# Patient Record
Sex: MALE | Race: BLACK OR AFRICAN AMERICAN | Employment: OTHER | URBAN - METROPOLITAN AREA
[De-identification: names, ages, dates, MRNs, and addresses within clinical notes are randomized per-mention and may not be internally consistent; named-entity substitution may affect disease eponyms.]

---

## 2017-01-16 ENCOUNTER — HOSPITAL ENCOUNTER (EMERGENCY)
Age: 67
Discharge: HOME | DRG: 638 | End: 2017-01-17
Attending: EMERGENCY MEDICINE
Payer: MEDICARE

## 2017-01-16 DIAGNOSIS — R73.9 HYPERGLYCEMIA: Primary | ICD-10-CM

## 2017-01-16 DIAGNOSIS — N18.9 CHRONIC KIDNEY DISEASE, UNSPECIFIED STAGE: ICD-10-CM

## 2017-01-17 VITALS
HEIGHT: 70 IN | RESPIRATION RATE: 16 BRPM | HEART RATE: 70 BPM | BODY MASS INDEX: 17.9 KG/M2 | TEMPERATURE: 98.2 F | DIASTOLIC BLOOD PRESSURE: 87 MMHG | SYSTOLIC BLOOD PRESSURE: 167 MMHG | WEIGHT: 125 LBS | OXYGEN SATURATION: 100 %

## 2017-01-17 LAB
ALBUMIN SERPL BCP-MCNC: 3.6 G/DL (ref 3.5–5)
ALBUMIN/GLOB SERPL: 1.2 {RATIO} (ref 1.1–2.2)
ALP SERPL-CCNC: 93 U/L (ref 45–117)
ALT SERPL-CCNC: 44 U/L (ref 12–78)
ANION GAP BLD CALC-SCNC: 12 MMOL/L (ref 5–15)
APPEARANCE UR: CLEAR
AST SERPL W P-5'-P-CCNC: 45 U/L (ref 15–37)
BACTERIA URNS QL MICRO: NEGATIVE /HPF
BASOPHILS # BLD AUTO: 0 K/UL (ref 0–0.1)
BASOPHILS # BLD: 0 % (ref 0–1)
BILIRUB SERPL-MCNC: 0.5 MG/DL (ref 0.2–1)
BILIRUB UR QL: NEGATIVE
BUN SERPL-MCNC: 39 MG/DL (ref 6–20)
BUN/CREAT SERPL: 8 (ref 12–20)
CALCIUM SERPL-MCNC: 8.6 MG/DL (ref 8.5–10.1)
CHLORIDE SERPL-SCNC: 99 MMOL/L (ref 97–108)
CO2 SERPL-SCNC: 19 MMOL/L (ref 21–32)
COLOR UR: ABNORMAL
CREAT SERPL-MCNC: 5.12 MG/DL (ref 0.7–1.3)
EOSINOPHIL # BLD: 0.1 K/UL (ref 0–0.4)
EOSINOPHIL NFR BLD: 1 % (ref 0–7)
EPITH CASTS URNS QL MICRO: ABNORMAL /LPF
ERYTHROCYTE [DISTWIDTH] IN BLOOD BY AUTOMATED COUNT: 14.4 % (ref 11.5–14.5)
GLOBULIN SER CALC-MCNC: 3 G/DL (ref 2–4)
GLUCOSE BLD STRIP.AUTO-MCNC: 125 MG/DL (ref 65–100)
GLUCOSE BLD STRIP.AUTO-MCNC: 462 MG/DL (ref 65–100)
GLUCOSE BLD STRIP.AUTO-MCNC: >600 MG/DL (ref 65–100)
GLUCOSE BLD STRIP.AUTO-MCNC: >600 MG/DL (ref 65–100)
GLUCOSE SERPL-MCNC: 431 MG/DL (ref 65–100)
GLUCOSE UR STRIP.AUTO-MCNC: >1000 MG/DL
HCT VFR BLD AUTO: 27 % (ref 36.6–50.3)
HGB BLD-MCNC: 9.4 G/DL (ref 12.1–17)
HGB UR QL STRIP: ABNORMAL
HYALINE CASTS URNS QL MICRO: ABNORMAL /LPF (ref 0–5)
KETONES UR QL STRIP.AUTO: NEGATIVE MG/DL
LACTATE SERPL-SCNC: 1.9 MMOL/L (ref 0.4–2)
LEUKOCYTE ESTERASE UR QL STRIP.AUTO: NEGATIVE
LYMPHOCYTES # BLD AUTO: 13 % (ref 12–49)
LYMPHOCYTES # BLD: 1.2 K/UL (ref 0.8–3.5)
MCH RBC QN AUTO: 30.8 PG (ref 26–34)
MCHC RBC AUTO-ENTMCNC: 34.8 G/DL (ref 30–36.5)
MCV RBC AUTO: 88.5 FL (ref 80–99)
MONOCYTES # BLD: 0.8 K/UL (ref 0–1)
MONOCYTES NFR BLD AUTO: 8 % (ref 5–13)
NEUTS SEG # BLD: 7.3 K/UL (ref 1.8–8)
NEUTS SEG NFR BLD AUTO: 78 % (ref 32–75)
NITRITE UR QL STRIP.AUTO: NEGATIVE
PH UR STRIP: 7 [PH] (ref 5–8)
PLATELET # BLD AUTO: 245 K/UL (ref 150–400)
POTASSIUM SERPL-SCNC: 4.3 MMOL/L (ref 3.5–5.1)
PROT SERPL-MCNC: 6.6 G/DL (ref 6.4–8.2)
PROT UR STRIP-MCNC: 30 MG/DL
RBC # BLD AUTO: 3.05 M/UL (ref 4.1–5.7)
RBC #/AREA URNS HPF: ABNORMAL /HPF (ref 0–5)
SERVICE CMNT-IMP: ABNORMAL
SODIUM SERPL-SCNC: 130 MMOL/L (ref 136–145)
SP GR UR REFRACTOMETRY: 1.02 (ref 1–1.03)
UA: UC IF INDICATED,UAUC: ABNORMAL
UROBILINOGEN UR QL STRIP.AUTO: 0.2 EU/DL (ref 0.2–1)
WBC # BLD AUTO: 9.4 K/UL (ref 4.1–11.1)
WBC URNS QL MICRO: ABNORMAL /HPF (ref 0–4)

## 2017-01-17 RX ADMIN — SODIUM CHLORIDE 1000 ML: 900 INJECTION, SOLUTION INTRAVENOUS at 04:45

## 2017-01-17 RX ADMIN — SODIUM CHLORIDE 1000 ML: 900 INJECTION, SOLUTION INTRAVENOUS at 01:37

## 2017-01-17 RX ADMIN — INSULIN HUMAN 10 UNITS: 100 INJECTION, SOLUTION PARENTERAL at 01:36

## 2017-01-17 RX ADMIN — INSULIN HUMAN 10 UNITS: 100 INJECTION, SOLUTION PARENTERAL at 04:45

## 2017-01-17 NOTE — ED NOTES
Went in to give pt insulin and start iv fluid bolus and pts iv pulled out. Attempted x 5 to restart iv, unsuccessful. Spoke with Ed charge RN who will attempt iv. Dr Angela Mcghee informed.

## 2017-01-17 NOTE — ED PROVIDER NOTES
HPI Comments: Iliana Recinos. is a 61 y.o. male with h/o DM, Alzheimer's disease who presents via EMS to the ED from [facility] with c/o hyperglycemia. Per EMS, SNF staff reported that pt's BG has been reading \"high\" all day and the last time he received insulin was at 1100 this morning. Pt with no complaints, denies any pain, nausea, or SOB. PCP: PROVIDER UNKNOWN  PMHx significant for: Alzheimer's disease, CKD, HTN, DM, hyperlipidemia, prostate cancer  PSHx significant for: right shoulder surgery, EGD, colonoscopy   Social Hx:  smoking (-)                      alcohol (-)                        This history is limited by the condition of the pt. There are no other complaints, changes, or physical findings at this time. Written by IMAN Paez, as dictated by Augie Mary MD     The history is provided by the patient and the nursing home. The history is limited by the condition of the patient. Past Medical History:   Diagnosis Date    Alzheimer disease     CAD (coronary artery disease)     Cancer ([address]      prostate    Chronic kidney disease     Dementia     Diabetes ([address]     DM (diabetes mellitus), type 2, uncontrolled ([address]     Hyperlipidemia     Hypertension     Memory loss or impairment     Other ill-defined conditions(799.89)      blind R eye-retina detachment    Other ill-defined conditions(799.89)      right cerebellopontine angle lipoma.         Past Surgical History:   Procedure Laterality Date    Hx shoulder arthroscopy       right    Hx urological       prostate bx    Hx heent       retinal detachment    Upper gi endoscopy,biopsy  11/12/2014          Colonoscopy,diagnostic  11/12/2014               Family History:   Problem Relation Age of Onset    Heart Disease Mother      Pacemaker    Cancer Father        Social History     Social History    Marital status:      Spouse name: N/A    Number of children: N/A    Years of education: N/A Occupational History    Not on file. Social History Main Topics    Smoking status: Never Smoker    Smokeless tobacco: Never Used    Alcohol use No    Drug use: No    Sexual activity: Not Currently     Other Topics Concern    Not on file     Social History Narrative         ALLERGIES: Ace inhibitors and Arb-angiotensin receptor antagonist    Review of Systems   Constitutional: Negative. Negative for fever. HENT: Negative. Eyes: Negative. Respiratory: Negative. Negative for cough, chest tightness and shortness of breath. Cardiovascular: Negative. Negative for chest pain and leg swelling. Gastrointestinal: Negative. Negative for abdominal pain, diarrhea, nausea and vomiting. Endocrine: Negative. Genitourinary: Negative. Negative for difficulty urinating and dysuria. Musculoskeletal: Negative. Negative for myalgias. Skin: Negative. Neurological: Negative. Psychiatric/Behavioral: Negative. All other systems reviewed and are negative. Patient Vitals for the past 12 hrs:   Temp Pulse Resp BP SpO2   01/17/17 0652 - 81 18 161/80 -   01/17/17 0630 - 76 24 159/79 -   01/17/17 0615 - 79 18 186/88 -   01/17/17 0545 - 75 18 155/73 100 %   01/17/17 0530 - 75 19 175/72 100 %   01/17/17 0515 - 73 19 175/80 100 %   01/17/17 0500 - 72 19 182/77 100 %   01/17/17 0448 - 70 14 177/77 100 %   01/17/17 0415 - 79 19 160/83 100 %   01/17/17 0400 - 80 21 155/77 100 %   01/17/17 0300 - 81 15 160/75 100 %   01/17/17 0030 - 82 17 184/89 100 %   01/17/17 0001 - 75 19 174/80 100 %   01/16/17 2336 98.2 °F (36.8 °C) 78 19 178/79 -             Physical Exam   Constitutional: He appears well-developed and well-nourished. No distress. HENT:   Head: Normocephalic and atraumatic. Nose: Nose normal.   Mouth/Throat: No oropharyngeal exudate. Eyes: Conjunctivae and EOM are normal. Pupils are equal, round, and reactive to light. Neck: Normal range of motion. Neck supple. No JVD present. Cardiovascular: Normal rate, regular rhythm, normal heart sounds and intact distal pulses. Exam reveals no friction rub. No murmur heard. Pulmonary/Chest: Effort normal and breath sounds normal. No stridor. No respiratory distress. He has no wheezes. He has no rales. Abdominal: Soft. Bowel sounds are normal. He exhibits no distension. There is no tenderness. There is no rebound. Musculoskeletal: Normal range of motion. He exhibits no tenderness. Neurological: He is alert. He has normal strength. He is disoriented. No cranial nerve deficit. GCS eye subscore is 4. GCS verbal subscore is 4. GCS motor subscore is 6. Skin: Skin is warm and dry. No rash noted. He is not diaphoretic. Psychiatric: He has a normal mood and affect. His behavior is normal. Judgment and thought content normal.   Nursing note and vitals reviewed. MDM  Number of Diagnoses or Management Options  Chronic kidney disease, unspecified stage:   Hyperglycemia:   Diagnosis management comments: DDX:  Hyperglycemia, dka, uti    Plan:  Labs, ivf, insulin, ua    Impression:  Hyperglycemia           Amount and/or Complexity of Data Reviewed  Clinical lab tests: ordered and reviewed  Obtain history from someone other than the patient: yes (EMS)  Review and summarize past medical records: yes  Discuss the patient with other providers: yes (EMS)      ED Course       Procedures    I reviewed our electronic medical record system for any past medical records that were available that may contribute to the patients current condition, the nursing notes and and vital signs from today's visit    Nursing notes will be reviewed as they become available in realtime while the pt is in the ED. Sea Payne MD    12:35 AM  Pt's rate is 98 with a normal rhythm as noted on Cardiac monitor.   SpO2 is 100% on room air  Written by Fredy Martinez ED scribe, as dictated by Sea Payne MD      Progress Note:  12:35 AM  POC glucose on arrival to the ED is >600. Have ordered 1L NS bolus and 10 units of insulin  Written by George Godoy. Fu, ED scribe, as dictated by Sotero Daugherty MD      Progress Note:  2:45 AM  Repeat POC glucose still at 462, will order another 10 units of insulin and hang another liter of IVF  Written by George Godoy. Fu, ED scribe, as dictated by Sotero Daugherty MD     Progress Note:  4:50 AM  Informed by nursing pt had not received the 2nd dose of insulin as he had pulled out his IV and they had been unable to regain IV access. They have now regained IV access and are starting the 2nd round of medications. Written by George Godoy Fu, ED scribe, as dictated by Sotero Daugherty MD      Progress note:  7:11 AM  Repeat POC glucose down to 125. Pt noted to be feeling better, ready for discharge. Discussed lab findings with pt and the plan for d/c back to SNF. Will follow up as instructed. All questions have been answered, pt voiced understanding and agreement with plan. If narcotics were prescribed, pt was advised not to drive or operate heavy machinery. If abx were prescribed, pt advised that diarrhea and rash are possible side effects of the medications. Specific return precautions provided as well as instructions to return to the ED should sx worsen at any time.   Sotero Daugherty MD     LABORATORY TESTS:  Recent Results (from the past 12 hour(s))   GLUCOSE, POC    Collection Time: 01/16/17 11:50 PM   Result Value Ref Range    Glucose (POC) >600 (HH) 65 - 100 mg/dL    Performed by Gentry Haines    GLUCOSE, POC    Collection Time: 01/16/17 11:54 PM   Result Value Ref Range    Glucose (POC) >600 (HH) 65 - 100 mg/dL    Performed by Gentry Haines    CBC WITH AUTOMATED DIFF    Collection Time: 01/17/17 12:59 AM   Result Value Ref Range    WBC 9.4 4.1 - 11.1 K/uL    RBC 3.05 (L) 4.10 - 5.70 M/uL    HGB 9.4 (L) 12.1 - 17.0 g/dL    HCT 27.0 (L) 36.6 - 50.3 %    MCV 88.5 80.0 - 99.0 FL    MCH 30.8 26.0 - 34.0 PG    MCHC 34.8 30.0 - 36.5 g/dL    RDW 14.4 11.5 - 14.5 %    PLATELET 953 008 - 817 K/uL    NEUTROPHILS 78 (H) 32 - 75 %    LYMPHOCYTES 13 12 - 49 %    MONOCYTES 8 5 - 13 %    EOSINOPHILS 1 0 - 7 %    BASOPHILS 0 0 - 1 %    ABS. NEUTROPHILS 7.3 1.8 - 8.0 K/UL    ABS. LYMPHOCYTES 1.2 0.8 - 3.5 K/UL    ABS. MONOCYTES 0.8 0.0 - 1.0 K/UL    ABS. EOSINOPHILS 0.1 0.0 - 0.4 K/UL    ABS. BASOPHILS 0.0 0.0 - 0.1 K/UL   URINALYSIS W/ REFLEX CULTURE    Collection Time: 01/17/17 12:59 AM   Result Value Ref Range    Color YELLOW/STRAW      Appearance CLEAR CLEAR      Specific gravity 1.019 1.003 - 1.030      pH (UA) 7.0 5.0 - 8.0      Protein 30 (A) NEG mg/dL    Glucose >1000 (A) NEG mg/dL    Ketone NEGATIVE  NEG mg/dL    Bilirubin NEGATIVE  NEG      Blood TRACE (A) NEG      Urobilinogen 0.2 0.2 - 1.0 EU/dL    Nitrites NEGATIVE  NEG      Leukocyte Esterase NEGATIVE  NEG      WBC 0-4 0 - 4 /hpf    RBC 5-10 0 - 5 /hpf    Epithelial cells FEW FEW /lpf    Bacteria NEGATIVE  NEG /hpf    UA:UC IF INDICATED CULTURE NOT INDICATED BY UA RESULT CNI      Hyaline Cast 0-2 0 - 5 /lpf   LACTIC ACID, PLASMA    Collection Time: 01/17/17 12:59 AM   Result Value Ref Range    Lactic acid 1.9 0.4 - 2.0 MMOL/L   GLUCOSE, POC    Collection Time: 01/17/17  2:34 AM   Result Value Ref Range    Glucose (POC) 462 (H) 65 - 100 mg/dL    Performed by Elis Neves    METABOLIC PANEL, COMPREHENSIVE    Collection Time: 01/17/17  2:37 AM   Result Value Ref Range    Sodium 130 (L) 136 - 145 mmol/L    Potassium 4.3 3.5 - 5.1 mmol/L    Chloride 99 97 - 108 mmol/L    CO2 19 (L) 21 - 32 mmol/L    Anion gap 12 5 - 15 mmol/L    Glucose 431 (H) 65 - 100 mg/dL    BUN 39 (H) 6 - 20 MG/DL    Creatinine 5.12 (H) 0.70 - 1.30 MG/DL    BUN/Creatinine ratio 8 (L) 12 - 20      GFR est AA 14 (L) >60 ml/min/1.73m2    GFR est non-AA 11 (L) >60 ml/min/1.73m2    Calcium 8.6 8.5 - 10.1 MG/DL    Bilirubin, total 0.5 0.2 - 1.0 MG/DL    ALT 44 12 - 78 U/L    AST 45 (H) 15 - 37 U/L    Alk.  phosphatase 93 45 - 117 U/L    Protein, total 6.6 6.4 - 8.2 g/dL    Albumin 3.6 3.5 - 5.0 g/dL    Globulin 3.0 2.0 - 4.0 g/dL    A-G Ratio 1.2 1.1 - 2.2     GLUCOSE, POC    Collection Time: 01/17/17  6:54 AM   Result Value Ref Range    Glucose (POC) 125 (H) 65 - 100 mg/dL    Performed by Zack Mckoy        MEDICATIONS GIVEN:  Medications   sodium chloride 0.9 % bolus infusion 1,000 mL (0 mL IntraVENous IV Completed 1/17/17 0245)   insulin regular (NOVOLIN R, HUMULIN R) injection 10 Units (10 Units IntraVENous Given 1/17/17 0136)   sodium chloride 0.9 % bolus infusion 1,000 mL (1,000 mL IntraVENous New Bag 1/17/17 0445)   insulin regular (NOVOLIN R, HUMULIN R) injection 10 Units (10 Units IntraVENous Given 1/17/17 0445)       IMPRESSION:  1. Hyperglycemia    2. Chronic kidney disease, unspecified stage        PLAN:  1. Discharge home  Current Discharge Medication List      CONTINUE these medications which have NOT CHANGED    Details   amoxicillin-clavulanate (AUGMENTIN) 875-125 mg per tablet Take  by mouth every twelve (12) hours. albuterol-ipratropium (DUO-NEB) 2.5 mg-0.5 mg/3 ml nebu 3 mL by Nebulization route. !! amLODIPine (NORVASC) 5 mg tablet Take 5 mg by mouth daily. Polyethylene Glycol 3350 powd by Does Not Apply route. mirtazapine (REMERON) 15 mg tablet Take 15 mg by mouth nightly. Indications: MAJOR DEPRESSIVE DISORDER      omeprazole (PRILOSEC) 10 mg capsule Take 10 mg by mouth daily. Indications: GASTROESOPHAGEAL REFLUX      !! amLODIPine (NORVASC) 2.5 mg tablet Take 2.5 mg by mouth daily. Indications: HYPERTENSION      sodium polystyrene (KAYEXALATE) powder Take 30 g by mouth every fourty-eight (48) hours. tamsulosin (FLOMAX) 0.4 mg capsule Take 0.4 mg by mouth daily. calcitRIOL (ROCALTROL) 0.25 mcg capsule Take 0.25 mcg by mouth daily. ondansetron hcl (ZOFRAN, AS HYDROCHLORIDE,) 4 mg tablet Take 1 Tab by mouth every eight (8) hours as needed for Nausea.   Qty: 20 Tab, Refills: 0      b complex-vitamin c-folic acid (NEPHROCAPS) 1 mg capsule Take 1 Cap by mouth daily. insulin detemir (LEVEMIR) 100 unit/mL injection 0.07 mL by SubCUTAneous route daily. Qty: 10 mL, Refills: 10      insulin aspart (NOVOLOG) 100 unit/mL injection Novolog BEFORE MEALS: 0-90: hold, : 1 unit, 176-225: 2 units, 226-300: 3 units, 301 +    : 4 units, Novolog at BEDTIME - 225-300: 1 unit, 301 + 2 units  Qty: 10 mL, Refills: 10      glucose blood VI test strips (ASCENSIA AUTODISC VI, ONE TOUCH ULTRA TEST VI) strip Monitor glucose before meals and at bedtime and as needed for symptoms - 4-5 times daily. Qty: 150 Strip, Refills: 11      sodium bicarbonate 650 mg tablet Take 2 tablets by mouth three (3) times daily. Qty: 180 tablet, Refills: 1      ferrous sulfate (SLOW FE) 142 mg (45 mg iron) ER tablet Take 142 mg by mouth Daily (before breakfast). glucagon (GLUCAGEN) 1 mg injection 1 mL by IntraMUSCular route as needed for Hypoglycemia. Qty: 1 Vial, Refills: 0      pravastatin (PRAVACHOL) 10 mg tablet Take 1 Tab by mouth nightly. Qty: 30 Tab, Refills: 5      ergocalciferol (ERGOCALCIFEROL) 50,000 unit capsule Take 50,000 Units by mouth every seven (7) days. acetaminophen (TYLENOL) 325 mg tablet Take 325 mg by mouth every four (4) hours as needed. !! - Potential duplicate medications found. Please discuss with provider. Follow-up Information     Follow up With Details Comments Contact Info    Katherin Araujo MD Schedule an appointment as soon as possible for a visit in 2 days  [address] [place] [address] [facility]  [phone]        Return to ED if worse     DISCHARGE NOTE  7:12 AM   Pt has been re-evaluated. He has no new complaints, changes, or physical findings. All diagnostic results have been reviewed and discussed with the pt. Care plan has been outlined and discussed, and he understands all current sx, dx, tx, and rx. All of the pt's questions have been answered and concerns addressed.  All medications have been reviewed with the pt. He was instructed to and agrees to follow up with his PCP, or to return to the ED should his sxs worsen prior to follow up. Yvonne Bookeralisia. is ready for discharge. This note is prepared by Urszula Carrasco, acting as scribe for Muriel Arias MD.     Muriel Arias MD: The scribe's documentation has been prepared under my direction and personally reviewed by me in its entirety. I confirm that the note above accurately reflects all work, treatment, procedures, and medical decision making performed by me                                         This note will not be viewable in [address]

## 2017-01-17 NOTE — ED NOTES
Bedside and Verbal shift change report given to Greg Romeo RN (oncoming nurse) by Luis Manuel Duval RN (offgoing nurse). Report included the following information SBAR, ED Summary, MAR and Recent Results. Pt resting comfortably on stretcher, call bell in reach, on monitor x3.

## 2017-01-17 NOTE — PROGRESS NOTES
Care Management ED Assessment    **CM Consulted to assist with transportation home**    Mohan Vieira. is a 61 y.o. male with h/o DM, Alzheimer's disease who presents via EMS to the ED from [facility] with c/o hyperglycemia. Per EMS, SNF staff reported that pt's BG has been reading \"high\" all day and the last time he received insulin was at 1100 this morning. Medically stable for discharge. Discharge orders written.      Past Medical History          Past Medical History:   Diagnosis Date    Alzheimer disease      CAD (coronary artery disease)      [address]         prostate    Chronic kidney disease      Dementia      Diabetes ([facility]      DM (diabetes mellitus), type 2, uncontrolled ([facility]      Hyperlipidemia      Hypertension      Memory loss or impairment      Other ill-defined conditions(799.89)         blind R eye-retina detachment    Other ill-defined conditions(799.89)         right cerebellopontine angle lipoma.             Care Management Interventions  PCP Verified by CM: No  Mode of Transport at Discharge: [facility] - Ref [id number], asked for first available)  Transition of Care Consult (CM Consult): Discharge Planning, SNF (Returning to [facility])  MyChart Signup: No  Discharge Durable Medical Equipment: No  Health Maintenance Reviewed: Yes  Physical Therapy Consult: No  Occupational Therapy Consult: No  Speech Therapy Consult: No  Current Support Network: Nursing Facility (SNF - [facility])  Plan discussed with Pt/Family/Caregiver: Yes (Met with patient he is agreeable to discharge plan.)  Discharge Location  Discharge Placement: Skilled nursing facility    MICHAEL Fabian, BSN, [place]  ED Care Management  [phone]

## 2017-01-17 NOTE — ED NOTES
Assumed care of patient and verbal bedside report from Jesus Pulido RN. Patient has no signs or symptoms of acute distress. Resting quietly in a position of comfort on stretcher with call bell in reach.

## 2017-01-17 NOTE — DISCHARGE INSTRUCTIONS
Learning About High Blood Sugar  What is high blood sugar? Your body turns the food you eat into glucose (sugar), which it uses for energy. But if your body isn't able to use the sugar right away, it can build up in your blood and lead to high blood sugar. When the amount of sugar in your blood stays too high for too much of the time, you may have diabetes. Diabetes is a disease that can cause serious health problems. The good news is that lifestyle changes may help you get your blood sugar back to normal and avoid or delay diabetes. What causes high blood sugar? Sugar (glucose) can build up in your blood if you:  · Are overweight. · Have a family history of diabetes. · Take certain medicines, such as steroids. What are the symptoms? Having high blood sugar may not cause any symptoms at all. Or it may make you feel very thirsty or very hungry. You may also urinate more often than usual, have blurry vision, or lose weight without trying. How is high blood sugar treated? You can take steps to lower your blood sugar level if you understand what makes it get higher. Your doctor may want you to learn how to test your blood sugar level at home. Then you can see how illness, stress, or different kinds of food or medicine raise or lower your blood sugar level. Other tests may be needed to see if you have diabetes. How can you prevent high blood sugar? · Watch your weight. If you're overweight, losing just a small amount of weight may help. Reducing fat around your waist is most important. · Limit the amount of calories, sweets, and unhealthy fat you eat. Ask your doctor if a dietitian can help you. A registered dietitian can help you create meal plans that fit your lifestyle. · Get at least 30 minutes of exercise on most days of the week. Exercise helps control your blood sugar. It also helps you maintain a healthy weight. Walking is a good choice.  You also may want to do other activities, such as running, swimming, cycling, or playing tennis or team sports. · If your doctor prescribed medicines, take them exactly as prescribed. Call your doctor if you think you are having a problem with your medicine. You will get more details on the specific medicines your doctor prescribes. Follow-up care is a key part of your treatment and safety. Be sure to make and go to all appointments, and call your doctor if you are having problems. It's also a good idea to know your test results and keep a list of the medicines you take. Where can you learn more? Go to http://ángel-lori.info/. Enter O108 in the search box to learn more about \"Learning About High Blood Sugar. \"  Current as of: May 23, 2016  Content Version: 11.1  © 2575-4371 [employer]. Care instructions adapted under license by [employer] (which disclaims liability or warranty for this information). If you have questions about a medical condition or this instruction, always ask your healthcare professional. [place] [address] any warranty or liability for your use of this information. Chronic Kidney Disease: Care Instructions  Your Care Instructions  Chronic kidney disease happens when your kidneys don't work as well as they should. Your kidneys have a few important jobs. They remove waste from your blood. This waste leaves your body in your urine. They also balance your body's fluids and chemicals. When your kidneys don't work well, extra waste and fluid can build up. This can poison the body and sometimes cause death. The most common causes of this disease are diabetes and high blood pressure. In some cases, the disease develops in 2 to 3 months. But it usually develops over many years. If you take medicine and make healthy changes to your lifestyle, you may be able to prevent the disease from getting worse.  But if your kidney damage gets worse, you may need dialysis or a kidney transplant. Dialysis uses a machine to filter waste from the blood. A transplant is surgery to give you a healthy kidney from another person. Follow-up care is a key part of your treatment and safety. Be sure to make and go to all appointments, and call your doctor if you are having problems. It's also a good idea to know your test results and keep a list of the medicines you take. How can you care for yourself at home? Treatments and appointments  · Be safe with medicines. Take your medicines exactly as prescribed. Call your doctor if you have any problems with your medicine. You also may take medicine to control your blood pressure or to treat diabetes. Many people who have diabetes take blood pressure medicine. · If you have diabetes, do your best to keep your blood sugar in your target range. You may do this by eating healthy food and exercising. You may also take medicines. · Go to your dialysis appointments if you have this treatment. · Do not take ibuprofen (Advil, Motrin), naproxen (Aleve), or similar medicines, unless your doctor tells you to. These may make the disease worse. · Do not take any vitamins, over-the-counter medicines, or herbal products without talking to your doctor first.  · Do not smoke or use other tobacco products. Smoking can reduce blood flow to the kidneys. If you need help quitting, talk to your doctor about stop-smoking programs and medicines. These can increase your chances of quitting for good. · Do not drink alcohol or use illegal drugs. · Talk to your doctor about an exercise plan. Exercise helps lower your blood pressure. It also makes you feel better. · If you have an advance directive, let your doctor know. It may include a living will and a durable power of  for health care. If you don't have one, you may want to prepare one. It lets your doctor and loved ones know your health care wishes if you become unable to speak for yourself.   Diet  · Talk to a registered dietitian. He or she can help you make a meal plan that is right for you. Most people with kidney disease need to limit salt (sodium), fluids, and protein. Some also have to limit potassium and phosphorus. · You may have to give up many foods you like. But try to focus on the fact that this will help you stay healthy for as long as possible. · If you have a hard time eating enough, talk to your doctor or dietitian about ways to add calories to your diet. · Your diet may change as your disease changes. See your doctor for regular testing. And work with a dietitian to change your diet as needed. When should you call for help? Call 911 anytime you think you may need emergency care. For example, call if:  · You passed out (lost consciousness). Call your doctor now or seek immediate medical care if:  · You have less urine than normal or no urine. · You have trouble urinating or can urinate only very small amounts. · You are confused or have trouble thinking clearly. · You feel weaker or more tired than usual.  · You are very thirsty, lightheaded, or dizzy. · You have nausea and vomiting. · You have new swelling of your arms or feet, or your swelling is worse. · You have blood in your urine. · You have new or worse trouble breathing. Watch closely for changes in your health, and be sure to contact your doctor if:  · You have any problems with your medicine or other treatment. Where can you learn more? Go to http://ángel-lori.info/. Enter N276 in the search box to learn more about \"Chronic Kidney Disease: Care Instructions. \"  Current as of: November 20, 2015  Content Version: 11.1  © 0953-0812 [employer]. Care instructions adapted under license by [employer] (which disclaims liability or warranty for this information).  If you have questions about a medical condition or this instruction, always ask your healthcare professional. [employer] disclaims any warranty or liability for your use of this information. Learning About High Blood Sugar  What is high blood sugar? Your body turns the food you eat into glucose (sugar), which it uses for energy. But if your body isn't able to use the sugar right away, it can build up in your blood and lead to high blood sugar. When the amount of sugar in your blood stays too high for too much of the time, you may have diabetes. Diabetes is a disease that can cause serious health problems. The good news is that lifestyle changes may help you get your blood sugar back to normal and avoid or delay diabetes. What causes high blood sugar? Sugar (glucose) can build up in your blood if you:  · Are overweight. · Have a family history of diabetes. · Take certain medicines, such as steroids. What are the symptoms? Having high blood sugar may not cause any symptoms at all. Or it may make you feel very thirsty or very hungry. You may also urinate more often than usual, have blurry vision, or lose weight without trying. How is high blood sugar treated? You can take steps to lower your blood sugar level if you understand what makes it get higher. Your doctor may want you to learn how to test your blood sugar level at home. Then you can see how illness, stress, or different kinds of food or medicine raise or lower your blood sugar level. Other tests may be needed to see if you have diabetes. How can you prevent high blood sugar? · Watch your weight. If you're overweight, losing just a small amount of weight may help. Reducing fat around your waist is most important. · Limit the amount of calories, sweets, and unhealthy fat you eat. Ask your doctor if a dietitian can help you. A registered dietitian can help you create meal plans that fit your lifestyle. · Get at least 30 minutes of exercise on most days of the week. Exercise helps control your blood sugar.  It also helps you maintain a healthy weight. Walking is a good choice. You also may want to do other activities, such as running, swimming, cycling, or playing tennis or team sports. · If your doctor prescribed medicines, take them exactly as prescribed. Call your doctor if you think you are having a problem with your medicine. You will get more details on the specific medicines your doctor prescribes. Follow-up care is a key part of your treatment and safety. Be sure to make and go to all appointments, and call your doctor if you are having problems. It's also a good idea to know your test results and keep a list of the medicines you take. Where can you learn more? Go to http://ángel-olri.info/. Enter O108 in the search box to learn more about \"Learning About High Blood Sugar. \"  Current as of: May 23, 2016  Content Version: 11.1  © 5611-1585 [employer]. Care instructions adapted under license by [employer] (which disclaims liability or warranty for this information). If you have questions about a medical condition or this instruction, always ask your healthcare professional. [address] any warranty or liability for your use of this information.

## 2017-01-17 NOTE — ED NOTES
Bedside verbal report given to Mary Beth BECKETT RN. Patient care transferred. Patient included in bedside report.

## 2017-01-17 NOTE — ED NOTES
Pt to ED via EMS from [facility]. Per EMS patient with history of dementia and is at baseline neuro status. Pt is alert and oriented x2. Pt ambulatory with steady gait from EMS stretcher to ED bed. Pt denies any pain-0/10, and states that he feels \"tired\". Per EMS pt is here for cc of hyperglycemia-reading \"High\" at nursing home, and per EMS last received insulin at 1100am.  Pt is poor historian and does not know his medications or when he last received insulin. Pt in no acute distress.

## 2017-01-18 ENCOUNTER — HOSPITAL ENCOUNTER (INPATIENT)
Age: 67
LOS: 5 days | Discharge: NURSING, REHAB OR LONG-TERM CARE | DRG: 638 | End: 2017-01-23
Attending: EMERGENCY MEDICINE | Admitting: INTERNAL MEDICINE
Payer: MEDICARE

## 2017-01-18 ENCOUNTER — APPOINTMENT (OUTPATIENT)
Dept: GENERAL RADIOLOGY | Age: 67
DRG: 638 | End: 2017-01-18
Attending: INTERNAL MEDICINE
Payer: MEDICARE

## 2017-01-18 DIAGNOSIS — Z71.89 DO NOT RESUSCITATE DISCUSSION: ICD-10-CM

## 2017-01-18 DIAGNOSIS — E87.1 HYPONATREMIA: ICD-10-CM

## 2017-01-18 DIAGNOSIS — N18.4 CHRONIC KIDNEY DISEASE, STAGE 4 (SEVERE) (HCC): ICD-10-CM

## 2017-01-18 DIAGNOSIS — Z66 DO NOT RESUSCITATE: ICD-10-CM

## 2017-01-18 DIAGNOSIS — N17.9 ACUTE RENAL FAILURE, UNSPECIFIED ACUTE RENAL FAILURE TYPE (HCC): ICD-10-CM

## 2017-01-18 DIAGNOSIS — Z71.89 COUNSELING REGARDING GOALS OF CARE: ICD-10-CM

## 2017-01-18 DIAGNOSIS — R73.9 HYPERGLYCEMIA: ICD-10-CM

## 2017-01-18 DIAGNOSIS — E11.10 DIABETIC KETOACIDOSIS WITHOUT COMA ASSOCIATED WITH TYPE 2 DIABETES MELLITUS (HCC): Primary | ICD-10-CM

## 2017-01-18 DIAGNOSIS — F03.90 DEMENTIA WITHOUT BEHAVIORAL DISTURBANCE, UNSPECIFIED DEMENTIA TYPE: Chronic | ICD-10-CM

## 2017-01-18 DIAGNOSIS — R11.10 VOMITING, INTRACTABILITY OF VOMITING NOT SPECIFIED, PRESENCE OF NAUSEA NOT SPECIFIED, UNSPECIFIED VOMITING TYPE: ICD-10-CM

## 2017-01-18 LAB
ALBUMIN SERPL BCP-MCNC: 3.7 G/DL (ref 3.5–5)
ALBUMIN/GLOB SERPL: 1.2 {RATIO} (ref 1.1–2.2)
ALP SERPL-CCNC: 105 U/L (ref 45–117)
ALT SERPL-CCNC: 55 U/L (ref 12–78)
ANION GAP BLD CALC-SCNC: 13 MMOL/L (ref 5–15)
ANION GAP BLD CALC-SCNC: 14 MMOL/L (ref 5–15)
ANION GAP BLD CALC-SCNC: 18 MMOL/L (ref 5–15)
APPEARANCE UR: CLEAR
ARTERIAL PATENCY WRIST A: YES
AST SERPL W P-5'-P-CCNC: 85 U/L (ref 15–37)
ATRIAL RATE: 99 BPM
BACTERIA URNS QL MICRO: NEGATIVE /HPF
BASE DEFICIT BLDA-SCNC: 9 MMOL/L
BASOPHILS # BLD AUTO: 0 K/UL (ref 0–0.1)
BASOPHILS # BLD AUTO: ABNORMAL K/UL
BASOPHILS # BLD: 0 % (ref 0–1)
BASOPHILS # BLD: ABNORMAL %
BDY SITE: ABNORMAL
BILIRUB SERPL-MCNC: 0.9 MG/DL (ref 0.2–1)
BILIRUB UR QL: NEGATIVE
BREATHS.SPONTANEOUS ON VENT: 18
BUN SERPL-MCNC: 37 MG/DL (ref 6–20)
BUN SERPL-MCNC: 43 MG/DL (ref 6–20)
BUN SERPL-MCNC: 46 MG/DL (ref 6–20)
BUN/CREAT SERPL: 10 (ref 12–20)
BUN/CREAT SERPL: 9 (ref 12–20)
BUN/CREAT SERPL: 9 (ref 12–20)
CALCIUM SERPL-MCNC: 7.7 MG/DL (ref 8.5–10.1)
CALCIUM SERPL-MCNC: 7.7 MG/DL (ref 8.5–10.1)
CALCIUM SERPL-MCNC: 8.1 MG/DL (ref 8.5–10.1)
CALCULATED P AXIS, ECG09: 47 DEGREES
CALCULATED R AXIS, ECG10: 19 DEGREES
CALCULATED T AXIS, ECG11: 25 DEGREES
CHLORIDE SERPL-SCNC: 106 MMOL/L (ref 97–108)
CHLORIDE SERPL-SCNC: 106 MMOL/L (ref 97–108)
CHLORIDE SERPL-SCNC: 89 MMOL/L (ref 97–108)
CK MB CFR SERPL CALC: 1.6 % (ref 0–2.5)
CK MB SERPL-MCNC: 3.6 NG/ML (ref 5–25)
CK SERPL-CCNC: 229 U/L (ref 39–308)
CO2 SERPL-SCNC: 14 MMOL/L (ref 21–32)
CO2 SERPL-SCNC: 17 MMOL/L (ref 21–32)
CO2 SERPL-SCNC: 18 MMOL/L (ref 21–32)
COLOR UR: ABNORMAL
CREAT SERPL-MCNC: 4.21 MG/DL (ref 0.7–1.3)
CREAT SERPL-MCNC: 4.47 MG/DL (ref 0.7–1.3)
CREAT SERPL-MCNC: 5.04 MG/DL (ref 0.7–1.3)
DIAGNOSIS, 93000: NORMAL
DIFFERENTIAL METHOD BLD: ABNORMAL
DIFFERENTIAL METHOD BLD: ABNORMAL
EOSINOPHIL # BLD: 0 K/UL (ref 0–0.4)
EOSINOPHIL # BLD: ABNORMAL K/UL
EOSINOPHIL NFR BLD: 0 % (ref 0–7)
EOSINOPHIL NFR BLD: ABNORMAL %
EPITH CASTS URNS QL MICRO: ABNORMAL /LPF
ERYTHROCYTE [DISTWIDTH] IN BLOOD BY AUTOMATED COUNT: 14.4 % (ref 11.5–14.5)
ERYTHROCYTE [DISTWIDTH] IN BLOOD BY AUTOMATED COUNT: 14.4 % (ref 11.5–14.5)
EST. AVERAGE GLUCOSE BLD GHB EST-MCNC: 240 MG/DL
FERRITIN SERPL-MCNC: 440 NG/ML (ref 26–388)
FIO2 ON VENT: 21 %
GLOBULIN SER CALC-MCNC: 3 G/DL (ref 2–4)
GLUCOSE BLD STRIP.AUTO-MCNC: 101 MG/DL (ref 65–100)
GLUCOSE BLD STRIP.AUTO-MCNC: 113 MG/DL (ref 65–100)
GLUCOSE BLD STRIP.AUTO-MCNC: 114 MG/DL (ref 65–100)
GLUCOSE BLD STRIP.AUTO-MCNC: 128 MG/DL (ref 65–100)
GLUCOSE BLD STRIP.AUTO-MCNC: 188 MG/DL (ref 65–100)
GLUCOSE BLD STRIP.AUTO-MCNC: 203 MG/DL (ref 65–100)
GLUCOSE BLD STRIP.AUTO-MCNC: 216 MG/DL (ref 65–100)
GLUCOSE BLD STRIP.AUTO-MCNC: 261 MG/DL (ref 65–100)
GLUCOSE BLD STRIP.AUTO-MCNC: 290 MG/DL (ref 65–100)
GLUCOSE BLD STRIP.AUTO-MCNC: 378 MG/DL (ref 65–100)
GLUCOSE BLD STRIP.AUTO-MCNC: 382 MG/DL (ref 65–100)
GLUCOSE BLD STRIP.AUTO-MCNC: 383 MG/DL (ref 65–100)
GLUCOSE BLD STRIP.AUTO-MCNC: 510 MG/DL (ref 65–100)
GLUCOSE BLD STRIP.AUTO-MCNC: 77 MG/DL (ref 65–100)
GLUCOSE BLD STRIP.AUTO-MCNC: 85 MG/DL (ref 65–100)
GLUCOSE BLD STRIP.AUTO-MCNC: 89 MG/DL (ref 65–100)
GLUCOSE BLD STRIP.AUTO-MCNC: >600 MG/DL (ref 65–100)
GLUCOSE SERPL-MCNC: 1094 MG/DL (ref 65–100)
GLUCOSE SERPL-MCNC: 125 MG/DL (ref 65–100)
GLUCOSE SERPL-MCNC: 150 MG/DL (ref 65–100)
GLUCOSE SERPL-MCNC: 798 MG/DL (ref 65–100)
GLUCOSE UR STRIP.AUTO-MCNC: >1000 MG/DL
HBA1C MFR BLD: 10 % (ref 4.2–6.3)
HCO3 BLDA-SCNC: 14 MMOL/L (ref 22–26)
HCT VFR BLD AUTO: 26 % (ref 36.6–50.3)
HCT VFR BLD AUTO: 26 % (ref 36.6–50.3)
HGB BLD-MCNC: 8.5 G/DL (ref 12.1–17)
HGB BLD-MCNC: 8.5 G/DL (ref 12.1–17)
HGB UR QL STRIP: ABNORMAL
IRON SATN MFR SERPL: 13 % (ref 20–50)
IRON SERPL-MCNC: 34 UG/DL (ref 35–150)
KETONES UR QL STRIP.AUTO: ABNORMAL MG/DL
LACTATE SERPL-SCNC: 1.8 MMOL/L (ref 0.4–2)
LEUKOCYTE ESTERASE UR QL STRIP.AUTO: NEGATIVE
LIPASE SERPL-CCNC: 36 U/L (ref 73–393)
LYMPHOCYTES # BLD AUTO: 8 % (ref 12–49)
LYMPHOCYTES # BLD AUTO: ABNORMAL %
LYMPHOCYTES # BLD: 0.8 K/UL (ref 0.8–3.5)
LYMPHOCYTES # BLD: ABNORMAL K/UL
MAGNESIUM SERPL-MCNC: 1.6 MG/DL (ref 1.6–2.4)
MAGNESIUM SERPL-MCNC: 1.8 MG/DL (ref 1.6–2.4)
MCH RBC QN AUTO: 30.7 PG (ref 26–34)
MCH RBC QN AUTO: 30.7 PG (ref 26–34)
MCHC RBC AUTO-ENTMCNC: 32.7 G/DL (ref 30–36.5)
MCHC RBC AUTO-ENTMCNC: 32.7 G/DL (ref 30–36.5)
MCV RBC AUTO: 93.9 FL (ref 80–99)
MCV RBC AUTO: 93.9 FL (ref 80–99)
MONOCYTES # BLD: 0.9 K/UL (ref 0–1)
MONOCYTES # BLD: ABNORMAL K/UL
MONOCYTES NFR BLD AUTO: 9 % (ref 5–13)
MONOCYTES NFR BLD AUTO: ABNORMAL %
NEUTS SEG # BLD: 7.9 K/UL (ref 1.8–8)
NEUTS SEG # BLD: ABNORMAL K/UL
NEUTS SEG NFR BLD AUTO: 83 % (ref 32–75)
NEUTS SEG NFR BLD AUTO: ABNORMAL %
NITRITE UR QL STRIP.AUTO: NEGATIVE
P-R INTERVAL, ECG05: 134 MS
PCO2 BLDA: 26 MMHG (ref 35–45)
PH BLDA: 7.37 [PH] (ref 7.35–7.45)
PH UR STRIP: 7 [PH] (ref 5–8)
PHOSPHATE SERPL-MCNC: 3.3 MG/DL (ref 2.6–4.7)
PHOSPHATE SERPL-MCNC: 3.4 MG/DL (ref 2.6–4.7)
PLATELET # BLD AUTO: 205 K/UL (ref 150–400)
PLATELET # BLD AUTO: 205 K/UL (ref 150–400)
PO2 BLDA: 112 MMHG (ref 80–100)
POTASSIUM SERPL-SCNC: 3.8 MMOL/L (ref 3.5–5.1)
POTASSIUM SERPL-SCNC: 4.1 MMOL/L (ref 3.5–5.1)
POTASSIUM SERPL-SCNC: 5.6 MMOL/L (ref 3.5–5.1)
PROT SERPL-MCNC: 6.7 G/DL (ref 6.4–8.2)
PROT UR STRIP-MCNC: 30 MG/DL
Q-T INTERVAL, ECG07: 344 MS
QRS DURATION, ECG06: 76 MS
QTC CALCULATION (BEZET), ECG08: 441 MS
RBC # BLD AUTO: 2.77 M/UL (ref 4.1–5.7)
RBC # BLD AUTO: 2.77 M/UL (ref 4.1–5.7)
RBC #/AREA URNS HPF: ABNORMAL /HPF (ref 0–5)
RBC MORPH BLD: ABNORMAL
SAO2 % BLD: 98 % (ref 92–97)
SAO2% DEVICE SAO2% SENSOR NAME: ABNORMAL
SERVICE CMNT-IMP: ABNORMAL
SERVICE CMNT-IMP: NORMAL
SODIUM SERPL-SCNC: 121 MMOL/L (ref 136–145)
SODIUM SERPL-SCNC: 137 MMOL/L (ref 136–145)
SODIUM SERPL-SCNC: 137 MMOL/L (ref 136–145)
SP GR UR REFRACTOMETRY: 1.01 (ref 1–1.03)
SPECIMEN SITE: ABNORMAL
TIBC SERPL-MCNC: 252 UG/DL (ref 250–450)
TROPONIN I SERPL-MCNC: <0.04 NG/ML
UA: UC IF INDICATED,UAUC: ABNORMAL
UROBILINOGEN UR QL STRIP.AUTO: 0.2 EU/DL (ref 0.2–1)
VENTRICULAR RATE, ECG03: 99 BPM
WBC # BLD AUTO: 9.6 K/UL (ref 4.1–11.1)
WBC # BLD AUTO: 9.6 K/UL (ref 4.1–11.1)
WBC URNS QL MICRO: ABNORMAL /HPF (ref 0–4)

## 2017-01-18 PROCEDURE — 82550 ASSAY OF CK (CPK): CPT | Performed by: EMERGENCY MEDICINE

## 2017-01-18 PROCEDURE — 82728 ASSAY OF FERRITIN: CPT | Performed by: INTERNAL MEDICINE

## 2017-01-18 PROCEDURE — 74011000258 HC RX REV CODE- 258: Performed by: INTERNAL MEDICINE

## 2017-01-18 PROCEDURE — 74011000250 HC RX REV CODE- 250: Performed by: INTERNAL MEDICINE

## 2017-01-18 PROCEDURE — 71010 XR CHEST PORT: CPT

## 2017-01-18 PROCEDURE — 36600 WITHDRAWAL OF ARTERIAL BLOOD: CPT | Performed by: INTERNAL MEDICINE

## 2017-01-18 PROCEDURE — 65620000000 HC RM CCU GENERAL

## 2017-01-18 PROCEDURE — 80053 COMPREHEN METABOLIC PANEL: CPT | Performed by: EMERGENCY MEDICINE

## 2017-01-18 PROCEDURE — 82962 GLUCOSE BLOOD TEST: CPT

## 2017-01-18 PROCEDURE — 82803 BLOOD GASES ANY COMBINATION: CPT | Performed by: INTERNAL MEDICINE

## 2017-01-18 PROCEDURE — 83605 ASSAY OF LACTIC ACID: CPT | Performed by: EMERGENCY MEDICINE

## 2017-01-18 PROCEDURE — 83540 ASSAY OF IRON: CPT | Performed by: INTERNAL MEDICINE

## 2017-01-18 PROCEDURE — 74011250637 HC RX REV CODE- 250/637: Performed by: INTERNAL MEDICINE

## 2017-01-18 PROCEDURE — 83690 ASSAY OF LIPASE: CPT | Performed by: EMERGENCY MEDICINE

## 2017-01-18 PROCEDURE — 84100 ASSAY OF PHOSPHORUS: CPT | Performed by: INTERNAL MEDICINE

## 2017-01-18 PROCEDURE — 74011250636 HC RX REV CODE- 250/636: Performed by: EMERGENCY MEDICINE

## 2017-01-18 PROCEDURE — 80048 BASIC METABOLIC PNL TOTAL CA: CPT | Performed by: INTERNAL MEDICINE

## 2017-01-18 PROCEDURE — 74011636637 HC RX REV CODE- 636/637: Performed by: INTERNAL MEDICINE

## 2017-01-18 PROCEDURE — 36415 COLL VENOUS BLD VENIPUNCTURE: CPT | Performed by: INTERNAL MEDICINE

## 2017-01-18 PROCEDURE — 96361 HYDRATE IV INFUSION ADD-ON: CPT

## 2017-01-18 PROCEDURE — 83735 ASSAY OF MAGNESIUM: CPT | Performed by: INTERNAL MEDICINE

## 2017-01-18 PROCEDURE — 85025 COMPLETE CBC W/AUTO DIFF WBC: CPT | Performed by: EMERGENCY MEDICINE

## 2017-01-18 PROCEDURE — 74011250636 HC RX REV CODE- 250/636: Performed by: INTERNAL MEDICINE

## 2017-01-18 PROCEDURE — 94762 N-INVAS EAR/PLS OXIMTRY CONT: CPT

## 2017-01-18 PROCEDURE — 96360 HYDRATION IV INFUSION INIT: CPT

## 2017-01-18 PROCEDURE — 83036 HEMOGLOBIN GLYCOSYLATED A1C: CPT | Performed by: INTERNAL MEDICINE

## 2017-01-18 PROCEDURE — 76450000000

## 2017-01-18 PROCEDURE — 93005 ELECTROCARDIOGRAM TRACING: CPT

## 2017-01-18 PROCEDURE — 82947 ASSAY GLUCOSE BLOOD QUANT: CPT

## 2017-01-18 PROCEDURE — 99285 EMERGENCY DEPT VISIT HI MDM: CPT

## 2017-01-18 PROCEDURE — 81001 URINALYSIS AUTO W/SCOPE: CPT | Performed by: EMERGENCY MEDICINE

## 2017-01-18 PROCEDURE — 84484 ASSAY OF TROPONIN QUANT: CPT | Performed by: EMERGENCY MEDICINE

## 2017-01-18 RX ORDER — HEPARIN SODIUM 5000 [USP'U]/ML
5000 INJECTION, SOLUTION INTRAVENOUS; SUBCUTANEOUS EVERY 8 HOURS
Status: DISCONTINUED | OUTPATIENT
Start: 2017-01-18 | End: 2017-01-22

## 2017-01-18 RX ORDER — HYDROMORPHONE HYDROCHLORIDE 1 MG/ML
1 INJECTION, SOLUTION INTRAMUSCULAR; INTRAVENOUS; SUBCUTANEOUS
Status: DISCONTINUED | OUTPATIENT
Start: 2017-01-18 | End: 2017-01-18

## 2017-01-18 RX ORDER — TAMSULOSIN HYDROCHLORIDE 0.4 MG/1
0.4 CAPSULE ORAL DAILY
Status: DISCONTINUED | OUTPATIENT
Start: 2017-01-18 | End: 2017-01-23 | Stop reason: HOSPADM

## 2017-01-18 RX ORDER — ACETAMINOPHEN 325 MG/1
650 TABLET ORAL
Status: DISCONTINUED | OUTPATIENT
Start: 2017-01-18 | End: 2017-01-21

## 2017-01-18 RX ORDER — DEXTROSE 50 % IN WATER (D50W) INTRAVENOUS SYRINGE
12.5-25 AS NEEDED
Status: DISCONTINUED | OUTPATIENT
Start: 2017-01-18 | End: 2017-01-21 | Stop reason: SDUPTHER

## 2017-01-18 RX ORDER — PANTOPRAZOLE SODIUM 40 MG/1
40 TABLET, DELAYED RELEASE ORAL
Status: DISCONTINUED | OUTPATIENT
Start: 2017-01-19 | End: 2017-01-23 | Stop reason: HOSPADM

## 2017-01-18 RX ORDER — MIRTAZAPINE 15 MG/1
15 TABLET, FILM COATED ORAL
Status: DISCONTINUED | OUTPATIENT
Start: 2017-01-18 | End: 2017-01-23 | Stop reason: HOSPADM

## 2017-01-18 RX ORDER — HYDROMORPHONE HYDROCHLORIDE 2 MG/ML
1 INJECTION, SOLUTION INTRAMUSCULAR; INTRAVENOUS; SUBCUTANEOUS ONCE
Status: DISCONTINUED | OUTPATIENT
Start: 2017-01-18 | End: 2017-01-18

## 2017-01-18 RX ORDER — IPRATROPIUM BROMIDE AND ALBUTEROL SULFATE 2.5; .5 MG/3ML; MG/3ML
3 SOLUTION RESPIRATORY (INHALATION)
Status: DISCONTINUED | OUTPATIENT
Start: 2017-01-18 | End: 2017-01-23 | Stop reason: HOSPADM

## 2017-01-18 RX ORDER — ONDANSETRON 2 MG/ML
4 INJECTION INTRAMUSCULAR; INTRAVENOUS
Status: DISCONTINUED | OUTPATIENT
Start: 2017-01-18 | End: 2017-01-21

## 2017-01-18 RX ORDER — MAGNESIUM SULFATE 100 %
4 CRYSTALS MISCELLANEOUS AS NEEDED
Status: DISCONTINUED | OUTPATIENT
Start: 2017-01-18 | End: 2017-01-23 | Stop reason: HOSPADM

## 2017-01-18 RX ORDER — HYDROMORPHONE HYDROCHLORIDE 2 MG/ML
1 INJECTION, SOLUTION INTRAMUSCULAR; INTRAVENOUS; SUBCUTANEOUS
Status: DISCONTINUED | OUTPATIENT
Start: 2017-01-18 | End: 2017-01-18 | Stop reason: SDUPTHER

## 2017-01-18 RX ORDER — SODIUM BICARBONATE 1 MEQ/ML
50 SYRINGE (ML) INTRAVENOUS
Status: DISCONTINUED | OUTPATIENT
Start: 2017-01-18 | End: 2017-01-18

## 2017-01-18 RX ORDER — SODIUM CHLORIDE 0.9 % (FLUSH) 0.9 %
5-10 SYRINGE (ML) INJECTION EVERY 8 HOURS
Status: DISCONTINUED | OUTPATIENT
Start: 2017-01-18 | End: 2017-01-23 | Stop reason: HOSPADM

## 2017-01-18 RX ORDER — CALCIUM GLUCONATE 94 MG/ML
1 INJECTION, SOLUTION INTRAVENOUS
Status: DISCONTINUED | OUTPATIENT
Start: 2017-01-18 | End: 2017-01-18

## 2017-01-18 RX ORDER — SODIUM CHLORIDE 9 MG/ML
150 INJECTION, SOLUTION INTRAVENOUS CONTINUOUS
Status: DISCONTINUED | OUTPATIENT
Start: 2017-01-18 | End: 2017-01-18

## 2017-01-18 RX ORDER — HYDROMORPHONE HYDROCHLORIDE 1 MG/ML
1 INJECTION, SOLUTION INTRAMUSCULAR; INTRAVENOUS; SUBCUTANEOUS ONCE
Status: COMPLETED | OUTPATIENT
Start: 2017-01-18 | End: 2017-01-18

## 2017-01-18 RX ORDER — DEXTROSE MONOHYDRATE AND SODIUM CHLORIDE 5; .45 G/100ML; G/100ML
150 INJECTION, SOLUTION INTRAVENOUS CONTINUOUS
Status: DISCONTINUED | OUTPATIENT
Start: 2017-01-18 | End: 2017-01-20

## 2017-01-18 RX ORDER — PRAVASTATIN SODIUM 10 MG/1
10 TABLET ORAL
Status: DISCONTINUED | OUTPATIENT
Start: 2017-01-18 | End: 2017-01-23 | Stop reason: HOSPADM

## 2017-01-18 RX ORDER — LABETALOL HCL 20 MG/4 ML
10 SYRINGE (ML) INTRAVENOUS
Status: COMPLETED | OUTPATIENT
Start: 2017-01-18 | End: 2017-01-18

## 2017-01-18 RX ORDER — AMLODIPINE BESYLATE 5 MG/1
5 TABLET ORAL DAILY
Status: DISCONTINUED | OUTPATIENT
Start: 2017-01-18 | End: 2017-01-20

## 2017-01-18 RX ORDER — METOPROLOL TARTRATE 5 MG/5ML
5 INJECTION INTRAVENOUS
Status: DISCONTINUED | OUTPATIENT
Start: 2017-01-18 | End: 2017-01-23 | Stop reason: HOSPADM

## 2017-01-18 RX ORDER — SODIUM CHLORIDE 0.9 % (FLUSH) 0.9 %
5-10 SYRINGE (ML) INJECTION AS NEEDED
Status: DISCONTINUED | OUTPATIENT
Start: 2017-01-18 | End: 2017-01-23 | Stop reason: HOSPADM

## 2017-01-18 RX ADMIN — SODIUM CHLORIDE 1000 ML: 900 INJECTION, SOLUTION INTRAVENOUS at 05:03

## 2017-01-18 RX ADMIN — MIRTAZAPINE 15 MG: 15 TABLET, FILM COATED ORAL at 21:07

## 2017-01-18 RX ADMIN — HEPARIN SODIUM 5000 UNITS: 5000 INJECTION, SOLUTION INTRAVENOUS; SUBCUTANEOUS at 08:52

## 2017-01-18 RX ADMIN — SODIUM CHLORIDE 1000 ML: 900 INJECTION, SOLUTION INTRAVENOUS at 03:11

## 2017-01-18 RX ADMIN — LABETALOL HYDROCHLORIDE 10 MG: 5 INJECTION, SOLUTION INTRAVENOUS at 06:24

## 2017-01-18 RX ADMIN — SODIUM CHLORIDE 10.8 UNITS/HR: 900 INJECTION, SOLUTION INTRAVENOUS at 06:36

## 2017-01-18 RX ADMIN — DEXTROSE MONOHYDRATE 9 ML: 25 INJECTION, SOLUTION INTRAVENOUS at 13:05

## 2017-01-18 RX ADMIN — TAMSULOSIN HYDROCHLORIDE 0.4 MG: 0.4 CAPSULE ORAL at 10:08

## 2017-01-18 RX ADMIN — HYDROMORPHONE HYDROCHLORIDE 1 MG: 1 INJECTION, SOLUTION INTRAMUSCULAR; INTRAVENOUS; SUBCUTANEOUS at 05:52

## 2017-01-18 RX ADMIN — PRAVASTATIN SODIUM 10 MG: 10 TABLET ORAL at 21:07

## 2017-01-18 RX ADMIN — AMLODIPINE BESYLATE 5 MG: 5 TABLET ORAL at 08:52

## 2017-01-18 RX ADMIN — HEPARIN SODIUM 5000 UNITS: 5000 INJECTION, SOLUTION INTRAVENOUS; SUBCUTANEOUS at 16:52

## 2017-01-18 RX ADMIN — Medication 10 ML: at 23:13

## 2017-01-18 RX ADMIN — DEXTROSE MONOHYDRATE AND SODIUM CHLORIDE 100 ML/HR: 5; .45 INJECTION, SOLUTION INTRAVENOUS at 11:55

## 2017-01-18 RX ADMIN — SODIUM CHLORIDE 150 ML/HR: 900 INJECTION, SOLUTION INTRAVENOUS at 06:27

## 2017-01-18 RX ADMIN — Medication 10 ML: at 16:52

## 2017-01-18 NOTE — ED NOTES
TRANSFER - OUT REPORT:    Verbal report given to [facility] - YASMIN VERONICA RN(name) on Janna John.  being transferred to CCU(unit) for routine progression of care       Report consisted of patients Situation, Background, Assessment and   Recommendations(SBAR). Information from the following report(s) SBAR, Kardex, ED Summary and MAR was reviewed with the receiving nurse. Lines:   Peripheral IV 01/18/17 Left Antecubital (Active)   Site Assessment Clean, dry, & intact 1/18/2017  4:50 AM   Phlebitis Assessment 0 1/18/2017  4:50 AM   Infiltration Assessment 0 1/18/2017  4:50 AM   Dressing Status Clean, dry, & intact 1/18/2017  4:50 AM       Peripheral IV 01/18/17 Right Hand (Active)   Site Assessment Clean, dry, & intact 1/18/2017  6:37 AM   Phlebitis Assessment 0 1/18/2017  6:37 AM   Infiltration Assessment 0 1/18/2017  6:37 AM   Dressing Status Clean, dry, & intact 1/18/2017  6:37 AM   Dressing Type Transparent;Tape 1/18/2017  6:37 AM   Hub Color/Line Status Pink 1/18/2017  6:37 AM        Opportunity for questions and clarification was provided.       Patient transported with:   Monitor  Registered Nurse

## 2017-01-18 NOTE — DIABETES MGMT
DTC Progress Note    Recommendations/ Comments: Pt discussed with rounding team and Dr. Valerie Baires- plan to begin dextrose- would recommend, when appropriate, transitioning with home dose of Lantus 7 units daily. Patient is a LTC patient as well as severe Alzheimer dementia. Patient does not appear appropriate for education. Chart reviewed on [employer]. during Multidisciplinary Rounds. A1c:   Lab Results   Component Value Date/Time    Hemoglobin A1c 8.2 11/10/2014 02:23 AM    Hemoglobin A1c, External 7.7 08/12/2015           Recent Glucose Results:   Lab Results   Component Value Date/Time     (HH) 01/18/2017 05:40 AM    GLU 1094 (HH) 01/18/2017 03:06 AM    GLUCPOC 188 (H) 01/18/2017 11:07 AM    GLUCPOC 261 (H) 01/18/2017 10:03 AM    GLUCPOC 383 (H) 01/18/2017 08:51 AM        Lab Results   Component Value Date/Time    Creatinine 5.04 01/18/2017 03:06 AM       Active Orders   Diet    DIET NPO Except Meds        PO intake: No data found. Will continue to follow as needed. Thank you.     Dakota Chow, [address] [place] [address], [address]  Office:  [phone]

## 2017-01-18 NOTE — PROGRESS NOTES
Pt admitted early this AM by my colleague. Chart reviewed. Agree with current plans.    Will need overlap with sq insulin prior to discontinuing gtt

## 2017-01-18 NOTE — IP AVS SNAPSHOT
[address] [facility] 
[phone] Patient: Saritha Jama. MRN: [id number] SHARON:65/32/[birth year]27 You are allergic to the following Allergen Reactions Ace Inhibitors Unknown (comments) Arb-Angiotensin Receptor Antagonist Unknown (comments) Recent Documentation Height Weight BMI Smoking Status 1.651 m 72.7 kg 26.68 kg/m2 Never Smoker Unresulted Labs Order Current Status SAMPLE TO BLOOD BANK In process CBC WITH AUTOMATED DIFF Preliminary result Emergency Contacts Name Discharge Info Relation Home Work Mobile Rene Prudent [facility] Care Proxy Per Pt'S Mother)  Other Relative [6] [phone] Maribel Pretty  Child [2] [phone] [facility]  Parent [1] [phone] About your hospitalization You were admitted on:  January 18, 2017 You last received care in the:  [facility] 3 RENAL MEDICINE You were discharged on:  January 23, 2017 Unit phone number:  [phone] Why you were hospitalized Your primary diagnosis was:  Dka (Diabetic Ketoacidoses) (Hcc) Your diagnoses also included:  Counseling Regarding Goals Of Care, Do Not Resuscitate Discussion, Dementia, Dm (Diabetes Mellitus), Type 2, Uncontrolled (Hcc), Htn (Hypertension), History Of Gi Bleed, Ckd (Chronic Kidney Disease) Stage 3, Gfr 30-59 Ml/Min, Pure Hypercholesterolemia, Prostate Cancer (Hcc), Blind One Eye, Weakness Generalized, Alzheimer Disease, Cad (Coronary Artery Disease), Anemia In Chronic Kidney Disease Providers Seen During Your Hospitalizations Provider Role Specialty Primary office phone Debby Vaughn MD Attending Provider Emergency Medicine [phone] Michael Martinez MD Attending Provider Internal Medicine [phone] Radha Schultz MD Attending Provider Internal Medicine [phone] Ramo Vo MD Attending Provider Internal Medicine [phone] Your Primary Care Physician (PCP) Primary Care Physician Office Phone Office Fax Jillian Billingsley [phone] Follow-up Information Follow up With Details Comments Contact Info Cady Buenrostro MD In 3 days  [address] [facility] [address] [facility] [address] 
[phone] Ashlee Leong MD Schedule an appointment as soon as possible for a visit in 1 week Kidney specialist  [place] [address] [address] [place] [address] 
[phone] Your Appointments Wednesday February 22, 2017  2:10 PM EST Follow Up with Raven Wells MD  
[facility] Diabetes and Endocrinology [facility]. [address] [address] [phone] Current Discharge Medication List  
  
CONTINUE these medications which have CHANGED Dose & Instructions Dispensing Information Comments Morning Noon Evening Bedtime  
 amLODIPine 10 mg tablet Commonly known as:  Nereida Sharpe What changed:   
- medication strength 
- how much to take - Another medication with the same name was removed. Continue taking this medication, and follow the directions you see here. Your next dose is: Today, Tomorrow Other:  _________ Dose:  10 mg Take 1 Tab by mouth daily. Quantity:  30 Tab Refills:  1  
     
   
   
   
  
 sodium bicarbonate 650 mg tablet What changed:  when to take this Your next dose is: Today, Tomorrow Other:  _________ Dose:  1300 mg Take 2 Tabs by mouth two (2) times a day. Quantity:  180 Tab Refills:  1 CONTINUE these medications which have NOT CHANGED Dose & Instructions Dispensing Information Comments Morning Noon Evening Bedtime  
 acetaminophen 325 mg tablet Commonly known as:  TYLENOL Your next dose is: Today, Tomorrow Other:  _________ Dose:  325 mg Take 325 mg by mouth every four (4) hours as needed. Refills:  0  
     
   
   
   
  
 albuterol-ipratropium 2.5 mg-0.5 mg/3 ml Nebu Commonly known as:  Geralexandru Correia Your next dose is: Today, Tomorrow Other:  _________ Dose:  3 mL  
3 mL by Nebulization route. Refills:  0  
     
   
   
   
  
 calcitRIOL 0.25 mcg capsule Commonly known as:  ROCALTROL Your next dose is: Today, Tomorrow Other:  _________ Dose:  0.25 mcg Take 0.25 mcg by mouth daily. Refills:  0  
     
   
   
   
  
 glucagon 1 mg injection Commonly known as:  Georgina Mitchelling Your next dose is: Today, Tomorrow Other:  _________ Dose:  1 mg  
1 mL by IntraMUSCular route as needed for Hypoglycemia. Quantity:  1 Vial  
Refills:  0  
     
   
   
   
  
 glucose blood VI test strips strip Commonly known as:  ASCENSIA AUTODISC VI, ONE TOUCH ULTRA TEST VI Your next dose is: Today, Tomorrow Other:  _________ Monitor glucose before meals and at bedtime and as needed for symptoms - 4-5 times daily. Quantity:  150 Strip Refills:  11  
     
   
   
   
  
 insulin aspart 100 unit/mL injection Commonly known as:  Alpha Joana Your next dose is: Today, Tomorrow Other:  _________ Novolog BEFORE MEALS: 0-90: hold, : 1 unit, 176-225: 2 units, 226-300: 3 units, 301 +    : 4 units, Novolog at BEDTIME - 225-300: 1 unit, 301 + 2 units Quantity:  10 mL Refills:  10  
     
   
   
   
  
 mirtazapine 15 mg tablet Commonly known as:  Meme Maser Your next dose is: Today, Tomorrow Other:  _________ Dose:  15 mg Take 15 mg by mouth nightly. Indications: MAJOR DEPRESSIVE DISORDER Refills:  0 NEPHROCAPS 1 mg capsule Generic drug:  b complex-vitamin c-folic acid Your next dose is: Today, Tomorrow Other:  _________ Dose:  1 Cap Take 1 Cap by mouth daily. Refills:  0  
     
   
   
   
  
 omeprazole 10 mg capsule Commonly known as:  PRILOSEC Your next dose is: Today, Tomorrow Other:  _________ Dose:  10 mg Take 10 mg by mouth daily. Indications: GASTROESOPHAGEAL REFLUX Refills:  0  
     
   
   
   
  
 pravastatin 10 mg tablet Commonly known as:  PRAVACHOL Your next dose is: Today, Tomorrow Other:  _________ Dose:  10 mg Take 1 Tab by mouth nightly. Quantity:  30 Tab Refills:  5 SLOW  mg (45 mg iron) ER tablet Generic drug:  ferrous sulfate Your next dose is: Today, Tomorrow Other:  _________ Dose:  142 mg Take 142 mg by mouth Daily (before breakfast). Refills:  0  
     
   
   
   
  
 tamsulosin 0.4 mg capsule Commonly known as:  FLOMAX Your next dose is: Today, Tomorrow Other:  _________ Dose:  0.4 mg Take 0.4 mg by mouth daily. Refills:  0 STOP taking these medications   
 ergocalciferol 50,000 unit capsule Commonly known as:  ERGOCALCIFEROL  
   
  
 insulin detemir 100 unit/mL injection Commonly known as:  LEVEMIR  
   
  
 ondansetron hcl 4 mg tablet Commonly known as:  ZOFRAN (AS HYDROCHLORIDE) Polyethylene Glycol 3350 Powd  
   
  
 sodium polystyrene powder Commonly known as:  KAYEXALATE Where to Get Your Medications Information on where to get these meds will be given to you by the nurse or doctor. ! Ask your nurse or doctor about these medications  
  amLODIPine 10 mg tablet  
 sodium bicarbonate 650 mg tablet Discharge Instructions Patient Discharge Instructions Pt Name  Helane Gaucher. Date of Birth 1950 Age  61 y.o. Medical Record Number  [id number] PCP Elva Guerra MD   
Admit date:  1/18/2017 @    [address] Room Number  8995/60 Date of Discharge 1/22/2017 Admission Diagnoses:     DKA (diabetic ketoacidoses) ([facility] Allergies Allergen Reactions  Ace Inhibitors Unknown (comments)  Arb-Angiotensin Receptor Antagonist Unknown (comments) You were admitted to [address] for  DKA (diabetic ketoacidoses) ([facility] YOUR OTHER MEDICAL DIAGNOSES INCLUDE (BUT NOT LIMITED TO ): 
Present on Admission:  DKA (diabetic ketoacidoses) ([facility]  Do not resuscitate discussion  Dementia  DM (diabetes mellitus), type 2, uncontrolled ([facility]  HTN (hypertension)  History of GI bleed  CKD (chronic kidney disease) stage 3, GFR 30-59 ml/min  Pure hypercholesterolemia  Prostate cancer ([facility]  Blind one eye  Weakness generalized  Alzheimer disease  CAD (coronary artery disease)  Anemia in chronic kidney disease DIET:   Cardiac Diet, Low fat, Low cholesterol and Renal Diet Oral Nutritional Supplements: Boost Glucose Control Supplement Frequency: Once daily Recommended activity: Activity as tolerated Indwelling devices:  None Supplemental Oxygen: None Required Lab work: Per SNF routine Glucose management: Accucheck ACHS with sliding scale per SNF protocol Code status: DNR Skilled nursing facility MD responsible for above upon discharge. · It is important that you take the medication exactly as they are prescribed. · Keep your medication in the bottles provided by the pharmacist and keep a list of the medication names, dosages, and times to be taken in your wallet. · Do not take other medications without consulting your doctor.   
 
 
ADDITIONAL INFORMATION: If you experience any of the following symptoms or have any health problem not listed below, then please call your primary care physician or return to the emergency room if you cannot get hold of your doctor: Fever, chills, nausea, vomiting, diarrhea, change in mentation, falling, bleeding, shortness of breath. I understand that if any problems occur once I am discharged, I am supposed to call my Primary care physician for further care or seek help in the Emergency Department at the nearest Healthcare facility. I have had an opportunity to discuss my clinical issues with my doctor and nursing staff. I understand and acknowledge receipt of the above instructions. Physician's or R.N.'s Signature                                                            Date/Time Patient or Representative Signature                                                 Date/Time Discharge Orders None Mundi Announcement We are excited to announce that we are making your provider's discharge notes available to you in Mundi. You will see these notes when they are completed and signed by the physician that discharged you from your recent hospital stay. If you have any questions or concerns about any information you see in Mundi, please call the Health Information Department where you were seen or reach out to your Primary Care Provider for more information about your plan of care. Introducing [facility]! Cecy Greer introduces Mundi patient portal. Now you can access parts of your medical record, email your doctor's office, and request medication refills online. 1. In your internet browser, go to [email or web address] 2. Click on the First Time User? Click Here link in the Sign In box. You will see the New Member Sign Up page. 3. Enter your Mundi Access Code exactly as it appears below.  You will not need to use this code after youve completed the sign-up process. If you do not sign up before the expiration date, you must request a new code. · Siteheart Access Code: [email or web address] Expires: 2/21/2017  3:03 PM 
 
4. Enter the last four digits of your Social Security Number (xxxx) and Date of Birth (mm/dd/yyyy) as indicated and click Submit. You will be taken to the next sign-up page. 5. Create a Siteheart ID. This will be your Siteheart login ID and cannot be changed, so think of one that is secure and easy to remember. 6. Create a Siteheart password. You can change your password at any time. 7. Enter your Password Reset Question and Answer. This can be used at a later time if you forget your password. 8. Enter your e-mail address. You will receive e-mail notification when new information is available in [address] 9. Click Sign Up. You can now view and download portions of your medical record. 10. Click the Download Summary menu link to download a portable copy of your medical information. If you have questions, please visit the Frequently Asked Questions section of the Siteheart website. Remember, Siteheart is NOT to be used for urgent needs. For medical emergencies, dial 911. Now available from your iPhone and Android! General Information Please provide this summary of care documentation to your next provider. Patient Signature:  ____________________________________________________________ Date:  ____________________________________________________________  
  
Minnie Artist Provider Signature:  ____________________________________________________________ Date:  ____________________________________________________________

## 2017-01-18 NOTE — PROGRESS NOTES
[facility]  Pulmonary, Critical Care, and Sleep Medicine    Name: Chasity Guzman. MRN: [id number]   : 1950 Hospital: [address]   Date: 2017        Critical Care Initial Patient Consult    IMPRESSION:   · DKA  · CRI, stage 4 CKD  · Anemia hgb of 8.5  · Hypertension  · Severe Alzheimer dementia, LTC since . At [facility]   · CAD  · Hyperlipidemia  · Prostate Ca  · Full code  · POA is his sister Yvonne Stone ph: [phone]      RECOMMENDATIONS:   · Insulin drip  · IV hydration  · Monitor electrolytes  · Monitor Cr. · ON DVT prophylaxis  · Per IM had recommended Palliative eval about ESRD. Cc: Nausea and Vomiting    Subjective/History: This patient has been seen and evaluated at the request of Dr. Bridgette Jay for above. Patient is a 61 y.o. male   Who presented for evaluation of above. Was noted to have elevated blood sugar. Not able to get much hx from pt. Pt reports some nausea and vomiting after questions. Has mild cough. Has some chest pain which is not completely gone. Denies any fever, chills or sweats. Has not been able to take much by mouth because of nausea. The patient is critically ill and can not provide additional history due to Unable to comprehend. Seems limited by his underlying dementia. Past Medical History   Diagnosis Date    Alzheimer disease     CAD (coronary artery disease)     Cancer ([address]      prostate    CKD (chronic kidney disease) stage 4, GFR 15-29 ml/min ([facility]     DM (diabetes mellitus), type 2, uncontrolled ([address]     Hyperlipidemia     Hypertension     Other ill-defined conditions(799.89)      blind R eye-retina detachment    Other ill-defined conditions(799.89)      right cerebellopontine angle lipoma.        Past Surgical History   Procedure Laterality Date    Hx shoulder arthroscopy       right    Hx urological       prostate bx    Hx heent       retinal detachment    Upper gi endoscopy,biopsy  2014  Colonoscopy,diagnostic  11/12/2014            Prior to Admission medications    Medication Sig Start Date End Date Taking? Authorizing Provider   albuterol-ipratropium (DUO-NEB) 2.5 mg-0.5 mg/3 ml nebu 3 mL by Nebulization route. Historical Provider   amLODIPine (NORVASC) 5 mg tablet Take 5 mg by mouth daily. Historical Provider   Polyethylene Glycol 3350 powd by Does Not Apply route. Historical Provider   mirtazapine (REMERON) 15 mg tablet Take 15 mg by mouth nightly. Indications: MAJOR DEPRESSIVE DISORDER    Yumi De La O MD   omeprazole (PRILOSEC) 10 mg capsule Take 10 mg by mouth daily. Indications: GASTROESOPHAGEAL REFLUX    Yumi De La O MD   amLODIPine (NORVASC) 2.5 mg tablet Take 2.5 mg by mouth daily. Indications: HYPERTENSION    Yumi De La O MD   sodium polystyrene (KAYEXALATE) powder Take 30 g by mouth every fourty-eight (48) hours. Yumi De La O MD   tamsulosin (FLOMAX) 0.4 mg capsule Take 0.4 mg by mouth daily. Yumi De La O MD   calcitRIOL (ROCALTROL) 0.25 mcg capsule Take 0.25 mcg by mouth daily. Yumi De La O MD   ondansetron hcl (ZOFRAN, AS HYDROCHLORIDE,) 4 mg tablet Take 1 Tab by mouth every eight (8) hours as needed for Nausea. 12/17/15   Arya Angeles MD   b complex-vitamin c-folic acid (NEPHROCAPS) 1 mg capsule Take 1 Cap by mouth daily. Historical Provider   insulin detemir (LEVEMIR) 100 unit/mL injection 0.07 mL by SubCUTAneous route daily. Patient taking differently: 8 Units by SubCUTAneous route daily. 9/30/15   Jesika Peñaloza MD   insulin aspart (NOVOLOG) 100 unit/mL injection Novolog BEFORE MEALS: 0-90: hold, : 1 unit, 176-225: 2 units, 226-300: 3 units, 301 +    : 4 units, Novolog at BEDTIME - 225-300: 1 unit, 301 + 2 units 9/30/15   Jesika Peñaloza MD   glucose blood VI test strips (ASCENSIA AUTODISC VI, ONE TOUCH ULTRA TEST VI) strip Monitor glucose before meals and at bedtime and as needed for symptoms - 4-5 times daily.  9/30/15   Jesika Peñaloza MD sodium bicarbonate 650 mg tablet Take 2 tablets by mouth three (3) times daily. 14   Clay Hutchinson MD   ferrous sulfate (SLOW FE) 142 mg (45 mg iron) ER tablet Take 142 mg by mouth Daily (before breakfast). Yumi De La O MD   glucagon (GLUCAGEN) 1 mg injection 1 mL by IntraMUSCular route as needed for Hypoglycemia. 12   Paddy Vargas MD   pravastatin (PRAVACHOL) 10 mg tablet Take 1 Tab by mouth nightly. 3/19/12   Maik Ott MD   ergocalciferol (ERGOCALCIFEROL) 50,000 unit capsule Take 50,000 Units by mouth every seven (7) days. Historical Provider   acetaminophen (TYLENOL) 325 mg tablet Take 325 mg by mouth every four (4) hours as needed. Historical Provider     Current Facility-Administered Medications   Medication Dose Route Frequency    sodium chloride (NS) flush 5-10 mL  5-10 mL IntraVENous Q8H    insulin regular (NOVOLIN R, HUMULIN R) 100 Units in 0.9% sodium chloride 100 mL infusion  0-50 Units/hr IntraVENous TITRATE    heparin (porcine) injection 5,000 Units  5,000 Units SubCUTAneous Q8H    0.9% sodium chloride infusion  150 mL/hr IntraVENous CONTINUOUS    mirtazapine (REMERON) tablet 15 mg  15 mg Oral QHS     Allergies   Allergen Reactions    Ace Inhibitors Unknown (comments)    Arb-Angiotensin Receptor Antagonist Unknown (comments)      Social History   Substance Use Topics    Smoking status: Never Smoker    Smokeless tobacco: Never Used    Alcohol use No      Family History   Problem Relation Age of Onset    Heart Disease Mother      Pacemaker    Cancer Father         Review of Systems:  Review of systems not obtained due to patient factors. Pt has advanced dementia.      Objective:   Vital Signs:    Visit Vitals    /82    Pulse 89    Temp 99.1 °F (37.3 °C)    Resp 19    Ht 5' 5\" (1.651 m)    Wt 72.7 kg (160 lb 5.1 oz)    SpO2 100%    BMI 26.68 kg/m2       O2 Device: Room air       Temp (24hrs), Av.1 °F (37.3 °C), Min:99.1 °F (37.3 °C), Max:99.1 °F (37.3 °C)         Physical Exam:    General:  Alert, cooperative, no distress, appears stated age. Lying in bed no distress. Head:  Normocephalic, without obvious abnormality, atraumatic. Eyes:  Conjunctivae/corneas clear. PERRL, EOMs intact. Nose: Nares normal. Septum midline. Mucosa normal. No drainage or sinus tenderness. Throat: Lips, mucosa, and tongue normal. Teeth and gums normal.   Neck: Supple, symmetrical, trachea midline, no adenopathy, thyroid: no enlargment/tenderness/nodules, no carotid bruit and no JVD. Back:   Symmetric, no curvature. ROM normal.   Lungs:   Clear to auscultation bilaterally. Chest wall:  No tenderness or deformity. Heart:  Regular rate and rhythm, S1, S2 normal, no murmur, click, rub or gallop. Abdomen:   Soft, non-tender. Bowel sounds normal. No masses,  No organomegaly. Extremities: Extremities normal, atraumatic, no cyanosis or edema. Pulses: 2+ and symmetric all extremities. Skin: Skin color, texture, turgor normal. No rashes or lesions   Lymph nodes: Cervical, supraclavicular, and axillary nodes normal.   Neurologic: Grossly nonfocal       Data:     Recent Results (from the past 24 hour(s))   CBC WITH AUTOMATED DIFF    Collection Time: 01/18/17  3:06 AM   Result Value Ref Range    WBC 9.6 4.1 - 11.1 K/uL    RBC 2.77 (L) 4.10 - 5.70 M/uL    HGB 8.5 (L) 12.1 - 17.0 g/dL    HCT 26.0 (L) 36.6 - 50.3 %    MCV 93.9 80.0 - 99.0 FL    MCH 30.7 26.0 - 34.0 PG    MCHC 32.7 30.0 - 36.5 g/dL    RDW 14.4 11.5 - 14.5 %    PLATELET 118 456 - 671 K/uL    NEUTROPHILS 83 (H) 32 - 75 %    LYMPHOCYTES 8 (L) 12 - 49 %    MONOCYTES 9 5 - 13 %    EOSINOPHILS 0 0 - 7 %    BASOPHILS 0 0 - 1 %    ABS. NEUTROPHILS 7.9 1.8 - 8.0 K/UL    ABS. LYMPHOCYTES 0.8 0.8 - 3.5 K/UL    ABS. MONOCYTES 0.9 0.0 - 1.0 K/UL    ABS. EOSINOPHILS 0.0 0.0 - 0.4 K/UL    ABS.  BASOPHILS 0.0 0.0 - 0.1 K/UL    RBC COMMENTS NORMOCYTIC, NORMOCHROMIC      DF SMEAR SCANNED     LACTIC ACID, PLASMA    Collection Time: 01/18/17  3:06 AM   Result Value Ref Range    Lactic acid 1.8 0.4 - 2.0 MMOL/L   LIPASE    Collection Time: 01/18/17  3:06 AM   Result Value Ref Range    Lipase 36 (L) 73 - 393 U/L   CK W/ CKMB & INDEX    Collection Time: 01/18/17  3:06 AM   Result Value Ref Range     39 - 308 U/L    CK - MB 3.6 (H) <3.6 NG/ML    CK-MB Index 1.6 0 - 2.5     METABOLIC PANEL, COMPREHENSIVE    Collection Time: 01/18/17  3:06 AM   Result Value Ref Range    Sodium 121 (L) 136 - 145 mmol/L    Potassium 5.6 (H) 3.5 - 5.1 mmol/L    Chloride 89 (L) 97 - 108 mmol/L    CO2 14 (LL) 21 - 32 mmol/L    Anion gap 18 (H) 5 - 15 mmol/L    Glucose 1094 (HH) 65 - 100 mg/dL    BUN 46 (H) 6 - 20 MG/DL    Creatinine 5.04 (H) 0.70 - 1.30 MG/DL    BUN/Creatinine ratio 9 (L) 12 - 20      GFR est AA 14 (L) >60 ml/min/1.73m2    GFR est non-AA 12 (L) >60 ml/min/1.73m2    Calcium 8.1 (L) 8.5 - 10.1 MG/DL    Bilirubin, total 0.9 0.2 - 1.0 MG/DL    ALT 55 12 - 78 U/L    AST 85 (H) 15 - 37 U/L    Alk.  phosphatase 105 45 - 117 U/L    Protein, total 6.7 6.4 - 8.2 g/dL    Albumin 3.7 3.5 - 5.0 g/dL    Globulin 3.0 2.0 - 4.0 g/dL    A-G Ratio 1.2 1.1 - 2.2     TROPONIN I    Collection Time: 01/18/17  3:06 AM   Result Value Ref Range    Troponin-I, Qt. <0.04 <0.05 ng/mL   URINALYSIS W/ REFLEX CULTURE    Collection Time: 01/18/17  3:41 AM   Result Value Ref Range    Color YELLOW/STRAW      Appearance CLEAR CLEAR      Specific gravity 1.010 1.003 - 1.030      pH (UA) 7.0 5.0 - 8.0      Protein 30 (A) NEG mg/dL    Glucose >1000 (A) NEG mg/dL    Ketone TRACE (A) NEG mg/dL    Bilirubin NEGATIVE  NEG      Blood MODERATE (A) NEG      Urobilinogen 0.2 0.2 - 1.0 EU/dL    Nitrites NEGATIVE  NEG      Leukocyte Esterase NEGATIVE  NEG      WBC 0-4 0 - 4 /hpf    RBC 5-10 0 - 5 /hpf    Epithelial cells FEW FEW /lpf    Bacteria NEGATIVE  NEG /hpf    UA:UC IF INDICATED CULTURE NOT INDICATED BY UA RESULT CNI     GLUCOSE, POC    Collection Time: 01/18/17  4:09 AM   Result Value Ref Range    Glucose (POC) >600 (HH) 65 - 100 mg/dL    Performed by [address] [place] [address], POC    Collection Time: 01/18/17  4:10 AM   Result Value Ref Range    Glucose (POC) >600 (HH) 65 - 100 mg/dL    Performed by [address] [place] [address], POC    Collection Time: 01/18/17  5:31 AM   Result Value Ref Range    Glucose (POC) >600 (HH) 65 - 100 mg/dL    Performed by [address] [place] [address], ARTERIAL    Collection Time: 01/18/17  5:33 AM   Result Value Ref Range    pH 7.37 7.35 - 7.45      PCO2 26 (L) 35.0 - 45.0 mmHg    PO2 112 (H) 80 - 100 mmHg    O2 SAT 98 (H) 92 - 97 %    BICARBONATE 14 (L) 22 - 26 mmol/L    BASE DEFICIT 9.0 mmol/L    O2 METHOD ROOM AIR      FIO2 21 %    SPONTANEOUS RATE 18.0      Sample source ARTERIAL      SITE RIGHT RADIAL      BRIAN'S TEST YES     GLUCOSE, RANDOM    Collection Time: 01/18/17  5:40 AM   Result Value Ref Range    Glucose 798 (HH) 65 - 100 mg/dL           EKG: Rate of 99, QTc: 441. NSR from jan . Telemetry:NSR    Imaging:  I have personally reviewed the patients radiographs and have reviewed the reports:  None, will check CXR.        Martell Martinze MD

## 2017-01-18 NOTE — IP AVS SNAPSHOT
Current Discharge Medication List  
  
Take these medications at their scheduled times Dose & Instructions Dispensing Information Comments Morning Noon Evening Bedtime  
 amLODIPine 10 mg tablet Commonly known as:  Milad Bagley Your next dose is: Today, Tomorrow Other:  ____________ Dose:  10 mg Take 1 Tab by mouth daily. Quantity:  30 Tab Refills:  1  
     
   
   
   
  
 calcitRIOL 0.25 mcg capsule Commonly known as:  ROCALTROL Your next dose is: Today, Tomorrow Other:  ____________ Dose:  0.25 mcg Take 0.25 mcg by mouth daily. Refills:  0  
     
   
   
   
  
 mirtazapine 15 mg tablet Commonly known as:  Neetudaniel Deel Your next dose is: Today, Tomorrow Other:  ____________ Dose:  15 mg Take 15 mg by mouth nightly. Indications: MAJOR DEPRESSIVE DISORDER Refills:  0 NEPHROCAPS 1 mg capsule Generic drug:  b complex-vitamin c-folic acid Your next dose is: Today, Tomorrow Other:  ____________ Dose:  1 Cap Take 1 Cap by mouth daily. Refills:  0  
     
   
   
   
  
 omeprazole 10 mg capsule Commonly known as:  PRILOSEC Your next dose is: Today, Tomorrow Other:  ____________ Dose:  10 mg Take 10 mg by mouth daily. Indications: GASTROESOPHAGEAL REFLUX Refills:  0  
     
   
   
   
  
 pravastatin 10 mg tablet Commonly known as:  PRAVACHOL Your next dose is: Today, Tomorrow Other:  ____________ Dose:  10 mg Take 1 Tab by mouth nightly. Quantity:  30 Tab Refills:  5 SLOW  mg (45 mg iron) ER tablet Generic drug:  ferrous sulfate Your next dose is: Today, Tomorrow Other:  ____________ Dose:  142 mg Take 142 mg by mouth Daily (before breakfast). Refills:  0  
     
   
   
   
  
 sodium bicarbonate 650 mg tablet Your next dose is: Today, Tomorrow Other:  ____________ Dose:  1300 mg Take 2 Tabs by mouth two (2) times a day. Quantity:  180 Tab Refills:  1  
     
   
   
   
  
 tamsulosin 0.4 mg capsule Commonly known as:  FLOMAX Your next dose is: Today, Tomorrow Other:  ____________ Dose:  0.4 mg Take 0.4 mg by mouth daily. Refills:  0 Take these medications as needed Dose & Instructions Dispensing Information Comments Morning Noon Evening Bedtime  
 acetaminophen 325 mg tablet Commonly known as:  TYLENOL Your next dose is: Today, Tomorrow Other:  ____________ Dose:  325 mg Take 325 mg by mouth every four (4) hours as needed. Refills:  0  
     
   
   
   
  
 glucagon 1 mg injection Commonly known as:  Pravin Griffin Your next dose is: Today, Tomorrow Other:  ____________ Dose:  1 mg  
1 mL by IntraMUSCular route as needed for Hypoglycemia. Quantity:  1 Vial  
Refills:  0 Take these medications as directed Dose & Instructions Dispensing Information Comments Morning Noon Evening Bedtime  
 albuterol-ipratropium 2.5 mg-0.5 mg/3 ml Nebu Commonly known as:  Barsusanne Ruth Your next dose is: Today, Tomorrow Other:  ____________ Dose:  3 mL  
3 mL by Nebulization route. Refills:  0  
     
   
   
   
  
 glucose blood VI test strips strip Commonly known as:  ASCENSIA AUTODISC VI, ONE TOUCH ULTRA TEST VI Your next dose is: Today, Tomorrow Other:  ____________ Monitor glucose before meals and at bedtime and as needed for symptoms - 4-5 times daily. Quantity:  150 Strip Refills:  11  
     
   
   
   
  
 insulin aspart 100 unit/mL injection Commonly known as:  Quique White Your next dose is: Today, Tomorrow Other:  ____________ Novolog BEFORE MEALS: 0-90: hold, : 1 unit, 176-225: 2 units, 226-300: 3 units, 301 +    : 4 units, Novolog at BEDTIME - 225-300: 1 unit, 301 + 2 units Quantity:  10 mL Refills:  10 Where to Get Your Medications Information about where to get these medications is not yet available ! Ask your nurse or doctor about these medications  
  amLODIPine 10 mg tablet  
 sodium bicarbonate 650 mg tablet

## 2017-01-18 NOTE — H&P
Hospitalist Admission Note    NAME: Kalie Tapia. :  1950   MRN:  [id number]     Date/Time:  2017 5:13 AM    Patient PCP: PROVIDER UNKNOWN  ________________________________________________________________________    My assessment of this patient's clinical condition and my plan of care is as follows. Assessment / Plan:  DKA with pseudohyponatremia in setting of insulin-dependent DM2 uncontrolled with nephropathy:  - ABG now to assist with bicarb needs  - IV fluids  - insulin gtt  - NPO for now  - check HgA1c   - DTC consult  Hypertension, benign/essential:  Severe HTN in ER, improving with pain mgmt  - dilaudid prn pain  - metoprolol prn - may need to change to scheduled if remains elevated  CKD4 with anemia of chronic kidney disease, at baseline:  Severe renal failure, looks to be slowly progressing. Will probably become end-stage at some point  - holding oral bicarb, calcitriol, nephrocaps due to nausea/vomiting  - may need IV bicarb pending results of ABG  - check iron level - may need iron infusion (on po at LTC)  - palliative care consult - near end stage disease, not certain that family is aware and has discussed/thought about plan  Severe Alzheimer's dementia:  In LTC at [address] since 2011  - con't mirtazeine if able to tolerate po  Hyperlipidemia and CAD:    - holding pravachol due to nausea/vomiting  - starting bblocker IV as above  - consider adding ASA near time of discharge  Prostate ca: holding flomax due to vomiting    Code Status: full  Surrogate Decision Maker: sister Jhonathan Friend [phone]  DVT Prophylaxis: heparin    Baseline: LTC at [address] [place] [address]:   CHIEF COMPLAINT:  Nausea/vomiting    HISTORY OF PRESENT ILLNESS:     Jane Kapoor is a 61 y.o.  male who presents with above. Pt unable to give any history due to dementia. Has fluent speech but says he does not remember what happened today.   Apparently, he was found at [facility] complaining of chest/epigastric pain and had repeated vomiting throughout the day. EMS was called and he was found to have severe hyperglycemia. Pt says he does not think he has been sick recently. Only complain currently is epigastric pain. We were asked to admit for work up and evaluation of the above problems. Past Medical History   Diagnosis Date    Alzheimer disease     CAD (coronary artery disease)     Cancer ([facility] [address]      prostate    CKD (chronic kidney disease) stage 4, GFR 15-29 ml/min [facility]     DM (diabetes mellitus), type 2, uncontrolled ([facility] [address] [facility]     Hyperlipidemia     Hypertension     Other ill-defined conditions(799.89)      blind R eye-retina detachment    Other ill-defined conditions(799.89)      right cerebellopontine angle lipoma. Past Surgical History   Procedure Laterality Date    Hx shoulder arthroscopy       right    Hx urological       prostate bx    Hx heent       retinal detachment    Upper gi endoscopy,biopsy  11/12/2014          Colonoscopy,diagnostic  11/12/2014             Social History   Substance Use Topics    Smoking status: Never Smoker    Smokeless tobacco: Never Used    Alcohol use No        Family History   Problem Relation Age of Onset    Heart Disease Mother      Pacemaker    Cancer Father      Allergies   Allergen Reactions    Ace Inhibitors Unknown (comments)    Arb-Angiotensin Receptor Antagonist Unknown (comments)        Prior to Admission medications    Medication Sig Start Date End Date Taking? Authorizing Provider   amoxicillin-clavulanate (AUGMENTIN) 875-125 mg per tablet Take  by mouth every twelve (12) hours. Historical Provider   albuterol-ipratropium (DUO-NEB) 2.5 mg-0.5 mg/3 ml nebu 3 mL by Nebulization route. Historical Provider   amLODIPine (NORVASC) 5 mg tablet Take 5 mg by mouth daily. Historical Provider   Polyethylene Glycol 3350 powd by Does Not Apply route.     Historical Provider   mirtazapine (REMERON) 15 mg tablet Take 15 mg by mouth nightly. Indications: MAJOR DEPRESSIVE DISORDER    Yumi De La O MD   omeprazole (PRILOSEC) 10 mg capsule Take 10 mg by mouth daily. Indications: GASTROESOPHAGEAL REFLUX    Yumi De La O MD   amLODIPine (NORVASC) 2.5 mg tablet Take 2.5 mg by mouth daily. Indications: HYPERTENSION    Yumi De La O MD   sodium polystyrene (KAYEXALATE) powder Take 30 g by mouth every fourty-eight (48) hours. Yumi De La O MD   tamsulosin (FLOMAX) 0.4 mg capsule Take 0.4 mg by mouth daily. Yumi De La O MD   calcitRIOL (ROCALTROL) 0.25 mcg capsule Take 0.25 mcg by mouth daily. Yumi De La O MD   ondansetron hcl (ZOFRAN, AS HYDROCHLORIDE,) 4 mg tablet Take 1 Tab by mouth every eight (8) hours as needed for Nausea. 12/17/15   Daryn Ahn MD   b complex-vitamin c-folic acid (NEPHROCAPS) 1 mg capsule Take 1 Cap by mouth daily. Historical Provider   insulin detemir (LEVEMIR) 100 unit/mL injection 0.07 mL by SubCUTAneous route daily. Patient taking differently: 8 Units by SubCUTAneous route daily. 9/30/15   Rachel Ramirez MD   insulin aspart (NOVOLOG) 100 unit/mL injection Novolog BEFORE MEALS: 0-90: hold, : 1 unit, 176-225: 2 units, 226-300: 3 units, 301 +    : 4 units, Novolog at BEDTIME - 225-300: 1 unit, 301 + 2 units 9/30/15   Rachel Ramirez MD   glucose blood VI test strips (ASCENSIA AUTODISC VI, ONE TOUCH ULTRA TEST VI) strip Monitor glucose before meals and at bedtime and as needed for symptoms - 4-5 times daily. 9/30/15   Rachel Ramirez MD   sodium bicarbonate 650 mg tablet Take 2 tablets by mouth three (3) times daily. 11/12/14   Beatriz Curry MD   ferrous sulfate (SLOW FE) 142 mg (45 mg iron) ER tablet Take 142 mg by mouth Daily (before breakfast). Yumi De La O MD   glucagon (GLUCAGEN) 1 mg injection 1 mL by IntraMUSCular route as needed for Hypoglycemia. 6/25/12   Joann Francis MD   pravastatin (PRAVACHOL) 10 mg tablet Take 1 Tab by mouth nightly.  3/19/12   Baron Cobb Suzette Chávez MD   ergocalciferol (ERGOCALCIFEROL) 50,000 unit capsule Take 50,000 Units by mouth every seven (7) days. Historical Provider   acetaminophen (TYLENOL) 325 mg tablet Take 325 mg by mouth every four (4) hours as needed. Historical Provider       REVIEW OF SYSTEMS:     I am not able to complete the review of systems because:    The patient is intubated and sedated    The patient has altered mental status due to his acute medical problems    The patient has baseline aphasia from prior stroke(s)   x The patient has baseline dementia and is not reliable historian    The patient is in acute medical distress and unable to provide information           Total of 12 systems reviewed as follows:       POSITIVE= underlined text  Negative = text not underlined  General:  fever, chills, sweats, generalized weakness, weight loss/gain,      loss of appetite   Eyes:    blurred vision, eye pain, loss of vision, double vision  ENT:    rhinorrhea, pharyngitis   Respiratory:   cough, sputum production, SOB, LAUREANO, wheezing, pleuritic pain   Cardiology:   chest pain, palpitations, orthopnea, PND, edema, syncope   Gastrointestinal:  abdominal pain , N/V, diarrhea, dysphagia, constipation, bleeding   Genitourinary:  frequency, urgency, dysuria, hematuria, incontinence   Muskuloskeletal :  arthralgia, myalgia, back pain  Hematology:  easy bruising, nose or gum bleeding, lymphadenopathy   Dermatological: rash, ulceration, pruritis, color change / jaundice  Endocrine:   hot flashes or polydipsia   Neurological:  headache, dizziness, confusion, focal weakness, paresthesia,     Speech difficulties, memory loss, gait difficulty  Psychological: Feelings of anxiety, depression, agitation    Objective:   VITALS:    Visit Vitals    /83    Pulse 99    Temp 99.1 °F (37.3 °C)    Resp 18    Ht 5' 5\" (1.651 m)    Wt 72.7 kg (160 lb 5.1 oz)    SpO2 100%    BMI 26.68 kg/m2       PHYSICAL EXAM:    General:    Alert, cooperative with simple commands, moderate distress, appears stated age. HEENT: Atraumatic, anicteric sclerae, pink conjunctivae     No oral ulcers, mucosa moist, throat clear, dentition fair  Neck:  Supple, symmetrical,  thyroid: non tender  Lungs:   Clear to auscultation bilaterally. No Wheezing or Rhonchi. No rales. Chest wall:  No tenderness  No Accessory muscle use. Heart:   Tachycardic, regular rhythm,  No  murmur   No edema  Abdomen:   Soft, moderate diffuse tenderness. minimally distended. Bowel sounds normal  Extremities: No cyanosis. No clubbing    Skin:     Not pale. Not Jaundiced  No rashes   Psych:  Poor insight. Not depressed. Not anxious or agitated. Neurologic: EOMs intact. No facial asymmetry. No aphasia or slurred speech. Symmetrical strength, Sensation grossly intact. Alert and oriented X 1.     _______________________________________________________________________  Care Plan discussed with:    Comments   Patient x    Family      RN x    Care Manager                    Consultant:      _______________________________________________________________________  Expected  Disposition:   Home with Family    HH/PT/OT/RN    SNF/LTC x   JESUS    ________________________________________________________________________  TOTAL TIME:   Minutes    Critical Care Provided    39 Minutes non procedure based ([id number])      Comments    x Reviewed previous records   >50% of visit spent in counseling and coordination of care  Discussion with patient and/or family and questions answered       ________________________________________________________________________  Signed: Janet Eaton MD    Procedures: see electronic medical records for all procedures/Xrays and details which were not copied into this note but were reviewed prior to creation of Plan.     LAB DATA REVIEWED:    Recent Results (from the past 24 hour(s))   GLUCOSE, POC    Collection Time: 01/17/17  6:54 AM   Result Value Ref Range    Glucose (POC) 125 (H) 65 - 100 mg/dL    Performed by Coleen Holter    CBC WITH AUTOMATED DIFF    Collection Time: 01/18/17  3:06 AM   Result Value Ref Range    WBC 9.6 4.1 - 11.1 K/uL    RBC 2.77 (L) 4.10 - 5.70 M/uL    HGB 8.5 (L) 12.1 - 17.0 g/dL    HCT 26.0 (L) 36.6 - 50.3 %    MCV 93.9 80.0 - 99.0 FL    MCH 30.7 26.0 - 34.0 PG    MCHC 32.7 30.0 - 36.5 g/dL    RDW 14.4 11.5 - 14.5 %    PLATELET 207 366 - 932 K/uL    NEUTROPHILS 83 (H) 32 - 75 %    LYMPHOCYTES 8 (L) 12 - 49 %    MONOCYTES 9 5 - 13 %    EOSINOPHILS 0 0 - 7 %    BASOPHILS 0 0 - 1 %    ABS. NEUTROPHILS 7.9 1.8 - 8.0 K/UL    ABS. LYMPHOCYTES 0.8 0.8 - 3.5 K/UL    ABS. MONOCYTES 0.9 0.0 - 1.0 K/UL    ABS. EOSINOPHILS 0.0 0.0 - 0.4 K/UL    ABS. BASOPHILS 0.0 0.0 - 0.1 K/UL    RBC COMMENTS NORMOCYTIC, NORMOCHROMIC      DF SMEAR SCANNED     LACTIC ACID, PLASMA    Collection Time: 01/18/17  3:06 AM   Result Value Ref Range    Lactic acid 1.8 0.4 - 2.0 MMOL/L   LIPASE    Collection Time: 01/18/17  3:06 AM   Result Value Ref Range    Lipase 36 (L) 73 - 393 U/L   CK W/ CKMB & INDEX    Collection Time: 01/18/17  3:06 AM   Result Value Ref Range     39 - 308 U/L    CK - MB 3.6 (H) <3.6 NG/ML    CK-MB Index 1.6 0 - 2.5     METABOLIC PANEL, COMPREHENSIVE    Collection Time: 01/18/17  3:06 AM   Result Value Ref Range    Sodium 121 (L) 136 - 145 mmol/L    Potassium 5.6 (H) 3.5 - 5.1 mmol/L    Chloride 89 (L) 97 - 108 mmol/L    CO2 14 (LL) 21 - 32 mmol/L    Anion gap 18 (H) 5 - 15 mmol/L    Glucose 1094 (HH) 65 - 100 mg/dL    BUN 46 (H) 6 - 20 MG/DL    Creatinine 5.04 (H) 0.70 - 1.30 MG/DL    BUN/Creatinine ratio 9 (L) 12 - 20      GFR est AA 14 (L) >60 ml/min/1.73m2    GFR est non-AA 12 (L) >60 ml/min/1.73m2    Calcium 8.1 (L) 8.5 - 10.1 MG/DL    Bilirubin, total 0.9 0.2 - 1.0 MG/DL    ALT 55 12 - 78 U/L    AST 85 (H) 15 - 37 U/L    Alk.  phosphatase 105 45 - 117 U/L    Protein, total 6.7 6.4 - 8.2 g/dL    Albumin 3.7 3.5 - 5.0 g/dL    Globulin 3.0 2.0 - 4.0 g/dL    A-G Ratio 1.2 1.1 - 2. 2     TROPONIN I    Collection Time: 01/18/17  3:06 AM   Result Value Ref Range    Troponin-I, Qt. <0.04 <0.05 ng/mL   URINALYSIS W/ REFLEX CULTURE    Collection Time: 01/18/17  3:41 AM   Result Value Ref Range    Color YELLOW/STRAW      Appearance CLEAR CLEAR      Specific gravity 1.010 1.003 - 1.030      pH (UA) 7.0 5.0 - 8.0      Protein 30 (A) NEG mg/dL    Glucose >1000 (A) NEG mg/dL    Ketone TRACE (A) NEG mg/dL    Bilirubin NEGATIVE  NEG      Blood MODERATE (A) NEG      Urobilinogen 0.2 0.2 - 1.0 EU/dL    Nitrites NEGATIVE  NEG      Leukocyte Esterase NEGATIVE  NEG      WBC 0-4 0 - 4 /hpf    RBC 5-10 0 - 5 /hpf    Epithelial cells FEW FEW /lpf    Bacteria NEGATIVE  NEG /hpf    UA:UC IF INDICATED CULTURE NOT INDICATED BY UA RESULT CNI     GLUCOSE, POC    Collection Time: 01/18/17  4:09 AM   Result Value Ref Range    Glucose (POC) >600 (HH) 65 - 100 mg/dL    Performed by [address] [place] [address], POC    Collection Time: 01/18/17  4:10 AM   Result Value Ref Range    Glucose (POC) >600 (HH) 65 - 100 mg/dL    Performed by Mc Velasquez

## 2017-01-18 NOTE — ED PROVIDER NOTES
HPI Comments: Cam Xiong is a 61 y.o. male with PMHx significant for DM, HTN, frontotemporal Alzheimer's dementia, CKD, CAD, prostate CA, who presents via EMS to ED [facility] ED for evaluation of elevated BG levels today. Per EMS, pt has had N/V/D for the past ~24 hours. He has not had an episode of vomiting or diarrhea for the past several hours, but when he checked his BG at home it was found to be > 400 mg/dL. Pt was told by his PCP to seek medical attention if his BG ever became that high, so EMS was called. EMS states that when they arrived on the scene, the pt's BG was > 600 mg/dL, but they did not administer any medications en route to the ED. Pt is unsure if he has had any fevers, but he specifically denies any abdominal pain, CP, and SOB. PCP: PROVIDER UNKNOWN  Endocrinology: Dr. Tc Campo  Nephrology: Dr. 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 Written by Alexis Lopez ED Scribe, as dictated by Marck Anna MD.      The history is provided by the patient and the EMS personnel. The history is limited by the absence of a caregiver and the condition of the patient.         Past Medical History:   Diagnosis Date    Alzheimer disease     CAD (coronary artery disease)     Cancer ([address]      prostate    Chronic kidney disease     Dementia     Diabetes ([address]     DM (diabetes mellitus), type 2, uncontrolled ([address]     Hyperlipidemia     Hypertension     Memory loss or impairment     Other ill-defined conditions(799.89)      blind R eye-retina detachment    Other ill-defined conditions(799.89)      right cerebellopontine angle lipoma. Past Surgical History:   Procedure Laterality Date    Hx shoulder arthroscopy       right    Hx urological       prostate bx    Hx heent       retinal detachment    Upper gi endoscopy,biopsy  11/12/2014          Colonoscopy,diagnostic  11/12/2014               Family History:   Problem Relation Age of Onset    Heart Disease Mother      Pacemaker    Cancer Father        Social History     Social History    Marital status:      Spouse name: N/A    Number of children: N/A    Years of education: N/A     Occupational History    Not on file. Social History Main Topics    Smoking status: Never Smoker    Smokeless tobacco: Never Used    Alcohol use No    Drug use: No    Sexual activity: Not Currently     Other Topics Concern    Not on file     Social History Narrative         ALLERGIES: Ace inhibitors and Arb-angiotensin receptor antagonist    Review of Systems   Unable to perform ROS: Dementia       Vitals:    01/18/17 0245 01/18/17 0430 01/18/17 0445 01/18/17 0500   BP: (!) 206/94 184/86 (!) 200/91 170/83   Pulse: 99 97 100 99   Resp: 18 20 20 18   Temp: 99.1 °F (37.3 °C)      SpO2: 100% 100% 100% 100%   Weight: 72.7 kg (160 lb 5.1 oz)      Height: 5' 5\" (1.651 m)               Physical Exam   Constitutional: He appears well-developed and well-nourished. No distress. HENT:   Head: Normocephalic and atraumatic. Nose: Nose normal.   Mouth/Throat: No oropharyngeal exudate. Eyes: Conjunctivae and EOM are normal. Pupils are equal, round, and reactive to light. Neck: Normal range of motion. Neck supple. No JVD present. Cardiovascular: Normal rate, regular rhythm, normal heart sounds and intact distal pulses. Exam reveals no friction rub. No murmur heard. Pulmonary/Chest: Effort normal and breath sounds normal. No stridor. No respiratory distress. He has no wheezes. He has no rales. Abdominal: Soft.  Bowel sounds are normal. He exhibits no distension. There is no tenderness. There is no rebound. Musculoskeletal: Normal range of motion. He exhibits no tenderness. Neurological: He is alert. He has normal strength. He is disoriented. No cranial nerve deficit. GCS eye subscore is 4. GCS verbal subscore is 4. GCS motor subscore is 6. Skin: Skin is warm and dry. No rash noted. He is not diaphoretic. Psychiatric: He has a normal mood and affect. His behavior is normal. Judgment and thought content normal.   Nursing note and vitals reviewed. MDM  Number of Diagnoses or Management Options  Chronic kidney disease, stage 4 (severe) ([facility]):   Diabetic ketoacidosis without coma associated with type 2 diabetes mellitus ([facility]): Hyperglycemia:   Hyponatremia:   Vomiting, intractability of vomiting not specified, presence of nausea not specified, unspecified vomiting type:   Diagnosis management comments: DDX:  Dka, dehydration, electrolyte abnormality, uti, pna, acs    Plan:  Ekg, labs, ivf, insulin, cxr, ce's, ua    Impression:  DKA        Amount and/or Complexity of Data Reviewed  Clinical lab tests: reviewed and ordered  Decide to obtain previous medical records or to obtain history from someone other than the patient: yes  Obtain history from someone other than the patient: yes (EMS)  Review and summarize past medical records: yes  Discuss the patient with other providers: yes (Hospitalist)    Critical Care  Total time providing critical care:  minutes    ED Course       Procedures      I reviewed our electronic medical record system for any past medical records that were available that may contribute to the patients current condition, the nursing notes and and vital signs from today's visit    Nursing notes will be reviewed as they become available in realtime while the pt is in the ED. Carroll Ashley MD    EKG interpretation 0390: NSR, nl Axis, rate 99; , QRS 76, QTc 441; no acute ischemia;  Carroll Ashley MD      Progress Note:  4:13 AM  Pts BG is still reading high after receiving 10 units of insulin and half a liter of fluids. Will plan to give another ten units. Pt now c/o CP at this time. Written by Adri Bull, ED Scribe, as dictated by Carroll Ashley MD.       Progress Note:  4:59 AM  BG noted to be 1094 mg/dL. BP also noted to be elevated at 528 mmHg systolic. PT has not had any further vomiting while in ED. Written by Adri Bull, ED Scribe, as dictated by Carroll Ashley MD.       CONSULT NOTE:   5:16 AM   Carroll Ashley MD spoke with Dr. Willard Olea,   Specialty: Hospitalist  Discussed pt's hx, disposition, and available diagnostic and imaging results. Reviewed care plans. Consultant will evaluate pt for admission. Written by Jamaal Oseguera ED Scribe, as dictated by Carroll Ashley MD.      CRITICAL CARE NOTE:  5:19 AM  IMPENDING DETERIORATION -Airway, Respiratory, Cardiovascular, CNS, Metabolic and Renal    ASSOCIATED RISK FACTORS - Hypotension, Dysrhythmia, Metabolic changes, Dehydration, Vascular Compromise and CNS Decompensation    MANAGEMENT- Bedside Assessment and Supervision of Care    INTERPRETATION -  Xrays, ECG and Blood Pressure    INTERVENTIONS - hemodynamic mngmt, vascular control and Metobolic interventions    CASE REVIEW - Hospitalist and Nursing    TREATMENT RESPONSE -Improved    PERFORMED BY - Self      NOTES   :    I have spent  minutes of critical care time involved in lab review, consultations with specialist, family decision- making, bedside attention and documentation. During this entire length of time I was immediately available to the patient . Carroll Ashley MD       Pt noted to receive care in the ED during monthly downtime. Imaging and lab work may not be readily available in EMR. Hard copy print outs of labs have been reviewed, imaging results (via \"sticky notes\" from Radiologist) have also been reviewed.   Carroll Ashley MD LABORATORY TESTS:  Recent Results (from the past 12 hour(s))   CBC WITH AUTOMATED DIFF    Collection Time: 01/18/17  3:06 AM   Result Value Ref Range    WBC 9.6 4.1 - 11.1 K/uL    RBC 2.77 (L) 4.10 - 5.70 M/uL    HGB 8.5 (L) 12.1 - 17.0 g/dL    HCT 26.0 (L) 36.6 - 50.3 %    MCV 93.9 80.0 - 99.0 FL    MCH 30.7 26.0 - 34.0 PG    MCHC 32.7 30.0 - 36.5 g/dL    RDW 14.4 11.5 - 14.5 %    PLATELET 042 870 - 484 K/uL    NEUTROPHILS 83 (H) 32 - 75 %    LYMPHOCYTES 8 (L) 12 - 49 %    MONOCYTES 9 5 - 13 %    EOSINOPHILS 0 0 - 7 %    BASOPHILS 0 0 - 1 %    ABS. NEUTROPHILS 7.9 1.8 - 8.0 K/UL    ABS. LYMPHOCYTES 0.8 0.8 - 3.5 K/UL    ABS. MONOCYTES 0.9 0.0 - 1.0 K/UL    ABS. EOSINOPHILS 0.0 0.0 - 0.4 K/UL    ABS. BASOPHILS 0.0 0.0 - 0.1 K/UL    RBC COMMENTS NORMOCYTIC, NORMOCHROMIC      DF SMEAR SCANNED     LACTIC ACID, PLASMA    Collection Time: 01/18/17  3:06 AM   Result Value Ref Range    Lactic acid 1.8 0.4 - 2.0 MMOL/L   LIPASE    Collection Time: 01/18/17  3:06 AM   Result Value Ref Range    Lipase 36 (L) 73 - 393 U/L   CK W/ CKMB & INDEX    Collection Time: 01/18/17  3:06 AM   Result Value Ref Range     39 - 308 U/L    CK - MB 3.6 (H) <3.6 NG/ML    CK-MB Index 1.6 0 - 2.5     METABOLIC PANEL, COMPREHENSIVE    Collection Time: 01/18/17  3:06 AM   Result Value Ref Range    Sodium 121 (L) 136 - 145 mmol/L    Potassium 5.6 (H) 3.5 - 5.1 mmol/L    Chloride 89 (L) 97 - 108 mmol/L    CO2 14 (LL) 21 - 32 mmol/L    Anion gap 18 (H) 5 - 15 mmol/L    Glucose 1094 (HH) 65 - 100 mg/dL    BUN 46 (H) 6 - 20 MG/DL    Creatinine 5.04 (H) 0.70 - 1.30 MG/DL    BUN/Creatinine ratio 9 (L) 12 - 20      GFR est AA 14 (L) >60 ml/min/1.73m2    GFR est non-AA 12 (L) >60 ml/min/1.73m2    Calcium 8.1 (L) 8.5 - 10.1 MG/DL    Bilirubin, total 0.9 0.2 - 1.0 MG/DL    ALT 55 12 - 78 U/L    AST 85 (H) 15 - 37 U/L    Alk.  phosphatase 105 45 - 117 U/L    Protein, total 6.7 6.4 - 8.2 g/dL    Albumin 3.7 3.5 - 5.0 g/dL    Globulin 3.0 2.0 - 4.0 g/dL    A-G Ratio 1.2 1.1 - 2.2     TROPONIN I    Collection Time: 01/18/17  3:06 AM   Result Value Ref Range    Troponin-I, Qt. <0.04 <0.05 ng/mL   URINALYSIS W/ REFLEX CULTURE    Collection Time: 01/18/17  3:41 AM   Result Value Ref Range    Color YELLOW/STRAW      Appearance CLEAR CLEAR      Specific gravity 1.010 1.003 - 1.030      pH (UA) 7.0 5.0 - 8.0      Protein 30 (A) NEG mg/dL    Glucose >1000 (A) NEG mg/dL    Ketone TRACE (A) NEG mg/dL    Bilirubin NEGATIVE  NEG      Blood MODERATE (A) NEG      Urobilinogen 0.2 0.2 - 1.0 EU/dL    Nitrites NEGATIVE  NEG      Leukocyte Esterase NEGATIVE  NEG      WBC 0-4 0 - 4 /hpf    RBC 5-10 0 - 5 /hpf    Epithelial cells FEW FEW /lpf    Bacteria NEGATIVE  NEG /hpf    UA:UC IF INDICATED CULTURE NOT INDICATED BY UA RESULT CNI     GLUCOSE, POC    Collection Time: 01/18/17  4:09 AM   Result Value Ref Range    Glucose (POC) >600 (HH) 65 - 100 mg/dL    Performed by [address] [place] [address], POC    Collection Time: 01/18/17  4:10 AM   Result Value Ref Range    Glucose (POC) >600 (HH) 65 - 100 mg/dL    Performed by Linda Cruz        MEDICATIONS GIVEN:  Medications   sodium chloride 0.9 % bolus infusion 1,000 mL (1,000 mL IntraVENous New Bag 1/18/17 7065)   calcium gluconate injection 1 g (not administered)   sodium bicarbonate 8.4 % (1 mEq/mL) injection 50 mEq (not administered)   insulin regular (NOVOLIN R, HUMULIN R) 100 Units in 0.9% sodium chloride 100 mL infusion (not administered)   sodium chloride 0.9 % bolus infusion 1,000 mL (1,000 mL IntraVENous New Bag 1/18/17 9105)       IMPRESSION:  1. Hyperglycemia    2. Hyponatremia    3. Chronic kidney disease, stage 4 (severe) ([facility])        PLAN:  1. Admit to Hospitalist     Admission Note:  5:17 AM  Patient is being admitted to the hospital by Dr. Mauro Oreilly. The results of their tests and reasons for their admission have been discussed with them and available family.  They convey agreement and understanding for the need to be admitted and for their admission diagnosis. This note is prepared by Kristen Christiansen, acting as Scribe for Vimal Esquivel MD.     Vimal Esquivel MD: The scribe's documentation has been prepared under my direction and personally reviewed by me in its entirety. I confirm that the note above accurately reflects all work, treatment, procedures, and medical decision making performed by me.

## 2017-01-18 NOTE — PROGRESS NOTES
Critical care interdisciplinary rounds held on 1/18/17). Following members present, Pharmacy, Diabetes Treatment, Case Management, Respiratory Therapy, Physical Therapy, Pastoral Care, Heart Failure Management, Palliative Care and Nutrition. Led by Camilla Malagon RN and Dr. Brooklyn Lugo. Plan of care discussed. See clinical pathway for plan of care and interventions and desired outcomes.

## 2017-01-19 PROBLEM — Z66 DO NOT RESUSCITATE: Status: ACTIVE | Noted: 2017-01-19

## 2017-01-19 PROBLEM — Z71.89 DO NOT RESUSCITATE DISCUSSION: Status: ACTIVE | Noted: 2017-01-19

## 2017-01-19 PROBLEM — Z71.89 COUNSELING REGARDING GOALS OF CARE: Status: ACTIVE | Noted: 2017-01-19

## 2017-01-19 LAB
ANION GAP BLD CALC-SCNC: 13 MMOL/L (ref 5–15)
ANION GAP BLD CALC-SCNC: 13 MMOL/L (ref 5–15)
BUN SERPL-MCNC: 33 MG/DL (ref 6–20)
BUN SERPL-MCNC: 35 MG/DL (ref 6–20)
BUN/CREAT SERPL: 8 (ref 12–20)
BUN/CREAT SERPL: 8 (ref 12–20)
CALCIUM SERPL-MCNC: 7.4 MG/DL (ref 8.5–10.1)
CALCIUM SERPL-MCNC: 7.5 MG/DL (ref 8.5–10.1)
CHLORIDE SERPL-SCNC: 106 MMOL/L (ref 97–108)
CHLORIDE SERPL-SCNC: 110 MMOL/L (ref 97–108)
CO2 SERPL-SCNC: 15 MMOL/L (ref 21–32)
CO2 SERPL-SCNC: 15 MMOL/L (ref 21–32)
CREAT SERPL-MCNC: 4.08 MG/DL (ref 0.7–1.3)
CREAT SERPL-MCNC: 4.14 MG/DL (ref 0.7–1.3)
GLUCOSE BLD STRIP.AUTO-MCNC: 104 MG/DL (ref 65–100)
GLUCOSE BLD STRIP.AUTO-MCNC: 109 MG/DL (ref 65–100)
GLUCOSE BLD STRIP.AUTO-MCNC: 110 MG/DL (ref 65–100)
GLUCOSE BLD STRIP.AUTO-MCNC: 118 MG/DL (ref 65–100)
GLUCOSE BLD STRIP.AUTO-MCNC: 118 MG/DL (ref 65–100)
GLUCOSE BLD STRIP.AUTO-MCNC: 136 MG/DL (ref 65–100)
GLUCOSE BLD STRIP.AUTO-MCNC: 147 MG/DL (ref 65–100)
GLUCOSE BLD STRIP.AUTO-MCNC: 152 MG/DL (ref 65–100)
GLUCOSE BLD STRIP.AUTO-MCNC: 162 MG/DL (ref 65–100)
GLUCOSE BLD STRIP.AUTO-MCNC: 203 MG/DL (ref 65–100)
GLUCOSE BLD STRIP.AUTO-MCNC: 221 MG/DL (ref 65–100)
GLUCOSE BLD STRIP.AUTO-MCNC: 239 MG/DL (ref 65–100)
GLUCOSE BLD STRIP.AUTO-MCNC: 272 MG/DL (ref 65–100)
GLUCOSE BLD STRIP.AUTO-MCNC: 278 MG/DL (ref 65–100)
GLUCOSE BLD STRIP.AUTO-MCNC: 285 MG/DL (ref 65–100)
GLUCOSE BLD STRIP.AUTO-MCNC: 289 MG/DL (ref 65–100)
GLUCOSE BLD STRIP.AUTO-MCNC: 328 MG/DL (ref 65–100)
GLUCOSE BLD STRIP.AUTO-MCNC: 70 MG/DL (ref 65–100)
GLUCOSE BLD STRIP.AUTO-MCNC: 81 MG/DL (ref 65–100)
GLUCOSE BLD STRIP.AUTO-MCNC: 86 MG/DL (ref 65–100)
GLUCOSE BLD STRIP.AUTO-MCNC: 91 MG/DL (ref 65–100)
GLUCOSE BLD STRIP.AUTO-MCNC: 96 MG/DL (ref 65–100)
GLUCOSE BLD STRIP.AUTO-MCNC: 96 MG/DL (ref 65–100)
GLUCOSE BLD STRIP.AUTO-MCNC: 97 MG/DL (ref 65–100)
GLUCOSE BLD STRIP.AUTO-MCNC: >600 MG/DL (ref 65–100)
GLUCOSE BLD STRIP.AUTO-MCNC: >600 MG/DL (ref 65–100)
GLUCOSE SERPL-MCNC: 212 MG/DL (ref 65–100)
GLUCOSE SERPL-MCNC: 95 MG/DL (ref 65–100)
MAGNESIUM SERPL-MCNC: 1.7 MG/DL (ref 1.6–2.4)
MAGNESIUM SERPL-MCNC: 1.7 MG/DL (ref 1.6–2.4)
PHOSPHATE SERPL-MCNC: 3.4 MG/DL (ref 2.6–4.7)
POTASSIUM SERPL-SCNC: 3.9 MMOL/L (ref 3.5–5.1)
POTASSIUM SERPL-SCNC: 4.2 MMOL/L (ref 3.5–5.1)
SERVICE CMNT-IMP: ABNORMAL
SERVICE CMNT-IMP: NORMAL
SODIUM SERPL-SCNC: 134 MMOL/L (ref 136–145)
SODIUM SERPL-SCNC: 138 MMOL/L (ref 136–145)

## 2017-01-19 PROCEDURE — 36415 COLL VENOUS BLD VENIPUNCTURE: CPT | Performed by: INTERNAL MEDICINE

## 2017-01-19 PROCEDURE — 74011636637 HC RX REV CODE- 636/637: Performed by: INTERNAL MEDICINE

## 2017-01-19 PROCEDURE — 74011000258 HC RX REV CODE- 258: Performed by: INTERNAL MEDICINE

## 2017-01-19 PROCEDURE — 74011000250 HC RX REV CODE- 250: Performed by: INTERNAL MEDICINE

## 2017-01-19 PROCEDURE — 82962 GLUCOSE BLOOD TEST: CPT

## 2017-01-19 PROCEDURE — 80048 BASIC METABOLIC PNL TOTAL CA: CPT | Performed by: INTERNAL MEDICINE

## 2017-01-19 PROCEDURE — 74011250637 HC RX REV CODE- 250/637: Performed by: EMERGENCY MEDICINE

## 2017-01-19 PROCEDURE — 74011250637 HC RX REV CODE- 250/637: Performed by: INTERNAL MEDICINE

## 2017-01-19 PROCEDURE — 84100 ASSAY OF PHOSPHORUS: CPT | Performed by: INTERNAL MEDICINE

## 2017-01-19 PROCEDURE — 74011250636 HC RX REV CODE- 250/636: Performed by: INTERNAL MEDICINE

## 2017-01-19 PROCEDURE — 65620000000 HC RM CCU GENERAL

## 2017-01-19 PROCEDURE — 83735 ASSAY OF MAGNESIUM: CPT | Performed by: INTERNAL MEDICINE

## 2017-01-19 RX ORDER — INSULIN LISPRO 100 [IU]/ML
INJECTION, SOLUTION INTRAVENOUS; SUBCUTANEOUS
Status: DISCONTINUED | OUTPATIENT
Start: 2017-01-19 | End: 2017-01-20

## 2017-01-19 RX ADMIN — SALINE NASAL SPRAY 2 SPRAY: 1.5 SOLUTION NASAL at 23:45

## 2017-01-19 RX ADMIN — TAMSULOSIN HYDROCHLORIDE 0.4 MG: 0.4 CAPSULE ORAL at 08:13

## 2017-01-19 RX ADMIN — Medication 10 ML: at 21:50

## 2017-01-19 RX ADMIN — PANTOPRAZOLE SODIUM 40 MG: 40 TABLET, DELAYED RELEASE ORAL at 08:14

## 2017-01-19 RX ADMIN — DEXTROSE MONOHYDRATE 25 G: 25 INJECTION, SOLUTION INTRAVENOUS at 02:38

## 2017-01-19 RX ADMIN — HEPARIN SODIUM 5000 UNITS: 5000 INJECTION, SOLUTION INTRAVENOUS; SUBCUTANEOUS at 08:14

## 2017-01-19 RX ADMIN — AMLODIPINE BESYLATE 5 MG: 5 TABLET ORAL at 08:14

## 2017-01-19 RX ADMIN — HEPARIN SODIUM 5000 UNITS: 5000 INJECTION, SOLUTION INTRAVENOUS; SUBCUTANEOUS at 23:46

## 2017-01-19 RX ADMIN — DEXTROSE MONOHYDRATE AND SODIUM CHLORIDE 150 ML/HR: 5; .45 INJECTION, SOLUTION INTRAVENOUS at 14:59

## 2017-01-19 RX ADMIN — PRAVASTATIN SODIUM 10 MG: 10 TABLET ORAL at 21:49

## 2017-01-19 RX ADMIN — INSULIN LISPRO 3 UNITS: 100 INJECTION, SOLUTION INTRAVENOUS; SUBCUTANEOUS at 10:04

## 2017-01-19 RX ADMIN — Medication 10 ML: at 07:41

## 2017-01-19 RX ADMIN — Medication 10 ML: at 05:43

## 2017-01-19 RX ADMIN — HEPARIN SODIUM 5000 UNITS: 5000 INJECTION, SOLUTION INTRAVENOUS; SUBCUTANEOUS at 00:39

## 2017-01-19 RX ADMIN — MIRTAZAPINE 15 MG: 15 TABLET, FILM COATED ORAL at 21:49

## 2017-01-19 RX ADMIN — SODIUM CHLORIDE 1.5 UNITS/HR: 900 INJECTION, SOLUTION INTRAVENOUS at 00:22

## 2017-01-19 RX ADMIN — DEXTROSE MONOHYDRATE AND SODIUM CHLORIDE 150 ML/HR: 5; .45 INJECTION, SOLUTION INTRAVENOUS at 23:50

## 2017-01-19 RX ADMIN — HEPARIN SODIUM 5000 UNITS: 5000 INJECTION, SOLUTION INTRAVENOUS; SUBCUTANEOUS at 16:40

## 2017-01-19 NOTE — CONSULTS
Palliative Medicine Consult  Tadeo: [phone] ([phone])    Patient Name: Clara Ramírez. YOB: 1950    Date of Initial Consult: 1/19/17  Reason for Consult: Near end stage renal disease with severe dementia.  ?AMD and goals of care if continues to decline.  Probably would not be a candidate for HD with dementia. Requesting Provider:  Shireen Torrez  Primary Care Physician: Trenton Fatima MD      SUMMARY:   Clara Levy is a 61y.o. year old with a past history of DM, HTN, frontotemporal Alzheimer's dementia, CKD stage 4, CAD, prostate CA, who was admitted on 1/18/2017 from [address] [place] [address] with a diagnosis of hyperglycemia. Current medical issues leading to Palliative Medicine involvement include: pt with dementia, now end stage kidney failure. PALLIATIVE DIAGNOSES:   1. Hyperglycemia >600  2. DKA  3. CKD4   4. Anemia of chronic disease  5. Severe Alzheimer's dementia  6. Care decisions   PLAN:   1. Care decisions: spoke with patient, who is oriented x 3 (place, year, president), he told me he's had a good life, when asked stated he did not want his heart restarted if it stopped. When I asked him about HD, he said he knew (but could describe it) and did not want it. I spoke with Arianna Shaw, his mPOA, discussed pt's overall condition, his declining renal function, whether or not to start dialysis; she stated that pt is tired, does not have much QOL,. Counseled about comfort care vs aggressive medical care and she felt pt would want comfort over longevity. I had a long discussion with pt's daughter Ilene Ji, who agreed with Talia Yanes, answered their questions, discussed weeks to months prognosis, expect increase in confusion and somnolence. So the plan is as follows:  1. NO DIALYSIS! 2. Comfort measures only. 3. When medically ready for discharge, return to [facility] for comfort care. 4. Provide a DO NOT REHOSPITALIZE order. 5. DDNR completed, pink sheet on chart, now do not resuscitate. 2. Initial consult note routed to primary continuity provider  3. Communicated plan of care with: Palliative IDT,RN, Dr Missy Ramsey / TREATMENT PREFERENCES:   [====Goals of Care====]  GOALS OF CARE:  Patient / health care proxy stated goals:     1. CMO  2. No dialysis  3. Return to [facility]. for comfort care      TREATMENT PREFERENCES:   Code Status: DNR    Advance Care Planning:  No flowsheet data found. Other:    The palliative care team has discussed with patient / health care proxy about goals of care / treatment preferences for patient.  [====Goals of Care====]         HISTORY:     History obtained from: family   CHIEF COMPLAINT: hyperglycemia    HPI/SUBJECTIVE:    The patient is:   [x] Verbal and participatory  [] Non-participatory due to:     BIBA for evaluation of elevated BG levels; pt had n/v/d and c/o CP for the past 24 hours, when he checked his BG at home it was >400. Pt was told by his PCP to seek treatment if his BG ever became that high so they called EMS. Today pt able to tell me he's at [facility], that the month is October, year 2014 no 2017, president is Chon Wilburn, his daughter is Papo Frazier. We talked about death and dying and he stated \"when it's my time, let me go. \"  \"I've lived a good life\"  Clinical Pain Assessment (nonverbal scale for severity on nonverbal patients):   [++++ Clinical Pain Assessment++++]   Denies any pain  [++++Pain Severity++++]: Pain: 0  [++++Pain Character++++]:   [++++Pain Duration++++]:   [++++Pain Effect++++]:   [++++Pain Factors++++]:   [++++Pain Frequency++++]:   [++++Pain Location++++]:   [++++ Clinical Pain Assessment++++]     FUNCTIONAL ASSESSMENT:     Palliative Performance Scale (PPS):  PPS: 20       PSYCHOSOCIAL/SPIRITUAL SCREENING:     Advance Care Planning:  No flowsheet data found.      Any spiritual / Restoration concerns:  [] Yes /  [x] No    Caregiver Burnout:  [] Yes /  [] No /  [x] No Caregiver Present      Anticipatory grief assessment:   [x] Normal  / [] Maladaptive       ESAS Anxiety: Anxiety: 0    ESAS Depression: Depression: 0        REVIEW OF SYSTEMS:     Positive and pertinent negative findings in ROS are noted above in HPI. The following systems were [x] reviewed / [] unable to be reviewed as noted in HPI  Other findings are noted below. Systems: constitutional, ears/nose/mouth/throat, respiratory, gastrointestinal, genitourinary, musculoskeletal, integumentary, neurologic, psychiatric, endocrine. Positive findings noted below. Modified ESAS Completed by: provider   Fatigue: 8 Drowsiness: 5   Depression: 0 Pain: 0   Anxiety: 0 Nausea: 0   Anorexia: 0 Dyspnea: 0     Constipation: No     Stool Occurrence(s): 1        PHYSICAL EXAM:     From RN flowsheet:  Wt Readings from Last 3 Encounters:   01/18/17 160 lb 5.1 oz (72.7 kg)   01/16/17 125 lb (56.7 kg)   11/23/16 119 lb (54 kg)     Blood pressure 125/67, pulse 85, temperature 98.8 °F (37.1 °C), resp. rate 17, height 5' 5\" (1.651 m), weight 160 lb 5.1 oz (72.7 kg), SpO2 98 %.     Pain Scale 1: Numeric (0 - 10)  Pain Intensity 1: 0                 Last bowel movement, if known:     Constitutional: WD, WN, NAD  Eyes: pupils equal, anicteric  ENMT: no nasal discharge, moist mucous membranes  Cardiovascular: regular rhythm, distal pulses intact  Respiratory: breathing not labored, symmetric  Gastrointestinal: soft non-tender, +bowel sounds  Musculoskeletal: no deformity, no tenderness to palpation  Skin: warm, dry  Neurologic: following commands, moving all extremities  Psychiatric: full affect, no hallucinations  Other:       HISTORY:     Active Problems:    DKA (diabetic ketoacidoses) ([facility] (7/10/2011)      Past Medical History   Diagnosis Date    Alzheimer disease     CAD (coronary artery disease)     Cancer ([facility]      prostate    CKD (chronic kidney disease) stage 4, GFR 15-29 ml/min ([facility])     DM (diabetes mellitus), type 2, uncontrolled ([facility]     Hyperlipidemia     Hypertension     Other ill-defined conditions(799.89)      blind R eye-retina detachment    Other ill-defined conditions(799.89)      right cerebellopontine angle lipoma. Past Surgical History   Procedure Laterality Date    Hx shoulder arthroscopy       right    Hx urological       prostate bx    Hx heent       retinal detachment    Upper gi endoscopy,biopsy  11/12/2014          Colonoscopy,diagnostic  11/12/2014            Family History   Problem Relation Age of Onset    Heart Disease Mother      Pacemaker    Cancer Father       History reviewed, no pertinent family history.   Social History   Substance Use Topics    Smoking status: Never Smoker    Smokeless tobacco: Never Used    Alcohol use No     Allergies   Allergen Reactions    Ace Inhibitors Unknown (comments)    Arb-Angiotensin Receptor Antagonist Unknown (comments)      Current Facility-Administered Medications   Medication Dose Route Frequency    sodium chloride (OCEAN) 0.65 % nasal spray 2 Spray  2 Spray Both Nostrils Q2H PRN    insulin lispro (HUMALOG) injection   SubCUTAneous TIDAC    sodium chloride (NS) flush 5-10 mL  5-10 mL IntraVENous Q8H    sodium chloride (NS) flush 5-10 mL  5-10 mL IntraVENous PRN    insulin regular (NOVOLIN R, HUMULIN R) 100 Units in 0.9% sodium chloride 100 mL infusion  0-50 Units/hr IntraVENous TITRATE    glucose chewable tablet 16 g  4 Tab Oral PRN    dextrose (D50W) injection syrg 12.5-25 g  12.5-25 g IntraVENous PRN    glucagon (GLUCAGEN) injection 1 mg  1 mg IntraMUSCular PRN    ondansetron (ZOFRAN) injection 4 mg  4 mg IntraVENous Q4H PRN    acetaminophen (TYLENOL) tablet 650 mg  650 mg Oral Q4H PRN    heparin (porcine) injection 5,000 Units  5,000 Units SubCUTAneous Q8H    metoprolol (LOPRESSOR) injection 5 mg  5 mg IntraVENous Q6H PRN    albuterol-ipratropium (DUO-NEB) 2.5 MG-0.5 MG/3 ML  3 mL Nebulization Q4H PRN    mirtazapine (REMERON) tablet 15 mg  15 mg Oral QHS    amLODIPine (NORVASC) tablet 5 mg  5 mg Oral DAILY    pravastatin (PRAVACHOL) tablet 10 mg  10 mg Oral QHS    pantoprazole (PROTONIX) tablet 40 mg  40 mg Oral ACB    tamsulosin (FLOMAX) capsule 0.4 mg  0.4 mg Oral DAILY    dextrose 5 % - 0.45% NaCl infusion  150 mL/hr IntraVENous CONTINUOUS          LAB AND IMAGING FINDINGS:     Lab Results   Component Value Date/Time    WBC 9.6 01/18/2017 03:06 AM    WBC 9.6 01/18/2017 03:06 AM    HGB 8.5 01/18/2017 03:06 AM    HGB 8.5 01/18/2017 03:06 AM    PLATELET 566 25/03/1822 03:06 AM    PLATELET 818 85/79/9378 03:06 AM     Lab Results   Component Value Date/Time    Sodium 138 01/19/2017 04:14 AM    Potassium 4.2 01/19/2017 04:14 AM    Chloride 110 01/19/2017 04:14 AM    CO2 15 01/19/2017 04:14 AM    BUN 33 01/19/2017 04:14 AM    Creatinine 4.08 01/19/2017 04:14 AM    Calcium 7.4 01/19/2017 04:14 AM    Magnesium 1.7 01/19/2017 04:14 AM    Phosphorus 3.4 01/19/2017 04:14 AM      Lab Results   Component Value Date/Time    AST 85 01/18/2017 03:06 AM    Alk.  phosphatase 105 01/18/2017 03:06 AM    Protein, total 6.7 01/18/2017 03:06 AM    Albumin 3.7 01/18/2017 03:06 AM    Globulin 3.0 01/18/2017 03:06 AM     Lab Results   Component Value Date/Time    INR 1.0 06/14/2012 12:20 PM    Prothrombin time 10.6 06/14/2012 12:20 PM    aPTT 27.8 06/14/2012 12:20 PM      Lab Results   Component Value Date/Time    Iron 34 01/18/2017 06:21 PM    TIBC 252 01/18/2017 06:21 PM    Iron % saturation 13 01/18/2017 06:21 PM    Ferritin 440 01/18/2017 06:21 PM      Lab Results   Component Value Date/Time    pH 7.37 01/18/2017 05:33 AM    PCO2 26 01/18/2017 05:33 AM    PO2 112 01/18/2017 05:33 AM     No components found for: [place]   Lab Results   Component Value Date/Time     01/18/2017 03:06 AM    CK - MB 3.6 01/18/2017 03:06 AM                Total time: 75 min  65 minCounseling / coordination time: 65 min  > 50% counseling / coordination?: yes    Prolonged service was provided for  []30 min   []75 min in face to face time in the presence of the patient. Time Start:   Time End:   Note: this can only be billed with 88196 (initial) or 57934 (follow up). If multiple start / stop times, list each separately.

## 2017-01-19 NOTE — PROGRESS NOTES
Hospitalist Progress Note    NAME: Cam Valdivia. :  1950   MRN:  [id number]       Interim Hospital Summary: 61 y.o. male whom presented on 2017 with      Assessment / Plan:  DKA/T2DM with nephropathy  Pseudohyponatremia  Metabolic Acidosis-likely from CKD  -continue insulin gtt  - IV fluids  -check A1C  -diabetic diet, monitor lytes    Hypertension, benign/essential  -likely from pain induced  - dilaudid prn pain  - metoprolol prn     CKD4 with anemia of chronic kidney disease  -baseline cr 3.5, presenting with cr 4  -nephro following, appreciate recs    Severe Alzheimer's dementia  - In LTC at [address] since 2011  - con't mirtazapin    Hyperlipidemia/CAD  -resume pravachol, BB,  - adding ASA near time of discharge    Prostate ca  -flomax       Code Status: full  Surrogate Decision Maker: sister Adarsh Joel [phone]  DVT Prophylaxis: heparin     Baseline: LTC at [address]:     Chief Complaint / Reason for Physician Visit  No acute complaints. Discussed with RN events overnight. Review of Systems:  Symptom Y/N Comments  Symptom Y/N Comments   Fever/Chills    Chest Pain     Poor Appetite    Edema     Cough    Abdominal Pain     Sputum    Joint Pain     SOB/LAUREANO    Pruritis/Rash     Nausea/vomit    Tolerating PT/OT     Diarrhea    Tolerating Diet     Constipation    Other       Could NOT obtain due to: dementia     Objective:     VITALS:   Last 24hrs VS reviewed since prior progress note.  Most recent are:  Patient Vitals for the past 24 hrs:   Temp Pulse Resp BP SpO2   17 1400 - (!) 113 (!) 31 177/89 100 %   17 1300 - 94 18 162/80 100 %   17 1201 - (!) 101 16 - -   17 1200 98.6 °F (37 °C) 95 15 132/73 100 %   17 1100 - (!) 105 14 125/78 100 %   17 1006 - 93 21 148/78 100 %   17 0859 - 88 19 163/82 100 %   17 0800 98.7 °F (37.1 °C) - - - -   17 0408 - 95 20 146/73 100 %   17 2300 - 83 21 158/76 99 % 01/18/17 2200 - 86 22 144/75 99 %   01/18/17 2100 - (!) 101 15 133/81 100 %   01/18/17 2000 - 91 21 143/56 100 %   01/18/17 1900 98.9 °F (37.2 °C) 87 20 147/73 100 %   01/18/17 1800 - (!) 103 - 153/77 98 %   01/18/17 1700 - 89 20 128/43 100 %   01/18/17 1600 98.9 °F (37.2 °C) 79 18 125/64 100 %   01/18/17 1500 - 81 19 144/67 100 %       Intake/Output Summary (Last 24 hours) at 01/19/17 1437  Last data filed at 01/19/17 1203   Gross per 24 hour   Intake           3026.6 ml   Output             2375 ml   Net            651.6 ml        PHYSICAL EXAM:  General: WD, WN. Alert, cooperative, no acute distress    EENT:  EOMI. Anicteric sclerae. MMM  Resp:  CTA bilaterally, no wheezing or rales. No accessory muscle use  CV:  Regular  rhythm,  No edema  GI:  Soft, Non distended, Non tender.  +Bowel sounds  Neurologic:  Alert and oriented X 3, normal speech,   Psych:   Good insight. Not anxious nor agitated  Skin:  No rashes. No jaundice    Reviewed most current lab test results and cultures  YES  Reviewed most current radiology test results   YES  Review and summation of old records today    NO  Reviewed patient's current orders and MAR    YES  PMH/ reviewed - no change compared to H&P  ________________________________________________________________________  Care Plan discussed with:    Comments   Patient x    Family      RN x    Care Manager     Consultant                        Multidiciplinary team rounds were held today with , nursing, pharmacist and clinical coordinator. Patient's plan of care was discussed; medications were reviewed and discharge planning was addressed.      ________________________________________________________________________  Total NON critical care TIME:  45   Minutes    Total CRITICAL CARE TIME Spent:   Minutes non procedure based      Comments   >50% of visit spent in counseling and coordination of care ________________________________________________________________________  Angelo Perry MD     Procedures: see electronic medical records for all procedures/Xrays and details which were not copied into this note but were reviewed prior to creation of Plan. LABS:  I reviewed today's most current labs and imaging studies. Pertinent labs include:  Recent Labs      01/18/17   0306  01/17/17   0059   WBC  9.6  9.6  9.4   HGB  8.5*  8.5*  9.4*   HCT  26.0*  26.0*  27.0*   PLT  205  205  245     Recent Labs      01/19/17   0414  01/18/17   2311  01/18/17   1821  01/18/17   1136   01/18/17   0306  01/17/17   0237   NA  138  134*  137  137   --   121*  130*   K  4.2  3.9  4.1  3.8   --   5.6*  4.3   CL  110*  106  106  106   --   89*  99   CO2  15*  15*  17*  18*   --   14*  19*   GLU  95  212*  150*  125*   < >  1094*  431*   BUN  33*  35*  37*  43*   --   46*  39*   CREA  4.08*  4.14*  4.21*  4.47*   --   5.04*  5.12*   CA  7.4*  7.5*  7.7*  7.7*   --   8.1*  8.6   MG  1.7  1.7  1.6  1.8   < >   --    --    PHOS  3.4   --   3.4  3.3   --    --    --    ALB   --    --    --    --    --   3.7  3.6   TBILI   --    --    --    --    --   0.9  0.5   SGOT   --    --    --    --    --   85*  45*   ALT   --    --    --    --    --   55  44    < > = values in this interval not displayed.        Signed: Angelo Perry MD

## 2017-01-19 NOTE — PROGRESS NOTES
20;00 Pt . Resumed insulin gtt  (pt had eaten  Peanut butter and crackers previous shift)    00:50 Telehospitalist on call notified of Chemistry including Co2. No new orders received. 02:38 BG 71  Insulin gtt stopped. 12 ml D50 given per stabilizer protocol. 03:00 Repeat BS 97. Insulin remains off.    05:10 CO2 remains 15. No new orders received. 06:00 Insulin gtt remains off.

## 2017-01-19 NOTE — PROGRESS NOTES
[facility]  Pulmonary, Critical Care, and Sleep Medicine    Name: Kalie Tapia. MRN: [id number]   : 1950 Hospital: [facility]   Date: 2017        Critical Care Patient Consult    IMPRESSION:   · DKA-still with acidosis, may be worsened due to the CRI. · CRI, stage 4 CKD  · Anemia hgb of 8.5  · Hypertension  · GERD  · Severe Alzheimer dementia, LTC since . At [facility]   · CAD  · Hyperlipidemia  · Prostate Ca  · Full code  · POA is his sister Jhonathan Friend ph: [phone]      RECOMMENDATIONS:   · Insulin drip  · IV hydration  · Monitor electrolytes  · Monitor Cr. · ON DVT prophylaxis  · Placed consult to Dr. Knute Hodgkins for Acute on chronic renal insufficiency  · Consult for Palliative care in place  · Discussed with pts daughter yesterday. · May need further neuro eval for Dementia once stable from DKA standpoint. 17: No acute complaints. NO chest pain this am. Had some mild GERD yesterday. Cc: Nausea and Vomiting    Subjective/History: This patient has been seen and evaluated at the request of Dr. Yvonne Zafar for above. Patient is a 61 y.o. male   Who presented for evaluation of above. Was noted to have elevated blood sugar. Not able to get much hx from pt. Pt reports some nausea and vomiting after questions. Has mild cough. Has some chest pain which is not completely gone. Denies any fever, chills or sweats. Has not been able to take much by mouth because of nausea. The patient is critically ill and can not provide additional history due to Unable to comprehend. Seems limited by his underlying dementia.      Past Medical History   Diagnosis Date    Alzheimer disease     CAD (coronary artery disease)     Cancer ([facility]      prostate    CKD (chronic kidney disease) stage 4, GFR 15-29 ml/min (HCC)     DM (diabetes mellitus), type 2, uncontrolled (HCC)     Hyperlipidemia     Hypertension     Other ill-defined conditions(799.89)      blind R eye-retina detachment    Other ill-defined conditions(799.89)      right cerebellopontine angle lipoma. Past Surgical History   Procedure Laterality Date    Hx shoulder arthroscopy       right    Hx urological       prostate bx    Hx heent       retinal detachment    Upper gi endoscopy,biopsy  11/12/2014          Colonoscopy,diagnostic  11/12/2014            Prior to Admission medications    Medication Sig Start Date End Date Taking? Authorizing Provider   albuterol-ipratropium (DUO-NEB) 2.5 mg-0.5 mg/3 ml nebu 3 mL by Nebulization route. Historical Provider   amLODIPine (NORVASC) 5 mg tablet Take 5 mg by mouth daily. Historical Provider   Polyethylene Glycol 3350 powd by Does Not Apply route. Historical Provider   mirtazapine (REMERON) 15 mg tablet Take 15 mg by mouth nightly. Indications: MAJOR DEPRESSIVE DISORDER    Phys Other, MD   omeprazole (PRILOSEC) 10 mg capsule Take 10 mg by mouth daily. Indications: GASTROESOPHAGEAL REFLUX    Yumi De La O MD   amLODIPine (NORVASC) 2.5 mg tablet Take 2.5 mg by mouth daily. Indications: HYPERTENSION    Phys Other, MD   sodium polystyrene (KAYEXALATE) powder Take 30 g by mouth every fourty-eight (48) hours. Phys MD Jaylyn   tamsulosin (FLOMAX) 0.4 mg capsule Take 0.4 mg by mouth daily. Yumi De La O MD   calcitRIOL (ROCALTROL) 0.25 mcg capsule Take 0.25 mcg by mouth daily. Phys MD Jaylyn   ondansetron hcl (ZOFRAN, AS HYDROCHLORIDE,) 4 mg tablet Take 1 Tab by mouth every eight (8) hours as needed for Nausea. 12/17/15   Morteza Chaidez MD   b complex-vitamin c-folic acid (NEPHROCAPS) 1 mg capsule Take 1 Cap by mouth daily. Historical Provider   insulin detemir (LEVEMIR) 100 unit/mL injection 0.07 mL by SubCUTAneous route daily. Patient taking differently: 8 Units by SubCUTAneous route daily.  9/30/15   Danica Joel MD   insulin aspart (NOVOLOG) 100 unit/mL injection Novolog BEFORE MEALS: 0-90: hold, : 1 unit, 176-225: 2 units, 226-300: 3 units, 301 +    : 4 units, Novolog at BEDTIME - 225-300: 1 unit, 301 + 2 units 9/30/15   Katherin Araujo MD   glucose blood VI test strips (ASCENSIA AUTODISC VI, ONE TOUCH ULTRA TEST VI) strip Monitor glucose before meals and at bedtime and as needed for symptoms - 4-5 times daily. 9/30/15   Katherin Araujo MD   sodium bicarbonate 650 mg tablet Take 2 tablets by mouth three (3) times daily. 11/12/14   Jesus Harrington MD   ferrous sulfate (SLOW FE) 142 mg (45 mg iron) ER tablet Take 142 mg by mouth Daily (before breakfast). Yumi De La O MD   glucagon (GLUCAGEN) 1 mg injection 1 mL by IntraMUSCular route as needed for Hypoglycemia. 6/25/12   Mulu Francois MD   pravastatin (PRAVACHOL) 10 mg tablet Take 1 Tab by mouth nightly. 3/19/12   Vasyl Wharton MD   ergocalciferol (ERGOCALCIFEROL) 50,000 unit capsule Take 50,000 Units by mouth every seven (7) days. Historical Provider   acetaminophen (TYLENOL) 325 mg tablet Take 325 mg by mouth every four (4) hours as needed.     Historical Provider     Current Facility-Administered Medications   Medication Dose Route Frequency    sodium chloride (NS) flush 5-10 mL  5-10 mL IntraVENous Q8H    insulin regular (NOVOLIN R, HUMULIN R) 100 Units in 0.9% sodium chloride 100 mL infusion  0-50 Units/hr IntraVENous TITRATE    heparin (porcine) injection 5,000 Units  5,000 Units SubCUTAneous Q8H    mirtazapine (REMERON) tablet 15 mg  15 mg Oral QHS    amLODIPine (NORVASC) tablet 5 mg  5 mg Oral DAILY    pravastatin (PRAVACHOL) tablet 10 mg  10 mg Oral QHS    pantoprazole (PROTONIX) tablet 40 mg  40 mg Oral ACB    tamsulosin (FLOMAX) capsule 0.4 mg  0.4 mg Oral DAILY    dextrose 5 % - 0.45% NaCl infusion  100 mL/hr IntraVENous CONTINUOUS     Allergies   Allergen Reactions    Ace Inhibitors Unknown (comments)    Arb-Angiotensin Receptor Antagonist Unknown (comments)      Social History   Substance Use Topics    Smoking status: Never Smoker    Smokeless tobacco: Never Used    Alcohol use No      Family History   Problem Relation Age of Onset    Heart Disease Mother      Pacemaker    Cancer Father         Review of Systems:  Review of systems not obtained due to patient factors. Pt has advanced dementia. Objective:   Vital Signs:    Visit Vitals    /73    Pulse 95    Temp 98.9 °F (37.2 °C)    Resp 20    Ht 5' 5\" (1.651 m)    Wt 72.7 kg (160 lb 5.1 oz)    SpO2 100%    BMI 26.68 kg/m2       O2 Device: Room air       Temp (24hrs), Av.1 °F (37.3 °C), Min:98.9 °F (37.2 °C), Max:99.5 °F (37.5 °C)         Physical Exam:    General:  Alert, cooperative, no distress, appears stated age. Lying in bed no distress. Head:  Normocephalic, without obvious abnormality, atraumatic. Eyes:  Conjunctivae/corneas clear. PERRL, EOMs intact. Nose: Nares normal. Septum midline. Mucosa normal. No drainage or sinus tenderness. Throat: Lips, mucosa, and tongue normal. Teeth and gums normal.   Neck: Supple, symmetrical, trachea midline, no adenopathy, thyroid: no enlargment/tenderness/nodules, no carotid bruit and no JVD. Back:   Symmetric, no curvature. ROM normal.   Lungs:   Clear to auscultation bilaterally. Chest wall:  No tenderness or deformity. Heart:  Regular rate and rhythm, S1, S2 normal, no murmur, click, rub or gallop. Abdomen:   Soft, non-tender. Bowel sounds normal. No masses,  No organomegaly. Extremities: Extremities normal, atraumatic, no cyanosis or edema. Pulses: 2+ and symmetric all extremities.    Skin: Skin color, texture, turgor normal. No rashes or lesions   Lymph nodes: Cervical, supraclavicular, and axillary nodes normal.   Neurologic: Grossly nonfocal       Data:     Recent Results (from the past 24 hour(s))   GLUCOSE, POC    Collection Time: 17  8:51 AM   Result Value Ref Range    Glucose (POC) 383 (H) 65 - 100 mg/dL    Performed by [employer], POC    Collection Time: 17 10:03 AM   Result Value Ref Range    Glucose (POC) 261 (H) 65 - 100 mg/dL    Performed by [employer], POC    Collection Time: 01/18/17 11:07 AM   Result Value Ref Range    Glucose (POC) 188 (H) 65 - 100 mg/dL    Performed by Hasmukh Chavez, BASIC    Collection Time: 01/18/17 11:36 AM   Result Value Ref Range    Sodium 137 136 - 145 mmol/L    Potassium 3.8 3.5 - 5.1 mmol/L    Chloride 106 97 - 108 mmol/L    CO2 18 (L) 21 - 32 mmol/L    Anion gap 13 5 - 15 mmol/L    Glucose 125 (H) 65 - 100 mg/dL    BUN 43 (H) 6 - 20 MG/DL    Creatinine 4.47 (H) 0.70 - 1.30 MG/DL    BUN/Creatinine ratio 10 (L) 12 - 20      GFR est AA 16 (L) >60 ml/min/1.73m2    GFR est non-AA 13 (L) >60 ml/min/1.73m2    Calcium 7.7 (L) 8.5 - 10.1 MG/DL   MAGNESIUM    Collection Time: 01/18/17 11:36 AM   Result Value Ref Range    Magnesium 1.8 1.6 - 2.4 mg/dL   PHOSPHORUS    Collection Time: 01/18/17 11:36 AM   Result Value Ref Range    Phosphorus 3.3 2.6 - 4.7 MG/DL   GLUCOSE, POC    Collection Time: 01/18/17 12:04 PM   Result Value Ref Range    Glucose (POC) 128 (H) 65 - 100 mg/dL    Performed by [employer], POC    Collection Time: 01/18/17  1:03 PM   Result Value Ref Range    Glucose (POC) 77 65 - 100 mg/dL    Performed by [employer], POC    Collection Time: 01/18/17  1:18 PM   Result Value Ref Range    Glucose (POC) 101 (H) 65 - 100 mg/dL    Performed by [employer], POC    Collection Time: 01/18/17  2:24 PM   Result Value Ref Range    Glucose (POC) 85 65 - 100 mg/dL    Performed by [employer], POC    Collection Time: 01/18/17  3:33 PM   Result Value Ref Range    Glucose (POC) 89 65 - 100 mg/dL    Performed by [employer], POC    Collection Time: 01/18/17  4:33 PM   Result Value Ref Range    Glucose (POC) 113 (H) 65 - 100 mg/dL    Performed by [address], [facility]    Collection Time: 01/18/17  5:42 PM   Result Value Ref Range    Glucose (POC) 114 (H) 65 - 100 mg/dL    Performed by [address] A1C WITH EAG    Collection Time: 01/18/17  6:21 PM   Result Value Ref Range    Hemoglobin A1c 10.0 (H) 4.2 - 6.3 %    Est. average glucose 523 mg/dL   METABOLIC PANEL, BASIC    Collection Time: 01/18/17  6:21 PM   Result Value Ref Range    Sodium 137 136 - 145 mmol/L    Potassium 4.1 3.5 - 5.1 mmol/L    Chloride 106 97 - 108 mmol/L    CO2 17 (L) 21 - 32 mmol/L    Anion gap 14 5 - 15 mmol/L    Glucose 150 (H) 65 - 100 mg/dL    BUN 37 (H) 6 - 20 MG/DL    Creatinine 4.21 (H) 0.70 - 1.30 MG/DL    BUN/Creatinine ratio 9 (L) 12 - 20      GFR est AA 17 (L) >60 ml/min/1.73m2    GFR est non-AA 14 (L) >60 ml/min/1.73m2    Calcium 7.7 (L) 8.5 - 10.1 MG/DL   MAGNESIUM    Collection Time: 01/18/17  6:21 PM   Result Value Ref Range    Magnesium 1.6 1.6 - 2.4 mg/dL   PHOSPHORUS    Collection Time: 01/18/17  6:21 PM   Result Value Ref Range    Phosphorus 3.4 2.6 - 4.7 MG/DL   IRON PROFILE    Collection Time: 01/18/17  6:21 PM   Result Value Ref Range    Iron 34 (L) 35 - 150 ug/dL    TIBC 252 250 - 450 ug/dL    Iron % saturation 13 (L) 20 - 50 %   FERRITIN    Collection Time: 01/18/17  6:21 PM   Result Value Ref Range    Ferritin 440 (H) 26 - 388 NG/ML   GLUCOSE, POC    Collection Time: 01/18/17  6:43 PM   Result Value Ref Range    Glucose (POC) 203 (H) 65 - 100 mg/dL    Performed by [employer], POC    Collection Time: 01/18/17  8:01 PM   Result Value Ref Range    Glucose (POC) 382 (H) 65 - 100 mg/dL    Performed by Leonid Chan    GLUCOSE, POC    Collection Time: 01/18/17  8:57 PM   Result Value Ref Range    Glucose (POC) 378 (H) 65 - 100 mg/dL    Performed by Leonid Chan    GLUCOSE, POC    Collection Time: 01/18/17 10:11 PM   Result Value Ref Range    Glucose (POC) 290 (H) 65 - 100 mg/dL    Performed by Jose Francisco[facility], POC    Collection Time: 01/18/17 11:09 PM   Result Value Ref Range    Glucose (POC) 216 (H) 65 - 100 mg/dL    Performed by [employer] [employer], BASIC    Collection Time: 01/18/17 11:11 PM   Result Value Ref Range    Sodium 134 (L) 136 - 145 mmol/L    Potassium 3.9 3.5 - 5.1 mmol/L    Chloride 106 97 - 108 mmol/L    CO2 15 (LL) 21 - 32 mmol/L    Anion gap 13 5 - 15 mmol/L    Glucose 212 (H) 65 - 100 mg/dL    BUN 35 (H) 6 - 20 MG/DL    Creatinine 4.14 (H) 0.70 - 1.30 MG/DL    BUN/Creatinine ratio 8 (L) 12 - 20      GFR est AA 18 (L) >60 ml/min/1.73m2    GFR est non-AA 15 (L) >60 ml/min/1.73m2    Calcium 7.5 (L) 8.5 - 10.1 MG/DL   MAGNESIUM    Collection Time: 01/18/17 11:11 PM   Result Value Ref Range    Magnesium 1.7 1.6 - 2.4 mg/dL   GLUCOSE, POC    Collection Time: 01/19/17 12:22 AM   Result Value Ref Range    Glucose (POC) 136 (H) 65 - 100 mg/dL    Performed by Nish Priscilla    GLUCOSE, POC    Collection Time: 01/19/17  1:28 AM   Result Value Ref Range    Glucose (POC) 81 65 - 100 mg/dL    Performed by [place] Priscilla    GLUCOSE, POC    Collection Time: 01/19/17  2:36 AM   Result Value Ref Range    Glucose (POC) 70 65 - 100 mg/dL    Performed by Nish Priscilla    GLUCOSE, POC    Collection Time: 01/19/17  3:03 AM   Result Value Ref Range    Glucose (POC) 97 65 - 100 mg/dL    Performed by [place] Priscilla    GLUCOSE, POC    Collection Time: 01/19/17  4:11 AM   Result Value Ref Range    Glucose (POC) 109 (H) 65 - 100 mg/dL    Performed by Nish Priscilla    METABOLIC PANEL, BASIC    Collection Time: 01/19/17  4:14 AM   Result Value Ref Range    Sodium 138 136 - 145 mmol/L    Potassium 4.2 3.5 - 5.1 mmol/L    Chloride 110 (H) 97 - 108 mmol/L    CO2 15 (LL) 21 - 32 mmol/L    Anion gap 13 5 - 15 mmol/L    Glucose 95 65 - 100 mg/dL    BUN 33 (H) 6 - 20 MG/DL    Creatinine 4.08 (H) 0.70 - 1.30 MG/DL    BUN/Creatinine ratio 8 (L) 12 - 20      GFR est AA 18 (L) >60 ml/min/1.73m2    GFR est non-AA 15 (L) >60 ml/min/1.73m2    Calcium 7.4 (L) 8.5 - 10.1 MG/DL   MAGNESIUM    Collection Time: 01/19/17  4:14 AM   Result Value Ref Range    Magnesium 1.7 1.6 - 2.4 mg/dL   PHOSPHORUS    Collection Time: 01/19/17 4:14 AM   Result Value Ref Range    Phosphorus 3.4 2.6 - 4.7 MG/DL   GLUCOSE, POC    Collection Time: 01/19/17  5:13 AM   Result Value Ref Range    Glucose (POC) 152 (H) 65 - 100 mg/dL    Performed by Ezzard Luis    GLUCOSE, POC    Collection Time: 01/19/17  6:33 AM   Result Value Ref Range    Glucose (POC) 221 (H) 65 - 100 mg/dL    Performed by [employer]    GLUCOSE, POC    Collection Time: 01/19/17  7:37 AM   Result Value Ref Range    Glucose (POC) 285 (H) 65 - 100 mg/dL    Performed by [employer]             EKG: Rate of 99, QTc: 441. NSR from jan . Telemetry:NSR    Imaging:  I have personally reviewed the patients radiographs and have reviewed the reports:  None, will check CXR.        Ema Eli MD

## 2017-01-19 NOTE — DIABETES MGMT
DTC Progress Note    Recommendations/ Comments: Pt discussed with rounding team and Dr. Dick Campbell and Dr. Jhonny Ly. Pt remains acidotic. BG fluctuating from 70s-upper 200s on gtt likely secondary to insulin sensitivity and rebound hyperglycemia when gtt is at a rate of zero for too long of a duration. Plan to decrease target range to 140-180, increase rate of D5 to 150ml/hr to try to drive insulin gtt to remain on at a rate and add prandial CHO coverage. Chart reviewed on Desiree Vallejo during Multidisciplinary Rounds. A1c:   Lab Results   Component Value Date/Time    Hemoglobin A1c 10.0 01/18/2017 06:21 PM    Hemoglobin A1c, External 7.7 08/12/2015           Recent Glucose Results: Lab Results   Component Value Date/Time    GLU 95 01/19/2017 04:14 AM     (H) 01/18/2017 11:11 PM     (H) 01/18/2017 06:21 PM    GLUCPOC 272 (H) 01/19/2017 09:30 AM    GLUCPOC 278 (H) 01/19/2017 08:26 AM    GLUCPOC 285 (H) 01/19/2017 07:37 AM        Lab Results   Component Value Date/Time    Creatinine 4.08 01/19/2017 04:14 AM       Active Orders   Diet    DIET DIABETIC CONSISTENT CARB Regular; 2 GM NA (House Low NA)        PO intake: Patient Vitals for the past 72 hrs:   % Diet Eaten   01/19/17 0850 30 %       Will continue to follow as needed. Thank you.   Mamie Erickson RN, [address]

## 2017-01-19 NOTE — PROGRESS NOTES
0730: patient received in room, transferred to bed and connected to monitor. Report received from [place], [place]  0800: assessment completed, patient alert and oriented to name and place. Vs, wnl.   1200: reassessment done, no changes  1300: insulin stopped for bg of 77 and 9ml d50 given. 1600: reassessment done, no changes. diabetic diet and up with assistance orders from dr. Beatriz Rhodes. 1700: patient up to chair with minimal assistance.    1920: bedside and verbal report given to holli phillips

## 2017-01-19 NOTE — CONSULTS
Consultation Note    NAME: Nadiya Chris :  1950   MRN:  [id number]     Date/Time:  2017 11:25 AM    I have been asked to see this patient by Dr. Evin Choi  for advice/opinion re: MANA on CKD stage 4 vs progressive CKD stage 4. Assessment :    Plan:  MANA on CKD stage 4 (vs progression)  DM nephropathy  HTN  Anemia - hgb 8.5  Metabolic acidosis  Dementia, severe, in LTC for 5 years  shpt    Admitted with DKA Creatinine on 16 as an outpatient was 3.5; now his creatinine is in the low 4's; non-oliguric; agree with ivf resuscitation as is. Will follow closely and try to re-establish baseline. Probably not too far off from RRT, but need to discuss the pro's and con's with his family, especially in the context of his severe dementia. Will give epo 10 k sc weekly. Iron sat is low. Will give iv venofer for 400 mg. On calcitriol - check phos, pth       Subjective:   CHIEF COMPLAINT:  mana    HISTORY OF PRESENT ILLNESS:     Mouna Hutton is a 61 y.o.   male who has a history of ckd stage 4, dm, htn, hyperkalemia. Mr. Leny Valdovinos was alert, denied complaint, but is a poor historian. Admitted with DKA. No family present. Dementia. Past Medical History   Diagnosis Date    Alzheimer disease     CAD (coronary artery disease)     [facility] ([facility] [address]      prostate    CKD (chronic kidney disease) stage 4, GFR 15-29 ml/min ([facility])     DM (diabetes mellitus), type 2, uncontrolled ([address]     Hyperlipidemia     Hypertension     Other ill-defined conditions(799.89)      blind R eye-retina detachment    Other ill-defined conditions(799.89)      right cerebellopontine angle lipoma.        Past Surgical History   Procedure Laterality Date    Hx shoulder arthroscopy       right    Hx urological       prostate bx    Hx heent       retinal detachment    Upper gi endoscopy,biopsy  2014          Colonoscopy,diagnostic  2014           Social History   Substance Use Topics    Smoking status: Never Smoker  Smokeless tobacco: Never Used    Alcohol use No      Family History   Problem Relation Age of Onset    Heart Disease Mother      Pacemaker    Cancer Father       Allergies   Allergen Reactions    Ace Inhibitors Unknown (comments)    Arb-Angiotensin Receptor Antagonist Unknown (comments)      Prior to Admission medications    Medication Sig Start Date End Date Taking? Authorizing Provider   albuterol-ipratropium (DUO-NEB) 2.5 mg-0.5 mg/3 ml nebu 3 mL by Nebulization route. Historical Provider   amLODIPine (NORVASC) 5 mg tablet Take 5 mg by mouth daily. Historical Provider   Polyethylene Glycol 3350 powd by Does Not Apply route. Historical Provider   mirtazapine (REMERON) 15 mg tablet Take 15 mg by mouth nightly. Indications: MAJOR DEPRESSIVE DISORDER    Phys Other, MD   omeprazole (PRILOSEC) 10 mg capsule Take 10 mg by mouth daily. Indications: GASTROESOPHAGEAL REFLUX    Phys MD Jaylyn   amLODIPine (NORVASC) 2.5 mg tablet Take 2.5 mg by mouth daily. Indications: HYPERTENSION    Phys Other, MD   sodium polystyrene (KAYEXALATE) powder Take 30 g by mouth every fourty-eight (48) hours. Phys MD Jaylyn   tamsulosin (FLOMAX) 0.4 mg capsule Take 0.4 mg by mouth daily. Yumi De La O MD   calcitRIOL (ROCALTROL) 0.25 mcg capsule Take 0.25 mcg by mouth daily. Yumi De La O MD   ondansetron hcl (ZOFRAN, AS HYDROCHLORIDE,) 4 mg tablet Take 1 Tab by mouth every eight (8) hours as needed for Nausea. 12/17/15   Val Baron MD   b complex-vitamin c-folic acid (NEPHROCAPS) 1 mg capsule Take 1 Cap by mouth daily. Historical Provider   insulin detemir (LEVEMIR) 100 unit/mL injection 0.07 mL by SubCUTAneous route daily. Patient taking differently: 8 Units by SubCUTAneous route daily.  9/30/15   Cecil Bess MD   insulin aspart (NOVOLOG) 100 unit/mL injection Novolog BEFORE MEALS: 0-90: hold, : 1 unit, 176-225: 2 units, 226-300: 3 units, 301 +    : 4 units, Novolog at BEDTIME - 225-300: 1 unit, 301 + 2 units 9/30/15   Danica Joel MD   glucose blood VI test strips (ASCENSIA AUTODISC VI, ONE TOUCH ULTRA TEST VI) strip Monitor glucose before meals and at bedtime and as needed for symptoms - 4-5 times daily. 9/30/15   Danica Joel MD   sodium bicarbonate 650 mg tablet Take 2 tablets by mouth three (3) times daily. 11/12/14   Kadeem Sorto MD   ferrous sulfate (SLOW FE) 142 mg (45 mg iron) ER tablet Take 142 mg by mouth Daily (before breakfast). Yumi De La O MD   glucagon (GLUCAGEN) 1 mg injection 1 mL by IntraMUSCular route as needed for Hypoglycemia. 6/25/12   Gus Estrada MD   pravastatin (PRAVACHOL) 10 mg tablet Take 1 Tab by mouth nightly. 3/19/12   Olga Jimenez MD   ergocalciferol (ERGOCALCIFEROL) 50,000 unit capsule Take 50,000 Units by mouth every seven (7) days. Historical Provider   acetaminophen (TYLENOL) 325 mg tablet Take 325 mg by mouth every four (4) hours as needed.     Historical Provider     REVIEW OF SYSTEMS:     [x]  Unable to obtain reliable ROS due to  [x] mental status  [] sedated   [] intubated   [] Total of 12 systems reviewed as follows:  Constitutional: negative fever, negative chills, negative weight loss  Eyes:   negative visual changes  ENT:   negative sore throat, tongue or lip swelling  Respiratory:  negative cough, negative dyspnea  Cards:  negative for chest pain, palpitations, lower extremity edema  GI:   negative for nausea, vomiting, diarrhea, and abdominal pain  :  negative for frequency, dysuria  Integument:  negative for rash and pruritus  Heme:  negative for easy bruising and gum/nose bleeding  Musculoskel: negative for myalgias,  back pain and muscle weakness  Neuro:  negative for headaches, dizziness, vertigo  Psych:  negative for feelings of anxiety, depression   Travel?: none    Objective:   VITALS:    Visit Vitals    /78    Pulse 93    Temp 98.7 °F (37.1 °C)    Resp 21    Ht 5' 5\" (1.651 m)    Wt 72.7 kg (160 lb 5.1 oz)    SpO2 100%    BMI 26.68 kg/m2     PHYSICAL EXAM:  Gen:  [x]  WD [x]  WN  [] cachectic []  thin []  obese []  disheveled             []  ill apearing  []   Critical  []   Chronic    [x]  No acute distress    HEENT:   [x] NC/AT/PERRLA/EOMI    [] pink conjunctivae      [] pale conjunctivae                  PERRL  [] yes  [] no      [] moist mucosa    [] dry mucosa    hearing intact to voice [x] yes  [] No                 NECK:   supple [] yes  [] no        masses [] yes  [] No               thyroid  []  non tender  []  tender    RESP:   [x] CTA bilaterally/no wheezing/rhonchi/rales/crackles    [] rhonchi bilaterally - no dullness  [] wheezing   [] rhonchi   [] crackles     use of accessory muscles [] yes [x] no    CARD:   [x]  regular rate and rhythm/No murmurs/rubs/gallops    murmur  [] yes ()  [] no      Rubs  [] yes  [] no       Gallops [] yes  [] no    Rate []  regular  []  irregular        carotid bruits  [] Right  []  Left                 LE edema [] yes  [x] no           JVP  []  yes   [x]  no    ABD:    [x] soft/non distended/non tender/+bowel sounds/no HSM    []  Rigid    tenderness [] yes [] no   Liver enlargement  []  yes []  no                Spleen enlargement  []  yes []  no     distended []  yes [] no     bowel sound  [] hypoactive   [] hyperactive    LYMPH:    Neck []  yes [x]  no       Axillae []  yes []  no    SKIN:   Rashes []  yes   [x]  no    Ulcers []  yes   []  no               [] tight to palpitation    skin turgor []  good  [] poor  [] decreased               Cyanosis/clubbing []  yes []  no    NEUR:   [x] cranial nerves II-XII grossly intact       [] Cranial nerves deficit                 []  facial droop    []  slurred speech   [] aphasic     [] Strength normal     []  weakness  []  LUE  []   RUE/ []  LLE  []   RLE    follows commands  [x]  yes []  no           PSYCH:   insight [] poor [x] good   Alert and Oriented to  [x] person  [x] place  [x]  time                    [] depressed [] anxious [] agitated [] lethargic [] stuporous  [] sedated     LAB DATA REVIEWED:    Recent Labs      01/18/17   0306  01/17/17   0059   WBC  9.6  9.6  9.4   HGB  8.5*  8.5*  9.4*   HCT  26.0*  26.0*  27.0*   PLT  205  205  245     Recent Labs      01/19/17   0414  01/18/17   2311  01/18/17   1821  01/18/17   1136   NA  138  134*  137  137   K  4.2  3.9  4.1  3.8   CL  110*  106  106  106   CO2  15*  15*  17*  18*   BUN  33*  35*  37*  43*   CREA  4.08*  4.14*  4.21*  4.47*   GLU  95  212*  150*  125*   CA  7.4*  7.5*  7.7*  7.7*   MG  1.7  1.7  1.6  1.8   PHOS  3.4   --   3.4  3.3     Recent Labs      01/18/17   0306  01/17/17   0237   SGOT  85*  45*   ALT  55  44   AP  105  93   TBILI  0.9  0.5   ALB  3.7  3.6   GLOB  3.0  3.0   LPSE  36*   --      No results for input(s): INR, PTP, APTT in the last 72 hours. No lab exists for component: INREXT   Recent Labs      01/18/17   1821   FE  34*   TIBC  252   PSAT  13*   FERR  440*      Recent Labs      01/18/17   0533   PH  7.37   PCO2  26*   PO2  112*     Recent Labs      01/18/17   0306   CPK  229   CKMB  3.6*     Lab Results   Component Value Date/Time    Glucose (POC) 328 01/19/2017 10:33 AM    Glucose (POC) 272 01/19/2017 09:30 AM    Glucose (POC) 278 01/19/2017 08:26 AM    Glucose (POC) 285 01/19/2017 07:37 AM    Glucose (POC) 221 01/19/2017 06:33 AM    Glucose (POC) 207 11/12/2014 01:09 PM    Glucose (POC) 152 03/02/2011 10:14 PM    Glucose  02/17/2011 12:28 PM       Procedures: see electronic medical records for all procedures/Xrays and details which were not copied into this note but were reviewed prior to creation of Plan.    ________________________________________________________________________       ___________________________________________________  Consulting Physician:  Sayra Shay MD

## 2017-01-20 LAB
ANION GAP BLD CALC-SCNC: 11 MMOL/L (ref 5–15)
BUN SERPL-MCNC: 28 MG/DL (ref 6–20)
BUN/CREAT SERPL: 7 (ref 12–20)
CALCIUM SERPL-MCNC: 7.3 MG/DL (ref 8.5–10.1)
CHLORIDE SERPL-SCNC: 112 MMOL/L (ref 97–108)
CO2 SERPL-SCNC: 15 MMOL/L (ref 21–32)
CREAT SERPL-MCNC: 3.95 MG/DL (ref 0.7–1.3)
EST. AVERAGE GLUCOSE BLD GHB EST-MCNC: 223 MG/DL
GLUCOSE BLD STRIP.AUTO-MCNC: 102 MG/DL (ref 65–100)
GLUCOSE BLD STRIP.AUTO-MCNC: 111 MG/DL (ref 65–100)
GLUCOSE BLD STRIP.AUTO-MCNC: 122 MG/DL (ref 65–100)
GLUCOSE BLD STRIP.AUTO-MCNC: 124 MG/DL (ref 65–100)
GLUCOSE BLD STRIP.AUTO-MCNC: 160 MG/DL (ref 65–100)
GLUCOSE BLD STRIP.AUTO-MCNC: 167 MG/DL (ref 65–100)
GLUCOSE BLD STRIP.AUTO-MCNC: 195 MG/DL (ref 65–100)
GLUCOSE BLD STRIP.AUTO-MCNC: 242 MG/DL (ref 65–100)
GLUCOSE BLD STRIP.AUTO-MCNC: 278 MG/DL (ref 65–100)
GLUCOSE BLD STRIP.AUTO-MCNC: 321 MG/DL (ref 65–100)
GLUCOSE BLD STRIP.AUTO-MCNC: 358 MG/DL (ref 65–100)
GLUCOSE BLD STRIP.AUTO-MCNC: 85 MG/DL (ref 65–100)
GLUCOSE BLD STRIP.AUTO-MCNC: 86 MG/DL (ref 65–100)
GLUCOSE SERPL-MCNC: 152 MG/DL (ref 65–100)
HBA1C MFR BLD: 9.4 % (ref 4.2–6.3)
POTASSIUM SERPL-SCNC: 4.7 MMOL/L (ref 3.5–5.1)
SERVICE CMNT-IMP: ABNORMAL
SERVICE CMNT-IMP: NORMAL
SERVICE CMNT-IMP: NORMAL
SODIUM SERPL-SCNC: 138 MMOL/L (ref 136–145)

## 2017-01-20 PROCEDURE — 74011250637 HC RX REV CODE- 250/637: Performed by: INTERNAL MEDICINE

## 2017-01-20 PROCEDURE — 74011000258 HC RX REV CODE- 258: Performed by: INTERNAL MEDICINE

## 2017-01-20 PROCEDURE — 65660000000 HC RM CCU STEPDOWN

## 2017-01-20 PROCEDURE — 74011250636 HC RX REV CODE- 250/636: Performed by: INTERNAL MEDICINE

## 2017-01-20 PROCEDURE — 74011636637 HC RX REV CODE- 636/637: Performed by: INTERNAL MEDICINE

## 2017-01-20 PROCEDURE — 36415 COLL VENOUS BLD VENIPUNCTURE: CPT | Performed by: INTERNAL MEDICINE

## 2017-01-20 PROCEDURE — 74011000250 HC RX REV CODE- 250: Performed by: INTERNAL MEDICINE

## 2017-01-20 PROCEDURE — 80048 BASIC METABOLIC PNL TOTAL CA: CPT | Performed by: INTERNAL MEDICINE

## 2017-01-20 PROCEDURE — 83036 HEMOGLOBIN GLYCOSYLATED A1C: CPT | Performed by: INTERNAL MEDICINE

## 2017-01-20 PROCEDURE — 82962 GLUCOSE BLOOD TEST: CPT

## 2017-01-20 RX ORDER — INSULIN LISPRO 100 [IU]/ML
INJECTION, SOLUTION INTRAVENOUS; SUBCUTANEOUS EVERY 6 HOURS
Status: DISCONTINUED | OUTPATIENT
Start: 2017-01-20 | End: 2017-01-20

## 2017-01-20 RX ORDER — INSULIN LISPRO 100 [IU]/ML
2 INJECTION, SOLUTION INTRAVENOUS; SUBCUTANEOUS
Status: DISCONTINUED | OUTPATIENT
Start: 2017-01-20 | End: 2017-01-23 | Stop reason: HOSPADM

## 2017-01-20 RX ORDER — DEXTROSE 50 % IN WATER (D50W) INTRAVENOUS SYRINGE
12.5-25 AS NEEDED
Status: DISCONTINUED | OUTPATIENT
Start: 2017-01-20 | End: 2017-01-23 | Stop reason: HOSPADM

## 2017-01-20 RX ORDER — INSULIN GLARGINE 100 [IU]/ML
8 INJECTION, SOLUTION SUBCUTANEOUS DAILY
Status: DISCONTINUED | OUTPATIENT
Start: 2017-01-20 | End: 2017-01-22

## 2017-01-20 RX ORDER — INSULIN LISPRO 100 [IU]/ML
INJECTION, SOLUTION INTRAVENOUS; SUBCUTANEOUS
Status: DISCONTINUED | OUTPATIENT
Start: 2017-01-20 | End: 2017-01-23 | Stop reason: HOSPADM

## 2017-01-20 RX ORDER — MAGNESIUM SULFATE 100 %
4 CRYSTALS MISCELLANEOUS AS NEEDED
Status: DISCONTINUED | OUTPATIENT
Start: 2017-01-20 | End: 2017-01-21

## 2017-01-20 RX ORDER — AMLODIPINE BESYLATE 5 MG/1
5 TABLET ORAL ONCE
Status: COMPLETED | OUTPATIENT
Start: 2017-01-20 | End: 2017-01-20

## 2017-01-20 RX ORDER — AMLODIPINE BESYLATE 5 MG/1
10 TABLET ORAL DAILY
Status: DISCONTINUED | OUTPATIENT
Start: 2017-01-21 | End: 2017-01-23 | Stop reason: HOSPADM

## 2017-01-20 RX ORDER — SODIUM CHLORIDE 9 MG/ML
75 INJECTION, SOLUTION INTRAVENOUS CONTINUOUS
Status: DISCONTINUED | OUTPATIENT
Start: 2017-01-20 | End: 2017-01-20

## 2017-01-20 RX ORDER — SODIUM BICARBONATE 650 MG/1
1300 TABLET ORAL 2 TIMES DAILY
Status: DISCONTINUED | OUTPATIENT
Start: 2017-01-20 | End: 2017-01-23 | Stop reason: HOSPADM

## 2017-01-20 RX ADMIN — TAMSULOSIN HYDROCHLORIDE 0.4 MG: 0.4 CAPSULE ORAL at 08:00

## 2017-01-20 RX ADMIN — DEXTROSE MONOHYDRATE AND SODIUM CHLORIDE 150 ML/HR: 5; .45 INJECTION, SOLUTION INTRAVENOUS at 06:28

## 2017-01-20 RX ADMIN — Medication 10 ML: at 05:11

## 2017-01-20 RX ADMIN — SODIUM CHLORIDE 75 ML/HR: 900 INJECTION, SOLUTION INTRAVENOUS at 14:05

## 2017-01-20 RX ADMIN — METOPROLOL TARTRATE 5 MG: 5 INJECTION INTRAVENOUS at 05:06

## 2017-01-20 RX ADMIN — SODIUM BICARBONATE 650 MG TABLET 1300 MG: at 17:26

## 2017-01-20 RX ADMIN — SODIUM BICARBONATE 650 MG TABLET 1300 MG: at 12:10

## 2017-01-20 RX ADMIN — HEPARIN SODIUM 5000 UNITS: 5000 INJECTION, SOLUTION INTRAVENOUS; SUBCUTANEOUS at 16:36

## 2017-01-20 RX ADMIN — INSULIN LISPRO 2 UNITS: 100 INJECTION, SOLUTION INTRAVENOUS; SUBCUTANEOUS at 14:04

## 2017-01-20 RX ADMIN — SALINE NASAL SPRAY 2 SPRAY: 1.5 SOLUTION NASAL at 08:04

## 2017-01-20 RX ADMIN — SALINE NASAL SPRAY 2 SPRAY: 1.5 SOLUTION NASAL at 04:27

## 2017-01-20 RX ADMIN — Medication 5 ML: at 14:00

## 2017-01-20 RX ADMIN — Medication 10 ML: at 21:47

## 2017-01-20 RX ADMIN — PANTOPRAZOLE SODIUM 40 MG: 40 TABLET, DELAYED RELEASE ORAL at 07:57

## 2017-01-20 RX ADMIN — AMLODIPINE BESYLATE 5 MG: 5 TABLET ORAL at 12:11

## 2017-01-20 RX ADMIN — MIRTAZAPINE 15 MG: 15 TABLET, FILM COATED ORAL at 21:51

## 2017-01-20 RX ADMIN — IRON SUCROSE 200 MG: 20 INJECTION, SOLUTION INTRAVENOUS at 12:11

## 2017-01-20 RX ADMIN — ERYTHROPOIETIN 10000 UNITS: 10000 INJECTION, SOLUTION INTRAVENOUS; SUBCUTANEOUS at 17:52

## 2017-01-20 RX ADMIN — INSULIN GLARGINE 8 UNITS: 100 INJECTION, SOLUTION SUBCUTANEOUS at 09:42

## 2017-01-20 RX ADMIN — HEPARIN SODIUM 5000 UNITS: 5000 INJECTION, SOLUTION INTRAVENOUS; SUBCUTANEOUS at 07:57

## 2017-01-20 RX ADMIN — AMLODIPINE BESYLATE 5 MG: 5 TABLET ORAL at 08:00

## 2017-01-20 RX ADMIN — PRAVASTATIN SODIUM 10 MG: 10 TABLET ORAL at 21:51

## 2017-01-20 RX ADMIN — INSULIN HUMAN 8 UNITS: 100 INJECTION, SOLUTION PARENTERAL at 12:29

## 2017-01-20 NOTE — PROGRESS NOTES
Pt is a LTC resident at [facility] and will likely return there when medically stable. Updates sent to [facility] via [address].   DWAYNE Bonner

## 2017-01-20 NOTE — PROGRESS NOTES
D5 stopped and blood glucose went from 321 to 85 in a few hours. Rechecked blood glucose after giving ginger ale and waiting 60 minutes or so, and blood glucose was 86. Will keep a close eye on it and pass this on in report.

## 2017-01-20 NOTE — PROGRESS NOTES
[facility]  Pulmonary, Critical Care, and Sleep Medicine    Name: Melva Hall. MRN: [id number]   : 1950 Hospital: [address] [facility]   Date: 2017        Critical Care Patient Consult    IMPRESSION:   · DKA-improving. Will need to transition off the drip. · CRI, stage 4 CKD  · Anemia hgb of 8.5  · Hypertension  · GERD  · Alzheimer dementia, may also have a prior TBI per his family. · CAD  · Hyperlipidemia  · Prostate Ca  · Full code  · Pt is Do Not Resuscitate. · POA is his sister Trena Felty ph: [phone]      RECOMMENDATIONS:   · Transition off the drip. · Does he need some oral bicarb. · Monitor electrolytes  · Monitor Cr. · ON DVT prophylaxis  · Renal following. · Consult for Palliative care in place  · Will place orders to transfer out of ICU. · May need further neuro eval for Dementia once stable from DKA standpoint. 17: has nasal congestion, sneezing. Reports his appetite is decreased. 17: No acute complaints. NO chest pain this am. Had some mild GERD yesterday. Cc: Nausea and Vomiting    Subjective/History: This patient has been seen and evaluated at the request of Dr. Leslie Boyd for above. Patient is a 61 y.o. male   Who presented for evaluation of above. Was noted to have elevated blood sugar. Not able to get much hx from pt. Pt reports some nausea and vomiting after questions. Has mild cough. Has some chest pain which is not completely gone. Denies any fever, chills or sweats. Has not been able to take much by mouth because of nausea. The patient is critically ill and can not provide additional history due to Unable to comprehend. Seems limited by his underlying dementia.      Past Medical History   Diagnosis Date    Alzheimer disease     CAD (coronary artery disease)     Cancer ([facility] [address]      prostate    CKD (chronic kidney disease) stage 4, GFR 15-29 ml/min ([facility])     DM (diabetes mellitus), type 2, uncontrolled ([facility] [address]  Hyperlipidemia     Hypertension     Other ill-defined conditions(799.89)      blind R eye-retina detachment    Other ill-defined conditions(799.89)      right cerebellopontine angle lipoma. Past Surgical History   Procedure Laterality Date    Hx shoulder arthroscopy       right    Hx urological       prostate bx    Hx heent       retinal detachment    Upper gi endoscopy,biopsy  11/12/2014          Colonoscopy,diagnostic  11/12/2014            Prior to Admission medications    Medication Sig Start Date End Date Taking? Authorizing Provider   albuterol-ipratropium (DUO-NEB) 2.5 mg-0.5 mg/3 ml nebu 3 mL by Nebulization route. Historical Provider   amLODIPine (NORVASC) 5 mg tablet Take 5 mg by mouth daily. Historical Provider   Polyethylene Glycol 3350 powd by Does Not Apply route. Historical Provider   mirtazapine (REMERON) 15 mg tablet Take 15 mg by mouth nightly. Indications: MAJOR DEPRESSIVE DISORDER    Phys MD Jaylyn   omeprazole (PRILOSEC) 10 mg capsule Take 10 mg by mouth daily. Indications: GASTROESOPHAGEAL REFLUX    Yumi De La O MD   amLODIPine (NORVASC) 2.5 mg tablet Take 2.5 mg by mouth daily. Indications: HYPERTENSION    Phys Other, MD   sodium polystyrene (KAYEXALATE) powder Take 30 g by mouth every fourty-eight (48) hours. Yumi De La O MD   tamsulosin (FLOMAX) 0.4 mg capsule Take 0.4 mg by mouth daily. Yumi De La O MD   calcitRIOL (ROCALTROL) 0.25 mcg capsule Take 0.25 mcg by mouth daily. Yumi De La O MD   ondansetron hcl (ZOFRAN, AS HYDROCHLORIDE,) 4 mg tablet Take 1 Tab by mouth every eight (8) hours as needed for Nausea. 12/17/15   Elijah Nino MD   b complex-vitamin c-folic acid (NEPHROCAPS) 1 mg capsule Take 1 Cap by mouth daily. Historical Provider   insulin detemir (LEVEMIR) 100 unit/mL injection 0.07 mL by SubCUTAneous route daily. Patient taking differently: 8 Units by SubCUTAneous route daily.  9/30/15   Bebo Jones MD   insulin aspart (NOVOLOG) 100 unit/mL injection Novolog BEFORE MEALS: 0-90: hold, : 1 unit, 176-225: 2 units, 226-300: 3 units, 301 +    : 4 units, Novolog at BEDTIME - 225-300: 1 unit, 301 + 2 units 9/30/15   Kayleigh Cunha MD   glucose blood VI test strips (ASCENSIA AUTODISC VI, ONE TOUCH ULTRA TEST VI) strip Monitor glucose before meals and at bedtime and as needed for symptoms - 4-5 times daily. 9/30/15   Kayleigh Cunha MD   sodium bicarbonate 650 mg tablet Take 2 tablets by mouth three (3) times daily. 11/12/14   Carolina Mario MD   ferrous sulfate (SLOW FE) 142 mg (45 mg iron) ER tablet Take 142 mg by mouth Daily (before breakfast). Yumi De La O MD   glucagon (GLUCAGEN) 1 mg injection 1 mL by IntraMUSCular route as needed for Hypoglycemia. 6/25/12   Moody Marcus MD   pravastatin (PRAVACHOL) 10 mg tablet Take 1 Tab by mouth nightly. 3/19/12   Linda Dewey MD   ergocalciferol (ERGOCALCIFEROL) 50,000 unit capsule Take 50,000 Units by mouth every seven (7) days. Historical Provider   acetaminophen (TYLENOL) 325 mg tablet Take 325 mg by mouth every four (4) hours as needed.     Historical Provider     Current Facility-Administered Medications   Medication Dose Route Frequency    insulin lispro (HUMALOG) injection   SubCUTAneous TIDAC    sodium chloride (NS) flush 5-10 mL  5-10 mL IntraVENous Q8H    insulin regular (NOVOLIN R, HUMULIN R) 100 Units in 0.9% sodium chloride 100 mL infusion  0-50 Units/hr IntraVENous TITRATE    heparin (porcine) injection 5,000 Units  5,000 Units SubCUTAneous Q8H    mirtazapine (REMERON) tablet 15 mg  15 mg Oral QHS    amLODIPine (NORVASC) tablet 5 mg  5 mg Oral DAILY    pravastatin (PRAVACHOL) tablet 10 mg  10 mg Oral QHS    pantoprazole (PROTONIX) tablet 40 mg  40 mg Oral ACB    tamsulosin (FLOMAX) capsule 0.4 mg  0.4 mg Oral DAILY    dextrose 5 % - 0.45% NaCl infusion  150 mL/hr IntraVENous CONTINUOUS     Allergies   Allergen Reactions    Ace Inhibitors Unknown (comments)    Arb-Angiotensin Receptor Antagonist Unknown (comments)      Social History   Substance Use Topics    Smoking status: Never Smoker    Smokeless tobacco: Never Used    Alcohol use No      Family History   Problem Relation Age of Onset    Heart Disease Mother      Pacemaker    Cancer Father         Review of Systems:  Review of systems not obtained due to patient factors. Pt has advanced dementia. Objective:   Vital Signs:    Visit Vitals    /79    Pulse 74    Temp 99.3 °F (37.4 °C)    Resp 18    Ht 5' 5\" (1.651 m)    Wt 72.7 kg (160 lb 5.1 oz)    SpO2 100%    BMI 26.68 kg/m2       O2 Device: Room air       Temp (24hrs), Av.1 °F (37.3 °C), Min:98.6 °F (37 °C), Max:99.5 °F (37.5 °C)         Physical Exam:    General:  Alert, cooperative, no distress, appears stated age. Lying in bed no distress. Head:  Normocephalic, without obvious abnormality, atraumatic. Eyes:  Conjunctivae/corneas clear. PERRL, EOMs intact. Nose: Nares normal. Septum midline. Mucosa normal. No drainage or sinus tenderness. Throat: Lips, mucosa, and tongue normal. Teeth and gums normal.   Neck: Supple, symmetrical, trachea midline, no adenopathy, thyroid: no enlargment/tenderness/nodules, no carotid bruit and no JVD. Back:   Symmetric, no curvature. ROM normal.   Lungs:   Clear to auscultation bilaterally. Chest wall:  No tenderness or deformity. Heart:  Regular rate and rhythm, S1, S2 normal, no murmur, click, rub or gallop. Abdomen:   Soft, non-tender. Bowel sounds normal. No masses,  No organomegaly. Extremities: Extremities normal, atraumatic, no cyanosis or edema. Pulses: 2+ and symmetric all extremities.    Skin: Skin color, texture, turgor normal. No rashes or lesions   Lymph nodes: Cervical, supraclavicular, and axillary nodes normal.   Neurologic: Grossly nonfocal       Data:     Recent Results (from the past 24 hour(s))   GLUCOSE, POC    Collection Time: 17  8:26 AM   Result Value Ref Range    Glucose (POC) 278 (H) 65 - 100 mg/dL    Performed by Unkown     GLUCOSE, POC    Collection Time: 01/19/17  9:30 AM   Result Value Ref Range    Glucose (POC) 272 (H) 65 - 100 mg/dL    Performed by Joann Lee, POC    Collection Time: 01/19/17 10:33 AM   Result Value Ref Range    Glucose (POC) 328 (H) 65 - 100 mg/dL    Performed by Etelvina Foster    GLUCOSE, POC    Collection Time: 01/19/17 11:32 AM   Result Value Ref Range    Glucose (POC) 289 (H) 65 - 100 mg/dL    Performed by Unkown     GLUCOSE, POC    Collection Time: 01/19/17 12:36 PM   Result Value Ref Range    Glucose (POC) 147 (H) 65 - 100 mg/dL    Performed by Laith Reno, POC    Collection Time: 01/19/17 12:43 PM   Result Value Ref Range    Glucose (POC) 162 (H) 65 - 100 mg/dL    Performed by Luis Fish    GLUCOSE, POC    Collection Time: 01/19/17  1:48 PM   Result Value Ref Range    Glucose (POC) 118 (H) 65 - 100 mg/dL    Performed by Etelvina Foster    GLUCOSE, POC    Collection Time: 01/19/17  2:57 PM   Result Value Ref Range    Glucose (POC) 96 65 - 100 mg/dL    Performed by Etelvina Foster    GLUCOSE, POC    Collection Time: 01/19/17  4:10 PM   Result Value Ref Range    Glucose (POC) 86 65 - 100 mg/dL    Performed by Unkown     GLUCOSE, POC    Collection Time: 01/19/17  5:12 PM   Result Value Ref Range    Glucose (POC) 91 65 - 100 mg/dL    Performed by Unkown     GLUCOSE, POC    Collection Time: 01/19/17  6:15 PM   Result Value Ref Range    Glucose (POC) 104 (H) 65 - 100 mg/dL    Performed by Unkown     GLUCOSE, POC    Collection Time: 01/19/17  7:17 PM   Result Value Ref Range    Glucose (POC) 203 (H) 65 - 100 mg/dL    Performed by Unkown     GLUCOSE, POC    Collection Time: 01/19/17  8:21 PM   Result Value Ref Range    Glucose (POC) 239 (H) 65 - 100 mg/dL    Performed by Nilton Maldonado, POC    Collection Time: 01/19/17  9:41 PM   Result Value Ref Range Glucose (POC) 118 (H) 65 - 100 mg/dL    Performed by Vanita Baig, POC    Collection Time: 01/19/17 10:33 PM   Result Value Ref Range    Glucose (POC) 96 65 - 100 mg/dL    Performed by Jill Hutchinson    GLUCOSE, POC    Collection Time: 01/19/17 11:44 PM   Result Value Ref Range    Glucose (POC) 110 (H) 65 - 100 mg/dL    Performed by Vanita Baig, POC    Collection Time: 01/20/17 12:58 AM   Result Value Ref Range    Glucose (POC) 122 (H) 65 - 100 mg/dL    Performed by Jill Hutchinson    GLUCOSE, POC    Collection Time: 01/20/17  2:02 AM   Result Value Ref Range    Glucose (POC) 167 (H) 65 - 100 mg/dL    Performed by Vanita Baig, POC    Collection Time: 01/20/17  3:08 AM   Result Value Ref Range    Glucose (POC) 195 (H) 65 - 100 mg/dL    Performed by Abelino Rose A1C WITH EAG    Collection Time: 01/20/17  4:14 AM   Result Value Ref Range    Hemoglobin A1c 9.4 (H) 4.2 - 6.3 %    Est. average glucose 578 mg/dL   METABOLIC PANEL, BASIC    Collection Time: 01/20/17  4:14 AM   Result Value Ref Range    Sodium 138 136 - 145 mmol/L    Potassium 4.7 3.5 - 5.1 mmol/L    Chloride 112 (H) 97 - 108 mmol/L    CO2 15 (LL) 21 - 32 mmol/L    Anion gap 11 5 - 15 mmol/L    Glucose 152 (H) 65 - 100 mg/dL    BUN 28 (H) 6 - 20 MG/DL    Creatinine 3.95 (H) 0.70 - 1.30 MG/DL    BUN/Creatinine ratio 7 (L) 12 - 20      GFR est AA 19 (L) >60 ml/min/1.73m2    GFR est non-AA 15 (L) >60 ml/min/1.73m2    Calcium 7.3 (L) 8.5 - 10.1 MG/DL   GLUCOSE, POC    Collection Time: 01/20/17  4:19 AM   Result Value Ref Range    Glucose (POC) 160 (H) 65 - 100 mg/dL    Performed by Vanita Baig, POC    Collection Time: 01/20/17  5:54 AM   Result Value Ref Range    Glucose (POC) 124 (H) 65 - 100 mg/dL    Performed by Vanita Baig, POC    Collection Time: 01/20/17  7:20 AM   Result Value Ref Range    Glucose (POC) 111 (H) 65 - 100 mg/dL    Performed by Berta Montes            EKG: Rate of 99, QTc: 441. NSR from jan . Telemetry:NSR    Imaging:  I have personally reviewed the patients radiographs and have reviewed the reports:  None, will check CXR.        Su Perry MD

## 2017-01-20 NOTE — PROGRESS NOTES
NAME: Nish Chen. :  1950        MRN:  [id number]        Assessment :    Plan:  --MANA on CKD stage 4 (vs progression)  DM nephropathy  HTN  Anemia   Metabolic acidosis  Dementia, severe, in LTC for 5 years  shpt     Admitted with DKA --Creatinine with slow improvement (4.1 to 4); creatinine in November was 3.5; non-oliguric; would not want HD should RRT be needed (not needed at present)    BP too high - amlodipine increased    Will place on po bicarb    10 k sc weekly. Iron sat is low. Will give iv venofer for 400 mg    On calcitriol       Subjective:     Chief Complaint:  Alert. Has a \"cold\". We discussed the above. Review of Systems:    Symptom Y/N Comments  Symptom Y/N Comments   Fever/Chills    Chest Pain n    Poor Appetite    Edema n    Cough    Abdominal Pain     Sputum    Joint Pain     SOB/LAUREANO n   Pruritis/Rash     Nausea/vomit n   Tolerating PT/OT     Diarrhea n   Tolerating Diet     Constipation    Other       Could not obtain due to:      Objective:     VITALS:   Last 24hrs VS reviewed since prior progress note. Most recent are:  Visit Vitals    /90    Pulse 89    Temp 99.3 °F (37.4 °C)    Resp 15    Ht 5' 5\" (1.651 m)    Wt 72.7 kg (160 lb 5.1 oz)    SpO2 100%    BMI 26.68 kg/m2       Intake/Output Summary (Last 24 hours) at 17 1015  Last data filed at 17 5387   Gross per 24 hour   Intake          3398.48 ml   Output             2925 ml   Net           473.48 ml      Telemetry Reviewed:     PHYSICAL EXAM:  General: WD, WN. Alert, cooperative, no acute distress  Resp:  CTA Bilaterally. No Wheezing/Rhonchi/Rales. No access. muscle use  CV:  Regular  rhythm,  No murmur (), No Rubs, No Gallops.  No edema  GI:  Soft, Non distended, Non tender.  +Bowel sounds, no HSM      Lab Data Reviewed: (see below)    Medications Reviewed: (see below)    PMH/SH reviewed - no change compared to H&P  ________________________________________________________________________  Care Plan discussed with:  Patient y    Family      RN     Care Manager                    Consultant:          Comments   >50% of visit spent in counseling and coordination of care       ________________________________________________________________________  Sayra Shay MD     Procedures: see electronic medical records for all procedures/Xrays and details which  were not copied into this note but were reviewed prior to creation of Plan. LABS:  Recent Labs      01/18/17   0306   WBC  9.6  9.6   HGB  8.5*  8.5*   HCT  26.0*  26.0*   PLT  205  205     Recent Labs      01/20/17   0414  01/19/17   0414  01/18/17   2311  01/18/17   1821 01/18/17   1136   NA  138  138  134*  137  137   K  4.7  4.2  3.9  4.1  3.8   CL  112*  110*  106  106  106   CO2  15*  15*  15*  17*  18*   BUN  28*  33*  35*  37*  43*   CREA  3.95*  4.08*  4.14*  4.21*  4.47*   GLU  152*  95  212*  150*  125*   CA  7.3*  7.4*  7.5*  7.7*  7.7*   MG   --   1.7  1.7  1.6  1.8   PHOS   --   3.4   --   3.4  3.3     Recent Labs      01/18/17   0306   SGOT  85*   AP  105   TP  6.7   ALB  3.7   GLOB  3.0   LPSE  36*     No results for input(s): INR, PTP, APTT in the last 72 hours.     No lab exists for component: INREXT   Recent Labs      01/18/17   1821   FE  34*   TIBC  252   PSAT  13*   FERR  440*      Lab Results   Component Value Date/Time    Folate 53.6 11/11/2014 03:40 AM      Recent Labs      01/18/17   0533   PH  7.37   PCO2  26*   PO2  112*     Recent Labs      01/18/17   0306   CPK  229   CKMB  3.6*     No components found for: [place]  Lab Results   Component Value Date/Time    Color YELLOW/STRAW 01/18/2017 03:41 AM    Appearance CLEAR 01/18/2017 03:41 AM    Specific gravity 1.010 01/18/2017 03:41 AM    Specific gravity 1.019 01/17/2017 12:59 AM    pH (UA) 7.0 01/18/2017 03:41 AM    Protein 30 01/18/2017 03:41 AM    Glucose >1000 01/18/2017 03:41 AM Ketone TRACE 01/18/2017 03:41 AM    Bilirubin NEGATIVE  01/18/2017 03:41 AM    Urobilinogen 0.2 01/18/2017 03:41 AM    Nitrites NEGATIVE  01/18/2017 03:41 AM    Leukocyte Esterase NEGATIVE  01/18/2017 03:41 AM    Epithelial cells FEW 01/18/2017 03:41 AM    Bacteria NEGATIVE  01/18/2017 03:41 AM    WBC 0-4 01/18/2017 03:41 AM    RBC 5-10 01/18/2017 03:41 AM       MEDICATIONS:  Current Facility-Administered Medications   Medication Dose Route Frequency    insulin glargine (LANTUS) injection 8 Units  8 Units SubCUTAneous DAILY    glucose chewable tablet 16 g  4 Tab Oral PRN    dextrose (D50W) injection syrg 12.5-25 g  12.5-25 g IntraVENous PRN    glucagon (GLUCAGEN) injection 1 mg  1 mg IntraMUSCular PRN    insulin lispro (HUMALOG) injection   SubCUTAneous AC&HS    insulin lispro (HUMALOG) injection 2 Units  2 Units SubCUTAneous TIDAC    [START ON 1/21/2017] amLODIPine (NORVASC) tablet 10 mg  10 mg Oral DAILY    sodium chloride (OCEAN) 0.65 % nasal spray 2 Spray  2 Spray Both Nostrils Q2H PRN    sodium chloride (NS) flush 5-10 mL  5-10 mL IntraVENous Q8H    sodium chloride (NS) flush 5-10 mL  5-10 mL IntraVENous PRN    insulin regular (NOVOLIN R, HUMULIN R) 100 Units in 0.9% sodium chloride 100 mL infusion  0-50 Units/hr IntraVENous TITRATE    glucose chewable tablet 16 g  4 Tab Oral PRN    dextrose (D50W) injection syrg 12.5-25 g  12.5-25 g IntraVENous PRN    glucagon (GLUCAGEN) injection 1 mg  1 mg IntraMUSCular PRN    ondansetron (ZOFRAN) injection 4 mg  4 mg IntraVENous Q4H PRN    acetaminophen (TYLENOL) tablet 650 mg  650 mg Oral Q4H PRN    heparin (porcine) injection 5,000 Units  5,000 Units SubCUTAneous Q8H    metoprolol (LOPRESSOR) injection 5 mg  5 mg IntraVENous Q6H PRN    albuterol-ipratropium (DUO-NEB) 2.5 MG-0.5 MG/3 ML  3 mL Nebulization Q4H PRN    mirtazapine (REMERON) tablet 15 mg  15 mg Oral QHS    pravastatin (PRAVACHOL) tablet 10 mg  10 mg Oral QHS    pantoprazole (PROTONIX) tablet 40 mg  40 mg Oral ACB    tamsulosin (FLOMAX) capsule 0.4 mg  0.4 mg Oral DAILY    dextrose 5 % - 0.45% NaCl infusion  150 mL/hr IntraVENous CONTINUOUS

## 2017-01-20 NOTE — DIABETES MGMT
DTC Progress Note    Recommendations/ Comments: Pt discussed with rounding team and Dr. Earnestine Dakin and Dr. Alfredo Olivarez. Anion gap closed this am.  Plan to transition to lantus 8units daily, stop insulin gtt 2hrs after administering lantus, begin 2units ac tid for prandial coverage and humalog correction high sensitivity. NURSING : HYPOGLYCEMIC RISK ASSESSMENT    Patient is at increased risk for hypoglycemia due to to the following conditions: type 1 DM, insulin sensitive, renal failure and dementia    Noted glucose events:     Please monitor BG levels and follow the hypoglycemia protocol for treatment. Chart reviewed on Baldo Guillermo. during Multidisciplinary Rounds. A1c:   Lab Results   Component Value Date/Time    Hemoglobin A1c 9.4 01/20/2017 04:14 AM    Hemoglobin A1c, External 7.7 08/12/2015           Recent Glucose Results:   Lab Results   Component Value Date/Time     (H) 01/20/2017 04:14 AM    GLUCPOC 278 (H) 01/20/2017 09:55 AM    GLUCPOC 242 (H) 01/20/2017 08:55 AM    GLUCPOC 111 (H) 01/20/2017 07:20 AM        Lab Results   Component Value Date/Time    Creatinine 3.95 01/20/2017 04:14 AM       Active Orders   Diet    DIET DIABETIC CONSISTENT CARB Regular; 2 GM NA (House Low NA)        PO intake:   Patient Vitals for the past 72 hrs:   % Diet Eaten   01/19/17 0850 30 %       Will continue to follow as needed. Thank you.   Armando Patel RN, [address]

## 2017-01-20 NOTE — PROGRESS NOTES
1918: Restarted Insulin drip. 2241: Insulin drip held per glucostabilizer. 2345: PRN Ocean spray used for nasal congestion. 0309: Restarted insulin drip.  0427: PRN Ocean spray used for nasal congestion. 5300: PRN metoprolol given for /89.  0555: Insulin drip held per glucostabilizer.

## 2017-01-20 NOTE — PROGRESS NOTES
Hospitalist Progress Note    NAME: Janith Pallas. :  1950   MRN:  [id number]       Interim Hospital Summary: 61 y.o. male whom presented on 2017 with      Assessment / Plan:  DKA/T2DM with nephropathy  Pseudohyponatremia, resolved  AG Metabolic Acidosis-likely from CKD  -HA1C 9.4  -insulin gtt discontinued  -continue with lantus, ssi  -dc ivf-may be contributing to HTN. Patient able to take PO intake  -diabetic diet, monitor lytes    Hypertension, benign/essential  -likely from pain induced  - dilaudid prn pain  - amlodipine increased to 10mg, metoprolol prn     CKD4 with anemia of chronic kidney disease  -baseline cr 3.5, presenting with cr 4  -nephro following, appreciate recs    Severe Alzheimer's dementia  - In LTC at [facility] since 2011  - con't mirtazapin    Hyperlipidemia/CAD  -resume pravachol, BB  - adding ASA near time of discharge    Prostate ca  -flomax       Code Status: full  Surrogate Decision Maker: sister Arleen Valenzuela [phone]  DVT Prophylaxis: heparin     Baseline: LTC at [facility] [address] [facility]:     Chief Complaint / Reason for Physician Visit  No acute complaints. Discussed with RN events overnight. Review of Systems:  Symptom Y/N Comments  Symptom Y/N Comments   Fever/Chills    Chest Pain     Poor Appetite    Edema     Cough    Abdominal Pain     Sputum    Joint Pain     SOB/ALUREANO    Pruritis/Rash     Nausea/vomit    Tolerating PT/OT     Diarrhea    Tolerating Diet     Constipation    Other       Could NOT obtain due to: dementia     Objective:     VITALS:   Last 24hrs VS reviewed since prior progress note.  Most recent are:  Patient Vitals for the past 24 hrs:   Temp Pulse Resp BP SpO2   17 1351 98.2 °F (36.8 °C) 100 16 164/84 100 %   17 0900 - 89 15 188/90 100 %   17 0800 99.3 °F (37.4 °C) 77 17 177/81 100 %   17 0700 - 74 18 164/79 100 %   17 0600 - 72 16 169/78 100 %   17 0506 - 90 - 170/89 -   17 0500 - 88 15 170/89 100 %   01/20/17 0430 - - - 160/74 -   01/20/17 0400 99.3 °F (37.4 °C) 76 19 169/78 100 %   01/20/17 0300 - 80 18 152/70 100 %   01/20/17 0200 - 81 21 152/63 100 %   01/20/17 0100 - 82 16 144/70 100 %   01/20/17 0000 99.2 °F (37.3 °C) 89 17 162/82 100 %   01/19/17 2300 - 86 14 150/89 100 %   01/19/17 2200 - 86 12 141/80 100 %   01/19/17 2100 - 87 16 148/69 100 %   01/19/17 2000 99.5 °F (37.5 °C) 82 21 146/69 100 %   01/19/17 1900 - 91 24 157/77 100 %   01/19/17 1800 - 89 16 148/73 95 %   01/19/17 1700 - 92 21 155/70 100 %   01/19/17 1600 98.8 °F (37.1 °C) 85 17 125/67 98 %   01/19/17 1500 - 93 17 (!) 146/109 100 %       Intake/Output Summary (Last 24 hours) at 01/20/17 1455  Last data filed at 01/20/17 0907   Gross per 24 hour   Intake           3084.9 ml   Output             2575 ml   Net            509.9 ml        PHYSICAL EXAM:  General: WD, WN. Alert, cooperative, no acute distress    EENT:  EOMI. Anicteric sclerae. MMM  Resp:  CTA bilaterally, no wheezing or rales. No accessory muscle use  CV:  Regular  rhythm,  No edema  GI:  Soft, Non distended, Non tender.  +Bowel sounds  Neurologic:  Alert and oriented X 3, normal speech,   Psych:   Good insight. Not anxious nor agitated  Skin:  No rashes. No jaundice    Reviewed most current lab test results and cultures  YES  Reviewed most current radiology test results   YES  Review and summation of old records today    NO  Reviewed patient's current orders and MAR    YES  PMH/SH reviewed - no change compared to H&P  ________________________________________________________________________  Care Plan discussed with:    Comments   Patient x    Family      RN x    Care Manager     Consultant                        Multidiciplinary team rounds were held today with , nursing, pharmacist and clinical coordinator. Patient's plan of care was discussed; medications were reviewed and discharge planning was addressed. ________________________________________________________________________  Total NON critical care TIME:  45   Minutes    Total CRITICAL CARE TIME Spent:   Minutes non procedure based      Comments   >50% of visit spent in counseling and coordination of care     ________________________________________________________________________  Sadia Jessica MD     Procedures: see electronic medical records for all procedures/Xrays and details which were not copied into this note but were reviewed prior to creation of Plan. LABS:  I reviewed today's most current labs and imaging studies. Pertinent labs include:  Recent Labs      01/18/17   0306   WBC  9.6  9.6   HGB  8.5*  8.5*   HCT  26.0*  26.0*   PLT  205  205     Recent Labs      01/20/17   0414  01/19/17   0414  01/18/17   2311  01/18/17   1821  01/18/17   1136   01/18/17   0306   NA  138  138  134*  137  137   --   121*   K  4.7  4.2  3.9  4.1  3.8   --   5.6*   CL  112*  110*  106  106  106   --   89*   CO2  15*  15*  15*  17*  18*   --   14*   GLU  152*  95  212*  150*  125*   < >  1094*   BUN  28*  33*  35*  37*  43*   --   46*   CREA  3.95*  4.08*  4.14*  4.21*  4.47*   --   5.04*   CA  7.3*  7.4*  7.5*  7.7*  7.7*   --   8.1*   MG   --   1.7  1.7  1.6  1.8   < >   --    PHOS   --   3.4   --   3.4  3.3   --    --    ALB   --    --    --    --    --    --   3.7   TBILI   --    --    --    --    --    --   0.9   SGOT   --    --    --    --    --    --   85*   ALT   --    --    --    --    --    --   55    < > = values in this interval not displayed.        Signed: Sadia Jessica MD

## 2017-01-21 PROBLEM — N18.9 ANEMIA IN CHRONIC KIDNEY DISEASE: Status: ACTIVE | Noted: 2017-01-21

## 2017-01-21 PROBLEM — D63.1 ANEMIA IN CHRONIC KIDNEY DISEASE: Status: ACTIVE | Noted: 2017-01-21

## 2017-01-21 LAB
ALBUMIN SERPL BCP-MCNC: 3.7 G/DL (ref 3.5–5)
ALBUMIN/GLOB SERPL: 1.2 {RATIO} (ref 1.1–2.2)
ALP SERPL-CCNC: 95 U/L (ref 45–117)
ALT SERPL-CCNC: 30 U/L (ref 12–78)
ANION GAP BLD CALC-SCNC: 10 MMOL/L (ref 5–15)
AST SERPL W P-5'-P-CCNC: 21 U/L (ref 15–37)
BASOPHILS # BLD AUTO: 0.1 K/UL (ref 0–0.1)
BASOPHILS # BLD: 1 % (ref 0–1)
BILIRUB SERPL-MCNC: 0.7 MG/DL (ref 0.2–1)
BUN SERPL-MCNC: 25 MG/DL (ref 6–20)
BUN/CREAT SERPL: 6 (ref 12–20)
CALCIUM SERPL-MCNC: 7.7 MG/DL (ref 8.5–10.1)
CALCIUM SERPL-MCNC: 7.7 MG/DL (ref 8.5–10.1)
CHLORIDE SERPL-SCNC: 110 MMOL/L (ref 97–108)
CO2 SERPL-SCNC: 16 MMOL/L (ref 21–32)
CREAT SERPL-MCNC: 3.97 MG/DL (ref 0.7–1.3)
EOSINOPHIL # BLD: 0.2 K/UL (ref 0–0.4)
EOSINOPHIL NFR BLD: 2 % (ref 0–7)
ERYTHROCYTE [DISTWIDTH] IN BLOOD BY AUTOMATED COUNT: 15 % (ref 11.5–14.5)
GLOBULIN SER CALC-MCNC: 3.2 G/DL (ref 2–4)
GLUCOSE BLD STRIP.AUTO-MCNC: 121 MG/DL (ref 65–100)
GLUCOSE BLD STRIP.AUTO-MCNC: 177 MG/DL (ref 65–100)
GLUCOSE BLD STRIP.AUTO-MCNC: 189 MG/DL (ref 65–100)
GLUCOSE BLD STRIP.AUTO-MCNC: 84 MG/DL (ref 65–100)
GLUCOSE SERPL-MCNC: 181 MG/DL (ref 65–100)
HCT VFR BLD AUTO: 24.9 % (ref 36.6–50.3)
HGB BLD-MCNC: 8.5 G/DL (ref 12.1–17)
LYMPHOCYTES # BLD AUTO: 15 % (ref 12–49)
LYMPHOCYTES # BLD: 1.4 K/UL (ref 0.8–3.5)
MAGNESIUM SERPL-MCNC: 1.8 MG/DL (ref 1.6–2.4)
MCH RBC QN AUTO: 30.4 PG (ref 26–34)
MCHC RBC AUTO-ENTMCNC: 34.1 G/DL (ref 30–36.5)
MCV RBC AUTO: 88.9 FL (ref 80–99)
MONOCYTES # BLD: 1 K/UL (ref 0–1)
MONOCYTES NFR BLD AUTO: 10 % (ref 5–13)
NEUTS SEG # BLD: 7 K/UL (ref 1.8–8)
NEUTS SEG NFR BLD AUTO: 72 % (ref 32–75)
PHOSPHATE SERPL-MCNC: 3.4 MG/DL (ref 2.6–4.7)
PLATELET # BLD AUTO: 225 K/UL (ref 150–400)
POTASSIUM SERPL-SCNC: 5.2 MMOL/L (ref 3.5–5.1)
POTASSIUM SERPL-SCNC: 5.9 MMOL/L (ref 3.5–5.1)
PROT SERPL-MCNC: 6.9 G/DL (ref 6.4–8.2)
PTH-INTACT SERPL-MCNC: 409.4 PG/ML (ref 14–72)
RBC # BLD AUTO: 2.8 M/UL (ref 4.1–5.7)
SERVICE CMNT-IMP: ABNORMAL
SERVICE CMNT-IMP: NORMAL
SODIUM SERPL-SCNC: 136 MMOL/L (ref 136–145)
WBC # BLD AUTO: 9.6 K/UL (ref 4.1–11.1)

## 2017-01-21 PROCEDURE — 74011250637 HC RX REV CODE- 250/637: Performed by: INTERNAL MEDICINE

## 2017-01-21 PROCEDURE — 74011250636 HC RX REV CODE- 250/636: Performed by: INTERNAL MEDICINE

## 2017-01-21 PROCEDURE — 74011000250 HC RX REV CODE- 250: Performed by: INTERNAL MEDICINE

## 2017-01-21 PROCEDURE — 36415 COLL VENOUS BLD VENIPUNCTURE: CPT | Performed by: INTERNAL MEDICINE

## 2017-01-21 PROCEDURE — 80053 COMPREHEN METABOLIC PANEL: CPT | Performed by: INTERNAL MEDICINE

## 2017-01-21 PROCEDURE — 82962 GLUCOSE BLOOD TEST: CPT

## 2017-01-21 PROCEDURE — 74011636637 HC RX REV CODE- 636/637: Performed by: INTERNAL MEDICINE

## 2017-01-21 PROCEDURE — 83970 ASSAY OF PARATHORMONE: CPT | Performed by: INTERNAL MEDICINE

## 2017-01-21 PROCEDURE — 83735 ASSAY OF MAGNESIUM: CPT | Performed by: INTERNAL MEDICINE

## 2017-01-21 PROCEDURE — 84100 ASSAY OF PHOSPHORUS: CPT | Performed by: INTERNAL MEDICINE

## 2017-01-21 PROCEDURE — 85025 COMPLETE CBC W/AUTO DIFF WBC: CPT | Performed by: INTERNAL MEDICINE

## 2017-01-21 PROCEDURE — 84132 ASSAY OF SERUM POTASSIUM: CPT | Performed by: INTERNAL MEDICINE

## 2017-01-21 PROCEDURE — 65660000000 HC RM CCU STEPDOWN

## 2017-01-21 RX ORDER — ACETAMINOPHEN 500 MG
500 TABLET ORAL
Status: DISCONTINUED | OUTPATIENT
Start: 2017-01-21 | End: 2017-01-23 | Stop reason: HOSPADM

## 2017-01-21 RX ORDER — ONDANSETRON 2 MG/ML
2 INJECTION INTRAMUSCULAR; INTRAVENOUS
Status: DISCONTINUED | OUTPATIENT
Start: 2017-01-21 | End: 2017-01-23 | Stop reason: HOSPADM

## 2017-01-21 RX ADMIN — IRON SUCROSE 200 MG: 20 INJECTION, SOLUTION INTRAVENOUS at 11:26

## 2017-01-21 RX ADMIN — SODIUM BICARBONATE 650 MG TABLET 1300 MG: at 17:06

## 2017-01-21 RX ADMIN — HEPARIN SODIUM 5000 UNITS: 5000 INJECTION, SOLUTION INTRAVENOUS; SUBCUTANEOUS at 00:10

## 2017-01-21 RX ADMIN — Medication 5 ML: at 14:00

## 2017-01-21 RX ADMIN — INSULIN LISPRO 2 UNITS: 100 INJECTION, SOLUTION INTRAVENOUS; SUBCUTANEOUS at 16:00

## 2017-01-21 RX ADMIN — INSULIN LISPRO 2 UNITS: 100 INJECTION, SOLUTION INTRAVENOUS; SUBCUTANEOUS at 08:31

## 2017-01-21 RX ADMIN — AMLODIPINE BESYLATE 10 MG: 5 TABLET ORAL at 08:14

## 2017-01-21 RX ADMIN — HEPARIN SODIUM 5000 UNITS: 5000 INJECTION, SOLUTION INTRAVENOUS; SUBCUTANEOUS at 23:39

## 2017-01-21 RX ADMIN — HEPARIN SODIUM 5000 UNITS: 5000 INJECTION, SOLUTION INTRAVENOUS; SUBCUTANEOUS at 16:00

## 2017-01-21 RX ADMIN — INSULIN GLARGINE 8 UNITS: 100 INJECTION, SOLUTION SUBCUTANEOUS at 08:31

## 2017-01-21 RX ADMIN — ACETAMINOPHEN 500 MG: 500 TABLET, FILM COATED ORAL at 23:40

## 2017-01-21 RX ADMIN — SODIUM POLYSTYRENE SULFONATE 30 G: 15 SUSPENSION ORAL; RECTAL at 08:09

## 2017-01-21 RX ADMIN — HEPARIN SODIUM 5000 UNITS: 5000 INJECTION, SOLUTION INTRAVENOUS; SUBCUTANEOUS at 08:12

## 2017-01-21 RX ADMIN — PANTOPRAZOLE SODIUM 40 MG: 40 TABLET, DELAYED RELEASE ORAL at 08:14

## 2017-01-21 RX ADMIN — METOPROLOL TARTRATE 5 MG: 5 INJECTION INTRAVENOUS at 15:18

## 2017-01-21 RX ADMIN — MIRTAZAPINE 15 MG: 15 TABLET, FILM COATED ORAL at 21:12

## 2017-01-21 RX ADMIN — PRAVASTATIN SODIUM 10 MG: 10 TABLET ORAL at 21:12

## 2017-01-21 RX ADMIN — TAMSULOSIN HYDROCHLORIDE 0.4 MG: 0.4 CAPSULE ORAL at 08:14

## 2017-01-21 RX ADMIN — Medication 10 ML: at 21:12

## 2017-01-21 RX ADMIN — Medication 10 ML: at 06:00

## 2017-01-21 RX ADMIN — SODIUM BICARBONATE 650 MG TABLET 1300 MG: at 08:14

## 2017-01-21 NOTE — PROGRESS NOTES
Bedside and Verbal shift change report given to Alli Vieyra RN (oncoming nurse) by Parag Seymour RN (offgoing nurse). Report included the following information SBAR, Kardex, MAR and Recent Results.

## 2017-01-21 NOTE — PROGRESS NOTES
Bedside and Verbal shift change report given to Jodi (oncoming nurse) by Inderjit Madrid RN   (offgoing nurse). Report included the following information SBAR, Kardex and Recent Results. Zone Phone for oncoming shift:   [phone]    Shift Summary: forgetful, agitated at times    LDAs               Peripheral IV 01/19/17 Left Wrist (Active)   Site Assessment Clean, dry, & intact 1/20/2017  4:43 PM   Phlebitis Assessment 0 1/20/2017  4:43 PM   Infiltration Assessment 0 1/20/2017  4:43 PM   Dressing Status Clean, dry, & intact 1/20/2017  4:43 PM   Dressing Type Tape;Transparent 1/20/2017  4:43 PM   Hub Color/Line Status Blue; Infusing 1/20/2017  4:43 PM                        Intake & Output   Date 01/19/17 1900 - 01/20/17 0659 01/20/17 0700 - 01/21/17 0659   Shift 4056-0577 24 Hour Total 6555-8295 8610-8914 24 Hour Total   I  N  T  A  K  E   P.O. 360 840         P. O. 360 840       I.V.  (mL/kg/hr) 2110.6 3371.5         Insulin Volume 10.6 70.6         Volume (dextrose 5 % - 0.45% NaCl infusion) 2100 3300.8       Shift Total  (mL/kg) 2470.6  (34) 4211.5  (57.9)      O  U  T  P  U  T   Urine  (mL/kg/hr) 1375 2850 500  (0.6)  500      Urine Voided 1375 2850 500  500      Urine Occurrence(s) 2 x 4 x       Stool           Stool Occurrence(s) 2 x 3 x 1 x  1 x    Shift Total  (mL/kg) 1375  (18.9) 2850  (39.2) 500  (6.9)  500  (6.9)   NET 1095.6 1361.5 -500  -500   Weight (kg) 72.7 72.7 72.7 72.7 72.7      Last Bowel Movement Last Bowel Movement Date: 01/20/17   Glucose Checks [] N/A  [x] AC/HS  [] Q6  Concerns:   Nutrition Active Orders   Diet    DIET DIABETIC CONSISTENT CARB Regular; 2 GM NA (House Low NA)       Consults []PT  []OT  []Speech  []Case Management   Cardiac Monitoring []N/A [x]Yes Expires:

## 2017-01-21 NOTE — PROGRESS NOTES
Progress Note          Pt Name  Cam Valdivia. Date of Birth 1950   Medical Record Number  [id number]      Age  61 y.o. PCP Su Ramirez MD   Admit date:  1/18/2017    Room Number  3260/01  @ [facility]   Date of Service  1/21/2017     Admission Diagnoses:  DKA (diabetic ketoacidoses) ([facility] [address]     Assessment and plan:      DKA (diabetic ketoacidoses) ([facility] [address] -resolved    Hyperkalemia - change diet to renal restriction, and give one dose of kayexalate, recheck K+ level later in the day   Do not resuscitate discussion   Dementia   DM (diabetes mellitus), type 2, uncontrolled ([facility] [address]   HTN (hypertension)   History of GI bleed   CKD (chronic kidney disease) stage 3, GFR 30-59 ml/min   Pure hypercholesterolemia   Prostate cancer ([facility] [address]   Blind one eye   Weakness generalized   Alzheimer disease   CAD (coronary artery disease)   Anemia in chronic kidney disease      Body mass index is 26.68 kg/(m^2). PLAN  · Appreciate input from nephrologist. We will recheck his K level later  Today. · Continue current Rx as is   · Given his hyperkalemia, it is not safe for him to discharge today. · We will see if [address] [place] [address] takes pt on Sunday if not , we will plan to discharge him on Monday   ·         CODE STATUS   DNR     Functional Status  Pt resides at the LTC facility       Surrogate decision maker:  Pt's sister Mrs. Grant      Prophylaxis   Hep SQ   Discharge Plan:  SNF/LTC,      Misc   Benefit:  Payor: Kandi Claude / Plan: [employer] / Product Type: Managed Care Medicare /    Isolation :  There are currently no Active Isolations   ADT status:  INPATIENT      Query   None noted today    Prognosis   Fair    Social issues  Date  Comment     01/21/17 9:09 AM - spoke with Portia Diaz pt's daughter who was concerned that Cynthia holder in the ICU \" told her that there is no point in giving him HD.    I informed her that I respectfully disagree with that statement since the patient's status is improved and he is at his baseline, he might be a candidate for HD. His POA and family should meet with his PCP and nephrologist and plan for such decision as OP. She agreed.                   Subjective Data     \" OK \"  Review of Systems - History obtained from unobtainable from patient due to mental status    Objective Data       Comments  Pleasantly demented man lying in bed in no distress   He has nice tennis shoes on that he stated he likes      Patient Vitals for the past 24 hrs:   BP   01/21/17 0300 168/74   01/20/17 2300 156/70   01/20/17 2000 (!) 175/96   01/20/17 1539 150/74   01/20/17 1351 164/84   01/20/17 0900 188/90    Patient Vitals for the past 24 hrs:   Pulse   01/21/17 0300 85   01/20/17 2300 94   01/20/17 2000 99   01/20/17 1539 89   01/20/17 1351 100   01/20/17 0900 89    Patient Vitals for the past 24 hrs:   Resp   01/21/17 0300 18   01/20/17 2300 16   01/20/17 2000 18   01/20/17 1539 18   01/20/17 1351 16   01/20/17 0900 15    Patient Vitals for the past 24 hrs:   Temp   01/21/17 0300 99.3 °F (37.4 °C)   01/20/17 2300 99.3 °F (37.4 °C)   01/20/17 2000 98.4 °F (36.9 °C)   01/20/17 1539 98 °F (36.7 °C)   01/20/17 1351 98.2 °F (36.8 °C)        SpO2 Readings from Last 6 Encounters:   01/21/17 100%   01/17/17 100%   09/30/16 100%   09/15/16 100%   08/22/16 94%   01/15/16 100%          O2 Device: Room air Body mass index is 26.68 kg/(m^2). -  Wt Readings from Last 10 Encounters:   01/18/17 72.7 kg (160 lb 5.1 oz)   01/16/17 56.7 kg (125 lb)   11/23/16 54 kg (119 lb)   09/30/16 55.3 kg (122 lb)   09/15/16 54.4 kg (120 lb)   08/22/16 61.2 kg (135 lb)   12/17/15 56.7 kg (125 lb)   09/30/15 55.8 kg (123 lb)   11/08/14 63.5 kg (140 lb)   07/07/14 68 kg (150 lb)        Physical Exam:             General:  Alert, cooperative,   well noursished,   well developed,   appears stated age    Ears/Eyes:  Hearing intact  Sclera anicteric.       Mouth/Throat:  Mucous membranes normal pink and moist     Neck:     Lungs:  Trachea midline  Chest excursion symmetrical   Auscultation B/L Symmetrical with   Vesicular breath sounds     No Crepitations noted   Percussion note resonant on mid Clavicular line; no sign of pneumothorax        CVS:  Regular rate and rhythm   no  murmur,   No click, rub or gallop  S1 normal   S2 normal   Pedal pulses  b/l symmetrical   Abdomen:    Soft, non-tender  Bowel sounds normal  No distension   Percussion note tympanitic   Extremities:    No cyanosis, jaundice  No edema noted   No sign of DVT/cord like lesion on palpation  No sign of acute trauma   Age appropriate OA changes noted.     Skin:    Skin color, texture, turgor normal. no acute rash or lesions    Lymph nodes:     Musculoskeletal Muscle bulk B/L symmetrical   Neuro Cranial nerves are intact,   motor movement b/l symmetrical,   Sensory evaluation b/l symmetrical    Psych:  Alert and oriented to self   normal mood & affect           Medications reviewed     Current Facility-Administered Medications   Medication Dose Route Frequency    acetaminophen (TYLENOL) tablet 500 mg  500 mg Oral Q4H PRN    ondansetron (ZOFRAN) injection 2 mg  2 mg IntraVENous Q4H PRN    sodium polystyrene (KAYEXALATE) 15 gram/60 mL oral suspension 30 g  30 g Oral NOW    insulin glargine (LANTUS) injection 8 Units  8 Units SubCUTAneous DAILY    dextrose (D50W) injection syrg 12.5-25 g  12.5-25 g IntraVENous PRN    glucagon (GLUCAGEN) injection 1 mg  1 mg IntraMUSCular PRN    insulin lispro (HUMALOG) injection   SubCUTAneous AC&HS    insulin lispro (HUMALOG) injection 2 Units  2 Units SubCUTAneous TIDAC    amLODIPine (NORVASC) tablet 10 mg  10 mg Oral DAILY    sodium bicarbonate tablet 1,300 mg  1,300 mg Oral BID    epoetin shaye (EPOGEN;PROCRIT) injection 10,000 Units  10,000 Units SubCUTAneous Q7D    iron sucrose (VENOFER) 100 mg iron/5 mL injection 200 mg  200 mg IntraVENous Q24H    sodium chloride (OCEAN) 0.65 % nasal spray 2 Spray  2 Spray Both Nostrils Q2H PRN    sodium chloride (NS) flush 5-10 mL  5-10 mL IntraVENous Q8H    sodium chloride (NS) flush 5-10 mL  5-10 mL IntraVENous PRN    glucose chewable tablet 16 g  4 Tab Oral PRN    dextrose (D50W) injection syrg 12.5-25 g  12.5-25 g IntraVENous PRN    glucagon (GLUCAGEN) injection 1 mg  1 mg IntraMUSCular PRN    heparin (porcine) injection 5,000 Units  5,000 Units SubCUTAneous Q8H    metoprolol (LOPRESSOR) injection 5 mg  5 mg IntraVENous Q6H PRN    albuterol-ipratropium (DUO-NEB) 2.5 MG-0.5 MG/3 ML  3 mL Nebulization Q4H PRN    mirtazapine (REMERON) tablet 15 mg  15 mg Oral QHS    pravastatin (PRAVACHOL) tablet 10 mg  10 mg Oral QHS    pantoprazole (PROTONIX) tablet 40 mg  40 mg Oral ACB    tamsulosin (FLOMAX) capsule 0.4 mg  0.4 mg Oral DAILY       Relevant other informations: Other medical conditions listed in [facility] problem list section; all of these and other pertinent data were taken into consideration when treatment plan is developed and customized to this patient's unique overall circumstances and needs. We have reviewed available old medical records within the constraints of this admission process.         Data Review:   Recent Days:  All Micro Results     None          Recent Labs      01/21/17   0301   WBC  9.6   HGB  8.5*   HCT  24.9*   PLT  225     Recent Labs      01/21/17   0301  01/20/17   0414  01/19/17   0414  01/18/17   2311  01/18/17   1821   NA  136  138  138  134*  137   K  5.9*  4.7  4.2  3.9  4.1   CL  110*  112*  110*  106  106   CO2  16*  15*  15*  15*  17*   GLU  181*  152*  95  212*  150*   BUN  25*  28*  33*  35*  37*   CREA  3.97*  3.95*  4.08*  4.14*  4.21*   CA  7.7*  7.3*  7.4*  7.5*  7.7*   MG  1.8   --   1.7  1.7  1.6   PHOS  3.4   --   3.4   --   3.4   ALB  3.7   --    --    --    --    TBILI  0.7   --    --    --    --    SGOT  21   --    --    --    --    ALT  30   --    --    --    --       Lab Results   Component Value Date/Time    TSH 2.61 06/20/2012 03:14 AM            Care Plan discussed with:Patient/Family and Nurse   Other medical conditions are listed in the active hospital problem list section; these and other pertinent data were taken into consideration when the treatment plan was developed and customized to this patient's unique overall circumstances and needs.     High complexity decision making was performed for this patient who is at high risk for decompensation with multiple organ involvement.    Today total floor/unit time was 35 minutes while caring for this patient and greater than 50% of that time was spent with patient (and/or family) coordinating patients clinical issues; this includes time spent during multidisciplinary rounds. Marquis Mirna PRINGLE MPH FACP    1/21/2017

## 2017-01-21 NOTE — PROGRESS NOTES
REC'D REPORT FROM [facility] RN AT BEDSIDE. PT RESTING IN BED. FAMILY AT BEDSIDE. PT DOES HAVE PERIODS OF CONFUSION. 36 REPORT GIVEN TO EVA RN AT BEDSIDE.

## 2017-01-22 LAB
ANION GAP BLD CALC-SCNC: 12 MMOL/L (ref 5–15)
BASOPHILS # BLD AUTO: 0 K/UL (ref 0–0.1)
BASOPHILS # BLD: 0 % (ref 0–1)
BUN SERPL-MCNC: 27 MG/DL (ref 6–20)
BUN/CREAT SERPL: 6 (ref 12–20)
CALCIUM SERPL-MCNC: 7.5 MG/DL (ref 8.5–10.1)
CHLORIDE SERPL-SCNC: 109 MMOL/L (ref 97–108)
CO2 SERPL-SCNC: 17 MMOL/L (ref 21–32)
CREAT SERPL-MCNC: 4.29 MG/DL (ref 0.7–1.3)
EOSINOPHIL # BLD: 0.3 K/UL (ref 0–0.4)
EOSINOPHIL NFR BLD: 2 % (ref 0–7)
ERYTHROCYTE [DISTWIDTH] IN BLOOD BY AUTOMATED COUNT: 15 % (ref 11.5–14.5)
GLUCOSE BLD STRIP.AUTO-MCNC: 107 MG/DL (ref 65–100)
GLUCOSE BLD STRIP.AUTO-MCNC: 116 MG/DL (ref 65–100)
GLUCOSE BLD STRIP.AUTO-MCNC: 55 MG/DL (ref 65–100)
GLUCOSE BLD STRIP.AUTO-MCNC: 56 MG/DL (ref 65–100)
GLUCOSE BLD STRIP.AUTO-MCNC: 76 MG/DL (ref 65–100)
GLUCOSE BLD STRIP.AUTO-MCNC: 86 MG/DL (ref 65–100)
GLUCOSE BLD STRIP.AUTO-MCNC: 99 MG/DL (ref 65–100)
GLUCOSE SERPL-MCNC: 57 MG/DL (ref 65–100)
HCT VFR BLD AUTO: 23.8 % (ref 36.6–50.3)
HGB BLD-MCNC: 8.2 G/DL (ref 12.1–17)
LYMPHOCYTES # BLD AUTO: 24 % (ref 12–49)
LYMPHOCYTES # BLD: 3.1 K/UL (ref 0.8–3.5)
MCH RBC QN AUTO: 30.8 PG (ref 26–34)
MCHC RBC AUTO-ENTMCNC: 34.5 G/DL (ref 30–36.5)
MCV RBC AUTO: 89.5 FL (ref 80–99)
MONOCYTES # BLD: 1.9 K/UL (ref 0–1)
MONOCYTES NFR BLD AUTO: 15 % (ref 5–13)
NEUTS SEG # BLD: 7.7 K/UL (ref 1.8–8)
NEUTS SEG NFR BLD AUTO: 59 % (ref 32–75)
PLATELET # BLD AUTO: 258 K/UL (ref 150–400)
POTASSIUM SERPL-SCNC: 4.6 MMOL/L (ref 3.5–5.1)
RBC # BLD AUTO: 2.66 M/UL (ref 4.1–5.7)
SERVICE CMNT-IMP: ABNORMAL
SERVICE CMNT-IMP: NORMAL
SODIUM SERPL-SCNC: 138 MMOL/L (ref 136–145)
WBC # BLD AUTO: 13 K/UL (ref 4.1–11.1)

## 2017-01-22 PROCEDURE — 36415 COLL VENOUS BLD VENIPUNCTURE: CPT | Performed by: INTERNAL MEDICINE

## 2017-01-22 PROCEDURE — 74011250637 HC RX REV CODE- 250/637: Performed by: INTERNAL MEDICINE

## 2017-01-22 PROCEDURE — 85025 COMPLETE CBC W/AUTO DIFF WBC: CPT | Performed by: INTERNAL MEDICINE

## 2017-01-22 PROCEDURE — 74011250636 HC RX REV CODE- 250/636: Performed by: INTERNAL MEDICINE

## 2017-01-22 PROCEDURE — 65660000000 HC RM CCU STEPDOWN

## 2017-01-22 PROCEDURE — 80048 BASIC METABOLIC PNL TOTAL CA: CPT | Performed by: INTERNAL MEDICINE

## 2017-01-22 PROCEDURE — 74011000250 HC RX REV CODE- 250: Performed by: INTERNAL MEDICINE

## 2017-01-22 PROCEDURE — 82962 GLUCOSE BLOOD TEST: CPT

## 2017-01-22 PROCEDURE — 74011636637 HC RX REV CODE- 636/637: Performed by: INTERNAL MEDICINE

## 2017-01-22 RX ORDER — AMLODIPINE BESYLATE 10 MG/1
10 TABLET ORAL DAILY
Qty: 30 TAB | Refills: 1 | Status: ON HOLD | OUTPATIENT
Start: 2017-01-22 | End: 2017-04-06 | Stop reason: DRUGHIGH

## 2017-01-22 RX ORDER — INSULIN GLARGINE 100 [IU]/ML
4 INJECTION, SOLUTION SUBCUTANEOUS DAILY
Status: DISCONTINUED | OUTPATIENT
Start: 2017-01-23 | End: 2017-01-23 | Stop reason: HOSPADM

## 2017-01-22 RX ORDER — HEPARIN SODIUM 5000 [USP'U]/ML
5000 INJECTION, SOLUTION INTRAVENOUS; SUBCUTANEOUS EVERY 12 HOURS
Status: DISCONTINUED | OUTPATIENT
Start: 2017-01-23 | End: 2017-01-23 | Stop reason: HOSPADM

## 2017-01-22 RX ORDER — SODIUM BICARBONATE 650 MG/1
1300 TABLET ORAL 2 TIMES DAILY
Qty: 180 TAB | Refills: 1 | Status: ON HOLD | OUTPATIENT
Start: 2017-01-22 | End: 2017-04-04

## 2017-01-22 RX ADMIN — INSULIN GLARGINE 8 UNITS: 100 INJECTION, SOLUTION SUBCUTANEOUS at 08:34

## 2017-01-22 RX ADMIN — MIRTAZAPINE 15 MG: 15 TABLET, FILM COATED ORAL at 21:38

## 2017-01-22 RX ADMIN — DEXTROSE MONOHYDRATE 12.5 G: 500 INJECTION PARENTERAL at 11:30

## 2017-01-22 RX ADMIN — Medication 10 ML: at 21:38

## 2017-01-22 RX ADMIN — HEPARIN SODIUM 5000 UNITS: 5000 INJECTION, SOLUTION INTRAVENOUS; SUBCUTANEOUS at 16:41

## 2017-01-22 RX ADMIN — INSULIN LISPRO 2 UNITS: 100 INJECTION, SOLUTION INTRAVENOUS; SUBCUTANEOUS at 08:26

## 2017-01-22 RX ADMIN — Medication 10 ML: at 08:26

## 2017-01-22 RX ADMIN — METOPROLOL TARTRATE 5 MG: 5 INJECTION INTRAVENOUS at 16:40

## 2017-01-22 RX ADMIN — SODIUM BICARBONATE 650 MG TABLET 1300 MG: at 17:55

## 2017-01-22 RX ADMIN — Medication 10 ML: at 12:50

## 2017-01-22 RX ADMIN — AMLODIPINE BESYLATE 10 MG: 5 TABLET ORAL at 08:25

## 2017-01-22 RX ADMIN — SODIUM BICARBONATE 650 MG TABLET 1300 MG: at 08:25

## 2017-01-22 RX ADMIN — TAMSULOSIN HYDROCHLORIDE 0.4 MG: 0.4 CAPSULE ORAL at 08:25

## 2017-01-22 RX ADMIN — HEPARIN SODIUM 5000 UNITS: 5000 INJECTION, SOLUTION INTRAVENOUS; SUBCUTANEOUS at 08:25

## 2017-01-22 RX ADMIN — PANTOPRAZOLE SODIUM 40 MG: 40 TABLET, DELAYED RELEASE ORAL at 08:25

## 2017-01-22 RX ADMIN — PRAVASTATIN SODIUM 10 MG: 10 TABLET ORAL at 21:38

## 2017-01-22 NOTE — PROGRESS NOTES
NAME: Suzi Jc. :  1950        MRN:  [id number]        Assessment :    Plan:  --MANA on CKD stage 4 (vs progression)  DM nephropathy    Hyperkalemia    HTN  Anemia   Metabolic acidosis  Dementia, severe, in LTC for 5 years  shpt     Admitted with DKA --Creatinine at new baseline of ~ 4; non-oliguric; would not want HD should RRT be needed (not needed at present)    on po bicarb    He has required routine kayexalate dosing as an outpatient for high K; s/p kayexalate 30 grams on ; K ok today    epo 10 k sc weekly. Iron sat is low. getting iv venofer for 400 mg    On calcitriol       Subjective:     Chief Complaint:  Alert. poor historian. To direct questioning he denied orthostatic symptoms, sob, cp or n/v/d. He was walking around his room without difficulty when I was there to see him. We discussed the above. Review of Systems:    Symptom Y/N Comments  Symptom Y/N Comments   Fever/Chills    Chest Pain n    Poor Appetite    Edema n    Cough    Abdominal Pain     Sputum    Joint Pain     SOB/LAUREANO n   Pruritis/Rash     Nausea/vomit n   Tolerating PT/OT     Diarrhea n   Tolerating Diet     Constipation    Other       Could not obtain due to:      Objective:     VITALS:   Last 24hrs VS reviewed since prior progress note. Most recent are:  Visit Vitals    /74 (BP 1 Location: Left arm, BP Patient Position: At rest)    Pulse 97    Temp 97.4 °F (36.3 °C)    Resp 18    Ht 5' 5\" (1.651 m)    Wt 72.7 kg (160 lb 5.1 oz)    SpO2 99%    BMI 26.68 kg/m2     No intake or output data in the 24 hours ending 17 0715   Telemetry Reviewed:     PHYSICAL EXAM:  General: WD, WN. Alert, cooperative, no acute distress  Resp:  CTA Bilaterally. No Wheezing/Rhonchi/Rales. No access. muscle use  CV:  Regular  rhythm,  No murmur (), No Rubs, No Gallops.  No edema  GI:  Soft, Non distended, Non tender.  +Bowel sounds, no HSM      Lab Data Reviewed: (see below)    Medications Reviewed: (see below)    PMH/SH reviewed - no change compared to H&P  ________________________________________________________________________  Care Plan discussed with:  Patient y    Family      RN     Care Manager                    Consultant:          Comments   >50% of visit spent in counseling and coordination of care       ________________________________________________________________________  Jose L Carpenter MD     Procedures: see electronic medical records for all procedures/Xrays and details which  were not copied into this note but were reviewed prior to creation of Plan. LABS:  Recent Labs      01/22/17 0328 01/21/17 0301   WBC  13.0*  9.6   HGB  8.2*  8.5*   HCT  23.8*  24.9*   PLT  258  225     Recent Labs      01/22/17 0328 01/21/17   1416  01/21/17 0301 01/20/17   0414   NA  138   --   136  138   K  4.6  5.2*  5.9*  4.7   CL  109*   --   110*  112*   CO2  17*   --   16*  15*   BUN  27*   --   25*  28*   CREA  4.29*   --   3.97*  3.95*   GLU  57*   --   181*  152*   CA  7.5*   --   7.7*  7.7*  7.3*   MG   --    --   1.8   --    PHOS   --    --   3.4   --      Recent Labs      01/21/17   0301   SGOT  21   AP  95   TP  6.9   ALB  3.7   GLOB  3.2     No results for input(s): INR, PTP, APTT in the last 72 hours. No lab exists for component: INREXT, INREXT   No results for input(s): FE, TIBC, PSAT, FERR in the last 72 hours. Lab Results   Component Value Date/Time    Folate 53.6 11/11/2014 03:40 AM      No results for input(s): PH, PCO2, PO2 in the last 72 hours. No results for input(s): CPK, CKMB in the last 72 hours.     No lab exists for component: TROPONINI  No components found for: [place]  Lab Results   Component Value Date/Time    Color YELLOW/STRAW 01/18/2017 03:41 AM    Appearance CLEAR 01/18/2017 03:41 AM    Specific gravity 1.010 01/18/2017 03:41 AM    Specific gravity 1.019 01/17/2017 12:59 AM    pH (UA) 7.0 01/18/2017 03:41 AM    Protein 30 01/18/2017 03:41 AM    Glucose >1000 01/18/2017 03:41 AM    Ketone TRACE 01/18/2017 03:41 AM    Bilirubin NEGATIVE  01/18/2017 03:41 AM    Urobilinogen 0.2 01/18/2017 03:41 AM    Nitrites NEGATIVE  01/18/2017 03:41 AM    Leukocyte Esterase NEGATIVE  01/18/2017 03:41 AM    Epithelial cells FEW 01/18/2017 03:41 AM    Bacteria NEGATIVE  01/18/2017 03:41 AM    WBC 0-4 01/18/2017 03:41 AM    RBC 5-10 01/18/2017 03:41 AM       MEDICATIONS:  Current Facility-Administered Medications   Medication Dose Route Frequency    acetaminophen (TYLENOL) tablet 500 mg  500 mg Oral Q4H PRN    ondansetron (ZOFRAN) injection 2 mg  2 mg IntraVENous Q4H PRN    insulin glargine (LANTUS) injection 8 Units  8 Units SubCUTAneous DAILY    dextrose (D50W) injection syrg 12.5-25 g  12.5-25 g IntraVENous PRN    glucagon (GLUCAGEN) injection 1 mg  1 mg IntraMUSCular PRN    insulin lispro (HUMALOG) injection   SubCUTAneous AC&HS    insulin lispro (HUMALOG) injection 2 Units  2 Units SubCUTAneous TIDAC    amLODIPine (NORVASC) tablet 10 mg  10 mg Oral DAILY    sodium bicarbonate tablet 1,300 mg  1,300 mg Oral BID    epoetin shaye (EPOGEN;PROCRIT) injection 10,000 Units  10,000 Units SubCUTAneous Q7D    sodium chloride (NS) flush 5-10 mL  5-10 mL IntraVENous Q8H    sodium chloride (NS) flush 5-10 mL  5-10 mL IntraVENous PRN    glucose chewable tablet 16 g  4 Tab Oral PRN    heparin (porcine) injection 5,000 Units  5,000 Units SubCUTAneous Q8H    metoprolol (LOPRESSOR) injection 5 mg  5 mg IntraVENous Q6H PRN    albuterol-ipratropium (DUO-NEB) 2.5 MG-0.5 MG/3 ML  3 mL Nebulization Q4H PRN    mirtazapine (REMERON) tablet 15 mg  15 mg Oral QHS    pravastatin (PRAVACHOL) tablet 10 mg  10 mg Oral QHS    pantoprazole (PROTONIX) tablet 40 mg  40 mg Oral ACB    tamsulosin (FLOMAX) capsule 0.4 mg  0.4 mg Oral DAILY

## 2017-01-22 NOTE — DISCHARGE INSTRUCTIONS
Patient Discharge Instructions     Pt Name  Charlie Griffiths Date of Birth 1950   Age  61 y.o. Medical Record Number  [id number]   PCP Cleveland Phalen, MD    Admit date:  1/18/2017 @    [place] [address]    Room Number  3260/01   Date of Discharge 1/22/2017     Admission Diagnoses:     DKA (diabetic ketoacidoses) ([facility]          Allergies   Allergen Reactions    Ace Inhibitors Unknown (comments)    Arb-Angiotensin Receptor Antagonist Unknown (comments)        You were admitted to [address] for  DKA (diabetic ketoacidoses) ([facility]    YOUR OTHER MEDICAL DIAGNOSES INCLUDE (BUT NOT LIMITED TO ):  Present on Admission:   [facility]   Do not resuscitate discussion   Dementia   DM (diabetes mellitus), type 2, uncontrolled ([facility]   HTN (hypertension)   History of GI bleed   CKD (chronic kidney disease) stage 3, GFR 30-59 ml/min   Pure hypercholesterolemia   Prostate cancer ([facility]   Blind one eye   Weakness generalized   Alzheimer disease   CAD (coronary artery disease)   Anemia in chronic kidney disease          DIET:   Cardiac Diet, Low fat, Low cholesterol and Renal Diet   Oral Nutritional Supplements: Boost Glucose Control  Supplement Frequency: Once daily   Recommended activity: Activity as tolerated   Indwelling devices:  None   Supplemental Oxygen: None   Required Lab work: Per SNF routine   Glucose management: Accucheck ACHS with sliding scale per SNF protocol   Code status: DNR            Skilled nursing facility MD responsible for above upon discharge. · It is important that you take the medication exactly as they are prescribed. · Keep your medication in the bottles provided by the pharmacist and keep a list of the medication names, dosages, and times to be taken in your wallet. · Do not take other medications without consulting your doctor.        ADDITIONAL INFORMATION: If you experience any of the following symptoms or have any health problem not listed below, then please call your primary care physician or return to the emergency room if you cannot get hold of your doctor: Fever, chills, nausea, vomiting, diarrhea, change in mentation, falling, bleeding, shortness of breath. I understand that if any problems occur once I am discharged, I am supposed to call my Primary care physician for further care or seek help in the Emergency Department at the nearest Healthcare facility. I have had an opportunity to discuss my clinical issues with my doctor and nursing staff. I understand and acknowledge receipt of the above instructions.                                                                                                                                            Physician's or R.N.'s Signature                                                            Date/Time                                                                                                                                              Patient or Representative Signature                                                 Date/Time

## 2017-01-22 NOTE — PROGRESS NOTES
Bedside and Verbal shift change report given to Rosemary Alejandra (oncoming nurse) by Molly Gorman RN   (offgoing nurse). Report included the following information SBAR, Kardex and Recent Results. Zone Phone for oncoming shift:   [phone]    Shift Summary: forgetful, wanders, pleasant. LDAs               Peripheral IV 01/19/17 Left Wrist (Active)   Site Assessment Clean, dry, & intact 1/21/2017  5:00 PM   Phlebitis Assessment 0 1/21/2017  5:00 PM   Infiltration Assessment 0 1/21/2017  5:00 PM   Dressing Status Clean, dry, & intact 1/21/2017  5:00 PM   Dressing Type Tape;Transparent 1/21/2017  5:00 PM   Hub Color/Line Status Blue;Capped 1/21/2017  5:00 PM                        Intake & Output   Date 01/20/17 1900 - 01/21/17 0659 01/21/17 0700 - 01/22/17 0659   Shift 8423-1722 24 Hour Total 2536-7664 6100-8230 24 Hour Total   I  N  T  A  K  E   P.O. 240 240         P. O. 240 240       Shift Total  (mL/kg) 240  (3.3) 240  (3.3)      O  U  T  P  U  T   Urine  (mL/kg/hr)  500         Urine Voided  500         Urine Occurrence(s) 2 x 2 x       Stool           Stool Occurrence(s)  1 x       Shift Total  (mL/kg)  500  (6.9)       -260      Weight (kg) 72.7 72.7 72.7 72.7 72.7      Last Bowel Movement Last Bowel Movement Date: 01/20/17   Glucose Checks [] N/A  [x] AC/HS  [] Q6  Concerns:   Nutrition Active Orders   Diet    DIET RENAL WITH OPTIONS 60-60-60; Regular; Consistent Carb 1800kcal       Consults []PT  []OT  []Speech  [x]Case Management   Cardiac Monitoring []N/A [x]Yes Expires:

## 2017-01-22 NOTE — PROGRESS NOTES
LEDA spoke with Vanita Timmons with [facility] and was advised pt would need to be re-assessed on Monday. LEDA sent referral via [employer] and advised attending.      DWAYNE Hampton, [address] [place] [address]   [phone]

## 2017-01-22 NOTE — ROUTINE PROCESS
Bedside and Verbal shift change report given to Polly Lopez (oncoming nurse) by Adrian Hernandez RN    (offgoing nurse).  Report included the following information SBAR, Kardex and Recent Results

## 2017-01-22 NOTE — PROGRESS NOTES
1115- Pt has a BG of 55. BG re-check: 56. Pt states that he feels, \"shaky. \"    1130- 1/2 amp of D50 given through IV.    1143- BG re-check- 107. Will continue to monitor. 1600- Pt's BG- 76.    1610- Pt given juice. MD paged. Orders received to decrease pt's daily Lantus from 8 units to 4 units. 1624- Pt's BG recheck- 116. Will continue to monitor. 1640- Pt's Bp 166/93. Pt given 5 mg IV Metoprolol. 1752- Pt's Bp 156/82.    1937- Bedside and Verbal shift change report given to Marcus Vaca RN (oncoming nurse) by Joann Ledbetter RN (offgoing nurse). Report included the following information SBAR, Kardex, Intake/Output and MAR.

## 2017-01-23 VITALS
OXYGEN SATURATION: 99 % | WEIGHT: 160.32 LBS | SYSTOLIC BLOOD PRESSURE: 174 MMHG | HEART RATE: 83 BPM | TEMPERATURE: 98.4 F | HEIGHT: 65 IN | BODY MASS INDEX: 26.71 KG/M2 | DIASTOLIC BLOOD PRESSURE: 88 MMHG | RESPIRATION RATE: 18 BRPM

## 2017-01-23 LAB
ANION GAP BLD CALC-SCNC: 12 MMOL/L (ref 5–15)
BASOPHILS # BLD AUTO: 0 K/UL (ref 0–0.1)
BASOPHILS # BLD: 0 % (ref 0–1)
BUN SERPL-MCNC: 25 MG/DL (ref 6–20)
BUN/CREAT SERPL: 6 (ref 12–20)
CALCIUM SERPL-MCNC: 7.7 MG/DL (ref 8.5–10.1)
CHLORIDE SERPL-SCNC: 110 MMOL/L (ref 97–108)
CO2 SERPL-SCNC: 16 MMOL/L (ref 21–32)
CREAT SERPL-MCNC: 4.38 MG/DL (ref 0.7–1.3)
EOSINOPHIL # BLD: 0.2 K/UL (ref 0–0.4)
EOSINOPHIL NFR BLD: 2 % (ref 0–7)
ERYTHROCYTE [DISTWIDTH] IN BLOOD BY AUTOMATED COUNT: 15.1 % (ref 11.5–14.5)
GLUCOSE BLD STRIP.AUTO-MCNC: 102 MG/DL (ref 65–100)
GLUCOSE BLD STRIP.AUTO-MCNC: 123 MG/DL (ref 65–100)
GLUCOSE BLD STRIP.AUTO-MCNC: 168 MG/DL (ref 65–100)
GLUCOSE BLD STRIP.AUTO-MCNC: 169 MG/DL (ref 65–100)
GLUCOSE BLD STRIP.AUTO-MCNC: 48 MG/DL (ref 65–100)
GLUCOSE BLD STRIP.AUTO-MCNC: 52 MG/DL (ref 65–100)
GLUCOSE SERPL-MCNC: 60 MG/DL (ref 65–100)
HCT VFR BLD AUTO: 26.4 % (ref 36.6–50.3)
HGB BLD-MCNC: 8.9 G/DL (ref 12.1–17)
LYMPHOCYTES # BLD AUTO: 19 % (ref 12–49)
LYMPHOCYTES # BLD: 2 K/UL (ref 0.8–3.5)
MAGNESIUM SERPL-MCNC: 1.9 MG/DL (ref 1.6–2.4)
MCH RBC QN AUTO: 30.6 PG (ref 26–34)
MCHC RBC AUTO-ENTMCNC: 33.7 G/DL (ref 30–36.5)
MCV RBC AUTO: 90.7 FL (ref 80–99)
MONOCYTES # BLD: 0.9 K/UL (ref 0–1)
MONOCYTES NFR BLD AUTO: 8 % (ref 5–13)
NEUTS SEG # BLD: 7.7 K/UL (ref 1.8–8)
NEUTS SEG NFR BLD AUTO: 71 % (ref 32–75)
PHOSPHATE SERPL-MCNC: 4.5 MG/DL (ref 2.6–4.7)
PLATELET # BLD AUTO: 276 K/UL (ref 150–400)
POTASSIUM SERPL-SCNC: 5 MMOL/L (ref 3.5–5.1)
POTASSIUM SERPL-SCNC: 5.5 MMOL/L (ref 3.5–5.1)
RBC # BLD AUTO: 2.91 M/UL (ref 4.1–5.7)
SERVICE CMNT-IMP: ABNORMAL
SODIUM SERPL-SCNC: 138 MMOL/L (ref 136–145)
WBC # BLD AUTO: 10.9 K/UL (ref 4.1–11.1)

## 2017-01-23 PROCEDURE — 83735 ASSAY OF MAGNESIUM: CPT | Performed by: INTERNAL MEDICINE

## 2017-01-23 PROCEDURE — 74011000250 HC RX REV CODE- 250: Performed by: INTERNAL MEDICINE

## 2017-01-23 PROCEDURE — 74011636637 HC RX REV CODE- 636/637: Performed by: INTERNAL MEDICINE

## 2017-01-23 PROCEDURE — 84132 ASSAY OF SERUM POTASSIUM: CPT | Performed by: HOSPITALIST

## 2017-01-23 PROCEDURE — 74011250637 HC RX REV CODE- 250/637: Performed by: INTERNAL MEDICINE

## 2017-01-23 PROCEDURE — 80048 BASIC METABOLIC PNL TOTAL CA: CPT | Performed by: INTERNAL MEDICINE

## 2017-01-23 PROCEDURE — 36415 COLL VENOUS BLD VENIPUNCTURE: CPT | Performed by: INTERNAL MEDICINE

## 2017-01-23 PROCEDURE — 84100 ASSAY OF PHOSPHORUS: CPT | Performed by: INTERNAL MEDICINE

## 2017-01-23 PROCEDURE — 85025 COMPLETE CBC W/AUTO DIFF WBC: CPT | Performed by: INTERNAL MEDICINE

## 2017-01-23 PROCEDURE — 82962 GLUCOSE BLOOD TEST: CPT

## 2017-01-23 PROCEDURE — 74011250636 HC RX REV CODE- 250/636: Performed by: INTERNAL MEDICINE

## 2017-01-23 RX ADMIN — INSULIN GLARGINE 4 UNITS: 100 INJECTION, SOLUTION SUBCUTANEOUS at 09:48

## 2017-01-23 RX ADMIN — HEPARIN SODIUM 5000 UNITS: 5000 INJECTION, SOLUTION INTRAVENOUS; SUBCUTANEOUS at 09:48

## 2017-01-23 RX ADMIN — SODIUM POLYSTYRENE SULFONATE 30 G: 15 SUSPENSION ORAL; RECTAL at 09:50

## 2017-01-23 RX ADMIN — AMLODIPINE BESYLATE 10 MG: 5 TABLET ORAL at 09:49

## 2017-01-23 RX ADMIN — Medication 10 ML: at 05:31

## 2017-01-23 RX ADMIN — PANTOPRAZOLE SODIUM 40 MG: 40 TABLET, DELAYED RELEASE ORAL at 09:49

## 2017-01-23 RX ADMIN — Medication 10 ML: at 17:07

## 2017-01-23 RX ADMIN — Medication 10 ML: at 01:29

## 2017-01-23 RX ADMIN — SODIUM BICARBONATE 650 MG TABLET 1300 MG: at 09:49

## 2017-01-23 RX ADMIN — TAMSULOSIN HYDROCHLORIDE 0.4 MG: 0.4 CAPSULE ORAL at 09:49

## 2017-01-23 RX ADMIN — DEXTROSE MONOHYDRATE 25 G: 500 INJECTION PARENTERAL at 02:05

## 2017-01-23 RX ADMIN — INSULIN LISPRO 2 UNITS: 100 INJECTION, SOLUTION INTRAVENOUS; SUBCUTANEOUS at 09:48

## 2017-01-23 RX ADMIN — Medication 10 ML: at 02:08

## 2017-01-23 RX ADMIN — METOPROLOL TARTRATE 5 MG: 5 INJECTION INTRAVENOUS at 01:29

## 2017-01-23 NOTE — PROGRESS NOTES
Pt admitted from [address] at [facility] with DKA and CKD 4. Pt resides in NH, needs assist with all adls, has [facility], POA is Hugo Liu, NATHALIAR, and plan is for return to NH when bed available. 1500  Staff please call report to [phone], send emar, d/c instructions, facesheet, ambulance form, Rx for narcotics if any, and completed DDNR. [facility] ambulance will transport at 5pm  Thanks    Care Management Interventions  PCP Verified by CM:  Yes  Palliative Care Consult (Criteria: CHF and RRAT>21): Yes  Mode of Transport at Discharge: S  Transition of Care Consult (CM Consult): [address]: No  Discharge Durable Medical Equipment: Yes  Physical Therapy Consult: No  Occupational Therapy Consult: No  Speech Therapy Consult: No  Current Support Network: [address] [place] [address]  Confirm Follow Up Transport: Family  Plan discussed with Pt/Family/Caregiver: No  Discharge Location  Discharge Placement: Skilled nursing facility

## 2017-01-23 NOTE — PROGRESS NOTES
NAME: Hannah Bradley. :  1950        MRN:  [id number]        Assessment :    Plan:  --MANA on CKD stage 4 (vs progression)  DM nephropathy    Hyperkalemia    HTN  Anemia   Metabolic acidosis  Dementia, severe, in LTC for 5 years  shpt     Admitted with DKA --Creatinine at new baseline of ~ 4; non-oliguric; would not want HD should RRT be needed (not needed at present)    on po bicarb    He has required routine kayexalate dosing as an outpatient for high K; s/p kayexalate 30 grams on ; Deleta John today--in the outpatient setting he is eligible for either  #1) kayexolate 60 grams weekly or #2) veltassa 8.4 g (new med for hyperkalemia with less gi side effects)    epo 10 k sc weekly. Iron sat is low. getting iv venofer for 400 mg    On calcitriol    I don't know who he sees in the outpatient setting-but once office opens I will set him up for outpatient appt       Subjective:     Chief Complaint:  Awants his coffee and to go home. Review of Systems:    Symptom Y/N Comments  Symptom Y/N Comments   Fever/Chills    Chest Pain n    Poor Appetite    Edema n    Cough    Abdominal Pain     Sputum    Joint Pain     SOB/LAUREANO n   Pruritis/Rash     Nausea/vomit n   Tolerating PT/OT     Diarrhea n   Tolerating Diet     Constipation    Other       Could not obtain due to:      Objective:     VITALS:   Last 24hrs VS reviewed since prior progress note. Most recent are:  Visit Vitals    /75    Pulse 80    Temp 98.5 °F (36.9 °C)    Resp 18    Ht 5' 5\" (1.651 m)    Wt 72.7 kg (160 lb 5.1 oz)    SpO2 96%    BMI 26.68 kg/m2       Intake/Output Summary (Last 24 hours) at 17 0751  Last data filed at 17 2040   Gross per 24 hour   Intake             1080 ml   Output                0 ml   Net             1080 ml      Telemetry Reviewed:     PHYSICAL EXAM:  General: WD, WN.  Alert, cooperative, no acute distress  Resp:  CTA Bilaterally. CV:  Regular  rhythm,  No. No edema  GI:  Soft, N      Lab Data Reviewed: (see below)    Medications Reviewed: (see below)    PMH/SH reviewed - no change compared to H&P  ________________________________________________________________________  Care Plan discussed with:  Patient y    [facility]:          Comments   >50% of visit spent in counseling and coordination of care       ________________________________________________________________________  Nia Worthington MD     Procedures: see electronic medical records for all procedures/Xrays and details which  were not copied into this note but were reviewed prior to creation of Plan. LABS:  Recent Labs      01/23/17   0512 01/22/17   0328   WBC  10.9  13.0*   HGB  8.9*  8.2*   HCT  26.4*  23.8*   PLT  276  258     Recent Labs      01/23/17   0512  01/22/17   0328  01/21/17   1416  01/21/17   0301   NA  138  138   --   136   K  5.5*  4.6  5.2*  5.9*   CL  110*  109*   --   110*   CO2  16*  17*   --   16*   BUN  25*  27*   --   25*   CREA  4.38*  4.29*   --   3.97*   GLU  60*  57*   --   181*   CA  7.7*  7.5*   --   7.7*  7.7*   MG  1.9   --    --   1.8   PHOS  4.5   --    --   3.4     Recent Labs      01/21/17   0301   SGOT  21   AP  95   TP  6.9   ALB  3.7   GLOB  3.2     No results for input(s): INR, PTP, APTT in the last 72 hours. No lab exists for component: INREXT, INREXT   No results for input(s): FE, TIBC, PSAT, FERR in the last 72 hours. Lab Results   Component Value Date/Time    Folate 53.6 11/11/2014 03:40 AM      No results for input(s): PH, PCO2, PO2 in the last 72 hours. No results for input(s): CPK, CKMB in the last 72 hours.     No lab exists for component: TROPONINI  No components found for: [place]  Lab Results   Component Value Date/Time    Color YELLOW/STRAW 01/18/2017 03:41 AM    Appearance CLEAR 01/18/2017 03:41 AM    Specific gravity 1.010 01/18/2017 03:41 AM    Specific gravity 1.019 01/17/2017 12:59 AM    pH (UA) 7.0 01/18/2017 03:41 AM    Protein 30 01/18/2017 03:41 AM    Glucose >1000 01/18/2017 03:41 AM    Ketone TRACE 01/18/2017 03:41 AM    Bilirubin NEGATIVE  01/18/2017 03:41 AM    Urobilinogen 0.2 01/18/2017 03:41 AM    Nitrites NEGATIVE  01/18/2017 03:41 AM    Leukocyte Esterase NEGATIVE  01/18/2017 03:41 AM    Epithelial cells FEW 01/18/2017 03:41 AM    Bacteria NEGATIVE  01/18/2017 03:41 AM    WBC 0-4 01/18/2017 03:41 AM    RBC 5-10 01/18/2017 03:41 AM       MEDICATIONS:  Current Facility-Administered Medications   Medication Dose Route Frequency    sodium polystyrene (KAYEXALATE) 15 gram/60 mL oral suspension 30 g  30 g Oral NOW    insulin glargine (LANTUS) injection 4 Units  4 Units SubCUTAneous DAILY    heparin (porcine) injection 5,000 Units  5,000 Units SubCUTAneous Q12H    acetaminophen (TYLENOL) tablet 500 mg  500 mg Oral Q4H PRN    ondansetron (ZOFRAN) injection 2 mg  2 mg IntraVENous Q4H PRN    dextrose (D50W) injection syrg 12.5-25 g  12.5-25 g IntraVENous PRN    glucagon (GLUCAGEN) injection 1 mg  1 mg IntraMUSCular PRN    insulin lispro (HUMALOG) injection   SubCUTAneous AC&HS    insulin lispro (HUMALOG) injection 2 Units  2 Units SubCUTAneous TIDAC    amLODIPine (NORVASC) tablet 10 mg  10 mg Oral DAILY    sodium bicarbonate tablet 1,300 mg  1,300 mg Oral BID    epoetin shaye (EPOGEN;PROCRIT) injection 10,000 Units  10,000 Units SubCUTAneous Q7D    sodium chloride (NS) flush 5-10 mL  5-10 mL IntraVENous Q8H    sodium chloride (NS) flush 5-10 mL  5-10 mL IntraVENous PRN    glucose chewable tablet 16 g  4 Tab Oral PRN    metoprolol (LOPRESSOR) injection 5 mg  5 mg IntraVENous Q6H PRN    albuterol-ipratropium (DUO-NEB) 2.5 MG-0.5 MG/3 ML  3 mL Nebulization Q4H PRN    mirtazapine (REMERON) tablet 15 mg  15 mg Oral QHS    pravastatin (PRAVACHOL) tablet 10 mg  10 mg Oral QHS    pantoprazole (PROTONIX) tablet 40 mg  40 mg Oral ACB    tamsulosin (FLOMAX) capsule 0.4 mg  0.4 mg Oral DAILY

## 2017-01-23 NOTE — PROGRESS NOTES
Hospital to [address]                                                                        61 y.o.   male    [facility]   Room: [address] [place] [address] 3 RENAL MEDICINE  Unit Phone# :  [phone] [facility] 3 RENAL MEDICINE  [address]  [address] [address]  Dept: [phone]  Loc: [phone]                    SITUATION     Admitted:  1/18/2017         Attending Provider:  Janice Ryan MD       Consultations:  IP CONSULT TO NEPHROLOGY    PCP:  Trenton Fatima MD   [phone]    Treatment Team: Attending Provider: Janice Ryan MD; Consulting Provider: Shireen Torrez MD; Consulting Provider: Dela Habermann, NP; Consulting Provider: Salazar Okeefe MD; Utilization Review: Cara Kirby RN; Care Manager: Taniya Pitts RN    Admitting Dx:  DKA (diabetic ketoacidoses) [facility])       Principal Problem: DKA (diabetic ketoacidoses) ([facility]    * No surgery found * of      BY: * Surgery not found *             ON: * No surgery found *                  Code Status: DNR                Advance Directives: No flowsheet data found. (Send w/patient)   No Doesnt Have       Isolation:  There are currently no Active Isolations       MDRO: No current active infections    Pain Medications given:  none    Last dose: 1/23/2017 at      Special Equipment needed: no  Type of equipment:  (Not currently on dialysis)     BACKGROUND     Allergies:   Allergies   Allergen Reactions    Ace Inhibitors Unknown (comments)    Arb-Angiotensin Receptor Antagonist Unknown (comments)       Past Medical History   Diagnosis Date    Alzheimer disease     CAD (coronary artery disease)     Cancer ([facility]      prostate    CKD (chronic kidney disease) stage 4, GFR 15-29 ml/min (HCC)     DM (diabetes mellitus), type 2, uncontrolled ([facility]     Hyperlipidemia     Hypertension     Other ill-defined conditions(484.23)      blind R eye-retina detachment    Other ill-defined conditions(799.89)      right cerebellopontine angle lipoma. Past Surgical History   Procedure Laterality Date    Hx shoulder arthroscopy       right    Hx urological       prostate bx    Hx heent       retinal detachment    Upper gi endoscopy,biopsy  11/12/2014          Colonoscopy,diagnostic  11/12/2014             Prescriptions Prior to Admission   Medication Sig    albuterol-ipratropium (DUO-NEB) 2.5 mg-0.5 mg/3 ml nebu 3 mL by Nebulization route.  Polyethylene Glycol 3350 powd by Does Not Apply route.  mirtazapine (REMERON) 15 mg tablet Take 15 mg by mouth nightly. Indications: MAJOR DEPRESSIVE DISORDER    omeprazole (PRILOSEC) 10 mg capsule Take 10 mg by mouth daily. Indications: GASTROESOPHAGEAL REFLUX    amLODIPine (NORVASC) 2.5 mg tablet Take 2.5 mg by mouth daily. Indications: HYPERTENSION    sodium polystyrene (KAYEXALATE) powder Take 30 g by mouth every fourty-eight (48) hours.  tamsulosin (FLOMAX) 0.4 mg capsule Take 0.4 mg by mouth daily.  calcitRIOL (ROCALTROL) 0.25 mcg capsule Take 0.25 mcg by mouth daily.  ondansetron hcl (ZOFRAN, AS HYDROCHLORIDE,) 4 mg tablet Take 1 Tab by mouth every eight (8) hours as needed for Nausea.  b complex-vitamin c-folic acid (NEPHROCAPS) 1 mg capsule Take 1 Cap by mouth daily.  insulin detemir (LEVEMIR) 100 unit/mL injection 0.07 mL by SubCUTAneous route daily. (Patient taking differently: 8 Units by SubCUTAneous route daily.)    insulin aspart (NOVOLOG) 100 unit/mL injection Novolog BEFORE MEALS: 0-90: hold, : 1 unit, 176-225: 2 units, 226-300: 3 units, 301 +    : 4 units, Novolog at BEDTIME - 225-300: 1 unit, 301 + 2 units    glucose blood VI test strips (ASCENSIA AUTODISC VI, ONE TOUCH ULTRA TEST VI) strip Monitor glucose before meals and at bedtime and as needed for symptoms - 4-5 times daily.  ferrous sulfate (SLOW FE) 142 mg (45 mg iron) ER tablet Take 142 mg by mouth Daily (before breakfast).     glucagon (GLUCAGEN) 1 mg injection 1 mL by IntraMUSCular route as needed for Hypoglycemia.  pravastatin (PRAVACHOL) 10 mg tablet Take 1 Tab by mouth nightly.  ergocalciferol (ERGOCALCIFEROL) 50,000 unit capsule Take 50,000 Units by mouth every seven (7) days.  acetaminophen (TYLENOL) 325 mg tablet Take 325 mg by mouth every four (4) hours as needed. Hard scripts included in transfer packet no    Vaccinations:    Immunization History   Administered Date(s) Administered    Influenza Vaccine 10/01/2014, 11/08/2016    Influenza Vaccine Split 09/21/2010, 12/08/2011    Pneumococcal Vaccine (Unspecified Type) 08/16/2010       Readmission Risks:    Known Risks: none        The Charlson CoMorbitiy Index tool is an evidenced based tool that has more automatic generated information. The tool looks at many different items such as the age of the patient, how many times they were admitted in the last calendar year, current length of stay in the hospital and their diagnosis. All of these items are pulled automatically from information documented in the chart from various places and will generate a score that predicts whether a patient is at low (less than 13), medium (13-20) or high (21 or greater) risk of being readmitted.         ASSESSMENT                Temp: 98.4 °F (36.9 °C) (01/23/17 1640) Pulse (Heart Rate): 83 (01/23/17 1640)     Resp Rate: 18 (01/23/17 1640)           BP: 174/88 (01/23/17 1640)     O2 Sat (%): 99 % (01/23/17 1640)     Weight: 72.7 kg (160 lb 5.1 oz)    Height: 5' 5\" (165.1 cm) (01/18/17 0245)       If above not within 1 hour of discharge:    BP:_____  P:____  R:____ T:_____ O2 Sat: ___%  O2: ______    Active Orders   Diet    DIET RENAL WITH OPTIONS 60-60-60; Regular; Consistent Carb 1800kcal         Orientation: disoriented     Active Behaviors: None                                   Active Lines/Drains:  (Peg Tube / Childs / CL or S/L?): no    Urinary Status: Voiding     Last BM: Last Bowel Movement Date: 01/23/17                   Mobility: No limitations   Weight Bearing Status: WBAT (Weight Bearing as Tolerated)                Lab Results   Component Value Date/Time    Glucose 60 01/23/2017 05:12 AM    Hemoglobin A1c 9.4 01/20/2017 04:14 AM    INR 1.0 06/14/2012 12:20 PM    INR PLEASE DISREGARD RESULTS 07/10/2011 02:30 AM    HGB 8.9 01/23/2017 05:12 AM    HGB 8.2 01/22/2017 03:28 AM    Hemoglobin A1c, External 7.7 08/12/2015        RECOMMENDATION     See After Visit Summary (AVS) for:  · Discharge instructions  · After [address]   · Special equipment needed (entered pre-discharge by Care Management)  · Medication Reconciliation    · Follow up Appointment(s)         Report given/sent by:  Ezra Siegel RN                    Verbal report given to:  Isa Oreilly  [facility] to:  [facility]         Estimated discharge time:  1/23/2017 at 1700

## 2017-01-23 NOTE — CONSULTS
Palliative Medicine Consult  [place]: [phone] ([phone])    Patient Name: Yvonne Alba. YOB: 1950    Date of Initial Consult: 1/19/17  Reason for Consult: Near end stage renal disease with severe dementia.  ?AMD and goals of care if continues to decline.  Probably would not be a candidate for HD with dementia. Requesting Provider:  Sammi Ghosh  Primary Care Physician: Cady Buenrostro MD      SUMMARY:   Yvonne Ojeda is a 61y.o. year old with a past history of DM, HTN, frontotemporal Alzheimer's dementia, CKD stage 4, CAD, prostate CA, who was admitted on 1/18/2017 from [address] [place] [address] with a diagnosis of hyperglycemia. Current medical issues leading to Palliative Medicine involvement include: pt with dementia, now end stage kidney failure. PALLIATIVE DIAGNOSES:   1. Hyperglycemia >600  2. DKA  3. CKD4   4. Anemia of chronic disease  5. Severe Alzheimer's dementia  6. Care decisions   PLAN:   1. Care decisions: Reached out to Keli Andino, however, she was too busy at work to talk, will call back later. Want to discuss hospice with her, however, he is not symptomatic, may not need for awhile. 1. NO DIALYSIS! 2. Comfort measures only. 3. When medically ready for discharge, return to [facility] for comfort care. 4. Provide a DO NOT REHOSPITALIZE order. 5. DDNR completed, pink sheet on chart, now do not resuscitate. 2. Initial consult note routed to primary continuity provider  Communicated plan of care with: Palliative IDT,RN      GOALS OF CARE / TREATMENT PREFERENCES:   [====Goals of Care====]  GOALS OF CARE:  Patient / health care proxy stated goals:     1. CMO  2. No dialysis  3. Return to [facility]. for comfort care      TREATMENT PREFERENCES:   Code Status: DNR    Advance Care Planning:  No flowsheet data found.     Other:    The palliative care team has discussed with patient / health care proxy about goals of care / treatment preferences for patient.  [====Goals of Care====] HISTORY:     History obtained from: family   CHIEF COMPLAINT: hyperglycemia    HPI/SUBJECTIVE:    The patient is:   [x] Verbal and participatory  [] Non-participatory due to:     BIBA for evaluation of elevated BG levels; pt had n/v/d and c/o CP for the past 24 hours, when he checked his BG at home it was >400. Pt was told by his PCP to seek treatment if his BG ever became that high so they called EMS. Today pt able to tell me he's at [facility], that the month is October, year 2014 no 2017, president is Alejandro Coleman, his daughter is Amie Villalobos. We talked about death and dying and he stated \"when it's my time, let me go. \"  \"I've lived a good life\"  Clinical Pain Assessment (nonverbal scale for severity on nonverbal patients):   [++++ Clinical Pain Assessment++++]   Denies any pain  [++++Pain Severity++++]: Pain: 0  [++++Pain Character++++]:   [++++Pain Duration++++]:   [++++Pain Effect++++]:   [++++Pain Factors++++]:   [++++Pain Frequency++++]:   [++++Pain Location++++]:   [++++ Clinical Pain Assessment++++]     FUNCTIONAL ASSESSMENT:     Palliative Performance Scale (PPS):  PPS: 30       PSYCHOSOCIAL/SPIRITUAL SCREENING:     Advance Care Planning:  No flowsheet data found. Any spiritual / Muslim concerns:  [] Yes /  [x] No    Caregiver Burnout:  [] Yes /  [] No /  [x] No Caregiver Present      Anticipatory grief assessment:   [x] Normal  / [] Maladaptive       ESAS Anxiety: Anxiety: 0    ESAS Depression: Depression: 0        REVIEW OF SYSTEMS:     Positive and pertinent negative findings in ROS are noted above in HPI. The following systems were [x] reviewed / [] unable to be reviewed as noted in HPI  Other findings are noted below. Systems: constitutional, ears/nose/mouth/throat, respiratory, gastrointestinal, genitourinary, musculoskeletal, integumentary, neurologic, psychiatric, endocrine. Positive findings noted below.   Modified ESAS Completed by: provider   Fatigue: 3 Drowsiness: 0   Depression: 0 Pain: 0 Anxiety: 0 Nausea: 0   Anorexia: 0 Dyspnea: 0     Constipation: No     Stool Occurrence(s): 1        PHYSICAL EXAM:     From RN flowsheet:  Wt Readings from Last 3 Encounters:   01/18/17 160 lb 5.1 oz (72.7 kg)   01/16/17 125 lb (56.7 kg)   11/23/16 119 lb (54 kg)     Blood pressure (!) 155/91, pulse 87, temperature 97.9 °F (36.6 °C), resp. rate 18, height 5' 5\" (1.651 m), weight 160 lb 5.1 oz (72.7 kg), SpO2 96 %. Pain Scale 1: Numeric (0 - 10)  Pain Intensity 1: 0                 Last bowel movement, if known:     Constitutional: WD, WN, NAD  Eyes: pupils equal, anicteric  ENMT: no nasal discharge, moist mucous membranes  Cardiovascular: regular rhythm, distal pulses intact  Respiratory: breathing not labored, symmetric  Gastrointestinal: soft non-tender, +bowel sounds  Musculoskeletal: no deformity, no tenderness to palpation  Skin: warm, dry  Neurologic: following commands, moving all extremities  Psychiatric: full affect, no hallucinations       HISTORY:     Principal Problem:    DKA (diabetic ketoacidoses) ([address] (7/10/2011)    Active Problems:    Pure hypercholesterolemia (1/2/2010)      Prostate cancer ([address] (1/2/2010)      Overview: Early stage--watchful waiting 2009      Dementia (1/2/2010)      Overview: Moderate.       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      DM (diabetes mellitus), type 2, uncontrolled ([address] (8/16/2010)      Weakness generalized (8/16/2010)      Alzheimer disease (9/19/2010)      CAD (coronary artery disease) (12/20/2010)      HTN (hypertension) (12/20/2010)      CKD (chronic kidney disease) stage 3, GFR 30-59 ml/min (12/13/2013)      History of GI bleed (11/12/2014)      Counseling regarding goals of care (1/19/2017)      Do not resuscitate discussion (1/19/2017)      Anemia in chronic kidney disease (1/21/2017)      Past Medical History   Diagnosis Date    Alzheimer disease     CAD (coronary artery disease)     Cancer ([address]      prostate    CKD (chronic kidney disease) stage 4, GFR 15-29 ml/min ([facility]     DM (diabetes mellitus), type 2, uncontrolled [facility]     Hyperlipidemia     Hypertension     Other ill-defined conditions(799.89)      blind R eye-retina detachment    Other ill-defined conditions(799.89)      right cerebellopontine angle lipoma. Past Surgical History   Procedure Laterality Date    Hx shoulder arthroscopy       right    Hx urological       prostate bx    Hx heent       retinal detachment    Upper gi endoscopy,biopsy  11/12/2014          Colonoscopy,diagnostic  11/12/2014            Family History   Problem Relation Age of Onset    Heart Disease Mother      Pacemaker    Cancer Father       History reviewed, no pertinent family history.   Social History   Substance Use Topics    Smoking status: Never Smoker    Smokeless tobacco: Never Used    Alcohol use No     Allergies   Allergen Reactions    Ace Inhibitors Unknown (comments)    Arb-Angiotensin Receptor Antagonist Unknown (comments)      Current Facility-Administered Medications   Medication Dose Route Frequency    insulin glargine (LANTUS) injection 4 Units  4 Units SubCUTAneous DAILY    heparin (porcine) injection 5,000 Units  5,000 Units SubCUTAneous Q12H    acetaminophen (TYLENOL) tablet 500 mg  500 mg Oral Q4H PRN    ondansetron (ZOFRAN) injection 2 mg  2 mg IntraVENous Q4H PRN    dextrose (D50W) injection syrg 12.5-25 g  12.5-25 g IntraVENous PRN    glucagon (GLUCAGEN) injection 1 mg  1 mg IntraMUSCular PRN    insulin lispro (HUMALOG) injection   SubCUTAneous AC&HS    insulin lispro (HUMALOG) injection 2 Units  2 Units SubCUTAneous TIDAC    amLODIPine (NORVASC) tablet 10 mg  10 mg Oral DAILY    sodium bicarbonate tablet 1,300 mg  1,300 mg Oral BID    epoetin shaye (EPOGEN;PROCRIT) injection 10,000 Units  10,000 Units SubCUTAneous Q7D    sodium chloride (NS) flush 5-10 mL  5-10 mL IntraVENous Q8H    sodium chloride (NS) flush 5-10 mL  5-10 mL IntraVENous PRN    glucose chewable tablet 16 g  4 Tab Oral PRN    metoprolol (LOPRESSOR) injection 5 mg  5 mg IntraVENous Q6H PRN    albuterol-ipratropium (DUO-NEB) 2.5 MG-0.5 MG/3 ML  3 mL Nebulization Q4H PRN    mirtazapine (REMERON) tablet 15 mg  15 mg Oral QHS    pravastatin (PRAVACHOL) tablet 10 mg  10 mg Oral QHS    pantoprazole (PROTONIX) tablet 40 mg  40 mg Oral ACB    tamsulosin (FLOMAX) capsule 0.4 mg  0.4 mg Oral DAILY          LAB AND IMAGING FINDINGS:     Lab Results   Component Value Date/Time    WBC 10.9 01/23/2017 05:12 AM    HGB 8.9 01/23/2017 05:12 AM    PLATELET 995 00/32/7768 05:12 AM     Lab Results   Component Value Date/Time    Sodium 138 01/23/2017 05:12 AM    Potassium 5.5 01/23/2017 05:12 AM    Chloride 110 01/23/2017 05:12 AM    CO2 16 01/23/2017 05:12 AM    BUN 25 01/23/2017 05:12 AM    Creatinine 4.38 01/23/2017 05:12 AM    Calcium 7.7 01/23/2017 05:12 AM    Magnesium 1.9 01/23/2017 05:12 AM    Phosphorus 4.5 01/23/2017 05:12 AM      Lab Results   Component Value Date/Time    AST 21 01/21/2017 03:01 AM    Alk.  phosphatase 95 01/21/2017 03:01 AM    Protein, total 6.9 01/21/2017 03:01 AM    Albumin 3.7 01/21/2017 03:01 AM    Globulin 3.2 01/21/2017 03:01 AM     Lab Results   Component Value Date/Time    INR 1.0 06/14/2012 12:20 PM    Prothrombin time 10.6 06/14/2012 12:20 PM    aPTT 27.8 06/14/2012 12:20 PM      Lab Results   Component Value Date/Time    Iron 34 01/18/2017 06:21 PM    TIBC 252 01/18/2017 06:21 PM    Iron % saturation 13 01/18/2017 06:21 PM    Ferritin 440 01/18/2017 06:21 PM      Lab Results   Component Value Date/Time    pH 7.37 01/18/2017 05:33 AM    PCO2 26 01/18/2017 05:33 AM    PO2 112 01/18/2017 05:33 AM     No components found for: [place]   Lab Results   Component Value Date/Time     01/18/2017 03:06 AM    CK - MB 3.6 01/18/2017 03:06 AM                Total time: 25 min  65 minCounseling / coordination time: 20 min  > 50% counseling / coordination?: yes    Prolonged service was provided for  []30 min   []75 min in face to face time in the presence of the patient. Time Start:   Time End:   Note: this can only be billed with 54041 (initial) or 70744 (follow up). If multiple start / stop times, list each separately.

## 2017-01-23 NOTE — PROGRESS NOTES
[facility]. They can accept tomorrow. Will arrange for 11a ambulance if all agree.

## 2017-01-23 NOTE — PROGRESS NOTES
Progress Note          Pt Name  Oleg Cartagena. Date of Birth 1950   Medical Record Number  [id number]      Age  61 y.o. PCP Karen Biggs MD   Admit date:  1/18/2017    Room Number  3260/01  @ [facility]   Date of Service  1/23/2017     Admission Diagnoses:  DKA (diabetic ketoacidoses) ([facility]     Assessment and plan:      DKA (diabetic ketoacidoses) ([facility] -resolved    Hyperkalemia - change diet to renal restriction, and give one dose of kayexalate, recheck K+ level later in the day   Do not resuscitate discussion   Dementia   DM (diabetes mellitus), type 2, uncontrolled ([facility]   HTN (hypertension)   History of GI bleed   CKD (chronic kidney disease) stage 3, GFR 30-59 ml/min   Pure hypercholesterolemia   Prostate cancer ([facility]   Blind one eye   Weakness generalized   Alzheimer disease   CAD (coronary artery disease)   Anemia in chronic kidney disease      Body mass index is 26.68 kg/(m^2). PLAN  · Appreciate input from nephrologist. We will recheck his K level later  Today. · Continue current Rx as is   · K+ level is better    · We will see if [address] [place] [address] takes pt on Sunday if not , we will plan to discharge him on Monday   ·         CODE STATUS   DNR     Functional Status  Pt resides at the LTC facility       Surrogate decision maker:  Pt's sister Mrs. Grant      Prophylaxis   Hep SQ   Discharge Plan:  SNF/LTC,      Misc   Benefit:  Payor: Sydney Causey / Plan: [employer] / Product Type: Managed Care Medicare /    Isolation :  There are currently no Active Isolations   ADT status:  INPATIENT      Query   None noted today    Prognosis   Fair    Social issues  Date  Comment     01/21/17 9:09 AM - spoke with Papo Frazier pt's daughter who was concerned that Cynthia holder in the ICU \" told her that there is no point in giving him HD.    I informed her that I respectfully disagree with that statement since the patient's status is improved and he is at his baseline, he might be a candidate for HD. His POA and family should meet with his PCP and nephrologist and plan for such decision as OP. She agreed.                   Subjective Data     \" OK \"  Review of Systems - History obtained from unobtainable from patient due to mental status    Objective Data       Comments  Pleasantly demented man wandering in no distress in the hallway. Very pleasant demented man      Patient Vitals for the past 24 hrs:   BP   01/23/17 0210 144/75   01/22/17 2300 176/89   01/22/17 2040 160/90   01/22/17 1752 156/82   01/22/17 1640 (!) 166/93   01/22/17 1515 169/82   01/22/17 1128 131/58   01/22/17 0806 174/80    Patient Vitals for the past 24 hrs:   Pulse   01/23/17 0210 80   01/22/17 2300 88   01/22/17 2040 95   01/22/17 1752 82   01/22/17 1640 95   01/22/17 1515 96   01/22/17 1128 97   01/22/17 0806 90    Patient Vitals for the past 24 hrs:   Resp   01/23/17 0210 18   01/22/17 2300 16   01/22/17 2040 18   01/22/17 1515 16   01/22/17 1128 18   01/22/17 0806 18    Patient Vitals for the past 24 hrs:   Temp   01/23/17 0210 98.5 °F (36.9 °C)   01/22/17 2300 98.4 °F (36.9 °C)   01/22/17 2040 98.5 °F (36.9 °C)   01/22/17 1515 98.1 °F (36.7 °C)   01/22/17 1128 98.5 °F (36.9 °C)   01/22/17 0806 98.8 °F (37.1 °C)        SpO2 Readings from Last 6 Encounters:   01/23/17 96%   01/17/17 100%   09/30/16 100%   09/15/16 100%   08/22/16 94%   01/15/16 100%          O2 Device: Room air Body mass index is 26.68 kg/(m^2).  -  Wt Readings from Last 10 Encounters:   01/18/17 72.7 kg (160 lb 5.1 oz)   01/16/17 56.7 kg (125 lb)   11/23/16 54 kg (119 lb)   09/30/16 55.3 kg (122 lb)   09/15/16 54.4 kg (120 lb)   08/22/16 61.2 kg (135 lb)   12/17/15 56.7 kg (125 lb)   09/30/15 55.8 kg (123 lb)   11/08/14 63.5 kg (140 lb)   07/07/14 68 kg (150 lb)        Physical Exam:             General:  Alert, cooperative,   well noursished,   well developed,   appears stated age    Ears/Eyes:  Hearing intact  Sclera anicteric. Mouth/Throat:     Neck:     Lungs:        CVS:     Abdomen:     Extremities:     Skin:     Lymph nodes:     Musculoskeletal    Neuro    Psych:  Alert and oriented to self   normal mood & affect           Medications reviewed     Current Facility-Administered Medications   Medication Dose Route Frequency    insulin glargine (LANTUS) injection 4 Units  4 Units SubCUTAneous DAILY    heparin (porcine) injection 5,000 Units  5,000 Units SubCUTAneous Q12H    acetaminophen (TYLENOL) tablet 500 mg  500 mg Oral Q4H PRN    ondansetron (ZOFRAN) injection 2 mg  2 mg IntraVENous Q4H PRN    dextrose (D50W) injection syrg 12.5-25 g  12.5-25 g IntraVENous PRN    glucagon (GLUCAGEN) injection 1 mg  1 mg IntraMUSCular PRN    insulin lispro (HUMALOG) injection   SubCUTAneous AC&HS    insulin lispro (HUMALOG) injection 2 Units  2 Units SubCUTAneous TIDAC    amLODIPine (NORVASC) tablet 10 mg  10 mg Oral DAILY    sodium bicarbonate tablet 1,300 mg  1,300 mg Oral BID    epoetin shaye (EPOGEN;PROCRIT) injection 10,000 Units  10,000 Units SubCUTAneous Q7D    sodium chloride (NS) flush 5-10 mL  5-10 mL IntraVENous Q8H    sodium chloride (NS) flush 5-10 mL  5-10 mL IntraVENous PRN    glucose chewable tablet 16 g  4 Tab Oral PRN    metoprolol (LOPRESSOR) injection 5 mg  5 mg IntraVENous Q6H PRN    albuterol-ipratropium (DUO-NEB) 2.5 MG-0.5 MG/3 ML  3 mL Nebulization Q4H PRN    mirtazapine (REMERON) tablet 15 mg  15 mg Oral QHS    pravastatin (PRAVACHOL) tablet 10 mg  10 mg Oral QHS    pantoprazole (PROTONIX) tablet 40 mg  40 mg Oral ACB    tamsulosin (FLOMAX) capsule 0.4 mg  0.4 mg Oral DAILY       Relevant other informations: Other medical conditions listed in [facility] problem list section; all of these and other pertinent data were taken into consideration when treatment plan is developed and customized to this patient's unique overall circumstances and needs.  We have reviewed available old medical records within the constraints of this admission process. Data Review:   Recent Days:  All Micro Results     None          Recent Labs      01/22/17   0328  01/21/17   0301   WBC  13.0*  9.6   HGB  8.2*  8.5*   HCT  23.8*  24.9*   PLT  258  225     Recent Labs      01/22/17   0328  01/21/17   1416  01/21/17   0301   NA  138   --   136   K  4.6  5.2*  5.9*   CL  109*   --   110*   CO2  17*   --   16*   GLU  57*   --   181*   BUN  27*   --   25*   CREA  4.29*   --   3.97*   CA  7.5*   --   7.7*  7.7*   MG   --    --   1.8   PHOS   --    --   3.4   ALB   --    --   3.7   TBILI   --    --   0.7   SGOT   --    --   21   ALT   --    --   30      Lab Results   Component Value Date/Time    TSH 2.61 06/20/2012 03:14 AM            Care Plan discussed with:Patient/Family and Nurse   Other medical conditions are listed in the active hospital problem list section; these and other pertinent data were taken into consideration when the treatment plan was developed and customized to this patient's unique overall circumstances and needs.     High complexity decision making was performed for this patient who is at high risk for decompensation with multiple organ involvement.    Today total floor/unit time was 15 minutes while caring for this patient and greater than 50% of that time was spent with patient (and/or family) coordinating patients clinical issues; this includes time spent during multidisciplinary rounds. Katja Chacon MD MPH FACP    1/23/2017

## 2017-01-23 NOTE — PROGRESS NOTES
0200: Pt called RN, reporting not feeling well and diaphoretic. VS stable per baseline. 0202:  RN checked pt's BG = 46 on Lt middle finger. Rechecked BG on Rt middle finger = 56.  0207: D50 50mL given. 0221: Rechecked BG = 169. Will continue to monitor. MD dr. Oscar Carter notified. No new order.

## 2017-01-23 NOTE — PROGRESS NOTES
Bedside and Verbal shift change report given to Tianna (oncoming nurse) by Diallo Perez (offgoing nurse). Report included the following information SBAR, Kardex, Intake/Output, MAR, Recent Results and Med Rec Status.

## 2017-01-23 NOTE — DISCHARGE SUMMARY
Ryan Contreras    Physician Discharge Summary     Patient ID:  Tammy Norman  [id number]  61 y.o.  1950    Admit date: 1/18/2017    Discharge date and time: 1/23/2017    Admission Diagnoses: DKA (diabetic ketoacidoses) [facility])    Discharge Diagnoses:      DKA (diabetic ketoacidoses) ([facility] (7/10/2011)  Pure hypercholesterolemia (1/2/2010)  Prostate cancer ([facility] (1/2/2010)      Overview: Early stage--watchful waiting 2009   Dementia (1/2/2010)      Overview: Moderate.   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  DM (diabetes mellitus), type 2, uncontrolled ([facility] (8/16/2010)  CAD (coronary artery disease) (12/20/2010)  HTN (hypertension) (12/20/2010)  CKD (chronic kidney disease) stage 3, GFR 30-59 ml/min (12/13/2013)  History of GI bleed (11/12/2014)  Counseling regarding goals of care (1/19/2017)  Do not resuscitate discussion (1/19/2017)  Anemia in chronic kidney disease (1/21/2017)    Hospital Course:     DKA/T2DM with nephropathy: DKA resolved  Pseudohyponatremia, resolved  AG Metabolic Acidosis-likely from CKD  -HA1C 9.4  -continue with lantus     Hypertension, benign/essential  -cont current treatment     CKD4 with anemia of chronic kidney disease with hyperkalemia  -baseline cr 3.5, presenting with cr 4  -nephro following, appreciate recs  -cont kayexalate as outpt for hyperkalemia and will f/u with nephrology     Severe Alzheimer's dementia  - In LTC at [address] since 12/2011  - con't mirtazapin     Hyperlipidemia/CAD  -resume pravachol, BB    Prostate ca  -flomax      PCP: Cheryl Marcos MD     Consults: Nephrology and pallaitive    Discharge Exam:  Gen:  No acute distress  Resp:  No accessory muscle use, clear breath sounds without wheezes rales or rhonchi  Card:  No murmurs, normal S1, S2 without thrills, bruits or peripheral edema  Abd:  Soft, non-tender, non-distended, normoactive bowel sounds are present, no palpable organomegaly  Skin:  No rashes or ulcers, skin turgor is good  Neuro:  Cranial nerves 3-12 are grossly intact    Disposition: home    Patient Instructions:   Current Discharge Medication List      CONTINUE these medications which have CHANGED    Details   amLODIPine (NORVASC) 10 mg tablet Take 1 Tab by mouth daily. Qty: 30 Tab, Refills: 1      sodium bicarbonate 650 mg tablet Take 2 Tabs by mouth two (2) times a day. Qty: 180 Tab, Refills: 1         CONTINUE these medications which have NOT CHANGED    Details   albuterol-ipratropium (DUO-NEB) 2.5 mg-0.5 mg/3 ml nebu 3 mL by Nebulization route. mirtazapine (REMERON) 15 mg tablet Take 15 mg by mouth nightly. Indications: MAJOR DEPRESSIVE DISORDER      omeprazole (PRILOSEC) 10 mg capsule Take 10 mg by mouth daily. Indications: GASTROESOPHAGEAL REFLUX      tamsulosin (FLOMAX) 0.4 mg capsule Take 0.4 mg by mouth daily. calcitRIOL (ROCALTROL) 0.25 mcg capsule Take 0.25 mcg by mouth daily. b complex-vitamin c-folic acid (NEPHROCAPS) 1 mg capsule Take 1 Cap by mouth daily. insulin aspart (NOVOLOG) 100 unit/mL injection Novolog BEFORE MEALS: 0-90: hold, : 1 unit, 176-225: 2 units, 226-300: 3 units, 301 +    : 4 units, Novolog at BEDTIME - 225-300: 1 unit, 301 + 2 units  Qty: 10 mL, Refills: 10      glucose blood VI test strips (ASCENSIA AUTODISC VI, ONE TOUCH ULTRA TEST VI) strip Monitor glucose before meals and at bedtime and as needed for symptoms - 4-5 times daily. Qty: 150 Strip, Refills: 11      ferrous sulfate (SLOW FE) 142 mg (45 mg iron) ER tablet Take 142 mg by mouth Daily (before breakfast). glucagon (GLUCAGEN) 1 mg injection 1 mL by IntraMUSCular route as needed for Hypoglycemia. Qty: 1 Vial, Refills: 0      pravastatin (PRAVACHOL) 10 mg tablet Take 1 Tab by mouth nightly. Qty: 30 Tab, Refills: 5      acetaminophen (TYLENOL) 325 mg tablet Take 325 mg by mouth every four (4) hours as needed.          STOP taking these medications       Polyethylene Glycol 3350 powd Comments:   Reason for Stopping:         sodium polystyrene (KAYEXALATE) powder Comments:   Reason for Stopping:         ondansetron hcl (ZOFRAN, AS HYDROCHLORIDE,) 4 mg tablet Comments:   Reason for Stopping:         insulin detemir (LEVEMIR) 100 unit/mL injection Comments:   Reason for Stopping:         ergocalciferol (ERGOCALCIFEROL) 50,000 unit capsule Comments:   Reason for Stopping:             Activity: Activity as tolerated  Diet: Cardiac/diabetic Diet  Wound Care: None needed    Follow-up with Cleveland Phalen, MD in 1 week.   Follow-up tests/labs none    Approximate time spent in patient care on day of discharge: 35 min    Signed:  Belle Saunders MD  1/23/2017  12:06 PM

## 2017-02-04 ENCOUNTER — APPOINTMENT (OUTPATIENT)
Dept: GENERAL RADIOLOGY | Age: 67
DRG: 871 | End: 2017-02-04
Attending: EMERGENCY MEDICINE
Payer: MEDICARE

## 2017-02-04 ENCOUNTER — HOSPITAL ENCOUNTER (INPATIENT)
Age: 67
LOS: 6 days | Discharge: NURSING, REHAB OR LONG-TERM CARE | DRG: 871 | End: 2017-02-10
Attending: EMERGENCY MEDICINE | Admitting: INTERNAL MEDICINE
Payer: MEDICARE

## 2017-02-04 DIAGNOSIS — F02.80 EARLY ONSET ALZHEIMER'S DEMENTIA WITHOUT BEHAVIORAL DISTURBANCE (HCC): ICD-10-CM

## 2017-02-04 DIAGNOSIS — G30.0 EARLY ONSET ALZHEIMER'S DEMENTIA WITHOUT BEHAVIORAL DISTURBANCE (HCC): ICD-10-CM

## 2017-02-04 DIAGNOSIS — D64.9 ANEMIA, UNSPECIFIED TYPE: ICD-10-CM

## 2017-02-04 DIAGNOSIS — J18.9 HCAP (HEALTHCARE-ASSOCIATED PNEUMONIA): Primary | ICD-10-CM

## 2017-02-04 DIAGNOSIS — F03.90 DEMENTIA WITHOUT BEHAVIORAL DISTURBANCE, UNSPECIFIED DEMENTIA TYPE: Chronic | ICD-10-CM

## 2017-02-04 DIAGNOSIS — A41.9 SEPSIS, DUE TO UNSPECIFIED ORGANISM: ICD-10-CM

## 2017-02-04 DIAGNOSIS — R73.9 HYPERGLYCEMIA: ICD-10-CM

## 2017-02-04 DIAGNOSIS — R53.1 WEAKNESS GENERALIZED: ICD-10-CM

## 2017-02-04 DIAGNOSIS — N18.5 CKD (CHRONIC KIDNEY DISEASE), STAGE 5: ICD-10-CM

## 2017-02-04 DIAGNOSIS — R06.00 DYSPNEA, UNSPECIFIED TYPE: ICD-10-CM

## 2017-02-04 LAB
ALBUMIN SERPL BCP-MCNC: 3.3 G/DL (ref 3.5–5)
ALBUMIN/GLOB SERPL: 1.1 {RATIO} (ref 1.1–2.2)
ALP SERPL-CCNC: 94 U/L (ref 45–117)
ALT SERPL-CCNC: 23 U/L (ref 12–78)
ANION GAP BLD CALC-SCNC: 13 MMOL/L (ref 5–15)
AST SERPL W P-5'-P-CCNC: 21 U/L (ref 15–37)
BASOPHILS # BLD AUTO: 0.1 K/UL (ref 0–0.1)
BASOPHILS # BLD: 0 % (ref 0–1)
BILIRUB SERPL-MCNC: 0.3 MG/DL (ref 0.2–1)
BUN SERPL-MCNC: 40 MG/DL (ref 6–20)
BUN/CREAT SERPL: 8 (ref 12–20)
CALCIUM SERPL-MCNC: 7.9 MG/DL (ref 8.5–10.1)
CHLORIDE SERPL-SCNC: 107 MMOL/L (ref 97–108)
CK MB CFR SERPL CALC: 2 % (ref 0–2.5)
CK MB SERPL-MCNC: 7.8 NG/ML (ref 5–25)
CK SERPL-CCNC: 389 U/L (ref 39–308)
CO2 SERPL-SCNC: 17 MMOL/L (ref 21–32)
CREAT SERPL-MCNC: 4.94 MG/DL (ref 0.7–1.3)
EOSINOPHIL # BLD: 0.1 K/UL (ref 0–0.4)
EOSINOPHIL NFR BLD: 1 % (ref 0–7)
ERYTHROCYTE [DISTWIDTH] IN BLOOD BY AUTOMATED COUNT: 14.8 % (ref 11.5–14.5)
FLUAV AG NPH QL IA: NEGATIVE
FLUBV AG NOSE QL IA: NEGATIVE
GLOBULIN SER CALC-MCNC: 3.1 G/DL (ref 2–4)
GLUCOSE BLD STRIP.AUTO-MCNC: 118 MG/DL (ref 65–100)
GLUCOSE BLD STRIP.AUTO-MCNC: 252 MG/DL (ref 65–100)
GLUCOSE SERPL-MCNC: 87 MG/DL (ref 65–100)
HCT VFR BLD AUTO: 19.7 % (ref 36.6–50.3)
HCT VFR BLD AUTO: 21.7 % (ref 36.6–50.3)
HEMOCCULT STL QL: NEGATIVE
HGB BLD-MCNC: 6.7 G/DL (ref 12.1–17)
HGB BLD-MCNC: 7.4 G/DL (ref 12.1–17)
LACTATE SERPL-SCNC: 1.7 MMOL/L (ref 0.4–2)
LYMPHOCYTES # BLD AUTO: 8 % (ref 12–49)
LYMPHOCYTES # BLD: 1.1 K/UL (ref 0.8–3.5)
MCH RBC QN AUTO: 30.8 PG (ref 26–34)
MCHC RBC AUTO-ENTMCNC: 34.1 G/DL (ref 30–36.5)
MCV RBC AUTO: 90.4 FL (ref 80–99)
MONOCYTES # BLD: 0.9 K/UL (ref 0–1)
MONOCYTES NFR BLD AUTO: 7 % (ref 5–13)
NEUTS SEG # BLD: 10.7 K/UL (ref 1.8–8)
NEUTS SEG NFR BLD AUTO: 84 % (ref 32–75)
PLATELET # BLD AUTO: 353 K/UL (ref 150–400)
POTASSIUM SERPL-SCNC: 5.7 MMOL/L (ref 3.5–5.1)
PROT SERPL-MCNC: 6.4 G/DL (ref 6.4–8.2)
RBC # BLD AUTO: 2.4 M/UL (ref 4.1–5.7)
SERVICE CMNT-IMP: ABNORMAL
SERVICE CMNT-IMP: ABNORMAL
SODIUM SERPL-SCNC: 137 MMOL/L (ref 136–145)
TROPONIN I SERPL-MCNC: <0.04 NG/ML
WBC # BLD AUTO: 12.8 K/UL (ref 4.1–11.1)

## 2017-02-04 PROCEDURE — 74011250637 HC RX REV CODE- 250/637: Performed by: INTERNAL MEDICINE

## 2017-02-04 PROCEDURE — 74011250636 HC RX REV CODE- 250/636: Performed by: INTERNAL MEDICINE

## 2017-02-04 PROCEDURE — 85025 COMPLETE CBC W/AUTO DIFF WBC: CPT | Performed by: EMERGENCY MEDICINE

## 2017-02-04 PROCEDURE — 83605 ASSAY OF LACTIC ACID: CPT | Performed by: EMERGENCY MEDICINE

## 2017-02-04 PROCEDURE — 77030029131 HC ADMN ST IV BLD N DEHP ICUM -B

## 2017-02-04 PROCEDURE — 82270 OCCULT BLOOD FECES: CPT

## 2017-02-04 PROCEDURE — 96361 HYDRATE IV INFUSION ADD-ON: CPT

## 2017-02-04 PROCEDURE — 80053 COMPREHEN METABOLIC PANEL: CPT | Performed by: EMERGENCY MEDICINE

## 2017-02-04 PROCEDURE — 94640 AIRWAY INHALATION TREATMENT: CPT

## 2017-02-04 PROCEDURE — 74011250636 HC RX REV CODE- 250/636: Performed by: EMERGENCY MEDICINE

## 2017-02-04 PROCEDURE — 93005 ELECTROCARDIOGRAM TRACING: CPT

## 2017-02-04 PROCEDURE — 87804 INFLUENZA ASSAY W/OPTIC: CPT | Performed by: EMERGENCY MEDICINE

## 2017-02-04 PROCEDURE — 82550 ASSAY OF CK (CPK): CPT | Performed by: EMERGENCY MEDICINE

## 2017-02-04 PROCEDURE — 86900 BLOOD TYPING SEROLOGIC ABO: CPT | Performed by: INTERNAL MEDICINE

## 2017-02-04 PROCEDURE — 71020 XR CHEST PA LAT: CPT

## 2017-02-04 PROCEDURE — 87040 BLOOD CULTURE FOR BACTERIA: CPT | Performed by: EMERGENCY MEDICINE

## 2017-02-04 PROCEDURE — 74011000250 HC RX REV CODE- 250: Performed by: INTERNAL MEDICINE

## 2017-02-04 PROCEDURE — 84484 ASSAY OF TROPONIN QUANT: CPT | Performed by: EMERGENCY MEDICINE

## 2017-02-04 PROCEDURE — 74011636637 HC RX REV CODE- 636/637: Performed by: INTERNAL MEDICINE

## 2017-02-04 PROCEDURE — 86920 COMPATIBILITY TEST SPIN: CPT | Performed by: INTERNAL MEDICINE

## 2017-02-04 PROCEDURE — 65270000029 HC RM PRIVATE

## 2017-02-04 PROCEDURE — 36415 COLL VENOUS BLD VENIPUNCTURE: CPT | Performed by: EMERGENCY MEDICINE

## 2017-02-04 PROCEDURE — 82962 GLUCOSE BLOOD TEST: CPT

## 2017-02-04 PROCEDURE — 74011000258 HC RX REV CODE- 258: Performed by: EMERGENCY MEDICINE

## 2017-02-04 PROCEDURE — 74011000250 HC RX REV CODE- 250: Performed by: EMERGENCY MEDICINE

## 2017-02-04 PROCEDURE — 85018 HEMOGLOBIN: CPT | Performed by: INTERNAL MEDICINE

## 2017-02-04 PROCEDURE — 99285 EMERGENCY DEPT VISIT HI MDM: CPT

## 2017-02-04 PROCEDURE — 96365 THER/PROPH/DIAG IV INF INIT: CPT

## 2017-02-04 RX ORDER — LEVOFLOXACIN 5 MG/ML
750 INJECTION, SOLUTION INTRAVENOUS
Status: DISCONTINUED | OUTPATIENT
Start: 2017-02-04 | End: 2017-02-04

## 2017-02-04 RX ORDER — ACETAMINOPHEN 325 MG/1
650 TABLET ORAL
Status: DISCONTINUED | OUTPATIENT
Start: 2017-02-04 | End: 2017-02-10 | Stop reason: HOSPADM

## 2017-02-04 RX ORDER — ONDANSETRON 2 MG/ML
4 INJECTION INTRAMUSCULAR; INTRAVENOUS
Status: DISCONTINUED | OUTPATIENT
Start: 2017-02-04 | End: 2017-02-10 | Stop reason: HOSPADM

## 2017-02-04 RX ORDER — BISACODYL 5 MG
5 TABLET, DELAYED RELEASE (ENTERIC COATED) ORAL DAILY PRN
Status: DISCONTINUED | OUTPATIENT
Start: 2017-02-04 | End: 2017-02-10 | Stop reason: HOSPADM

## 2017-02-04 RX ORDER — PANTOPRAZOLE SODIUM 40 MG/1
40 TABLET, DELAYED RELEASE ORAL
Status: DISCONTINUED | OUTPATIENT
Start: 2017-02-05 | End: 2017-02-10 | Stop reason: HOSPADM

## 2017-02-04 RX ORDER — AMLODIPINE BESYLATE 5 MG/1
10 TABLET ORAL DAILY
Status: DISCONTINUED | OUTPATIENT
Start: 2017-02-05 | End: 2017-02-10 | Stop reason: HOSPADM

## 2017-02-04 RX ORDER — CALCITRIOL 0.25 UG/1
0.25 CAPSULE ORAL DAILY
Status: DISCONTINUED | OUTPATIENT
Start: 2017-02-05 | End: 2017-02-10 | Stop reason: HOSPADM

## 2017-02-04 RX ORDER — LEVOFLOXACIN 5 MG/ML
500 INJECTION, SOLUTION INTRAVENOUS
Status: DISCONTINUED | OUTPATIENT
Start: 2017-02-06 | End: 2017-02-10 | Stop reason: HOSPADM

## 2017-02-04 RX ORDER — LEVOFLOXACIN 5 MG/ML
750 INJECTION, SOLUTION INTRAVENOUS
Status: DISCONTINUED | OUTPATIENT
Start: 2017-02-05 | End: 2017-02-04

## 2017-02-04 RX ORDER — HEPARIN SODIUM 5000 [USP'U]/ML
5000 INJECTION, SOLUTION INTRAVENOUS; SUBCUTANEOUS EVERY 8 HOURS
Status: DISCONTINUED | OUTPATIENT
Start: 2017-02-04 | End: 2017-02-10 | Stop reason: HOSPADM

## 2017-02-04 RX ORDER — HYDRALAZINE HYDROCHLORIDE 20 MG/ML
10 INJECTION INTRAMUSCULAR; INTRAVENOUS
Status: DISCONTINUED | OUTPATIENT
Start: 2017-02-04 | End: 2017-02-10 | Stop reason: HOSPADM

## 2017-02-04 RX ORDER — DEXTROSE 50 % IN WATER (D50W) INTRAVENOUS SYRINGE
12.5-25 AS NEEDED
Status: DISCONTINUED | OUTPATIENT
Start: 2017-02-04 | End: 2017-02-06 | Stop reason: SDUPTHER

## 2017-02-04 RX ORDER — PRAVASTATIN SODIUM 10 MG/1
10 TABLET ORAL
Status: DISCONTINUED | OUTPATIENT
Start: 2017-02-04 | End: 2017-02-10 | Stop reason: HOSPADM

## 2017-02-04 RX ORDER — SODIUM BICARBONATE 650 MG/1
1300 TABLET ORAL 2 TIMES DAILY
Status: DISCONTINUED | OUTPATIENT
Start: 2017-02-04 | End: 2017-02-05

## 2017-02-04 RX ORDER — MIRTAZAPINE 15 MG/1
15 TABLET, FILM COATED ORAL
Status: DISCONTINUED | OUTPATIENT
Start: 2017-02-04 | End: 2017-02-10 | Stop reason: HOSPADM

## 2017-02-04 RX ORDER — SODIUM CHLORIDE 0.9 % (FLUSH) 0.9 %
5-10 SYRINGE (ML) INJECTION AS NEEDED
Status: DISCONTINUED | OUTPATIENT
Start: 2017-02-04 | End: 2017-02-10 | Stop reason: HOSPADM

## 2017-02-04 RX ORDER — PREDNISONE 20 MG/1
40 TABLET ORAL
Status: DISCONTINUED | OUTPATIENT
Start: 2017-02-04 | End: 2017-02-05

## 2017-02-04 RX ORDER — ALBUTEROL SULFATE 0.83 MG/ML
2.5 SOLUTION RESPIRATORY (INHALATION)
Status: DISCONTINUED | OUTPATIENT
Start: 2017-02-04 | End: 2017-02-10 | Stop reason: HOSPADM

## 2017-02-04 RX ORDER — MAGNESIUM SULFATE 100 %
4 CRYSTALS MISCELLANEOUS AS NEEDED
Status: DISCONTINUED | OUTPATIENT
Start: 2017-02-04 | End: 2017-02-05 | Stop reason: SDUPTHER

## 2017-02-04 RX ORDER — INSULIN LISPRO 100 [IU]/ML
INJECTION, SOLUTION INTRAVENOUS; SUBCUTANEOUS
Status: DISCONTINUED | OUTPATIENT
Start: 2017-02-04 | End: 2017-02-05 | Stop reason: ALTCHOICE

## 2017-02-04 RX ORDER — TAMSULOSIN HYDROCHLORIDE 0.4 MG/1
0.4 CAPSULE ORAL DAILY
Status: DISCONTINUED | OUTPATIENT
Start: 2017-02-05 | End: 2017-02-10 | Stop reason: HOSPADM

## 2017-02-04 RX ORDER — LANOLIN ALCOHOL/MO/W.PET/CERES
1 CREAM (GRAM) TOPICAL
Status: DISCONTINUED | OUTPATIENT
Start: 2017-02-05 | End: 2017-02-05 | Stop reason: SDUPTHER

## 2017-02-04 RX ORDER — VANCOMYCIN 1.75 GRAM/500 ML IN 0.9 % SODIUM CHLORIDE INTRAVENOUS
1750 ONCE
Status: COMPLETED | OUTPATIENT
Start: 2017-02-04 | End: 2017-02-05

## 2017-02-04 RX ORDER — IPRATROPIUM BROMIDE AND ALBUTEROL SULFATE 2.5; .5 MG/3ML; MG/3ML
3 SOLUTION RESPIRATORY (INHALATION)
Status: DISCONTINUED | OUTPATIENT
Start: 2017-02-04 | End: 2017-02-07

## 2017-02-04 RX ORDER — IPRATROPIUM BROMIDE AND ALBUTEROL SULFATE 2.5; .5 MG/3ML; MG/3ML
3 SOLUTION RESPIRATORY (INHALATION)
Status: COMPLETED | OUTPATIENT
Start: 2017-02-04 | End: 2017-02-04

## 2017-02-04 RX ORDER — SODIUM CHLORIDE 9 MG/ML
50 INJECTION, SOLUTION INTRAVENOUS CONTINUOUS
Status: DISCONTINUED | OUTPATIENT
Start: 2017-02-04 | End: 2017-02-08

## 2017-02-04 RX ORDER — SODIUM POLYSTYRENE SULFONATE 15 G/60ML
30 SUSPENSION ORAL; RECTAL
Status: COMPLETED | OUTPATIENT
Start: 2017-02-04 | End: 2017-02-04

## 2017-02-04 RX ORDER — SODIUM CHLORIDE 9 MG/ML
250 INJECTION, SOLUTION INTRAVENOUS AS NEEDED
Status: DISCONTINUED | OUTPATIENT
Start: 2017-02-04 | End: 2017-02-06 | Stop reason: ALTCHOICE

## 2017-02-04 RX ORDER — SODIUM CHLORIDE 0.9 % (FLUSH) 0.9 %
5-10 SYRINGE (ML) INJECTION EVERY 8 HOURS
Status: DISCONTINUED | OUTPATIENT
Start: 2017-02-04 | End: 2017-02-10 | Stop reason: HOSPADM

## 2017-02-04 RX ADMIN — IPRATROPIUM BROMIDE AND ALBUTEROL SULFATE 3 ML: .5; 3 SOLUTION RESPIRATORY (INHALATION) at 19:39

## 2017-02-04 RX ADMIN — INSULIN LISPRO 3 UNITS: 100 INJECTION, SOLUTION INTRAVENOUS; SUBCUTANEOUS at 22:15

## 2017-02-04 RX ADMIN — CEFEPIME HYDROCHLORIDE 2 G: 2 INJECTION, POWDER, FOR SOLUTION INTRAVENOUS at 19:38

## 2017-02-04 RX ADMIN — IPRATROPIUM BROMIDE AND ALBUTEROL SULFATE 3 ML: .5; 3 SOLUTION RESPIRATORY (INHALATION) at 20:53

## 2017-02-04 RX ADMIN — SODIUM BICARBONATE 1300 MG: 650 TABLET, ORALLY DISINTEGRATING ORAL at 22:55

## 2017-02-04 RX ADMIN — LEVOFLOXACIN 750 MG: 5 INJECTION, SOLUTION INTRAVENOUS at 20:50

## 2017-02-04 RX ADMIN — VANCOMYCIN HYDROCHLORIDE 1750 MG: 10 INJECTION, POWDER, LYOPHILIZED, FOR SOLUTION INTRAVENOUS at 22:54

## 2017-02-04 RX ADMIN — MIRTAZAPINE 15 MG: 15 TABLET, FILM COATED ORAL at 22:55

## 2017-02-04 RX ADMIN — Medication 10 ML: at 23:03

## 2017-02-04 RX ADMIN — SODIUM CHLORIDE 50 ML/HR: 900 INJECTION, SOLUTION INTRAVENOUS at 22:19

## 2017-02-04 RX ADMIN — PRAVASTATIN SODIUM 10 MG: 10 TABLET ORAL at 22:56

## 2017-02-04 RX ADMIN — PREDNISONE 40 MG: 20 TABLET ORAL at 22:56

## 2017-02-04 RX ADMIN — SODIUM POLYSTYRENE SULFONATE 15 G: 15 SUSPENSION ORAL; RECTAL at 20:39

## 2017-02-04 RX ADMIN — HEPARIN SODIUM 5000 UNITS: 5000 INJECTION, SOLUTION INTRAVENOUS; SUBCUTANEOUS at 21:05

## 2017-02-04 RX ADMIN — SODIUM CHLORIDE 2181 ML: 900 INJECTION, SOLUTION INTRAVENOUS at 18:49

## 2017-02-04 NOTE — IP AVS SNAPSHOT
Current Discharge Medication List  
  
Take these medications at their scheduled times Dose & Instructions Dispensing Information Comments Morning Noon Evening Bedtime  
 amLODIPine 10 mg tablet Commonly known as:  Caleb Esposito Your next dose is: Today, Tomorrow Other:  ____________ Dose:  10 mg Take 1 Tab by mouth daily. Quantity:  30 Tab Refills:  1  
     
   
   
   
  
 calcitRIOL 0.25 mcg capsule Commonly known as:  ROCALTROL Your next dose is: Today, Tomorrow Other:  ____________ Dose:  0.25 mcg Take 0.25 mcg by mouth daily. Refills:  0  
     
   
   
   
  
 levoFLOXacin 750 mg tablet Commonly known as:  Threasa Mince Your next dose is: Today, Tomorrow Other:  ____________ Dose:  750 mg Take 1 Tab by mouth daily. Quantity:  10 Tab Refills:  0  
     
   
   
   
  
 mirtazapine 15 mg tablet Commonly known as:  Xenia Dawn Your next dose is: Today, Tomorrow Other:  ____________ Dose:  15 mg Take 15 mg by mouth nightly. Indications: MAJOR DEPRESSIVE DISORDER Refills:  0 NEPHROCAPS 1 mg capsule Generic drug:  b complex-vitamin c-folic acid Your next dose is: Today, Tomorrow Other:  ____________ Dose:  1 Cap Take 1 Cap by mouth daily. Refills:  0  
     
   
   
   
  
 omeprazole 10 mg capsule Commonly known as:  PRILOSEC Your next dose is: Today, Tomorrow Other:  ____________ Dose:  10 mg Take 10 mg by mouth daily. Indications: GASTROESOPHAGEAL REFLUX Refills:  0  
     
   
   
   
  
 pravastatin 10 mg tablet Commonly known as:  PRAVACHOL Your next dose is: Today, Tomorrow Other:  ____________ Dose:  10 mg Take 1 Tab by mouth nightly. Quantity:  30 Tab Refills:  5 SLOW  mg (45 mg iron) ER tablet Generic drug:  ferrous sulfate Your next dose is: Today, Tomorrow Other:  ____________ Dose:  142 mg Take 142 mg by mouth Daily (before breakfast). Refills:  0  
     
   
   
   
  
 sodium bicarbonate 650 mg tablet Your next dose is: Today, Tomorrow Other:  ____________ Dose:  1300 mg Take 2 Tabs by mouth two (2) times a day. Quantity:  180 Tab Refills:  1  
     
   
   
   
  
 tamsulosin 0.4 mg capsule Commonly known as:  FLOMAX Your next dose is: Today, Tomorrow Other:  ____________ Dose:  0.4 mg Take 0.4 mg by mouth daily. Refills:  0 Take these medications as needed Dose & Instructions Dispensing Information Comments Morning Noon Evening Bedtime  
 acetaminophen 325 mg tablet Commonly known as:  TYLENOL Your next dose is: Today, Tomorrow Other:  ____________ Dose:  325 mg Take 325 mg by mouth every four (4) hours as needed. Refills:  0  
     
   
   
   
  
 glucagon 1 mg injection Commonly known as:  Royal Cordial Your next dose is: Today, Tomorrow Other:  ____________ Dose:  1 mg  
1 mL by IntraMUSCular route as needed for Hypoglycemia. Quantity:  1 Vial  
Refills:  0 Take these medications as directed Dose & Instructions Dispensing Information Comments Morning Noon Evening Bedtime  
 albuterol-ipratropium 2.5 mg-0.5 mg/3 ml Nebu Commonly known as:  She Medicus Your next dose is: Today, Tomorrow Other:  ____________ Dose:  3 mL  
3 mL by Nebulization route. Refills:  0  
     
   
   
   
  
 glucose blood VI test strips strip Commonly known as:  ASCENSIA AUTODISC VI, ONE TOUCH ULTRA TEST VI Your next dose is: Today, Tomorrow Other:  ____________ Monitor glucose before meals and at bedtime and as needed for symptoms - 4-5 times daily. Quantity:  150 Strip Refills:  11  
     
   
   
   
  
 insulin aspart 100 unit/mL injection Commonly known as:  Kortney Oreilly Your next dose is: Today, Tomorrow Other:  ____________ Novolog BEFORE MEALS: 0-90: hold, : 1 unit, 176-225: 2 units, 226-300: 3 units, 301 +    : 4 units, Novolog at BEDTIME - 225-300: 1 unit, 301 + 2 units Quantity:  10 mL Refills:  10 Where to Get Your Medications Information about where to get these medications is not yet available ! Ask your nurse or doctor about these medications  
  levoFLOXacin 750 mg tablet

## 2017-02-04 NOTE — ED PROVIDER NOTES
HPI Comments: Nelson Epperson is a 61 y.o. male, who presents via EMS from [facility] to the ED c/o SOB since earlier today. Pt was confused about why he was there, stating that he was not SOB. EMS was called by [facility] over concern of his breathing. According to pt, his blood sugar had been fluctuating PTA. Pt is compliant with his daily medication, per [facility]. He endorses a cough and notes that it was nonproductive. He also notes that his bowel movements have been normal. He denies fever, leg swelling, or dark and tarry stools. Pt denies any pain in the ED. PCP: Stone Felix MD    PMHx: Significant for anemia, Alzheimer's disease, HTN, MD, HLD, CAD, prostate cancer, CKD  PSHx: Significant for right shoulder arthroscopy  Social Hx: -tobacco, -EtOH, -Illicit Drugs     There are no other complaints, changes, or physical findings at this time. The history is provided by the patient and the nursing home. No  was used. Past Medical History:   Diagnosis Date    Alzheimer disease     CAD (coronary artery disease)     Cancer ([facility] [address]      prostate    CKD (chronic kidney disease) stage 4, GFR 15-29 ml/min ([facility])     DM (diabetes mellitus), type 2, uncontrolled ([address]     Hyperlipidemia     Hypertension     Other ill-defined conditions(799.89)      blind R eye-retina detachment    Other ill-defined conditions(799.89)      right cerebellopontine angle lipoma.         Past Surgical History:   Procedure Laterality Date    Hx shoulder arthroscopy       right    Hx urological       prostate bx    Hx heent       retinal detachment    Upper gi endoscopy,biopsy  11/12/2014          Colonoscopy,diagnostic  11/12/2014               Family History:   Problem Relation Age of Onset    Heart Disease Mother      Pacemaker    Cancer Father        Social History     Social History    Marital status:      Spouse name: N/A    Number of children: N/A    Years of education: N/A     Occupational History    Not on file. Social History Main Topics    Smoking status: Never Smoker    Smokeless tobacco: Never Used    Alcohol use No    Drug use: No    Sexual activity: Not Currently     Other Topics Concern    Not on file     Social History Narrative         ALLERGIES: Ace inhibitors and Arb-angiotensin receptor antagonist    Review of Systems   Constitutional: Negative for chills and fever. HENT: Negative for congestion, rhinorrhea, sneezing and sore throat. Eyes: Negative for redness and visual disturbance. Respiratory: Positive for cough (nonproductive) and shortness of breath. Cardiovascular: Negative for chest pain and leg swelling. Gastrointestinal: Negative for abdominal pain, blood in stool, constipation, diarrhea, nausea and vomiting. Genitourinary: Negative for difficulty urinating and frequency. Musculoskeletal: Negative for back pain, myalgias and neck stiffness. Skin: Negative for rash. Neurological: Negative for dizziness, syncope, weakness and headaches. Hematological: Negative for adenopathy. Patient Vitals for the past 12 hrs:   Temp Pulse Resp BP SpO2   02/04/17 2150 - (!) 119 24 - 100 %   02/04/17 2145 - (!) 123 18 144/81 99 %   02/04/17 2130 - - - 134/71 99 %   02/04/17 2128 - (!) 107 25 - 100 %   02/04/17 2115 - (!) 114 24 151/77 100 %   02/04/17 2100 - (!) 116 (!) 31 157/81 100 %   02/04/17 2045 - (!) 121 17 155/75 100 %   02/04/17 2033 - (!) 121 29 - 100 %   02/04/17 2030 - (!) 121 (!) 33 169/86 100 %   02/04/17 2015 - (!) 124 26 (!) 153/100 100 %   02/04/17 2001 - (!) 120 22 - 100 %   02/04/17 2000 - (!) 120 25 151/83 100 %   02/04/17 1945 98.5 °F (36.9 °C) - - - -   02/04/17 1930 - (!) 117 26 151/72 100 %   02/04/17 1900 - (!) 104 24 155/80 97 %   02/04/17 1756 97.5 °F (36.4 °C) (!) 101 24 158/81 100 %       Physical Exam   Constitutional: He is oriented to person, place, and time.  He appears well-developed and well-nourished. HENT:   Head: Normocephalic and atraumatic. Mouth/Throat: Oropharynx is clear and moist and mucous membranes are normal.   Eyes: EOM are normal.   Neck: Normal range of motion and full passive range of motion without pain. Neck supple. Cardiovascular: Regular rhythm, normal heart sounds, intact distal pulses and normal pulses. Tachycardia present. No murmur heard. Pulmonary/Chest: Effort normal and breath sounds normal. He exhibits no tenderness. Faint wheezes to right upper lobe and bilateral left lower lobe, tachypnic, mild respiratory distress    Abdominal: Soft. Normal appearance and bowel sounds are normal. There is no tenderness. There is no rebound and no guarding. Neurological: He is alert and oriented to person, place, and time. He has normal strength. Skin: Skin is warm, dry and intact. No rash noted. No erythema. There is pallor. Warm to touch   Psychiatric: He has a normal mood and affect. His speech is normal and behavior is normal. Judgment and thought content normal.   Nursing note and vitals reviewed. MDM  Number of Diagnoses or Management Options  Anemia, unspecified type:   CKD (chronic kidney disease), stage 5 ([facility] [address]):   HCAP (healthcare-associated pneumonia):    Hyperglycemia:   Sepsis, due to unspecified organism [facility]):   Diagnosis management comments: DDx: ACS, COPD, asthma, anemia, PE, pneumonia       Amount and/or Complexity of Data Reviewed  Clinical lab tests: ordered and reviewed  Tests in the radiology section of CPT®: ordered and reviewed  Tests in the medicine section of CPT®: reviewed and ordered  Obtain history from someone other than the patient: yes (Nursing home, EMS)  Review and summarize past medical records: yes  Discuss the patient with other providers: yes (Hospitalist )  Independent visualization of images, tracings, or specimens: yes    Risk of Complications, Morbidity, and/or Mortality  Presenting problems: high  Diagnostic procedures: high  Management options: high    Critical Care  Total time providing critical care: 30-74 minutes    Patient Progress  Patient progress: improved    ED Course       Procedures    ED EKG interpretation: 18:01  Rhythm: sinus tachycardia; and regular . Rate (approx.): 105; Axis: normal; P wave: normal; QRS interval: normal ; ST/T wave: normal; Other findings: borderline ekg. This EKG was interpreted by Braxton Da Silva MD,ED Provider. PROGRESS NOTE   6:07 PM  Pt was not quite certain why he was there. He denied initially SOB. Pt's SpO2 was 100% upon examination. Written by Tyler Reese ED Scribe, as dictated by Braxton Da Silva MD.     PROGRESS NOTE   6:48 PM  Hospitalist paged for admission over possible sepsis and healthcare associated pneumonia. Written by Tyler Reese ED Scribe, as dictated by Braxton Da Silva MD.    CONSULT NOTE:   6:49 PM  Braxton Da Silva MD spoke with Dr. Meagan Christine,   Specialty: Hospitalist  Discussed pt's hx, disposition, and available diagnostic and imaging results. Reviewed care plans. Consultant will evaluate pt for admission.   Written by Tyler Reese ED Scribe, as dictated by Braxton Da Silva MD.    LABORATORY TESTS:  Recent Results (from the past 12 hour(s))   EKG, 12 LEAD, INITIAL    Collection Time: 02/04/17  6:01 PM   Result Value Ref Range    Ventricular Rate 105 BPM    Atrial Rate 105 BPM    P-R Interval 124 ms    QRS Duration 64 ms    Q-T Interval 358 ms    QTC Calculation (Bezet) 473 ms    Calculated P Axis 24 degrees    Calculated R Axis 43 degrees    Calculated T Axis 9 degrees    Diagnosis       ** Poor data quality, interpretation may be adversely affected  Sinus tachycardia  Nonspecific T wave abnormality  Abnormal ECG  When compared with ECG of 18-JAN-2017 03:23,  No significant change was found     GLUCOSE, POC    Collection Time: 02/04/17  6:11 PM   Result Value Ref Range    Glucose (POC) 118 (H) 65 - 100 mg/dL Performed by [facility]    CBC WITH AUTOMATED DIFF    Collection Time: 02/04/17  6:40 PM   Result Value Ref Range    WBC 12.8 (H) 4.1 - 11.1 K/uL    RBC 2.40 (L) 4.10 - 5.70 M/uL    HGB 7.4 (L) 12.1 - 17.0 g/dL    HCT 21.7 (L) 36.6 - 50.3 %    MCV 90.4 80.0 - 99.0 FL    MCH 30.8 26.0 - 34.0 PG    MCHC 34.1 30.0 - 36.5 g/dL    RDW 14.8 (H) 11.5 - 14.5 %    PLATELET 040 772 - 971 K/uL    NEUTROPHILS 84 (H) 32 - 75 %    LYMPHOCYTES 8 (L) 12 - 49 %    MONOCYTES 7 5 - 13 %    EOSINOPHILS 1 0 - 7 %    BASOPHILS 0 0 - 1 %    ABS. NEUTROPHILS 10.7 (H) 1.8 - 8.0 K/UL    ABS. LYMPHOCYTES 1.1 0.8 - 3.5 K/UL    ABS. MONOCYTES 0.9 0.0 - 1.0 K/UL    ABS. EOSINOPHILS 0.1 0.0 - 0.4 K/UL    ABS. BASOPHILS 0.1 0.0 - 0.1 K/UL   METABOLIC PANEL, COMPREHENSIVE    Collection Time: 02/04/17  6:40 PM   Result Value Ref Range    Sodium 137 136 - 145 mmol/L    Potassium 5.7 (H) 3.5 - 5.1 mmol/L    Chloride 107 97 - 108 mmol/L    CO2 17 (L) 21 - 32 mmol/L    Anion gap 13 5 - 15 mmol/L    Glucose 87 65 - 100 mg/dL    BUN 40 (H) 6 - 20 MG/DL    Creatinine 4.94 (H) 0.70 - 1.30 MG/DL    BUN/Creatinine ratio 8 (L) 12 - 20      GFR est AA 14 (L) >60 ml/min/1.73m2    GFR est non-AA 12 (L) >60 ml/min/1.73m2    Calcium 7.9 (L) 8.5 - 10.1 MG/DL    Bilirubin, total 0.3 0.2 - 1.0 MG/DL    ALT (SGPT) 23 12 - 78 U/L    AST (SGOT) 21 15 - 37 U/L    Alk.  phosphatase 94 45 - 117 U/L    Protein, total 6.4 6.4 - 8.2 g/dL    Albumin 3.3 (L) 3.5 - 5.0 g/dL    Globulin 3.1 2.0 - 4.0 g/dL    A-G Ratio 1.1 1.1 - 2.2     CK W/ CKMB & INDEX    Collection Time: 02/04/17  6:40 PM   Result Value Ref Range     (H) 39 - 308 U/L    CK - MB 7.8 (H) <3.6 NG/ML    CK-MB Index 2.0 0 - 2.5     TROPONIN I    Collection Time: 02/04/17  6:40 PM   Result Value Ref Range    Troponin-I, Qt. <0.04 <0.05 ng/mL   INFLUENZA A & B AG (RAPID TEST)    Collection Time: 02/04/17  6:40 PM   Result Value Ref Range    Influenza A Antigen NEGATIVE  NEG      Influenza B Antigen NEGATIVE NEG     LACTIC ACID, PLASMA    Collection Time: 02/04/17  7:02 PM   Result Value Ref Range    Lactic acid 1.7 0.4 - 2.0 MMOL/L       IMAGING RESULTS:  CXR Results  (Last 48 hours)               02/04/17 1822  XR CHEST PA LAT Final result    Impression:  IMPRESSION: New, mild right perihilar patchy airspace disease. Narrative: Indication:  Chest Pain, SOB, diffuse wheezing, pneumonia vs COPD? Exam: PA and lateral views of the chest.       Direct comparison is made to prior CXR dated January 2017. Findings: Cardiomediastinal silhouette is within normal limits. There is new   mild right perihilar patchy airspace disease. Lungs are otherwise clear. There   is no pleural fluid. There is no pneumothorax.                    MEDICATIONS GIVEN:  Medications   cefepime (MAXIPIME) 2 g in 0.9% sodium chloride (MBP/ADV) 100 mL (2 g IntraVENous New Bag 2/4/17 1938)   levoFLOXacin (LEVAQUIN) 750 mg in D5W IVPB (750 mg IntraVENous New Bag 2/4/17 2050)   sodium chloride (NS) flush 5-10 mL (not administered)   sodium chloride (NS) flush 5-10 mL (not administered)   acetaminophen (TYLENOL) tablet 650 mg (not administered)   ondansetron (ZOFRAN) injection 4 mg (not administered)   bisacodyl (DULCOLAX) tablet 5 mg (not administered)   0.9% sodium chloride infusion (not administered)   heparin (porcine) injection 5,000 Units (5,000 Units SubCUTAneous Given 2/4/17 2105)   albuterol-ipratropium (DUO-NEB) 2.5 MG-0.5 MG/3 ML (3 mL Nebulization Given 2/4/17 2053)   albuterol (PROVENTIL VENTOLIN) nebulizer solution 2.5 mg (not administered)   amLODIPine (NORVASC) tablet 10 mg (not administered)   calcitRIOL (ROCALTROL) capsule 0.25 mcg (not administered)   mirtazapine (REMERON) tablet 15 mg (not administered)   pravastatin (PRAVACHOL) tablet 10 mg (not administered)   sodium bicarbonate tablet 1,300 mg (not administered)   tamsulosin (FLOMAX) capsule 0.4 mg (not administered)   B complex-vitaminC-folic acid (NEPHROCAP) cap (not administered)   insulin lispro (HUMALOG) injection (not administered)   glucose chewable tablet 16 g (not administered)   dextrose (D50W) injection syrg 12.5-25 g (not administered)   glucagon (GLUCAGEN) injection 1 mg (not administered)   ferrous sulfate ER tablet 140 mg (not administered)   pantoprazole (PROTONIX) tablet 40 mg (not administered)   hydrALAZINE (APRESOLINE) 20 mg/mL injection 10 mg (not administered)   vancomycin (VANCOCIN) 1750 mg in  ml infusion (not administered)   sodium chloride 0.9 % bolus infusion 2,181 mL (2,181 mL IntraVENous New Bag 2/4/17 1849)   albuterol-ipratropium (DUO-NEB) 2.5 MG-0.5 MG/3 ML (3 mL Nebulization Given 2/4/17 1939)   sodium polystyrene (KAYEXALATE) 15 gram/60 mL oral suspension 30 g (15 g Oral Given 2/4/17 2039)       IMPRESSION:  1. HCAP (healthcare-associated pneumonia)    2. Anemia, unspecified type    3. CKD (chronic kidney disease), stage 5 ([facility] [address]    4. Hyperglycemia    5. Sepsis, due to unspecified organism [facility])        PLAN: Admit to hospitalist  Admission Note:  6:50 PM  Patient is being admitted to the hospital by Dr. Alessio Virk. The results of their tests and reasons for their admission have been discussed with them and/or available family. They convey agreement and understanding for the need to be admitted and for their admission diagnosis. Written by Merced Perez, ED Scribe, as dictated by Sj Higgins MD.    Attestation: This note is prepared by Merced Perez, acting as Scribe for Sj Higgins MD.    Sj Higgins MD: The scribe's documentation has been prepared under my direction and personally reviewed by me in its entirety. I confirm that the note above accurately reflects all work, treatment, procedures, and medical decision making performed by me.

## 2017-02-04 NOTE — IP AVS SNAPSHOT
[address] [facility] 
[phone] Patient: Ismael Rose. MRN: [id number] You are allergic to the following Allergen Reactions Ace Inhibitors Unknown (comments) Arb-Angiotensin Receptor Antagonist Unknown (comments) Recent Documentation Height Weight BMI Smoking Status 1.651 m 52.3 kg 19.19 kg/m2 Never Smoker Emergency Contacts Name Discharge Info Relation Home Work Mobile Ananya Diaz [facility] Care Proxy Per Pt'S Mother) DISCHARGE CAREGIVER [3] Other Relative [6] [phone] Maribel Pretty  Child [2] [phone] On license of [facility]  Parent [1] [phone] About your hospitalization You were admitted on:  February 4, 2017 You last received care in the:  [facility] 3 RENAL MEDICINE You were discharged on:  February 10, 2017 Unit phone number:  [phone] Why you were hospitalized Your primary diagnosis was:  Not on File Your diagnoses also included:  Pneumonia, Dyspnea Providers Seen During Your Hospitalizations Provider Role Specialty Primary office phone Avel Russo MD Attending Provider Emergency Medicine [phone] Deepthi Deshpande MD Attending Provider Internal Medicine [phone] Your Primary Care Physician (PCP) Primary Care Physician Office Phone Office Fax Marvin Smoke [phone] Follow-up Information Follow up With Details Comments Contact Info Cheryl Marcos MD   [address] [facility] [place] [address] 
[phone] Cheryl Marcos MD In 3 days  [address] [place] [address] [place] [address] 
[phone] Your Appointments Wednesday February 22, 2017  2:10 PM EST Follow Up with Rohit Harrington MD  
[facility] [address] [address] [phone] Current Discharge Medication List  
  
START taking these medications Dose & Instructions Dispensing Information Comments Morning Noon Evening Bedtime  
 levoFLOXacin 750 mg tablet Commonly known as:  Michael Aloe Your next dose is: Today, Tomorrow Other:  _________ Dose:  750 mg Take 1 Tab by mouth daily. Quantity:  10 Tab Refills:  0 CONTINUE these medications which have NOT CHANGED Dose & Instructions Dispensing Information Comments Morning Noon Evening Bedtime  
 acetaminophen 325 mg tablet Commonly known as:  TYLENOL Your next dose is: Today, Tomorrow Other:  _________ Dose:  325 mg Take 325 mg by mouth every four (4) hours as needed. Refills:  0  
     
   
   
   
  
 albuterol-ipratropium 2.5 mg-0.5 mg/3 ml Nebu Commonly known as:  Otoniel Punches Your next dose is: Today, Tomorrow Other:  _________ Dose:  3 mL  
3 mL by Nebulization route. Refills:  0  
     
   
   
   
  
 amLODIPine 10 mg tablet Commonly known as:  Isabell Colon Your next dose is: Today, Tomorrow Other:  _________ Dose:  10 mg Take 1 Tab by mouth daily. Quantity:  30 Tab Refills:  1  
     
   
   
   
  
 calcitRIOL 0.25 mcg capsule Commonly known as:  ROCALTROL Your next dose is: Today, Tomorrow Other:  _________ Dose:  0.25 mcg Take 0.25 mcg by mouth daily. Refills:  0  
     
   
   
   
  
 glucagon 1 mg injection Commonly known as:  Gm Blower Your next dose is: Today, Tomorrow Other:  _________ Dose:  1 mg  
1 mL by IntraMUSCular route as needed for Hypoglycemia. Quantity:  1 Vial  
Refills:  0  
     
   
   
   
  
 glucose blood VI test strips strip Commonly known as:  ASCENSIA AUTODISC VI, ONE TOUCH ULTRA TEST VI  
   
 Your next dose is: Today, Tomorrow Other:  _________ Monitor glucose before meals and at bedtime and as needed for symptoms - 4-5 times daily. Quantity:  150 Strip Refills:  11  
     
   
   
   
  
 insulin aspart 100 unit/mL injection Commonly known as:  Kala Eth Your next dose is: Today, Tomorrow Other:  _________ Novolog BEFORE MEALS: 0-90: hold, : 1 unit, 176-225: 2 units, 226-300: 3 units, 301 +    : 4 units, Novolog at BEDTIME - 225-300: 1 unit, 301 + 2 units Quantity:  10 mL Refills:  10  
     
   
   
   
  
 mirtazapine 15 mg tablet Commonly known as:  Rolly Clarence Your next dose is: Today, Tomorrow Other:  _________ Dose:  15 mg Take 15 mg by mouth nightly. Indications: MAJOR DEPRESSIVE DISORDER Refills:  0 NEPHROCAPS 1 mg capsule Generic drug:  b complex-vitamin c-folic acid Your next dose is: Today, Tomorrow Other:  _________ Dose:  1 Cap Take 1 Cap by mouth daily. Refills:  0  
     
   
   
   
  
 omeprazole 10 mg capsule Commonly known as:  PRILOSEC Your next dose is: Today, Tomorrow Other:  _________ Dose:  10 mg Take 10 mg by mouth daily. Indications: GASTROESOPHAGEAL REFLUX Refills:  0  
     
   
   
   
  
 pravastatin 10 mg tablet Commonly known as:  PRAVACHOL Your next dose is: Today, Tomorrow Other:  _________ Dose:  10 mg Take 1 Tab by mouth nightly. Quantity:  30 Tab Refills:  5 SLOW  mg (45 mg iron) ER tablet Generic drug:  ferrous sulfate Your next dose is: Today, Tomorrow Other:  _________ Dose:  142 mg Take 142 mg by mouth Daily (before breakfast). Refills:  0  
     
   
   
   
  
 sodium bicarbonate 650 mg tablet Your next dose is: Today, Tomorrow Other:  _________  Dose:  1300 mg  
 Take 2 Tabs by mouth two (2) times a day. Quantity:  180 Tab Refills:  1  
     
   
   
   
  
 tamsulosin 0.4 mg capsule Commonly known as:  FLOMAX Your next dose is: Today, Tomorrow Other:  _________ Dose:  0.4 mg Take 0.4 mg by mouth daily. Refills:  0 Where to Get Your Medications Information on where to get these meds will be given to you by the nurse or doctor. ! Ask your nurse or doctor about these medications  
  levoFLOXacin 750 mg tablet Discharge Instructions DISCHARGE DIAGNOSIS: 
Acute hypoxic respiratory failure MEDICATIONS: 
· It is important that you take the medication exactly as they are prescribed. · Keep your medication in the bottles provided by the pharmacist and keep a list of the medication names, dosages, and times to be taken in your wallet. · Do not take other medications without consulting your doctor. Pain Management: per above medications What to do at [facility] Recommended diet:  Cardiac Diet and Diabetic Diet Recommended activity: Activity as tolerated If you have questions regarding the hospital related prescriptions or hospital related issues please call [address] [place] [address] at . You can always direct your questions to your primary care doctor if you are unable to reach your hospital physician; your PCP works as an extension of your hospital doctor just like your hospital doctor is an extension of your PCP for your time at the hospital [facility]). If you experience any of the following symptoms then please call your primary care physician or return to the emergency room if you cannot get hold of your doctor: 
Fever, chills, nausea, vomiting, diarrhea, change in mentation, falling, bleeding, shortness of breath,  
 
Discharge Orders None Margaritahart Announcement  We are excited to announce that we are making your provider's discharge notes available to you in Owned it. You will see these notes when they are completed and signed by the physician that discharged you from your recent hospital stay. If you have any questions or concerns about any information you see in Owned it, please call the Health Information Department where you were seen or reach out to your Primary Care Provider for more information about your plan of care. Introducing [facility] SERVICES! Bala Gatic[employer] introduces Owned it patient portal. Now you can access parts of your medical record, email your doctor's office, and request medication refills online. 1. In your internet browser, go to [email or web address] 2. Click on the First Time User? Click Here link in the Sign In box. You will see the New Member Sign Up page. 3. Enter your Owned it Access Code exactly as it appears below. You will not need to use this code after youve completed the sign-up process. If you do not sign up before the expiration date, you must request a new code. · Owned it Access Code: [email or web address] Expires: 2/21/2017  3:03 PM 
 
4. Enter the last four digits of your Social Security Number (xxxx) and Date of Birth (mm/dd/yyyy) as indicated and click Submit. You will be taken to the next sign-up page. 5. Create a [employer] ID. This will be your Owned it login ID and cannot be changed, so think of one that is secure and easy to remember. 6. Create a Owned it password. You can change your password at any time. 7. Enter your Password Reset Question and Answer. This can be used at a later time if you forget your password. 8. Enter your e-mail address. You will receive e-mail notification when new information is available in [address] 9. Click Sign Up. You can now view and download portions of your medical record. 10. Click the Download Summary menu link to download a portable copy of your medical information. If you have questions, please visit the Frequently Asked Questions section of the MyChart website. Remember, MyChart is NOT to be used for urgent needs. For medical emergencies, dial 911. Now available from your iPhone and Android! General Information Please provide this summary of care documentation to your next provider. Patient Signature:  ____________________________________________________________ Date:  ____________________________________________________________  
  
[facility] Provider Signature:  ____________________________________________________________ Date:  ____________________________________________________________

## 2017-02-05 ENCOUNTER — APPOINTMENT (OUTPATIENT)
Dept: GENERAL RADIOLOGY | Age: 67
DRG: 871 | End: 2017-02-05
Attending: INTERNAL MEDICINE
Payer: MEDICARE

## 2017-02-05 LAB
ANION GAP BLD CALC-SCNC: 17 MMOL/L (ref 5–15)
ANION GAP BLD CALC-SCNC: 19 MMOL/L (ref 5–15)
ANION GAP BLD CALC-SCNC: 20 MMOL/L (ref 5–15)
ARTERIAL PATENCY WRIST A: YES
ARTERIAL PATENCY WRIST A: YES
ATRIAL RATE: 105 BPM
BASE DEFICIT BLDA-SCNC: 19.9 MMOL/L
BASE DEFICIT BLDA-SCNC: 23.4 MMOL/L
BASOPHILS # BLD AUTO: 0 K/UL (ref 0–0.1)
BASOPHILS # BLD: 0 % (ref 0–1)
BDY SITE: ABNORMAL
BDY SITE: ABNORMAL
BNP SERPL-MCNC: ABNORMAL PG/ML (ref 0–125)
BREATHS.SPONTANEOUS ON VENT: 40
BUN SERPL-MCNC: 39 MG/DL (ref 6–20)
BUN SERPL-MCNC: 40 MG/DL (ref 6–20)
BUN SERPL-MCNC: 47 MG/DL (ref 6–20)
BUN/CREAT SERPL: 8 (ref 12–20)
BUN/CREAT SERPL: 8 (ref 12–20)
BUN/CREAT SERPL: 9 (ref 12–20)
CALCIUM SERPL-MCNC: 7.6 MG/DL (ref 8.5–10.1)
CALCIUM SERPL-MCNC: 7.6 MG/DL (ref 8.5–10.1)
CALCIUM SERPL-MCNC: 8.6 MG/DL (ref 8.5–10.1)
CALCULATED P AXIS, ECG09: 24 DEGREES
CALCULATED R AXIS, ECG10: 43 DEGREES
CALCULATED T AXIS, ECG11: 9 DEGREES
CHLORIDE SERPL-SCNC: 106 MMOL/L (ref 97–108)
CHLORIDE SERPL-SCNC: 107 MMOL/L (ref 97–108)
CHLORIDE SERPL-SCNC: 110 MMOL/L (ref 97–108)
CK MB CFR SERPL CALC: 1.8 % (ref 0–2.5)
CK MB SERPL-MCNC: 7.6 NG/ML (ref 5–25)
CK SERPL-CCNC: 428 U/L (ref 39–308)
CO2 SERPL-SCNC: 16 MMOL/L (ref 21–32)
CO2 SERPL-SCNC: 7 MMOL/L (ref 21–32)
CO2 SERPL-SCNC: 7 MMOL/L (ref 21–32)
CREAT SERPL-MCNC: 4.82 MG/DL (ref 0.7–1.3)
CREAT SERPL-MCNC: 4.99 MG/DL (ref 0.7–1.3)
CREAT SERPL-MCNC: 5.5 MG/DL (ref 0.7–1.3)
DIAGNOSIS, 93000: NORMAL
DIFFERENTIAL METHOD BLD: ABNORMAL
EOSINOPHIL # BLD: 0 K/UL (ref 0–0.4)
EOSINOPHIL NFR BLD: 0 % (ref 0–7)
EPAP/CPAP/PEEP, PAPEEP: 5
ERYTHROCYTE [DISTWIDTH] IN BLOOD BY AUTOMATED COUNT: 14.8 % (ref 11.5–14.5)
FIO2 ON VENT: 100 %
GAS FLOW.O2 O2 DELIVERY SYS: 3.5 L/MIN
GAS FLOW.O2 SETTING OXYMISER: 16 L/MIN
GLUCOSE BLD STRIP.AUTO-MCNC: 132 MG/DL (ref 65–100)
GLUCOSE BLD STRIP.AUTO-MCNC: 155 MG/DL (ref 65–100)
GLUCOSE BLD STRIP.AUTO-MCNC: 192 MG/DL (ref 65–100)
GLUCOSE BLD STRIP.AUTO-MCNC: 235 MG/DL (ref 65–100)
GLUCOSE BLD STRIP.AUTO-MCNC: 251 MG/DL (ref 65–100)
GLUCOSE BLD STRIP.AUTO-MCNC: 264 MG/DL (ref 65–100)
GLUCOSE BLD STRIP.AUTO-MCNC: 370 MG/DL (ref 65–100)
GLUCOSE BLD STRIP.AUTO-MCNC: 488 MG/DL (ref 65–100)
GLUCOSE BLD STRIP.AUTO-MCNC: 49 MG/DL (ref 65–100)
GLUCOSE BLD STRIP.AUTO-MCNC: 552 MG/DL (ref 65–100)
GLUCOSE BLD STRIP.AUTO-MCNC: 63 MG/DL (ref 65–100)
GLUCOSE BLD STRIP.AUTO-MCNC: 82 MG/DL (ref 65–100)
GLUCOSE BLD STRIP.AUTO-MCNC: 83 MG/DL (ref 65–100)
GLUCOSE BLD STRIP.AUTO-MCNC: 92 MG/DL (ref 65–100)
GLUCOSE BLD STRIP.AUTO-MCNC: 93 MG/DL (ref 65–100)
GLUCOSE BLD STRIP.AUTO-MCNC: >600 MG/DL (ref 65–100)
GLUCOSE BLD STRIP.AUTO-MCNC: >600 MG/DL (ref 65–100)
GLUCOSE SERPL-MCNC: 190 MG/DL (ref 65–100)
GLUCOSE SERPL-MCNC: 555 MG/DL (ref 65–100)
GLUCOSE SERPL-MCNC: 577 MG/DL (ref 65–100)
GLUCOSE SERPL-MCNC: 668 MG/DL (ref 65–100)
HCO3 BLDA-SCNC: 10 MMOL/L (ref 22–26)
HCO3 BLDA-SCNC: 5 MMOL/L (ref 22–26)
HCT VFR BLD AUTO: 25 % (ref 36.6–50.3)
HGB BLD-MCNC: 8.3 G/DL (ref 12.1–17)
LYMPHOCYTES # BLD AUTO: 1 % (ref 12–49)
LYMPHOCYTES # BLD: 0.2 K/UL (ref 0.8–3.5)
MCH RBC QN AUTO: 31 PG (ref 26–34)
MCHC RBC AUTO-ENTMCNC: 33.2 G/DL (ref 30–36.5)
MCV RBC AUTO: 93.3 FL (ref 80–99)
MONOCYTES # BLD: 0.2 K/UL (ref 0–1)
MONOCYTES NFR BLD AUTO: 1 % (ref 5–13)
NEUTS SEG # BLD: 14.7 K/UL (ref 1.8–8)
NEUTS SEG NFR BLD AUTO: 98 % (ref 32–75)
P-R INTERVAL, ECG05: 124 MS
PCO2 BLDA: 16 MMHG (ref 35–45)
PCO2 BLDA: 39 MMHG (ref 35–45)
PH BLDA: 7.04 [PH] (ref 7.35–7.45)
PH BLDA: 7.07 [PH] (ref 7.35–7.45)
PLATELET # BLD AUTO: 346 K/UL (ref 150–400)
PO2 BLDA: 376 MMHG (ref 80–100)
PO2 BLDA: 99 MMHG (ref 80–100)
POTASSIUM SERPL-SCNC: 5 MMOL/L (ref 3.5–5.1)
POTASSIUM SERPL-SCNC: 6.7 MMOL/L (ref 3.5–5.1)
POTASSIUM SERPL-SCNC: 7.4 MMOL/L (ref 3.5–5.1)
POTASSIUM SERPL-SCNC: 7.5 MMOL/L (ref 3.5–5.1)
Q-T INTERVAL, ECG07: 358 MS
QRS DURATION, ECG06: 64 MS
QTC CALCULATION (BEZET), ECG08: 473 MS
RBC # BLD AUTO: 2.68 M/UL (ref 4.1–5.7)
RBC MORPH BLD: ABNORMAL
SAO2 % BLD: 100 % (ref 92–97)
SAO2 % BLD: 95 % (ref 92–97)
SAO2% DEVICE SAO2% SENSOR NAME: ABNORMAL
SAO2% DEVICE SAO2% SENSOR NAME: ABNORMAL
SERVICE CMNT-IMP: ABNORMAL
SERVICE CMNT-IMP: NORMAL
SODIUM SERPL-SCNC: 133 MMOL/L (ref 136–145)
SODIUM SERPL-SCNC: 133 MMOL/L (ref 136–145)
SODIUM SERPL-SCNC: 143 MMOL/L (ref 136–145)
SPECIMEN SITE: ABNORMAL
SPECIMEN SITE: ABNORMAL
TROPONIN I SERPL-MCNC: <0.04 NG/ML
VENTILATION MODE VENT: ABNORMAL
VENTILATION MODE VENT: ABNORMAL
VENTRICULAR RATE, ECG03: 105 BPM
VT SETTING VENT: 500 ML
WBC # BLD AUTO: 15.1 K/UL (ref 4.1–11.1)

## 2017-02-05 PROCEDURE — 36556 INSERT NON-TUNNEL CV CATH: CPT

## 2017-02-05 PROCEDURE — 94640 AIRWAY INHALATION TREATMENT: CPT

## 2017-02-05 PROCEDURE — 05HM33Z INSERTION OF INFUSION DEVICE INTO RIGHT INTERNAL JUGULAR VEIN, PERCUTANEOUS APPROACH: ICD-10-PCS | Performed by: RADIOLOGY

## 2017-02-05 PROCEDURE — 36415 COLL VENOUS BLD VENIPUNCTURE: CPT | Performed by: INTERNAL MEDICINE

## 2017-02-05 PROCEDURE — 74011250636 HC RX REV CODE- 250/636: Performed by: EMERGENCY MEDICINE

## 2017-02-05 PROCEDURE — 82550 ASSAY OF CK (CPK): CPT | Performed by: INTERNAL MEDICINE

## 2017-02-05 PROCEDURE — 74011000250 HC RX REV CODE- 250: Performed by: INTERNAL MEDICINE

## 2017-02-05 PROCEDURE — 74011000250 HC RX REV CODE- 250

## 2017-02-05 PROCEDURE — 74011250636 HC RX REV CODE- 250/636: Performed by: INTERNAL MEDICINE

## 2017-02-05 PROCEDURE — 74011250637 HC RX REV CODE- 250/637: Performed by: INTERNAL MEDICINE

## 2017-02-05 PROCEDURE — 94664 DEMO&/EVAL PT USE INHALER: CPT

## 2017-02-05 PROCEDURE — C1751 CATH, INF, PER/CENT/MIDLINE: HCPCS

## 2017-02-05 PROCEDURE — 80048 BASIC METABOLIC PNL TOTAL CA: CPT | Performed by: INTERNAL MEDICINE

## 2017-02-05 PROCEDURE — 36600 WITHDRAWAL OF ARTERIAL BLOOD: CPT | Performed by: INTERNAL MEDICINE

## 2017-02-05 PROCEDURE — 82803 BLOOD GASES ANY COMBINATION: CPT | Performed by: INTERNAL MEDICINE

## 2017-02-05 PROCEDURE — C1894 INTRO/SHEATH, NON-LASER: HCPCS

## 2017-02-05 PROCEDURE — 5A1945Z RESPIRATORY VENTILATION, 24-96 CONSECUTIVE HOURS: ICD-10-PCS | Performed by: ANESTHESIOLOGY

## 2017-02-05 PROCEDURE — 82962 GLUCOSE BLOOD TEST: CPT

## 2017-02-05 PROCEDURE — 84132 ASSAY OF SERUM POTASSIUM: CPT | Performed by: INTERNAL MEDICINE

## 2017-02-05 PROCEDURE — 82947 ASSAY GLUCOSE BLOOD QUANT: CPT

## 2017-02-05 PROCEDURE — 77030029684 HC NEB SM VOL KT MONA -A

## 2017-02-05 PROCEDURE — 65610000006 HC RM INTENSIVE CARE

## 2017-02-05 PROCEDURE — 71010 XR CHEST PORT: CPT

## 2017-02-05 PROCEDURE — 74011000258 HC RX REV CODE- 258: Performed by: INTERNAL MEDICINE

## 2017-02-05 PROCEDURE — P9016 RBC LEUKOCYTES REDUCED: HCPCS | Performed by: INTERNAL MEDICINE

## 2017-02-05 PROCEDURE — 74011636637 HC RX REV CODE- 636/637: Performed by: INTERNAL MEDICINE

## 2017-02-05 PROCEDURE — 77030008771 HC TU NG SALEM SUMP -A

## 2017-02-05 PROCEDURE — 74011000258 HC RX REV CODE- 258: Performed by: EMERGENCY MEDICINE

## 2017-02-05 PROCEDURE — 84484 ASSAY OF TROPONIN QUANT: CPT | Performed by: INTERNAL MEDICINE

## 2017-02-05 PROCEDURE — 31500 INSERT EMERGENCY AIRWAY: CPT

## 2017-02-05 PROCEDURE — 83880 ASSAY OF NATRIURETIC PEPTIDE: CPT | Performed by: INTERNAL MEDICINE

## 2017-02-05 PROCEDURE — 85025 COMPLETE CBC W/AUTO DIFF WBC: CPT | Performed by: INTERNAL MEDICINE

## 2017-02-05 PROCEDURE — 77030005538 HC CATH URETH FOL44 BARD -B

## 2017-02-05 PROCEDURE — 36430 TRANSFUSION BLD/BLD COMPNT: CPT

## 2017-02-05 PROCEDURE — 94002 VENT MGMT INPAT INIT DAY: CPT

## 2017-02-05 PROCEDURE — 74011000250 HC RX REV CODE- 250: Performed by: EMERGENCY MEDICINE

## 2017-02-05 PROCEDURE — 0BH17EZ INSERTION OF ENDOTRACHEAL AIRWAY INTO TRACHEA, VIA NATURAL OR ARTIFICIAL OPENING: ICD-10-PCS | Performed by: ANESTHESIOLOGY

## 2017-02-05 RX ORDER — PROPOFOL 10 MG/ML
5-50 VIAL (ML) INTRAVENOUS
Status: DISCONTINUED | OUTPATIENT
Start: 2017-02-05 | End: 2017-02-06

## 2017-02-05 RX ORDER — INSULIN LISPRO 100 [IU]/ML
10 INJECTION, SOLUTION INTRAVENOUS; SUBCUTANEOUS ONCE
Status: ACTIVE | OUTPATIENT
Start: 2017-02-05 | End: 2017-02-05

## 2017-02-05 RX ORDER — DEXTROSE 50 % IN WATER (D50W) INTRAVENOUS SYRINGE
25 ONCE
Status: DISCONTINUED | OUTPATIENT
Start: 2017-02-05 | End: 2017-02-05

## 2017-02-05 RX ORDER — CALCIUM GLUCONATE 94 MG/ML
1 INJECTION, SOLUTION INTRAVENOUS ONCE
Status: DISCONTINUED | OUTPATIENT
Start: 2017-02-05 | End: 2017-02-05

## 2017-02-05 RX ORDER — INSULIN LISPRO 100 [IU]/ML
INJECTION, SOLUTION INTRAVENOUS; SUBCUTANEOUS
Status: DISCONTINUED | OUTPATIENT
Start: 2017-02-05 | End: 2017-02-05 | Stop reason: ALTCHOICE

## 2017-02-05 RX ORDER — BUMETANIDE 0.25 MG/ML
2 INJECTION INTRAMUSCULAR; INTRAVENOUS ONCE
Status: COMPLETED | OUTPATIENT
Start: 2017-02-05 | End: 2017-02-05

## 2017-02-05 RX ORDER — LORAZEPAM 2 MG/ML
0.5 INJECTION INTRAMUSCULAR ONCE
Status: COMPLETED | OUTPATIENT
Start: 2017-02-05 | End: 2017-02-05

## 2017-02-05 RX ORDER — DEXTROSE MONOHYDRATE AND SODIUM CHLORIDE 5; .9 G/100ML; G/100ML
50 INJECTION, SOLUTION INTRAVENOUS CONTINUOUS
Status: DISCONTINUED | OUTPATIENT
Start: 2017-02-05 | End: 2017-02-06

## 2017-02-05 RX ORDER — SODIUM BICARBONATE 1 MEQ/ML
100 SYRINGE (ML) INTRAVENOUS
Status: COMPLETED | OUTPATIENT
Start: 2017-02-05 | End: 2017-02-05

## 2017-02-05 RX ORDER — VECURONIUM BROMIDE FOR INJECTION 1 MG/ML
10 INJECTION, POWDER, LYOPHILIZED, FOR SOLUTION INTRAVENOUS ONCE
Status: COMPLETED | OUTPATIENT
Start: 2017-02-05 | End: 2017-02-05

## 2017-02-05 RX ORDER — PROPOFOL 10 MG/ML
5-50 VIAL (ML) INTRAVENOUS
Status: DISCONTINUED | OUTPATIENT
Start: 2017-02-05 | End: 2017-02-05 | Stop reason: SDUPTHER

## 2017-02-05 RX ORDER — MUPIROCIN 20 MG/G
OINTMENT TOPICAL 2 TIMES DAILY
Status: DISCONTINUED | OUTPATIENT
Start: 2017-02-05 | End: 2017-02-05

## 2017-02-05 RX ORDER — LORAZEPAM 2 MG/ML
0.5 INJECTION INTRAMUSCULAR
Status: COMPLETED | OUTPATIENT
Start: 2017-02-05 | End: 2017-02-05

## 2017-02-05 RX ORDER — SODIUM POLYSTYRENE SULFONATE 15 G/60ML
45 SUSPENSION ORAL; RECTAL
Status: COMPLETED | OUTPATIENT
Start: 2017-02-05 | End: 2017-02-05

## 2017-02-05 RX ORDER — DEXTROSE 50 % IN WATER (D50W) INTRAVENOUS SYRINGE
12.5-25 AS NEEDED
Status: DISCONTINUED | OUTPATIENT
Start: 2017-02-05 | End: 2017-02-10 | Stop reason: HOSPADM

## 2017-02-05 RX ORDER — FUROSEMIDE 10 MG/ML
40 INJECTION INTRAMUSCULAR; INTRAVENOUS 2 TIMES DAILY
Status: DISCONTINUED | OUTPATIENT
Start: 2017-02-05 | End: 2017-02-08

## 2017-02-05 RX ORDER — CHLORHEXIDINE GLUCONATE 1.2 MG/ML
15 RINSE ORAL EVERY 12 HOURS
Status: DISCONTINUED | OUTPATIENT
Start: 2017-02-05 | End: 2017-02-06

## 2017-02-05 RX ORDER — MAGNESIUM SULFATE 100 %
4 CRYSTALS MISCELLANEOUS AS NEEDED
Status: DISCONTINUED | OUTPATIENT
Start: 2017-02-05 | End: 2017-02-10 | Stop reason: HOSPADM

## 2017-02-05 RX ORDER — LORAZEPAM 2 MG/ML
INJECTION INTRAMUSCULAR
Status: DISCONTINUED
Start: 2017-02-05 | End: 2017-02-05

## 2017-02-05 RX ORDER — PROPOFOL 10 MG/ML
200 INJECTION, EMULSION INTRAVENOUS
Status: COMPLETED | OUTPATIENT
Start: 2017-02-05 | End: 2017-02-05

## 2017-02-05 RX ORDER — SODIUM BICARBONATE 1 MEQ/ML
100 SYRINGE (ML) INTRAVENOUS ONCE
Status: COMPLETED | OUTPATIENT
Start: 2017-02-05 | End: 2017-02-05

## 2017-02-05 RX ORDER — SODIUM BICARBONATE 1 MEQ/ML
SYRINGE (ML) INTRAVENOUS
Status: COMPLETED
Start: 2017-02-05 | End: 2017-02-05

## 2017-02-05 RX ORDER — MUPIROCIN 20 MG/G
OINTMENT TOPICAL 2 TIMES DAILY
Status: DISCONTINUED | OUTPATIENT
Start: 2017-02-06 | End: 2017-02-10 | Stop reason: HOSPADM

## 2017-02-05 RX ORDER — INSULIN LISPRO 100 [IU]/ML
10 INJECTION, SOLUTION INTRAVENOUS; SUBCUTANEOUS ONCE
Status: COMPLETED | OUTPATIENT
Start: 2017-02-05 | End: 2017-02-05

## 2017-02-05 RX ADMIN — SODIUM BICARBONATE 100 MEQ: 84 INJECTION, SOLUTION INTRAVENOUS at 10:30

## 2017-02-05 RX ADMIN — Medication 25 MCG/HR: at 15:58

## 2017-02-05 RX ADMIN — SODIUM POLYSTYRENE SULFONATE 45 G: 15 SUSPENSION ORAL; RECTAL at 09:15

## 2017-02-05 RX ADMIN — SODIUM CHLORIDE 10.8 UNITS/HR: 900 INJECTION, SOLUTION INTRAVENOUS at 08:55

## 2017-02-05 RX ADMIN — INSULIN LISPRO 10 UNITS: 100 INJECTION, SOLUTION INTRAVENOUS; SUBCUTANEOUS at 07:48

## 2017-02-05 RX ADMIN — PRAVASTATIN SODIUM 10 MG: 10 TABLET ORAL at 21:46

## 2017-02-05 RX ADMIN — Medication 10 ML: at 05:32

## 2017-02-05 RX ADMIN — IPRATROPIUM BROMIDE AND ALBUTEROL SULFATE 3 ML: .5; 3 SOLUTION RESPIRATORY (INHALATION) at 14:47

## 2017-02-05 RX ADMIN — IPRATROPIUM BROMIDE AND ALBUTEROL SULFATE 3 ML: .5; 3 SOLUTION RESPIRATORY (INHALATION) at 07:49

## 2017-02-05 RX ADMIN — HEPARIN SODIUM 5000 UNITS: 5000 INJECTION, SOLUTION INTRAVENOUS; SUBCUTANEOUS at 05:31

## 2017-02-05 RX ADMIN — IPRATROPIUM BROMIDE AND ALBUTEROL SULFATE 3 ML: .5; 3 SOLUTION RESPIRATORY (INHALATION) at 20:30

## 2017-02-05 RX ADMIN — MUPIROCIN: 20 OINTMENT TOPICAL at 19:27

## 2017-02-05 RX ADMIN — HEPARIN SODIUM 5000 UNITS: 5000 INJECTION, SOLUTION INTRAVENOUS; SUBCUTANEOUS at 12:14

## 2017-02-05 RX ADMIN — IPRATROPIUM BROMIDE AND ALBUTEROL SULFATE 3 ML: .5; 3 SOLUTION RESPIRATORY (INHALATION) at 01:56

## 2017-02-05 RX ADMIN — PROPOFOL 10 MCG/KG/MIN: 10 INJECTION, EMULSION INTRAVENOUS at 07:50

## 2017-02-05 RX ADMIN — PROPOFOL 200 MG: 10 INJECTION, EMULSION INTRAVENOUS at 07:02

## 2017-02-05 RX ADMIN — DEXTROSE MONOHYDRATE AND SODIUM CHLORIDE 50 ML/HR: 5; .9 INJECTION, SOLUTION INTRAVENOUS at 15:57

## 2017-02-05 RX ADMIN — HEPARIN SODIUM 5000 UNITS: 5000 INJECTION, SOLUTION INTRAVENOUS; SUBCUTANEOUS at 20:57

## 2017-02-05 RX ADMIN — PROPOFOL 8.25 MCG/KG/MIN: 10 INJECTION, EMULSION INTRAVENOUS at 08:57

## 2017-02-05 RX ADMIN — FUROSEMIDE 40 MG: 10 INJECTION, SOLUTION INTRAMUSCULAR; INTRAVENOUS at 18:24

## 2017-02-05 RX ADMIN — BUMETANIDE 2 MG: 0.25 INJECTION INTRAMUSCULAR; INTRAVENOUS at 02:55

## 2017-02-05 RX ADMIN — DEXTROSE MONOHYDRATE 20 ML: 25 INJECTION, SOLUTION INTRAVENOUS at 17:01

## 2017-02-05 RX ADMIN — ALBUTEROL SULFATE 2.5 MG: 2.5 SOLUTION RESPIRATORY (INHALATION) at 06:39

## 2017-02-05 RX ADMIN — SODIUM BICARBONATE 100 MEQ: 84 INJECTION INTRAVENOUS at 07:02

## 2017-02-05 RX ADMIN — Medication 10 ML: at 06:18

## 2017-02-05 RX ADMIN — BUMETANIDE 2 MG: 0.25 INJECTION INTRAMUSCULAR; INTRAVENOUS at 06:16

## 2017-02-05 RX ADMIN — AMLODIPINE BESYLATE 10 MG: 5 TABLET ORAL at 10:02

## 2017-02-05 RX ADMIN — CEFEPIME HYDROCHLORIDE 2 G: 2 INJECTION, POWDER, FOR SOLUTION INTRAVENOUS at 19:47

## 2017-02-05 RX ADMIN — FUROSEMIDE 40 MG: 10 INJECTION, SOLUTION INTRAMUSCULAR; INTRAVENOUS at 12:14

## 2017-02-05 RX ADMIN — CHLORHEXIDINE GLUCONATE 15 ML: 1.2 RINSE ORAL at 20:55

## 2017-02-05 RX ADMIN — CALCIUM CHLORIDE 1 G: 100 INJECTION, SOLUTION INTRAVENOUS at 11:16

## 2017-02-05 RX ADMIN — LORAZEPAM 0.5 MG: 2 INJECTION INTRAMUSCULAR; INTRAVENOUS at 06:15

## 2017-02-05 RX ADMIN — PROPOFOL 50 MCG/KG/MIN: 10 INJECTION, EMULSION INTRAVENOUS at 17:27

## 2017-02-05 RX ADMIN — CHLORHEXIDINE GLUCONATE 15 ML: 1.2 RINSE ORAL at 10:31

## 2017-02-05 RX ADMIN — VECURONIUM BROMIDE 10 MG: 1 INJECTION, POWDER, LYOPHILIZED, FOR SOLUTION INTRAVENOUS at 07:39

## 2017-02-05 RX ADMIN — Medication 10 ML: at 14:40

## 2017-02-05 RX ADMIN — Medication 10 ML: at 21:46

## 2017-02-05 RX ADMIN — LORAZEPAM 0.5 MG: 2 INJECTION INTRAMUSCULAR; INTRAVENOUS at 02:56

## 2017-02-05 RX ADMIN — PROPOFOL 40 MCG/KG/MIN: 10 INJECTION, EMULSION INTRAVENOUS at 22:20

## 2017-02-05 RX ADMIN — Medication 100 MEQ: at 10:30

## 2017-02-05 RX ADMIN — DEXTROSE MONOHYDRATE 12.5 G: 25 INJECTION, SOLUTION INTRAVENOUS at 15:42

## 2017-02-05 RX ADMIN — DEXTROSE MONOHYDRATE AND SODIUM CHLORIDE 50 ML/HR: 5; .9 INJECTION, SOLUTION INTRAVENOUS at 19:43

## 2017-02-05 RX ADMIN — MIRTAZAPINE 15 MG: 15 TABLET, FILM COATED ORAL at 21:46

## 2017-02-05 RX ADMIN — WATER: 1000 INJECTION, SOLUTION INTRAVENOUS at 12:39

## 2017-02-05 NOTE — ED NOTES
Pt transported upstairs with RN and ED tech. Pt transported on cardiac monitor. Pt in no acute distress.

## 2017-02-05 NOTE — ED NOTES
Assumed care of pt from [address] RN at bedside with verbal report consisting of Situation, Background, Assessment, and Recommendations (SBAR). Pt is A&O x 4. Pt resting comfortably on the stretcher in a position of comfort. Call bell within reach. Side rails x 2. Cardiac monitor x 3. Stretcher locked in the lowest position. Concerns and questions addressed at this time. Pt in no acute distress at this the time. Will continue to monitor.

## 2017-02-05 NOTE — PROGRESS NOTES
[facility]  Pulmonary, Critical Care, and Sleep Medicine    Name: Charlie Willson. MRN: [id number]   : 1950 Hospital: [facility]   Date: 2017        Critical Care Initial Patient Consult    IMPRESSION:   · Acute hypoxic respiratory failure required intubation on 17. · Possible Health Care associated  Pneumonia, with sepsis. · Pulmonary edema  · Severe metabolic acidosis  · Hyperglycemia, DM  · CAD, Hypertension on beta blocker  · Anemia suspected GI blood loss, most recent Hgb is 8.3  · CRI with acute kidney injury. , with hyperkalemia, acidosis. Baseline Cr is 4. Pt had not wanted HD in past.   · Hx of prostate Ca  · Depression  · Alzheimers dementia, pt is a resident of [facility]. 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 · Nonsmoker  · Discussed with Dr. Randi Marino and nurse. · Critically ill, Moderate risk of multiple organ failure and death. On moderate ventilator support. RECOMMENDATIONS:   · Empiric abx  · Blood transfusion  · Acute  Treatment was given earlier today for hyperkalemia  · Was given bicarb, calcium. · Was given Kayexalate, Bicarb Calcium  · ON ventilator support, not ready for SBT at this point. · Sedation target RASS of zero to minus 1. Subjective/History: This patient has been seen and evaluated at the request of Dr. Beti Malik for above. Patient is a 61 y.o. male who was admitted with respiratory insufficency. Worsened this am early and required emergent intubation. Has also been noted to have hyperglycemia and hyperkalemia. Has been having increased shortness of breath, chills prior to ER eval.       The patient is critically ill and can not provide additional history due to Ventilated.      Past Medical History   Diagnosis Date    Alzheimer disease     CAD (coronary artery disease)     Cancer ([facility] [address]      prostate    CKD (chronic kidney disease) stage 4, GFR 15-29 ml/min ([facility])     DM (diabetes mellitus), type 2, uncontrolled ([facility] [address]     Hyperlipidemia     Hypertension     Other ill-defined conditions(799.89)      blind R eye-retina detachment    Other ill-defined conditions(799.89)      right cerebellopontine angle lipoma. Past Surgical History   Procedure Laterality Date    Hx shoulder arthroscopy       right    Hx urological       prostate bx    Hx heent       retinal detachment    Upper gi endoscopy,biopsy  11/12/2014          Colonoscopy,diagnostic  11/12/2014            Prior to Admission medications    Medication Sig Start Date End Date Taking? Authorizing Provider   albuterol-ipratropium (DUO-NEB) 2.5 mg-0.5 mg/3 ml nebu 3 mL by Nebulization route. Yes Historical Provider   mirtazapine (REMERON) 15 mg tablet Take 15 mg by mouth nightly. Indications: MAJOR DEPRESSIVE DISORDER   Yes Yumi De La O MD   amLODIPine (NORVASC) 10 mg tablet Take 1 Tab by mouth daily. 1/22/17   Beth Juarez MD   sodium bicarbonate 650 mg tablet Take 2 Tabs by mouth two (2) times a day. 1/22/17   Beth Juarez MD   omeprazole (PRILOSEC) 10 mg capsule Take 10 mg by mouth daily. Indications: GASTROESOPHAGEAL REFLUX    Yumi De La O MD   tamsulosin (FLOMAX) 0.4 mg capsule Take 0.4 mg by mouth daily. Yumi De La O MD   calcitRIOL (ROCALTROL) 0.25 mcg capsule Take 0.25 mcg by mouth daily. Yumi De La O MD   b complex-vitamin c-folic acid (NEPHROCAPS) 1 mg capsule Take 1 Cap by mouth daily. Historical Provider   insulin aspart (NOVOLOG) 100 unit/mL injection Novolog BEFORE MEALS: 0-90: hold, : 1 unit, 176-225: 2 units, 226-300: 3 units, 301 +    : 4 units, Novolog at BEDTIME - 225-300: 1 unit, 301 + 2 units 9/30/15   Latonia Guerrero MD   glucose blood VI test strips (ASCENSIA AUTODISC VI, ONE TOUCH ULTRA TEST VI) strip Monitor glucose before meals and at bedtime and as needed for symptoms - 4-5 times daily.  9/30/15   Latonia Guerrero MD   ferrous sulfate (SLOW FE) 142 mg (45 mg iron) ER tablet Take 142 mg by mouth Daily (before breakfast). Yumi De La O MD   glucagon (GLUCAGEN) 1 mg injection 1 mL by IntraMUSCular route as needed for Hypoglycemia. 6/25/12   Dre Melo MD   pravastatin (PRAVACHOL) 10 mg tablet Take 1 Tab by mouth nightly. 3/19/12   Glen Orozco MD   acetaminophen (TYLENOL) 325 mg tablet Take 325 mg by mouth every four (4) hours as needed.     Historical Provider     Current Facility-Administered Medications   Medication Dose Route Frequency    LORazepam (ATIVAN) 2 mg/mL injection        insulin regular (NOVOLIN R, HUMULIN R) 100 Units in 0.9% sodium chloride 100 mL infusion  0-50 Units/hr IntraVENous TITRATE    propofol (DIPRIVAN) infusion  5-50 mcg/kg/min IntraVENous TITRATE    calcium chloride 1 g in 0.9% sodium chloride 100 mL IVPB  1 g IntraVENous ONCE    calcium gluconate injection 1 g  1 g IntraVENous ONCE    insulin regular (NOVOLIN R, HUMULIN R) injection 10 Units  10 Units IntraVENous ONCE    dextrose (D50W) injection syrg 25 g  25 g IntraVENous ONCE    furosemide (LASIX) injection 40 mg  40 mg IntraVENous BID    chlorhexidine (PERIDEX) 0.12 % mouthwash 15 mL  15 mL Oral Q12H    mupirocin (BACTROBAN) 2 % ointment   Both Nostrils BID    sodium bicarbonate (8.4%) 150 mEq in sterile water 1,000 mL infusion   IntraVENous CONTINUOUS    cefepime (MAXIPIME) 2 g in 0.9% sodium chloride (MBP/ADV) 100 mL  2 g IntraVENous Q24H    sodium chloride (NS) flush 5-10 mL  5-10 mL IntraVENous Q8H    0.9% sodium chloride infusion  50 mL/hr IntraVENous CONTINUOUS    heparin (porcine) injection 5,000 Units  5,000 Units SubCUTAneous Q8H    albuterol-ipratropium (DUO-NEB) 2.5 MG-0.5 MG/3 ML  3 mL Nebulization Q6H RT    amLODIPine (NORVASC) tablet 10 mg  10 mg Oral DAILY    calcitRIOL (ROCALTROL) capsule 0.25 mcg  0.25 mcg Oral DAILY    mirtazapine (REMERON) tablet 15 mg  15 mg Oral QHS    pravastatin (PRAVACHOL) tablet 10 mg  10 mg Oral QHS    tamsulosin (FLOMAX) capsule 0.4 mg  0.4 mg Oral DAILY    B complex-vitaminC-folic acid (NEPHROCAP) cap  1 Cap Oral DAILY    pantoprazole (PROTONIX) tablet 40 mg  40 mg Oral ACB    [START ON 2017] levoFLOXacin (LEVAQUIN) 500 mg in D5W IVPB  500 mg IntraVENous Q48H    [START ON 2017] vancomycin (VANCOCIN) 750 mg in 0.9% sodium chloride (MBP/ADV) 250 mL  750 mg IntraVENous Q36H     Allergies   Allergen Reactions    Ace Inhibitors Unknown (comments)    Arb-Angiotensin Receptor Antagonist Unknown (comments)      Social History   Substance Use Topics    Smoking status: Never Smoker    Smokeless tobacco: Never Used    Alcohol use No      Family History   Problem Relation Age of Onset    Heart Disease Mother      Pacemaker    Cancer Father         Review of Systems:  Review of systems not obtained due to patient factors. Objective:   Vital Signs:    Visit Vitals    /49    Pulse (!) 106    Temp 98.2 °F (36.8 °C)    Resp 21    Ht 5' 5\" (1.651 m)    Wt 60.4 kg (133 lb 2.5 oz)    SpO2 100%    BMI 22.16 kg/m2       O2 Device: Nasal cannula   O2 Flow Rate (L/min): 3 l/min   Temp (24hrs), Av.1 °F (36.7 °C), Min:97.5 °F (36.4 °C), Max:98.5 °F (36.9 °C)       Intake/Output:   Last shift:         Last 3 shifts:  1901 -  0700  In: 480 [P.O.:480]  Out: 350 [Urine:350]    Intake/Output Summary (Last 24 hours) at 17 1159  Last data filed at 17 0315   Gross per 24 hour   Intake              480 ml   Output              350 ml   Net              130 ml       Ventilator Settings:  Mode Rate Tidal Volume Pressure FiO2 PEEP   Assist control   500 ml    100 % 5 cm H20     Peak airway pressure: 20 cm H2O    Minute ventilation: 8.16 l/min      Physical Exam:    General:  Intubated and sedated, no distress, appears stated age. Head:  Normocephalic, without obvious abnormality, atraumatic. Eyes:  Conjunctivae/corneas clear. PERRL, EOMs intact. Nose: Nares normal. Septum midline.  Mucosa normal. No drainage or sinus tenderness. Throat: Lips, mucosa, and tongue normal. Teeth and gums normal.   Neck: Supple, symmetrical, trachea midline, no adenopathy, thyroid: no enlargment/tenderness/nodules, no carotid bruit and no JVD. Back:   Symmetric, no curvature. ROM normal.   Lungs:   Clear to auscultation bilaterally. Clear anteriorly, occasional rhonchi. Chest wall:  No tenderness or deformity. Heart:  Regular rate and rhythm, S1, S2 normal, no murmur, click, rub or gallop. Abdomen:   Soft, non-tender. Bowel sounds normal. No masses,  No organomegaly. NO rebound. Extremities: Extremities normal, atraumatic, no cyanosis or edema. Pulses: 2+ and symmetric all extremities. Skin: Skin color, texture, turgor normal. No rashes or lesions       Neurologic: Grossly nonfocal, Sedated.         Data:     Recent Results (from the past 24 hour(s))   EKG, 12 LEAD, INITIAL    Collection Time: 02/04/17  6:01 PM   Result Value Ref Range    Ventricular Rate 105 BPM    Atrial Rate 105 BPM    P-R Interval 124 ms    QRS Duration 64 ms    Q-T Interval 358 ms    QTC Calculation (Bezet) 473 ms    Calculated P Axis 24 degrees    Calculated R Axis 43 degrees    Calculated T Axis 9 degrees    Diagnosis       ** Poor data quality, interpretation may be adversely affected  Sinus tachycardia  Nonspecific T wave abnormality  Abnormal ECG  When compared with ECG of 18-JAN-2017 03:23,  No significant change was found     GLUCOSE, POC    Collection Time: 02/04/17  6:11 PM   Result Value Ref Range    Glucose (POC) 118 (H) 65 - 100 mg/dL    Performed by Vani Leggett    CBC WITH AUTOMATED DIFF    Collection Time: 02/04/17  6:40 PM   Result Value Ref Range    WBC 12.8 (H) 4.1 - 11.1 K/uL    RBC 2.40 (L) 4.10 - 5.70 M/uL    HGB 7.4 (L) 12.1 - 17.0 g/dL    HCT 21.7 (L) 36.6 - 50.3 %    MCV 90.4 80.0 - 99.0 FL    MCH 30.8 26.0 - 34.0 PG    MCHC 34.1 30.0 - 36.5 g/dL    RDW 14.8 (H) 11.5 - 14.5 %    PLATELET 518 599 - 819 K/uL    NEUTROPHILS 84 (H) 32 - 75 % LYMPHOCYTES 8 (L) 12 - 49 %    MONOCYTES 7 5 - 13 %    EOSINOPHILS 1 0 - 7 %    BASOPHILS 0 0 - 1 %    ABS. NEUTROPHILS 10.7 (H) 1.8 - 8.0 K/UL    ABS. LYMPHOCYTES 1.1 0.8 - 3.5 K/UL    ABS. MONOCYTES 0.9 0.0 - 1.0 K/UL    ABS. EOSINOPHILS 0.1 0.0 - 0.4 K/UL    ABS. BASOPHILS 0.1 0.0 - 0.1 K/UL   METABOLIC PANEL, COMPREHENSIVE    Collection Time: 02/04/17  6:40 PM   Result Value Ref Range    Sodium 137 136 - 145 mmol/L    Potassium 5.7 (H) 3.5 - 5.1 mmol/L    Chloride 107 97 - 108 mmol/L    CO2 17 (L) 21 - 32 mmol/L    Anion gap 13 5 - 15 mmol/L    Glucose 87 65 - 100 mg/dL    BUN 40 (H) 6 - 20 MG/DL    Creatinine 4.94 (H) 0.70 - 1.30 MG/DL    BUN/Creatinine ratio 8 (L) 12 - 20      GFR est AA 14 (L) >60 ml/min/1.73m2    GFR est non-AA 12 (L) >60 ml/min/1.73m2    Calcium 7.9 (L) 8.5 - 10.1 MG/DL    Bilirubin, total 0.3 0.2 - 1.0 MG/DL    ALT (SGPT) 23 12 - 78 U/L    AST (SGOT) 21 15 - 37 U/L    Alk.  phosphatase 94 45 - 117 U/L    Protein, total 6.4 6.4 - 8.2 g/dL    Albumin 3.3 (L) 3.5 - 5.0 g/dL    Globulin 3.1 2.0 - 4.0 g/dL    A-G Ratio 1.1 1.1 - 2.2     CK W/ CKMB & INDEX    Collection Time: 02/04/17  6:40 PM   Result Value Ref Range     (H) 39 - 308 U/L    CK - MB 7.8 (H) <3.6 NG/ML    CK-MB Index 2.0 0 - 2.5     TROPONIN I    Collection Time: 02/04/17  6:40 PM   Result Value Ref Range    Troponin-I, Qt. <0.04 <0.05 ng/mL   INFLUENZA A & B AG (RAPID TEST)    Collection Time: 02/04/17  6:40 PM   Result Value Ref Range    Influenza A Antigen NEGATIVE  NEG      Influenza B Antigen NEGATIVE  NEG     CULTURE, BLOOD    Collection Time: 02/04/17  7:02 PM   Result Value Ref Range    Special Requests: NO SPECIAL REQUESTS      Culture result: NO GROWTH AFTER 12 HOURS     LACTIC ACID, PLASMA    Collection Time: 02/04/17  7:02 PM   Result Value Ref Range    Lactic acid 1.7 0.4 - 2.0 MMOL/L   CULTURE, BLOOD    Collection Time: 02/04/17  7:02 PM   Result Value Ref Range    Special Requests: NO SPECIAL REQUESTS Culture result: NO GROWTH AFTER 12 HOURS     HGB & HCT    Collection Time: 02/04/17  9:14 PM   Result Value Ref Range    HGB 6.7 (L) 12.1 - 17.0 g/dL    HCT 19.7 (L) 36.6 - 50.3 %   TYPE & SCREEN    Collection Time: 02/04/17  9:14 PM   Result Value Ref Range    Crossmatch Expiration 02/07/2017     ABO/Rh(D) A POSITIVE     Antibody screen NEG     Unit number [id number]     Blood component type RC LR AS1     Unit division 00     Status of unit ALLOCATED     Crossmatch result Compatible     Unit number [id number]     Blood component type RC LR AS1     Unit division 00     Status of unit ISSUED     Crossmatch result Compatible    GLUCOSE, POC    Collection Time: 02/04/17 10:07 PM   Result Value Ref Range    Glucose (POC) 252 (H) 65 - 100 mg/dL    Performed by Yumiko Ace    POC FECAL OCCULT BLOOD    Collection Time: 02/04/17 10:09 PM   Result Value Ref Range    Occult blood, stool (POC) NEGATIVE  NEG     GLUCOSE, POC    Collection Time: 02/05/17  2:23 AM   Result Value Ref Range    Glucose (POC) 264 (H) 65 - 100 mg/dL    Performed by Chey JAFFE    METABOLIC PANEL, BASIC    Collection Time: 02/05/17  5:42 AM   Result Value Ref Range    Sodium 133 (L) 136 - 145 mmol/L    Potassium 7.4 (HH) 3.5 - 5.1 mmol/L    Chloride 106 97 - 108 mmol/L    CO2 7 (LL) 21 - 32 mmol/L    Anion gap 20 (H) 5 - 15 mmol/L    Glucose 555 (H) 65 - 100 mg/dL    BUN 40 (H) 6 - 20 MG/DL    Creatinine 4.82 (H) 0.70 - 1.30 MG/DL    BUN/Creatinine ratio 8 (L) 12 - 20      GFR est AA 15 (L) >60 ml/min/1.73m2    GFR est non-AA 12 (L) >60 ml/min/1.73m2    Calcium 7.6 (L) 8.5 - 10.1 MG/DL   CBC WITH AUTOMATED DIFF    Collection Time: 02/05/17  5:42 AM   Result Value Ref Range    WBC 15.1 (H) 4.1 - 11.1 K/uL    RBC 2.68 (L) 4.10 - 5.70 M/uL    HGB 8.3 (L) 12.1 - 17.0 g/dL    HCT 25.0 (L) 36.6 - 50.3 %    MCV 93.3 80.0 - 99.0 FL    MCH 31.0 26.0 - 34.0 PG    MCHC 33.2 30.0 - 36.5 g/dL    RDW 14.8 (H) 11.5 - 14.5 %    PLATELET 997 275 - 238 K/uL NEUTROPHILS 98 (H) 32 - 75 %    LYMPHOCYTES 1 (L) 12 - 49 %    MONOCYTES 1 (L) 5 - 13 %    EOSINOPHILS 0 0 - 7 %    BASOPHILS 0 0 - 1 %    ABS. NEUTROPHILS 14.7 (H) 1.8 - 8.0 K/UL    ABS. LYMPHOCYTES 0.2 (L) 0.8 - 3.5 K/UL    ABS. MONOCYTES 0.2 0.0 - 1.0 K/UL    ABS. EOSINOPHILS 0.0 0.0 - 0.4 K/UL    ABS.  BASOPHILS 0.0 0.0 - 0.1 K/UL    DF SMEAR SCANNED      RBC COMMENTS NORMOCYTIC, NORMOCHROMIC     METABOLIC PANEL, BASIC    Collection Time: 02/05/17  5:42 AM   Result Value Ref Range    Sodium 133 (L) 136 - 145 mmol/L    Potassium 7.5 (HH) 3.5 - 5.1 mmol/L    Chloride 107 97 - 108 mmol/L    CO2 7 (LL) 21 - 32 mmol/L    Anion gap 19 (H) 5 - 15 mmol/L    Glucose 577 (H) 65 - 100 mg/dL    BUN 39 (H) 6 - 20 MG/DL    Creatinine 4.99 (H) 0.70 - 1.30 MG/DL    BUN/Creatinine ratio 8 (L) 12 - 20      GFR est AA 14 (L) >60 ml/min/1.73m2    GFR est non-AA 12 (L) >60 ml/min/1.73m2    Calcium 7.6 (L) 8.5 - 10.1 MG/DL   CK W/ CKMB & INDEX    Collection Time: 02/05/17  5:42 AM   Result Value Ref Range     (H) 39 - 308 U/L    CK - MB 7.6 (H) <3.6 NG/ML    CK-MB Index 1.8 0 - 2.5     TROPONIN I    Collection Time: 02/05/17  5:42 AM   Result Value Ref Range    Troponin-I, Qt. <0.04 <0.05 ng/mL   PRO-BNP    Collection Time: 02/05/17  5:42 AM   Result Value Ref Range    NT pro-BNP 68018 (H) 0 - 125 PG/ML   GLUCOSE, POC    Collection Time: 02/05/17  6:48 AM   Result Value Ref Range    Glucose (POC) >600 (HH) 65 - 100 mg/dL    Performed by Natalio hernan JAFFE    BLOOD GAS, ARTERIAL    Collection Time: 02/05/17  6:50 AM   Result Value Ref Range    pH 7.07 (LL) 7.35 - 7.45      PCO2 16 (L) 35.0 - 45.0 mmHg    PO2 99 80 - 100 mmHg    O2 SAT 95 92 - 97 %    BICARBONATE 5 (L) 22 - 26 mmol/L    BASE DEFICIT 23.4 mmol/L    O2 METHOD NASAL O2      O2 FLOW RATE 3.50 L/min    MODE OTHER      SPONTANEOUS RATE 40.0      Sample source ARTERIAL      SITE RIGHT RADIAL      BRIAN'S TEST YES      Critical value read back HUGH MORROW RN    GLUCOSE, POC Collection Time: 02/05/17  8:23 AM   Result Value Ref Range    Glucose (POC) >600 (HH) 65 - 100 mg/dL    Performed by Toro Chavez, RANDOM    Collection Time: 02/05/17  9:12 AM   Result Value Ref Range    Glucose 668 (HH) 65 - 100 mg/dL   POTASSIUM    Collection Time: 02/05/17  9:12 AM   Result Value Ref Range    Potassium 6.7 (HH) 3.5 - 5.1 mmol/L   GLUCOSE, POC    Collection Time: 02/05/17  9:56 AM   Result Value Ref Range    Glucose (POC) 552 (H) 65 - 100 mg/dL    Performed by Daryl James, ARTERIAL    Collection Time: 02/05/17 10:00 AM   Result Value Ref Range    pH 7.04 (LL) 7.35 - 7.45      PCO2 39 35.0 - 45.0 mmHg    PO2 376 (H) 80 - 100 mmHg    O2  (H) 92 - 97 %    BICARBONATE 10 (L) 22 - 26 mmol/L    BASE DEFICIT 19.9 mmol/L    O2 METHOD VENTILATOR      FIO2 100 %    MODE A/C      Tidal volume 500      SET RATE 16      EPAP/CPAP/PEEP 5.0      Sample source ARTERIAL      SITE LEFT RADIAL      BRIAN'S TEST YES      Critical value read back Chas Hanna MD    GLUCOSE, POC    Collection Time: 02/05/17 10:59 AM   Result Value Ref Range    Glucose (POC) 488 (H) 65 - 100 mg/dL    Performed by Sarah Richards              Telemetry:** Poor data quality, interpretation may be adversely affected   Sinus tachycardia   Nonspecific T wave abnormality; Qtc is 473    Imaging:  I have personally reviewed the patients radiographs and have reviewed the reports:  2-5-17: FINDINGS: Single AP portable view of the chest obtained at 10:59 AM demonstrates  a new right internal jugular central venous catheter, which has its tip  overlying the right atrium. There is otherwise no change in position of the  lines and tubes. The cardiomediastinal silhouette is stable. Moderately severe  pulmonary interstitial edema, bibasilar atelectasis/airspace disease, and  bilateral pleural effusions are unchanged.  No pneumothorax is seen.      IMPRESSION: Right internal jugular central venous catheter appears to be in  satisfactory position. No evidence of placement related complication. Unchanged  pulmonary edema, bibasilar atelectasis/airspace disease, and bilateral pleural  effusions.         Total critical care time exclusive of procedures: 40 minutes  Gerda Calero MD

## 2017-02-05 NOTE — PROGRESS NOTES
Central Line Procedure Note    Indication: Inadequate venous access    Pt intubated and sedated. Patient positioned in Trendelenburg. 7-Step Sterility Protocol followed. (cap, mask sterile gown, sterile gloves, large sterile sheet, hand hygiene, 2% chlorhexidine for cutaneous antisepsis)  5 mL 1% Lidocaine placed at insertion site. Right internal jugular cannulated x 1 attempt(s) utilizing the Seldinger technique. Catheter secured & Biopatch applied. Sterile Tegaderm placed. CXR pending.     Care turned over to covering Attending MD.

## 2017-02-05 NOTE — ED NOTES
Pt reports feeling cold and \"sweating cold\". RN provided pt with another blanket. RN talked with Hospitalist to make her aware that pt is shivering, \"sweating cold\", tachypneic and tachycardic. No verbal orders received.

## 2017-02-05 NOTE — PROGRESS NOTES
Rapid assessment called at 2216 for room 3257. Arrived at pt's room 0640. Patient presents with acute resp distress. Assessment completed. The following interventions were provided: Stat nebs, cxr, bicarb x 2, RSI and intubate. Dr. Willis Vaughan at bedside. Orders were obtained. Based on assessment and current results pt. will intubate and transfer to CCU.

## 2017-02-05 NOTE — ED NOTES
Bedside shift change report given to Felix Santos (oncoming nurse) by Mandy Mckee RN (offgoing nurse).  Report included the following information SBAR, ED Summary, MAR and Recent Results

## 2017-02-05 NOTE — PROGRESS NOTES
Primary Nurse Maryam Taylor RN and Pillo Hopkins RN performed a dual skin assessment on this patient No impairment noted.   Luke score is 22

## 2017-02-05 NOTE — H&P
Hospitalist Admission Note    NAME: Saritha Jama. :  1950   MRN:  [id number]     Date/Time:  2017 8:51 PM    Patient PCP: Pablo Tapia MD  ________________________________________________________________________    My assessment of this patient's clinical condition and my plan of care is as follows. Assessment / Plan:  Addendum:  -repeat h/h 6.7/.7  -heme occult neg  -transfuse 2 units  -continue to trend h/h    Acute respiratory failure without hypoxia  HCAP  Sepsis, POA   -cxr showed mild right perihilar patchy airspace disease  -BC obtained, sputum cx ordered  -duonebs, albuterol prn  -empiric abx with vancomycin, Levaquin, cefepime, de-escalate, pending bc     Chronic Anemia  -hbg 7.4, from 8.9 on   -likely anemia of chronic disease, hwoever will need to r/o other source of bleeding  -fecal occult  -type and screen  -trend h/h, transfuse for hbg<7  -continue iron suppl    CAD/HTN/HLD  -continue amlodipine, pravastatin    CKD stage 4 with hyperkalemia  -s/p kayexalate  -repeat bmp   -nephro consulted, seen by Dr Raiza Montemayor last admission      Code Status: heparin  Surrogate Decision Maker:daughter    DVT Prophylaxis: heparin  GI Prophylaxis: not indicated    Baseline: lives at [facility]:   CHIEF COMPLAINT: shortness of breath    HISTORY OF PRESENT ILLNESS:     Td Avila is a 61 y.o.  male w CKD stage 4, T2DM, HTN, Alzheimer disease, CAD present to ED via EMS complaining of sob. Pt is a resident at [facility], started having sob since 1400 per chart review. Pt was placed on oxygen and nebs rx given. SpO2 at the time was 98%. Per EMS, pt's sob appeared to be decrease once he was out of nursing home. He complained of \"feeling cold\", appears to have shivering per nursing staff. During my encounter, patient is aaox3, appears to be comfortable, NAD. However pt would answer \"I don't care and I don't know\" to all questions.     In the ED, vitals include: T97, P 120, /81 SpO2 100% on RA. CXR showed mild right perihilar patchy airspace disease. We were asked to admit for work up and evaluation of the above problems. Past Medical History   Diagnosis Date    Alzheimer disease     CAD (coronary artery disease)     Cancer ([facility] [address]      prostate    CKD (chronic kidney disease) stage 4, GFR 15-29 ml/min ([facility]     DM (diabetes mellitus), type 2, uncontrolled ([facility] [address]     Hyperlipidemia     Hypertension     Other ill-defined conditions(799.89)      blind R eye-retina detachment    Other ill-defined conditions(799.89)      right cerebellopontine angle lipoma. Past Surgical History   Procedure Laterality Date    Hx shoulder arthroscopy       right    Hx urological       prostate bx    Hx heent       retinal detachment    Upper gi endoscopy,biopsy  11/12/2014          Colonoscopy,diagnostic  11/12/2014             Social History   Substance Use Topics    Smoking status: Never Smoker    Smokeless tobacco: Never Used    Alcohol use No        Family History   Problem Relation Age of Onset    Heart Disease Mother      Pacemaker    Cancer Father      Allergies   Allergen Reactions    Ace Inhibitors Unknown (comments)    Arb-Angiotensin Receptor Antagonist Unknown (comments)        Prior to Admission medications    Medication Sig Start Date End Date Taking? Authorizing Provider   amLODIPine (NORVASC) 10 mg tablet Take 1 Tab by mouth daily. 1/22/17   Edouard Ferrari MD   sodium bicarbonate 650 mg tablet Take 2 Tabs by mouth two (2) times a day. 1/22/17   Edouard Ferrari MD   albuterol-ipratropium (DUO-NEB) 2.5 mg-0.5 mg/3 ml nebu 3 mL by Nebulization route. Historical Provider   mirtazapine (REMERON) 15 mg tablet Take 15 mg by mouth nightly. Indications: MAJOR DEPRESSIVE DISORDER    Yumi De La O MD   omeprazole (PRILOSEC) 10 mg capsule Take 10 mg by mouth daily.  Indications: Selin De La O MD tamsulosin (FLOMAX) 0.4 mg capsule Take 0.4 mg by mouth daily. Yumi De La O MD   calcitRIOL (ROCALTROL) 0.25 mcg capsule Take 0.25 mcg by mouth daily. Yumi De La O MD   b complex-vitamin c-folic acid (NEPHROCAPS) 1 mg capsule Take 1 Cap by mouth daily. Historical Provider   insulin aspart (NOVOLOG) 100 unit/mL injection Novolog BEFORE MEALS: 0-90: hold, : 1 unit, 176-225: 2 units, 226-300: 3 units, 301 +    : 4 units, Novolog at BEDTIME - 225-300: 1 unit, 301 + 2 units 9/30/15   Kayleigh Cunha MD   glucose blood VI test strips (ASCENSIA AUTODISC VI, ONE TOUCH ULTRA TEST VI) strip Monitor glucose before meals and at bedtime and as needed for symptoms - 4-5 times daily. 9/30/15   Kayleigh Cunha MD   ferrous sulfate (SLOW FE) 142 mg (45 mg iron) ER tablet Take 142 mg by mouth Daily (before breakfast). Yumi De La O MD   glucagon (GLUCAGEN) 1 mg injection 1 mL by IntraMUSCular route as needed for Hypoglycemia. 6/25/12   Moody Marcus MD   pravastatin (PRAVACHOL) 10 mg tablet Take 1 Tab by mouth nightly. 3/19/12   Linda Dewey MD   acetaminophen (TYLENOL) 325 mg tablet Take 325 mg by mouth every four (4) hours as needed. Historical Provider       REVIEW OF SYSTEMS:     I am not able to complete the review of systems because:    The patient is intubated and sedated    The patient has altered mental status due to his acute medical problems    The patient has baseline aphasia from prior stroke(s)    The patient has baseline dementia and is not reliable historian    The patient is in acute medical distress and unable to provide information   x The patient refused to answer questions appropriately       Total of 12 systems reviewed as follows:       POSITIVE= underlined text  Negative = text not underlined  General:  fever, chills, sweats, generalized weakness, weight loss/gain,      loss of appetite   Eyes:    blurred vision, eye pain, loss of vision, double vision  ENT:    rhinorrhea, pharyngitis Respiratory:   cough, sputum production, SOB, LAUREANO, wheezing, pleuritic pain   Cardiology:   chest pain, palpitations, orthopnea, PND, edema, syncope   Gastrointestinal:  abdominal pain , N/V, diarrhea, dysphagia, constipation, bleeding   Genitourinary:  frequency, urgency, dysuria, hematuria, incontinence   Muskuloskeletal :  arthralgia, myalgia, back pain  Hematology:  easy bruising, nose or gum bleeding, lymphadenopathy   Dermatological: rash, ulceration, pruritis, color change / jaundice  Endocrine:   hot flashes or polydipsia   Neurological:  headache, dizziness, confusion, focal weakness, paresthesia,     Speech difficulties, memory loss, gait difficulty  Psychological: Feelings of anxiety, depression, agitation    Objective:   VITALS:    Visit Vitals    /83    Pulse (!) 120    Temp 98.5 °F (36.9 °C)    Resp 22    Ht 5' 5\" (1.651 m)    Wt 72.7 kg (160 lb 4.4 oz)    SpO2 100%    BMI 26.67 kg/m2       PHYSICAL EXAM:    General:    Alert, cooperative, no distress, appears stated age. HEENT: Atraumatic, anicteric sclerae, pink conjunctivae     No oral ulcers, mucosa moist, throat clear, dentition fair  Neck:  Supple, symmetrical,  thyroid: non tender  Lungs:   Mild wheezing b/l  Chest wall:  No tenderness  No Accessory muscle use. Heart:   tachycardic,  No  murmur   No edema  Abdomen:   Soft, non-tender. Not distended. Bowel sounds normal  Extremities: No cyanosis. No clubbing      Capillary refill normal,  Radial pulse 2+  Skin:     Not pale. Not Jaundiced  No rashes   Psych:  Good insight. Not depressed. Not anxious or agitated.   Neurologic: AAOx4, able to move all exts     _______________________________________________________________________  Care Plan discussed with:    Comments   Patient x    Family      RN x    Care Manager                    Consultant:      _______________________________________________________________________  Expected  Disposition:   Home with Family HH/PT/OT/RN    SNF/LTC x   JESUS    ________________________________________________________________________  TOTAL TIME:  60 Minutes    Critical Care Provided     Minutes non procedure based      Comments     Reviewed previous records   >50% of visit spent in counseling and coordination of care  Discussion with patient and/or family and questions answered       ________________________________________________________________________  Signed: Meghana Clark MD    Procedures: see electronic medical records for all procedures/Xrays and details which were not copied into this note but were reviewed prior to creation of Plan. LAB DATA REVIEWED:    Recent Results (from the past 24 hour(s))   EKG, 12 LEAD, INITIAL    Collection Time: 02/04/17  6:01 PM   Result Value Ref Range    Ventricular Rate 105 BPM    Atrial Rate 105 BPM    P-R Interval 124 ms    QRS Duration 64 ms    Q-T Interval 358 ms    QTC Calculation (Bezet) 473 ms    Calculated P Axis 24 degrees    Calculated R Axis 43 degrees    Calculated T Axis 9 degrees    Diagnosis       ** Poor data quality, interpretation may be adversely affected  Sinus tachycardia  Nonspecific T wave abnormality  Abnormal ECG  When compared with ECG of 18-JAN-2017 03:23,  No significant change was found     GLUCOSE, POC    Collection Time: 02/04/17  6:11 PM   Result Value Ref Range    Glucose (POC) 118 (H) 65 - 100 mg/dL    Performed by Nuvia Kaminski    CBC WITH AUTOMATED DIFF    Collection Time: 02/04/17  6:40 PM   Result Value Ref Range    WBC 12.8 (H) 4.1 - 11.1 K/uL    RBC 2.40 (L) 4.10 - 5.70 M/uL    HGB 7.4 (L) 12.1 - 17.0 g/dL    HCT 21.7 (L) 36.6 - 50.3 %    MCV 90.4 80.0 - 99.0 FL    MCH 30.8 26.0 - 34.0 PG    MCHC 34.1 30.0 - 36.5 g/dL    RDW 14.8 (H) 11.5 - 14.5 %    PLATELET 034 849 - 571 K/uL    NEUTROPHILS 84 (H) 32 - 75 %    LYMPHOCYTES 8 (L) 12 - 49 %    MONOCYTES 7 5 - 13 %    EOSINOPHILS 1 0 - 7 %    BASOPHILS 0 0 - 1 %    ABS.  NEUTROPHILS 10.7 (H) 1.8 - 8.0 K/UL ABS. LYMPHOCYTES 1.1 0.8 - 3.5 K/UL    ABS. MONOCYTES 0.9 0.0 - 1.0 K/UL    ABS. EOSINOPHILS 0.1 0.0 - 0.4 K/UL    ABS. BASOPHILS 0.1 0.0 - 0.1 K/UL   METABOLIC PANEL, COMPREHENSIVE    Collection Time: 02/04/17  6:40 PM   Result Value Ref Range    Sodium 137 136 - 145 mmol/L    Potassium 5.7 (H) 3.5 - 5.1 mmol/L    Chloride 107 97 - 108 mmol/L    CO2 17 (L) 21 - 32 mmol/L    Anion gap 13 5 - 15 mmol/L    Glucose 87 65 - 100 mg/dL    BUN 40 (H) 6 - 20 MG/DL    Creatinine 4.94 (H) 0.70 - 1.30 MG/DL    BUN/Creatinine ratio 8 (L) 12 - 20      GFR est AA 14 (L) >60 ml/min/1.73m2    GFR est non-AA 12 (L) >60 ml/min/1.73m2    Calcium 7.9 (L) 8.5 - 10.1 MG/DL    Bilirubin, total 0.3 0.2 - 1.0 MG/DL    ALT (SGPT) 23 12 - 78 U/L    AST (SGOT) 21 15 - 37 U/L    Alk.  phosphatase 94 45 - 117 U/L    Protein, total 6.4 6.4 - 8.2 g/dL    Albumin 3.3 (L) 3.5 - 5.0 g/dL    Globulin 3.1 2.0 - 4.0 g/dL    A-G Ratio 1.1 1.1 - 2.2     CK W/ CKMB & INDEX    Collection Time: 02/04/17  6:40 PM   Result Value Ref Range     (H) 39 - 308 U/L    CK - MB 7.8 (H) <3.6 NG/ML    CK-MB Index 2.0 0 - 2.5     TROPONIN I    Collection Time: 02/04/17  6:40 PM   Result Value Ref Range    Troponin-I, Qt. <0.04 <0.05 ng/mL   INFLUENZA A & B AG (RAPID TEST)    Collection Time: 02/04/17  6:40 PM   Result Value Ref Range    Influenza A Antigen NEGATIVE  NEG      Influenza B Antigen NEGATIVE  NEG     LACTIC ACID, PLASMA    Collection Time: 02/04/17  7:02 PM   Result Value Ref Range    Lactic acid 1.7 0.4 - 2.0 MMOL/L

## 2017-02-05 NOTE — PROGRESS NOTES
0600: Pagekei hartley MD, dr. Abbi Aparicio, informed of pt's condition of episode of labored breathing, pt complaining he can't breathe. Urine output 225 through spencer cath. RR 34, O2 99% at 3L O2 by NC, expiratory wheezing on auscultation. Blood transfusion completed at 0530. MD stated will put order in for Bumex and Lorazepam. RN continues to monitor pt.   0630: Pt becoming more anxious and labored breathing. RN paged Rapid response team and ED MD, dr. Liliya Martínez. 6735: RRT and supervisor arrived to pt's room. 5: MD dr. Liliya Martínez came to assess pt.   0564: Anesthetist came to intubate pt.  Everton Wells: Pt transported for transfer to ICU.

## 2017-02-05 NOTE — PROGRESS NOTES
Intubation Note    Called to bedside secondary to  impending respiratory failure. Patient pre-oxygenated with 100% oxygen. Smooth RSI with propofol 100 mg IV.    glidescope  x 1    7.5 ETT taped and secured at 24 cm at the teeth.    + Bilateral BS, + Chest rise, + ETCO2    CXR pending.     Care turned over to covering Attending MD.

## 2017-02-05 NOTE — CONSULTS
Consultation Note    NAME: Charlie Willson. :  1950   MRN:  [id number]     Date/Time:  2017 9:21 AM    I have been asked to see this patient by Dr. Zheng Nurse  for advice/opinion re: CKD/MANA/Hyperkalemia. Assessment :    Plan:  CKD-4 - baseline creatinine about 4. Has declined RRT in the past.  MANA  Sepsis   HCAP  DM-hyperglycemia  Metabolic acidosis  Pseudohyponatremia  Hyperkalemia-chronic- no up related to elevated glucose. Notes from his last stay indicate that he did not want HD. Will need to address with family when they arrive. Needs aggressive treatment of glucose. Given kayexalate. Repeat labs pending. IV calcium and IV HCO3. Send urine studies. Will give IV HCO3 - amps now and then drip. Potassium down a little. Continue to work on glucose. BMP at 2 pm.       Subjective:   CHIEF COMPLAINT:  Intubated    HISTORY OF PRESENT ILLNESS:     Rach Hassan is a 61 y.o.   male who has a history of CKD-4 recently here now admitted with dyspnea. Review of records shows that he was here - with DKA. He was treated with Iv insulin and improved. His D/C creatinine was 4.38. He resides in a NH. According to the chart he had dyspnea as well as shivering and chills. He was sent to the ER. CXR showed patchy ASDZ and he was admitted. Overnight, he worsened and had to be intubated. Labs show a glucose over 500 and a potassium over 7. Repeat labs are pending. Past Medical History   Diagnosis Date    Alzheimer disease     CAD (coronary artery disease)     Cancer ([facility] [address]      prostate    CKD (chronic kidney disease) stage 4, GFR 15-29 ml/min ([facility])     DM (diabetes mellitus), type 2, uncontrolled ([facility] [address]     Hyperlipidemia     Hypertension     Other ill-defined conditions(799.89)      blind R eye-retina detachment    Other ill-defined conditions(799.89)      right cerebellopontine angle lipoma.        Past Surgical History   Procedure Laterality Date    Hx shoulder arthroscopy       right    Hx urological       prostate bx    Hx heent       retinal detachment    Upper gi endoscopy,biopsy  11/12/2014          Colonoscopy,diagnostic  11/12/2014           Social History   Substance Use Topics    Smoking status: Never Smoker    Smokeless tobacco: Never Used    Alcohol use No      Family History   Problem Relation Age of Onset    Heart Disease Mother      Pacemaker    Cancer Father       Allergies   Allergen Reactions    Ace Inhibitors Unknown (comments)    Arb-Angiotensin Receptor Antagonist Unknown (comments)      Prior to Admission medications    Medication Sig Start Date End Date Taking? Authorizing Provider   albuterol-ipratropium (DUO-NEB) 2.5 mg-0.5 mg/3 ml nebu 3 mL by Nebulization route. Yes Historical Provider   mirtazapine (REMERON) 15 mg tablet Take 15 mg by mouth nightly. Indications: MAJOR DEPRESSIVE DISORDER   Yes Yumi De La O MD   amLODIPine (NORVASC) 10 mg tablet Take 1 Tab by mouth daily. 1/22/17   Kanchan Cruz MD   sodium bicarbonate 650 mg tablet Take 2 Tabs by mouth two (2) times a day. 1/22/17   Kanchan Cruz MD   omeprazole (PRILOSEC) 10 mg capsule Take 10 mg by mouth daily. Indications: GASTROESOPHAGEAL REFLUX    Yumi De La O MD   tamsulosin (FLOMAX) 0.4 mg capsule Take 0.4 mg by mouth daily. Yumi De La O MD   calcitRIOL (ROCALTROL) 0.25 mcg capsule Take 0.25 mcg by mouth daily. Yumi De La O MD   b complex-vitamin c-folic acid (NEPHROCAPS) 1 mg capsule Take 1 Cap by mouth daily. Historical Provider   insulin aspart (NOVOLOG) 100 unit/mL injection Novolog BEFORE MEALS: 0-90: hold, : 1 unit, 176-225: 2 units, 226-300: 3 units, 301 +    : 4 units, Novolog at BEDTIME - 225-300: 1 unit, 301 + 2 units 9/30/15   Kayleigh Cunha MD   glucose blood VI test strips (ASCENSIA AUTODISC VI, ONE TOUCH ULTRA TEST VI) strip Monitor glucose before meals and at bedtime and as needed for symptoms - 4-5 times daily.  9/30/15   Kayleigh Cunha MD ferrous sulfate (SLOW FE) 142 mg (45 mg iron) ER tablet Take 142 mg by mouth Daily (before breakfast). Yumi De La O MD   glucagon (GLUCAGEN) 1 mg injection 1 mL by IntraMUSCular route as needed for Hypoglycemia. 6/25/12   Go Hernandez MD   pravastatin (PRAVACHOL) 10 mg tablet Take 1 Tab by mouth nightly. 3/19/12   Mika Benítez MD   acetaminophen (TYLENOL) 325 mg tablet Take 325 mg by mouth every four (4) hours as needed.     Historical Provider     REVIEW OF SYSTEMS:     [x]  Unable to obtain reliable ROS due to  [] mental status  [] sedated   [x] intubated   [] Total of 12 systems reviewed as follows:  Constitutional: negative fever, negative chills, negative weight loss  Eyes:   negative visual changes  ENT:   negative sore throat, tongue or lip swelling  Respiratory:  negative cough, negative dyspnea  Cards:  negative for chest pain, palpitations, lower extremity edema  GI:   negative for nausea, vomiting, diarrhea, and abdominal pain  :  negative for frequency, dysuria  Integument:  negative for rash and pruritus  Heme:  negative for easy bruising and gum/nose bleeding  Musculoskel: negative for myalgias,  back pain and muscle weakness  Neuro:  negative for headaches, dizziness, vertigo  Psych:  negative for feelings of anxiety, depression   Travel?: none    Objective:   VITALS:    Visit Vitals    /63    Pulse (!) 113    Temp 98.2 °F (36.8 °C)    Resp 16    Ht 5' 5\" (1.651 m)    Wt 60.4 kg (133 lb 2.5 oz)    SpO2 100%    BMI 22.16 kg/m2     PHYSICAL EXAM:  Gen:  []  WD []  WN  [] cachectic []  thin []  obese []  disheveled             [x]  ill apearing - on vent  []   Critical  []   Chronic    []  No acute distress    HEENT:   [x] NC/AT/PERRL    [x] pink conjunctivae      [] pale conjunctivae                  PERRL  [] yes  [] no      [] moist mucosa    [] dry mucosa    hearing intact to voice [] yes  [] No                 NECK:   supple [x] yes  [] no        masses [] yes  [] No thyroid  []  non tender  []  tender    RESP:   [] CTA bilaterally/no wheezing/rhonchi/rales/crackles    [x] rhonchi bilaterally - no dullness  [] wheezing   [] rhonchi   [] crackles     use of accessory muscles [] yes [] no    CARD:   [x]  regular rate and rhythm/No murmurs/rubs/gallops    murmur  [] yes ()  [] no      Rubs  [] yes  [] no       Gallops [] yes  [] no    Rate []  regular  []  irregular        carotid bruits  [] Right  []  Left                 LE edema [] yes  [x] no           JVP  []  yes   []  no    ABD:    [x] soft/non distended/non tender/+bowel sounds/no HSM    []  Rigid    tenderness [] yes [] no   Liver enlargement  []  yes []  no                Spleen enlargement  []  yes []  no     distended []  yes [] no     bowel sound  [] hypoactive   [] hyperactive    LYMPH:    Neck []  yes [x]  no       Axillae []  yes [x]  no    SKIN:   Rashes []  yes   [x]  no    Ulcers []  yes   []  no               [] tight to palpitation    skin turgor []  good  [] poor  [] decreased               Cyanosis/clubbing []  yes []  no    NEUR:   [] cranial nerves II-XII grossly intact       [] Cranial nerves deficit                 []  facial droop    []  slurred speech   [] aphasic     [] Strength normal     []  weakness  []  LUE  []   RUE/ []  LLE  []   RLE    follows commands  []  yes [x]  no           PSYCH:   insight [x] poor [] good   Alert and Oriented to  [] person  [] place  []  time                    [] depressed [] anxious [] agitated  [] lethargic [] stuporous  [] sedated     LAB DATA REVIEWED:    Recent Labs      02/05/17   0542  02/04/17 2114 02/04/17   1840   WBC  15.1*   --   12.8*   HGB  8.3*  6.7*  7.4*   HCT  25.0*  19.7*  21.7*   PLT  346   --   353     Recent Labs      02/05/17   0542  02/04/17   1840   NA  133*  133*  137   K  7.5*  7.4*  5.7*   CL  107  106  107   CO2  7*  7*  17*   BUN  39*  40*  40*   CREA  4.99*  4.82*  4.94*   GLU  577*  555*  87   CA  7.6*  7.6*  7.9* Recent Labs      02/04/17   1840   SGOT  21   ALT  23   AP  94   TBILI  0.3   ALB  3.3*   GLOB  3.1     No results for input(s): INR, PTP, APTT in the last 72 hours. No lab exists for component: INREXT   No results for input(s): FE, TIBC, PSAT, FERR in the last 72 hours.    Recent Labs      02/05/17   0650   PH  7.07*   PCO2  16*   PO2  99     Recent Labs      02/05/17   0542  02/04/17   1840   CPK  428*  389*   CKMB  7.6*  7.8*     Lab Results   Component Value Date/Time    Glucose (POC) >600 02/05/2017 08:23 AM    Glucose (POC) >600 02/05/2017 06:48 AM    Glucose (POC) 264 02/05/2017 02:23 AM    Glucose (POC) 252 02/04/2017 10:07 PM    Glucose (POC) 118 02/04/2017 06:11 PM    Glucose (POC) 207 11/12/2014 01:09 PM    Glucose (POC) 152 03/02/2011 10:14 PM    Glucose  02/17/2011 12:28 PM       Procedures: see electronic medical records for all procedures/Xrays and details which were not copied into this note but were reviewed prior to creation of Plan.    ________________________________________________________________________       ___________________________________________________  Consulting Physician: Pavel Montoya MD

## 2017-02-05 NOTE — PROGRESS NOTES
1500-Patient agitated and trying to talk on ventilator. Peak pressures elevated and vent going off. Propofol maxed out at 50mcgs from 45mcgs. 1505-Notified Dr. Pedro Luis Hinds of latest BMP result. Received telephone orders to wait until 0400 to draw next BMP and to keep patient on the Bicarb drip. Also confirmed the glucostabilizer setting (specifically the target range from 200-300) was okay for now. He verbalized to not changed it.

## 2017-02-05 NOTE — PROGRESS NOTES
TRANSFER - IN REPORT:    Verbal report received from Michel (name) on Baldo Guillermo.  being received from ED (unit) for routine progression of care      Report consisted of patients Situation, Background, Assessment and   Recommendations(SBAR). Information from the following report(s) SBAR, Kardex, Intake/Output, MAR and Recent Results was reviewed with the receiving nurse. Opportunity for questions and clarification was provided. Assessment completed upon patients arrival to unit and care assumed.

## 2017-02-05 NOTE — PROGRESS NOTES
0146: Pt started receiving blood transfusion at 100ml/hr. VS stable per baseline. No s/s of allergic reaction. 0215: Pt refused to finish Albuterol breathing treatment, saying that he was feeling sick, but not nauseated and refused Zofran. 0230: Pt agitated and complained that he could not breathe. RN placed non-rebreather mask at 10L of O2. Pt kept saying could not breathe and took off the mask. Pt was more tachypneic at 33bpm. Lungs sounds very wet. Blood transfusion rate reduced to 75ml/hr. 46: RN paged Rapid Response Team.   5507: RRT and supervisor arrived. 0255: Pt received 2mg of Bumex IV and 0.5mg Lorazepam IV.  0305: Childs placed, 350mL of clear yellow urine output. 7277: Pt had portable CXR.   0320: Pt calming down. RN continues to monitor Pt.

## 2017-02-05 NOTE — PROGRESS NOTES
Received results for ABGs, from Johanne BARNES, RT:pH 7.07, pCO2 16.4, pO2: 99%, HCO3 4.7, base access -23.4, O2 Saturation 95%. Results shown to Dr. Shiloh Shelley.

## 2017-02-05 NOTE — PROGRESS NOTES
TRANSFER - OUT REPORT:    Bed side and Verbal report given to Kathernie (name) on Helane Gaucher.  being transferred to CCU(unit) for change in patient condition(Respiratory distress)       Report consisted of patients Situation, Background, Assessment and   Recommendations(SBAR). Information from the following report(s) SBAR, Kardex, ED Summary, Intake/Output, MAR, Recent Results, Med Rec Status and Cardiac Rhythm NSR was reviewed with the receiving nurse. Lines:   Peripheral IV 02/04/17 Left Forearm (Active)   Site Assessment Clean, dry, & intact 2/4/2017 11:08 PM   Phlebitis Assessment 0 2/4/2017 11:08 PM   Infiltration Assessment 0 2/4/2017 11:08 PM   Dressing Status Clean, dry, & intact 2/4/2017 11:08 PM   Dressing Type Transparent 2/4/2017 11:08 PM   Hub Color/Line Status Pink;Flushed 2/4/2017 11:08 PM        Opportunity for questions and clarification was provided.       Patient transported with:   Registered Nurse (RRT)

## 2017-02-05 NOTE — PROGRESS NOTES
0730 Patient arrived to unit, intubated, Dr Luna Wtit in to see patient  65 Notified patients sister and daughter of events and updated on plan of care  0830 Patient oral temp not registering, rectal temp 94.9, xuan huggar applied and rectal probe inserted. Dr. Jos Griggs and Maria Antonia Marks in to see patient   [address] sent for Potassium and serum glucose level    0940   Plan for central line placement. Called and left message for sister. 1000 Critical lab results received for patassium and glucose levels, both improved from previous, ABG results and stat lab results given to Dr. Jos Griggs and Maria Antonia Marks  21  obtained phone consent for central line placement. 0 Dr. Mandie Whitmore in to place central line, patient tolerated well,   1120 Patients sister in to see patient, updated on events, line placement confirmed via Xray  1230  rectal temp up to 97.1, Xuan huggar off   1330 Patient more awake and agitated, increased Propofol,  1400 stat labs drawn and sent  1510 Propofol maxed, Dr. Jos Griggs updated on stat lab results, and agitation.   Also updated on glucose result trend, orders recieved  1550 Insulin drip on hold per glucomander and D50 given, D5NS infusion also started  1605 Insulin drip restarted and Fentanyl drip started at 25mcg,   1700 Insulin drip off  1750 Patient had several loose stools, able to turn self and cooperate with commands  1900 Report given to oncoming nurse

## 2017-02-05 NOTE — ED NOTES
TRANSFER - OUT REPORT:    Verbal report given to RIMA MACIEL [facility] MARY WHITLEY [facility], RN on Melva Hall.  being transferred to Renal for routine progression of care       Report consisted of patients Situation, Background, Assessment and   Recommendations(SBAR). Information from the following report(s) SBAR, ED Summary, [facility] and Recent Results was reviewed with the receiving nurse. Lines:       Opportunity for questions and clarification was provided.       Patient transported with:   [employer]

## 2017-02-05 NOTE — ED NOTES
Hourly rounds completed. Concerns and questions addressed at this time. Pt in no acute distress at this time. Call bell within reach. Side rails x 2. Cardiac Monitor x 3. Stretcher locked in lowest position.

## 2017-02-05 NOTE — PROGRESS NOTES
Hospitalist Progress Note    NAME: Addy Ricketts. :  1950   MRN:  [id number]       Interim Hospital Summary: 61 y.o. male whom presented on 2017 with      Assessment / Plan:  1. Acute hypoxic respiratory failure:  Continue with full vent support, and monitor respiratory status. 2. PNA:  Continue with Cefepime/Vanco/Levaquin, and follow cultures. 3. CKD:  Creatinine stable. Continue to monitor renal function closely. 4. Hyperkalemia; Will give Calcium/D50/Insulin, and monitor. 5. CAD; Continue with BB. 6. H/o CHF:  Continue with IV Lasix, obtain echo, and monitor volume status. 7. HTN:  Fairly under control. Continue with BB. 8. DM:  HG A1C pending. Continue with SSI, and monitor FSs.    9. Dyslipidemia:  Will resume statin once tolerates PO intake. 10. H/o prostate CA:  Will resume Flomax once tolerates PO intake. 11. Depression:  Will resume Remeron once tolerates PO intake. 12. Anemia:  H/H stable. Continue to monitor. Code status: Full code  Prophylaxis: SC Heparin  Recommended Disposition: Continue with ICU care     Subjective:     Chief Complaint / Reason for Physician Visit  Patient seen and examined. Patient is intubated and on mechanical ventilator. Discussed with RN events overnight. Review of Systems:  Symptom Y/N Comments  Symptom Y/N Comments   Fever/Chills    Chest Pain     Poor Appetite    Edema     Cough    Abdominal Pain     Sputum    Joint Pain     SOB/LAURAENO Y   Pruritis/Rash     Nausea/vomit    Tolerating PT/OT     Diarrhea    Tolerating Diet     Constipation    Other       Could NOT obtain due to:      Objective:     VITALS:   Last 24hrs VS reviewed since prior progress note.  Most recent are:  Patient Vitals for the past 24 hrs:   Temp Pulse Resp BP SpO2   17 0728 - (!) 113 16 - 100 %   17 0700 - (!) 102 - 108/63 100 %   17 0639 - 99 (!) 38 158/68 98 %   17 0616 - 84 - 142/72 -   17 0518 98.2 °F (36.8 °C) (!) 110 24 138/70 100 %   02/05/17 0401 - (!) 111 24 142/80 98 %   02/05/17 0335 - (!) 109 22 157/90 100 %   02/05/17 0315 - (!) 110 24 (!) 161/95 98 %   02/05/17 0305 - (!) 118 24 (!) 169/91 100 %   02/05/17 0255 - (!) 115 (!) 33 (!) 169/99 100 %   02/05/17 0251 - - - - 100 %   02/05/17 0243 - (!) 126 28 (!) 188/98 98 %   02/05/17 0229 - (!) 115 24 (!) 163/98 97 %   02/05/17 0215 - (!) 109 24 150/86 95 %   02/05/17 0159 - (!) 107 24 155/90 95 %   02/05/17 0154 - - - - 97 %   02/05/17 0144 98.1 °F (36.7 °C) (!) 115 24 156/86 98 %   02/05/17 0129 98.1 °F (36.7 °C) (!) 111 22 153/88 98 %   02/04/17 2308 - (!) 121 22 155/89 99 %   02/04/17 2241 98.3 °F (36.8 °C) (!) 111 22 157/89 99 %   02/04/17 2215 - (!) 113 (!) 31 146/79 -   02/04/17 2214 - (!) 115 25 - 98 %   02/04/17 2200 - (!) 111 25 142/68 100 %   02/04/17 2150 - (!) 119 24 - 100 %   02/04/17 2145 - (!) 123 18 144/81 99 %   02/04/17 2130 - - - 134/71 99 %   02/04/17 2128 - (!) 107 25 - 100 %   02/04/17 2115 - (!) 114 24 151/77 100 %   02/04/17 2100 - (!) 116 (!) 31 157/81 100 %   02/04/17 2045 - (!) 121 17 155/75 100 %   02/04/17 2033 - (!) 121 29 - 100 %   02/04/17 2030 - (!) 121 (!) 33 169/86 100 %   02/04/17 2015 - (!) 124 26 (!) 153/100 100 %   02/04/17 2001 - (!) 120 22 - 100 %   02/04/17 2000 - (!) 120 25 151/83 100 %   02/04/17 1945 98.5 °F (36.9 °C) - - - -   02/04/17 1930 - (!) 117 26 151/72 100 %   02/04/17 1900 - (!) 104 24 155/80 97 %   02/04/17 1756 97.5 °F (36.4 °C) (!) 101 24 158/81 100 %       Intake/Output Summary (Last 24 hours) at 02/05/17 1007  Last data filed at 02/05/17 0315   Gross per 24 hour   Intake              480 ml   Output              350 ml   Net              130 ml        PHYSICAL EXAM:  General: WD, WN. Alert, cooperative, no acute distress    EENT:  EOMI. Anicteric sclerae. MMM  Resp:  CTA bilaterally, no wheezing or rales.   No accessory muscle use  CV:  Regular  rhythm,  No edema  GI:  Soft, Non distended, Non tender.  +Bowel sounds  Neurologic:  Alert and oriented X 3, normal speech,   Psych:   Good insight. Not anxious nor agitated  Skin:  No rashes. No jaundice    Reviewed most current lab test results and cultures  YES  Reviewed most current radiology test results   YES  Review and summation of old records today    NO  Reviewed patient's current orders and MAR    YES  PMH/SH reviewed - no change compared to H&P  ________________________________________________________________________  Care Plan discussed with:    Comments   Patient     Family      RN Y    Care Manager     Consultant                        Multidiciplinary team rounds were held today with , nursing, pharmacist and clinical coordinator. Patient's plan of care was discussed; medications were reviewed and discharge planning was addressed. ________________________________________________________________________  Total NON critical care TIME:  30  Minutes    Total CRITICAL CARE TIME Spent:   Minutes non procedure based      Comments   >50% of visit spent in counseling and coordination of care Y    ________________________________________________________________________  Klaudia Degroot MD     Procedures: see electronic medical records for all procedures/Xrays and details which were not copied into this note but were reviewed prior to creation of Plan. LABS:  I reviewed today's most current labs and imaging studies.   Pertinent labs include:  Recent Labs      02/05/17 0542  02/04/17 2114 02/04/17   1840   WBC  15.1*   --   12.8*   HGB  8.3*  6.7*  7.4*   HCT  25.0*  19.7*  21.7*   PLT  346   --   353     Recent Labs      02/05/17 0912  02/05/17   0542  02/04/17   1840   NA   --   133*  133*  137   K  6.7*  7.5*  7.4*  5.7*   CL   --   107  106  107   CO2   --   7*  7*  17*   GLU  668*  577*  555*  87   BUN   --   39*  40*  40*   CREA   --   4.99*  4.82*  4.94*   CA   --   7.6*  7.6*  7.9*   ALB   --    --   3.3*   TBILI   -- --   0.3   SGOT   --    --   21   ALT   --    --   23       Signed: Kimmy Timmons MD

## 2017-02-05 NOTE — PROGRESS NOTES
Rapid Response Team Note  Called by RN to see patient for respiratory distress stat. Upon entering the room the patient was complaining of not breathing well. Audible wheezing and grunting with retractions. per RN Around 530am noted to have slight worsened sob then suddenly diaphoretic and much more severe sob. Further noted was RRT called earlier this evening and given bumex for what appeared to be pulmonary edema with minimal output of only 600cc but per report was up and going to bathroom on own. Review of chart showed that he received 2L of IVF in ED for suspicion sepsis and 1 of 2 unit PRBC given as well. Visit Vitals    /72    Pulse 84    Temp 98.2 °F (36.8 °C)    Resp 24    Ht 5' 5\" (1.651 m)    Wt 72.7 kg (160 lb 4.4 oz)    SpO2 100%    BMI 26.67 kg/m2       Gen: critically ill appearing bm sitting up in bed with 100% nrb on with retractions and very diaphoretic talking 1 word sentences  HEENT : nc at  Chest: rhonchi with wheezing  Abd: s/nd/nt +bs  Neuro: cn 2-12 gi, appears fatigued  Ext: no r/g    Pertinent Recent Labs and Xrays:  Recent Labs      02/05/17   0542  02/04/17   2114  02/04/17   1840   WBC  15.1*   --   12.8*   HGB  8.3*  6.7*  7.4*   HCT  25.0*  19.7*  21.7*   PLT  346   --   353     Recent Labs      02/05/17   0542  02/04/17   1840   NA  133*  137   K  7.4*  5.7*   CL  106  107   CO2  7*  17*   BUN  40*  40*   CREA  4.82*  4.94*   GLU  555*  87   CA  7.6*  7.9*     No results for input(s): INR in the last 72 hours. No lab exists for component: INREXT, INREXT   No results for input(s): PH, PCO2, PO2 in the last 72 hours. No results for input(s): PHI, PO2I, PCO2I in the last 72 hours.   Recent Labs      02/04/17   1840   CPK  389*   CKNDX  2.0   TROIQ  <0.04     Lab Results   Component Value Date/Time    Glucose (POC) 264 02/05/2017 02:23 AM    Glucose (POC) 252 02/04/2017 10:07 PM    Glucose (POC) 118 02/04/2017 06:11 PM    Glucose (POC) 123 01/23/2017 04:42 PM Glucose (POC) 102 01/23/2017 12:24 PM    Glucose (POC) 207 11/12/2014 01:09 PM    Glucose (POC) 152 03/02/2011 10:14 PM    Glucose  02/17/2011 12:28 PM       chest X-ray - decreased pulmonary edema compared to prior    A/P:  Acute hypoxic respiratory failure with impending failure  -abg stat  -labs this AM just returned with K 7.4,  CO2 7.  Give kayexalate and insulin and calcium stat  -repeat CXR as above done stat  -patient clinically deteriorating rapidly and suspect impending intubation  -stat cpk/mb/index/trop to r/o cardiac component  -bvm for hypoxia  -HR and BP stable for now  -suspect quite acidotic    HHS  -IV insulin bolus  -place on glucommander to drive bs down rapidly - q1h bs check    Hyperkalemia  -kayexalate when able  -calcium to stabilize myocardium now    Acidosis  -give 2amp bicarb stat now  -get ABG and adjust    Lidia Sabillon MD  Critical care time unrelated to prior notes: 45min    ADDENDUM  ABG shows 7.07, paCo2 16  -plan to intubate now with anesthesia for protection given impending failure  -will need IV access (watch as will likely require HD)  -will need OG/NGT for kayexalate to be given stat    Discussed further with Dr Mai Justice whom will see this AM - agrees with plan and likely need for HD today vs later pending further blood work  Lidia Sabillon MD

## 2017-02-05 NOTE — PROGRESS NOTES
Rapid assessment called at [address] for room 3257. Arrived at pt's room [id number]. Patient presents with SOB, audible rales, dyspnea. Assessment completed. The following interventions were provided: CXRp, Bumex, Ativan , and spencer. Dr. Ashlyn He notified. Orders were obtained. Based on assessment and current results pt. will remain on current unit.

## 2017-02-05 NOTE — PROGRESS NOTES
Pharmacy Automatic Renal Dosing Protocol - Antimicrobials    Indication for Antimicrobials: PNA    Current Regimen of Each Antimicrobial (Start Day & Day of Therapy):  Vancomycin 1.75 gm IV then Vancomycin 750 mg IV q36h (start , Day 1)  Levofloxacin 750 mg IV q48h (start , Day 1)   Cefepime 2 gm IV q24h (start , Day 1)    Significant cultures:    Blood cx x 2: Pending    CAPD, Intermittent Hemodialysis or Renal Replacement Therapy: CKD but not on dialysis yet  Paralysis, amputations, malnutrition: n/a  Recent Labs      17   1840   CREA  4.94*   BUN  40*   WBC  12.8*     Temp (24hrs), Av °F (36.7 °C), Min:97.5 °F (36.4 °C), Max:98.5 °F (36.9 °C)    Creatinine Clearance (ml/min): 13      Goal Vancomycin Level(s): 15-20      Impression/Plan: Vancomycin 1.75 gm IV then 750 mg IV q36h for predicted trough of about 16.8. Levofloxacin dose changed to 750 mg IV x 1 then 500 mg IV q48h for renal function. Also, Cefepime 2 gm IV q24h is appropriate for renal function        Pharmacy will follow daily and adjust doses of monitored medications as appropriate for renal function and/or serum levels.     Thank you,  Jaskaran Willson, PHARMD

## 2017-02-06 ENCOUNTER — PATIENT OUTREACH (OUTPATIENT)
Dept: ENDOCRINOLOGY | Age: 67
End: 2017-02-06

## 2017-02-06 ENCOUNTER — APPOINTMENT (OUTPATIENT)
Dept: GENERAL RADIOLOGY | Age: 67
DRG: 871 | End: 2017-02-06
Attending: INTERNAL MEDICINE
Payer: MEDICARE

## 2017-02-06 PROBLEM — R06.00 DYSPNEA: Status: ACTIVE | Noted: 2017-02-06

## 2017-02-06 LAB
ALBUMIN SERPL BCP-MCNC: 2.8 G/DL (ref 3.5–5)
ALBUMIN/GLOB SERPL: 1 {RATIO} (ref 1.1–2.2)
ALP SERPL-CCNC: 84 U/L (ref 45–117)
ALT SERPL-CCNC: 21 U/L (ref 12–78)
ANION GAP BLD CALC-SCNC: 14 MMOL/L (ref 5–15)
ANION GAP BLD CALC-SCNC: 16 MMOL/L (ref 5–15)
APPEARANCE UR: CLEAR
AST SERPL W P-5'-P-CCNC: 21 U/L (ref 15–37)
BACTERIA URNS QL MICRO: NEGATIVE /HPF
BASOPHILS # BLD AUTO: 0 K/UL (ref 0–0.1)
BASOPHILS # BLD: 0 % (ref 0–1)
BILIRUB DIRECT SERPL-MCNC: 0.2 MG/DL (ref 0–0.2)
BILIRUB SERPL-MCNC: 0.6 MG/DL (ref 0.2–1)
BILIRUB UR QL: NEGATIVE
BNP SERPL-MCNC: ABNORMAL PG/ML (ref 0–125)
BUN SERPL-MCNC: 47 MG/DL (ref 6–20)
BUN SERPL-MCNC: 48 MG/DL (ref 6–20)
BUN/CREAT SERPL: 9 (ref 12–20)
BUN/CREAT SERPL: 9 (ref 12–20)
CALCIUM SERPL-MCNC: 7.6 MG/DL (ref 8.5–10.1)
CALCIUM SERPL-MCNC: 7.8 MG/DL (ref 8.5–10.1)
CHLORIDE SERPL-SCNC: 101 MMOL/L (ref 97–108)
CHLORIDE SERPL-SCNC: 106 MMOL/L (ref 97–108)
CHOLEST SERPL-MCNC: 154 MG/DL
CO2 SERPL-SCNC: 22 MMOL/L (ref 21–32)
CO2 SERPL-SCNC: 23 MMOL/L (ref 21–32)
COLOR UR: ABNORMAL
CREAT SERPL-MCNC: 5.23 MG/DL (ref 0.7–1.3)
CREAT SERPL-MCNC: 5.41 MG/DL (ref 0.7–1.3)
DIFFERENTIAL METHOD BLD: ABNORMAL
EOSINOPHIL # BLD: 0 K/UL (ref 0–0.4)
EOSINOPHIL NFR BLD: 0 % (ref 0–7)
EPITH CASTS URNS QL MICRO: ABNORMAL /LPF
ERYTHROCYTE [DISTWIDTH] IN BLOOD BY AUTOMATED COUNT: 14.4 % (ref 11.5–14.5)
GLOBULIN SER CALC-MCNC: 2.9 G/DL (ref 2–4)
GLUCOSE BLD STRIP.AUTO-MCNC: 160 MG/DL (ref 65–100)
GLUCOSE BLD STRIP.AUTO-MCNC: 169 MG/DL (ref 65–100)
GLUCOSE BLD STRIP.AUTO-MCNC: 180 MG/DL (ref 65–100)
GLUCOSE BLD STRIP.AUTO-MCNC: 241 MG/DL (ref 65–100)
GLUCOSE BLD STRIP.AUTO-MCNC: 251 MG/DL (ref 65–100)
GLUCOSE BLD STRIP.AUTO-MCNC: 262 MG/DL (ref 65–100)
GLUCOSE BLD STRIP.AUTO-MCNC: 280 MG/DL (ref 65–100)
GLUCOSE BLD STRIP.AUTO-MCNC: 288 MG/DL (ref 65–100)
GLUCOSE BLD STRIP.AUTO-MCNC: 446 MG/DL (ref 65–100)
GLUCOSE SERPL-MCNC: 173 MG/DL (ref 65–100)
GLUCOSE SERPL-MCNC: 181 MG/DL (ref 65–100)
GLUCOSE UR STRIP.AUTO-MCNC: 100 MG/DL
HCT VFR BLD AUTO: 21.1 % (ref 36.6–50.3)
HDLC SERPL-MCNC: 121 MG/DL
HDLC SERPL: 1.3 {RATIO} (ref 0–5)
HGB BLD-MCNC: 7.3 G/DL (ref 12.1–17)
HGB UR QL STRIP: ABNORMAL
HYALINE CASTS URNS QL MICRO: ABNORMAL /LPF (ref 0–5)
KETONES UR QL STRIP.AUTO: NEGATIVE MG/DL
LACTATE SERPL-SCNC: 1 MMOL/L (ref 0.4–2)
LDLC SERPL CALC-MCNC: 19.8 MG/DL (ref 0–100)
LEUKOCYTE ESTERASE UR QL STRIP.AUTO: NEGATIVE
LIPID PROFILE,FLP: NORMAL
LYMPHOCYTES # BLD AUTO: 9 % (ref 12–49)
LYMPHOCYTES # BLD: 1.9 K/UL (ref 0.8–3.5)
MAGNESIUM SERPL-MCNC: 1.8 MG/DL (ref 1.6–2.4)
MCH RBC QN AUTO: 30.7 PG (ref 26–34)
MCHC RBC AUTO-ENTMCNC: 34.6 G/DL (ref 30–36.5)
MCV RBC AUTO: 88.7 FL (ref 80–99)
MONOCYTES # BLD: 1.9 K/UL (ref 0–1)
MONOCYTES NFR BLD AUTO: 9 % (ref 5–13)
NEUTS SEG # BLD: 17 K/UL (ref 1.8–8)
NEUTS SEG NFR BLD AUTO: 82 % (ref 32–75)
NITRITE UR QL STRIP.AUTO: NEGATIVE
PH UR STRIP: 7.5 [PH] (ref 5–8)
PHOSPHATE SERPL-MCNC: 7.2 MG/DL (ref 2.6–4.7)
PLATELET # BLD AUTO: 283 K/UL (ref 150–400)
PLATELET COMMENTS,PCOM: ABNORMAL
POTASSIUM SERPL-SCNC: 4.4 MMOL/L (ref 3.5–5.1)
POTASSIUM SERPL-SCNC: 4.8 MMOL/L (ref 3.5–5.1)
PROT SERPL-MCNC: 5.7 G/DL (ref 6.4–8.2)
PROT UR STRIP-MCNC: 100 MG/DL
RBC # BLD AUTO: 2.38 M/UL (ref 4.1–5.7)
RBC #/AREA URNS HPF: ABNORMAL /HPF (ref 0–5)
RBC MORPH BLD: ABNORMAL
SERVICE CMNT-IMP: ABNORMAL
SODIUM SERPL-SCNC: 140 MMOL/L (ref 136–145)
SODIUM SERPL-SCNC: 142 MMOL/L (ref 136–145)
SP GR UR REFRACTOMETRY: 1.01 (ref 1–1.03)
TRIGL SERPL-MCNC: 66 MG/DL (ref ?–150)
TSH SERPL DL<=0.05 MIU/L-ACNC: 1.41 UIU/ML (ref 0.36–3.74)
UA: UC IF INDICATED,UAUC: ABNORMAL
UROBILINOGEN UR QL STRIP.AUTO: 0.2 EU/DL (ref 0.2–1)
VLDLC SERPL CALC-MCNC: 13.2 MG/DL
WBC # BLD AUTO: 20.8 K/UL (ref 4.1–11.1)
WBC MORPH BLD: ABNORMAL
WBC URNS QL MICRO: ABNORMAL /HPF (ref 0–4)

## 2017-02-06 PROCEDURE — 74011000250 HC RX REV CODE- 250: Performed by: INTERNAL MEDICINE

## 2017-02-06 PROCEDURE — 74011636637 HC RX REV CODE- 636/637: Performed by: INTERNAL MEDICINE

## 2017-02-06 PROCEDURE — G8987 SELF CARE CURRENT STATUS: HCPCS | Performed by: OCCUPATIONAL THERAPIST

## 2017-02-06 PROCEDURE — 83880 ASSAY OF NATRIURETIC PEPTIDE: CPT | Performed by: INTERNAL MEDICINE

## 2017-02-06 PROCEDURE — 80048 BASIC METABOLIC PNL TOTAL CA: CPT | Performed by: INTERNAL MEDICINE

## 2017-02-06 PROCEDURE — 97165 OT EVAL LOW COMPLEX 30 MIN: CPT | Performed by: OCCUPATIONAL THERAPIST

## 2017-02-06 PROCEDURE — 94003 VENT MGMT INPAT SUBQ DAY: CPT

## 2017-02-06 PROCEDURE — 97116 GAIT TRAINING THERAPY: CPT

## 2017-02-06 PROCEDURE — 74011250636 HC RX REV CODE- 250/636: Performed by: EMERGENCY MEDICINE

## 2017-02-06 PROCEDURE — 84100 ASSAY OF PHOSPHORUS: CPT | Performed by: INTERNAL MEDICINE

## 2017-02-06 PROCEDURE — 83605 ASSAY OF LACTIC ACID: CPT | Performed by: INTERNAL MEDICINE

## 2017-02-06 PROCEDURE — 94640 AIRWAY INHALATION TREATMENT: CPT

## 2017-02-06 PROCEDURE — 77030029684 HC NEB SM VOL KT MONA -A

## 2017-02-06 PROCEDURE — 74011250636 HC RX REV CODE- 250/636: Performed by: INTERNAL MEDICINE

## 2017-02-06 PROCEDURE — 85025 COMPLETE CBC W/AUTO DIFF WBC: CPT | Performed by: INTERNAL MEDICINE

## 2017-02-06 PROCEDURE — G8979 MOBILITY GOAL STATUS: HCPCS

## 2017-02-06 PROCEDURE — 82962 GLUCOSE BLOOD TEST: CPT

## 2017-02-06 PROCEDURE — 93306 TTE W/DOPPLER COMPLETE: CPT

## 2017-02-06 PROCEDURE — 80076 HEPATIC FUNCTION PANEL: CPT | Performed by: INTERNAL MEDICINE

## 2017-02-06 PROCEDURE — 80061 LIPID PANEL: CPT | Performed by: INTERNAL MEDICINE

## 2017-02-06 PROCEDURE — 84443 ASSAY THYROID STIM HORMONE: CPT | Performed by: INTERNAL MEDICINE

## 2017-02-06 PROCEDURE — 36415 COLL VENOUS BLD VENIPUNCTURE: CPT | Performed by: INTERNAL MEDICINE

## 2017-02-06 PROCEDURE — 74011250637 HC RX REV CODE- 250/637: Performed by: INTERNAL MEDICINE

## 2017-02-06 PROCEDURE — 77010033678 HC OXYGEN DAILY

## 2017-02-06 PROCEDURE — G8988 SELF CARE GOAL STATUS: HCPCS | Performed by: OCCUPATIONAL THERAPIST

## 2017-02-06 PROCEDURE — 71010 XR CHEST PORT: CPT

## 2017-02-06 PROCEDURE — 65610000006 HC RM INTENSIVE CARE

## 2017-02-06 PROCEDURE — 76450000000

## 2017-02-06 PROCEDURE — 97161 PT EVAL LOW COMPLEX 20 MIN: CPT

## 2017-02-06 PROCEDURE — 81001 URINALYSIS AUTO W/SCOPE: CPT | Performed by: INTERNAL MEDICINE

## 2017-02-06 PROCEDURE — 74011000258 HC RX REV CODE- 258: Performed by: EMERGENCY MEDICINE

## 2017-02-06 PROCEDURE — 83735 ASSAY OF MAGNESIUM: CPT | Performed by: INTERNAL MEDICINE

## 2017-02-06 PROCEDURE — G8978 MOBILITY CURRENT STATUS: HCPCS

## 2017-02-06 RX ORDER — INSULIN LISPRO 100 [IU]/ML
INJECTION, SOLUTION INTRAVENOUS; SUBCUTANEOUS
Status: DISCONTINUED | OUTPATIENT
Start: 2017-02-06 | End: 2017-02-10 | Stop reason: HOSPADM

## 2017-02-06 RX ORDER — INSULIN GLARGINE 100 [IU]/ML
8 INJECTION, SOLUTION SUBCUTANEOUS DAILY
Status: DISCONTINUED | OUTPATIENT
Start: 2017-02-06 | End: 2017-02-07

## 2017-02-06 RX ADMIN — IPRATROPIUM BROMIDE AND ALBUTEROL SULFATE 3 ML: .5; 3 SOLUTION RESPIRATORY (INHALATION) at 13:59

## 2017-02-06 RX ADMIN — FUROSEMIDE 40 MG: 10 INJECTION, SOLUTION INTRAMUSCULAR; INTRAVENOUS at 09:13

## 2017-02-06 RX ADMIN — HEPARIN SODIUM 5000 UNITS: 5000 INJECTION, SOLUTION INTRAVENOUS; SUBCUTANEOUS at 13:08

## 2017-02-06 RX ADMIN — CEFEPIME HYDROCHLORIDE 2 G: 2 INJECTION, POWDER, FOR SOLUTION INTRAVENOUS at 20:01

## 2017-02-06 RX ADMIN — WATER: 1000 INJECTION, SOLUTION INTRAVENOUS at 00:25

## 2017-02-06 RX ADMIN — PRAVASTATIN SODIUM 10 MG: 10 TABLET ORAL at 21:56

## 2017-02-06 RX ADMIN — INSULIN LISPRO 4 UNITS: 100 INJECTION, SOLUTION INTRAVENOUS; SUBCUTANEOUS at 11:30

## 2017-02-06 RX ADMIN — ERYTHROPOIETIN 10000 UNITS: 10000 INJECTION, SOLUTION INTRAVENOUS; SUBCUTANEOUS at 18:47

## 2017-02-06 RX ADMIN — CALCITRIOL 0.25 MCG: 0.25 CAPSULE, LIQUID FILLED ORAL at 09:56

## 2017-02-06 RX ADMIN — MUPIROCIN: 20 OINTMENT TOPICAL at 18:23

## 2017-02-06 RX ADMIN — MUPIROCIN: 20 OINTMENT TOPICAL at 09:56

## 2017-02-06 RX ADMIN — HEPARIN SODIUM 5000 UNITS: 5000 INJECTION, SOLUTION INTRAVENOUS; SUBCUTANEOUS at 04:53

## 2017-02-06 RX ADMIN — INSULIN GLARGINE 8 UNITS: 100 INJECTION, SOLUTION SUBCUTANEOUS at 11:25

## 2017-02-06 RX ADMIN — LEVOFLOXACIN 500 MG: 5 INJECTION, SOLUTION INTRAVENOUS at 20:01

## 2017-02-06 RX ADMIN — IPRATROPIUM BROMIDE AND ALBUTEROL SULFATE 3 ML: .5; 3 SOLUTION RESPIRATORY (INHALATION) at 07:25

## 2017-02-06 RX ADMIN — AMLODIPINE BESYLATE 10 MG: 5 TABLET ORAL at 09:55

## 2017-02-06 RX ADMIN — HEPARIN SODIUM 5000 UNITS: 5000 INJECTION, SOLUTION INTRAVENOUS; SUBCUTANEOUS at 20:01

## 2017-02-06 RX ADMIN — INSULIN LISPRO 3 UNITS: 100 INJECTION, SOLUTION INTRAVENOUS; SUBCUTANEOUS at 16:30

## 2017-02-06 RX ADMIN — TAMSULOSIN HYDROCHLORIDE 0.4 MG: 0.4 CAPSULE ORAL at 09:57

## 2017-02-06 RX ADMIN — FUROSEMIDE 40 MG: 10 INJECTION, SOLUTION INTRAMUSCULAR; INTRAVENOUS at 18:12

## 2017-02-06 RX ADMIN — INSULIN LISPRO 1 UNITS: 100 INJECTION, SOLUTION INTRAVENOUS; SUBCUTANEOUS at 21:55

## 2017-02-06 RX ADMIN — Medication 10 ML: at 14:00

## 2017-02-06 RX ADMIN — IPRATROPIUM BROMIDE AND ALBUTEROL SULFATE 3 ML: .5; 3 SOLUTION RESPIRATORY (INHALATION) at 21:01

## 2017-02-06 RX ADMIN — PANTOPRAZOLE SODIUM 40 MG: 40 TABLET, DELAYED RELEASE ORAL at 09:56

## 2017-02-06 RX ADMIN — Medication 10 ML: at 21:51

## 2017-02-06 RX ADMIN — MIRTAZAPINE 15 MG: 15 TABLET, FILM COATED ORAL at 21:56

## 2017-02-06 RX ADMIN — RENO CAPS 1 CAPSULE: 100; 1.5; 1.7; 20; 10; 1; 150; 5; 6 CAPSULE ORAL at 09:56

## 2017-02-06 RX ADMIN — VANCOMYCIN HYDROCHLORIDE 750 MG: 750 INJECTION, POWDER, LYOPHILIZED, FOR SOLUTION INTRAVENOUS at 11:49

## 2017-02-06 RX ADMIN — CHLORHEXIDINE GLUCONATE 15 ML: 1.2 RINSE ORAL at 09:56

## 2017-02-06 RX ADMIN — IPRATROPIUM BROMIDE AND ALBUTEROL SULFATE 3 ML: .5; 3 SOLUTION RESPIRATORY (INHALATION) at 02:10

## 2017-02-06 RX ADMIN — Medication 10 ML: at 05:01

## 2017-02-06 RX ADMIN — PROPOFOL 40 MCG/KG/MIN: 10 INJECTION, EMULSION INTRAVENOUS at 04:53

## 2017-02-06 NOTE — CONSULTS
Palliative Medicine Consult  [place]: [phone] ([phone])    Patient Name: Valeen Mcardle. YOB: 1950    Date of Initial Consult: 2/6/17  Reason for Consult: care decisions  Requesting Provider: Kinsey Lui   Primary Care Physician: Yinka Koenig MD      SUMMARY:   Valeen Mcardle. is a 61 y.o. with a past history of DM, HTN, frontotemporal Alzheimer's dementia, CKD stage 4, CAD, prostate CA,, who was admitted on 2/4/2017 from [address] [place] [address] with a diagnosis of HCAP. Current medical issues leading to Palliative Medicine involvement include: dementia, dyspnea, care decisions. Pt was seen by PM 1/2017, at which time sister Kimberly [place] [facility] made pt DNR, comfort care, do not rehospitalize. He was readmitted 2 days ago for dyspnea. He was given 2 Liters IVF per sepsis protocol and subsequently went into flash pulmonary edema and required intubation. He was diuresed and subsequently extubated today. PALLIATIVE DIAGNOSES:   1. Dyspnea   2. Dementia   3. Fatigue   4. Debility  5. Care decisions   6. hyperglycemia     PLAN:   1. Met with pt and son Servando; Servando deferred all decision-making to his aunt (pt's sister) Kimberly Perkins who is patient's mPOA. 2. Pt does not meet hospice criteria, I sent a message to [facility]iative [facility] to see if they can follow him as outpatient; once I hear back I will contact Kimberly Perkins to discuss POC. 3. Initial consult note routed to primary continuity provider  4. Communicated plan of care with: Palliative IDT, ICU IDT       GOALS OF CARE / TREATMENT PREFERENCES:   [====Goals of Care====]  GOALS OF CARE:  Patient / health care proxy stated goals:     Pending      TREATMENT PREFERENCES:   Code Status: DNR    Advance Care Planning:  No flowsheet data found.     Other:    The palliative care team has discussed with patient / health care proxy about goals of care / treatment preferences for patient.  [====Goals of Care====]         HISTORY:     History obtained from: chart, son, patient    CHIEF COMPLAINT: \"I don't know, I feel fine! \"    HPI/SUBJECTIVE:    The patient is:   [x] Verbal and participatory  [] Non-participatory due to:     Patient responds to all questions with \"I don't know\", except when asked how he is feeling. Cannot tell me where he lives, what brought him here. Clinical Pain Assessment (nonverbal scale for severity on nonverbal patients):   Patient denies  [++++ Clinical Pain Assessment++++]  [++++Pain Severity++++]: Pain: 0  [++++Pain Character++++]:   [++++Pain Duration++++]:   [++++Pain Effect++++]:   [++++Pain Factors++++]:   [++++Pain Frequency++++]:   [++++Pain Location++++]:   [++++ Clinical Pain Assessment++++]     FUNCTIONAL ASSESSMENT:     Palliative Performance Scale (PPS):  PPS: 50       PSYCHOSOCIAL/SPIRITUAL SCREENING:     Advance Care Planning:  No flowsheet data found. Any spiritual / Hinduism concerns:  [] Yes /  [x] No    Caregiver Burnout:  [] Yes /  [] No /  [x] No Caregiver Present      Anticipatory grief assessment:   [x] Normal  / [] Maladaptive       ESAS Anxiety: Anxiety: 0    ESAS Depression: Depression: 0        REVIEW OF SYSTEMS:     Positive and pertinent negative findings in ROS are noted above in HPI. The following systems were [x] reviewed / [] unable to be reviewed as noted in HPI  Other findings are noted below. Systems: constitutional, ears/nose/mouth/throat, respiratory, gastrointestinal, genitourinary, musculoskeletal, integumentary, neurologic, psychiatric, endocrine. Positive findings noted below.   Modified ESAS Completed by: provider   Fatigue: 5 Drowsiness: 0   Depression: 0 Pain: 0   Anxiety: 0 Nausea: 0   Anorexia: 0 Dyspnea: 0     Constipation: No     Stool Occurrence(s): 1        PHYSICAL EXAM:     From RN flowsheet:  Wt Readings from Last 3 Encounters:   02/06/17 127 lb 10.3 oz (57.9 kg)   01/18/17 160 lb 5.1 oz (72.7 kg)   01/16/17 125 lb (56.7 kg)     Blood pressure 131/73, pulse (!) 104, temperature 99 °F (37.2 °C), resp. rate 16, height 5' 5\" (1.651 m), weight 127 lb 10.3 oz (57.9 kg), SpO2 100 %. Pain Scale 1: Numeric (0 - 10)  Pain Intensity 1: 0                 Last bowel movement, if known:       Constitutional: WD, WN, NAD, sitting in chair at bedside  Eyes: pupils equal, anicteric  ENMT: no nasal discharge, moist mucous membranes  Cardiovascular: regular rhythm, distal pulses intact  Respiratory: breathing not labored, symmetric, Sp02 100% without 02  Gastrointestinal: soft non-tender, +bowel sounds  Musculoskeletal: no deformity, no tenderness to palpation  Skin: warm, dry  Neurologic: following commands, moving all extremities, very poor memory   Psychiatric: full affect, no hallucinations     HISTORY:     Active Problems:    Pneumonia (2/4/2017)      Past Medical History   Diagnosis Date    Alzheimer disease     CAD (coronary artery disease)     Cancer ([facility] [address]      prostate    CKD (chronic kidney disease) stage 4, GFR 15-29 ml/min ([facility])     DM (diabetes mellitus), type 2, uncontrolled ([facility])     Hyperlipidemia     Hypertension     Other ill-defined conditions(799.89)      blind R eye-retina detachment    Other ill-defined conditions(799.89)      right cerebellopontine angle lipoma. Past Surgical History   Procedure Laterality Date    Hx shoulder arthroscopy       right    Hx urological       prostate bx    Hx heent       retinal detachment    Upper gi endoscopy,biopsy  11/12/2014          Colonoscopy,diagnostic  11/12/2014            Family History   Problem Relation Age of Onset    Heart Disease Mother      Pacemaker    Cancer Father       History reviewed, no pertinent family history.   Social History   Substance Use Topics    Smoking status: Never Smoker    Smokeless tobacco: Never Used    Alcohol use No     Allergies   Allergen Reactions    Ace Inhibitors Unknown (comments)    Arb-Angiotensin Receptor Antagonist Unknown (comments)      Current Facility-Administered Medications   Medication Dose Route Frequency    epoetin shaye (EPOGEN;PROCRIT) injection 10,000 Units  10,000 Units SubCUTAneous EVERY MON & TH    insulin glargine (LANTUS) injection 8 Units  8 Units SubCUTAneous DAILY    insulin lispro (HUMALOG) injection +++Home sliding scale+++   SubCUTAneous AC&HS    glucose chewable tablet 16 g  4 Tab Oral PRN    dextrose (D50W) injection syrg 12.5-25 g  12.5-25 g IntraVENous PRN    glucagon (GLUCAGEN) injection 1 mg  1 mg IntraMUSCular PRN    furosemide (LASIX) injection 40 mg  40 mg IntraVENous BID    mupirocin (BACTROBAN) 2 % ointment   Both Nostrils BID    cefepime (MAXIPIME) 2 g in 0.9% sodium chloride (MBP/ADV) 100 mL  2 g IntraVENous Q24H    sodium chloride (NS) flush 5-10 mL  5-10 mL IntraVENous Q8H    sodium chloride (NS) flush 5-10 mL  5-10 mL IntraVENous PRN    acetaminophen (TYLENOL) tablet 650 mg  650 mg Oral Q4H PRN    ondansetron (ZOFRAN) injection 4 mg  4 mg IntraVENous Q4H PRN    bisacodyl (DULCOLAX) tablet 5 mg  5 mg Oral DAILY PRN    0.9% sodium chloride infusion  50 mL/hr IntraVENous CONTINUOUS    heparin (porcine) injection 5,000 Units  5,000 Units SubCUTAneous Q8H    albuterol-ipratropium (DUO-NEB) 2.5 MG-0.5 MG/3 ML  3 mL Nebulization Q6H RT    albuterol (PROVENTIL VENTOLIN) nebulizer solution 2.5 mg  2.5 mg Nebulization Q4H PRN    amLODIPine (NORVASC) tablet 10 mg  10 mg Oral DAILY    calcitRIOL (ROCALTROL) capsule 0.25 mcg  0.25 mcg Oral DAILY    mirtazapine (REMERON) tablet 15 mg  15 mg Oral QHS    pravastatin (PRAVACHOL) tablet 10 mg  10 mg Oral QHS    tamsulosin (FLOMAX) capsule 0.4 mg  0.4 mg Oral DAILY    B complex-vitaminC-folic acid (NEPHROCAP) cap  1 Cap Oral DAILY    pantoprazole (PROTONIX) tablet 40 mg  40 mg Oral ACB    hydrALAZINE (APRESOLINE) 20 mg/mL injection 10 mg  10 mg IntraVENous Q6H PRN    levoFLOXacin (LEVAQUIN) 500 mg in D5W IVPB  500 mg IntraVENous Q48H    vancomycin (VANCOCIN) 750 mg in 0.9% sodium chloride (MBP/ADV) 250 mL  750 mg IntraVENous Q36H          LAB AND IMAGING FINDINGS:     Lab Results   Component Value Date/Time    WBC 20.8 02/06/2017 02:36 AM    HGB 7.3 02/06/2017 02:36 AM    PLATELET 073 99/04/1215 02:36 AM     Lab Results   Component Value Date/Time    Sodium 140 02/06/2017 02:36 PM    Potassium 4.4 02/06/2017 02:36 PM    Chloride 101 02/06/2017 02:36 PM    CO2 23 02/06/2017 02:36 PM    BUN 47 02/06/2017 02:36 PM    Creatinine 5.41 02/06/2017 02:36 PM    Calcium 7.6 02/06/2017 02:36 PM    Magnesium 1.8 02/06/2017 02:36 AM    Phosphorus 7.2 02/06/2017 02:36 AM      Lab Results   Component Value Date/Time    AST (SGOT) 21 02/06/2017 02:36 AM    Alk. phosphatase 84 02/06/2017 02:36 AM    Protein, total 5.7 02/06/2017 02:36 AM    Albumin 2.8 02/06/2017 02:36 AM    Globulin 2.9 02/06/2017 02:36 AM     Lab Results   Component Value Date/Time    INR 1.0 06/14/2012 12:20 PM    Prothrombin time 10.6 06/14/2012 12:20 PM    aPTT 27.8 06/14/2012 12:20 PM      Lab Results   Component Value Date/Time    Iron 34 01/18/2017 06:21 PM    TIBC 252 01/18/2017 06:21 PM    Iron % saturation 13 01/18/2017 06:21 PM    Ferritin 440 01/18/2017 06:21 PM      Lab Results   Component Value Date/Time    pH 7.04 02/05/2017 10:00 AM    PCO2 39 02/05/2017 10:00 AM    PO2 376 02/05/2017 10:00 AM     No components found for: [place]   Lab Results   Component Value Date/Time     02/05/2017 05:42 AM    CK - MB 7.6 02/05/2017 05:42 AM                Total time: 75 min  Counseling / coordination time: 65 min  > 50% counseling / coordination?: no    Prolonged service was provided for  []30 min   []75 min in face to face time in the presence of the patient. Time Start:   Time End:   Note: this can only be billed with 91269 (initial) or 64929 (follow up). If multiple start / stop times, list each separately.

## 2017-02-06 NOTE — PROGRESS NOTES
02/06/17 8595   Patient Observations   Pulse (Heart Rate) (!) 104   Resp Rate 23   O2 Sat (%) 100 %   Vent Settings   FIO2 (%) 50 %   Pressure Support (cm H2O) 5 cm H2O   PEEP/VENT (cm H2O) 5 cm H20   ABCDE Bundle   SBT Safety Screen Passed Yes   SBT Trial Passed Yes   Weaning Parameters   Spontaneous Breathing Trial Complete Yes   Resp Rate Observed 23   Ve 13      RSBI 45   Vent Method/Mode   Ventilation Method Conventional   Ventilator  Mode Spontaneous

## 2017-02-06 NOTE — PROGRESS NOTES
Hospitalist Progress Note    NAME: Irvine Gaucher. :  1950   MRN:  [id number]       Interim Hospital Summary: 61 y.o. male whom presented on 2017 with      Assessment / Plan:  1. Acute hypoxic respiratory failure:  Continue with current vent support, and monitor respiratory status.     2. PNA:  Continue with Cefepime/Vanco/Levaquin, and follow cultures.     3. CKD:  Creatinine stable. Continue to monitor renal function closely.     4. Hyperkalemia; Resolved.     5. CAD; Continue with BB.     6. H/o CHF:  Continue with IV Lasix, obtain echo, and monitor volume status.     7. HTN:  Fairly under control. Continue with BB.     8. DM:  HG A1C pending. Continue with basal/prandial Insulins, and monitor FSs.     9. Dyslipidemia:  Continue with statin.     10. H/o prostate CA:  Continue with Flomax.     11. Depression:  Continue with Remeron.     12. Anemia:  H/H stable. Continue with Epoetin.                                     Code status: DNR  Prophylaxis: SC Heparin  Recommended Disposition: Continue with ICU care     Subjective:     Chief Complaint / Reason for Physician Visit  Patient seen and examined. Patient is intubated and on mechanical ventilator. Discussed with RN events overnight. Review of Systems:  Symptom Y/N Comments  Symptom Y/N Comments   Fever/Chills    Chest Pain     Poor Appetite    Edema     Cough    Abdominal Pain     Sputum    Joint Pain     SOB/LAUREANO Y   Pruritis/Rash     Nausea/vomit    Tolerating PT/OT     Diarrhea    Tolerating Diet     Constipation    Other       Could NOT obtain due to:      Objective:     VITALS:   Last 24hrs VS reviewed since prior progress note.  Most recent are:  Patient Vitals for the past 24 hrs:   Temp Pulse Resp BP SpO2   17 1118 - - - 150/74 -   17 1100 - (!) 106 18 144/70 98 %   17 1000 - (!) 109 15 148/71 98 %   17 0955 - (!) 109 - 157/71 -   17 0915 - (!) 108 20 150/70 100 %   17 0900 - (!) 110 19 152/63 99 %   02/06/17 0852 - - - - 98 %   02/06/17 0830 - (!) 104 22 161/70 100 %   02/06/17 0800 - (!) 106 19 162/66 100 %   02/06/17 0730 - (!) 112 16 165/79 100 %   02/06/17 0726 98.8 °F (37.1 °C) (!) 112 16 165/77 100 %   02/06/17 0700 - (!) 107 14 164/81 100 %   02/06/17 0650 - (!) 113 24 - 100 %   02/06/17 0632 - (!) 104 23 - 100 %   02/06/17 0600 - 91 16 140/63 100 %   02/06/17 0500 - 95 14 153/71 100 %   02/06/17 0426 - 98 12 - 100 %   02/06/17 0400 99.4 °F (37.4 °C) 98 16 154/73 100 %   02/06/17 0300 - 100 16 158/78 100 %   02/06/17 0210 - - - - 100 %   02/06/17 0200 - 98 16 168/83 100 %   02/06/17 0100 - 90 16 151/73 100 %   02/06/17 0006 - 95 14 - 100 %   02/06/17 0000 - 97 16 141/69 100 %   02/05/17 2300 99.1 °F (37.3 °C) (!) 101 16 155/71 100 %   02/05/17 2200 - (!) 103 20 161/76 100 %   02/05/17 2100 100 °F (37.8 °C) (!) 108 21 148/68 100 %   02/05/17 2030 - (!) 108 22 - 100 %   02/05/17 2000 - (!) 102 16 167/86 100 %   02/05/17 1957 - - - - 100 %   02/05/17 1900 - (!) 104 16 156/77 100 %   02/05/17 1630 - (!) 110 29 151/63 100 %   02/05/17 1600 100.3 °F (37.9 °C) (!) 109 19 160/68 100 %   02/05/17 1530 - (!) 114 28 154/64 100 %   02/05/17 1500 99.6 °F (37.6 °C) (!) 112 21 154/67 100 %   02/05/17 1430 - (!) 124 (!) 34 171/68 100 %   02/05/17 1400 - (!) 117 27 164/70 100 %   02/05/17 1330 - (!) 123 28 168/73 100 %   02/05/17 1300 97.7 °F (36.5 °C) (!) 122 27 166/66 100 %   02/05/17 1239 - (!) 117 26 - 100 %   02/05/17 1230 - (!) 117 23 159/63 100 %       Intake/Output Summary (Last 24 hours) at 02/06/17 1208  Last data filed at 02/06/17 1002   Gross per 24 hour   Intake          3481.11 ml   Output             5025 ml   Net         -1543.89 ml        PHYSICAL EXAM:  General: WD, WN. Alert, cooperative, no acute distress    EENT:  EOMI. Anicteric sclerae. MMM  Resp:  CTA bilaterally, no wheezing or rales. No accessory muscle use  CV:  Regular  rhythm,  No edema  GI:  Soft, Non distended, Non tender.  +Bowel sounds  Neurologic:  Alert and oriented X 3, normal speech,   Psych:   Good insight. Not anxious nor agitated  Skin:  No rashes. No jaundice    Reviewed most current lab test results and cultures  YES  Reviewed most current radiology test results   YES  Review and summation of old records today    NO  Reviewed patient's current orders and MAR    YES  PMH/SH reviewed - no change compared to H&P  ________________________________________________________________________  Care Plan discussed with:    Comments   Patient     Family      RN Y    Care Manager     Consultant                        Multidiciplinary team rounds were held today with , nursing, pharmacist and clinical coordinator. Patient's plan of care was discussed; medications were reviewed and discharge planning was addressed. ________________________________________________________________________  Total NON critical care TIME:  30  Minutes    Total CRITICAL CARE TIME Spent:   Minutes non procedure based      Comments   >50% of visit spent in counseling and coordination of care Y    ________________________________________________________________________  Margret Garza MD     Procedures: see electronic medical records for all procedures/Xrays and details which were not copied into this note but were reviewed prior to creation of Plan. LABS:  I reviewed today's most current labs and imaging studies.   Pertinent labs include:  Recent Labs      02/06/17   0236  02/05/17   0542  02/04/17   2114  02/04/17   1840   WBC  20.8*  15.1*   --   12.8*   HGB  7.3*  8.3*  6.7*  7.4*   HCT  21.1*  25.0*  19.7*  21.7*   PLT  283  346   --   353     Recent Labs      02/06/17   0236  02/05/17   1359  02/05/17   0912  02/05/17   0542  02/04/17   1840   NA  142  143   --   133*  133*  137   K  4.8  5.0  6.7*  7.5*  7.4*  5.7*   CL  106  110*   --   107  106  107   CO2  22  16*   --   7*  7*  17*   GLU  181*  190*  668*  577*  555*  87   BUN  48* 47*   --   39*  40*  40*   CREA  5.23*  5.50*   --   4.99*  4.82*  4.94*   CA  7.8*  8.6   --   7.6*  7.6*  7.9*   MG  1.8   --    --    --    --    PHOS  7.2*   --    --    --    --    ALB  2.8*   --    --    --   3.3*   TBILI  0.6   --    --    --   0.3   SGOT  21   --    --    --   21   ALT  21   --    --    --   23       Signed: Margret Garza MD

## 2017-02-06 NOTE — PROGRESS NOTES
Note: patient arrived here with durable DNR dated January 19, 2017. Will convert patient to DNR in EMR per his previously expressed medical directives.   Sid Garcia MD

## 2017-02-06 NOTE — PROGRESS NOTES
0730: Initial assessment complete. Pt. On SBT, alert, looks comfortable, not restrained, follows commands, propofol has been on standby, fentanyl at 25 mcg; VSS; IJ and spencer line intact. 0845: Pt. Extubated to [facility], oriented to self and place  0950: bedside swallow eval complete, see chart, passed   1040: interdisciplinary rounds held over patient, diet and insulin coverage obtained, fluids stopped   1255: re-assessment complete. Pt. Oriented to self, place, time of day. No complaints of pain; all lines intact; Up in chair eating lunch; tachycardic otherwise VSS on 3LNC; will continue to monitor. 1600: re-assessment complete. Pt. Oriented to self, place, time of day. No complaints of pain; all lines intact, Up in chair; tachycardic otherwise VSS on RA; will continue to monitor. 1900: Report given to Jesus Mcclure RN.

## 2017-02-06 NOTE — CDMP QUERY
Dr. Goran Jacobs, Radha Willis  :  Please clarify if this patient is being treated/managed for:    =>conformation Sepsis POA as noted by renal, HP doctor, with [id number], left shift , organ dysfunction: MANA AHRF/HCAP/vent;  =>Other Explanation of clinical findings  =>Unable to Determine (no explanation of clinical findings)    The medical record reflects the following clinical findings, treatment, and risk factors:    Risk Factors: age  Clinical Indicators: sepsis noted in HP, noted by renal, AHRF/HCAP, sever metabolic acidosis. Treatment: [email or web address], Maxipime, Vancomycin, Levaquin,     Please clarify and document your clinical opinion in the progress notes and discharge summary including the definitive and/or presumptive diagnosis, (suspected or probable), related to the above clinical findings. Please include clinical findings supporting your diagnosis. Thank [facility] RN/CDMP  [phone]

## 2017-02-06 NOTE — WOUND CARE
Pressure Ulcer Prevention In basket Alert Received for Luke < 14 (moderate risk).      Suggested Care Plan/Interventions for Nursing  1. Complete Luke Pressure Ulcer Risk Scale and use sub scores to identify appropriate interventions. 2. Perform Assessment: skin, changes in LOC, visual cues for pain, monitor skin under medical devices  3. Respond to Reduced Sensory Perception: changes in LOC, check visual cues for pain, float heels, suspension boots, pressure redistribution bed/mattress/chair cushion, turning and reposition approximately every 2 hours (pillows & wedges), pad between skin to skin, turn & reposition  4. Manage Moisture: absorbent under pads, internal / external urinary device, internal /  external fecal device, minimize layers, contain wound drainage, access need for specialty bed, limit adult briefs, maintain skin hydration (lotion/cream), moisture barrier, offer toileting every hour  5. Promote Activity: increase time out of bed, chair cushion, PT/OT evaluation, trapeze to reposition, pressure redistribution bed/mattress/chair  6. Address Reduced Mobility: float heels / suspension boot, HOB 30 degrees or less, pressure redistribution bed/mattress/cushion, PT / OT evaluation, turn and reposition approximately every 2 hours (pillows & wedges)  7. Promote Nutrition: document food / fluid / supplement intake, encourage/assist with meals as needed  8. Reduce Friction and Shear: transferring/repositioning devices (lift/draw sheet), lift team/ patient mobility team, feet elevated on foot rest, minimize layers, foam dressing / transparent film / skin sealants, protective barrier creams and emollients, transfer aides (board, Nas lift, ceiling lift, stand assist), HOB 30 degrees or less, trapeze to reposition.   Wound Care Team

## 2017-02-06 NOTE — PROGRESS NOTES
NAME: Suzi Jc. :  1950        MRN:  [id number]        Assessment :    Plan:  --CKD-4 - baseline creatinine about 4. MANA  Sepsis   HCAP  DM-hyperglycemia (up to 607)  Metabolic acidosis  Pseudohyponatremia  Hyperkalemia-chronic  Anemia  HTN  SHPT   --Creatinine slightly better (5.5 to 5.2). Great UOP - 4.6 liters. Prerenal +/- ATN. Has declined RRT in the past.    Resolved hyperkalemia with medical management (volume, kayexalate, bicarb). Hgb 7.3    On Calcitriol, phos high    Sounds a bit wet. Stop bicarb gtt and is getting lasix 40 iv bid. Repeat bmp later today. Start epo 10 k sc biw. S/p IV iron in January - none now in face of infection       Subjective:     Chief Complaint:  Alert. Just extubated. Poor historian. Denied sob, chest pain, nausea when directly asked. Did not at all remember who I was. Review of Systems:    Symptom Y/N Comments  Symptom Y/N Comments   Fever/Chills    Chest Pain     Poor Appetite    Edema     Cough    Abdominal Pain     Sputum    Joint Pain     SOB/LAUREANO    Pruritis/Rash     Nausea/vomit    Tolerating PT/OT     Diarrhea    Tolerating Diet     Constipation    Other       Could not obtain due to: See above     Objective:     VITALS:   Last 24hrs VS reviewed since prior progress note. Most recent are:  Visit Vitals    /77 (BP 1 Location: Right arm, BP Patient Position: At rest)    Pulse (!) 112    Temp 98.8 °F (37.1 °C)    Resp 16    Ht 5' 5\" (1.651 m)    Wt 57.9 kg (127 lb 10.3 oz)    SpO2 100%    BMI 21.24 kg/m2       Intake/Output Summary (Last 24 hours) at 17 0826  Last data filed at 17 0731   Gross per 24 hour   Intake          3131.11 ml   Output             4790 ml   Net         -1658.89 ml      Telemetry Reviewed:     PHYSICAL EXAM:  General: WD, WN.  Alert, cooperative, no acute distress  Resp:  CTA Bilaterally upper lobes, Rales to lower lobes. No access. muscle use  CV:  Regular  rhythm,  No murmur (), No Rubs, No Gallops. No edema  GI:  Soft, Non distended, Non tender.  +Bowel sounds, no HSM      Lab Data Reviewed: (see below)    Medications Reviewed: (see below)    PMH/SH reviewed - no change compared to H&P  ________________________________________________________________________  Care Plan discussed with:  Patient y    Family      RN y    Care Manager                    Consultant:          Comments   >50% of visit spent in counseling and coordination of care       ________________________________________________________________________  Kyle Wong MD     Procedures: see electronic medical records for all procedures/Xrays and details which  were not copied into this note but were reviewed prior to creation of Plan. LABS:  Recent Labs      02/06/17 0236 02/05/17   0542   WBC  20.8*  15.1*   HGB  7.3*  8.3*   HCT  21.1*  25.0*   PLT  283  346     Recent Labs      02/06/17 0236 02/05/17   1359  02/05/17   0912  02/05/17   0542   NA  142  143   --   133*  133*   K  4.8  5.0  6.7*  7.5*  7.4*   CL  106  110*   --   107  106   CO2  22  16*   --   7*  7*   BUN  48*  47*   --   39*  40*   CREA  5.23*  5.50*   --   4.99*  4.82*   GLU  181*  190*  668*  577*  555*   CA  7.8*  8.6   --   7.6*  7.6*   MG  1.8   --    --    --    PHOS  7.2*   --    --    --      Recent Labs      02/06/17 0236 02/04/17   1840   SGOT  21  21   AP  84  94   TP  5.7*  6.4   ALB  2.8*  3.3*   GLOB  2.9  3.1     No results for input(s): INR, PTP, APTT in the last 72 hours. No lab exists for component: INREXT   No results for input(s): FE, TIBC, PSAT, FERR in the last 72 hours.    Lab Results   Component Value Date/Time    Folate 53.6 11/11/2014 03:40 AM      Recent Labs      02/05/17   1000  02/05/17   0650   PH  7.04*  7.07*   PCO2  39  16*   PO2  376*  99     Recent Labs      02/05/17   0542  02/04/17   1840   CPK  428*  389*   CKMB  7.6*  7.8* No components found for: [place]  Lab Results   Component Value Date/Time    Color YELLOW/STRAW 02/06/2017 02:36 AM    Appearance CLEAR 02/06/2017 02:36 AM    Specific gravity 1.008 02/06/2017 02:36 AM    Specific gravity 1.010 01/18/2017 03:41 AM    pH (UA) 7.5 02/06/2017 02:36 AM    Protein 100 02/06/2017 02:36 AM    Glucose 100 02/06/2017 02:36 AM    Ketone NEGATIVE  02/06/2017 02:36 AM    Bilirubin NEGATIVE  02/06/2017 02:36 AM    Urobilinogen 0.2 02/06/2017 02:36 AM    Nitrites NEGATIVE  02/06/2017 02:36 AM    Leukocyte Esterase NEGATIVE  02/06/2017 02:36 AM    Epithelial cells FEW 02/06/2017 02:36 AM    Bacteria NEGATIVE  02/06/2017 02:36 AM    WBC 0-4 02/06/2017 02:36 AM    RBC  02/06/2017 02:36 AM       MEDICATIONS:  Current Facility-Administered Medications   Medication Dose Route Frequency    insulin regular (NOVOLIN R, HUMULIN R) 100 Units in 0.9% sodium chloride 100 mL infusion  0-50 Units/hr IntraVENous TITRATE    glucose chewable tablet 16 g  4 Tab Oral PRN    dextrose (D50W) injection syrg 12.5-25 g  12.5-25 g IntraVENous PRN    glucagon (GLUCAGEN) injection 1 mg  1 mg IntraMUSCular PRN    propofol (DIPRIVAN) infusion  5-50 mcg/kg/min IntraVENous TITRATE    furosemide (LASIX) injection 40 mg  40 mg IntraVENous BID    chlorhexidine (PERIDEX) 0.12 % mouthwash 15 mL  15 mL Oral Q12H    sodium bicarbonate (8.4%) 150 mEq in sterile water 1,000 mL infusion   IntraVENous CONTINUOUS    dextrose 5% and 0.9% NaCl infusion  50 mL/hr IntraVENous CONTINUOUS    fentaNYL (PF) 900 mcg/30 ml infusion soln   mcg/hr IntraVENous TITRATE    mupirocin (BACTROBAN) 2 % ointment   Both Nostrils BID    cefepime (MAXIPIME) 2 g in 0.9% sodium chloride (MBP/ADV) 100 mL  2 g IntraVENous Q24H    sodium chloride (NS) flush 5-10 mL  5-10 mL IntraVENous Q8H    sodium chloride (NS) flush 5-10 mL  5-10 mL IntraVENous PRN    acetaminophen (TYLENOL) tablet 650 mg  650 mg Oral Q4H PRN    ondansetron (ZOFRAN) injection 4 mg  4 mg IntraVENous Q4H PRN    bisacodyl (DULCOLAX) tablet 5 mg  5 mg Oral DAILY PRN    0.9% sodium chloride infusion  50 mL/hr IntraVENous CONTINUOUS    heparin (porcine) injection 5,000 Units  5,000 Units SubCUTAneous Q8H    albuterol-ipratropium (DUO-NEB) 2.5 MG-0.5 MG/3 ML  3 mL Nebulization Q6H RT    albuterol (PROVENTIL VENTOLIN) nebulizer solution 2.5 mg  2.5 mg Nebulization Q4H PRN    amLODIPine (NORVASC) tablet 10 mg  10 mg Oral DAILY    calcitRIOL (ROCALTROL) capsule 0.25 mcg  0.25 mcg Oral DAILY    mirtazapine (REMERON) tablet 15 mg  15 mg Oral QHS    pravastatin (PRAVACHOL) tablet 10 mg  10 mg Oral QHS    tamsulosin (FLOMAX) capsule 0.4 mg  0.4 mg Oral DAILY    B complex-vitaminC-folic acid (NEPHROCAP) cap  1 Cap Oral DAILY    dextrose (D50W) injection syrg 12.5-25 g  12.5-25 g IntraVENous PRN    pantoprazole (PROTONIX) tablet 40 mg  40 mg Oral ACB    hydrALAZINE (APRESOLINE) 20 mg/mL injection 10 mg  10 mg IntraVENous Q6H PRN    levoFLOXacin (LEVAQUIN) 500 mg in D5W IVPB  500 mg IntraVENous Q48H    vancomycin (VANCOCIN) 750 mg in 0.9% sodium chloride (MBP/ADV) 250 mL  750 mg IntraVENous Q36H    0.9% sodium chloride infusion 250 mL  250 mL IntraVENous PRN

## 2017-02-06 NOTE — PROGRESS NOTES
Problem: Mobility Impaired (Adult and Pediatric)  Goal: *Acute Goals and Plan of Care (Insert Text)  Physical Therapy Goals  Initiated 2/6/2017  1. Patient will move from supine to sit and sit to supine , scoot up and down and roll side to side in bed with independence within 7 day(s). 2. Patient will transfer from bed to chair and chair to bed with independence using the least restrictive device within 7 day(s). 3. Patient will perform sit to stand with independence within 7 day(s). 4. Patient will ambulate with independence for 200 feet with the least restrictive device within 7 day(s). PHYSICAL THERAPY EVALUATION  Patient: Harleen Villalpando. (64 y.o. male)  Date: 2/6/2017  Primary Diagnosis: Pneumonia        Precautions:  Fall; L eye blind      ASSESSMENT :  Based on the objective data described below, the patient presents with impaired standing balance, generalized weakness, impaired mentation, dementia, increased fall risk, decreased independence, and decline from baseline functional mobility following admission for PNA. Patient reports being independent for mobility and ADLs at baseline. Patient received supine in bed on 3 L/min O2 with VSS and agreeable to therapy evaluation. Patient performed bed mobility with SBA and transfers with min-CGA x 1. Patient with marked posterior lean/LOB in standing, requiring support and cueing to improve standing balance. Patient ambulated 76' with CGA x 1 using cardiac cart. Patient with slow gait speed, shortened step length, and decreased step clearance throughout ambulation. Patient was assisted into bedside chair following therapy session and left with all needs met and nursing present. Unsure what level of care patient receives at [facility], however patient may benefit from [address] at discharge pending progress in acute PT. Patient will continue to benefit from skilled acute PT to improve balance and gait.      Patient will benefit from skilled intervention to address the above impairments. Patients rehabilitation potential is considered to be Good  Factors which may influence rehabilitation potential include:   [ ]         None noted  [X]         Mental ability/status  [ ]         Medical condition  [ ]         Home/family situation and support systems  [ ]         Safety awareness  [ ]         Pain tolerance/management  [ ]         Other:        PLAN :  Recommendations and Planned Interventions:  [X]           Bed Mobility Training             [ ]    Neuromuscular Re-Education  [X]           Transfer Training                   [ ]    Orthotic/Prosthetic Training  [X]           Gait Training                         [ ]    Modalities  [X]           Therapeutic Exercises           [ ]    Edema Management/Control  [X]           Therapeutic Activities            [X]    Patient and Family Training/Education  [ ]           Other (comment):     Frequency/Duration: Patient will be followed by physical therapy  4 times a week to address goals. Discharge Recommendations: Home Health and To Be Determined  Further Equipment Recommendations for Discharge: TBD based on progress in acute PT; likely none       SUBJECTIVE:   Patient stated I live with my mother.       OBJECTIVE DATA SUMMARY:   HISTORY:    Past Medical History   Diagnosis Date    Alzheimer disease      CAD (coronary artery disease)      Cancer ([facility] [address]         prostate    CKD (chronic kidney disease) stage 4, GFR 15-29 ml/min ([facility])      DM (diabetes mellitus), type 2, uncontrolled ([facility] [address]      Hyperlipidemia      Hypertension      Other ill-defined conditions(799.89)         blind R eye-retina detachment    Other ill-defined conditions(799.89)         right cerebellopontine angle lipoma.       Past Surgical History   Procedure Laterality Date    Hx shoulder arthroscopy           right    Hx urological           prostate bx    Hx heent           retinal detachment    Upper gi endoscopy,biopsy   11/12/2014  Colonoscopy,diagnostic   11/12/2014             Prior Level of Function/Home Situation: pt reports independence with mobility and ADLs at baseline; pt reports he lives with mother in one level home, however per chart, patient lives at [facility]; denies fall history  Personal factors and/or comorbidities impacting plan of care: Alzheimers; CAD; HTN; DM     Home Situation  Home Environment: Private residence  # Steps to Enter: 3  Rails to Enter: Yes  Hand Rails : Bilateral  Wheelchair Ramp: Yes  One/Two Story Residence: One story  Living Alone: No  Support Systems: Parent  Patient Expects to be Discharged to[de-identified] Unknown  Current DME Used/Available at Home: Cane, straight  Tub or Shower Type: Tub/Shower combination     EXAMINATION/PRESENTATION/DECISION MAKING:   Critical Behavior:  Neurologic State: Alert  Orientation Level: Oriented to person, Oriented to place, Disoriented to situation, Oriented to time  Cognition: Follows commands, Memory loss     Strength:    Strength: Generally decreased, functional                    Tone & Sensation:   Tone: Normal              Sensation: Intact               Range Of Motion:  AROM: Within functional limits           PROM: Within functional limits           Coordination:  Coordination: Within functional limits     Functional Mobility:  Bed Mobility:  Rolling: Stand-by asssistance  Supine to Sit: Stand-by asssistance     Scooting: Stand-by asssistance  Transfers:  Sit to Stand: Contact guard assistance;Minimum assistance  Stand to Sit: Contact guard assistance;Minimum assistance                       Balance:   Sitting: Intact; Without support  Standing: Impaired; Without support  Standing - Static: Fair  Standing - Dynamic : Fair  Ambulation/Gait Training:  Distance (ft): 75 Feet (ft)  Assistive Device: Gait belt (cardiac cart)  Ambulation - Level of Assistance: Contact guard assistance;Assist x1;Additional time     Gait Description (WDL): Exceptions to [facility]  Gait Abnormalities: Decreased step clearance              Speed/Rosenda: Pace decreased (<100 feet/min)  Step Length: Left shortened;Right shortened     Functional Measure:  Tinetti test:      Sitting Balance: 1  Arises: 1  Attempts to Rise: 1  Immediate Standing Balance: 0  Standing Balance: 1  Nudged: 0  Eyes Closed: 0  Turn 360 Degrees - Continuous/Discontinuous: 0  Turn 360 Degrees - Steady/Unsteady: 0  Sitting Down: 1  Balance Score: 5  Indication of Gait: 1  R Step Length/Height: 1  L Step Length/Height: 1  R Foot Clearance: 1  L Foot Clearance: 1  Step Symmetry: 1  Step Continuity: 0  Path: 1  Trunk: 0  Walking Time: 0  Gait Score: 7  Total Score: 12         Tinetti Test and G-code impairment scale:  Percentage of Impairment CH     0%    CI     1-19% CJ     20-39% CK     40-59% CL     60-79% CM     80-99% CN      100%   Tinetti  Score 0-28 28 23-27 17-22 12-16 6-11 1-5 0          Tinetti Tool Score Risk of Falls  <19 = High Fall Risk  19-24 = Moderate Fall Risk  25-28 = Low Fall Risk  Tinetti ME. Performance-Oriented Assessment of Mobility Problems in Elderly Patients. [facility]; [id number]. (Scoring Description: PT Bulletin Feb. 10, 1993)     Older adults: Nelsy Shine et al, 2009; n = 1000 [facility] elderly evaluated with ABC, AYDIN, ADL, and IADL)  · Mean AYDIN score for males aged 69-68 years = 26.21(3.40)  · Mean AYDIN score for females age 69-68 years = 25.16(4.30)  · Mean AYDIN score for males over 80 years = 23.29(6.02)  · Mean AYDIN score for females over 80 years = 17.20(8.32)         G codes: In compliance with CMSs Claims Based Outcome Reporting, the following G-code set was chosen for this patient based on their primary functional limitation being treated: The outcome measure chosen to determine the severity of the functional limitation was the Tinetti with a score of 12/28 which was correlated with the impairment scale.       · Mobility - Walking and Moving Around:               - CURRENT STATUS:    CK - 40%-59% impaired, limited or restricted               - GOAL STATUS:           CI - 1%-19% impaired, limited or restricted               - D/C STATUS:                       ---------------To be determined---------------      Physical Therapy Evaluation Charge Determination   History Examination Presentation Decision-Making   HIGH Complexity :3+ comorbidities / personal factors will impact the outcome/ POC  HIGH Complexity : 4+ Standardized tests and measures addressing body structure, function, activity limitation and / or participation in recreation  LOW Complexity : Stable, uncomplicated  Other outcome measures Tinetti  HIGH       Based on the above components, the patient evaluation is determined to be of the following complexity level: LOW      Pain:  Pain Scale 1: Numeric (0 - 10)  Pain Intensity 1: 0              Activity Tolerance:   Good - VSS; pt without complaints  Please refer to the flowsheet for vital signs taken during this treatment. After treatment:   [X]         Patient left in no apparent distress sitting up in chair  [ ]         Patient left in no apparent distress in bed  [X]         Call bell left within reach  [X]         Nursing notified  [X]         Caregiver present  [ ]         Bed alarm activated      COMMUNICATION/EDUCATION:   The patients plan of care was discussed with: Physical Therapist, Occupational Therapist and Registered Nurse.  [X]         Fall prevention education was provided and the patient/caregiver indicated understanding. [X]         Patient/family have participated as able in goal setting and plan of care. [X]         Patient/family agree to work toward stated goals and plan of care. [ ]         Patient understands intent and goals of therapy, but is neutral about his/her participation. [ ]         Patient is unable to participate in goal setting and plan of care.      Thank you for this referral.  Annabelle Carranza, PT, DPT   Time Calculation: 24 mins

## 2017-02-06 NOTE — PROGRESS NOTES
02/06/17 0650   Patient Observations   Pulse (Heart Rate) (!) 113   Resp Rate 24   O2 Sat (%) 100 %   Vent Settings   FIO2 (%) 50 %   Back-Up Rate 16   Vt Set (ml) 500 ml   Pressure Support (cm H2O) 5 cm H2O   PEEP/VENT (cm H2O) 5 cm H20   Ventilator Measurements   Resp Rate Observed 24   Vt Spont (ml) 503 ml   Ve Observed (l/min) 10.6 l/min   PIP Observed (cm H2O) 11 cm H2O   MAP (cm H2O) 7.8   Weaning Parameters   RSBI 65   Vent Method/Mode   Ventilation Method Conventional   Ventilator  Mode Spontaneous

## 2017-02-06 NOTE — PROGRESS NOTES
Patient currently hospitalized at [facility] for pnuemonia. Patient resides at [facility] at [phone]. Spoke to Jennifer regarding patient's status. Encouraged  to notify office of patient's hospital discharge and return to facility. Requesting facility record and send patient's most recent blood sugar readings to office for MD review follow hospital discharge. Order faxed to [phone]. Reminded  of patient's next follow up appointment scheduled for 2/22/17 at 2:10 pm.  Will continue to follow.

## 2017-02-06 NOTE — PROGRESS NOTES
02/06/17 0726   Weaning Parameters   Spontaneous Breathing Trial Complete Yes   Resp Rate Observed 13   Ve 9.2      RSBI 19

## 2017-02-06 NOTE — PROGRESS NOTES
[facility]  Pulmonary, Critical Care, and Sleep Medicine    Name: Ismael Rose. MRN: [id number]   : 1950 Hospital: [address]   Date: 2017        Critical Care    IMPRESSION:   · Acute hypoxic respiratory failure required intubation on 17. · Pulmonary edema  · Possible HCAP  · Severe metabolic acidosis  · Hyperglycemia, DM  · CAD, Hypertension on beta blocker  · Anemia suspected GI blood loss   · CRI with acute kidney injury. , with hyperkalemia, acidosis. Baseline Cr is 4. Pt had not wanted HD in past.   · Hx of prostate Ca  · Depression  · Alzheimers dementia, pt is a resident of [facility]. 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 · Nonsmoker      RECOMMENDATIONS:   · Wean ventilator to extubation - doing well on SBT  · Empiric abx  · Blood transfusion for Hgb <7  · Acute treatment was given earlier for hyperkalemia  · Was given bicarb, calcium. · Nephrology following  · DVT/GI prophylaxis  · Palliative care consult - patient has refused dialysis, but now having life-threatening issues surrounding his kidney disease - long-term decision making needs to be addressed (ie code status)     Subjective/History: This patient has been seen and evaluated at the request of Dr. Tyler Hadley for above. Patient is a 61 y.o. male who was admitted with respiratory insufficency. Worsened this am early and required emergent intubation. Has also been noted to have hyperglycemia and hyperkalemia. Has been having increased shortness of breath, chills prior to ER eval.       The patient is critically ill and can not provide additional history due to Ventilated.      Past Medical History   Diagnosis Date    Alzheimer disease     CAD (coronary artery disease)     [facility] ([facility] [address]      prostate    CKD (chronic kidney disease) stage 4, GFR 15-29 ml/min ([facility])     DM (diabetes mellitus), type 2, uncontrolled ([address]     Hyperlipidemia     Hypertension     Other ill-defined conditions(799.89)      blind R eye-retina detachment    Other ill-defined conditions(799.89)      right cerebellopontine angle lipoma. Past Surgical History   Procedure Laterality Date    Hx shoulder arthroscopy       right    Hx urological       prostate bx    Hx heent       retinal detachment    Upper gi endoscopy,biopsy  11/12/2014          Colonoscopy,diagnostic  11/12/2014            Prior to Admission medications    Medication Sig Start Date End Date Taking? Authorizing Provider   albuterol-ipratropium (DUO-NEB) 2.5 mg-0.5 mg/3 ml nebu 3 mL by Nebulization route. Yes Historical Provider   mirtazapine (REMERON) 15 mg tablet Take 15 mg by mouth nightly. Indications: MAJOR DEPRESSIVE DISORDER   Yes Yumi De La O MD   amLODIPine (NORVASC) 10 mg tablet Take 1 Tab by mouth daily. 1/22/17   Fritz Fletcher MD   sodium bicarbonate 650 mg tablet Take 2 Tabs by mouth two (2) times a day. 1/22/17   Fritz Fletcher MD   omeprazole (PRILOSEC) 10 mg capsule Take 10 mg by mouth daily. Indications: GASTROESOPHAGEAL REFLUX    Yumi De La O MD   tamsulosin (FLOMAX) 0.4 mg capsule Take 0.4 mg by mouth daily. Yumi De La O MD   calcitRIOL (ROCALTROL) 0.25 mcg capsule Take 0.25 mcg by mouth daily. Yumi De La O MD   b complex-vitamin c-folic acid (NEPHROCAPS) 1 mg capsule Take 1 Cap by mouth daily. Historical Provider   insulin aspart (NOVOLOG) 100 unit/mL injection Novolog BEFORE MEALS: 0-90: hold, : 1 unit, 176-225: 2 units, 226-300: 3 units, 301 +    : 4 units, Novolog at BEDTIME - 225-300: 1 unit, 301 + 2 units 9/30/15   Bill Garrison MD   glucose blood VI test strips (ASCENSIA AUTODISC VI, ONE TOUCH ULTRA TEST VI) strip Monitor glucose before meals and at bedtime and as needed for symptoms - 4-5 times daily. 9/30/15   Bill Garrison MD   ferrous sulfate (SLOW FE) 142 mg (45 mg iron) ER tablet Take 142 mg by mouth Daily (before breakfast).     Yumi De La O MD   glucagon (GLUCAGEN) 1 mg injection 1 mL by IntraMUSCular route as needed for Hypoglycemia. 6/25/12   Mickey Shields MD   pravastatin (PRAVACHOL) 10 mg tablet Take 1 Tab by mouth nightly. 3/19/12   Lorenzo Hoyos MD   acetaminophen (TYLENOL) 325 mg tablet Take 325 mg by mouth every four (4) hours as needed.     Historical Provider     Current Facility-Administered Medications   Medication Dose Route Frequency    insulin regular (NOVOLIN R, HUMULIN R) 100 Units in 0.9% sodium chloride 100 mL infusion  0-50 Units/hr IntraVENous TITRATE    propofol (DIPRIVAN) infusion  5-50 mcg/kg/min IntraVENous TITRATE    furosemide (LASIX) injection 40 mg  40 mg IntraVENous BID    chlorhexidine (PERIDEX) 0.12 % mouthwash 15 mL  15 mL Oral Q12H    sodium bicarbonate (8.4%) 150 mEq in sterile water 1,000 mL infusion   IntraVENous CONTINUOUS    dextrose 5% and 0.9% NaCl infusion  50 mL/hr IntraVENous CONTINUOUS    fentaNYL (PF) 900 mcg/30 ml infusion soln   mcg/hr IntraVENous TITRATE    mupirocin (BACTROBAN) 2 % ointment   Both Nostrils BID    cefepime (MAXIPIME) 2 g in 0.9% sodium chloride (MBP/ADV) 100 mL  2 g IntraVENous Q24H    sodium chloride (NS) flush 5-10 mL  5-10 mL IntraVENous Q8H    0.9% sodium chloride infusion  50 mL/hr IntraVENous CONTINUOUS    heparin (porcine) injection 5,000 Units  5,000 Units SubCUTAneous Q8H    albuterol-ipratropium (DUO-NEB) 2.5 MG-0.5 MG/3 ML  3 mL Nebulization Q6H RT    amLODIPine (NORVASC) tablet 10 mg  10 mg Oral DAILY    calcitRIOL (ROCALTROL) capsule 0.25 mcg  0.25 mcg Oral DAILY    mirtazapine (REMERON) tablet 15 mg  15 mg Oral QHS    pravastatin (PRAVACHOL) tablet 10 mg  10 mg Oral QHS    tamsulosin (FLOMAX) capsule 0.4 mg  0.4 mg Oral DAILY    B complex-vitaminC-folic acid (NEPHROCAP) cap  1 Cap Oral DAILY    pantoprazole (PROTONIX) tablet 40 mg  40 mg Oral ACB    levoFLOXacin (LEVAQUIN) 500 mg in D5W IVPB  500 mg IntraVENous Q48H    vancomycin (VANCOCIN) 750 mg in 0.9% sodium chloride (MBP/ADV) 250 mL  750 mg IntraVENous Q36H     Allergies   Allergen Reactions    Ace Inhibitors Unknown (comments)    Arb-Angiotensin Receptor Antagonist Unknown (comments)      Social History   Substance Use Topics    Smoking status: Never Smoker    Smokeless tobacco: Never Used    Alcohol use No      Family History   Problem Relation Age of Onset    Heart Disease Mother      Pacemaker    Cancer Father         Review of Systems:  Review of systems not obtained due to patient factors. Objective:   Vital Signs:    Visit Vitals    /77 (BP 1 Location: Right arm, BP Patient Position: At rest)    Pulse (!) 112  Comment: Simultaneous filing. User may not have seen previous data.  Temp 98.8 °F (37.1 °C)    Resp 16  Comment: Simultaneous filing. User may not have seen previous data.  Ht 5' 5\" (1.651 m)    Wt 57.9 kg (127 lb 10.3 oz)    SpO2 98%    BMI 21.24 kg/m2       O2 Device: Nasal cannula   O2 Flow Rate (L/min): 5 l/min   Temp (24hrs), Av.9 °F (37.2 °C), Min:96.5 °F (35.8 °C), Max:100.3 °F (37.9 °C)       Intake/Output:   Last shift:      701 - 1900  In: -   Out: 225 [Urine:225]  Last 3 shifts: 1901 -  07  In: 4046.9 [P.O.:480; I.V.:3566.9]  Out: 4965 [Urine:4965]    Intake/Output Summary (Last 24 hours) at 17 0855  Last data filed at 17 0731   Gross per 24 hour   Intake          3131.11 ml   Output             4790 ml   Net         -1658.89 ml       Ventilator Settings:  Mode Rate Tidal Volume Pressure FiO2 PEEP   Spontaneous   500 ml  5 cm H2O 40 % 5 cm H20     Peak airway pressure: 11 cm H2O    Minute ventilation: 10.3 l/min      Physical Exam:    General:  Intubated but alert   Head:  Normocephalic, without obvious abnormality, atraumatic. Eyes:  Conjunctivae/corneas clear. Nose: Nares normal. Septum midline. Mucosa normal.     Throat: ETT in place   Neck: Supple, symmetrical, trachea midline    Back:   Symmetric, no curvature.  ROM normal. Lungs:   Scattered ronchi   Chest wall:  No tenderness or deformity. Heart:  Regular rate and rhythm    Abdomen:   Soft, non-tender.  Bowel sounds normal.    Extremities: Extremities normal, atraumatic, no cyanosis    Skin: Skin color, texture, turgor normal.    Neurologic: Grossly nonfocal      Data:     Current Facility-Administered Medications   Medication Dose Route Frequency    insulin regular (NOVOLIN R, HUMULIN R) 100 Units in 0.9% sodium chloride 100 mL infusion  0-50 Units/hr IntraVENous TITRATE    propofol (DIPRIVAN) infusion  5-50 mcg/kg/min IntraVENous TITRATE    furosemide (LASIX) injection 40 mg  40 mg IntraVENous BID    chlorhexidine (PERIDEX) 0.12 % mouthwash 15 mL  15 mL Oral Q12H    sodium bicarbonate (8.4%) 150 mEq in sterile water 1,000 mL infusion   IntraVENous CONTINUOUS    dextrose 5% and 0.9% NaCl infusion  50 mL/hr IntraVENous CONTINUOUS    fentaNYL (PF) 900 mcg/30 ml infusion soln   mcg/hr IntraVENous TITRATE    mupirocin (BACTROBAN) 2 % ointment   Both Nostrils BID    cefepime (MAXIPIME) 2 g in 0.9% sodium chloride (MBP/ADV) 100 mL  2 g IntraVENous Q24H    sodium chloride (NS) flush 5-10 mL  5-10 mL IntraVENous Q8H    0.9% sodium chloride infusion  50 mL/hr IntraVENous CONTINUOUS    heparin (porcine) injection 5,000 Units  5,000 Units SubCUTAneous Q8H    albuterol-ipratropium (DUO-NEB) 2.5 MG-0.5 MG/3 ML  3 mL Nebulization Q6H RT    amLODIPine (NORVASC) tablet 10 mg  10 mg Oral DAILY    calcitRIOL (ROCALTROL) capsule 0.25 mcg  0.25 mcg Oral DAILY    mirtazapine (REMERON) tablet 15 mg  15 mg Oral QHS    pravastatin (PRAVACHOL) tablet 10 mg  10 mg Oral QHS    tamsulosin (FLOMAX) capsule 0.4 mg  0.4 mg Oral DAILY    B complex-vitaminC-folic acid (NEPHROCAP) cap  1 Cap Oral DAILY    pantoprazole (PROTONIX) tablet 40 mg  40 mg Oral ACB    levoFLOXacin (LEVAQUIN) 500 mg in D5W IVPB  500 mg IntraVENous Q48H    vancomycin (VANCOCIN) 750 mg in 0.9% sodium chloride (MBP/ADV) 250 mL  750 mg IntraVENous Q36H                Labs:  Recent Labs      02/06/17   0236  02/05/17   0542  02/04/17   2114  02/04/17   1840   WBC  20.8*  15.1*   --   12.8*   HGB  7.3*  8.3*  6.7*  7.4*   HCT  21.1*  25.0*  19.7*  21.7*   PLT  283  346   --   353     Recent Labs      02/06/17   0236  02/05/17   1359  02/05/17   0912  02/05/17   0542  02/04/17   1840   NA  142  143   --   133*  133*  137   K  4.8  5.0  6.7*  7.5*  7.4*  5.7*   CL  106  110*   --   107  106  107   CO2  22  16*   --   7*  7*  17*   GLU  181*  190*  668*  577*  555*  87   BUN  48*  47*   --   39*  40*  40*   CREA  5.23*  5.50*   --   4.99*  4.82*  4.94*   CA  7.8*  8.6   --   7.6*  7.6*  7.9*   MG  1.8   --    --    --    --    PHOS  7.2*   --    --    --    --    ALB  2.8*   --    --    --   3.3*   TBILI  0.6   --    --    --   0.3   SGOT  21   --    --    --   21   ALT  21   --    --    --   23     Recent Labs      02/05/17   1000  02/05/17   0650   PH  7.04*  7.07*   PCO2  39  16*   PO2  376*  99   HCO3  10*  5*   FIO2  100   --      Imaging:  I have personally reviewed the patients radiographs and have reviewed the reports:  Intubated, bilateral effusions        Total critical care time exclusive of procedures: 25 minutes  Norman Crabtree MD

## 2017-02-06 NOTE — Clinical Note
Patient is currently hospitalized with pneumonia. I called [facility] requesting that , Jennifer record and send patient's most recent blood sugar readings and medication regimen to office for MD review following patient's discharge from [address] [place] [address] understands and agrees. Facility manger requesting MD order to send patient's blood sugar readings. [facility] fax number is [phone]. Patient has upcoming follow up appointment scheduled for 2/22/17.

## 2017-02-06 NOTE — PROGRESS NOTES
Problem: Self Care Deficits Care Plan (Adult)  Goal: *Acute Goals and Plan of Care (Insert Text)  Occupational Therapy Goals  Initiated 2/6/2017  1. Patient will perform grooming in standing with supervision/set-up within 7 day(s). 2. Patient will perform upper body dressing and lower body dressing with supervision/set-up within 7 day(s). 3. Patient will tolerate standing adls for at least 10 minutes {with supervision/set-up within 7 day(s). 4. Patient will perform toilet transfers with supervision/set-up within 7 day(s). 5. Patient will perform all aspects of toileting with supervision/set-up within 7 day(s). 6. Patient will participate in upper extremity therapeutic exercise/activities with supervision/set-up for 5 minutes within 7 day(s). OCCUPATIONAL THERAPY EVALUATION  Patient: Kaitlin Rodriguez (64 y.o. male)  Date: 2/6/2017  Primary Diagnosis: Pneumonia        Precautions:   Fall      ASSESSMENT :  Based on the objective data described below, the patient presents with baseline history of alzheimer's dementia and retinal detachment L eye (per pt report), decreased balance, generalized weakness and decreased endurance impairing independence and safety during adls and functional mobility. Patient reported several different answers to baseline questions re: PLOF-per chart pt is a resident of [facility]. No family members were present to confirm baseline information. In Nov. 2014 pt was independent in adls per history in his medical record. Pt is likely functioning below his baseline and will benefit from acute OT to maximize independence and safety as well as functional tolerance during adls. He will likely benefit from returning to [facility] at discharge. Will follow to determine discharge needs. .     Patient will benefit from skilled intervention to address the above impairments.   Patients rehabilitation potential is considered to be Good  Factors which may influence rehabilitation potential include:   [ ]             None noted  [X]             Mental ability/status-demential at baseline  [ ]             Medical condition  [ ]             Home/family situation and support systems  [ ]             Safety awareness  [ ]             Pain tolerance/management  [ ]             Other:        PLAN :  Recommendations and Planned Interventions:  [X]               Self Care Training                  [X]        Therapeutic Activities  [X]               Functional Mobility Training    [ ]        Cognitive Retraining  [X]               Therapeutic Exercises           [X]        Endurance Activities  [X]               Balance Training                   [ ]        Neuromuscular Re-Education  [ ]               Visual/Perceptual Training     [X]   Home Safety Training  [X]               Patient Education                 [ ]        Family Training/Education  [ ]               Other (comment):     Frequency/Duration: Patient will be followed by occupational therapy 4 times a week to address goals. Discharge Recommendations: Skilled Nursing Facility-[facility] to [facility]  Further Equipment Recommendations for Discharge: tbd       SUBJECTIVE:   Patient stated I live with my mother.       OBJECTIVE DATA SUMMARY:   HISTORY:   Past Medical History   Diagnosis Date    Alzheimer disease      CAD (coronary artery disease)      Cancer ([facility] [address]         prostate    CKD (chronic kidney disease) stage 4, GFR 15-29 ml/min ([facility]      DM (diabetes mellitus), type 2, uncontrolled ([facility] [address]      Hyperlipidemia      Hypertension      Other ill-defined conditions(799.89)         blind R eye-retina detachment    Other ill-defined conditions(799.89)         right cerebellopontine angle lipoma.       Past Surgical History   Procedure Laterality Date    Hx shoulder arthroscopy           right    Hx urological           prostate bx    Hx heent           retinal detachment    Upper gi endoscopy,biopsy   11/12/2014            Colonoscopy,diagnostic   11/12/2014                Prior Level of Function/Home Situation: Per chart, pt lives at [facility]. Per pt, he lives with his mother. He reports independence in adls and mobility. Expanded or extensive additional review of patient history:    in Nov. 2014 pt was independent in adls and mobiltiy, scored 100/100 on the Barthel Index per medical record  Home Situation  Home Environment: Private residence  # Steps to Enter: 3  Rails to Enter: Yes  Hand Rails : Bilateral  Wheelchair Ramp: Yes  One/Two Story Residence: One story  Living Alone: No  Support Systems: Parent  Patient Expects to be Discharged to[de-identified] Unknown  Current DME Used/Available at Home: Cane, straight  Tub or Shower Type: Tub/Shower combination  [x ]  Right hand dominant   [ ]  Left hand dominant     EXAMINATION OF PERFORMANCE DEFICITS:  Cognitive/Behavioral Status:  Neurologic State: Alert  Orientation Level: Oriented to person;Oriented to place; Disoriented to situation;Oriented to time  Cognition: Decreased attention/concentration; Follows commands;Memory loss  Perception: Appears intact  Perseveration: No perseveration noted  Safety/Judgement: Decreased awareness of need for assistance;Decreased awareness of need for safety;Decreased insight into deficits  Skin: generally intact  Edema: none observed  Vision/Perceptual:    Tracking:  (vision loss in L eye per pt report)                      Acuity:  (vision loss in L eye per pt)    Corrective Lenses:  (none present)  Range of Motion:  BUEs:  AROM: Within functional limits  PROM: Within functional limits                    Strength:  BUEs:    Strength: Generally decreased, functional    strength is equal bilaterally           Coordination:  Coordination: Within functional limits  Fine Motor Skills-Upper: Left Intact; Right Intact    Gross Motor Skills-Upper: Left Intact; Right Intact  Tone & Sensation:    Tone: Normal  Sensation: Intact Balance:  Sitting: Intact; Without support  Standing: Impaired; Without support  Standing - Static: Fair  Standing - Dynamic : Fair     Functional Mobility and Transfers for ADLs:  Bed Mobility:  Rolling: Stand-by asssistance  Supine to Sit: Stand-by asssistance  Scooting: Stand-by asssistance     Transfers:  Sit to Stand: Contact guard assistance;Minimum assistance  Stand to Sit: Contact guard assistance;Minimum assistance     ADL Assessment:  Feeding: Stand-by assistance;Setup     Oral Facial Hygiene/Grooming: Setup (seated)     Bathing: Minimum assistance; Moderate assistance     Upper Body Dressing: Minimum assistance     Lower Body Dressing: Minimum assistance     Toileting: Minimum assistance                 ADL Intervention and task modifications:   Pt performed ambulation/functional mobility and performed simulated adls. Able to don/doff socks bringing foot up to knee level. Cognitive Retraining  Safety/Judgement: Decreased awareness of need for assistance;Decreased awareness of need for safety;Decreased insight into deficits     Functional Measure:  Barthel Index:    Bathin  Bladder: 10  Bowels: 10  Groomin  Dressin  Feeding: 10  Mobility: 0  Stairs: 0  Toilet Use: 5  Transfer (Bed to Chair and Back): 10  Total: 55       Barthel and G-code impairment scale:  Percentage of impairment CH  0% CI  1-19% CJ  20-39% CK  40-59% CL  60-79% CM  80-99% CN  100%   Barthel Score 0-100 100 99-80 79-60 59-40 20-39 1-19   0   Barthel Score 0-20 20 17-19 13-16 9-12 5-8 1-4 0      The Barthel ADL Index: Guidelines  1. The index should be used as a record of what a patient does, not as a record of what a patient could do. 2. The main aim is to establish degree of independence from any help, physical or verbal, however minor and for whatever reason. 3. The need for supervision renders the patient not independent.   4. A patient's performance should be established using the best available evidence. Asking the patient, friends/relatives and nurses are the usual sources, but direct observation and common sense are also important. However direct testing is not needed. 5. Usually the patient's performance over the preceding 24-48 hours is important, but occasionally longer periods will be relevant. 6. Middle categories imply that the patient supplies over 50 per cent of the effort. 7. Use of aids to be independent is allowed. Zoey Payne., Barthel, DEmmanuelW. (4912). Functional evaluation: the Barthel Index. [address] (14)2. Matteo Andersen simran RUSS Canales, Lloyd Thorne., Nadiya Duran., Daljit Geller, [address] (1999). Measuring the change indisability after inpatient rehabilitation; comparison of the responsiveness of the Barthel Index and Functional Moore Measure. Journal of Neurology, Neurosurgery, and Psychiatry, 66(4), 860-660. Stephie Snowden, NEmmanuelJ.A, LONNIE Lord, & Emily Bergeron MEmmanuelA. (2004.) Assessment of post-stroke quality of life in cost-effectiveness studies: The usefulness of the Barthel Index and the EuroQoL-5D. Quality of Life Research, 13, 221-33        G codes: In compliance with CMSs Claims Based Outcome Reporting, the following G-code set was chosen for this patient based on their primary functional limitation being treated: The outcome measure chosen to determine the severity of the functional limitation was the Barthel Index with a score of 55/100 which was correlated with the impairment scale. ?  Self Care:     - CURRENT STATUS: CK - 40%-59% impaired, limited or restricted    - GOAL STATUS: CJ - 20%-39% impaired, limited or restricted    - D/C STATUS:  ---------------To be determined---------------      Occupational Therapy Evaluation Charge Determination   History Examination Decision-Making   MEDIUM Complexity : Expanded review of history including physical, cognitive and psychosocial  history  MEDIUM Complexity : 3-5 performance deficits relating to physical, cognitive , or psychosocial skils that result in activity limitations and / or participation restrictions MEDIUM Complexity : Patient may present with comorbidities that affect occupational performnce. Miniml to moderate modification of tasks or assistance (eg, physical or verbal ) with assesment(s) is necessary to enable patient to complete evaluation       Based on the above components, the patient evaluation is determined to be of the following complexity level: MEDIUM  Pain:  Pain Scale 1: Numeric (0 - 10)  Pain Intensity 1: 0              Activity Tolerance:   Good. VSS  After treatment:   [ x] Patient left in no apparent distress sitting up in chair  [ ] Patient left in no apparent distress in bed  [x ] Call bell left within reach  [ x] Nursing notified  [ ] Caregiver present  [ ] Bed alarm activated      COMMUNICATION/EDUCATION:   The patients plan of care was discussed with: Physical Therapist and Registered Nurse. [ ] Home safety education was provided and the patient/caregiver indicated understanding. [ x] Patient/family have participated as able in goal setting and plan of care. [ ] Patient/family agree to work toward stated goals and plan of care. [ ] Patient understands intent and goals of therapy, but is neutral about his/her participation. [ x] Patient is unable to participate in goal setting and plan of care. This patients plan of care is appropriate for delegation to [facility].      Thank you for this referral.  Henrry Yip OTR/L  Time Calculation: 22 mins

## 2017-02-06 NOTE — INTERDISCIPLINARY ROUNDS
Interdisciplinary team rounds were held with the following team members:Case Management, Diabetes Treatment Specialist, Nursing, Nutrition, Occupational Therapy, Physical Therapy,Pharmacy, Physician and Respiratory Therapy. Plan of care discussed. See clinical pathway and/or care plan for interventions and desired outcomes.     Goals: extubated this am, restart PTA meds

## 2017-02-06 NOTE — PROGRESS NOTES
Restraint type: Soft restraint:  right wrist and left wrist  Reason for restraints: Interference with medical treatment  Duration: 24 hours    Restraints must be removed when an alternative is available and effective and/or patient no longer meets criteria. Orders must be renewed every calendar day or when discontinued.     The MD must conduct a face to face assessment within 1 calendar day of initiation when initial restraint order is verbal.    Restraints discontinued, patient is able to be verbally redirected and following commands appropriately

## 2017-02-06 NOTE — PROGRESS NOTES
Attended Interdisciplinary rounds in Critical Care Unit, where patient care was discussed. Visit by: Jesus Denson. Devon Calero.  Marlon Martinez MA, [address]

## 2017-02-06 NOTE — PROGRESS NOTES
Bedside and Verbal shift change report given to Fabiola Scott RN (oncoming nurse) by Caleb Gao RN (offgoing nurse). Report included the following information SBAR, Kardex, Procedure Summary, Intake/Output, MAR, Recent Results, Med Rec Status and Cardiac Rhythm NSR/ST. 2015 In to assess patient, noted patient to be drowsy but easy to arouse. Follows commands and good strength noted. Patient is blind in left eye. Assisted with turning and repositioned in bed. Oral care and spencer care provided. Continue to monitor. 2210 Patient has remained calm and cooperative without restraints, needed much verbal redirection at the beginning of shift. Remains on fentanyl and propofol for sedation    0000 Patient continues to rest comfortably, VSS. Turned and repositioned. Oral care provided. Continue to monitor    0230 Patient bathed and cleansed of small incontinent episode. CHG wipes used after basin bath      0630 Patient passed SAT/SBT and with constant verbal direction has not made any attempts to remove ETT. Continue to monitor closely. 0715 Bedside and Verbal shift change report given to MICHAEL Vega (oncoming nurse) by Fabiola Scott RN (offgoing nurse). Report included the following information SBAR, Kardex, Procedure Summary, Intake/Output, MAR, Recent Results, Med Rec Status and Cardiac Rhythm NSR/ST.

## 2017-02-06 NOTE — DIABETES MGMT
DTC Progress Note    Recommendations/ Comments: Pt discussed with rounding team and Dr. Edvin Galicia. Pt off insulin gtt yesterday, no basal insulin ordered. Plan to begin lantus 8units daily and add humalog per PTA med list.      Chart reviewed on Janna Meza during Multidisciplinary Rounds. A1c:   Lab Results   Component Value Date/Time    Hemoglobin A1c 9.4 01/20/2017 04:14 AM    Hemoglobin A1c, External 7.7 08/12/2015           Recent Glucose Results: Lab Results   Component Value Date/Time     (H) 02/06/2017 02:36 AM    GLUCPOC 262 (H) 02/06/2017 01:43 PM    GLUCPOC 446 (H) 02/06/2017 11:20 AM    GLUCPOC 280 (H) 02/06/2017 07:24 AM        Lab Results   Component Value Date/Time    Creatinine 5.23 02/06/2017 02:36 AM       Active Orders   Diet    DIET DIABETIC CONSISTENT CARB Regular; 2 GM NA (House Low NA); FR 1800ML; 70-70-70 (House)        PO intake: Patient Vitals for the past 72 hrs:   % Diet Eaten   02/06/17 1200 100 %   02/04/17 2013 80 %       Will continue to follow as needed. Thank you.   Karlo Fitzgerald RN, [address]

## 2017-02-07 ENCOUNTER — APPOINTMENT (OUTPATIENT)
Dept: GENERAL RADIOLOGY | Age: 67
DRG: 871 | End: 2017-02-07
Attending: INTERNAL MEDICINE
Payer: MEDICARE

## 2017-02-07 LAB
ANION GAP BLD CALC-SCNC: 10 MMOL/L (ref 5–15)
BASOPHILS # BLD AUTO: 0.1 K/UL (ref 0–0.1)
BASOPHILS # BLD: 0 % (ref 0–1)
BNP SERPL-MCNC: ABNORMAL PG/ML (ref 0–125)
BUN SERPL-MCNC: 44 MG/DL (ref 6–20)
BUN/CREAT SERPL: 8 (ref 12–20)
CALCIUM SERPL-MCNC: 7.2 MG/DL (ref 8.5–10.1)
CHLORIDE SERPL-SCNC: 101 MMOL/L (ref 97–108)
CO2 SERPL-SCNC: 26 MMOL/L (ref 21–32)
CREAT SERPL-MCNC: 5.37 MG/DL (ref 0.7–1.3)
EOSINOPHIL # BLD: 0.2 K/UL (ref 0–0.4)
EOSINOPHIL NFR BLD: 1 % (ref 0–7)
ERYTHROCYTE [DISTWIDTH] IN BLOOD BY AUTOMATED COUNT: 14.3 % (ref 11.5–14.5)
GLUCOSE BLD STRIP.AUTO-MCNC: 115 MG/DL (ref 65–100)
GLUCOSE BLD STRIP.AUTO-MCNC: 185 MG/DL (ref 65–100)
GLUCOSE BLD STRIP.AUTO-MCNC: 331 MG/DL (ref 65–100)
GLUCOSE BLD STRIP.AUTO-MCNC: 95 MG/DL (ref 65–100)
GLUCOSE SERPL-MCNC: 61 MG/DL (ref 65–100)
HCT VFR BLD AUTO: 21.8 % (ref 36.6–50.3)
HGB BLD-MCNC: 7.4 G/DL (ref 12.1–17)
LYMPHOCYTES # BLD AUTO: 16 % (ref 12–49)
LYMPHOCYTES # BLD: 2.6 K/UL (ref 0.8–3.5)
MAGNESIUM SERPL-MCNC: 1.8 MG/DL (ref 1.6–2.4)
MCH RBC QN AUTO: 30.7 PG (ref 26–34)
MCHC RBC AUTO-ENTMCNC: 33.9 G/DL (ref 30–36.5)
MCV RBC AUTO: 90.5 FL (ref 80–99)
MONOCYTES # BLD: 1.3 K/UL (ref 0–1)
MONOCYTES NFR BLD AUTO: 8 % (ref 5–13)
NEUTS SEG # BLD: 12.2 K/UL (ref 1.8–8)
NEUTS SEG NFR BLD AUTO: 75 % (ref 32–75)
PHOSPHATE SERPL-MCNC: 6.9 MG/DL (ref 2.6–4.7)
PLATELET # BLD AUTO: 293 K/UL (ref 150–400)
POTASSIUM SERPL-SCNC: 4 MMOL/L (ref 3.5–5.1)
RBC # BLD AUTO: 2.41 M/UL (ref 4.1–5.7)
SERVICE CMNT-IMP: ABNORMAL
SERVICE CMNT-IMP: NORMAL
SODIUM SERPL-SCNC: 137 MMOL/L (ref 136–145)
WBC # BLD AUTO: 16.2 K/UL (ref 4.1–11.1)

## 2017-02-07 PROCEDURE — 74011250636 HC RX REV CODE- 250/636: Performed by: INTERNAL MEDICINE

## 2017-02-07 PROCEDURE — 74011250637 HC RX REV CODE- 250/637: Performed by: INTERNAL MEDICINE

## 2017-02-07 PROCEDURE — 71010 XR CHEST PORT: CPT

## 2017-02-07 PROCEDURE — 65270000029 HC RM PRIVATE

## 2017-02-07 PROCEDURE — 77010033678 HC OXYGEN DAILY

## 2017-02-07 PROCEDURE — 85025 COMPLETE CBC W/AUTO DIFF WBC: CPT | Performed by: INTERNAL MEDICINE

## 2017-02-07 PROCEDURE — 77030029684 HC NEB SM VOL KT MONA -A

## 2017-02-07 PROCEDURE — 82962 GLUCOSE BLOOD TEST: CPT

## 2017-02-07 PROCEDURE — 83880 ASSAY OF NATRIURETIC PEPTIDE: CPT | Performed by: INTERNAL MEDICINE

## 2017-02-07 PROCEDURE — 83735 ASSAY OF MAGNESIUM: CPT | Performed by: INTERNAL MEDICINE

## 2017-02-07 PROCEDURE — 97116 GAIT TRAINING THERAPY: CPT

## 2017-02-07 PROCEDURE — 84100 ASSAY OF PHOSPHORUS: CPT | Performed by: INTERNAL MEDICINE

## 2017-02-07 PROCEDURE — 74011000250 HC RX REV CODE- 250: Performed by: INTERNAL MEDICINE

## 2017-02-07 PROCEDURE — 94640 AIRWAY INHALATION TREATMENT: CPT

## 2017-02-07 PROCEDURE — 80048 BASIC METABOLIC PNL TOTAL CA: CPT | Performed by: INTERNAL MEDICINE

## 2017-02-07 PROCEDURE — 36415 COLL VENOUS BLD VENIPUNCTURE: CPT | Performed by: INTERNAL MEDICINE

## 2017-02-07 PROCEDURE — 74011636637 HC RX REV CODE- 636/637: Performed by: INTERNAL MEDICINE

## 2017-02-07 RX ORDER — IPRATROPIUM BROMIDE AND ALBUTEROL SULFATE 2.5; .5 MG/3ML; MG/3ML
3 SOLUTION RESPIRATORY (INHALATION)
Status: DISCONTINUED | OUTPATIENT
Start: 2017-02-08 | End: 2017-02-10 | Stop reason: HOSPADM

## 2017-02-07 RX ORDER — INSULIN GLARGINE 100 [IU]/ML
6 INJECTION, SOLUTION SUBCUTANEOUS DAILY
Status: DISCONTINUED | OUTPATIENT
Start: 2017-02-08 | End: 2017-02-10 | Stop reason: HOSPADM

## 2017-02-07 RX ADMIN — AMLODIPINE BESYLATE 10 MG: 5 TABLET ORAL at 09:05

## 2017-02-07 RX ADMIN — HEPARIN SODIUM 5000 UNITS: 5000 INJECTION, SOLUTION INTRAVENOUS; SUBCUTANEOUS at 21:23

## 2017-02-07 RX ADMIN — IPRATROPIUM BROMIDE AND ALBUTEROL SULFATE 3 ML: .5; 3 SOLUTION RESPIRATORY (INHALATION) at 08:12

## 2017-02-07 RX ADMIN — PRAVASTATIN SODIUM 10 MG: 10 TABLET ORAL at 21:25

## 2017-02-07 RX ADMIN — CALCITRIOL 0.25 MCG: 0.25 CAPSULE, LIQUID FILLED ORAL at 09:06

## 2017-02-07 RX ADMIN — HEPARIN SODIUM 5000 UNITS: 5000 INJECTION, SOLUTION INTRAVENOUS; SUBCUTANEOUS at 04:58

## 2017-02-07 RX ADMIN — FUROSEMIDE 40 MG: 10 INJECTION, SOLUTION INTRAMUSCULAR; INTRAVENOUS at 09:05

## 2017-02-07 RX ADMIN — Medication 10 ML: at 06:04

## 2017-02-07 RX ADMIN — MUPIROCIN: 20 OINTMENT TOPICAL at 18:56

## 2017-02-07 RX ADMIN — RENO CAPS 1 CAPSULE: 100; 1.5; 1.7; 20; 10; 1; 150; 5; 6 CAPSULE ORAL at 09:06

## 2017-02-07 RX ADMIN — MUPIROCIN: 20 OINTMENT TOPICAL at 09:06

## 2017-02-07 RX ADMIN — PANTOPRAZOLE SODIUM 40 MG: 40 TABLET, DELAYED RELEASE ORAL at 07:01

## 2017-02-07 RX ADMIN — IPRATROPIUM BROMIDE AND ALBUTEROL SULFATE 3 ML: .5; 3 SOLUTION RESPIRATORY (INHALATION) at 20:33

## 2017-02-07 RX ADMIN — INSULIN LISPRO 2 UNITS: 100 INJECTION, SOLUTION INTRAVENOUS; SUBCUTANEOUS at 21:29

## 2017-02-07 RX ADMIN — INSULIN GLARGINE 8 UNITS: 100 INJECTION, SOLUTION SUBCUTANEOUS at 08:23

## 2017-02-07 RX ADMIN — FUROSEMIDE 40 MG: 10 INJECTION, SOLUTION INTRAMUSCULAR; INTRAVENOUS at 17:24

## 2017-02-07 RX ADMIN — TAMSULOSIN HYDROCHLORIDE 0.4 MG: 0.4 CAPSULE ORAL at 09:06

## 2017-02-07 RX ADMIN — Medication 10 ML: at 14:00

## 2017-02-07 RX ADMIN — INSULIN LISPRO 2 UNITS: 100 INJECTION, SOLUTION INTRAVENOUS; SUBCUTANEOUS at 11:30

## 2017-02-07 RX ADMIN — IPRATROPIUM BROMIDE AND ALBUTEROL SULFATE 3 ML: .5; 3 SOLUTION RESPIRATORY (INHALATION) at 02:02

## 2017-02-07 RX ADMIN — INSULIN LISPRO 1 UNITS: 100 INJECTION, SOLUTION INTRAVENOUS; SUBCUTANEOUS at 07:30

## 2017-02-07 RX ADMIN — MIRTAZAPINE 15 MG: 15 TABLET, FILM COATED ORAL at 21:25

## 2017-02-07 RX ADMIN — HEPARIN SODIUM 5000 UNITS: 5000 INJECTION, SOLUTION INTRAVENOUS; SUBCUTANEOUS at 12:31

## 2017-02-07 RX ADMIN — IPRATROPIUM BROMIDE AND ALBUTEROL SULFATE 3 ML: .5; 3 SOLUTION RESPIRATORY (INHALATION) at 14:25

## 2017-02-07 RX ADMIN — SODIUM CHLORIDE 50 ML/HR: 900 INJECTION, SOLUTION INTRAVENOUS at 21:31

## 2017-02-07 RX ADMIN — Medication 10 ML: at 21:22

## 2017-02-07 NOTE — PROGRESS NOTES
[facility]  Pulmonary, Critical Care, and Sleep Medicine    Name: Cam Valdivia. MRN: [id number]   : 1950 Hospital: [facility]   Date: 2017        Critical Care    IMPRESSION:   · Acute hypoxic respiratory failure - resolved  · Pulmonary edema due to ESRD (not on dialysis) - improved  · Severe metabolic acidosis - improved  · Hyperglycemia, DM  · CAD, Hypertension on beta blocker  · Anemia suspected GI blood loss   · CRI with acute kidney injury. , with hyperkalemia, acidosis. Baseline Cr is 4. Pt had not wanted HD in past.   · Hx of prostate Ca  · Depression  · Alzheimers dementia, pt is a resident of [facility]. 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 · Nonsmoker      RECOMMENDATIONS:   · On room air  · Nephrology following  · DVT/GI prophylaxis  · DNR. Was supposed to have a \"do not rehospitalize\" order after his last admission. Transfer to floor. Subjective/History: This patient has been seen and evaluated at the request of Dr. Cecilia Villasenor for above. Patient is a 61 y.o. male who was admitted with respiratory insufficency. Worsened this am early and required emergent intubation. Has also been noted to have hyperglycemia and hyperkalemia. Has been having increased shortness of breath, chills prior to ER eval.       The patient is critically ill and can not provide additional history due to Ventilated. Past Medical History   Diagnosis Date    Alzheimer disease     CAD (coronary artery disease)     Cancer ([address]      prostate    CKD (chronic kidney disease) stage 4, GFR 15-29 ml/min ([facility])     DM (diabetes mellitus), type 2, uncontrolled ([address]     Hyperlipidemia     Hypertension     Other ill-defined conditions(799.89)      blind R eye-retina detachment    Other ill-defined conditions(799.89)      right cerebellopontine angle lipoma.        Past Surgical History   Procedure Laterality Date    Hx shoulder arthroscopy right    Hx urological       prostate bx    Hx heent       retinal detachment    Upper gi endoscopy,biopsy  11/12/2014          Colonoscopy,diagnostic  11/12/2014            Prior to Admission medications    Medication Sig Start Date End Date Taking? Authorizing Provider   albuterol-ipratropium (DUO-NEB) 2.5 mg-0.5 mg/3 ml nebu 3 mL by Nebulization route. Yes Historical Provider   mirtazapine (REMERON) 15 mg tablet Take 15 mg by mouth nightly. Indications: MAJOR DEPRESSIVE DISORDER   Yes Yumi De La O MD   amLODIPine (NORVASC) 10 mg tablet Take 1 Tab by mouth daily. 1/22/17   Maribel Oconnor MD   sodium bicarbonate 650 mg tablet Take 2 Tabs by mouth two (2) times a day. 1/22/17   Maribel Oconnor MD   omeprazole (PRILOSEC) 10 mg capsule Take 10 mg by mouth daily. Indications: GASTROESOPHAGEAL REFLUX    Yumi De La O MD   tamsulosin (FLOMAX) 0.4 mg capsule Take 0.4 mg by mouth daily. Yumi De La O MD   calcitRIOL (ROCALTROL) 0.25 mcg capsule Take 0.25 mcg by mouth daily. Yumi De La O MD   b complex-vitamin c-folic acid (NEPHROCAPS) 1 mg capsule Take 1 Cap by mouth daily. Historical Provider   insulin aspart (NOVOLOG) 100 unit/mL injection Novolog BEFORE MEALS: 0-90: hold, : 1 unit, 176-225: 2 units, 226-300: 3 units, 301 +    : 4 units, Novolog at BEDTIME - 225-300: 1 unit, 301 + 2 units 9/30/15   Reny Vega MD   glucose blood VI test strips (ASCENSIA AUTODISC VI, ONE TOUCH ULTRA TEST VI) strip Monitor glucose before meals and at bedtime and as needed for symptoms - 4-5 times daily. 9/30/15   Reny Vega MD   ferrous sulfate (SLOW FE) 142 mg (45 mg iron) ER tablet Take 142 mg by mouth Daily (before breakfast). Yumi De La O MD   glucagon (GLUCAGEN) 1 mg injection 1 mL by IntraMUSCular route as needed for Hypoglycemia. 6/25/12   Nicholas Calle MD   pravastatin (PRAVACHOL) 10 mg tablet Take 1 Tab by mouth nightly.  3/19/12   Arleen Tate MD   acetaminophen (TYLENOL) 325 mg tablet Take 325 mg by mouth every four (4) hours as needed.     Historical Provider     Current Facility-Administered Medications   Medication Dose Route Frequency    epoetin shaye (EPOGEN;PROCRIT) injection 10,000 Units  10,000 Units SubCUTAneous EVERY MON & TH    insulin glargine (LANTUS) injection 8 Units  8 Units SubCUTAneous DAILY    insulin lispro (HUMALOG) injection +++Home sliding scale+++   SubCUTAneous AC&HS    furosemide (LASIX) injection 40 mg  40 mg IntraVENous BID    mupirocin (BACTROBAN) 2 % ointment   Both Nostrils BID    cefepime (MAXIPIME) 2 g in 0.9% sodium chloride (MBP/ADV) 100 mL  2 g IntraVENous Q24H    sodium chloride (NS) flush 5-10 mL  5-10 mL IntraVENous Q8H    0.9% sodium chloride infusion  50 mL/hr IntraVENous CONTINUOUS    heparin (porcine) injection 5,000 Units  5,000 Units SubCUTAneous Q8H    albuterol-ipratropium (DUO-NEB) 2.5 MG-0.5 MG/3 ML  3 mL Nebulization Q6H RT    amLODIPine (NORVASC) tablet 10 mg  10 mg Oral DAILY    calcitRIOL (ROCALTROL) capsule 0.25 mcg  0.25 mcg Oral DAILY    mirtazapine (REMERON) tablet 15 mg  15 mg Oral QHS    pravastatin (PRAVACHOL) tablet 10 mg  10 mg Oral QHS    tamsulosin (FLOMAX) capsule 0.4 mg  0.4 mg Oral DAILY    B complex-vitaminC-folic acid (NEPHROCAP) cap  1 Cap Oral DAILY    pantoprazole (PROTONIX) tablet 40 mg  40 mg Oral ACB    levoFLOXacin (LEVAQUIN) 500 mg in D5W IVPB  500 mg IntraVENous Q48H    vancomycin (VANCOCIN) 750 mg in 0.9% sodium chloride (MBP/ADV) 250 mL  750 mg IntraVENous Q36H     Allergies   Allergen Reactions    Ace Inhibitors Unknown (comments)    Arb-Angiotensin Receptor Antagonist Unknown (comments)      Social History   Substance Use Topics    Smoking status: Never Smoker    Smokeless tobacco: Never Used    Alcohol use No      Family History   Problem Relation Age of Onset    Heart Disease Mother      Pacemaker    Cancer Father         Review of Systems:  Review of systems not obtained due to patient factors. Objective:   Vital Signs:    Visit Vitals    /77 (BP 1 Location: Left arm, BP Patient Position: At rest)    Pulse (!) 101    Temp 99.9 °F (37.7 °C)    Resp 20    Ht 5' 5\" (1.651 m)    Wt 59 kg (130 lb 1.1 oz)    SpO2 97%    BMI 21.64 kg/m2       O2 Device: Room air   O2 Flow Rate (L/min): 2 l/min   Temp (24hrs), Av °F (37.2 °C), Min:98.3 °F (36.8 °C), Max:100.5 °F (38.1 °C)       Intake/Output:   Last shift:         Last 3 shifts:  190 -  07  In: 3825.8 [P.O.:960; I.V.:2865.8]  Out: 7650 [Urine:7650]    Intake/Output Summary (Last 24 hours) at 17 0856  Last data filed at 17 0700   Gross per 24 hour   Intake             1610 ml   Output             4350 ml   Net            -2740 ml       Ventilator Settings:  Mode Rate Tidal Volume Pressure FiO2 PEEP   Spontaneous   500 ml  5 cm H2O 40 % 5 cm H20     Peak airway pressure: 11 cm H2O    Minute ventilation: 10.3 l/min      Physical Exam:    General:  Alert, no distress   Head:  Normocephalic, without obvious abnormality, atraumatic. Eyes:  Conjunctivae/corneas clear. Nose: Nares normal. Septum midline. Mucosa normal.     Neck: Supple, symmetrical, trachea midline    Back:   Symmetric, no curvature. ROM normal.   Lungs:   CTA B   Chest wall:  No tenderness or deformity. Heart:  Regular rate and rhythm    Abdomen:   Soft, non-tender.  Bowel sounds normal.    Extremities: Extremities normal, atraumatic, no cyanosis    Skin: Skin color, texture, turgor normal.    Neurologic: Grossly nonfocal      Data:     Current Facility-Administered Medications   Medication Dose Route Frequency    epoetin shaye (EPOGEN;PROCRIT) injection 10,000 Units  10,000 Units SubCUTAneous EVERY MON & TH    insulin glargine (LANTUS) injection 8 Units  8 Units SubCUTAneous DAILY    insulin lispro (HUMALOG) injection +++Home sliding scale+++   SubCUTAneous AC&HS    furosemide (LASIX) injection 40 mg  40 mg IntraVENous BID    mupirocin (BACTROBAN) 2 % ointment   Both Nostrils BID    cefepime (MAXIPIME) 2 g in 0.9% sodium chloride (MBP/ADV) 100 mL  2 g IntraVENous Q24H    sodium chloride (NS) flush 5-10 mL  5-10 mL IntraVENous Q8H    0.9% sodium chloride infusion  50 mL/hr IntraVENous CONTINUOUS    heparin (porcine) injection 5,000 Units  5,000 Units SubCUTAneous Q8H    albuterol-ipratropium (DUO-NEB) 2.5 MG-0.5 MG/3 ML  3 mL Nebulization Q6H RT    amLODIPine (NORVASC) tablet 10 mg  10 mg Oral DAILY    calcitRIOL (ROCALTROL) capsule 0.25 mcg  0.25 mcg Oral DAILY    mirtazapine (REMERON) tablet 15 mg  15 mg Oral QHS    pravastatin (PRAVACHOL) tablet 10 mg  10 mg Oral QHS    tamsulosin (FLOMAX) capsule 0.4 mg  0.4 mg Oral DAILY    B complex-vitaminC-folic acid (NEPHROCAP) cap  1 Cap Oral DAILY    pantoprazole (PROTONIX) tablet 40 mg  40 mg Oral ACB    levoFLOXacin (LEVAQUIN) 500 mg in D5W IVPB  500 mg IntraVENous Q48H    vancomycin (VANCOCIN) 750 mg in 0.9% sodium chloride (MBP/ADV) 250 mL  750 mg IntraVENous Q36H                Labs:  Recent Labs      02/07/17   0251  02/06/17   0236  02/05/17   0542   WBC  16.2*  20.8*  15.1*   HGB  7.4*  7.3*  8.3*   HCT  21.8*  21.1*  25.0*   PLT  293  283  346     Recent Labs      02/07/17   0251  02/06/17   1436  02/06/17   0236   02/04/17   1840   NA  137  140  142   < >  137   K  4.0  4.4  4.8   < >  5.7*   CL  101  101  106   < >  107   CO2  26  23  22   < >  17*   GLU  61*  173*  181*   < >  87   BUN  44*  47*  48*   < >  40*   CREA  5.37*  5.41*  5.23*   < >  4.94*   CA  7.2*  7.6*  7.8*   < >  7.9*   MG  1.8   --   1.8   --    --    PHOS  6.9*   --   7.2*   --    --    ALB   --    --   2.8*   --   3.3*   TBILI   --    --   0.6   --   0.3   SGOT   --    --   21   --   21   ALT   --    --   21   --   23    < > = values in this interval not displayed.      Recent Labs      02/05/17   1000  02/05/17   0650   PH  7.04*  7.07*   PCO2  39  16*   PO2  376*  99   HCO3  10*  5*   FIO2  100   -- Imaging:  I have personally reviewed the patients radiographs and have reviewed the reports:  Extubated, resolved pulmonary edema        Total critical care time exclusive of procedures: 25 minutes  Sandy Landa MD

## 2017-02-07 NOTE — PROGRESS NOTES
Occupational Therapy  Medical record reviewed for updates. Nursing cleared pt for OT and Attempted to see pt. Upon arrival, pt called out from his room requesting something to eat. He perseverated on the need for food, \"I'm a diabetic, I'm hungry and I need to eat.\" . .. \"I ain't never been this hungry. Mallory Hermosillo \"  Pt stated that he would work with this therapist after he ate. Discussed with nursing (who reported that pt has had food to eat and has ordered more) and encouraged pt to participate in OT. Brought pt a cup of ice water (no crackers present in galley), which he declined. Explained plan of care, however, pt declined OT, becoming more demanding of food and adamant not to participate until he ate. Nursing informed. Will defer and continue to follow. 8 minutes.

## 2017-02-07 NOTE — PROGRESS NOTES
Problem: Mobility Impaired (Adult and Pediatric)  Goal: *Acute Goals and Plan of Care (Insert Text)  Physical Therapy Goals  Initiated 2/6/2017  1. Patient will move from supine to sit and sit to supine , scoot up and down and roll side to side in bed with independence within 7 day(s). 2. Patient will transfer from bed to chair and chair to bed with independence using the least restrictive device within 7 day(s). 3. Patient will perform sit to stand with independence within 7 day(s). 4. Patient will ambulate with independence for 200 feet with the least restrictive device within 7 day(s). PHYSICAL THERAPY TREATMENT  Patient: Mohan Carreno (64 y.o. male)  Date: 2/7/2017  Diagnosis: Pneumonia <principal problem not specified>       Precautions: Fall      ASSESSMENT:  Patient received supine in bed and agreeable to therapy. Patient eager to be OOB. Patient tolerated session well. Patient now on room air and spO2 > 95% at rest and with activity. Patient able to complete supine to sit with standby assist. Patient performed sit<>stand with CGA. Patient ambulated 80 feet with HHA and CGA-min assist. Patient noted to have generalized instability and path deviations; however, no LOB noted. Patient remained seated up in chair to eat breakfast. Patient will continue to benefit from PT to progress mobility and reach highest level of independence. Patient encouraged to remain OOB to chair 3 x day. D/C rec: anticipate return to prior setting (patient is from [facility]). Progression toward goals:  [X]    Improving appropriately and progressing toward goals  [ ]    Improving slowly and progressing toward goals  [ ]    Not making progress toward goals and plan of care will be adjusted       PLAN:  Patient continues to benefit from skilled intervention to address the above impairments. Continue treatment per established plan of care.   Discharge Recommendations:  Prior setting  Further Equipment Recommendations for Discharge:  TBD       SUBJECTIVE:   Patient stated [facility] voice is all funny. \".      OBJECTIVE DATA SUMMARY:   Critical Behavior:  Neurologic State: Eyes open spontaneously  Orientation Level: Oriented X4  Cognition: Appropriate decision making, Appropriate for age attention/concentration, Appropriate safety awareness  Safety/Judgement: Decreased awareness of need for assistance, Decreased awareness of need for safety, Decreased insight into deficits  Functional Mobility Training:  Bed Mobility:     Supine to Sit: Stand-by asssistance              Transfers:  Sit to Stand: Contact guard assistance  Stand to Sit: Contact guard assistance        Bed to Chair: Contact guard assistance                    Balance:  Sitting: Intact  Standing: Impaired  Standing - Static: Good (with HHA)  Standing - Dynamic : Fair (w/ HHA)  Ambulation/Gait Training:  Distance (ft): 80 Feet (ft)  Assistive Device: Gait belt (HHA on the R)  Ambulation - Level of Assistance: Contact guard assistance;Minimal assistance        Gait Abnormalities: Trunk sway increased;Decreased step clearance; Path deviations        Base of Support: Narrowed     Speed/Rosenda: Pace decreased (<100 feet/min)  Step Length: Right shortened;Left shortened     Pain:  Pain Scale 1: Numeric (0 - 10)  Pain Intensity 1: 0              Activity Tolerance:   Good. Improving  Please refer to the flowsheet for vital signs taken during this treatment.   After treatment:   [X]    Patient left in no apparent distress sitting up in chair  [ ]    Patient left in no apparent distress in bed  [X]    Call bell left within reach  [X]    Nursing notified  [ ]    Caregiver present  [ ]    Bed alarm activated      COMMUNICATION/COLLABORATION:   The patients plan of care was discussed with: Registered Nurse     Mirtha Thurman, PT, DPT   Time Calculation: 20 mins

## 2017-02-07 NOTE — PROGRESS NOTES
Hospitalist Progress Note    NAME: Tanisha Hoskins. :  1950   MRN:  [id number]       Interim Hospital Summary: 61 y.o. male whom presented on 2017 with      Assessment / Plan:  1. Acute hypoxic respiratory failure:  Resolved. Continue with O2, BDs, and monitor respiratory status.      2. PNA:  Continue with Levaquin.      3. CKD:  Creatinine stable. Continue to monitor renal function closely.      4. CAD; Continue with BB.      5. H/o CHF:  Echo with LVEF 60-65%. Continue with IV Lasix, and monitor volume status.      7. HTN:  Fairly under control. Continue with BB.      8. DM:  HG A1C . Continue with basal/prandial Insulins, and monitor FSs.      9. Dyslipidemia:  Continue with statin.      10. H/o prostate CA:  Continue with Flomax.      11. Depression:  Continue with Remeron.      12. Anemia:  H/H stable. Continue with Epoetin. Code status: DNR  Prophylaxis: SC Heparin  Recommended Disposition: SNF        Subjective:     Chief Complaint / Reason for Physician Visit  Patient seen and examined. Feels better today. Discussed with RN events overnight. Review of Systems:  Symptom Y/N Comments  Symptom Y/N Comments   Fever/Chills N   Chest Pain N    Poor Appetite    Edema     Cough    Abdominal Pain N    Sputum    Joint Pain     SOB/LAUREANO N   Pruritis/Rash     Nausea/vomit N   Tolerating PT/OT Y    Diarrhea N   Tolerating Diet Y    Constipation    Other       Could NOT obtain due to:      Objective:     VITALS:   Last 24hrs VS reviewed since prior progress note.  Most recent are:  Patient Vitals for the past 24 hrs:   Temp Pulse Resp BP SpO2   17 1426 - - - - 99 %   17 1304 98.5 °F (36.9 °C) (!) 101 16 144/75 100 %   17 1200 98.5 °F (36.9 °C) 97 16 136/67 100 %   17 1130 - 100 22 - 100 %   17 1100 - (!) 102 16 132/68 100 %   17 1000 - 99 17 139/71 100 %   17 0905 - (!) 114 - 164/75 -   17 0900 - (!) 118 17 164/75 100 %   17 0813 - - - - 97 %   02/07/17 0800 - 97 13 143/69 98 %   02/07/17 0700 99.9 °F (37.7 °C) (!) 101 20 159/77 97 %   02/07/17 0600 - 94 17 148/71 -   02/07/17 0500 - 93 18 145/74 100 %   02/07/17 0400 - 92 15 146/73 100 %   02/07/17 0300 98.5 °F (36.9 °C) 95 17 131/64 100 %   02/07/17 0202 - - - - 100 %   02/07/17 0200 - 100 24 149/78 99 %   02/07/17 0100 - 93 19 128/67 100 %   02/07/17 0000 98.4 °F (36.9 °C) 95 19 141/70 100 %   02/06/17 2300 98.3 °F (36.8 °C) (!) 109 21 150/72 100 %   02/06/17 2200 - 100 19 137/69 100 %   02/06/17 2102 - - - - 100 %   02/06/17 2100 - 96 24 146/69 100 %   02/06/17 2000 - 98 15 135/67 100 %   02/06/17 1900 - - - 137/80 100 %   02/06/17 1800 - (!) 106 16 147/74 100 %   02/06/17 1700 - (!) 103 16 139/71 100 %   02/06/17 1600 98.5 °F (36.9 °C) (!) 104 16 131/73 100 %   02/06/17 1500 99 °F (37.2 °C) (!) 103 15 120/64 100 %       Intake/Output Summary (Last 24 hours) at 02/07/17 1429  Last data filed at 02/07/17 0813   Gross per 24 hour   Intake             1400 ml   Output             4000 ml   Net            -2600 ml        PHYSICAL EXAM:  General: WD, WN. Alert, cooperative, no acute distress    EENT:  EOMI. Anicteric sclerae. MMM  Resp:  CTA bilaterally, no wheezing or rales. No accessory muscle use  CV:  Regular  rhythm,  No edema  GI:  Soft, Non distended, Non tender.  +Bowel sounds  Neurologic:  Alert and oriented X 3, normal speech,   Psych:   Good insight. Not anxious nor agitated  Skin:  No rashes.   No jaundice    Reviewed most current lab test results and cultures  YES  Reviewed most current radiology test results   YES  Review and summation of old records today    NO  Reviewed patient's current orders and MAR    YES  PMH/SH reviewed - no change compared to H&P  ________________________________________________________________________  Care Plan discussed with:    Comments   Patient Y    Family  Y    [address] [facility]     Consultant                        Multidiciplinary team rounds were held today with , nursing, pharmacist and clinical coordinator. Patient's plan of care was discussed; medications were reviewed and discharge planning was addressed. ________________________________________________________________________  Total NON critical care TIME:  30   Minutes    Total CRITICAL CARE TIME Spent:   Minutes non procedure based      Comments   >50% of visit spent in counseling and coordination of care Y    ________________________________________________________________________  Klaudia Degroot MD     Procedures: see electronic medical records for all procedures/Xrays and details which were not copied into this note but were reviewed prior to creation of Plan. LABS:  I reviewed today's most current labs and imaging studies. Pertinent labs include:  Recent Labs      02/07/17   0251  02/06/17   0236  02/05/17   0542   WBC  16.2*  20.8*  15.1*   HGB  7.4*  7.3*  8.3*   HCT  21.8*  21.1*  25.0*   PLT  293  283  346     Recent Labs      02/07/17   0251  02/06/17   1436  02/06/17   0236   02/04/17   1840   NA  137  140  142   < >  137   K  4.0  4.4  4.8   < >  5.7*   CL  101  101  106   < >  107   CO2  26  23  22   < >  17*   GLU  61*  173*  181*   < >  87   BUN  44*  47*  48*   < >  40*   CREA  5.37*  5.41*  5.23*   < >  4.94*   CA  7.2*  7.6*  7.8*   < >  7.9*   MG  1.8   --   1.8   --    --    PHOS  6.9*   --   7.2*   --    --    ALB   --    --   2.8*   --   3.3*   TBILI   --    --   0.6   --   0.3   SGOT   --    --   21   --   21   ALT   --    --   21   --   23    < > = values in this interval not displayed.        Signed: Klaudia Degroot MD

## 2017-02-07 NOTE — PROGRESS NOTES
Pt is a 62 yo male admitted from [facility] for evaluation/treatment of SOB - suspected PNA. Pt has medical history which includes CKD stage 4, DM, HTN, Alzheimer's dementia, CAD, hx of prostate CA. Pt experienced flash pulmonary edema and required intubation; was diuresed and extubated on 2/6. Pt is a LTC resident at [facility]. His sister Shae Avila, is his surrogate medical decision-maker. Pt had an admission in Jan 2017 during which time, sister signed DNR. Pt was sent back to [facility] with understanding that he was to receive comfort care, do not rehospitalize. Palliative Care met w/ sister and pt's son Servando on 2/6 to discuss plans. Possible hospice at [facility] was discussed but pt does not meet hospice criteria at this time. Plan is for transfer back to [facility] when medically stable - again - DNR and DO NOT REHOSPITALIZE orders will accompany pt. Pt is being transferred to [address] [place] [address] for routine progression of care. CM will handoff to Renal CM. Care Management Interventions  PCP Verified by CM: Yes  Palliative Care Consult (Criteria: CHF and RRAT>21): Yes  Mode of Transport at Discharge:  Other (see comment) (family vs BLS?)  Transition of Care Consult (CM Consult): SNF (return to [facility])  Discharge Durable Medical Equipment: No  Physical Therapy Consult: Yes  Occupational Therapy Consult: Yes  Speech Therapy Consult: No  Current Support Network: [address] [place] [address] ([place])  Confirm Follow Up Transport: Family  Plan discussed with Pt/Family/Caregiver: Yes  Freedom of Choice Offered: Yes  Discharge Location  Discharge Placement: [address] [place] [address]     DWAYNE Thomas

## 2017-02-07 NOTE — PROGRESS NOTES
Attended Interdisciplinary rounds in Critical Care Unit, where patient care was discussed. Visit by: Sameera Sarmiento. Yamilex Gabriel.  Kyle Flanagan MA, [address]

## 2017-02-07 NOTE — PROGRESS NOTES
TRANSFER - IN REPORT:    Verbal report received from [facility] (name) on Valeen Mcardle.  being received from CCU (unit) for routine progression of care      Report consisted of patients Situation, Background, Assessment and   Recommendations(SBAR). Information from the following report(s) SBAR, Kardex, Intake/Output, MAR, Recent Results and Cardiac Rhythm . was reviewed with the receiving nurse. Opportunity for questions and clarification was provided. Assessment completed upon patients arrival to unit and care assumed.

## 2017-02-07 NOTE — PROGRESS NOTES
0800: Initial assessment complete; Pt. Resting quietly in bed, no complaints of pain, VSS, all lines intact. 1030: interdisciplinary rounds held over patient, verbal to remove IJ and spencer per Dr. Eileen Michael; patient will transfer to floor. 1200: Re-assessment, no changes noted  1220: Report given to Osiel Doss RN. . Passed on that spencer was removed but IJ will still need to be removed. Joe Chavez

## 2017-02-07 NOTE — DIABETES MGMT
DTC Progress Note    Recommendations/ Comments: Pt discussed with rounding team and Dr. Caro Bright. Plan to decrease lantus to 6units daily, monitor today as pt already received lantus this morning. BG on chemistry this am was 61. Chart reviewed on Nadiya Chris during Multidisciplinary Rounds. A1c:   Lab Results   Component Value Date/Time    Hemoglobin A1c 9.4 01/20/2017 04:14 AM    Hemoglobin A1c, External 7.7 08/12/2015           Recent Glucose Results:   Lab Results   Component Value Date/Time    GLU 61 (L) 02/07/2017 02:51 AM     (H) 02/06/2017 02:36 PM    GLUCPOC 115 (H) 02/07/2017 08:13 AM    GLUCPOC 288 (H) 02/06/2017 09:48 PM    GLUCPOC 241 (H) 02/06/2017 05:12 PM        Lab Results   Component Value Date/Time    Creatinine 5.37 02/07/2017 02:51 AM       Active Orders   Diet    DIET DIABETIC CONSISTENT CARB Regular; 2 GM NA (House Low NA); FR 1800ML; 70-70-70 (House)        PO intake:   Patient Vitals for the past 72 hrs:   % Diet Eaten   02/06/17 1700 50 %   02/06/17 1200 100 %   02/04/17 2013 80 %       Will continue to follow as needed. Thank you.   Hattie Wright RN, [facility]

## 2017-02-07 NOTE — PROGRESS NOTES
Shift report     1915 Report received from [place], RN. Assumed care of patient. Patient noted to be sitting up in chair with family at bedside. Patient in NAD. Continue to monitor. 2030 Assessment complete, patient constantly calling out for food, once shift began noted patient completing dinner tray. Possibly due to dementia patient may have forgotten that he had a meal previously. Offered crackers and peanut butter. 2100 Patient continues to state he has not been fed and wishes to call daughter to make aware that he is still hungry. Given a diet ginger ale. Had discussion regarding snacks and diabetes and that after 2100 will not be offering anything else until am unless becomes hypoglycemic. Patient continues to state that he is starving. 2230 Noted another staff offering patient more snacks and diet soda. Spoke with patient at length regarding the need to stop eating extra snacks due to hyperglycemia. Patient remains forgetful and requests more food    2330 Patient in bed resting comfortably. Will continue to monitor. 0215 Patient c/o breathing treatment not helping. Patient does not understand, educated on resp treatments and patient completed nebulizer. Patient cleansed of moderate amount of soft brown stool. Requested bed pan to void. Will continue to monitor. 0400 Patient awake in room and requesting food. Patient without c/o pain. Assisted on bedpan and patient voided medium amount of brown stool. Advised will be able to eat at breakfast, fluids offered. 0700 Bedside and Verbal shift change report given to [place] Chaves RN (oncoming nurse) by Lory Hester RN (offgoing nurse). Report included the following information SBAR, Kardex, Procedure Summary, Intake/Output, MAR, Recent Results, Med Rec Status and Cardiac Rhythm NSR/ST.

## 2017-02-07 NOTE — PROGRESS NOTES
NAME: Addy Ricketts. :  1950        MRN:  [id number]        Assessment :    Plan:  --CKD-4 - baseline creatinine about 4. MANA  Sepsis   HCAP  DM-hyperglycemia (up to 245)  Metabolic acidosis  Pseudohyponatremia  Hyperkalemia-chronic  Anemia  HTN  SHPT   --Creatinine stable at ~ 5.4. Great UOP - 4.6 liters. Prerenal +/- ATN. Has declined RRT in the past.    Resolved hyperkalemia with medical management (volume, kayexalate, bicarb). Hgb 7.3    On Calcitriol, phos high    Still some pj, continue lasix 40 iv bid. on epo 10 k sc biw. S/p IV iron in January - none now in face of infection       Subjective:     Chief Complaint:  Alert. Poor historian. Denied sob, chest pain, nausea when directly asked. States he is tired and hungry. Did not at all remember who I was. Review of Systems:    Symptom Y/N Comments  Symptom Y/N Comments   Fever/Chills    Chest Pain     Poor Appetite    Edema     Cough    Abdominal Pain     Sputum    Joint Pain     SOB/LAUREANO    Pruritis/Rash     Nausea/vomit    Tolerating PT/OT     Diarrhea    Tolerating Diet     Constipation    Other       Could not obtain due to: See above     Objective:     VITALS:   Last 24hrs VS reviewed since prior progress note. Most recent are:  Visit Vitals    /75    Pulse (!) 114    Temp 99.9 °F (37.7 °C)    Resp 17    Ht 5' 5\" (1.651 m)    Wt 59 kg (130 lb 1.1 oz)    SpO2 100%    BMI 21.64 kg/m2       Intake/Output Summary (Last 24 hours) at 17 1002  Last data filed at 17 0700   Gross per 24 hour   Intake             1410 ml   Output             4000 ml   Net            -2590 ml      Telemetry Reviewed:     PHYSICAL EXAM:  General: WD, WN. Alert, cooperative, no acute distress  Resp:  CTA Bilaterally upper lobes, Rales to lower lobes. No access. muscle use  CV:  Regular  rhythm,  No murmur (), No Rubs, No Gallops.  No edema  GI:  Soft, Non distended, Non tender.  +Bowel sounds, no HSM      Lab Data Reviewed: (see below)    Medications Reviewed: (see below)    PMH/SH reviewed - no change compared to H&P  ________________________________________________________________________  Care Plan discussed with:  Patient y    Family      RN y    Care Manager                    Consultant:          Comments   >50% of visit spent in counseling and coordination of care       ________________________________________________________________________  Kaila Alfonso MD     Procedures: see electronic medical records for all procedures/Xrays and details which  were not copied into this note but were reviewed prior to creation of Plan. LABS:  Recent Labs      02/07/17   0251  02/06/17   0236   WBC  16.2*  20.8*   HGB  7.4*  7.3*   HCT  21.8*  21.1*   PLT  293  283     Recent Labs      02/07/17   0251  02/06/17   1436  02/06/17   0236   NA  137  140  142   K  4.0  4.4  4.8   CL  101  101  106   CO2  26  23  22   BUN  44*  47*  48*   CREA  5.37*  5.41*  5.23*   GLU  61*  173*  181*   CA  7.2*  7.6*  7.8*   MG  1.8   --   1.8   PHOS  6.9*   --   7.2*     Recent Labs      02/06/17   0236  02/04/17   1840   SGOT  21  21   AP  84  94   TP  5.7*  6.4   ALB  2.8*  3.3*   GLOB  2.9  3.1     No results for input(s): INR, PTP, APTT in the last 72 hours. No lab exists for component: INREXT, INREXT   No results for input(s): FE, TIBC, PSAT, FERR in the last 72 hours.    Lab Results   Component Value Date/Time    Folate 53.6 11/11/2014 03:40 AM      Recent Labs      02/05/17   1000  02/05/17   0650   PH  7.04*  7.07*   PCO2  39  16*   PO2  376*  99     Recent Labs      02/05/17   0542  02/04/17   1840   CPK  428*  389*   CKMB  7.6*  7.8*     No components found for: [place]  Lab Results   Component Value Date/Time    Color YELLOW/STRAW 02/06/2017 02:36 AM    Appearance CLEAR 02/06/2017 02:36 AM    Specific gravity 1.008 02/06/2017 02:36 AM    Specific gravity 1.010 01/18/2017 03:41 AM    pH (UA) 7.5 02/06/2017 02:36 AM    Protein 100 02/06/2017 02:36 AM    Glucose 100 02/06/2017 02:36 AM    Ketone NEGATIVE  02/06/2017 02:36 AM    Bilirubin NEGATIVE  02/06/2017 02:36 AM    Urobilinogen 0.2 02/06/2017 02:36 AM    Nitrites NEGATIVE  02/06/2017 02:36 AM    Leukocyte Esterase NEGATIVE  02/06/2017 02:36 AM    Epithelial cells FEW 02/06/2017 02:36 AM    Bacteria NEGATIVE  02/06/2017 02:36 AM    WBC 0-4 02/06/2017 02:36 AM    RBC  02/06/2017 02:36 AM       MEDICATIONS:  Current Facility-Administered Medications   Medication Dose Route Frequency    epoetin shaye (EPOGEN;PROCRIT) injection 10,000 Units  10,000 Units SubCUTAneous EVERY MON & TH    insulin glargine (LANTUS) injection 8 Units  8 Units SubCUTAneous DAILY    insulin lispro (HUMALOG) injection +++Home sliding scale+++   SubCUTAneous AC&HS    glucose chewable tablet 16 g  4 Tab Oral PRN    dextrose (D50W) injection syrg 12.5-25 g  12.5-25 g IntraVENous PRN    glucagon (GLUCAGEN) injection 1 mg  1 mg IntraMUSCular PRN    furosemide (LASIX) injection 40 mg  40 mg IntraVENous BID    mupirocin (BACTROBAN) 2 % ointment   Both Nostrils BID    sodium chloride (NS) flush 5-10 mL  5-10 mL IntraVENous Q8H    sodium chloride (NS) flush 5-10 mL  5-10 mL IntraVENous PRN    acetaminophen (TYLENOL) tablet 650 mg  650 mg Oral Q4H PRN    ondansetron (ZOFRAN) injection 4 mg  4 mg IntraVENous Q4H PRN    bisacodyl (DULCOLAX) tablet 5 mg  5 mg Oral DAILY PRN    0.9% sodium chloride infusion  50 mL/hr IntraVENous CONTINUOUS    heparin (porcine) injection 5,000 Units  5,000 Units SubCUTAneous Q8H    albuterol-ipratropium (DUO-NEB) 2.5 MG-0.5 MG/3 ML  3 mL Nebulization Q6H RT    albuterol (PROVENTIL VENTOLIN) nebulizer solution 2.5 mg  2.5 mg Nebulization Q4H PRN    amLODIPine (NORVASC) tablet 10 mg  10 mg Oral DAILY    calcitRIOL (ROCALTROL) capsule 0.25 mcg  0.25 mcg Oral DAILY    mirtazapine (REMERON) tablet 15 mg  15 mg Oral QHS    pravastatin (PRAVACHOL) tablet 10 mg  10 mg Oral QHS    tamsulosin (FLOMAX) capsule 0.4 mg  0.4 mg Oral DAILY    B complex-vitaminC-folic acid (NEPHROCAP) cap  1 Cap Oral DAILY    pantoprazole (PROTONIX) tablet 40 mg  40 mg Oral ACB    hydrALAZINE (APRESOLINE) 20 mg/mL injection 10 mg  10 mg IntraVENous Q6H PRN    levoFLOXacin (LEVAQUIN) 500 mg in D5W IVPB  500 mg IntraVENous Q48H    vancomycin (VANCOCIN) 750 mg in 0.9% sodium chloride (MBP/ADV) 250 mL  750 mg IntraVENous Q36H

## 2017-02-08 ENCOUNTER — HOSPICE ADMISSION (OUTPATIENT)
Dept: HOSPICE | Facility: HOSPICE | Age: 67
End: 2017-02-08

## 2017-02-08 LAB
ABO + RH BLD: NORMAL
ANION GAP BLD CALC-SCNC: 13 MMOL/L (ref 5–15)
BASOPHILS # BLD AUTO: 0 K/UL (ref 0–0.1)
BASOPHILS # BLD: 0 % (ref 0–1)
BLD PROD TYP BPU: NORMAL
BLD PROD TYP BPU: NORMAL
BLOOD GROUP ANTIBODIES SERPL: NORMAL
BNP SERPL-MCNC: ABNORMAL PG/ML (ref 0–125)
BPU ID: NORMAL
BPU ID: NORMAL
BUN SERPL-MCNC: 40 MG/DL (ref 6–20)
BUN/CREAT SERPL: 7 (ref 12–20)
CALCIUM SERPL-MCNC: 7.1 MG/DL (ref 8.5–10.1)
CHLORIDE SERPL-SCNC: 101 MMOL/L (ref 97–108)
CO2 SERPL-SCNC: 22 MMOL/L (ref 21–32)
CREAT SERPL-MCNC: 6.02 MG/DL (ref 0.7–1.3)
CROSSMATCH RESULT,%XM: NORMAL
CROSSMATCH RESULT,%XM: NORMAL
EOSINOPHIL # BLD: 0.3 K/UL (ref 0–0.4)
EOSINOPHIL NFR BLD: 2 % (ref 0–7)
ERYTHROCYTE [DISTWIDTH] IN BLOOD BY AUTOMATED COUNT: 14.1 % (ref 11.5–14.5)
GLUCOSE BLD STRIP.AUTO-MCNC: 208 MG/DL (ref 65–100)
GLUCOSE BLD STRIP.AUTO-MCNC: 54 MG/DL (ref 65–100)
GLUCOSE BLD STRIP.AUTO-MCNC: 547 MG/DL (ref 65–100)
GLUCOSE BLD STRIP.AUTO-MCNC: 597 MG/DL (ref 65–100)
GLUCOSE BLD STRIP.AUTO-MCNC: 60 MG/DL (ref 65–100)
GLUCOSE BLD STRIP.AUTO-MCNC: 83 MG/DL (ref 65–100)
GLUCOSE BLD STRIP.AUTO-MCNC: 85 MG/DL (ref 65–100)
GLUCOSE BLD STRIP.AUTO-MCNC: >600 MG/DL (ref 65–100)
GLUCOSE SERPL-MCNC: 120 MG/DL (ref 65–100)
HCT VFR BLD AUTO: 23.4 % (ref 36.6–50.3)
HGB BLD-MCNC: 7.9 G/DL (ref 12.1–17)
LYMPHOCYTES # BLD AUTO: 14 % (ref 12–49)
LYMPHOCYTES # BLD: 1.8 K/UL (ref 0.8–3.5)
MCH RBC QN AUTO: 30.4 PG (ref 26–34)
MCHC RBC AUTO-ENTMCNC: 33.8 G/DL (ref 30–36.5)
MCV RBC AUTO: 90 FL (ref 80–99)
MONOCYTES # BLD: 1.3 K/UL (ref 0–1)
MONOCYTES NFR BLD AUTO: 10 % (ref 5–13)
NEUTS SEG # BLD: 9.3 K/UL (ref 1.8–8)
NEUTS SEG NFR BLD AUTO: 74 % (ref 32–75)
PLATELET # BLD AUTO: 279 K/UL (ref 150–400)
POTASSIUM SERPL-SCNC: 4 MMOL/L (ref 3.5–5.1)
RBC # BLD AUTO: 2.6 M/UL (ref 4.1–5.7)
SERVICE CMNT-IMP: ABNORMAL
SERVICE CMNT-IMP: NORMAL
SERVICE CMNT-IMP: NORMAL
SODIUM SERPL-SCNC: 136 MMOL/L (ref 136–145)
SPECIMEN EXP DATE BLD: NORMAL
STATUS OF UNIT,%ST: NORMAL
STATUS OF UNIT,%ST: NORMAL
UNIT DIVISION, %UDIV: 0
UNIT DIVISION, %UDIV: 0
WBC # BLD AUTO: 12.6 K/UL (ref 4.1–11.1)

## 2017-02-08 PROCEDURE — 36415 COLL VENOUS BLD VENIPUNCTURE: CPT | Performed by: INTERNAL MEDICINE

## 2017-02-08 PROCEDURE — 74011250637 HC RX REV CODE- 250/637: Performed by: INTERNAL MEDICINE

## 2017-02-08 PROCEDURE — 74011250636 HC RX REV CODE- 250/636: Performed by: INTERNAL MEDICINE

## 2017-02-08 PROCEDURE — 80048 BASIC METABOLIC PNL TOTAL CA: CPT | Performed by: INTERNAL MEDICINE

## 2017-02-08 PROCEDURE — 77030011256 HC DRSG MEPILEX <16IN NO BORD MOLN -A

## 2017-02-08 PROCEDURE — 65270000029 HC RM PRIVATE

## 2017-02-08 PROCEDURE — 83880 ASSAY OF NATRIURETIC PEPTIDE: CPT | Performed by: INTERNAL MEDICINE

## 2017-02-08 PROCEDURE — 74011636637 HC RX REV CODE- 636/637: Performed by: INTERNAL MEDICINE

## 2017-02-08 PROCEDURE — 85025 COMPLETE CBC W/AUTO DIFF WBC: CPT | Performed by: INTERNAL MEDICINE

## 2017-02-08 PROCEDURE — 94640 AIRWAY INHALATION TREATMENT: CPT

## 2017-02-08 PROCEDURE — 82962 GLUCOSE BLOOD TEST: CPT

## 2017-02-08 PROCEDURE — 74011000250 HC RX REV CODE- 250: Performed by: INTERNAL MEDICINE

## 2017-02-08 RX ORDER — FUROSEMIDE 40 MG/1
40 TABLET ORAL DAILY
Status: DISCONTINUED | OUTPATIENT
Start: 2017-02-09 | End: 2017-02-10 | Stop reason: HOSPADM

## 2017-02-08 RX ADMIN — RENO CAPS 1 CAPSULE: 100; 1.5; 1.7; 20; 10; 1; 150; 5; 6 CAPSULE ORAL at 08:17

## 2017-02-08 RX ADMIN — IPRATROPIUM BROMIDE AND ALBUTEROL SULFATE 3 ML: .5; 3 SOLUTION RESPIRATORY (INHALATION) at 21:06

## 2017-02-08 RX ADMIN — AMLODIPINE BESYLATE 10 MG: 5 TABLET ORAL at 08:17

## 2017-02-08 RX ADMIN — PRAVASTATIN SODIUM 10 MG: 10 TABLET ORAL at 21:33

## 2017-02-08 RX ADMIN — IPRATROPIUM BROMIDE AND ALBUTEROL SULFATE 3 ML: .5; 3 SOLUTION RESPIRATORY (INHALATION) at 07:34

## 2017-02-08 RX ADMIN — CALCITRIOL 0.25 MCG: 0.25 CAPSULE, LIQUID FILLED ORAL at 08:17

## 2017-02-08 RX ADMIN — TAMSULOSIN HYDROCHLORIDE 0.4 MG: 0.4 CAPSULE ORAL at 08:17

## 2017-02-08 RX ADMIN — IPRATROPIUM BROMIDE AND ALBUTEROL SULFATE 3 ML: .5; 3 SOLUTION RESPIRATORY (INHALATION) at 14:08

## 2017-02-08 RX ADMIN — HEPARIN SODIUM 5000 UNITS: 5000 INJECTION, SOLUTION INTRAVENOUS; SUBCUTANEOUS at 13:17

## 2017-02-08 RX ADMIN — Medication 10 ML: at 21:33

## 2017-02-08 RX ADMIN — Medication 10 ML: at 13:17

## 2017-02-08 RX ADMIN — INSULIN LISPRO 10 UNITS: 100 INJECTION, SOLUTION INTRAVENOUS; SUBCUTANEOUS at 11:37

## 2017-02-08 RX ADMIN — HYDRALAZINE HYDROCHLORIDE 10 MG: 20 INJECTION INTRAMUSCULAR; INTRAVENOUS at 08:18

## 2017-02-08 RX ADMIN — MIRTAZAPINE 15 MG: 15 TABLET, FILM COATED ORAL at 21:34

## 2017-02-08 RX ADMIN — FUROSEMIDE 40 MG: 10 INJECTION, SOLUTION INTRAMUSCULAR; INTRAVENOUS at 08:18

## 2017-02-08 RX ADMIN — INSULIN GLARGINE 6 UNITS: 100 INJECTION, SOLUTION SUBCUTANEOUS at 11:37

## 2017-02-08 RX ADMIN — LEVOFLOXACIN 500 MG: 5 INJECTION, SOLUTION INTRAVENOUS at 20:35

## 2017-02-08 RX ADMIN — HEPARIN SODIUM 5000 UNITS: 5000 INJECTION, SOLUTION INTRAVENOUS; SUBCUTANEOUS at 04:05

## 2017-02-08 RX ADMIN — PANTOPRAZOLE SODIUM 40 MG: 40 TABLET, DELAYED RELEASE ORAL at 08:17

## 2017-02-08 RX ADMIN — Medication 10 ML: at 05:12

## 2017-02-08 RX ADMIN — HEPARIN SODIUM 5000 UNITS: 5000 INJECTION, SOLUTION INTRAVENOUS; SUBCUTANEOUS at 20:40

## 2017-02-08 NOTE — CONSULTS
Palliative Medicine Consult  [place]: [phone])    Patient Name: Daniela Glover. YOB: 1950    Date of Initial Consult: 2/6/17  Reason for Consult: care decisions  Requesting Provider: Klever Lui   Primary Care Physician: Santos Issa MD      SUMMARY:   Daniela Hebert is a 61 y.o. with a past history of DM, HTN, frontotemporal Alzheimer's dementia, CKD stage 4, CAD, prostate CA,, who was admitted on 2/4/2017 from [address] [place] [address] with a diagnosis of HCAP. Current medical issues leading to Palliative Medicine involvement include: dementia, dyspnea, care decisions. Pt was seen by PM 1/2017, at which time sister Desi Ding[place] [facility]) made pt DNR, comfort care, do not rehospitalize. He was readmitted 2 days ago for dyspnea. He was given 2 Liters IVF per sepsis protocol and subsequently went into flash pulmonary edema and required intubation. He was diuresed and subsequently extubated today. PALLIATIVE DIAGNOSES:   1. Dyspnea   2. Dementia   3. Fatigue   4. Debility  5. Care decisions   6. hyperglycemia     PLAN:   1. Patient does not meet Hospice criteria (inpatient or outpatient). In addition, Palliative Outpatient Clinic does not have a contract to see patients at [facility], therefore cannot accept him in the clinic. I will contact [facility] [address] tomorrow am to discuss if/how they can handle \"Do Not Rehospitalize\" without support of Hospice. 2. hear back I will contact Desi Ontiveros to discuss POC. 3. Initial consult note routed to primary continuity provider  4. Communicated plan of care with: Palliative IDT, ICU IDT       GOALS OF CARE / TREATMENT PREFERENCES:   [====Goals of Care====]  GOALS OF CARE:  Patient / health care proxy stated goals:     Pending      TREATMENT PREFERENCES:   Code Status: DNR    Advance Care Planning:  No flowsheet data found.     Other:    The palliative care team has discussed with patient / health care proxy about goals of care / treatment preferences for patient.  [====Goals of Care====]         HISTORY:     History obtained from: chart, son, patient    CHIEF COMPLAINT: \"I don't know, I feel fine! \"    HPI/SUBJECTIVE:    The patient is:   [x] Verbal and participatory  [] Non-participatory due to:     Patient responds to all questions with \"I don't know\", except when asked how he is feeling. Cannot tell me where he lives, what brought him here. 2/8: \"I'm starving! \"  Provided pt with sugar free popcycle and gingerale. Clinical Pain Assessment (nonverbal scale for severity on nonverbal patients):   Patient denies  [++++ Clinical Pain Assessment++++]  [++++Pain Severity++++]: Pain: 0  [++++Pain Character++++]:   [++++Pain Duration++++]:   [++++Pain Effect++++]:   [++++Pain Factors++++]:   [++++Pain Frequency++++]:   [++++Pain Location++++]:   [++++ Clinical Pain Assessment++++]     FUNCTIONAL ASSESSMENT:     Palliative Performance Scale (PPS):  PPS: 50       PSYCHOSOCIAL/SPIRITUAL SCREENING:     Advance Care Planning:  No flowsheet data found. Any spiritual / Church concerns:  [] Yes /  [x] No    Caregiver Burnout:  [] Yes /  [] No /  [x] No Caregiver Present      Anticipatory grief assessment:   [x] Normal  / [] Maladaptive       ESAS Anxiety: Anxiety: 0    ESAS Depression: Depression: 0        REVIEW OF SYSTEMS:     Positive and pertinent negative findings in ROS are noted above in HPI. The following systems were [x] reviewed / [] unable to be reviewed as noted in HPI  Other findings are noted below. Systems: constitutional, ears/nose/mouth/throat, respiratory, gastrointestinal, genitourinary, musculoskeletal, integumentary, neurologic, psychiatric, endocrine. Positive findings noted below.   Modified ESAS Completed by: provider   Fatigue: 5 Drowsiness: 0   Depression: 0 Pain: 0   Anxiety: 0 Nausea: 0   Anorexia: 0 Dyspnea: 0     Constipation: No     Stool Occurrence(s): 1        PHYSICAL EXAM:     From RN flowsheet:  Wt Readings from Last 3 Encounters:   02/08/17 122 lb 4.8 oz (55.5 kg)   01/18/17 160 lb 5.1 oz (72.7 kg)   01/16/17 125 lb (56.7 kg)     Blood pressure 115/80, pulse (!) 103, temperature 98 °F (36.7 °C), resp. rate 20, height 5' 5\" (1.651 m), weight 122 lb 4.8 oz (55.5 kg), SpO2 100 %. Pain Scale 1: Numeric (0 - 10)  Pain Intensity 1: 0                 Last bowel movement, if known:       Constitutional: WD, WN, NAD   Eyes: pupils equal, anicteric  ENMT: no nasal discharge, moist mucous membranes  Cardiovascular: regular rhythm, distal pulses intact  Respiratory: breathing not labored, symmetric   Gastrointestinal: soft non-tender, +bowel sounds  Musculoskeletal: no deformity, no tenderness to palpation  Skin: warm, dry  Neurologic: following commands, moving all extremities, very poor memory   Psychiatric: full affect, no hallucinations     HISTORY:     Active Problems:    Pneumonia (2/4/2017)      Dyspnea (2/6/2017)      Past Medical History   Diagnosis Date    Alzheimer disease     CAD (coronary artery disease)     Cancer ([facility] [address]      prostate    CKD (chronic kidney disease) stage 4, GFR 15-29 ml/min [facility]     DM (diabetes mellitus), type 2, uncontrolled ([facility])     Hyperlipidemia     Hypertension     Other ill-defined conditions(799.89)      blind R eye-retina detachment    Other ill-defined conditions(799.89)      right cerebellopontine angle lipoma. Past Surgical History   Procedure Laterality Date    Hx shoulder arthroscopy       right    Hx urological       prostate bx    Hx heent       retinal detachment    Upper gi endoscopy,biopsy  11/12/2014          Colonoscopy,diagnostic  11/12/2014            Family History   Problem Relation Age of Onset    Heart Disease Mother      Pacemaker    Cancer Father       History reviewed, no pertinent family history.   Social History   Substance Use Topics    Smoking status: Never Smoker    Smokeless tobacco: Never Used    Alcohol use No     Allergies   Allergen Reactions    Ace Inhibitors Unknown (comments)    Arb-Angiotensin Receptor Antagonist Unknown (comments)      Current Facility-Administered Medications   Medication Dose Route Frequency    [START ON 2/9/2017] furosemide (LASIX) tablet 40 mg  40 mg Oral DAILY    insulin glargine (LANTUS) injection 6 Units  6 Units SubCUTAneous DAILY    albuterol-ipratropium (DUO-NEB) 2.5 MG-0.5 MG/3 ML  3 mL Nebulization TID RT    epoetin shaye (EPOGEN;PROCRIT) injection 10,000 Units  10,000 Units SubCUTAneous EVERY MON & TH    insulin lispro (HUMALOG) injection +++Home sliding scale+++   SubCUTAneous AC&HS    glucose chewable tablet 16 g  4 Tab Oral PRN    dextrose (D50W) injection syrg 12.5-25 g  12.5-25 g IntraVENous PRN    glucagon (GLUCAGEN) injection 1 mg  1 mg IntraMUSCular PRN    mupirocin (BACTROBAN) 2 % ointment   Both Nostrils BID    sodium chloride (NS) flush 5-10 mL  5-10 mL IntraVENous Q8H    sodium chloride (NS) flush 5-10 mL  5-10 mL IntraVENous PRN    acetaminophen (TYLENOL) tablet 650 mg  650 mg Oral Q4H PRN    ondansetron (ZOFRAN) injection 4 mg  4 mg IntraVENous Q4H PRN    bisacodyl (DULCOLAX) tablet 5 mg  5 mg Oral DAILY PRN    heparin (porcine) injection 5,000 Units  5,000 Units SubCUTAneous Q8H    albuterol (PROVENTIL VENTOLIN) nebulizer solution 2.5 mg  2.5 mg Nebulization Q4H PRN    amLODIPine (NORVASC) tablet 10 mg  10 mg Oral DAILY    calcitRIOL (ROCALTROL) capsule 0.25 mcg  0.25 mcg Oral DAILY    mirtazapine (REMERON) tablet 15 mg  15 mg Oral QHS    pravastatin (PRAVACHOL) tablet 10 mg  10 mg Oral QHS    tamsulosin (FLOMAX) capsule 0.4 mg  0.4 mg Oral DAILY    B complex-vitaminC-folic acid (NEPHROCAP) cap  1 Cap Oral DAILY    pantoprazole (PROTONIX) tablet 40 mg  40 mg Oral ACB    hydrALAZINE (APRESOLINE) 20 mg/mL injection 10 mg  10 mg IntraVENous Q6H PRN    levoFLOXacin (LEVAQUIN) 500 mg in D5W IVPB  500 mg IntraVENous Q48H          LAB AND IMAGING FINDINGS:     Lab Results Component Value Date/Time    WBC 12.6 02/08/2017 02:05 AM    HGB 7.9 02/08/2017 02:05 AM    PLATELET 303 91/76/0175 02:05 AM     Lab Results   Component Value Date/Time    Sodium 136 02/08/2017 02:05 AM    Potassium 4.0 02/08/2017 02:05 AM    Chloride 101 02/08/2017 02:05 AM    CO2 22 02/08/2017 02:05 AM    BUN 40 02/08/2017 02:05 AM    Creatinine 6.02 02/08/2017 02:05 AM    Calcium 7.1 02/08/2017 02:05 AM    Magnesium 1.8 02/07/2017 02:51 AM    Phosphorus 6.9 02/07/2017 02:51 AM      Lab Results   Component Value Date/Time    AST (SGOT) 21 02/06/2017 02:36 AM    Alk. phosphatase 84 02/06/2017 02:36 AM    Protein, total 5.7 02/06/2017 02:36 AM    Albumin 2.8 02/06/2017 02:36 AM    Globulin 2.9 02/06/2017 02:36 AM     Lab Results   Component Value Date/Time    INR 1.0 06/14/2012 12:20 PM    Prothrombin time 10.6 06/14/2012 12:20 PM    aPTT 27.8 06/14/2012 12:20 PM      Lab Results   Component Value Date/Time    Iron 34 01/18/2017 06:21 PM    TIBC 252 01/18/2017 06:21 PM    Iron % saturation 13 01/18/2017 06:21 PM    Ferritin 440 01/18/2017 06:21 PM      Lab Results   Component Value Date/Time    pH 7.04 02/05/2017 10:00 AM    PCO2 39 02/05/2017 10:00 AM    PO2 376 02/05/2017 10:00 AM     No components found for: [place]   Lab Results   Component Value Date/Time     02/05/2017 05:42 AM    CK - MB 7.6 02/05/2017 05:42 AM                Total time: 30 min  Counseling / coordination time: 25 min  > 50% counseling / coordination?:yes    Prolonged service was provided for  []30 min   []75 min in face to face time in the presence of the patient. Time Start:   Time End:   Note: this can only be billed with 17050 (initial) or 31239 (follow up). If multiple start / stop times, list each separately.

## 2017-02-08 NOTE — PROGRESS NOTES
NAME: Arsh Dominguez. :  1950        MRN:  [id number]        Assessment :    Plan:  --CKD-4 - baseline creatinine about 4. MANA  Sepsis   HCAP  DM-hyperglycemia (up to 142)  Metabolic acidosis  Pseudohyponatremia  Hyperkalemia-chronic  Anemia  HTN  SHPT   --Creatinine worse (6). Prerenal +/- ATN. He is not a RRT candidate    Resolved hyperkalemia with medical management (volume, kayexalate, bicarb). Hgb 7.3    On Calcitriol, phos high    Change to lasix 40 mg po daily; d/c ivf's    on epo 10 k sc biw. S/p IV iron in January - none now in face of infection       Subjective:     Chief Complaint:  Alert. Poor historian. Denied sob, chest pain, nausea when directly asked. States he is hungry. Did not at all remember who I was. Review of Systems:    Symptom Y/N Comments  Symptom Y/N Comments   Fever/Chills    Chest Pain     Poor Appetite    Edema     Cough    Abdominal Pain     Sputum    Joint Pain     SOB/LAUREANO    Pruritis/Rash     Nausea/vomit    Tolerating PT/OT     Diarrhea    Tolerating Diet     Constipation    Other       Could not obtain due to: See above     Objective:     VITALS:   Last 24hrs VS reviewed since prior progress note. Most recent are:  Visit Vitals    /81 (BP 1 Location: Right arm, BP Patient Position: At rest)    Pulse 97    Temp 98.2 °F (36.8 °C)    Resp 18    Ht 5' 5\" (1.651 m)    Wt 55.5 kg (122 lb 4.8 oz)    SpO2 100%    BMI 20.35 kg/m2       Intake/Output Summary (Last 24 hours) at 17 1354  Last data filed at 17 1311   Gross per 24 hour   Intake             1060 ml   Output              600 ml   Net              460 ml      Telemetry Reviewed:     PHYSICAL EXAM:  General: WD, WN. Alert, cooperative, no acute distress  Resp:  CTA Bilaterally upper lobes, Rales to lower lobes. No access. muscle use  CV:  Regular  rhythm,  No murmur (), No Rubs, No Gallops.  No edema  GI:  Soft, Non distended, Non tender.  +Bowel sounds, no HSM      Lab Data Reviewed: (see below)    Medications Reviewed: (see below)    PMH/SH reviewed - no change compared to H&P  ________________________________________________________________________  Care Plan discussed with:  Patient y    Family      RN y    Care Manager                    Consultant:          Comments   >50% of visit spent in counseling and coordination of care       ________________________________________________________________________  Erika Bishop MD     Procedures: see electronic medical records for all procedures/Xrays and details which  were not copied into this note but were reviewed prior to creation of Plan. LABS:  Recent Labs      02/08/17   0205  02/07/17   0251   WBC  12.6*  16.2*   HGB  7.9*  7.4*   HCT  23.4*  21.8*   PLT  279  293     Recent Labs      02/08/17   0205  02/07/17   0251  02/06/17   1436  02/06/17   0236   NA  136  137  140  142   K  4.0  4.0  4.4  4.8   CL  101  101  101  106   CO2  22  26  23  22   BUN  40*  44*  47*  48*   CREA  6.02*  5.37*  5.41*  5.23*   GLU  120*  61*  173*  181*   CA  7.1*  7.2*  7.6*  7.8*   MG   --   1.8   --   1.8   PHOS   --   6.9*   --   7.2*     Recent Labs      02/06/17   0236   SGOT  21   AP  84   TP  5.7*   ALB  2.8*   GLOB  2.9     No results for input(s): INR, PTP, APTT in the last 72 hours. No lab exists for component: INREXT, INREXT   No results for input(s): FE, TIBC, PSAT, FERR in the last 72 hours. Lab Results   Component Value Date/Time    Folate 53.6 11/11/2014 03:40 AM      No results for input(s): PH, PCO2, PO2 in the last 72 hours. No results for input(s): CPK, CKMB in the last 72 hours.     No lab exists for component: TROPONINI  No components found for: [place]  Lab Results   Component Value Date/Time    Color YELLOW/STRAW 02/06/2017 02:36 AM    Appearance CLEAR 02/06/2017 02:36 AM    Specific gravity 1.008 02/06/2017 02:36 AM    Specific gravity 1.010 01/18/2017 03:41 AM    pH (UA) 7.5 02/06/2017 02:36 AM    Protein 100 02/06/2017 02:36 AM    Glucose 100 02/06/2017 02:36 AM    Ketone NEGATIVE  02/06/2017 02:36 AM    Bilirubin NEGATIVE  02/06/2017 02:36 AM    Urobilinogen 0.2 02/06/2017 02:36 AM    Nitrites NEGATIVE  02/06/2017 02:36 AM    Leukocyte Esterase NEGATIVE  02/06/2017 02:36 AM    Epithelial cells FEW 02/06/2017 02:36 AM    Bacteria NEGATIVE  02/06/2017 02:36 AM    WBC 0-4 02/06/2017 02:36 AM    RBC  02/06/2017 02:36 AM       MEDICATIONS:  Current Facility-Administered Medications   Medication Dose Route Frequency    insulin glargine (LANTUS) injection 6 Units  6 Units SubCUTAneous DAILY    albuterol-ipratropium (DUO-NEB) 2.5 MG-0.5 MG/3 ML  3 mL Nebulization TID RT    epoetin shaye (EPOGEN;PROCRIT) injection 10,000 Units  10,000 Units SubCUTAneous EVERY MON & TH    insulin lispro (HUMALOG) injection +++Home sliding scale+++   SubCUTAneous AC&HS    glucose chewable tablet 16 g  4 Tab Oral PRN    dextrose (D50W) injection syrg 12.5-25 g  12.5-25 g IntraVENous PRN    glucagon (GLUCAGEN) injection 1 mg  1 mg IntraMUSCular PRN    furosemide (LASIX) injection 40 mg  40 mg IntraVENous BID    mupirocin (BACTROBAN) 2 % ointment   Both Nostrils BID    sodium chloride (NS) flush 5-10 mL  5-10 mL IntraVENous Q8H    sodium chloride (NS) flush 5-10 mL  5-10 mL IntraVENous PRN    acetaminophen (TYLENOL) tablet 650 mg  650 mg Oral Q4H PRN    ondansetron (ZOFRAN) injection 4 mg  4 mg IntraVENous Q4H PRN    bisacodyl (DULCOLAX) tablet 5 mg  5 mg Oral DAILY PRN    0.9% sodium chloride infusion  50 mL/hr IntraVENous CONTINUOUS    heparin (porcine) injection 5,000 Units  5,000 Units SubCUTAneous Q8H    albuterol (PROVENTIL VENTOLIN) nebulizer solution 2.5 mg  2.5 mg Nebulization Q4H PRN    amLODIPine (NORVASC) tablet 10 mg  10 mg Oral DAILY    calcitRIOL (ROCALTROL) capsule 0.25 mcg  0.25 mcg Oral DAILY    mirtazapine (REMERON) tablet 15 mg 15 mg Oral QHS    pravastatin (PRAVACHOL) tablet 10 mg  10 mg Oral QHS    tamsulosin (FLOMAX) capsule 0.4 mg  0.4 mg Oral DAILY    B complex-vitaminC-folic acid (NEPHROCAP) cap  1 Cap Oral DAILY    pantoprazole (PROTONIX) tablet 40 mg  40 mg Oral ACB    hydrALAZINE (APRESOLINE) 20 mg/mL injection 10 mg  10 mg IntraVENous Q6H PRN    levoFLOXacin (LEVAQUIN) 500 mg in D5W IVPB  500 mg IntraVENous Q48H

## 2017-02-08 NOTE — PROGRESS NOTES
Hospitalist Progress Note    NAME: Helane Gaucher. :  1950   MRN:  [id number]       Interim Hospital Summary: 61 y.o. male whom presented on 2017 with      Assessment / Plan:  1. Acute hypoxic respiratory failure:  No more SOB. Continue with O2, BDs, and monitor respiratory status.      2. PNA:  Continue with Levaquin.      3. CKD:  Creatinine up 6.02 today. Continue to monitor renal function closely. Nephrology on board.      4. CAD; Continue with BB.      5. H/o CHF:  Echo with LVEF 60-65%. Continue with PO Lasix, and monitor volume status.      7. HTN:  Fairly under control. Continue with BB.      8. DM:  HG A1C pending. Continue with basal/prandial Insulins, and monitor FSs.      9. Dyslipidemia:  Continue with statin.      10. H/o prostate CA:  Continue with Flomax.      11. Depression:  Continue with Remeron.      12. Anemia:  H/H stable. Continue with Epoetin. Code status: DNR  Prophylaxis: SC Heparin  Recommended Disposition: SNF      Subjective:     Chief Complaint / Reason for Physician Visit  Patient seen and examined. Feels better today. Discussed with RN events overnight. Review of Systems:  Symptom Y/N Comments  Symptom Y/N Comments   Fever/Chills N   Chest Pain N    Poor Appetite    Edema     Cough    Abdominal Pain N    Sputum    Joint Pain     SOB/LAUREANO N   Pruritis/Rash     Nausea/vomit N   Tolerating PT/OT Y    Diarrhea N   Tolerating Diet Y    Constipation    Other       Could NOT obtain due to:      Objective:     VITALS:   Last 24hrs VS reviewed since prior progress note.  Most recent are:  Patient Vitals for the past 24 hrs:   Temp Pulse Resp BP SpO2   17 1407 - - - - 99 %   17 1116 98.2 °F (36.8 °C) 97 18 146/81 100 %   17 0920 - - - 155/78 -   17 0733 - - - - 98 %   17 0703 98.3 °F (36.8 °C) (!) 109 20 (!) 173/93 100 %   17 0328 98.1 °F (36.7 °C) 94 18 144/66 100 %   17 2330 98.8 °F (37.1 °C) (!) 102 18 133/63 98 %   02/07/17 2034 - - - - 100 %   02/07/17 1922 98.2 °F (36.8 °C) 98 18 155/83 100 %   02/07/17 1519 98 °F (36.7 °C) 96 18 144/70 100 %       Intake/Output Summary (Last 24 hours) at 02/08/17 1439  Last data filed at 02/08/17 1311   Gross per 24 hour   Intake             1060 ml   Output              600 ml   Net              460 ml        PHYSICAL EXAM:  General: WD, WN. Alert, cooperative, no acute distress    EENT:  EOMI. Anicteric sclerae. MMM  Resp:  CTA bilaterally, no wheezing or rales. No accessory muscle use  CV:  Regular  rhythm,  No edema  GI:  Soft, Non distended, Non tender.  +Bowel sounds  Neurologic:  Alert and oriented X 3, normal speech,   Psych:   Good insight. Not anxious nor agitated  Skin:  No rashes. No jaundice    Reviewed most current lab test results and cultures  YES  Reviewed most current radiology test results   YES  Review and summation of old records today    NO  Reviewed patient's current orders and MAR    YES  PMH/ reviewed - no change compared to H&P  ________________________________________________________________________  Care Plan discussed with:    Comments   Patient Y    Family      RN Y    Care Manager     Consultant                        Multidiciplinary team rounds were held today with , nursing, pharmacist and clinical coordinator. Patient's plan of care was discussed; medications were reviewed and discharge planning was addressed. ________________________________________________________________________  Total NON critical care TIME: 30   Minutes    Total CRITICAL CARE TIME Spent:   Minutes non procedure based      Comments   >50% of visit spent in counseling and coordination of care Y    ________________________________________________________________________  Shay Whiteside MD     Procedures: see electronic medical records for all procedures/Xrays and details which were not copied into this note but were reviewed prior to creation of Plan.       LABS:  I reviewed today's most current labs and imaging studies.   Pertinent labs include:  Recent Labs      02/08/17   0205 02/07/17   0251  02/06/17   0236   WBC  12.6*  16.2*  20.8*   HGB  7.9*  7.4*  7.3*   HCT  23.4*  21.8*  21.1*   PLT  279  293  283     Recent Labs      02/08/17   0205 02/07/17   0251 02/06/17   1436  02/06/17   0236   NA  136  137  140  142   K  4.0  4.0  4.4  4.8   CL  101  101  101  106   CO2  22  26  23  22   GLU  120*  61*  173*  181*   BUN  40*  44*  47*  48*   CREA  6.02*  5.37*  5.41*  5.23*   CA  7.1*  7.2*  7.6*  7.8*   MG   --   1.8   --   1.8   PHOS   --   6.9*   --   7.2*   ALB   --    --    --   2.8*   TBILI   --    --    --   0.6   SGOT   --    --    --   21   ALT   --    --    --   21       Signed: Angel Sousa MD

## 2017-02-08 NOTE — DIABETES MGMT
DTC Progress Note    Recommendations/ Comments: If appropriate, please consider giving Lantus dose of 6 units and a small amount of correctional insulin at this time to correct lunch time BG. Note hospice consulted. Chart reviewed on Oleg Scott .    Patient is a 61 y.o. male with known diabetes on Lantus and correctional insulin at home. A1c:   Lab Results   Component Value Date/Time    Hemoglobin A1c 9.4 01/20/2017 04:14 AM    Hemoglobin A1c 10.0 01/18/2017 06:21 PM    Hemoglobin A1c, External 7.7 08/12/2015    Hemoglobin A1c, External 7.7 08/07/2015       Recent Glucose Results: Lab Results   Component Value Date/Time     (H) 02/08/2017 02:05 AM    GLUCPOC 85 02/08/2017 08:07 AM    GLUCPOC 60 (L) 02/08/2017 07:49 AM    GLUCPOC 54 (L) 02/08/2017 07:48 AM        Lab Results   Component Value Date/Time    Creatinine 6.02 02/08/2017 02:05 AM       Active Orders   Diet    DIET DIABETIC CONSISTENT CARB Regular; 2 GM NA (House Low NA); FR 1800ML; 70-70-70 (House)        PO intake: Patient Vitals for the past 72 hrs:   % Diet Eaten   02/08/17 0906 100 %   02/07/17 1517 100 %   02/07/17 0813 100 %   02/06/17 1700 50 %   02/06/17 1200 100 %       Current hospital DM medication: Richa Common s    Will continue to follow as needed. Thank you.   Adam Muse, [address], [place]   Office:  [phone]

## 2017-02-08 NOTE — HOSPICE
[facility] RN note:  Consult noted. Reviewing chart. Will contact family after discussing pt with those involved with care. 2:40-----Lengthy discussion with palliative NP Laura Eid. Pt at this time is not inpt appropriate for hospice as there are no symptoms to manage that cannot be managed in another location and pt is not actively dying with hours to day prognosis. Note that pt came from [facility] which is a facility that [facility] hospice does not have a contract. Should pt decline and not be stable enough to transport, please contact hospice to assess. Thank you for the opportunity to care for this pt and family. Please contact hospice at [phone] with any questions or concerns.

## 2017-02-08 NOTE — PROGRESS NOTES
Consult sent to [facility] to assess for IP Hospice.      11a  Case discussed with Donna Ordonez RN with [facility]    12gold  Tim has Kirstin Hampton so pt may return when all in agreement

## 2017-02-08 NOTE — PROGRESS NOTES
Bedside shift change report given to [address] [place] [address] (oncoming nurse) by [facility] (offgoing nurse). Report included the following information SBAR, Kardex, Intake/Output, MAR, Recent Results and Cardiac Rhythm \..    Zone Phone for oncoming shift:   [phone]    Shift Summary: Pt transferred from CCU today. LDAs               Peripheral IV 02/04/17 Left Forearm (Active)   Site Assessment Clean, dry, & intact 2/7/2017  3:14 PM   Phlebitis Assessment 0 2/7/2017  3:14 PM   Infiltration Assessment 0 2/7/2017  3:14 PM   Dressing Status Clean, dry, & intact 2/7/2017  3:14 PM   Dressing Type Transparent;Tape 2/7/2017  3:14 PM   Hub Color/Line Status Pink;Capped 2/7/2017  3:14 PM   Action Taken Open ports on tubing capped 2/6/2017  5:00 AM   Alcohol Cap Used Yes 2/6/2017  5:00 AM       Peripheral IV 02/05/17 (Active)   Site Assessment Clean, dry, & intact 2/7/2017  3:14 PM   Phlebitis Assessment 0 2/7/2017  3:14 PM   Infiltration Assessment 0 2/7/2017  3:14 PM   Dressing Status Clean, dry, & intact 2/7/2017  3:14 PM   Dressing Type Transparent;Tape 2/7/2017  3:14 PM   Hub Color/Line Status Pink;Capped 2/7/2017  3:14 PM   Action Taken Open ports on tubing capped 2/6/2017  5:00 AM   Alcohol Cap Used Yes 2/6/2017  5:00 AM                    [REMOVED] Urinary Catheter 02/05/17 2- way-Criteria for Appropriate Use: Strict I/Os   Intake & Output   Date 02/06/17 1900 - 02/07/17 0659 02/07/17 0700 - 02/08/17 0659   Shift 7408-1455 24 Hour Total 1519-8926 8128-3519 24 Hour Total   I  N  T  A  K  E   P.O.  960 480  480      P. O.  960 480  480    I.V.  (mL/kg/hr) 200 801.7         Fentanyl Volume  1.7         Volume (sodium bicarbonate (8.4%) 150 mEq in sterile water 1,000 mL infusion)  200         Volume (dextrose 5% and 0.9% NaCl infusion)  150         Volume (cefepime (MAXIPIME) 2 g in 0.9% sodium chloride (MBP/ADV) 100 mL) 100 100         Volume (levoFLOXacin (LEVAQUIN) 500 mg in D5W IVPB) 100 100         Volume (vancomycin (VANCOCIN) 750 mg in 0.9% sodium chloride (MBP/ADV) 250 mL)  250       Shift Total  (mL/kg) 200  (3.5) 1761.7  (30.4) 480  (8.1)  480  (8.1)   O  U  T  P  U  T   Urine  (mL/kg/hr) 2700 4300 275  (0.4)  275      Urine Occurrence(s)   3 x  3 x      Urine Output (mL) ([REMOVED] Urinary Catheter 02/05/17 2- way) 2700 4300 275  275    Stool           Stool Occurrence(s) 1 x 1 x       Shift Total  (mL/kg) 2700  (46.6) 4300  (74.3) 275  (4.7)  275  (4.7)   NET -2500 -2538.3 205  205   Weight (kg) 57.9 57.9 59 59 59      Last Bowel Movement Last Bowel Movement Date: 02/07/17   Glucose Checks [] N/A  [] AC/HS  [] Q6  Concerns:   Nutrition Active Orders   Diet    DIET DIABETIC CONSISTENT CARB Regular; 2 GM NA ([place] Low NA); FR 1800ML; 70-70-70 (House)       Consults []PT  []OT  []Speech  []Case Management   Cardiac Monitoring []N/A []Yes Expires:

## 2017-02-08 NOTE — PROGRESS NOTES
Bedside and Verbal shift change report given to Osiel Doss (oncoming nurse) by Steve Sue RN (offgoing nurse). Report included the following information Kardex, MAR and Recent Results. Zone Phone for oncoming shift:   [phone]  Shift Summary: Resting quietly at this time    LDAs               Peripheral IV 02/04/17 Left Forearm (Active)   Site Assessment Clean, dry, & intact 2/7/2017  8:30 PM   Phlebitis Assessment 0 2/7/2017  8:30 PM   Infiltration Assessment 0 2/7/2017  8:30 PM   Dressing Status Clean, dry, & intact 2/7/2017  8:30 PM   Dressing Type Transparent 2/7/2017  8:30 PM   Hub Color/Line Status Pink;Capped 2/7/2017  8:30 PM   Action Taken Open ports on tubing capped 2/6/2017  5:00 AM   Alcohol Cap Used Yes 2/7/2017  8:30 PM       Peripheral IV 02/05/17 (Active)   Site Assessment Clean, dry, & intact 2/7/2017  8:30 PM   Phlebitis Assessment 0 2/7/2017  8:30 PM   Infiltration Assessment 0 2/7/2017  8:30 PM   Dressing Status Clean, dry, & intact 2/7/2017  8:30 PM   Dressing Type Transparent 2/7/2017  8:30 PM   Hub Color/Line Status Pink; Infusing 2/7/2017  8:30 PM   Action Taken Open ports on tubing capped 2/6/2017  5:00 AM   Alcohol Cap Used Yes 2/6/2017  5:00 AM                    [REMOVED] Urinary Catheter 02/05/17 2- way-Criteria for Appropriate Use: Strict I/Os   Intake & Output   Date 02/06/17 1900 - 02/07/17 0659 02/07/17 0700 - 02/08/17 0659   Shift 9788-0014 24 Hour Total 8584-6467 5302-6469 24 Hour Total   I  N  T  A  K  E   P.O.  960 480  480      P. O.  960 480  480    I.V.  (mL/kg/hr) 200 801.7         Fentanyl Volume  1.7         Volume (sodium bicarbonate (8.4%) 150 mEq in sterile water 1,000 mL infusion)  200         Volume (dextrose 5% and 0.9% NaCl infusion)  150         Volume (cefepime (MAXIPIME) 2 g in 0.9% sodium chloride (MBP/ADV) 100 mL) 100 100         Volume (levoFLOXacin (LEVAQUIN) 500 mg in D5W IVPB) 100 100         Volume (vancomycin (VANCOCIN) 750 mg in 0.9% sodium chloride (MBP/ADV) 250 mL) 250       Shift Total  (mL/kg) 200  (3.5) 1761.7  (30.4) 480  (8.1)  480  (8.1)   O  U  T  P  U  T   Urine  (mL/kg/hr) 2700 4300 275  (0.4)  275      Urine Occurrence(s)   3 x  3 x      Urine Output (mL) ([REMOVED] Urinary Catheter 02/05/17 2- way) 2700 4300 275  275    Stool           Stool Occurrence(s) 1 x 1 x       Shift Total  (mL/kg) 2700  (46.6) 4300  (74.3) 275  (4.7)  275  (4.7)   NET -2500 -2538.3 205  205   Weight (kg) 57.9 57.9 59 59 59      Last Bowel Movement Last Bowel Movement Date: 02/07/17   Glucose Checks [] N/A  [x] AC/HS  [] Q6  Concerns:   Nutrition Active Orders   Diet    DIET DIABETIC CONSISTENT CARB Regular; 2 GM NA ([place] Low NA); FR 1800ML; 70-70-70 (House)       Consults []PT  []OT  []Speech  []Case Management   Cardiac Monitoring []N/A [x]Yes Expires:

## 2017-02-08 NOTE — PROGRESS NOTES
HYPOGLYCEMIC EPISODE DOCUMENTATION    Patient with hypoglycemic episode(s) at 07:49 (time) on 2/8/16 (date). BG value(s) pre-treatment 61    Was patient symptomatic? [] yes, [x] no  Patient was treated with the following rescue medications/treatments: [] D50                [] Glucose tablets                [] Glucagon                [x] 4oz juice                [] 6oz reg soda                [] 8oz low fat milk  BG value post-treatment: 85  Once BG treated and value greater than 80mg/dl, pt was provided with the following:  [] snack  [x] meal    MD Chang notified.  Hold morning Lantus per MD.

## 2017-02-08 NOTE — PROGRESS NOTES
/93. PRN hydralazine given. 09:20- /78     11:21- Blood glucose 547  11:25- Dr. Aaron Zamarripa paged  11:33- Spoke to Dr. Aaron Zamarripa- give 10 units of Humalog and give morning dose of Lantus that was previously held.

## 2017-02-08 NOTE — PROGRESS NOTES
Patient's chart reviewed and he is cleared by nursing for PT treatment prior to PT entering patient's room. Patient is initially agreeable to participation, until it becomes clear that he must get out of bed. He declines ambulation and transfer to chair (\"I've already sat up today\"). He refuses supine exercise while remaining in bed and begins to perseverate on being hungry. Lunch is then delivered by [facility] and patient comes to sit at the edge of his bed independently. He refuses transfer to chair to eat lunch more comfortably. Nursing is present and aware of this transaction. Aborted PT treatment, will try again tomorrow.

## 2017-02-09 LAB
ANION GAP BLD CALC-SCNC: 10 MMOL/L (ref 5–15)
BASOPHILS # BLD AUTO: 0.1 K/UL (ref 0–0.1)
BASOPHILS # BLD: 1 % (ref 0–1)
BNP SERPL-MCNC: ABNORMAL PG/ML (ref 0–125)
BUN SERPL-MCNC: 36 MG/DL (ref 6–20)
BUN/CREAT SERPL: 6 (ref 12–20)
CALCIUM SERPL-MCNC: 7.3 MG/DL (ref 8.5–10.1)
CHLORIDE SERPL-SCNC: 102 MMOL/L (ref 97–108)
CO2 SERPL-SCNC: 21 MMOL/L (ref 21–32)
CREAT SERPL-MCNC: 6.4 MG/DL (ref 0.7–1.3)
EOSINOPHIL # BLD: 0.3 K/UL (ref 0–0.4)
EOSINOPHIL NFR BLD: 3 % (ref 0–7)
ERYTHROCYTE [DISTWIDTH] IN BLOOD BY AUTOMATED COUNT: 13.9 % (ref 11.5–14.5)
EST. AVERAGE GLUCOSE BLD GHB EST-MCNC: 214 MG/DL
GLUCOSE BLD STRIP.AUTO-MCNC: 267 MG/DL (ref 65–100)
GLUCOSE BLD STRIP.AUTO-MCNC: 287 MG/DL (ref 65–100)
GLUCOSE BLD STRIP.AUTO-MCNC: 302 MG/DL (ref 65–100)
GLUCOSE BLD STRIP.AUTO-MCNC: 337 MG/DL (ref 65–100)
GLUCOSE SERPL-MCNC: 249 MG/DL (ref 65–100)
HBA1C MFR BLD: 9.1 % (ref 4.2–6.3)
HCT VFR BLD AUTO: 23.4 % (ref 36.6–50.3)
HGB BLD-MCNC: 8.1 G/DL (ref 12.1–17)
LYMPHOCYTES # BLD AUTO: 21 % (ref 12–49)
LYMPHOCYTES # BLD: 2.1 K/UL (ref 0.8–3.5)
MCH RBC QN AUTO: 30.9 PG (ref 26–34)
MCHC RBC AUTO-ENTMCNC: 34.6 G/DL (ref 30–36.5)
MCV RBC AUTO: 89.3 FL (ref 80–99)
MONOCYTES # BLD: 1 K/UL (ref 0–1)
MONOCYTES NFR BLD AUTO: 10 % (ref 5–13)
NEUTS SEG # BLD: 6.8 K/UL (ref 1.8–8)
NEUTS SEG NFR BLD AUTO: 65 % (ref 32–75)
PLATELET # BLD AUTO: 298 K/UL (ref 150–400)
POTASSIUM SERPL-SCNC: 4.7 MMOL/L (ref 3.5–5.1)
RBC # BLD AUTO: 2.62 M/UL (ref 4.1–5.7)
SERVICE CMNT-IMP: ABNORMAL
SODIUM SERPL-SCNC: 133 MMOL/L (ref 136–145)
WBC # BLD AUTO: 10.2 K/UL (ref 4.1–11.1)

## 2017-02-09 PROCEDURE — 74011250636 HC RX REV CODE- 250/636: Performed by: INTERNAL MEDICINE

## 2017-02-09 PROCEDURE — 74011000250 HC RX REV CODE- 250: Performed by: INTERNAL MEDICINE

## 2017-02-09 PROCEDURE — 74011636637 HC RX REV CODE- 636/637: Performed by: INTERNAL MEDICINE

## 2017-02-09 PROCEDURE — 74011250637 HC RX REV CODE- 250/637: Performed by: INTERNAL MEDICINE

## 2017-02-09 PROCEDURE — 82962 GLUCOSE BLOOD TEST: CPT

## 2017-02-09 PROCEDURE — 65270000029 HC RM PRIVATE

## 2017-02-09 PROCEDURE — 80048 BASIC METABOLIC PNL TOTAL CA: CPT | Performed by: INTERNAL MEDICINE

## 2017-02-09 PROCEDURE — 36415 COLL VENOUS BLD VENIPUNCTURE: CPT | Performed by: INTERNAL MEDICINE

## 2017-02-09 PROCEDURE — 83036 HEMOGLOBIN GLYCOSYLATED A1C: CPT | Performed by: INTERNAL MEDICINE

## 2017-02-09 PROCEDURE — 85025 COMPLETE CBC W/AUTO DIFF WBC: CPT | Performed by: INTERNAL MEDICINE

## 2017-02-09 PROCEDURE — 94640 AIRWAY INHALATION TREATMENT: CPT

## 2017-02-09 PROCEDURE — 83880 ASSAY OF NATRIURETIC PEPTIDE: CPT | Performed by: INTERNAL MEDICINE

## 2017-02-09 RX ADMIN — AMLODIPINE BESYLATE 10 MG: 5 TABLET ORAL at 08:29

## 2017-02-09 RX ADMIN — Medication 10 ML: at 16:01

## 2017-02-09 RX ADMIN — FUROSEMIDE 40 MG: 40 TABLET ORAL at 08:29

## 2017-02-09 RX ADMIN — PANTOPRAZOLE SODIUM 40 MG: 40 TABLET, DELAYED RELEASE ORAL at 08:29

## 2017-02-09 RX ADMIN — IPRATROPIUM BROMIDE AND ALBUTEROL SULFATE 3 ML: .5; 3 SOLUTION RESPIRATORY (INHALATION) at 07:29

## 2017-02-09 RX ADMIN — INSULIN LISPRO 3 UNITS: 100 INJECTION, SOLUTION INTRAVENOUS; SUBCUTANEOUS at 08:29

## 2017-02-09 RX ADMIN — RENO CAPS 1 CAPSULE: 100; 1.5; 1.7; 20; 10; 1; 150; 5; 6 CAPSULE ORAL at 08:29

## 2017-02-09 RX ADMIN — INSULIN GLARGINE 6 UNITS: 100 INJECTION, SOLUTION SUBCUTANEOUS at 09:00

## 2017-02-09 RX ADMIN — MIRTAZAPINE 15 MG: 15 TABLET, FILM COATED ORAL at 21:09

## 2017-02-09 RX ADMIN — PRAVASTATIN SODIUM 10 MG: 10 TABLET ORAL at 21:08

## 2017-02-09 RX ADMIN — CALCITRIOL 0.25 MCG: 0.25 CAPSULE, LIQUID FILLED ORAL at 08:29

## 2017-02-09 RX ADMIN — TAMSULOSIN HYDROCHLORIDE 0.4 MG: 0.4 CAPSULE ORAL at 08:29

## 2017-02-09 RX ADMIN — IPRATROPIUM BROMIDE AND ALBUTEROL SULFATE 3 ML: .5; 3 SOLUTION RESPIRATORY (INHALATION) at 15:47

## 2017-02-09 RX ADMIN — INSULIN LISPRO 1 UNITS: 100 INJECTION, SOLUTION INTRAVENOUS; SUBCUTANEOUS at 21:13

## 2017-02-09 RX ADMIN — HEPARIN SODIUM 5000 UNITS: 5000 INJECTION, SOLUTION INTRAVENOUS; SUBCUTANEOUS at 04:10

## 2017-02-09 RX ADMIN — Medication 10 ML: at 21:08

## 2017-02-09 RX ADMIN — INSULIN LISPRO 3 UNITS: 100 INJECTION, SOLUTION INTRAVENOUS; SUBCUTANEOUS at 12:49

## 2017-02-09 RX ADMIN — Medication 10 ML: at 05:48

## 2017-02-09 RX ADMIN — ERYTHROPOIETIN 10000 UNITS: 10000 INJECTION, SOLUTION INTRAVENOUS; SUBCUTANEOUS at 17:18

## 2017-02-09 RX ADMIN — INSULIN LISPRO 4 UNITS: 100 INJECTION, SOLUTION INTRAVENOUS; SUBCUTANEOUS at 17:18

## 2017-02-09 RX ADMIN — HEPARIN SODIUM 5000 UNITS: 5000 INJECTION, SOLUTION INTRAVENOUS; SUBCUTANEOUS at 12:49

## 2017-02-09 RX ADMIN — HEPARIN SODIUM 5000 UNITS: 5000 INJECTION, SOLUTION INTRAVENOUS; SUBCUTANEOUS at 20:41

## 2017-02-09 NOTE — PROGRESS NOTES
D/C plans discussed with MD who anticipates d/c back [facility] tomorrow on 11a [place] if all agree. Pt has insurance auth. Will discuss plans with the team and family.

## 2017-02-09 NOTE — DIABETES MGMT
DTC Progress Note    Recommendations/ Comments: Pt know to have difficulty managing BG due to family members bringing outside food. Would encourage no outside food be brought in for pt to aid in glucose control while inpatient. Chart reviewed on Iliana Saldana for hyperglycemia. Patient is a 61 y.o. male with known diabetes on Lantus and correctional insulin at home. A1c:   Lab Results   Component Value Date/Time    Hemoglobin A1c 9.1 02/09/2017 02:01 AM    Hemoglobin A1c 9.4 01/20/2017 04:14 AM    Hemoglobin A1c, External 7.7 08/12/2015    Hemoglobin A1c, External 7.7 08/07/2015       Recent Glucose Results:   Lab Results   Component Value Date/Time     (H) 02/09/2017 02:01 AM    GLUCPOC 337 (H) 02/09/2017 04:17 PM    GLUCPOC 287 (H) 02/09/2017 11:56 AM    GLUCPOC 302 (H) 02/09/2017 07:44 AM        Lab Results   Component Value Date/Time    Creatinine 6.40 02/09/2017 02:01 AM       Active Orders   Diet    DIET DIABETIC CONSISTENT CARB Regular; 2 GM NA (House Low NA); FR 1800ML; 70-70-70 (House)        PO intake:   Patient Vitals for the past 72 hrs:   % Diet Eaten   02/09/17 0936 100 %   02/09/17 0800 100 %   02/08/17 1311 100 %   02/08/17 0920 100 %   02/08/17 0906 100 %   02/07/17 1517 100 %   02/07/17 0813 100 %   02/06/17 1700 50 %       Current hospital DM medication: Lantus    Will continue to follow as needed. Thank you.     Omi Painter, [address] [facility]  Office: [phone]

## 2017-02-09 NOTE — PROGRESS NOTES
Bedside shift change report given to [address] [place] [address] (oncoming nurse) by [facility] (offgoing nurse). Report included the following information SBAR, Kardex, Intake/Output, MAR and Recent Results. Shift Summary: Pt had hypoglycemic episode today- see previous note. LDAs               Peripheral IV 02/04/17 Left Forearm (Active)   Site Assessment Clean, dry, & intact 2/8/2017  3:34 PM   Phlebitis Assessment 0 2/8/2017  3:34 PM   Infiltration Assessment 0 2/8/2017  3:34 PM   Dressing Status Clean, dry, & intact 2/8/2017  3:34 PM   Dressing Type Transparent;Tape 2/8/2017  3:34 PM   Hub Color/Line Status Pink;Capped 2/8/2017  3:34 PM   Action Taken Open ports on tubing capped 2/6/2017  5:00 AM   Alcohol Cap Used Yes 2/8/2017  3:34 PM       Peripheral IV 02/05/17 (Active)   Site Assessment Clean, dry, & intact 2/8/2017  3:34 PM   Phlebitis Assessment 0 2/8/2017  3:34 PM   Infiltration Assessment 0 2/8/2017  3:34 PM   Dressing Status Clean, dry, & intact 2/8/2017  3:34 PM   Dressing Type Transparent 2/8/2017  3:34 PM   Hub Color/Line Status Pink; Infusing 2/8/2017  3:34 PM   Action Taken Open ports on tubing capped 2/6/2017  5:00 AM   Alcohol Cap Used Yes 2/6/2017  5:00 AM                    [REMOVED] Urinary Catheter 02/05/17 2- way-Criteria for Appropriate Use: Strict I/Os   Intake & Output   Date 02/07/17 1900 - 02/08/17 0659 02/08/17 0700 - 02/09/17 0659   Shift 3180-1418 24 Hour Total 6565-9794 3251-9490 24 Hour Total   I  N  T  A  K  E   P.O.  480 720  720      P. O.  480 720  720    I.V.  (mL/kg/hr) 100 100 550  (0.8)  550      I.V.   550  550      Volume (0.9% sodium chloride infusion) 100 100       Shift Total  (mL/kg) 100  (1.7) 580  (9.8) 1270  (22.9)  1270  (22.9)   O  U  T  P  U  T   Urine  (mL/kg/hr)  275 600  (0.9)  600      Urine Voided   600  600      Urine Occurrence(s) 2 x 5 x 1 x  1 x      Urine Output (mL) ([REMOVED] Urinary Catheter 02/05/17 2- way)  275       Stool           Stool Occurrence(s)   2 x  2 x Shift Total  (mL/kg)  275  (4.7) 600  (10.8)  600  (10.8)    305 670  670   Weight (kg) 59 59 55.5 55.5 55.5      Last Bowel Movement Last Bowel Movement Date: 02/08/17   Glucose Checks [] N/A  [] AC/HS  [] Q6  Concerns:   Nutrition Active Orders   Diet    DIET DIABETIC CONSISTENT CARB Regular; 2 GM NA (Truman Low NA); FR 1800ML; 70-70-70 (House)       Consults []PT  []OT  []Speech  []Case Management   Cardiac Monitoring []N/A []Yes Expires:

## 2017-02-09 NOTE — ROUTINE PROCESS
Interdisciplinary team rounds were held 2/9/2017 with the following team members:Care Management, Nursing, Pharmacy, Physical Therapy and Physician and the patient. Plan of care discussed. See clinical pathway and/or care plan for interventions and desired outcomes.     Plan:  Abdulaziz tomorrow at 11 am

## 2017-02-09 NOTE — PROGRESS NOTES
Nutrition Services      Nutrition Screen:  Wt Readings from Last 10 Encounters:   02/09/17 53.6 kg (118 lb 2.7 oz)   01/18/17 72.7 kg (160 lb 5.1 oz)   01/16/17 56.7 kg (125 lb)   11/23/16 54 kg (119 lb)   09/30/16 55.3 kg (122 lb)   09/15/16 54.4 kg (120 lb)   08/22/16 61.2 kg (135 lb)   12/17/15 56.7 kg (125 lb)   09/30/15 55.8 kg (123 lb)   11/08/14 63.5 kg (140 lb)     Body mass index is 19.66 kg/(m^2). Supplements:                        _____ ordered ______  declined. __ __  Pt is nutritionally stable at this time, will rescreen in 7 days. _x __    Pt is at nutritional risk and will be rescreened in 2-5 days. __ __  Pt is at moderate or high nutritional risk, will refer to RD for assessment.        Gregory Flor  Dietetic Technician, Registered

## 2017-02-09 NOTE — PROGRESS NOTES
D/C plans discussed with hospice, Pall. Care, renal, and MD.  Hospice criteria for renal failure is a creatine of 8//at present, pt does not meet hospice criteria. I spoke with sister Shannon Hammonds and updated her on potential d/c tomorrow on 11a [facility] ambulance to [address] [facility]. She is in agreement and is aware of renal criteria for hospice to which she agrees.

## 2017-02-09 NOTE — PROGRESS NOTES
Physical Therapy  Attempting to see patient for PT this pm. Patient adamantly refusing any mobility at this time stating he is hungry and wanting to eat. Offered to assist patient to chair for lunch but patient continues to refuse any OOB mobility. Patient remains fixated on eating and continually asking for something to eat. Therapy session aborted. Recommend patient return to LTC following discharge. Will likely require supervision for mobiltiy. Will follow back tomorrow.   Ankita Max, PT

## 2017-02-09 NOTE — PROGRESS NOTES
Hospitalist Progress Note    NAME: Shawn Deleon. :  1950   MRN:  [id number]       Interim Hospital Summary: 61 y.o. male whom presented on 2017 with      Assessment / Plan:  1. Acute hypoxic respiratory failure:  Feels better today. No SOB. Continue with O2, BDs, and monitor respiratory status.      2. PNA:  Continue with Levaquin.      3. CKD:  Creatinine up 6.02 today. Continue to monitor renal function closely. Nephrology on board.      4. CAD; Continue with BB.      5. H/o CHF:  Echo with LVEF 60-65%. Continue with PO Lasix, and monitor volume status.      7. HTN:  Fairly under control. Continue with BB.      8. DM:  HG A1C 9.1. Continue with basal/prandial Insulins, and monitor FSs.      9. Dyslipidemia:  Continue with statin.      10. H/o prostate CA:  Continue with Flomax.      11. Depression:  Continue with Remeron.      12. Anemia:  H/H stable. Continue with Epoetin.                     Code status: DNR  Prophylaxis: SC Heparin  Recommended Disposition: SNF        Subjective:     Chief Complaint / Reason for Physician Visit  Patient seen and examined. Feels better today. No SOB. Discussed with RN events overnight. Review of Systems:  Symptom Y/N Comments  Symptom Y/N Comments   Fever/Chills N   Chest Pain N    Poor Appetite    Edema     Cough    Abdominal Pain N    Sputum    Joint Pain     SOB/LAUREANO N   Pruritis/Rash     Nausea/vomit N   Tolerating PT/OT Y    Diarrhea N   Tolerating Diet Y    Constipation    Other       Could NOT obtain due to:      Objective:     VITALS:   Last 24hrs VS reviewed since prior progress note.  Most recent are:  Patient Vitals for the past 24 hrs:   Temp Pulse Resp BP SpO2   17 1155 98.4 °F (36.9 °C) 92 23 134/71 100 %   17 0728 98.2 °F (36.8 °C) (!) 102 18 149/87 99 %   17 0227 99 °F (37.2 °C) 96 16 139/71 98 %   17 2258 97.7 °F (36.5 °C) (!) 104 16 118/73 97 %   17 2106 - - - - 99 %   17 1938 98 °F (36.7 °C) (!) 109 18 123/78 96 %   02/08/17 1519 98 °F (36.7 °C) (!) 103 20 115/80 100 %   02/08/17 1407 - - - - 99 %       Intake/Output Summary (Last 24 hours) at 02/09/17 1209  Last data filed at 02/09/17 1049   Gross per 24 hour   Intake             1390 ml   Output              650 ml   Net              740 ml        PHYSICAL EXAM:  General: WD, WN. Alert, cooperative, no acute distress    EENT:  EOMI. Anicteric sclerae. MMM  Resp:  CTA bilaterally, no wheezing or rales. No accessory muscle use  CV:  Regular  rhythm,  No edema  GI:  Soft, Non distended, Non tender.  +Bowel sounds  Neurologic:  Alert and oriented X 3, normal speech,   Psych:   Good insight. Not anxious nor agitated  Skin:  No rashes. No jaundice    Reviewed most current lab test results and cultures  YES  Reviewed most current radiology test results   YES  Review and summation of old records today    NO  Reviewed patient's current orders and MAR    YES  PMH/SH reviewed - no change compared to H&P  ________________________________________________________________________  Care Plan discussed with:    Comments   Patient Y    Family      RN Y    Care Manager     Consultant                        Multidiciplinary team rounds were held today with , nursing, pharmacist and clinical coordinator. Patient's plan of care was discussed; medications were reviewed and discharge planning was addressed. ________________________________________________________________________  Total NON critical care TIME:  30   Minutes    Total CRITICAL CARE TIME Spent:   Minutes non procedure based      Comments   >50% of visit spent in counseling and coordination of care Y    ________________________________________________________________________  Crissy Beltran MD     Procedures: see electronic medical records for all procedures/Xrays and details which were not copied into this note but were reviewed prior to creation of Plan.       LABS:  I reviewed today's most current labs and imaging studies.   Pertinent labs include:  Recent Labs      02/09/17 0201 02/08/17 0205 02/07/17 0251   WBC  10.2  12.6*  16.2*   HGB  8.1*  7.9*  7.4*   HCT  23.4*  23.4*  21.8*   PLT  298  279  293     Recent Labs      02/09/17 0201 02/08/17 0205 02/07/17 0251   NA  133*  136  137   K  4.7  4.0  4.0   CL  102  101  101   CO2  21  22  26   GLU  249*  120*  61*   BUN  36*  40*  44*   CREA  6.40*  6.02*  5.37*   CA  7.3*  7.1*  7.2*   MG   --    --   1.8   PHOS   --    --   6.9*       Signed: Margret Garza MD

## 2017-02-09 NOTE — PROGRESS NOTES
Bedside and Verbal shift change report given to Bibi Rossi   (oncoming nurse) by Iza Spangler RN (offgoing nurse). Report included the following information Kardex, MAR and Recent Results. Zone Phone for oncoming shift:  [phone]  Shift Summary: Resting quietly at this time    LDAs               Peripheral IV 02/04/17 Left Forearm (Active)   Site Assessment Clean, dry, & intact 2/8/2017  8:04 PM   Phlebitis Assessment 0 2/8/2017  8:04 PM   Infiltration Assessment 0 2/8/2017  8:04 PM   Dressing Status Clean, dry, & intact 2/8/2017  8:04 PM   Dressing Type Transparent 2/8/2017  8:04 PM   Hub Color/Line Status Pink;Capped 2/8/2017  8:04 PM   Action Taken Open ports on tubing capped 2/6/2017  5:00 AM   Alcohol Cap Used Yes 2/8/2017  8:04 PM       Peripheral IV 02/05/17 (Active)   Site Assessment Clean, dry, & intact 2/8/2017  8:04 PM   Phlebitis Assessment 0 2/8/2017  8:04 PM   Infiltration Assessment 0 2/8/2017  8:04 PM   Dressing Status Clean, dry, & intact 2/8/2017  8:04 PM   Dressing Type Transparent 2/8/2017  8:04 PM   Hub Color/Line Status Pink;Capped 2/8/2017  8:04 PM   Action Taken Open ports on tubing capped 2/6/2017  5:00 AM   Alcohol Cap Used Yes 2/8/2017  8:04 PM                    [REMOVED] Urinary Catheter 02/05/17 2- way-Criteria for Appropriate Use: Strict I/Os   Intake & Output   Date 02/07/17 1900 - 02/08/17 0659 02/08/17 0700 - 02/09/17 0659   Shift 5189-0453 24 Hour Total 2729-2051 2449-4317 24 Hour Total   I  N  T  A  K  E   P.O.  480 720  720      P. O.  480 720  720    I.V.  (mL/kg/hr) 100 100 550  (0.8)  550      I.V.   550  550      Volume (0.9% sodium chloride infusion) 100 100       Shift Total  (mL/kg) 100  (1.7) 580  (9.8) 1270  (22.9)  1270  (22.9)   O  U  T  P  U  T   Urine  (mL/kg/hr)  275 600  (0.9)  600      Urine Voided   600  600      Urine Occurrence(s) 2 x 5 x 1 x  1 x      Urine Output (mL) ([REMOVED] Urinary Catheter 02/05/17 2- way)  275       Stool           Stool Occurrence(s)   2 x  2 x Shift Total  (mL/kg)  275  (4.7) 600  (10.8)  600  (10.8)    305 670  670   Weight (kg) 59 59 55.5 55.5 55.5      Last Bowel Movement Last Bowel Movement Date: 02/08/17   Glucose Checks [] N/A  [x] AC/HS  [] Q6  Concerns:   Nutrition Active Orders   Diet    DIET DIABETIC CONSISTENT CARB Regular; 2 GM NA (Inez Low NA); FR 1800ML; 70-70-70 (House)       Consults []PT  []OT  []Speech  []Case Management   Cardiac Monitoring []N/A [x]Yes Expires:

## 2017-02-09 NOTE — PROGRESS NOTES
NAME: Jimmy Sabillon. :  1950        MRN:  [id number]        Assessment :    Plan:  --CKD-4 - baseline creatinine about 4. MANA  Sepsis   HCAP  DM-hyperglycemia (up to 257)  Metabolic acidosis  Pseudohyponatremia  Hyperkalemia-chronic  Anemia  HTN  SHPT   --Creatinine worse (6.4). Prerenal +/- ATN. He is not a RRT candidate. Resolved hyperkalemia with medical management (volume, kayexalate, bicarb). Hgb 8.1    On Calcitriol, phos high    lasix 40 mg po daily    on epo 10 k sc biw. S/p IV iron in January - none now in face of infection    Stable for discharge from my standpoint - I would think he could qualify for hospice given poor and declining renal function? Subjective:     Chief Complaint:  Alert. Poor historian. Denied sob, chest pain, nausea when directly asked. States he is hungry and would like some coffee. Did not at all remember who I was. Review of Systems:    Symptom Y/N Comments  Symptom Y/N Comments   Fever/Chills    Chest Pain     Poor Appetite    Edema     Cough    Abdominal Pain     Sputum    Joint Pain     SOB/LAUREANO    Pruritis/Rash     Nausea/vomit    Tolerating PT/OT     Diarrhea    Tolerating Diet     Constipation    Other       Could not obtain due to: See above     Objective:     VITALS:   Last 24hrs VS reviewed since prior progress note. Most recent are:  Visit Vitals    /87 (BP 1 Location: Right arm, BP Patient Position: Sitting)    Pulse (!) 102    Temp 98.2 °F (36.8 °C)    Resp 18    Ht 5' 5\" (1.651 m)    Wt 53.6 kg (118 lb 2.7 oz)    SpO2 99%    BMI 19.66 kg/m2       Intake/Output Summary (Last 24 hours) at 17 1016  Last data filed at 17 0936   Gross per 24 hour   Intake             1030 ml   Output              650 ml   Net              380 ml      Telemetry Reviewed:     PHYSICAL EXAM:  General: WD, WN.  Alert, cooperative, no acute distress  Resp:  CTA Bilaterally upper lobes, Rales to lower lobes. No access. muscle use  CV:  Regular  rhythm,  No murmur (), No Rubs, No Gallops. No edema  GI:  Soft, Non distended, Non tender.  +Bowel sounds, no HSM      Lab Data Reviewed: (see below)    Medications Reviewed: (see below)    PMH/SH reviewed - no change compared to H&P  ________________________________________________________________________  Care Plan discussed with:  Patient y    Family      RN y    Care Manager                    Consultant:          Comments   >50% of visit spent in counseling and coordination of care       ________________________________________________________________________  Larissa Gutierrez MD     Procedures: see electronic medical records for all procedures/Xrays and details which  were not copied into this note but were reviewed prior to creation of Plan. LABS:  Recent Labs      02/09/17 0201 02/08/17   0205   WBC  10.2  12.6*   HGB  8.1*  7.9*   HCT  23.4*  23.4*   PLT  298  279     Recent Labs      02/09/17 0201 02/08/17 0205 02/07/17   0251   NA  133*  136  137   K  4.7  4.0  4.0   CL  102  101  101   CO2  21  22  26   BUN  36*  40*  44*   CREA  6.40*  6.02*  5.37*   GLU  249*  120*  61*   CA  7.3*  7.1*  7.2*   MG   --    --   1.8   PHOS   --    --   6.9*     No results for input(s): SGOT, GPT, AP, TBIL, TP, ALB, GLOB, GGT, AML, LPSE in the last 72 hours. No lab exists for component: AMYP, HLPSE  No results for input(s): INR, PTP, APTT in the last 72 hours. No lab exists for component: INREXT, INREXT   No results for input(s): FE, TIBC, PSAT, FERR in the last 72 hours. Lab Results   Component Value Date/Time    Folate 53.6 11/11/2014 03:40 AM      No results for input(s): PH, PCO2, PO2 in the last 72 hours. No results for input(s): CPK, CKMB in the last 72 hours.     No lab exists for component: TROPONINI  No components found for: [place]  Lab Results   Component Value Date/Time    Color YELLOW/STRAW 02/06/2017 02:36 AM    Appearance CLEAR 02/06/2017 02:36 AM    Specific gravity 1.008 02/06/2017 02:36 AM    Specific gravity 1.010 01/18/2017 03:41 AM    pH (UA) 7.5 02/06/2017 02:36 AM    Protein 100 02/06/2017 02:36 AM    Glucose 100 02/06/2017 02:36 AM    Ketone NEGATIVE  02/06/2017 02:36 AM    Bilirubin NEGATIVE  02/06/2017 02:36 AM    Urobilinogen 0.2 02/06/2017 02:36 AM    Nitrites NEGATIVE  02/06/2017 02:36 AM    Leukocyte Esterase NEGATIVE  02/06/2017 02:36 AM    Epithelial cells FEW 02/06/2017 02:36 AM    Bacteria NEGATIVE  02/06/2017 02:36 AM    WBC 0-4 02/06/2017 02:36 AM    RBC  02/06/2017 02:36 AM       MEDICATIONS:  Current Facility-Administered Medications   Medication Dose Route Frequency    furosemide (LASIX) tablet 40 mg  40 mg Oral DAILY    insulin glargine (LANTUS) injection 6 Units  6 Units SubCUTAneous DAILY    albuterol-ipratropium (DUO-NEB) 2.5 MG-0.5 MG/3 ML  3 mL Nebulization TID RT    epoetin shaye (EPOGEN;PROCRIT) injection 10,000 Units  10,000 Units SubCUTAneous EVERY MON & TH    insulin lispro (HUMALOG) injection +++Home sliding scale+++   SubCUTAneous AC&HS    glucose chewable tablet 16 g  4 Tab Oral PRN    dextrose (D50W) injection syrg 12.5-25 g  12.5-25 g IntraVENous PRN    glucagon (GLUCAGEN) injection 1 mg  1 mg IntraMUSCular PRN    mupirocin (BACTROBAN) 2 % ointment   Both Nostrils BID    sodium chloride (NS) flush 5-10 mL  5-10 mL IntraVENous Q8H    sodium chloride (NS) flush 5-10 mL  5-10 mL IntraVENous PRN    acetaminophen (TYLENOL) tablet 650 mg  650 mg Oral Q4H PRN    ondansetron (ZOFRAN) injection 4 mg  4 mg IntraVENous Q4H PRN    bisacodyl (DULCOLAX) tablet 5 mg  5 mg Oral DAILY PRN    heparin (porcine) injection 5,000 Units  5,000 Units SubCUTAneous Q8H    albuterol (PROVENTIL VENTOLIN) nebulizer solution 2.5 mg  2.5 mg Nebulization Q4H PRN    amLODIPine (NORVASC) tablet 10 mg  10 mg Oral DAILY    calcitRIOL (ROCALTROL) capsule 0.25 mcg  0.25 mcg Oral DAILY    mirtazapine (REMERON) tablet 15 mg  15 mg Oral QHS    pravastatin (PRAVACHOL) tablet 10 mg  10 mg Oral QHS    tamsulosin (FLOMAX) capsule 0.4 mg  0.4 mg Oral DAILY    B complex-vitaminC-folic acid (NEPHROCAP) cap  1 Cap Oral DAILY    pantoprazole (PROTONIX) tablet 40 mg  40 mg Oral ACB    hydrALAZINE (APRESOLINE) 20 mg/mL injection 10 mg  10 mg IntraVENous Q6H PRN    levoFLOXacin (LEVAQUIN) 500 mg in D5W IVPB  500 mg IntraVENous Q48H

## 2017-02-09 NOTE — PROGRESS NOTES
Spiritual Care Assessment/Progress Notes    Kit Moreno [id number]  [id number]    1950  61 y.o.  male    Patient Telephone Number: [phone] (home)   Congregational Affiliation: [facility]   Language: English   Extended Emergency Contact Information  Primary Emergency Contact: Ronamerary Lisa ([address] Proxy Per Pt'S Mother)   [address] Phone: [phone]  Work Phone: [phone]  Mobile Phone: [phone]  Relation: Other Relative  Secondary Emergency Contact: [address] Phone: [phone]  Relation: Child  Mother: Julianna David Phone: [phone]   Patient Active Problem List    Diagnosis Date Noted    Dyspnea 02/06/2017    Pneumonia 02/04/2017    Anemia in chronic kidney disease 01/21/2017     Class: Chronic    Counseling regarding goals of care 01/19/2017    Do not resuscitate discussion 01/19/2017    History of GI bleed 11/12/2014     Class: Present on Admission    CKD (chronic kidney disease) stage 3, GFR 30-59 ml/min 12/13/2013     Class: Chronic    DKA (diabetic ketoacidoses) ([facility] 07/10/2011    Renal failure, acute ([facility] 12/20/2010    CAD (coronary artery disease) 12/20/2010    HTN (hypertension) 12/20/2010    Alzheimer disease 09/19/2010    DM (diabetes mellitus), type 2, uncontrolled ([facility] 08/16/2010    Weakness generalized 08/16/2010    Pure hypercholesterolemia 01/02/2010    [facility] 01/02/2010    Dementia 01/02/2010    Blind one eye 01/02/2010        Date: 2/9/2017       Level of Congregational/Spiritual Activity:  []         Involved in oliverio tradition/spiritual practice    []         Not involved in oliverio tradition/spiritual practice  []         Spiritually oriented    [x]         Claims no spiritual orientation    []         seeking spiritual identity  []         Feels alienated from Hinduism practice/tradition  []         Feels angry about Hinduism practice/tradition  [] Spirituality/Baptism tradition  a resource for coping at this time. []         Not able to assess due to medical condition    Services Provided Today:  []         crisis intervention    []         reading Scriptures  [x]         spiritual assessment    []         prayer  [x]         empathic listening/emotional support  []         rites and rituals (cite in comments)  []         life review     []         Baptism support  []         theological development   []         advocacy  []         ethical dialog     []         blessing  []         bereavement support    []         support to family  []         anticipatory grief support   []         help with AMD  []         spiritual guidance    []         meditation      Spiritual Care Needs  []         Emotional Support  []         Spiritual/Rastafari Care  []         Loss/Adjustment  []         Advocacy/Referral                /Ethics  [x]         No needs expressed at               this time  []         Other: (note in               comments)  [address]  []         Follow up visits with               pt/family  []         Provide materials  []         Schedule sacraments  []         Contact Community               Clergy  [x]         Follow up as needed  []         Other: (note in               comments)     Comments: On rounds. Pt was forgetful about his glasses, eating, and drinking.  provided empathic listening as pt shared that he did not know what he wants or why he is admitted here but he does know that he wants to be D/C as soon. Pt shared that he has 3 daughters who are supportive of him through this.  consulted with RN. RN shared of his medical condition.  advised pt of availability and left a card for ways of contacting us. Garrison Aguayo M.S.   Spiritual Care Department  If need arise please call Maegan[facility]BEL ([phone])

## 2017-02-10 VITALS
HEART RATE: 97 BPM | WEIGHT: 115.3 LBS | SYSTOLIC BLOOD PRESSURE: 137 MMHG | DIASTOLIC BLOOD PRESSURE: 75 MMHG | HEIGHT: 65 IN | TEMPERATURE: 98.6 F | BODY MASS INDEX: 19.21 KG/M2 | OXYGEN SATURATION: 100 % | RESPIRATION RATE: 20 BRPM

## 2017-02-10 LAB
ANION GAP BLD CALC-SCNC: 14 MMOL/L (ref 5–15)
BACTERIA SPEC CULT: NORMAL
BACTERIA SPEC CULT: NORMAL
BASOPHILS # BLD AUTO: 0.1 K/UL (ref 0–0.1)
BASOPHILS # BLD: 1 % (ref 0–1)
BUN SERPL-MCNC: 33 MG/DL (ref 6–20)
BUN/CREAT SERPL: 5 (ref 12–20)
CALCIUM SERPL-MCNC: 7.5 MG/DL (ref 8.5–10.1)
CHLORIDE SERPL-SCNC: 107 MMOL/L (ref 97–108)
CO2 SERPL-SCNC: 15 MMOL/L (ref 21–32)
CREAT SERPL-MCNC: 6.37 MG/DL (ref 0.7–1.3)
DIFFERENTIAL METHOD BLD: ABNORMAL
EOSINOPHIL # BLD: 0.4 K/UL (ref 0–0.4)
EOSINOPHIL NFR BLD: 4 % (ref 0–7)
ERYTHROCYTE [DISTWIDTH] IN BLOOD BY AUTOMATED COUNT: 14 % (ref 11.5–14.5)
GLUCOSE BLD STRIP.AUTO-MCNC: 229 MG/DL (ref 65–100)
GLUCOSE SERPL-MCNC: 151 MG/DL (ref 65–100)
HCT VFR BLD AUTO: 26.5 % (ref 36.6–50.3)
HGB BLD-MCNC: 8.9 G/DL (ref 12.1–17)
LYMPHOCYTES # BLD AUTO: 21 % (ref 12–49)
LYMPHOCYTES # BLD: 1.9 K/UL (ref 0.8–3.5)
MCH RBC QN AUTO: 30.5 PG (ref 26–34)
MCHC RBC AUTO-ENTMCNC: 33.6 G/DL (ref 30–36.5)
MCV RBC AUTO: 90.8 FL (ref 80–99)
MONOCYTES # BLD: 1.2 K/UL (ref 0–1)
MONOCYTES NFR BLD AUTO: 13 % (ref 5–13)
NEUTS SEG # BLD: 5.3 K/UL (ref 1.8–8)
NEUTS SEG NFR BLD AUTO: 61 % (ref 32–75)
PLATELET # BLD AUTO: 339 K/UL (ref 150–400)
PLATELET COMMENTS,PCOM: ABNORMAL
POTASSIUM SERPL-SCNC: 5.5 MMOL/L (ref 3.5–5.1)
RBC # BLD AUTO: 2.92 M/UL (ref 4.1–5.7)
RBC MORPH BLD: ABNORMAL
SERVICE CMNT-IMP: ABNORMAL
SERVICE CMNT-IMP: NORMAL
SERVICE CMNT-IMP: NORMAL
SODIUM SERPL-SCNC: 136 MMOL/L (ref 136–145)
WBC # BLD AUTO: 8.9 K/UL (ref 4.1–11.1)
WBC MORPH BLD: ABNORMAL

## 2017-02-10 PROCEDURE — 80048 BASIC METABOLIC PNL TOTAL CA: CPT | Performed by: INTERNAL MEDICINE

## 2017-02-10 PROCEDURE — 74011636637 HC RX REV CODE- 636/637: Performed by: INTERNAL MEDICINE

## 2017-02-10 PROCEDURE — 74011250637 HC RX REV CODE- 250/637: Performed by: INTERNAL MEDICINE

## 2017-02-10 PROCEDURE — 74011250636 HC RX REV CODE- 250/636: Performed by: INTERNAL MEDICINE

## 2017-02-10 PROCEDURE — 36415 COLL VENOUS BLD VENIPUNCTURE: CPT | Performed by: INTERNAL MEDICINE

## 2017-02-10 PROCEDURE — 85025 COMPLETE CBC W/AUTO DIFF WBC: CPT | Performed by: INTERNAL MEDICINE

## 2017-02-10 PROCEDURE — 82962 GLUCOSE BLOOD TEST: CPT

## 2017-02-10 RX ORDER — LEVOFLOXACIN 750 MG/1
750 TABLET ORAL DAILY
Qty: 10 TAB | Refills: 0 | Status: ON HOLD | OUTPATIENT
Start: 2017-02-10 | End: 2017-04-04

## 2017-02-10 RX ADMIN — INSULIN LISPRO 3 UNITS: 100 INJECTION, SOLUTION INTRAVENOUS; SUBCUTANEOUS at 08:32

## 2017-02-10 RX ADMIN — CALCITRIOL 0.25 MCG: 0.25 CAPSULE, LIQUID FILLED ORAL at 08:32

## 2017-02-10 RX ADMIN — TAMSULOSIN HYDROCHLORIDE 0.4 MG: 0.4 CAPSULE ORAL at 08:32

## 2017-02-10 RX ADMIN — Medication 10 ML: at 06:07

## 2017-02-10 RX ADMIN — INSULIN GLARGINE 6 UNITS: 100 INJECTION, SOLUTION SUBCUTANEOUS at 08:33

## 2017-02-10 RX ADMIN — HEPARIN SODIUM 5000 UNITS: 5000 INJECTION, SOLUTION INTRAVENOUS; SUBCUTANEOUS at 05:59

## 2017-02-10 RX ADMIN — AMLODIPINE BESYLATE 10 MG: 5 TABLET ORAL at 08:32

## 2017-02-10 RX ADMIN — PANTOPRAZOLE SODIUM 40 MG: 40 TABLET, DELAYED RELEASE ORAL at 08:32

## 2017-02-10 RX ADMIN — FUROSEMIDE 40 MG: 40 TABLET ORAL at 08:32

## 2017-02-10 RX ADMIN — RENO CAPS 1 CAPSULE: 100; 1.5; 1.7; 20; 10; 1; 150; 5; 6 CAPSULE ORAL at 08:32

## 2017-02-10 NOTE — DISCHARGE INSTRUCTIONS
DISCHARGE DIAGNOSIS:  Acute hypoxic respiratory failure    MEDICATIONS:  · It is important that you take the medication exactly as they are prescribed. · Keep your medication in the bottles provided by the pharmacist and keep a list of the medication names, dosages, and times to be taken in your wallet. · Do not take other medications without consulting your doctor. Pain Management: per above medications    What to do at Home    Recommended diet:  Cardiac Diet and Diabetic Diet    Recommended activity: Activity as tolerated    If you have questions regarding the hospital related prescriptions or hospital related issues please call [address] [address] at . You can always direct your questions to your primary care doctor if you are unable to reach your hospital physician; your PCP works as an extension of your hospital doctor just like your hospital doctor is an extension of your PCP for your time at the hospital [facility], [facility]).     If you experience any of the following symptoms then please call your primary care physician or return to the emergency room if you cannot get hold of your doctor:  Fever, chills, nausea, vomiting, diarrhea, change in mentation, falling, bleeding, shortness of breath,

## 2017-02-10 NOTE — PROGRESS NOTES
Bedside shift change report given to [address] [place] [address] (oncoming nurse) by Juan Lo (offgoing nurse). Report included the following information SBAR, Kardex, Intake/Output, MAR, Recent Results and Cardiac Rhythm . Katelynn Car Phone for oncoming shift:   [phone]    Shift Summary: Pt resting quietly at this time. LDAs               Peripheral IV 02/04/17 Left Forearm (Active)   Site Assessment Clean, dry, & intact 2/9/2017  3:19 PM   Phlebitis Assessment 0 2/9/2017  3:19 PM   Infiltration Assessment 0 2/9/2017  3:19 PM   Dressing Status Clean, dry, & intact 2/9/2017  3:19 PM   Dressing Type Transparent;Tape 2/9/2017  3:19 PM   Hub Color/Line Status Pink;Capped 2/9/2017  3:19 PM   Action Taken Open ports on tubing capped 2/6/2017  5:00 AM   Alcohol Cap Used Yes 2/9/2017  3:19 PM       Peripheral IV 02/05/17 (Active)   Site Assessment Clean, dry, & intact 2/9/2017  3:19 PM   Phlebitis Assessment 0 2/9/2017  3:19 PM   Infiltration Assessment 0 2/9/2017  3:19 PM   Dressing Status Clean, dry, & intact 2/9/2017  3:19 PM   Dressing Type Transparent;Tape 2/9/2017  3:19 PM   Hub Color/Line Status Pink;Capped 2/9/2017  3:19 PM   Action Taken Open ports on tubing capped 2/6/2017  5:00 AM   Alcohol Cap Used Yes 2/9/2017  3:19 PM                    [REMOVED] Urinary Catheter 02/05/17 2- way-Criteria for Appropriate Use: Strict I/Os   Intake & Output   Date 02/08/17 1900 - 02/09/17 0659 02/09/17 0700 - 02/10/17 0659   Shift 6628-9358 24 Hour Total 4383-1745 8505-0829 24 Hour Total   I  N  T  A  K  E   P. O.  720 600  600      P. O.  720 600  600    I.V.  (mL/kg/hr)  550         I.V.  550       Shift Total  (mL/kg)  1270  (23.7) 600  (11.2)  600  (11.2)   O  U  T  P  U  T   Urine  (mL/kg/hr) 400 1000         Urine Voided 400 1000         Urine Occurrence(s)  1 x       Stool           Stool Occurrence(s)  2 x       Shift Total  (mL/kg) 400  (7.5) 1000  (18.7)      NET -400 270 600  600   Weight (kg) 53.6 53.6 53.6 53.6 53.6      Last Bowel Movement Last Bowel Movement Date: 02/08/17   Glucose Checks [] N/A  [] AC/HS  [] Q6  Concerns:   Nutrition Active Orders   Diet    DIET DIABETIC CONSISTENT CARB Regular; 2 GM NA (House Low NA); FR 1800ML; 70-70-70 (House)       Consults []PT  []OT  []Speech  []Case Management   Cardiac Monitoring []N/A []Yes Expires:

## 2017-02-10 NOTE — DISCHARGE SUMMARY
Hospitalist Discharge Summary     Patient ID:  Gladis Finnegan  [id number]  61 y.o.  1950    PCP on record: Matty Sheppard MD    Admit date: 2/4/2017  Discharge date and time: 2/10/2017      DISCHARGE DIAGNOSIS:    - Acute hypoxic respiratory failure  - PNA  - CKD  - CAD  - H/o CHF  - HTN  - DM  - Dyslipidemia  - H/o prostate CA  - Depression  - Anemia      CONSULTATIONS:  IP CONSULT TO NEPHROLOGY  IP CONSULT TO PALLIATIVE CARE - PROVIDER    Excerpted HPI from H&P of Brandee Donnelly MD:  Julio Washington is a 61 y.o.  male w CKD stage 4, T2DM, HTN, Alzheimer disease, CAD present to ED via EMS complaining of sob. Pt is a resident at [facility], started having sob since 1400 per chart review. Pt was placed on oxygen and nebs rx given. SpO2 at the time was 98%. Per EMS, pt's sob appeared to be decrease once he was out of nursing home. He complained of \"feeling cold\", appears to have shivering per nursing staff. During my encounter, patient is aaox3, appears to be comfortable, NAD. However pt would answer \"I don't care and I don't know\" to all questions. In the ED, vitals include: T97, P 120, /81 SpO2 100% on RA. CXR showed mild right perihilar patchy airspace disease. We were asked to admit for work up and evaluation of the above problems. ______________________________________________________________________  DISCHARGE SUMMARY/HOSPITAL COURSE:  for full details see H&P, daily progress notes, labs, consult notes. Patient was admitted in ICU with acute hypoxic respiratory failure 2/2 PNA. Due to respiratory difficulty, patient was intubated and was started on IV Abxs. Patient responded well to above management, and his symptoms improved. Hospital course was without any complications.  Patient will transfer to SNF  With PO ABx in stable condition today, and will follow up with his PCP in three days.        _______________________________________________________________________  Patient seen and examined by me on discharge day. Pertinent Findings:  Gen:    Not in distress  Chest: Clear lungs  CVS:   Regular rhythm. No edema  Abd:  Soft, not distended, not tender  Neuro:  Alert, oriented  _______________________________________________________________________  DISCHARGE MEDICATIONS:   Current Discharge Medication List      START taking these medications    Details   levoFLOXacin (LEVAQUIN) 750 mg tablet Take 1 Tab by mouth daily. Qty: 10 Tab, Refills: 0         CONTINUE these medications which have NOT CHANGED    Details   albuterol-ipratropium (DUO-NEB) 2.5 mg-0.5 mg/3 ml nebu 3 mL by Nebulization route. mirtazapine (REMERON) 15 mg tablet Take 15 mg by mouth nightly. Indications: MAJOR DEPRESSIVE DISORDER      amLODIPine (NORVASC) 10 mg tablet Take 1 Tab by mouth daily. Qty: 30 Tab, Refills: 1      sodium bicarbonate 650 mg tablet Take 2 Tabs by mouth two (2) times a day. Qty: 180 Tab, Refills: 1      omeprazole (PRILOSEC) 10 mg capsule Take 10 mg by mouth daily. Indications: GASTROESOPHAGEAL REFLUX      tamsulosin (FLOMAX) 0.4 mg capsule Take 0.4 mg by mouth daily. calcitRIOL (ROCALTROL) 0.25 mcg capsule Take 0.25 mcg by mouth daily. b complex-vitamin c-folic acid (NEPHROCAPS) 1 mg capsule Take 1 Cap by mouth daily. insulin aspart (NOVOLOG) 100 unit/mL injection Novolog BEFORE MEALS: 0-90: hold, : 1 unit, 176-225: 2 units, 226-300: 3 units, 301 +    : 4 units, Novolog at BEDTIME - 225-300: 1 unit, 301 + 2 units  Qty: 10 mL, Refills: 10      glucose blood VI test strips (ASCENSIA AUTODISC VI, ONE TOUCH ULTRA TEST VI) strip Monitor glucose before meals and at bedtime and as needed for symptoms - 4-5 times daily. Qty: 150 Strip, Refills: 11      ferrous sulfate (SLOW FE) 142 mg (45 mg iron) ER tablet Take 142 mg by mouth Daily (before breakfast).       glucagon (GLUCAGEN) 1 mg injection 1 mL by IntraMUSCular route as needed for Hypoglycemia. Qty: 1 Vial, Refills: 0      pravastatin (PRAVACHOL) 10 mg tablet Take 1 Tab by mouth nightly. Qty: 30 Tab, Refills: 5      acetaminophen (TYLENOL) 325 mg tablet Take 325 mg by mouth every four (4) hours as needed. My Recommended Diet, Activity, Wound Care, and follow-up labs are listed in the patient's Discharge Insturctions which I have personally completed and reviewed.     _______________________________________________________________________  DISPOSITION:    Home with Family:    Home with HH/PT/OT/RN:    SNF/LTC: Y   JESUS:    OTHER:        Condition at Discharge:  Stable  _______________________________________________________________________  Follow up with:   PCP : Santos Issa MD  Follow-up Information     Follow up With Details [address] [place] [address] MD Lizett   [address]  [phone]      Santos Issa MD In 3 days  [address] [place] [address]  [phone]                Total time in minutes spent coordinating this discharge (includes going over instructions, follow-up, prescriptions, and preparing report for sign off to her PCP) :  30 minutes    Signed:  Holly Tavares MD

## 2017-02-10 NOTE — PROGRESS NOTES
All in agreement with d/c today to [facility] SNF at [facility]. Please call report to [phone], send emar, d/c instructions, Rx for narcotics if any, ambulance form, facesheet, and completed DDNR. Thanks    Pt is a DNR and \"do not re hospitalize\" per MD//Pall. Care. This has been discussed with the NH whose policy is to have pt's family sign agreement to this request.  The pt will be seen at the facility within 3 days of d/c.      10a I called [facility], and received an [place] of [phone]. They hope to pick pt up at 11a.

## 2017-02-10 NOTE — PROGRESS NOTES
Bedside and Verbal shift change report given to Tianna (oncoming nurse) by Erasto Mackenzie RN (offgoing nurse). Report included the following information Kardex, MAR and Recent Results. Zone Phone for oncoming shift:  [phone]    Shift Summary: Resting quietly at this time    LDAs               Peripheral IV 02/04/17 Left Forearm (Active)   Site Assessment Clean, dry, & intact 2/9/2017  7:36 PM   Phlebitis Assessment 0 2/9/2017  7:36 PM   Infiltration Assessment 0 2/9/2017  7:36 PM   Dressing Status Clean, dry, & intact 2/9/2017  7:36 PM   Dressing Type Transparent 2/9/2017  7:36 PM   Hub Color/Line Status Pink;Capped 2/9/2017  7:36 PM   Action Taken Open ports on tubing capped 2/6/2017  5:00 AM   Alcohol Cap Used Yes 2/9/2017  7:36 PM       Peripheral IV 02/05/17 (Active)   Site Assessment Clean, dry, & intact 2/9/2017  7:36 PM   Phlebitis Assessment 0 2/9/2017  7:36 PM   Infiltration Assessment 0 2/9/2017  7:36 PM   Dressing Status Clean, dry, & intact 2/9/2017  7:36 PM   Dressing Type Transparent 2/9/2017  7:36 PM   Hub Color/Line Status Pink;Capped 2/9/2017  7:36 PM   Action Taken Open ports on tubing capped 2/6/2017  5:00 AM   Alcohol Cap Used Yes 2/9/2017  7:36 PM                    [REMOVED] Urinary Catheter 02/05/17 2- way-Criteria for Appropriate Use: Strict I/Os   Intake & Output   Date 02/08/17 1900 - 02/09/17 0659 02/09/17 0700 - 02/10/17 0659   Shift 2480-0918 24 Hour Total 4399-7758 6007-1396 24 Hour Total   I  N  T  A  K  E   P. O.  720 600  600      P. O.  720 600  600    I.V.  (mL/kg/hr)  550         I.V.  550       Shift Total  (mL/kg)  1270  (23.7) 600  (11.2)  600  (11.2)   O  U  T  P  U  T   Urine  (mL/kg/hr) 400 1000         Urine Voided 400 1000         Urine Occurrence(s)  1 x       Stool           Stool Occurrence(s)  2 x       Shift Total  (mL/kg) 400  (7.5) 1000  (18.7)      NET -400 270 600  600   Weight (kg) 53.6 53.6 53.6 53.6 53.6      Last Bowel Movement Last Bowel Movement Date: 02/08/17 Glucose Checks [] N/A  [x] AC/HS  [] Q6  Concerns:   Nutrition Active Orders   Diet    DIET DIABETIC CONSISTENT CARB Regular; 2 GM NA ([place] Low NA); FR 1800ML; 70-70-70 (House)       Consults []PT  []OT  []Speech  []Case Management   Cardiac Monitoring []N/A [x]Yes Expires:

## 2017-02-10 NOTE — PROGRESS NOTES
Hospital to [address]                                                                        61 y.o.   male    [facility]   Room: 3255/01    [facility] RENAL MEDICINE  Unit Phone# :  [place]  [facility] RENAL MEDICINE  [address] [facility]  [address] [address]  Dept: [phone]  Loc: [phone]                    SITUATION     Admitted:  2/4/2017         Attending Provider:  Praveena Guzman MD       Consultations:  IP CONSULT TO NEPHROLOGY  IP CONSULT TO PALLIATIVE CARE - PROVIDER    PCP:  Radha Rose MD   [phone]    Treatment Team: Attending Provider: Praveena Guzman MD; Consulting Provider: Telly Walker MD; Consulting Provider: Francisco Javier Marcelino MD; Consulting Provider: Tena Mayen MD; Utilization Review: Shelby Charlton RN; Care Manager: Ayush Barton RN    Admitting Dx:  Pneumonia       Principal Problem: <principal problem not specified>    * No surgery found * of      BY: * Surgery not found *             ON: * No surgery found *                  Code Status: DNR                Advance Directives: No flowsheet data found. (Send w/patient)   No Doesnt Have       Isolation:  There are currently no Active Isolations       MDRO: No current active infections    Pain Medications given:  none    Last dose: 2/10/2017 at  none    Special Equipment needed: no  Type of equipment:    hemodialysis  (Not currently on dialysis)  (Not currently on dialysis)  (Not currently on dialysis)     BACKGROUND     Allergies:   Allergies   Allergen Reactions    Ace Inhibitors Unknown (comments)    Arb-Angiotensin Receptor Antagonist Unknown (comments)       Past Medical History   Diagnosis Date    Alzheimer disease     CAD (coronary artery disease)     Cancer ([facility] [address] [facility]      prostate    CKD (chronic kidney disease) stage 4, GFR 15-29 ml/min (HCC)     DM (diabetes mellitus), type 2, uncontrolled ([facility]     Hyperlipidemia     Hypertension     Other ill-defined conditions(799.89)      blind R eye-retina detachment    Other ill-defined conditions(799.89)      right cerebellopontine angle lipoma. Past Surgical History   Procedure Laterality Date    Hx shoulder arthroscopy       right    Hx urological       prostate bx    Hx heent       retinal detachment    Upper gi endoscopy,biopsy  11/12/2014          Colonoscopy,diagnostic  11/12/2014             Prescriptions Prior to Admission   Medication Sig    albuterol-ipratropium (DUO-NEB) 2.5 mg-0.5 mg/3 ml nebu 3 mL by Nebulization route.  mirtazapine (REMERON) 15 mg tablet Take 15 mg by mouth nightly. Indications: MAJOR DEPRESSIVE DISORDER    amLODIPine (NORVASC) 10 mg tablet Take 1 Tab by mouth daily.  sodium bicarbonate 650 mg tablet Take 2 Tabs by mouth two (2) times a day.  omeprazole (PRILOSEC) 10 mg capsule Take 10 mg by mouth daily. Indications: GASTROESOPHAGEAL REFLUX    tamsulosin (FLOMAX) 0.4 mg capsule Take 0.4 mg by mouth daily.  calcitRIOL (ROCALTROL) 0.25 mcg capsule Take 0.25 mcg by mouth daily.  b complex-vitamin c-folic acid (NEPHROCAPS) 1 mg capsule Take 1 Cap by mouth daily.  insulin aspart (NOVOLOG) 100 unit/mL injection Novolog BEFORE MEALS: 0-90: hold, : 1 unit, 176-225: 2 units, 226-300: 3 units, 301 +    : 4 units, Novolog at BEDTIME - 225-300: 1 unit, 301 + 2 units    glucose blood VI test strips (ASCENSIA AUTODISC VI, ONE TOUCH ULTRA TEST VI) strip Monitor glucose before meals and at bedtime and as needed for symptoms - 4-5 times daily.  ferrous sulfate (SLOW FE) 142 mg (45 mg iron) ER tablet Take 142 mg by mouth Daily (before breakfast).  glucagon (GLUCAGEN) 1 mg injection 1 mL by IntraMUSCular route as needed for Hypoglycemia.  pravastatin (PRAVACHOL) 10 mg tablet Take 1 Tab by mouth nightly.  acetaminophen (TYLENOL) 325 mg tablet Take 325 mg by mouth every four (4) hours as needed.        Hard scripts included in transfer packet no    Vaccinations:    Immunization History   Administered Date(s) Administered    Influenza Vaccine 10/01/2014, 11/08/2016    Influenza Vaccine Split 09/21/2010, 12/08/2011    Pneumococcal Vaccine (Unspecified Type) 08/16/2010       Readmission Risks:    Known Risks: falls        The Charlson CoMorbitiy Index tool is an evidenced based tool that has more automatic generated information. The tool looks at many different items such as the age of the patient, how many times they were admitted in the last calendar year, current length of stay in the hospital and their diagnosis. All of these items are pulled automatically from information documented in the chart from various places and will generate a score that predicts whether a patient is at low (less than 13), medium (13-20) or high (21 or greater) risk of being readmitted.         ASSESSMENT                Temp: 98.6 °F (37 °C) (02/10/17 0732) Pulse (Heart Rate): 97 (02/10/17 0732)     Resp Rate: 20 (02/10/17 0732)           BP: 137/75 (02/10/17 0732)     O2 Sat (%): 100 % (02/10/17 0732)     Weight: 52.3 kg (115 lb 4.8 oz)    Height: 5' 5\" (165.1 cm) (02/04/17 1756)       If above not within 1 hour of discharge:    BP:_____  P:____  R:____ T:_____ O2 Sat: ___%  O2: ______    Active Orders   Diet    DIET DIABETIC CONSISTENT CARB Regular; 2 GM NA (House Low NA); FR 1800ML; 70-70-70 (House)         Orientation: disoriented     Active Behaviors: Agitation                                   Active Lines/Drains:  (Peg Tube / Childs / CL or S/L?): no    Urinary Status: Voiding     Last BM: Last Bowel Movement Date: 02/08/17     Skin Integrity: Intact             Mobility: Slightly limited   Weight Bearing Status: WBAT (Weight Bearing as Tolerated)      Gait Training  Assistive Device: Gait belt (HHA on the R)  Ambulation - Level of Assistance: Contact guard assistance, Minimal assistance  Distance (ft): 80 Feet (ft)         Lab Results   Component Value Date/Time Glucose 151 02/10/2017 02:41 AM    Hemoglobin A1c 9.1 02/09/2017 02:01 AM    INR 1.0 06/14/2012 12:20 PM    INR PLEASE DISREGARD RESULTS 07/10/2011 02:30 AM    HGB 8.9 02/10/2017 02:41 AM    HGB 8.1 02/09/2017 02:01 AM    Hemoglobin A1c, External 7.7 08/12/2015        RECOMMENDATION     See After Visit Summary (AVS) for:  · Discharge instructions  · After [address]   · Special equipment needed (entered pre-discharge by Care Management)  · Medication Reconciliation    · Follow up Appointment(s)         Report given/sent by:  Mark Huitron RN                    Verbal report given to: Geovanna Curtis  FAXED to:  [facility]         Estimated discharge time:  2/10/2017 at 1100

## 2017-02-22 ENCOUNTER — HOSPITAL ENCOUNTER (EMERGENCY)
Age: 67
Discharge: HOME | End: 2017-02-22
Attending: EMERGENCY MEDICINE
Payer: MEDICARE

## 2017-02-22 ENCOUNTER — HOSPITAL ENCOUNTER (EMERGENCY)
Age: 67
Discharge: STILL ADMITTED OR ADMINISTRATIVE | End: 2017-02-22
Attending: EMERGENCY MEDICINE
Payer: MEDICARE

## 2017-02-22 VITALS
HEART RATE: 102 BPM | DIASTOLIC BLOOD PRESSURE: 95 MMHG | WEIGHT: 139.11 LBS | BODY MASS INDEX: 23.18 KG/M2 | OXYGEN SATURATION: 100 % | RESPIRATION RATE: 16 BRPM | TEMPERATURE: 98.5 F | SYSTOLIC BLOOD PRESSURE: 154 MMHG | HEIGHT: 65 IN

## 2017-02-22 DIAGNOSIS — E87.5 HYPERKALEMIA: ICD-10-CM

## 2017-02-22 DIAGNOSIS — D64.9 ANEMIA, UNSPECIFIED TYPE: ICD-10-CM

## 2017-02-22 DIAGNOSIS — N18.4 CHRONIC KIDNEY DISEASE, STAGE 4 (SEVERE) (HCC): Primary | ICD-10-CM

## 2017-02-22 LAB
ALBUMIN SERPL BCP-MCNC: 3.5 G/DL (ref 3.5–5)
ALBUMIN/GLOB SERPL: 0.9 {RATIO} (ref 1.1–2.2)
ALP SERPL-CCNC: 321 U/L (ref 45–117)
ALT SERPL-CCNC: 216 U/L (ref 12–78)
ANION GAP BLD CALC-SCNC: 14 MMOL/L (ref 5–15)
AST SERPL W P-5'-P-CCNC: 164 U/L (ref 15–37)
ATRIAL RATE: 100 BPM
BASOPHILS # BLD AUTO: 0.1 K/UL (ref 0–0.1)
BASOPHILS # BLD: 1 % (ref 0–1)
BILIRUB SERPL-MCNC: 0.9 MG/DL (ref 0.2–1)
BUN SERPL-MCNC: 36 MG/DL (ref 6–20)
BUN/CREAT SERPL: 7 (ref 12–20)
CALCIUM SERPL-MCNC: 8.4 MG/DL (ref 8.5–10.1)
CALCULATED P AXIS, ECG09: 25 DEGREES
CALCULATED R AXIS, ECG10: 1 DEGREES
CALCULATED T AXIS, ECG11: 42 DEGREES
CHLORIDE SERPL-SCNC: 103 MMOL/L (ref 97–108)
CO2 SERPL-SCNC: 18 MMOL/L (ref 21–32)
CREAT SERPL-MCNC: 5.44 MG/DL (ref 0.7–1.3)
DIAGNOSIS, 93000: NORMAL
EOSINOPHIL # BLD: 0.2 K/UL (ref 0–0.4)
EOSINOPHIL NFR BLD: 2 % (ref 0–7)
ERYTHROCYTE [DISTWIDTH] IN BLOOD BY AUTOMATED COUNT: 14 % (ref 11.5–14.5)
GLOBULIN SER CALC-MCNC: 3.9 G/DL (ref 2–4)
GLUCOSE SERPL-MCNC: 147 MG/DL (ref 65–100)
HCT VFR BLD AUTO: 26.4 % (ref 36.6–50.3)
HGB BLD-MCNC: 8.8 G/DL (ref 12.1–17)
LYMPHOCYTES # BLD AUTO: 8 % (ref 12–49)
LYMPHOCYTES # BLD: 1 K/UL (ref 0.8–3.5)
MCH RBC QN AUTO: 30.6 PG (ref 26–34)
MCHC RBC AUTO-ENTMCNC: 33.3 G/DL (ref 30–36.5)
MCV RBC AUTO: 91.7 FL (ref 80–99)
MONOCYTES # BLD: 0.8 K/UL (ref 0–1)
MONOCYTES NFR BLD AUTO: 7 % (ref 5–13)
NEUTS SEG # BLD: 9.5 K/UL (ref 1.8–8)
NEUTS SEG NFR BLD AUTO: 82 % (ref 32–75)
P-R INTERVAL, ECG05: 118 MS
PLATELET # BLD AUTO: 274 K/UL (ref 150–400)
POTASSIUM SERPL-SCNC: 5.6 MMOL/L (ref 3.5–5.1)
PROT SERPL-MCNC: 7.4 G/DL (ref 6.4–8.2)
Q-T INTERVAL, ECG07: 352 MS
QRS DURATION, ECG06: 66 MS
QTC CALCULATION (BEZET), ECG08: 454 MS
RBC # BLD AUTO: 2.88 M/UL (ref 4.1–5.7)
SODIUM SERPL-SCNC: 135 MMOL/L (ref 136–145)
VENTRICULAR RATE, ECG03: 100 BPM
WBC # BLD AUTO: 11.5 K/UL (ref 4.1–11.1)

## 2017-02-22 PROCEDURE — 93005 ELECTROCARDIOGRAM TRACING: CPT

## 2017-02-22 PROCEDURE — 36415 COLL VENOUS BLD VENIPUNCTURE: CPT | Performed by: EMERGENCY MEDICINE

## 2017-02-22 PROCEDURE — 75810000275 HC EMERGENCY DEPT VISIT NO LEVEL OF CARE

## 2017-02-22 PROCEDURE — 85025 COMPLETE CBC W/AUTO DIFF WBC: CPT | Performed by: EMERGENCY MEDICINE

## 2017-02-22 PROCEDURE — 99285 EMERGENCY DEPT VISIT HI MDM: CPT

## 2017-02-22 PROCEDURE — 80053 COMPREHEN METABOLIC PANEL: CPT | Performed by: EMERGENCY MEDICINE

## 2017-02-22 NOTE — ED NOTES
Pt presents today from [facility] with Pottasium of 7.5 yesterday. Pt denies pain and sob.

## 2017-02-22 NOTE — DISCHARGE INSTRUCTIONS
Anemia: Care Instructions  Your Care Instructions    Anemia is a low level of red blood cells, which carry oxygen throughout your body. Many things can cause anemia. Lack of iron is one of the most common causes. Your body needs iron to make hemoglobin, a substance in red blood cells that carries oxygen from the lungs to your body's cells. Without enough iron, the body produces fewer and smaller red blood cells. As a result, your body's cells do not get enough oxygen, and you feel tired and weak. And you may have trouble concentrating. Bleeding is the most common cause of a lack of iron. You may have heavy menstrual bleeding or bleeding caused by conditions such as ulcers, hemorrhoids, or cancer. Regular use of aspirin or other anti-inflammatory medicines (such as ibuprofen) also can cause bleeding in some people. A lack of iron in your diet also can cause anemia, especially at times when the body needs more iron, such as during pregnancy, infancy, and the teen years. Your doctor may have prescribed iron pills. It may take several months of treatment for your iron levels to return to normal. Your doctor also may suggest that you eat foods that are rich in iron, such as meat and beans. There are many other causes of anemia. It is not always due to a lack of iron. Finding the specific cause of your anemia will help your doctor find the right treatment for you. Follow-up care is a key part of your treatment and safety. Be sure to make and go to all appointments, and call your doctor if you are having problems. It's also a good idea to know your test results and keep a list of the medicines you take. How can you care for yourself at home? · Take your medicines exactly as prescribed. Call your doctor if you think you are having a problem with your medicine. · If your doctor recommends iron pills, take them as directed:  ¨ Try to take the pills on an empty stomach about 1 hour before or 2 hours after meals. But you may need to take iron with food to avoid an upset stomach. ¨ Do not take antacids or drink milk or caffeine drinks (such as coffee, tea, or cola) at the same time or within 2 hours of the time that you take your iron. They can make it hard for your body to absorb the iron. ¨ Vitamin C (from food or supplements) helps your body absorb iron. Try taking iron pills with a glass of orange juice or some other food that is high in vitamin C, such as citrus fruits. ¨ Iron pills may cause stomach problems, such as heartburn, nausea, diarrhea, constipation, and cramps. Be sure to drink plenty of fluids, and include fruits, vegetables, and fiber in your diet each day. Iron pills often make your bowel movements dark or green. ¨ If you forget to take an iron pill, do not take a double dose of iron the next time you take a pill. ¨ Keep iron pills out of the reach of small children. An overdose of iron can be very dangerous. · Follow your doctor's advice about eating iron-rich foods. These include red meat, shellfish, poultry, eggs, beans, raisins, whole-grain bread, and leafy green vegetables. · Steam vegetables to help them keep their iron content. When should you call for help? Call 911 anytime you think you may need emergency care. For example, call if:  · You have symptoms of a heart attack. These may include:  ¨ Chest pain or pressure, or a strange feeling in the chest.  ¨ Sweating. ¨ Shortness of breath. ¨ Nausea or vomiting. ¨ Pain, pressure, or a strange feeling in the back, neck, jaw, or upper belly or in one or both shoulders or arms. ¨ Lightheadedness or sudden weakness. ¨ A fast or irregular heartbeat. After you call 911, the  may tell you to chew 1 adult-strength or 2 to 4 low-dose aspirin. Wait for an ambulance. Do not try to drive yourself. · You passed out (lost consciousness).   Call your doctor now or seek immediate medical care if:  · You have new or increased shortness of breath. · You are dizzy or lightheaded, or you feel like you may faint. · Your fatigue and weakness continue or get worse. · You have any abnormal bleeding, such as:  ¨ Nosebleeds. ¨ Vaginal bleeding that is different (heavier, more frequent, at a different time of the month) than what you are used to. ¨ Bloody or black stools, or rectal bleeding. ¨ Bloody or pink urine. Watch closely for changes in your health, and be sure to contact your doctor if:  · You do not get better as expected. Where can you learn more? Go to http://ángel-lori.info/. Enter R301 in the search box to learn more about \"Anemia: Care Instructions. \"  Current as of: February 5, 2016  Content Version: 11.1  © 9174-5145 [employer]. Care instructions adapted under license by [employer] (which disclaims liability or warranty for this information). If you have questions about a medical condition or this instruction, always ask your healthcare professional. [place] [address] any warranty or liability for your use of this information. Diet for Chronic Kidney Disease (Before Dialysis): Care Instructions  Your Care Instructions  When you have chronic kidney disease, you need to change your diet to avoid foods that make your kidneys worse. You may need to limit salt, fluids, and protein. You also may need to limit minerals such as potassium and phosphorus. A diet for chronic kidney disease takes planning. A dietitian who specializes in kidney disease can help you plan meals that meet your needs. These guidelines are for people who are not on dialysis. Talk with your doctor or dietitian to make sure your diet is right for your condition. Do not change your diet without talking to your doctor or dietitian. Follow-up care is a key part of your treatment and safety. Be sure to make and go to all appointments, and call your doctor if you are having problems.  It's also a good idea to know your test results and keep a list of the medicines you take. How can you care for yourself at home? · Work with your doctor or dietitian to create a food plan. · Do not skip meals or go for many hours without eating. If you do not feel very hungry, try to eat 4 or 5 small meals instead of 1 or 2 big meals. · If you have a hard time eating enough, talk to your doctor or dietitian about ways you can add calories to your diet. · Do not take any vitamins or minerals, supplements, or herbal products without talking to your doctor first.  · Check with your doctor about whether it is safe for you to drink alcohol. To get the right amount of protein  · Ask your doctor or dietitian how much protein you can have each day. Most people with chronic kidney disease need to limit the amount of protein they eat. But you still need some protein to stay healthy. · Include all sources of protein in your daily protein count. Besides meat, poultry, fish, and eggs, protein is found in milk and milk products, beans and nuts, breads, cereals, and vegetables. To limit salt  · Read food labels on cans and food packages. The labels tell you how much sodium is in each serving. Make sure that you look at the serving size. If you eat more than the serving size, you will get more sodium than what is listed on the label. · Do not add salt to your food. Avoid foods that list salt, sodium, or monosodium glutamate (MSG) as an ingredient. · Buy foods that are labeled \"no salt added,\" \"sodium-free,\" or \"low-sodium. \" Foods labeled \"reduced-sodium\" and \"light sodium\" may still have too much sodium. · Limit processed foods, fast food, and restaurant foods. These types of food are very high in sodium. · Avoid salted pretzels, chips, popcorn, and other salted snacks. · Avoid smoked, cured, salted, and canned meat, fish, and poultry. This includes ham, brady, hot dogs, and luncheon meats.   · You may use lemon, herbs, and spices to flavor your meals. To limit potassium  · Choose low-potassium fruits such as blueberries and raspberries. · Choose low-potassium vegetables such as cucumbers and radishes. · Do not use a salt substitute or lite salt unless your doctor says it is okay. Most salt substitutes and lite salts are high in potassium. To limit phosphorus  · Follow your food plan to know how much milk and milk products you can have. · Limit nuts, peanut butter, seeds, lentils, beans, organ meats, and sardines. · Avoid cola drinks. · Avoid bran breads or bran cereals. If you need to limit fluids  · Know how much fluid you can drink. Every day fill a pitcher with that amount of water. If you drink another fluid (such as coffee) that day, pour an equal amount of water out of the pitcher. · Count foods that are liquid at room temperature, such as gelatin dessert and ice cream, as fluids. Where can you learn more? Go to http://ángel-lori.info/. Enter J518 in the search box to learn more about \"Diet for Chronic Kidney Disease (Before Dialysis): Care Instructions. \"  Current as of: July 26, 2016  Content Version: 11.1  © 4729-8449 [employer]. Care instructions adapted under license by [employer] (which disclaims liability or warranty for this information). If you have questions about a medical condition or this instruction, always ask your healthcare professional. [place] [address] any warranty or liability for your use of this information.

## 2017-02-22 NOTE — ED PROVIDER NOTES
HPI Comments: Suzi Brady is a 61 y.o. male with history of Alzheimer's, CAD, DM, and CKD who presents via EMS from [facility] to ED for potassium of 7.5 yesterday. Pt states he feels at his normal baseline. Pt denies any fever, chills, chest pain, shortness of breath, N/V/D, headache. PCP:  Matty Sheppard MD  Nephrologist: Dr. Fozia Rodriguez  Endocrinologist: Dr. Sommer Darnell    There are no other complaints, changes or physical findings at this time. The history is provided by the patient. No  was used. Past Medical History:   Diagnosis Date    Alzheimer disease     CAD (coronary artery disease)     Cancer ([facility] [address]     prostate    CKD (chronic kidney disease) stage 4, GFR 15-29 ml/min ([facility])     DM (diabetes mellitus), type 2, uncontrolled ([facility] [address]     Hyperlipidemia     Hypertension     Other ill-defined conditions(799.89)     blind R eye-retina detachment    Other ill-defined conditions(799.89)     right cerebellopontine angle lipoma. Past Surgical History:   Procedure Laterality Date    COLONOSCOPY,DIAGNOSTIC  11/12/2014         HX HEENT      retinal detachment    HX SHOULDER ARTHROSCOPY      right    HX UROLOGICAL      prostate bx    UPPER GI ENDOSCOPY,BIOPSY  11/12/2014              Family History:   Problem Relation Age of Onset    Heart Disease Mother      Pacemaker    Cancer Father        Social History     Social History    Marital status:      Spouse name: N/A    Number of children: N/A    Years of education: N/A     Occupational History    Not on file.      Social History Main Topics    Smoking status: Never Smoker    Smokeless tobacco: Never Used    Alcohol use No    Drug use: No    Sexual activity: Not Currently     Other Topics Concern    Not on file     Social History Narrative         ALLERGIES: Ace inhibitors and Arb-angiotensin receptor antagonist    Review of Systems   Constitutional: Negative for chills and fever. HENT: Negative for congestion, rhinorrhea, sneezing and sore throat. Eyes: Negative for redness and visual disturbance. Respiratory: Negative for shortness of breath. Cardiovascular: Negative for chest pain and leg swelling. Gastrointestinal: Negative for abdominal pain, nausea and vomiting. Genitourinary: Negative for difficulty urinating and frequency. Musculoskeletal: Negative for back pain, myalgias and neck stiffness. Skin: Negative for rash. Neurological: Negative for dizziness, syncope, weakness and headaches. Hematological: Negative for adenopathy. Vitals:    02/22/17 0945   BP: (!) 154/95   Pulse: (!) 102   Resp: 16   Temp: 98.5 °F (36.9 °C)   SpO2: 100%   Weight: 63.1 kg (139 lb 1.8 oz)   Height: 5' 5\" (1.651 m)            Physical Exam   Constitutional: He is oriented to person, place, and time. He appears well-developed and well-nourished. HENT:   Head: Normocephalic and atraumatic. Mouth/Throat: Oropharynx is clear and moist and mucous membranes are normal.   Eyes: EOM are normal.   Neck: Normal range of motion and full passive range of motion without pain. Neck supple. Cardiovascular: Normal rate, regular rhythm, normal heart sounds, intact distal pulses and normal pulses. No murmur heard. Pulmonary/Chest: Effort normal and breath sounds normal. No respiratory distress. He exhibits no tenderness. Abdominal: Soft. Normal appearance and bowel sounds are normal. There is no tenderness. There is no rebound and no guarding. Neurological: He is alert and oriented to person, place, and time. He has normal strength. Skin: Skin is warm, dry and intact. No rash noted. No erythema. There is pallor. Psychiatric: He has a normal mood and affect. His speech is normal and behavior is normal. Judgment and thought content normal.   Nursing note and vitals reviewed.        MDM  Number of Diagnoses or Management Options  Diagnosis management comments: DDx: chronic kidney disease, hyperkalemia, anemia        Amount and/or Complexity of Data Reviewed  Clinical lab tests: reviewed and ordered  Tests in the medicine section of CPT®: ordered and reviewed  Obtain history from someone other than the patient: yes (EMS)  Review and summarize past medical records: yes  Independent visualization of images, tracings, or specimens: yes    Patient Progress  Patient progress: stable    ED Course       Procedures     EKG interpretation: (Preliminary) 9:47 AM  Rhythm: normal sinus rhythm; and regular . Rate (approx.): 100; Axis: normal; P wave: normal; QRS interval: normal ; ST/T wave: normal;   Written by Jose Mendez. Constanza Jose ED Scribe, as dictated by Tierney Hubbard MD.    11:14 AM  Tierney Hubbard MD spoke with a nurse from the patient's nephrology office regarding the patient's potassium level of 5.6 in the ED today. LABORATORY TESTS:  Recent Results (from the past 12 hour(s))   EKG, 12 LEAD, INITIAL    Collection Time: 02/22/17  9:47 AM   Result Value Ref Range    Ventricular Rate 100 BPM    Atrial Rate 100 BPM    P-R Interval 118 ms    QRS Duration 66 ms    Q-T Interval 352 ms    QTC Calculation (Bezet) 454 ms    Calculated P Axis 25 degrees    Calculated R Axis 1 degrees    Calculated T Axis 42 degrees    Diagnosis       Normal sinus rhythm  Septal infarct , age undetermined  Confirmed by Issac Adler ([id number]) on 2/22/2017 10:52:29 AM     CBC WITH AUTOMATED DIFF    Collection Time: 02/22/17 10:00 AM   Result Value Ref Range    WBC 11.5 (H) 4.1 - 11.1 K/uL    RBC 2.88 (L) 4.10 - 5.70 M/uL    HGB 8.8 (L) 12.1 - 17.0 g/dL    HCT 26.4 (L) 36.6 - 50.3 %    MCV 91.7 80.0 - 99.0 FL    MCH 30.6 26.0 - 34.0 PG    MCHC 33.3 30.0 - 36.5 g/dL    RDW 14.0 11.5 - 14.5 %    PLATELET 297 126 - 217 K/uL    NEUTROPHILS 82 (H) 32 - 75 %    LYMPHOCYTES 8 (L) 12 - 49 %    MONOCYTES 7 5 - 13 %    EOSINOPHILS 2 0 - 7 %    BASOPHILS 1 0 - 1 %    ABS.  NEUTROPHILS 9.5 (H) 1.8 - 8.0 K/UL    ABS. LYMPHOCYTES 1.0 0.8 - 3.5 K/UL    ABS. MONOCYTES 0.8 0.0 - 1.0 K/UL    ABS. EOSINOPHILS 0.2 0.0 - 0.4 K/UL    ABS. BASOPHILS 0.1 0.0 - 0.1 K/UL   METABOLIC PANEL, COMPREHENSIVE    Collection Time: 02/22/17 10:00 AM   Result Value Ref Range    Sodium 135 (L) 136 - 145 mmol/L    Potassium 5.6 (H) 3.5 - 5.1 mmol/L    Chloride 103 97 - 108 mmol/L    CO2 18 (L) 21 - 32 mmol/L    Anion gap 14 5 - 15 mmol/L    Glucose 147 (H) 65 - 100 mg/dL    BUN 36 (H) 6 - 20 MG/DL    Creatinine 5.44 (H) 0.70 - 1.30 MG/DL    BUN/Creatinine ratio 7 (L) 12 - 20      GFR est AA 13 (L) >60 ml/min/1.73m2    GFR est non-AA 11 (L) >60 ml/min/1.73m2    Calcium 8.4 (L) 8.5 - 10.1 MG/DL    Bilirubin, total 0.9 0.2 - 1.0 MG/DL    ALT (SGPT) 216 (H) 12 - 78 U/L    AST (SGOT) 164 (H) 15 - 37 U/L    Alk. phosphatase 321 (H) 45 - 117 U/L    Protein, total 7.4 6.4 - 8.2 g/dL    Albumin 3.5 3.5 - 5.0 g/dL    Globulin 3.9 2.0 - 4.0 g/dL    A-G Ratio 0.9 (L) 1.1 - 2.2         IMPRESSION:  1. Chronic kidney disease, stage 4 (severe) ([facility] [address]    2. Anemia, unspecified type    3. Hyperkalemia        PLAN:  1. Current Discharge Medication List      CONTINUE these medications which have NOT CHANGED    Details   levoFLOXacin (LEVAQUIN) 750 mg tablet Take 1 Tab by mouth daily. Qty: 10 Tab, Refills: 0      amLODIPine (NORVASC) 10 mg tablet Take 1 Tab by mouth daily. Qty: 30 Tab, Refills: 1      sodium bicarbonate 650 mg tablet Take 2 Tabs by mouth two (2) times a day. Qty: 180 Tab, Refills: 1      albuterol-ipratropium (DUO-NEB) 2.5 mg-0.5 mg/3 ml nebu 3 mL by Nebulization route. mirtazapine (REMERON) 15 mg tablet Take 15 mg by mouth nightly. Indications: MAJOR DEPRESSIVE DISORDER      omeprazole (PRILOSEC) 10 mg capsule Take 10 mg by mouth daily. Indications: GASTROESOPHAGEAL REFLUX      tamsulosin (FLOMAX) 0.4 mg capsule Take 0.4 mg by mouth daily. calcitRIOL (ROCALTROL) 0.25 mcg capsule Take 0.25 mcg by mouth daily.       b complex-vitamin c-folic acid (NEPHROCAPS) 1 mg capsule Take 1 Cap by mouth daily. insulin aspart (NOVOLOG) 100 unit/mL injection Novolog BEFORE MEALS: 0-90: hold, : 1 unit, 176-225: 2 units, 226-300: 3 units, 301 +    : 4 units, Novolog at BEDTIME - 225-300: 1 unit, 301 + 2 units  Qty: 10 mL, Refills: 10      glucose blood VI test strips (ASCENSIA AUTODISC VI, ONE TOUCH ULTRA TEST VI) strip Monitor glucose before meals and at bedtime and as needed for symptoms - 4-5 times daily. Qty: 150 Strip, Refills: 11      ferrous sulfate (SLOW FE) 142 mg (45 mg iron) ER tablet Take 142 mg by mouth Daily (before breakfast). glucagon (GLUCAGEN) 1 mg injection 1 mL by IntraMUSCular route as needed for Hypoglycemia. Qty: 1 Vial, Refills: 0      pravastatin (PRAVACHOL) 10 mg tablet Take 1 Tab by mouth nightly. Qty: 30 Tab, Refills: 5      acetaminophen (TYLENOL) 325 mg tablet Take 325 mg by mouth every four (4) hours as needed. 2.   Follow-up Information     Follow up With Details Comments Butch Francis MD Call  [address] [place] [address]  [phone]      Tish Joiner MD Call  [facility] [address] [place] [address]  [phone]      [facility] EMERGENCY DEPT  As needed, If symptoms worsen [address] [place] [address] Drive  [address] [facility]  [phone]        Return to ED if worse       DISCHARGE NOTE  11:12 AM  The patient has been re-evaluated and is ready for discharge. Reviewed available results with patient. Counseled pt on diagnosis and care plan. Pt has expressed understanding, and all questions have been answered. Pt agrees with plan and agrees to follow up as recommended, or return to the ED if their symptoms worsen. Discharge instructions have been provided and explained to the pt, along with reasons to return to the ED. Attestations: This note is prepared by Shonda Blanchard. Yoly Vo, acting as Scribe for Jerry Vásquez MD.    Rodo Bernardo. Fernie Blake MD: The scribe's documentation has been prepared under my direction and personally reviewed by me in its entirety. I confirm that the note above accurately reflects all work, treatment, procedures, and medical decision making performed by me.

## 2017-02-22 NOTE — ED NOTES
Pt given discharge instructions by MD, alert with family in wheelchair to [facility]

## 2017-02-28 ENCOUNTER — HOSPITAL ENCOUNTER (EMERGENCY)
Age: 67
Discharge: HOME | End: 2017-02-28
Attending: EMERGENCY MEDICINE
Payer: MEDICARE

## 2017-02-28 VITALS
SYSTOLIC BLOOD PRESSURE: 149 MMHG | WEIGHT: 139.11 LBS | DIASTOLIC BLOOD PRESSURE: 82 MMHG | RESPIRATION RATE: 18 BRPM | BODY MASS INDEX: 23.18 KG/M2 | OXYGEN SATURATION: 100 % | HEIGHT: 65 IN | TEMPERATURE: 98.1 F | HEART RATE: 102 BPM

## 2017-02-28 DIAGNOSIS — T82.838A BLEEDING DUE TO DIALYSIS CATHETER PLACEMENT, INITIAL ENCOUNTER (HCC): Primary | ICD-10-CM

## 2017-02-28 PROCEDURE — 99283 EMERGENCY DEPT VISIT LOW MDM: CPT

## 2017-03-01 NOTE — ED NOTES
Pt received discharge instructions from Dr. Flaca Hallman and was taken by ambulance back to [facility]. Report called to Angella Reaves LPN.

## 2017-03-01 NOTE — DISCHARGE INSTRUCTIONS
Keep compression on site for 24 hours and follow up with the person who place the HD line as needed.

## 2017-03-01 NOTE — ED NOTES
Pt arrives via EMS. Pt had dialysis shunt put in this am and has not stopped bleeding since per EMS. Pt is confused at baseline. Pt cooperative but oriented to name only.  Shunt bleeding

## 2017-03-01 NOTE — ED PROVIDER NOTES
HPI Comments: Arsh Talamantes is a 61 y.o. male with hx significant for Alzheimer's, DM, HTN, CAD, prostate cancer, and CKD who presents to [facility] ED via EMS from a SNF ([address] [place] [address]) with cc of bleeding from his dialysis catheter today. Pt had the catheter placed this morning at the dialysis center per EMS. Pt unable to tell who completed the procedure. Pt is alert and oriented to self at baseline which has been unchanged. Pt denies any lightheadedness. PCP: Lance Escobar MD    Social Hx: -tobacco, -EtOH, -illicit drug usage    Hx otherwise limited secondary to Alzheimer's. The history is provided by the EMS personnel. Past Medical History:   Diagnosis Date    Alzheimer disease     CAD (coronary artery disease)     Cancer ([facility]     prostate    CKD (chronic kidney disease) stage 4, GFR 15-29 ml/min ([facility])     DM (diabetes mellitus), type 2, uncontrolled ([facility] [address]     Hyperlipidemia     Hypertension     Other ill-defined conditions(799.89)     blind R eye-retina detachment    Other ill-defined conditions(799.89)     right cerebellopontine angle lipoma. Past Surgical History:   Procedure Laterality Date    COLONOSCOPY,DIAGNOSTIC  11/12/2014         HX HEENT      retinal detachment    HX SHOULDER ARTHROSCOPY      right    HX UROLOGICAL      prostate bx    UPPER GI ENDOSCOPY,BIOPSY  11/12/2014              Family History:   Problem Relation Age of Onset    Heart Disease Mother      Pacemaker    Cancer Father        Social History     Social History    Marital status:      Spouse name: N/A    Number of children: N/A    Years of education: N/A     Occupational History    Not on file.      Social History Main Topics    Smoking status: Never Smoker    Smokeless tobacco: Never Used    Alcohol use No    Drug use: No    Sexual activity: Not Currently     Other Topics Concern    Not on file     Social History Narrative         ALLERGIES: Ace inhibitors and Arb-angiotensin receptor antagonist    Review of Systems   Unable to perform ROS: Dementia       Vitals:    02/28/17 1936   BP: 153/80   Pulse: (!) 102   Resp: 18   Temp: 98.1 °F (36.7 °C)   SpO2: 99%   Weight: 63.1 kg (139 lb 1.8 oz)   Height: 5' 5\" (1.651 m)            Physical Exam   Constitutional: He appears well-developed and well-nourished. HENT:   Head: Normocephalic and atraumatic. Eyes: Conjunctivae and EOM are normal.   Neck: Normal range of motion. Neck supple. Cardiovascular: Normal rate and regular rhythm. Pulmonary/Chest: Effort normal and breath sounds normal. No respiratory distress. Removed dressing. Pt has clotted blood surrounding HD catheter in R upper chest, minimally oozing around the suture sites. Abdominal: Soft. He exhibits no distension. There is no tenderness. Musculoskeletal: Normal range of motion. Neurological:   Alert at baseline, answers all questions appropriate   Skin: Skin is warm and dry. Psychiatric: He has a normal mood and affect. Nursing note and vitals reviewed. MDM  Number of Diagnoses or Management Options  Bleeding due to dialysis catheter placement, initial encounter [facility]):   Diagnosis management comments: Post-op bleeding from recent HD placement. Unlikely coagulopathy. Unlikely anemic given amount of bleeding. Will control with sterile compression dressing. Amount and/or Complexity of Data Reviewed  Obtain history from someone other than the patient: yes (EMS)  Review and summarize past medical records: yes    Patient Progress  Patient progress: stable    ED Course       Procedures      PROGRESS NOTE:  8:20 PM  ED nursed called [facility] facility. Nurses there are unsure who placed pt's dialysis catheter, mentioning that it was placed by \"[facility]\". ED nurse also called \"[facility]\" who did not  the phone. Plan to apply pressure dressing and send pt back to [facility].   Written by Terry Fragoso ED Shayne, as dictated by Kwesi Mcpherson MD.      IMPRESSION:  1. Bleeding due to dialysis catheter placement, initial encounter [facility])        PLAN:  Current Discharge Medication List      CONTINUE these medications which have NOT CHANGED    Details   levoFLOXacin (LEVAQUIN) 750 mg tablet Take 1 Tab by mouth daily. Qty: 10 Tab, Refills: 0      amLODIPine (NORVASC) 10 mg tablet Take 1 Tab by mouth daily. Qty: 30 Tab, Refills: 1      sodium bicarbonate 650 mg tablet Take 2 Tabs by mouth two (2) times a day. Qty: 180 Tab, Refills: 1      albuterol-ipratropium (DUO-NEB) 2.5 mg-0.5 mg/3 ml nebu 3 mL by Nebulization route. mirtazapine (REMERON) 15 mg tablet Take 15 mg by mouth nightly. Indications: MAJOR DEPRESSIVE DISORDER      omeprazole (PRILOSEC) 10 mg capsule Take 10 mg by mouth daily. Indications: GASTROESOPHAGEAL REFLUX      tamsulosin (FLOMAX) 0.4 mg capsule Take 0.4 mg by mouth daily. calcitRIOL (ROCALTROL) 0.25 mcg capsule Take 0.25 mcg by mouth daily. b complex-vitamin c-folic acid (NEPHROCAPS) 1 mg capsule Take 1 Cap by mouth daily. insulin aspart (NOVOLOG) 100 unit/mL injection Novolog BEFORE MEALS: 0-90: hold, : 1 unit, 176-225: 2 units, 226-300: 3 units, 301 +    : 4 units, Novolog at BEDTIME - 225-300: 1 unit, 301 + 2 units  Qty: 10 mL, Refills: 10      glucose blood VI test strips (ASCENSIA AUTODISC VI, ONE TOUCH ULTRA TEST VI) strip Monitor glucose before meals and at bedtime and as needed for symptoms - 4-5 times daily. Qty: 150 Strip, Refills: 11      ferrous sulfate (SLOW FE) 142 mg (45 mg iron) ER tablet Take 142 mg by mouth Daily (before breakfast). glucagon (GLUCAGEN) 1 mg injection 1 mL by IntraMUSCular route as needed for Hypoglycemia. Qty: 1 Vial, Refills: 0      pravastatin (PRAVACHOL) 10 mg tablet Take 1 Tab by mouth nightly. Qty: 30 Tab, Refills: 5      acetaminophen (TYLENOL) 325 mg tablet Take 325 mg by mouth every four (4) hours as needed. Follow-up Information     Follow up With Details Comments [facility] doctor  As needed         Return to ED if worse     DISCHARGE NOTE:  8:41 PM  Pt has been reexamined. Pt has no new complaints, changes, or physical findings. Care plan outlined and precautions discussed. All available results reviewed with pt. All medications reviewed with pt. All of pts questions and concerns addressed. Pt agrees to f/u as instructed and agrees to return to ED upon further deterioration. Pt is ready to go home. ATTESTATION:  This note is prepared by Hillary June, acting as Scribe for Geovanna Lugo MD.    Geovanna Lugo MD and personally reviewed by me in its entirety. I confirm that the note above accurately reflects all work, treatment, procedures, and medical decision making performed by me.

## 2017-03-02 ENCOUNTER — HOSPITAL ENCOUNTER (EMERGENCY)
Age: 67
Discharge: NURSING, REHAB OR LONG-TERM CARE | End: 2017-03-02
Attending: EMERGENCY MEDICINE
Payer: MEDICARE

## 2017-03-02 VITALS
OXYGEN SATURATION: 99 % | TEMPERATURE: 98.3 F | HEART RATE: 93 BPM | SYSTOLIC BLOOD PRESSURE: 141 MMHG | HEIGHT: 64 IN | DIASTOLIC BLOOD PRESSURE: 81 MMHG | RESPIRATION RATE: 17 BRPM | BODY MASS INDEX: 24.41 KG/M2 | WEIGHT: 143 LBS

## 2017-03-02 DIAGNOSIS — T82.838D BLEEDING DUE TO DIALYSIS CATHETER PLACEMENT, SUBSEQUENT ENCOUNTER: Primary | ICD-10-CM

## 2017-03-02 PROCEDURE — 99282 EMERGENCY DEPT VISIT SF MDM: CPT

## 2017-03-02 NOTE — DISCHARGE INSTRUCTIONS
Bleeding After Surgery: Care Instructions  Your Care Instructions  After surgery, it is common to have some minor bleeding from the cut (incision) made by your doctor. But problems may occur that cause you to bleed too much. An injury to a blood vessel can cause bleeding after surgery. Other causes include medicines such as aspirin or anticoagulants (blood thinners). Your doctor examined you to find the cause of the bleeding. You may have had imaging tests, such as a CT scan, MRI, or ultrasound. To help stop the bleeding, your doctor probably put pressure on the area and may have sewn up or cauterized (sealed) the incision. Your doctor also may have given you medicines that help stop the bleeding. Follow-up care is a key part of your treatment and safety. Be sure to make and go to all appointments, and call your doctor if you are having problems. It's also a good idea to know your test results and keep a list of the medicines you take. How can you care for yourself at home? · If you have strips of tape on the incision, leave the tape on for a week or until it falls off. Or follow your doctor's instructions for removing the tape. Keep the area dry at all times. · You will have a dressing over the incision. A dressing helps the incision heal and protects it. Your doctor will tell you how to take care of this. · If you do not have tape on the incision, wash the area daily with warm, soapy water, and pat it dry. Don't use hydrogen peroxide or alcohol, which can slow healing. · You may cover the area with a gauze bandage if it weeps or rubs against clothing. Change the bandage every day. · Keep the area clean and dry. When should you call for help? Call 911 anytime you think you may need emergency care. For example, call if:  · You passed out (lost consciousness). Call your doctor now or seek immediate medical care if:  · You are dizzy or lightheaded, or you feel like you may faint.   · You have bleeding that starts again or gets worse, such as soaking one or more bandages over 2 to 4 hours. Watch closely for changes in your health, and be sure to contact your doctor if:  · You do not get better as expected. Where can you learn more? Go to http://ángel-lori.info/. Enter W467 in the search box to learn more about \"Bleeding After Surgery: Care Instructions. \"  Current as of: May 27, 2016  Content Version: 11.1  © 2438-7455 [employer]. Care instructions adapted under license by [employer] (which disclaims liability or warranty for this information).  If you have questions about a medical condition or this instruction, always ask your healthcare professional. [address] any warranty or liability for your use of this information.

## 2017-03-02 NOTE — ROUTINE PROCESS
TRANSFER - OUT REPORT:    Verbal report given to [facility] on Kiley Euceda.  being transferred to [facility] for discharge. Report consisted of patients Situation, Background, Assessment and   Recommendations(SBAR). Information from the following report(s) SBAR, ED Summary and Procedure Summary was reviewed with the receiving nurse. Opportunity for questions and clarification was provided. Patient transported by EMS.

## 2017-03-02 NOTE — ED NOTES
MD reviewed discharge instructions with the patient. The patient verbalized understanding. Pt w/o question, waitng for ems transport for discharge.

## 2017-03-02 NOTE — ED PROVIDER NOTES
HPI Comments: Irene Montesinos is a 61 y.o. male with h/o Alzheimer's, ESRD on MWF HD who presents via EMS to the ED from [facility] with c/o bleeding from around his dialysis catheter in his right upper chest. Per EMS, they were called by SNF staff for \"uncontrollable bleeding from about his dialysis catheter\". EMS note pt had a slightly bloody gauze bandage on on their arrival, are unsure when it was last changed, and note only minimal oozing bleeding. Per EMS, pt reported that he went to dialysis earlier today (Wednesday) as scheduled. Pt does not participate meaningfully in H&P    Nephrology: Cecy Hernández  PCP: Radha Rose MD  PMHx significant for: Alzheimer's disease, HTN, DM, CAD, hyperlipidemia, CKD, prostate cancer  PSHx significant for: right shoulder arthroscopy, EGD, colonoscopy, prostate biopsy, dialysis access  Social Hx:  smoking (-)                      alcohol (-)                        This history is limited by the condition of the patient. Written by IMAN Gagnon, as dictated by Efraín Merritt MD     The history is provided by the custodial. The history is limited by the condition of the patient. Past Medical History:   Diagnosis Date    Alzheimer disease     CAD (coronary artery disease)     Cancer ([address]     prostate    CKD (chronic kidney disease) stage 4, GFR 15-29 ml/min ([facility])     DM (diabetes mellitus), type 2, uncontrolled ([address]     Hyperlipidemia     Hypertension     Other ill-defined conditions(799.89)     blind R eye-retina detachment    Other ill-defined conditions(799.89)     right cerebellopontine angle lipoma.         Past Surgical History:   Procedure Laterality Date    COLONOSCOPY,DIAGNOSTIC  11/12/2014         HX HEENT      retinal detachment    HX SHOULDER ARTHROSCOPY      right    HX UROLOGICAL      prostate bx    UPPER GI ENDOSCOPY,BIOPSY  11/12/2014              Family History:   Problem Relation Age of Onset    Heart Disease Mother      Pacemaker    Cancer Father        Social History     Social History    Marital status:      Spouse name: N/A    Number of children: N/A    Years of education: N/A     Occupational History    Not on file. Social History Main Topics    Smoking status: Never Smoker    Smokeless tobacco: Never Used    Alcohol use No    Drug use: No    Sexual activity: Not Currently     Other Topics Concern    Not on file     Social History Narrative         ALLERGIES: Ace inhibitors and Arb-angiotensin receptor antagonist    Review of Systems   Unable to perform ROS: Dementia       Vitals:    03/02/17 0321   BP: 141/81   Pulse: 93   Resp: 17   Temp: 98.3 °F (36.8 °C)   SpO2: 99%   Weight: 64.9 kg (143 lb)   Height: 5' 4\" (1.626 m)            Physical Exam   Constitutional: He appears well-developed and well-nourished. No distress. HENT:   Head: Normocephalic and atraumatic. Nose: Nose normal.   Mouth/Throat: No oropharyngeal exudate. Eyes: Conjunctivae and EOM are normal. Pupils are equal, round, and reactive to light. Neck: Normal range of motion. Neck supple. No JVD present. Cardiovascular: Normal rate, regular rhythm, normal heart sounds and intact distal pulses. Exam reveals no friction rub. No murmur heard. New R chest HD catheter in place. Minimal, oozing of blood around catheter tubing. Pulmonary/Chest: Effort normal and breath sounds normal. No stridor. No respiratory distress. He has no wheezes. He has no rales. Abdominal: Soft. Bowel sounds are normal. He exhibits no distension. There is no tenderness. There is no rebound. Musculoskeletal: Normal range of motion. He exhibits no tenderness. Neurological: He is disoriented. No cranial nerve deficit. Skin: Skin is warm and dry. No rash noted. He is not diaphoretic. Psychiatric: He has a normal mood and affect. His behavior is normal. Judgment and thought content normal.   Nursing note and vitals reviewed. MDM  Number of Diagnoses or Management Options  Bleeding due to dialysis catheter placement, subsequent encounter:   Diagnosis management comments: DDX:  bleeding catheter site,     Plan:  Clean site, surgifoam and pressure dressing. Impression:  Bleeding catheter site. Amount and/or Complexity of Data Reviewed  Obtain history from someone other than the patient: yes (EMS)  Review and summarize past medical records: yes  Discuss the patient with other providers: yes (EMS)      ED Course       Procedures    I reviewed our electronic medical record system for any past medical records that were available that may contribute to the patients current condition, the nursing notes and and vital signs from today's visit    On review of his EMR, pt apparently just had the HD catheter placed 2 days ago and was sent to the ED from [facility] that day for bleeding from the catheter. Nursing notes will be reviewed as they become available in realtime while the pt is in the ED. Jannie Hazel MD     Progress Note:  3:36 AM   Pt with minimal oozing on arrival to the ED. Nursing has cleaned the area and will apply pressure and dress with surgifoam.   Written by Edmar Dowd. Gil, ED scribe, as dictated by Jannie Hazel MD      Progress Note:  4:45 AM  Bleeding controlled at this time, nursing to contact transport for pt to return to SNF. Written by Edmar Cordero, ED scribe, as dictated by Jannie Hazel MD      Progress Note:  6:32 AM  Nursing states pt is starting to have some minimal oozing again, will order tranexamic acid  Written by Edmar Dowd. Gil, IMAN scribe, as dictated by Jannie Hazel MD      Procedure Note - Bleeding Control:  7:36 AM  Performed by: Jannie Hazel MD  Cleared the area about pt's right chest HD catheter with chlorhexidine and then redressed it with gauze soaked with tranexamic acid  Hemostasis was achieved   The procedure took 1-15 minutes, and pt tolerated well.   Written by Yasmin Cordero, ED Scribe, as dictated by Pallavi Grant MD     Progress note:  8:00 AM  Pt with no further bleeding at this time, will d/c back to SNF with discharge instructions for SNF staff. All questions have been answered, pt voiced understanding and agreement with plan. If narcotics were prescribed, pt was advised not to drive or operate heavy machinery. If abx were prescribed, pt advised that diarrhea and rash are possible side effects of the medications. Specific return precautions provided as well as instructions to return to the ED should sx worsen at any time. Pallavi Grant MD      MEDICATIONS ORDERED:  Medications   tranexamic acid (CYKLOKAPRON) 5% in sterile water oral swish and spit solution (not administered)        IMPRESSION:  1. Bleeding due to dialysis catheter placement, subsequent encounter        PLAN:  1. Discharge home  2. Current Discharge Medication List      CONTINUE these medications which have NOT CHANGED    Details   levoFLOXacin (LEVAQUIN) 750 mg tablet Take 1 Tab by mouth daily. Qty: 10 Tab, Refills: 0      amLODIPine (NORVASC) 10 mg tablet Take 1 Tab by mouth daily. Qty: 30 Tab, Refills: 1      sodium bicarbonate 650 mg tablet Take 2 Tabs by mouth two (2) times a day. Qty: 180 Tab, Refills: 1      albuterol-ipratropium (DUO-NEB) 2.5 mg-0.5 mg/3 ml nebu 3 mL by Nebulization route. mirtazapine (REMERON) 15 mg tablet Take 15 mg by mouth nightly. Indications: MAJOR DEPRESSIVE DISORDER      omeprazole (PRILOSEC) 10 mg capsule Take 10 mg by mouth daily. Indications: GASTROESOPHAGEAL REFLUX      tamsulosin (FLOMAX) 0.4 mg capsule Take 0.4 mg by mouth daily. calcitRIOL (ROCALTROL) 0.25 mcg capsule Take 0.25 mcg by mouth daily. b complex-vitamin c-folic acid (NEPHROCAPS) 1 mg capsule Take 1 Cap by mouth daily.       insulin aspart (NOVOLOG) 100 unit/mL injection Novolog BEFORE MEALS: 0-90: hold, : 1 unit, 176-225: 2 units, 226-300: 3 units, 301 +    : 4 units, Novolog at BEDTIME - 225-300: 1 unit, 301 + 2 units  Qty: 10 mL, Refills: 10      glucose blood VI test strips (ASCENSIA AUTODISC VI, ONE TOUCH ULTRA TEST VI) strip Monitor glucose before meals and at bedtime and as needed for symptoms - 4-5 times daily. Qty: 150 Strip, Refills: 11      ferrous sulfate (SLOW FE) 142 mg (45 mg iron) ER tablet Take 142 mg by mouth Daily (before breakfast). glucagon (GLUCAGEN) 1 mg injection 1 mL by IntraMUSCular route as needed for Hypoglycemia. Qty: 1 Vial, Refills: 0      pravastatin (PRAVACHOL) 10 mg tablet Take 1 Tab by mouth nightly. Qty: 30 Tab, Refills: 5      acetaminophen (TYLENOL) 325 mg tablet Take 325 mg by mouth every four (4) hours as needed. 3.   Follow-up Information     Follow up With Details Comments Contact Info    Aparna Ambriz MD Schedule an appointment as soon as possible for a visit today  [facility]  [phone]      Ania Chambers MD Call  [place] [address]  [phone]        Return to ED if worse         DISCHARGE NOTE  8:00 AM  Pt has been re-evaluated. He has no new complaints, changes, or physical findings. Care plan has been outlined and discussed, and any family understand all current sx, dx, tx, and rx. All of the family's questions have been answered and concerns addressed. Have provided discharge paperwork to be sent back to pt's SNF with him. Will have him follow up with his PCP and nephrology, or to return to the ED should his sxs worsen prior to follow up. Kiley Euceda. is ready for discharge. This note is prepared by Eusbeio Dang, acting as scribe for Moris Koenig MD.     Moris Koenig MD: The scribe's documentation has been prepared under my direction and personally reviewed by me in its entirety.  I confirm that the note above accurately reflects all work, treatment, procedures, and medical decision making performed by me This note will not be viewable in [address]

## 2017-03-24 ENCOUNTER — TELEPHONE (OUTPATIENT)
Dept: ENDOCRINOLOGY | Age: 67
End: 2017-03-24

## 2017-03-24 ENCOUNTER — DOCUMENTATION ONLY (OUTPATIENT)
Dept: ENDOCRINOLOGY | Age: 67
End: 2017-03-24

## 2017-03-24 NOTE — TELEPHONE ENCOUNTER
Spoke per Dr Rehana Moya with [facility] and she stated that staff has advised pt and his family regarding the sweets but pt is still non-compliant. She then stated that she will pass your request along with the staff regarding removing all candy, pastries,and snacks and to only have pt to eat the meals that are provided by facility.

## 2017-03-24 NOTE — TELEPHONE ENCOUNTER
Jalen Hurtado LPN called from [facility] and stated that pt's glucose as been reading \"HI\" even after pt has had his coverage. Nurse stated that his blood sugar has been reading \"HI\" several times this week. Nurse also stated that her meter goes as high as 599.   Frantz Quails can be reached at [address]

## 2017-03-24 NOTE — TELEPHONE ENCOUNTER
I last saw him in November 2016. At that time, nurse and daughter commented that he would eat sweets and pastries all the time if he had access to them. Nurse stated he was often eating candy that family members had brought him and daughter stated he had a Honey Bun in room that day. I recommend nurse examine room for candy/sweets/snacks and remove these so he will only eat meals provided. They should also look for signs or symptoms of infection which would be a less likely explanation (fever, urinary complaints, cough, cold-like sx).

## 2017-04-03 ENCOUNTER — ANESTHESIA EVENT (OUTPATIENT)
Dept: SURGERY | Age: 67
DRG: 628 | End: 2017-04-03
Payer: MEDICARE

## 2017-04-03 RX ORDER — HYDROMORPHONE HYDROCHLORIDE 1 MG/ML
0.2 INJECTION, SOLUTION INTRAMUSCULAR; INTRAVENOUS; SUBCUTANEOUS
Status: CANCELLED | OUTPATIENT
Start: 2017-04-03

## 2017-04-03 RX ORDER — MORPHINE SULFATE 10 MG/ML
2 INJECTION, SOLUTION INTRAMUSCULAR; INTRAVENOUS
Status: CANCELLED | OUTPATIENT
Start: 2017-04-03

## 2017-04-03 RX ORDER — DIPHENHYDRAMINE HYDROCHLORIDE 50 MG/ML
12.5 INJECTION, SOLUTION INTRAMUSCULAR; INTRAVENOUS AS NEEDED
Status: CANCELLED | OUTPATIENT
Start: 2017-04-03 | End: 2017-04-03

## 2017-04-03 RX ORDER — SODIUM CHLORIDE, SODIUM LACTATE, POTASSIUM CHLORIDE, CALCIUM CHLORIDE 600; 310; 30; 20 MG/100ML; MG/100ML; MG/100ML; MG/100ML
100 INJECTION, SOLUTION INTRAVENOUS CONTINUOUS
Status: CANCELLED | OUTPATIENT
Start: 2017-04-03

## 2017-04-03 RX ORDER — SODIUM CHLORIDE 0.9 % (FLUSH) 0.9 %
5-10 SYRINGE (ML) INJECTION AS NEEDED
Status: CANCELLED | OUTPATIENT
Start: 2017-04-03

## 2017-04-03 RX ORDER — FENTANYL CITRATE 50 UG/ML
25 INJECTION, SOLUTION INTRAMUSCULAR; INTRAVENOUS
Status: CANCELLED | OUTPATIENT
Start: 2017-04-03

## 2017-04-03 RX ORDER — MIDAZOLAM HYDROCHLORIDE 1 MG/ML
0.5 INJECTION, SOLUTION INTRAMUSCULAR; INTRAVENOUS
Status: CANCELLED | OUTPATIENT
Start: 2017-04-03

## 2017-04-04 ENCOUNTER — ANESTHESIA (OUTPATIENT)
Dept: SURGERY | Age: 67
DRG: 628 | End: 2017-04-04
Payer: MEDICARE

## 2017-04-04 ENCOUNTER — APPOINTMENT (OUTPATIENT)
Dept: GENERAL RADIOLOGY | Age: 67
DRG: 628 | End: 2017-04-04
Attending: STUDENT IN AN ORGANIZED HEALTH CARE EDUCATION/TRAINING PROGRAM
Payer: MEDICARE

## 2017-04-04 ENCOUNTER — HOSPITAL ENCOUNTER (INPATIENT)
Age: 67
LOS: 4 days | Discharge: NURSING, REHAB OR LONG-TERM CARE | DRG: 628 | End: 2017-04-08
Attending: SURGERY | Admitting: STUDENT IN AN ORGANIZED HEALTH CARE EDUCATION/TRAINING PROGRAM
Payer: MEDICARE

## 2017-04-04 LAB
ANION GAP BLD CALC-SCNC: 11 MMOL/L (ref 5–15)
ANION GAP BLD CALC-SCNC: 12 MMOL/L (ref 5–15)
ANION GAP BLD CALC-SCNC: 15 MMOL/L (ref 5–15)
ANION GAP BLD CALC-SCNC: 22 MMOL/L (ref 5–15)
BASOPHILS # BLD AUTO: 0 K/UL (ref 0–0.1)
BASOPHILS # BLD: 1 % (ref 0–1)
BUN BLD-MCNC: 11 MG/DL (ref 9–20)
BUN SERPL-MCNC: 13 MG/DL (ref 6–20)
BUN SERPL-MCNC: 13 MG/DL (ref 6–20)
BUN SERPL-MCNC: 14 MG/DL (ref 6–20)
BUN/CREAT SERPL: 4 (ref 12–20)
BUN/CREAT SERPL: 5 (ref 12–20)
BUN/CREAT SERPL: 5 (ref 12–20)
CA-I BLD-MCNC: 1.1 MMOL/L (ref 1.12–1.32)
CALCIUM SERPL-MCNC: 8.3 MG/DL (ref 8.5–10.1)
CALCIUM SERPL-MCNC: 8.6 MG/DL (ref 8.5–10.1)
CALCIUM SERPL-MCNC: 8.6 MG/DL (ref 8.5–10.1)
CHLORIDE BLD-SCNC: 95 MMOL/L (ref 98–107)
CHLORIDE SERPL-SCNC: 100 MMOL/L (ref 97–108)
CHLORIDE SERPL-SCNC: 100 MMOL/L (ref 97–108)
CHLORIDE SERPL-SCNC: 99 MMOL/L (ref 97–108)
CO2 BLD-SCNC: 19 MMOL/L (ref 21–32)
CO2 SERPL-SCNC: 23 MMOL/L (ref 21–32)
CO2 SERPL-SCNC: 24 MMOL/L (ref 21–32)
CO2 SERPL-SCNC: 25 MMOL/L (ref 21–32)
CREAT BLD-MCNC: 2.3 MG/DL (ref 0.6–1.3)
CREAT SERPL-MCNC: 2.75 MG/DL (ref 0.7–1.3)
CREAT SERPL-MCNC: 2.86 MG/DL (ref 0.7–1.3)
CREAT SERPL-MCNC: 3 MG/DL (ref 0.7–1.3)
EOSINOPHIL # BLD: 0.1 K/UL (ref 0–0.4)
EOSINOPHIL NFR BLD: 2 % (ref 0–7)
ERYTHROCYTE [DISTWIDTH] IN BLOOD BY AUTOMATED COUNT: 15.5 % (ref 11.5–14.5)
EST. AVERAGE GLUCOSE BLD GHB EST-MCNC: 249 MG/DL
GLUCOSE BLD STRIP.AUTO-MCNC: 106 MG/DL (ref 65–100)
GLUCOSE BLD STRIP.AUTO-MCNC: 116 MG/DL (ref 65–100)
GLUCOSE BLD STRIP.AUTO-MCNC: 120 MG/DL (ref 65–100)
GLUCOSE BLD STRIP.AUTO-MCNC: 141 MG/DL (ref 65–100)
GLUCOSE BLD STRIP.AUTO-MCNC: 169 MG/DL (ref 65–100)
GLUCOSE BLD STRIP.AUTO-MCNC: 190 MG/DL (ref 65–100)
GLUCOSE BLD STRIP.AUTO-MCNC: 229 MG/DL (ref 65–100)
GLUCOSE BLD STRIP.AUTO-MCNC: 337 MG/DL (ref 65–100)
GLUCOSE BLD STRIP.AUTO-MCNC: 421 MG/DL (ref 65–100)
GLUCOSE BLD STRIP.AUTO-MCNC: 43 MG/DL (ref 65–100)
GLUCOSE BLD STRIP.AUTO-MCNC: 44 MG/DL (ref 65–100)
GLUCOSE BLD STRIP.AUTO-MCNC: 446 MG/DL (ref 65–100)
GLUCOSE BLD STRIP.AUTO-MCNC: 49 MG/DL (ref 65–100)
GLUCOSE BLD STRIP.AUTO-MCNC: 551 MG/DL (ref 65–100)
GLUCOSE BLD STRIP.AUTO-MCNC: 566 MG/DL (ref 65–100)
GLUCOSE BLD-MCNC: 627 MG/DL (ref 65–100)
GLUCOSE SERPL-MCNC: 166 MG/DL (ref 65–100)
GLUCOSE SERPL-MCNC: 205 MG/DL (ref 65–100)
GLUCOSE SERPL-MCNC: 417 MG/DL (ref 65–100)
HBA1C MFR BLD: 10.3 % (ref 4.2–6.3)
HCT VFR BLD AUTO: 31.1 % (ref 36.6–50.3)
HCT VFR BLD CALC: 31 % (ref 36.6–50.3)
HGB BLD-MCNC: 10.4 G/DL (ref 12.1–17)
HGB BLD-MCNC: 10.5 GM/DL (ref 12.1–17)
LYMPHOCYTES # BLD AUTO: 18 % (ref 12–49)
LYMPHOCYTES # BLD: 1 K/UL (ref 0.8–3.5)
MAGNESIUM SERPL-MCNC: 1.8 MG/DL (ref 1.6–2.4)
MAGNESIUM SERPL-MCNC: 2 MG/DL (ref 1.6–2.4)
MAGNESIUM SERPL-MCNC: 2.3 MG/DL (ref 1.6–2.4)
MCH RBC QN AUTO: 30.1 PG (ref 26–34)
MCHC RBC AUTO-ENTMCNC: 33.4 G/DL (ref 30–36.5)
MCV RBC AUTO: 90.1 FL (ref 80–99)
MONOCYTES # BLD: 0.3 K/UL (ref 0–1)
MONOCYTES NFR BLD AUTO: 6 % (ref 5–13)
NEUTS SEG # BLD: 4.2 K/UL (ref 1.8–8)
NEUTS SEG NFR BLD AUTO: 73 % (ref 32–75)
PHOSPHATE SERPL-MCNC: 1.4 MG/DL (ref 2.6–4.7)
PLATELET # BLD AUTO: 211 K/UL (ref 150–400)
POTASSIUM BLD-SCNC: 4.1 MMOL/L (ref 3.5–5.1)
POTASSIUM SERPL-SCNC: 3 MMOL/L (ref 3.5–5.1)
POTASSIUM SERPL-SCNC: 3.4 MMOL/L (ref 3.5–5.1)
POTASSIUM SERPL-SCNC: 3.5 MMOL/L (ref 3.5–5.1)
RBC # BLD AUTO: 3.45 M/UL (ref 4.1–5.7)
SERVICE CMNT-IMP: ABNORMAL
SODIUM BLD-SCNC: 130 MMOL/L (ref 136–145)
SODIUM SERPL-SCNC: 136 MMOL/L (ref 136–145)
SODIUM SERPL-SCNC: 136 MMOL/L (ref 136–145)
SODIUM SERPL-SCNC: 137 MMOL/L (ref 136–145)
WBC # BLD AUTO: 5.7 K/UL (ref 4.1–11.1)

## 2017-04-04 PROCEDURE — 84100 ASSAY OF PHOSPHORUS: CPT | Performed by: STUDENT IN AN ORGANIZED HEALTH CARE EDUCATION/TRAINING PROGRAM

## 2017-04-04 PROCEDURE — 82962 GLUCOSE BLOOD TEST: CPT

## 2017-04-04 PROCEDURE — 74011250636 HC RX REV CODE- 250/636: Performed by: ANESTHESIOLOGY

## 2017-04-04 PROCEDURE — 74011636637 HC RX REV CODE- 636/637: Performed by: ANESTHESIOLOGY

## 2017-04-04 PROCEDURE — 77030020782 HC GWN BAIR PAWS FLX 3M -B

## 2017-04-04 PROCEDURE — 80048 BASIC METABOLIC PNL TOTAL CA: CPT | Performed by: STUDENT IN AN ORGANIZED HEALTH CARE EDUCATION/TRAINING PROGRAM

## 2017-04-04 PROCEDURE — 74011250636 HC RX REV CODE- 250/636: Performed by: STUDENT IN AN ORGANIZED HEALTH CARE EDUCATION/TRAINING PROGRAM

## 2017-04-04 PROCEDURE — 83036 HEMOGLOBIN GLYCOSYLATED A1C: CPT | Performed by: STUDENT IN AN ORGANIZED HEALTH CARE EDUCATION/TRAINING PROGRAM

## 2017-04-04 PROCEDURE — 65610000006 HC RM INTENSIVE CARE

## 2017-04-04 PROCEDURE — 74011250636 HC RX REV CODE- 250/636

## 2017-04-04 PROCEDURE — 74011000258 HC RX REV CODE- 258: Performed by: STUDENT IN AN ORGANIZED HEALTH CARE EDUCATION/TRAINING PROGRAM

## 2017-04-04 PROCEDURE — 74011000250 HC RX REV CODE- 250: Performed by: STUDENT IN AN ORGANIZED HEALTH CARE EDUCATION/TRAINING PROGRAM

## 2017-04-04 PROCEDURE — 80047 BASIC METABLC PNL IONIZED CA: CPT

## 2017-04-04 PROCEDURE — 74011636637 HC RX REV CODE- 636/637: Performed by: STUDENT IN AN ORGANIZED HEALTH CARE EDUCATION/TRAINING PROGRAM

## 2017-04-04 PROCEDURE — 83735 ASSAY OF MAGNESIUM: CPT | Performed by: STUDENT IN AN ORGANIZED HEALTH CARE EDUCATION/TRAINING PROGRAM

## 2017-04-04 PROCEDURE — 85025 COMPLETE CBC W/AUTO DIFF WBC: CPT | Performed by: STUDENT IN AN ORGANIZED HEALTH CARE EDUCATION/TRAINING PROGRAM

## 2017-04-04 PROCEDURE — 36415 COLL VENOUS BLD VENIPUNCTURE: CPT | Performed by: STUDENT IN AN ORGANIZED HEALTH CARE EDUCATION/TRAINING PROGRAM

## 2017-04-04 PROCEDURE — 74011250637 HC RX REV CODE- 250/637: Performed by: STUDENT IN AN ORGANIZED HEALTH CARE EDUCATION/TRAINING PROGRAM

## 2017-04-04 PROCEDURE — 71010 XR CHEST PORT: CPT

## 2017-04-04 RX ORDER — ONDANSETRON 2 MG/ML
4 INJECTION INTRAMUSCULAR; INTRAVENOUS
Status: DISCONTINUED | OUTPATIENT
Start: 2017-04-04 | End: 2017-04-08 | Stop reason: HOSPADM

## 2017-04-04 RX ORDER — MIRTAZAPINE 15 MG/1
15 TABLET, FILM COATED ORAL
Status: DISCONTINUED | OUTPATIENT
Start: 2017-04-04 | End: 2017-04-08 | Stop reason: HOSPADM

## 2017-04-04 RX ORDER — HEPARIN SODIUM 5000 [USP'U]/ML
5000 INJECTION, SOLUTION INTRAVENOUS; SUBCUTANEOUS EVERY 12 HOURS
Status: DISCONTINUED | OUTPATIENT
Start: 2017-04-04 | End: 2017-04-08 | Stop reason: HOSPADM

## 2017-04-04 RX ORDER — SODIUM CHLORIDE, SODIUM LACTATE, POTASSIUM CHLORIDE, CALCIUM CHLORIDE 600; 310; 30; 20 MG/100ML; MG/100ML; MG/100ML; MG/100ML
100 INJECTION, SOLUTION INTRAVENOUS CONTINUOUS
Status: DISCONTINUED | OUTPATIENT
Start: 2017-04-04 | End: 2017-04-04

## 2017-04-04 RX ORDER — PRAVASTATIN SODIUM 10 MG/1
10 TABLET ORAL
Status: DISCONTINUED | OUTPATIENT
Start: 2017-04-04 | End: 2017-04-08 | Stop reason: HOSPADM

## 2017-04-04 RX ORDER — CALCIUM ACETATE 667 MG/1
667 TABLET ORAL
COMMUNITY
End: 2019-01-01

## 2017-04-04 RX ORDER — ACETAMINOPHEN 325 MG/1
325 TABLET ORAL
Status: DISCONTINUED | OUTPATIENT
Start: 2017-04-04 | End: 2017-04-08 | Stop reason: HOSPADM

## 2017-04-04 RX ORDER — SODIUM CHLORIDE 9 MG/ML
50 INJECTION, SOLUTION INTRAVENOUS CONTINUOUS
Status: DISCONTINUED | OUTPATIENT
Start: 2017-04-04 | End: 2017-04-04

## 2017-04-04 RX ORDER — ALBUMIN HUMAN 250 G/1000ML
12.5 SOLUTION INTRAVENOUS
Status: DISCONTINUED | OUTPATIENT
Start: 2017-04-05 | End: 2017-04-08 | Stop reason: HOSPADM

## 2017-04-04 RX ORDER — HEPARIN SODIUM 1000 [USP'U]/ML
1000 INJECTION, SOLUTION INTRAVENOUS; SUBCUTANEOUS
Status: DISCONTINUED | OUTPATIENT
Start: 2017-04-05 | End: 2017-04-08 | Stop reason: HOSPADM

## 2017-04-04 RX ORDER — FENTANYL CITRATE 50 UG/ML
50 INJECTION, SOLUTION INTRAMUSCULAR; INTRAVENOUS AS NEEDED
Status: DISCONTINUED | OUTPATIENT
Start: 2017-04-04 | End: 2017-04-04

## 2017-04-04 RX ORDER — DEXTROSE 50 % IN WATER (D50W) INTRAVENOUS SYRINGE
12.5-25 AS NEEDED
Status: DISCONTINUED | OUTPATIENT
Start: 2017-04-04 | End: 2017-04-08 | Stop reason: HOSPADM

## 2017-04-04 RX ORDER — SODIUM CHLORIDE 0.9 % (FLUSH) 0.9 %
5-10 SYRINGE (ML) INJECTION EVERY 8 HOURS
Status: DISCONTINUED | OUTPATIENT
Start: 2017-04-04 | End: 2017-04-08 | Stop reason: HOSPADM

## 2017-04-04 RX ORDER — SODIUM CHLORIDE 0.9 % (FLUSH) 0.9 %
5-10 SYRINGE (ML) INJECTION EVERY 8 HOURS
Status: DISCONTINUED | OUTPATIENT
Start: 2017-04-04 | End: 2017-04-04

## 2017-04-04 RX ORDER — MIDAZOLAM HYDROCHLORIDE 1 MG/ML
1 INJECTION, SOLUTION INTRAMUSCULAR; INTRAVENOUS AS NEEDED
Status: DISCONTINUED | OUTPATIENT
Start: 2017-04-04 | End: 2017-04-04

## 2017-04-04 RX ORDER — TAMSULOSIN HYDROCHLORIDE 0.4 MG/1
0.4 CAPSULE ORAL
Status: DISCONTINUED | OUTPATIENT
Start: 2017-04-05 | End: 2017-04-08 | Stop reason: HOSPADM

## 2017-04-04 RX ORDER — CALCITRIOL 0.25 UG/1
0.25 CAPSULE ORAL DAILY
Status: DISCONTINUED | OUTPATIENT
Start: 2017-04-05 | End: 2017-04-08 | Stop reason: HOSPADM

## 2017-04-04 RX ORDER — SODIUM CHLORIDE 0.9 % (FLUSH) 0.9 %
5-10 SYRINGE (ML) INJECTION AS NEEDED
Status: DISCONTINUED | OUTPATIENT
Start: 2017-04-04 | End: 2017-04-04

## 2017-04-04 RX ORDER — CEFAZOLIN SODIUM IN 0.9 % NACL 2 G/50 ML
2 INTRAVENOUS SOLUTION, PIGGYBACK (ML) INTRAVENOUS ONCE
Status: DISCONTINUED | OUTPATIENT
Start: 2017-04-04 | End: 2017-04-04

## 2017-04-04 RX ORDER — MAGNESIUM SULFATE 100 %
4 CRYSTALS MISCELLANEOUS AS NEEDED
Status: DISCONTINUED | OUTPATIENT
Start: 2017-04-04 | End: 2017-04-08 | Stop reason: HOSPADM

## 2017-04-04 RX ORDER — SODIUM CHLORIDE 0.9 % (FLUSH) 0.9 %
5-10 SYRINGE (ML) INJECTION AS NEEDED
Status: DISCONTINUED | OUTPATIENT
Start: 2017-04-04 | End: 2017-04-08 | Stop reason: HOSPADM

## 2017-04-04 RX ORDER — ROPIVACAINE HYDROCHLORIDE 5 MG/ML
150 INJECTION, SOLUTION EPIDURAL; INFILTRATION; PERINEURAL AS NEEDED
Status: DISCONTINUED | OUTPATIENT
Start: 2017-04-04 | End: 2017-04-04

## 2017-04-04 RX ORDER — HYDRALAZINE HYDROCHLORIDE 20 MG/ML
10 INJECTION INTRAMUSCULAR; INTRAVENOUS ONCE
Status: COMPLETED | OUTPATIENT
Start: 2017-04-05 | End: 2017-04-05

## 2017-04-04 RX ORDER — POTASSIUM CHLORIDE 750 MG/1
40 TABLET, FILM COATED, EXTENDED RELEASE ORAL
Status: COMPLETED | OUTPATIENT
Start: 2017-04-04 | End: 2017-04-04

## 2017-04-04 RX ORDER — INSULIN GLARGINE 100 [IU]/ML
6 INJECTION, SOLUTION SUBCUTANEOUS
Status: ON HOLD | COMMUNITY
End: 2017-04-08

## 2017-04-04 RX ORDER — LIDOCAINE HYDROCHLORIDE 10 MG/ML
0.1 INJECTION, SOLUTION EPIDURAL; INFILTRATION; INTRACAUDAL; PERINEURAL AS NEEDED
Status: DISCONTINUED | OUTPATIENT
Start: 2017-04-04 | End: 2017-04-04

## 2017-04-04 RX ORDER — CALCIUM ACETATE 667 MG/1
1334 CAPSULE ORAL
Status: DISCONTINUED | OUTPATIENT
Start: 2017-04-04 | End: 2017-04-04

## 2017-04-04 RX ORDER — INSULIN LISPRO 100 [IU]/ML
INJECTION, SOLUTION INTRAVENOUS; SUBCUTANEOUS
Status: DISCONTINUED | OUTPATIENT
Start: 2017-04-04 | End: 2017-04-05

## 2017-04-04 RX ORDER — PANTOPRAZOLE SODIUM 40 MG/1
40 TABLET, DELAYED RELEASE ORAL DAILY
Status: DISCONTINUED | OUTPATIENT
Start: 2017-04-05 | End: 2017-04-08 | Stop reason: HOSPADM

## 2017-04-04 RX ORDER — IPRATROPIUM BROMIDE AND ALBUTEROL SULFATE 2.5; .5 MG/3ML; MG/3ML
3 SOLUTION RESPIRATORY (INHALATION)
Status: DISCONTINUED | OUTPATIENT
Start: 2017-04-04 | End: 2017-04-08 | Stop reason: HOSPADM

## 2017-04-04 RX ORDER — LANOLIN ALCOHOL/MO/W.PET/CERES
1 CREAM (GRAM) TOPICAL
Status: DISCONTINUED | OUTPATIENT
Start: 2017-04-05 | End: 2017-04-06

## 2017-04-04 RX ORDER — DEXTROSE, SODIUM CHLORIDE, AND POTASSIUM CHLORIDE 5; .45; .15 G/100ML; G/100ML; G/100ML
50 INJECTION INTRAVENOUS CONTINUOUS
Status: DISPENSED | OUTPATIENT
Start: 2017-04-04 | End: 2017-04-05

## 2017-04-04 RX ORDER — AMLODIPINE BESYLATE 5 MG/1
10 TABLET ORAL DAILY
Status: DISCONTINUED | OUTPATIENT
Start: 2017-04-05 | End: 2017-04-08 | Stop reason: HOSPADM

## 2017-04-04 RX ADMIN — Medication 10 ML: at 11:59

## 2017-04-04 RX ADMIN — Medication 10 ML: at 22:36

## 2017-04-04 RX ADMIN — SODIUM CHLORIDE 7.1 UNITS/HR: 900 INJECTION, SOLUTION INTRAVENOUS at 13:20

## 2017-04-04 RX ADMIN — SODIUM CHLORIDE 50 ML/HR: 900 INJECTION, SOLUTION INTRAVENOUS at 11:05

## 2017-04-04 RX ADMIN — CALCIUM ACETATE 1334 MG: 667 CAPSULE ORAL at 13:32

## 2017-04-04 RX ADMIN — HUMAN INSULIN 10 UNITS: 100 INJECTION, SOLUTION SUBCUTANEOUS at 10:00

## 2017-04-04 RX ADMIN — DEXTROSE MONOHYDRATE, SODIUM CHLORIDE, AND POTASSIUM CHLORIDE 50 ML/HR: 50; 4.5; 1.49 INJECTION, SOLUTION INTRAVENOUS at 17:51

## 2017-04-04 RX ADMIN — HEPARIN SODIUM 5000 UNITS: 5000 INJECTION, SOLUTION INTRAVENOUS; SUBCUTANEOUS at 17:42

## 2017-04-04 RX ADMIN — INSULIN LISPRO 2 UNITS: 100 INJECTION, SOLUTION INTRAVENOUS; SUBCUTANEOUS at 17:43

## 2017-04-04 RX ADMIN — DEXTROSE MONOHYDRATE 25 G: 25 INJECTION, SOLUTION INTRAVENOUS at 19:37

## 2017-04-04 RX ADMIN — MIRTAZAPINE 15 MG: 15 TABLET, FILM COATED ORAL at 22:35

## 2017-04-04 RX ADMIN — PRAVASTATIN SODIUM 10 MG: 10 TABLET ORAL at 22:35

## 2017-04-04 RX ADMIN — Medication 10 ML: at 13:32

## 2017-04-04 RX ADMIN — INSULIN LISPRO 3 UNITS: 100 INJECTION, SOLUTION INTRAVENOUS; SUBCUTANEOUS at 13:32

## 2017-04-04 RX ADMIN — POTASSIUM CHLORIDE 40 MEQ: 750 TABLET, FILM COATED, EXTENDED RELEASE ORAL at 17:43

## 2017-04-04 NOTE — H&P
History & Physical    Date of admission: 4/4/2017    Patient name: Jerardo Wilson MRN: [id number]  YOB: 1950  Age: 61 y.o. Primary care provider:  Alysia Crandall MD     Source of Information: patient, medical records and patient's sister Giana Oscar [phone])                                  Chief complaint: hyperglycemia    History of present illness  Jerardo Wilson is a 61 y.o. male with history of dementia, CAD, prostate cancer, ESRD on HD (M/W/F), HTN, uncontrolled DM-2 who presents with hyperglycemia. He had originally been scheduled for vascular access surgery for his dialysis, but had been found to be hyperglycemic with fingerstick glucose of 627. His surgery was postponed because of this and we were asked to admit patient for glucose control. He is a poor historian secondary to dementia, answers most questions vaguely or answers with \"I don't know. \"  He does report feeling generally unwell, but cannot elaborate much further. No reported pain anywhere. Supplemental information obtained from his sister and chart review. He follows with Dr. Diego Major from endocrinology and has been noted to be non-compliant with diabetic diet, often eating whatever sweets he has available at his nursing home / rehab facility. His fingersticks often run too high to even quantify. Unclear if he has had polyuria, polydipsia or polyphagia. No reported fevers, chills or recent illness. No other information available. Review of systems  Review of systems not obtained due to patient factors. The remainder of the review of systems was reviewed and is noncontributory.     Past Medical History:   Diagnosis Date    Alzheimer disease     CAD (coronary artery disease)     Cancer ([facility]     prostate    CKD (chronic kidney disease) stage 4, GFR 15-29 ml/min ([facility])     DM (diabetes mellitus), type 2, uncontrolled ([address]  Hemodialysis patient ([facility]     Hyperlipidemia     Hypertension     Other ill-defined conditions     blind R eye-retina detachment    Other ill-defined conditions     right cerebellopontine angle lipoma. Past Surgical History:   Procedure Laterality Date    COLONOSCOPY,DIAGNOSTIC  11/12/2014         HX HEENT      retinal detachment    HX SHOULDER ARTHROSCOPY      right    HX UROLOGICAL      prostate bx    UPPER GI ENDOSCOPY,BIOPSY  11/12/2014          Prior to Admission medications    Medication Sig Start Date End Date Taking? Authorizing Provider   calcium acetate (PHOSLO) 667 mg tab tablet Take 1,334 mg by mouth three (3) times daily (with meals). Yes Historical Provider   insulin glargine (LANTUS) 100 unit/mL injection 6 Units by SubCUTAneous route nightly. Yes Historical Provider   amLODIPine (NORVASC) 10 mg tablet Take 1 Tab by mouth daily. 1/22/17  Yes Kayla Noble MD   mirtazapine (REMERON) 15 mg tablet Take 15 mg by mouth nightly. Indications: MAJOR DEPRESSIVE DISORDER   Yes Yumi De La O MD   omeprazole (PRILOSEC) 10 mg capsule Take 10 mg by mouth daily. Indications: GASTROESOPHAGEAL REFLUX   Yes Yumi De La O MD   tamsulosin (FLOMAX) 0.4 mg capsule Take 0.4 mg by mouth daily. Yes Yumi De La O MD   calcitRIOL (ROCALTROL) 0.25 mcg capsule Take 0.25 mcg by mouth daily. Yes Yumi De La O MD   insulin aspart (NOVOLOG) 100 unit/mL injection Novolog BEFORE MEALS: 0-90: hold, : 1 unit, 176-225: 2 units, 226-300: 3 units, 301 +    : 4 units, Novolog at BEDTIME - 225-300: 1 unit, 301 + 2 units  Patient taking differently: Novolog BEFORE MEALS: 0-160: hold, 161-300: 2 unit, 301-400: 4 units Novolog at BEDTIME - 225-300: 1 unit, 301-400  2 units 9/30/15  Yes Korina Little MD   ferrous sulfate (SLOW FE) 142 mg (45 mg iron) ER tablet Take 142 mg by mouth Daily (before breakfast). Yes Yumi De La O MD   pravastatin (PRAVACHOL) 10 mg tablet Take 1 Tab by mouth nightly.  3/19/12  Yes Campbell BARNES Keenan Ventura MD   OXYCODONE HCL,TEREPHTH/ASPIRIN (OXYCODONE HCL-OXYCODONE-ASA PO) Take 5 mg by mouth three (3) times daily as needed. Historical Provider   albuterol-ipratropium (DUO-NEB) 2.5 mg-0.5 mg/3 ml nebu 3 mL by Nebulization route. Historical Provider   glucose blood VI test strips (ASCENSIA AUTODISC VI, ONE TOUCH ULTRA TEST VI) strip Monitor glucose before meals and at bedtime and as needed for symptoms - 4-5 times daily. 9/30/15   Diana Mendes MD   glucagon (GLUCAGEN) 1 mg injection 1 mL by IntraMUSCular route as needed for Hypoglycemia. 6/25/12   Sukumar Barakat MD   acetaminophen (TYLENOL) 325 mg tablet Take 325 mg by mouth every four (4) hours as needed. Historical Provider     Allergies   Allergen Reactions    Ace Inhibitors Unknown (comments)    Arb-Angiotensin Receptor Antagonist Unknown (comments)      Family History   Problem Relation Age of Onset    Heart Disease Mother      Pacemaker    Cancer Father       Family history reviewed and non-contributory. Social history  Patient resides    Independently      With family care      Assisted living    x  SNF    Ambulates    Independently      With cane     x  Assisted walker         Alcohol history   x  None     Social     Chronic   Smoking history  x  None     Former smoker     Current smoker     History   Smoking Status    Never Smoker   Smokeless Tobacco    Never Used       Code status    Full code   x  DNR/DNI        Code status discussed with the patient/caregivers. Prior        Physical Examination   Visit Vitals    /75 (BP 1 Location: Right arm, BP Patient Position: Supine; Head of bed elevated (Comment degrees))    Pulse 78    Temp 98.3 °F (36.8 °C)    Resp 16    Ht 5' 4\" (1.626 m)    Wt 64.9 kg (143 lb 1.3 oz)    SpO2 100%    BMI 24.56 kg/m2          O2 Device: Room air    General:  Alert, cooperative, no distress   Head:  Normocephalic, without obvious abnormality, atraumatic   Eyes:  Conjunctivae/corneas clear. Post-surgical right retina. EOMI   E/N/M/T: Nares normal. Septum midline.  No nasal drainage or sinus tenderness  Lips, mucosa, and tongue normal   Teeth and gums normal  Clear oropharynx   Neck: Normal appearance and movements, symmetrical, trachea midline  No palpable adenopathy  No thyroid enlargement, tenderness or nodules  No carotid bruit   Normal JVP   Lungs:   Symmetrical chest expansion and respiratory effort  Clear to auscultation bilaterally   Chest wall:  No tenderness or deformity  Right dialysis catheter in place, appears clean   Heart:  Regular rhythm   Sounds normal; no murmur, click, rub or gallop   Abdomen:   Soft, no tenderness  Bowel sounds normal  No masses or hepatosplenomegaly  No hernias present   Back: No CVA tenderness   Extremities: Extremities normal, atraumatic  No cyanosis or edema  No DVT signs   Pulses 2+ and symmetric all extremities   Skin: No rashes or ulcers   Musculo-      skeletal: Gait not tested  Normal symmetry, ROM, strength and tone   Neuro: Normal cranial nerves  Normal reflexes and sensation   Psych: Alert, oriented x1 (person only)  Normal affect, judgement and insight         Data Review    24 Hour Results:  Recent Results (from the past 24 hour(s))   POC CHEM8    Collection Time: 04/04/17  9:16 AM   Result Value Ref Range    Calcium, ionized (POC) 1.10 (L) 1.12 - 1.32 MMOL/L    Sodium (POC) 130 (L) 136 - 145 MMOL/L    Potassium (POC) 4.1 3.5 - 5.1 MMOL/L    Chloride (POC) 95 (L) 98 - 107 MMOL/L    CO2 (POC) 19 (L) 21 - 32 MMOL/L    Anion gap (POC) 22 (H) 5 - 15 mmol/L    Glucose (POC) 627 (HH) 65 - 100 MG/DL    BUN (POC) 11 9 - 20 MG/DL    Creatinine (POC) 2.3 (H) 0.6 - 1.3 MG/DL    GFR-AA (POC) 35 (L) >60 ml/min/1.73m2    GFR, non-AA (POC) 29 (L) >60 ml/min/1.73m2    Hemoglobin (POC) 10.5 (L) 12.1 - 17.0 GM/DL    Hematocrit (POC) 31 (L) 36.6 - 50.3 %    Comment Notified RN or MD immediately by      GLUCOSE, POC    Collection Time: 04/04/17  9:19 AM   Result Value Ref Range    Glucose (POC) 551 (H) 65 - 100 mg/dL    Performed by Gera Ortiz    GLUCOSE, POC    Collection Time: 04/04/17 10:26 AM   Result Value Ref Range    Glucose (POC) 566 (H) 65 - 100 mg/dL    Performed by Gera Ortiz      No results for input(s): WBC, HGB, HCT, PLT, HGBEXT, HCTEXT, PLTEXT in the last 72 hours. No results for input(s): NA, K, CL, CO2, GLU, BUN, CREA, CA, MG, PHOS, ALB, TBIL, SGOT, ALT, INR in the last 72 hours. No lab exists for component: INREXT    Imaging   None. Assessment and Plan   Active Problems:    Dementia (1/2/2010)      Overview: Moderate. DM (diabetes mellitus), type 2, uncontrolled ([facility] (8/16/2010)      Alzheimer disease (9/19/2010)      HTN (hypertension) (12/20/2010)      DKA (diabetic ketoacidoses) ([facility] (7/10/2011)      CKD (chronic kidney disease) stage 3, GFR 30-59 ml/min (12/13/2013)        Assess: 61year old male with diabetic ketoacidosis, suspect due to insufficient insulin relative to his PO intake / dietary non-adherence outside of hospital.    1.  Uncontrolled DM-2, diabetic ketoacidosis   - with HCO3 19, anion gap of 22 based upon POC, suspect this is due to insufficient insulin in comparison to his PO intake   - gentle IV fluid hydration   - insulin gtt per DKA protocol   - clear liquid diet   - DTC consult    2. ESRD on HD (M/W/F)   - nephrology consulted, no emergent needs for dialysis currently   - continue home medications    3. HTN   - continue home amlodipine    4. Memory impairment   - originally listed in chart as secondary to dementia, though per sister's report, this may be due to TBI (head injury more than 30 years ago)   - supportive care    5. CAD   - no current chest complaints   - continue home pravastatin    6. Pseudohyponatremia   - Na+ of 130 corrects to 138 after correction for his hyperglycemia    7.   History of prostate cancer   - continue Flomax   - follow-up as outpatient    Diet: clear liquid  Activity: out of bed with assistance  DVT prophylaxis: heparin  Isolation precautions: none  Consultations: DTC  Anticipated disposition: likely back to SNF in 1-2 days         Signed by: Claudia Camarillo MD     April 4, 2017 at 10:56 AM     Time spent on admission > 55 minutes

## 2017-04-04 NOTE — ANESTHESIA POSTPROCEDURE EVALUATION
Post-Anesthesia Evaluation and Assessment        CASE CANCELLED IN PREOP  Patient: Travis Queen. MRN: [id number]  SSN: [id number]    YOB: 1950  Age: 61 y.o. Sex: male       Cardiovascular Function/Vital Signs  Visit Vitals    /75 (BP 1 Location: Right arm, BP Patient Position: Supine; Head of bed elevated (Comment degrees))    Pulse 78    Temp 36.8 °C (98.3 °F)    Resp 16    Ht 5' 4\" (1.626 m)    Wt 64.9 kg (143 lb 1.3 oz)    SpO2 100%    BMI 24.56 kg/m2       Patient is status post regional anesthesia for Procedure(s):  INSERTION LEFT UPPER ARM ARTERIO VENOUS GRAFT  (BLOCK). Nausea/Vomiting: None    Postoperative hydration reviewed and adequate. Pain:  Pain Scale 1: Numeric (0 - 10) (04/04/17 0851)  Pain Intensity 1: 0 (04/04/17 0851)   Managed    Neurological Status:   Neuro (WDL): Exceptions to [facility] (04/04/17 0709)  Neuro  Orientation Level: Oriented to person (04/04/17 0855)  Cognition: Memory loss (04/04/17 0855)   At baseline    Mental Status and Level of Consciousness: Arousable    Pulmonary Status:   O2 Device: Room air (04/04/17 0851)   Adequate oxygenation and airway patent    Complications related to anesthesia: None    Post-anesthesia assessment completed.  No concerns    Signed By: Shwetha Dominguez MD     April 4, 2017

## 2017-04-04 NOTE — PROGRESS NOTES
1530: Bedside shift change report given to Kerry Myrick 71 (oncoming nurse) by Irina Cheung (offgoing nurse). Report included the following information SBAR, Kardex, ED Summary, Procedure Summary, Intake/Output, MAR, Accordion, Recent Results, Med Rec Status and Cardiac Rhythm NSR.     1723: Dr. Renetta Osei paged for fluid orders. BS now  < 250 and K+ 3.0. Patient awake and eating clear liquid dinner. 1734: Received callback from Dr. Renetta Osei. Notified of BG and K+ level. Orders for D51/2 NS 20K @50cc/hr and 40mg PO K+.     1925: Checked BG as indicated by Danny Silver. BS 43, rechecked and 44. Patient Alert, given cranberry juice and insulin gtt stopped. Rechecked 10min later and BG 49. Patient refusing to drink more juice. Gave D50 per protocol. Rechecked 10min later and BG now 190. Restarted insulin gtt as directed by Danny Silver. 1930: Bedside shift change report given to [address] [place] [address] Octavia (oncoming nurse) by Kerry Myrick 69 (offgoing nurse). Report included the following information SBAR, Kardex, ED Summary, Procedure Summary, Intake/Output, MAR, Accordion, Recent Results, Med Rec Status and Cardiac Rhythm NSR.

## 2017-04-04 NOTE — ROUTINE PROCESS
TRANSFER - IN REPORT:    Verbal report received from Geoff Tavares RN,(name) on [address]  being received from OR holding(unit) for routine progression of care      Report consisted of patients Situation, Background, Assessment and   Recommendations(SBAR). Information from the following report(s) SBAR, Kardex, OR Summary, MAR and Recent Results was reviewed with the receiving nurse. Opportunity for questions and clarification was provided. Assessment completed upon patients arrival to unit and care assumed.

## 2017-04-04 NOTE — CONSULTS
Patient name: Naila Lebron MRN: [id number]      [facility]  Initial Consult Note  DOA:4/4/2017  DOS: 4/4/2017  Requested by: Dr. Feroz Fish  Reason: Evaluation and management of ESRD  Source: chart review. Pt not reliable historian    Assessment:  ESRD   HTN  DM-2 -uncontrolled with DKA (AG of 22)  Dementia  Anemia of ESRD  SHPT with low Phosphorus    Pt is confused. Has dementia. Not able to provide me details. Does not know which HD unit he goes to and what are his HD days. He is new ESRD based on review of hospital records. He was also considered for possible hospice during last admit and was felt to be a poor candidate for HD. Based on his labs, most likely HD days appears to be MWF. Will verify with family what are his HD days. Plan/Recommendations:  HD tomm per schedule  Ct gentle IVF  Insulin drip per primary team  Add EPO  D/C Phoslo- while phos is low. Resume in future when appetite improves and Phosphorus is high    HPI: Naila Lebron is a 61 y.o. male with PMH significant for ESRD on HD, HTN, uncontrolled DM>>was planned to have AVF surgery as outpt earlier today. He was found to have severe hyperglycemia (Glucose >600). Surgery was cancelled and pt was send to hospital. He was found to have elevated AG of 22. He is started on Insulin drip, IVF and Nephrology consulted for HD. Pt is new ESRD. He cannot tell me what HD unit he goes to and what are his usual HD days. Suspect he is MWF. He is confused. Has baseline dementia.  Unable to provide me any details. PMH:    Past Medical History:   Diagnosis Date    Alzheimer disease     CAD (coronary artery disease)     Cancer ([facility]     prostate    CKD (chronic kidney disease) stage 4, GFR 15-29 ml/min ([facility])     DM (diabetes mellitus), type 2, uncontrolled ([facility]     Hemodialysis patient ([facility]     Hyperlipidemia     Hypertension     Other ill-defined conditions     blind R eye-retina detachment    Other ill-defined conditions     right cerebellopontine angle lipoma. PSH:  Past Surgical History:   Procedure Laterality Date    COLONOSCOPY,DIAGNOSTIC  11/12/2014         HX HEENT      retinal detachment    HX SHOULDER ARTHROSCOPY      right    HX UROLOGICAL      prostate bx    UPPER GI ENDOSCOPY,BIOPSY  11/12/2014            Allergies   Allergen Reactions    Ace Inhibitors Unknown (comments)    Arb-Angiotensin Receptor Antagonist Unknown (comments)       Prescriptions Prior to Admission   Medication Sig    calcium acetate (PHOSLO) 667 mg tab tablet Take 1,334 mg by mouth three (3) times daily (with meals).  insulin glargine (LANTUS) 100 unit/mL injection 6 Units by SubCUTAneous route nightly.  amLODIPine (NORVASC) 10 mg tablet Take 1 Tab by mouth daily.  mirtazapine (REMERON) 15 mg tablet Take 15 mg by mouth nightly. Indications: MAJOR DEPRESSIVE DISORDER    omeprazole (PRILOSEC) 10 mg capsule Take 10 mg by mouth daily. Indications: GASTROESOPHAGEAL REFLUX    tamsulosin (FLOMAX) 0.4 mg capsule Take 0.4 mg by mouth daily.  calcitRIOL (ROCALTROL) 0.25 mcg capsule Take 0.25 mcg by mouth daily.     insulin aspart (NOVOLOG) 100 unit/mL injection Novolog BEFORE MEALS: 0-90: hold, : 1 unit, 176-225: 2 units, 226-300: 3 units, 301 +    : 4 units, Novolog at BEDTIME - 225-300: 1 unit, 301 + 2 units (Patient taking differently: Novolog BEFORE MEALS: 0-160: hold, 161-300: 2 unit, 301-400: 4 units Novolog at BEDTIME - 225-300: 1 unit, 301-400  2 units)    ferrous sulfate (SLOW FE) 142 mg (45 mg iron) ER tablet Take 142 mg by mouth Daily (before breakfast).  pravastatin (PRAVACHOL) 10 mg tablet Take 1 Tab by mouth nightly.  OXYCODONE HCL,TEREPHTH/ASPIRIN (OXYCODONE HCL-OXYCODONE-ASA PO) Take 5 mg by mouth three (3) times daily as needed.  albuterol-ipratropium (DUO-NEB) 2.5 mg-0.5 mg/3 ml nebu 3 mL by Nebulization route.  glucose blood VI test strips (ASCENSIA AUTODISC VI, ONE TOUCH ULTRA TEST VI) strip Monitor glucose before meals and at bedtime and as needed for symptoms - 4-5 times daily.  glucagon (GLUCAGEN) 1 mg injection 1 mL by IntraMUSCular route as needed for Hypoglycemia.  acetaminophen (TYLENOL) 325 mg tablet Take 325 mg by mouth every four (4) hours as needed. Social history:  Social History     Social History    Marital status:      Spouse name: N/A    Number of children: N/A    Years of education: N/A     Occupational History    Not on file. Social History Main Topics    Smoking status: Never Smoker    Smokeless tobacco: Never Used    Alcohol use No    Drug use: No    Sexual activity: Not Currently     Other Topics Concern    Not on file     Social History Narrative     Review of Systems: as noted in HPI;  Rest is deferred and not reliable    Physical Exam:  Visit Vitals    /74    Pulse 69    Temp 98.7 °F (37.1 °C)    Resp 21    Ht 5' 4\" (1.626 m)    Wt 52.3 kg (115 lb 4.8 oz)    SpO2 100%    BMI 19.79 kg/m2       NAD  Clear ant/lat  RRR, no rub  Soft, NT BS+  No edema  TDC intact  Alert awake.  Minimally verbal. No myoclonus  No rash    Labs/Data:   Lab Results   Component Value Date/Time    WBC 5.7 04/04/2017 12:20 PM    Hemoglobin (POC) 10.5 04/04/2017 09:16 AM    HGB 10.4 04/04/2017 12:20 PM    Hematocrit (POC) 31 04/04/2017 09:16 AM    HCT 31.1 04/04/2017 12:20 PM    PLATELET 304 31/00/1303 12:20 PM    MCV 90.1 04/04/2017 12:20 PM       Lab Results   Component Value Date/Time    Sodium 137 04/04/2017 12:20 PM Potassium 3.5 04/04/2017 12:20 PM    Chloride 99 04/04/2017 12:20 PM    CO2 23 04/04/2017 12:20 PM    Anion gap 15 04/04/2017 12:20 PM    Glucose 417 04/04/2017 12:20 PM    BUN 13 04/04/2017 12:20 PM    Creatinine 3.00 04/04/2017 12:20 PM    BUN/Creatinine ratio 4 04/04/2017 12:20 PM    GFR est AA 26 04/04/2017 12:20 PM    GFR est non-AA 21 04/04/2017 12:20 PM    Calcium 8.3 04/04/2017 12:20 PM         Intake/Output Summary (Last 24 hours) at 04/04/17 1517  Last data filed at 04/04/17 1500   Gross per 24 hour   Intake           810.31 ml   Output                0 ml   Net           810.31 ml       Wt Readings from Last 3 Encounters:   04/04/17 52.3 kg (115 lb 4.8 oz)   03/02/17 64.9 kg (143 lb)   02/28/17 63.1 kg (139 lb 1.8 oz)       Patient seen and examined. Chart reviewed.  Labs, data and other pertinent notes reviewed from admit    Discussed with pt/RN    Signed by:  Susan Montesinos MD  Nephrology and HTN

## 2017-04-04 NOTE — PROGRESS NOTES
Vascular  62 y/o gentleman here for new HD access found to have a glucose of 551 after his bmp showed a glucose of 667. Plan to cancel surgery and have hospitalist admit the patient for glucose control.  Will also involve his nephrologist Dr. Jorge Dobson

## 2017-04-04 NOTE — PERIOP NOTES
TRANSFER - OUT REPORT:    Verbal report given to Radha(name) on Cheryl Thakkar.  being transferred to (unit) for change in patient condition(DKA)       Report consisted of patients Situation, Background, Assessment and   Recommendations(SBAR). Information from the following report(s) SBAR, Recent Results, Med Rec Status and Cardiac Rhythm SR was reviewed with the receiving nurse. Lines:   Peripheral IV 04/04/17 Right Hand (Active)   Site Assessment Clean, dry, & intact 4/4/2017  9:00 AM   Phlebitis Assessment 0 4/4/2017  9:00 AM   Infiltration Assessment 0 4/4/2017  9:00 AM   Dressing Status Clean, dry, & intact; New 4/4/2017  9:00 AM   Dressing Type Tape;Transparent 4/4/2017  9:00 AM   Hub Color/Line Status Pink; Infusing 4/4/2017  9:00 AM   Action Taken Open ports on tubing capped;Blood drawn 4/4/2017  9:00 AM   Alcohol Cap Used Yes 4/4/2017  9:00 AM        Opportunity for questions and clarification was provided.       Patient transported with:   Monitor  Registered Nurse     Corina Johnson RN

## 2017-04-04 NOTE — PERIOP NOTES
Chem 8 POC test ran in pre-op prior to surgery (hemodialysis patient). Blood sugar 627, recheck by finger stick 551. Dr. Shahrzad Pearce (anesthesiologist) and Dr. Derrick Pradhan notified. Case cancelled and pt to be admitted for blood sugar control.     Alisia Boxer, RN

## 2017-04-04 NOTE — DIABETES MGMT
DTC Consult Note    Recommendations/ Comments: Consult received for assessment of home management and hospital blood glucose management. Note pt is on the insulin drip. Current drip rate is 10.8 units per hour and blood sugars at 1417 was 421 mg./dl. Per pt's nurse patient is alert and orientated times 1 and is not appropriate for assessment and education at this time. DTC will continue to follow and assess/educate when appropriate or when family is available. Chart reviewed on Dayana Barragan. .    Patient is a 61 y.o. male with known diabetes on Lantus 6 units once at night and Novolog Sliding Scale at home. A1c:   Lab Results   Component Value Date/Time    Hemoglobin A1c 10.3 04/04/2017 12:20 PM    Hemoglobin A1c 9.1 02/09/2017 02:01 AM    Hemoglobin A1c, External 7.7 08/12/2015    Hemoglobin A1c, External 7.7 08/07/2015       Recent Glucose Results: Lab Results   Component Value Date/Time     (H) 04/04/2017 12:20 PM    GLUCPOC 421 (H) 04/04/2017 02:16 PM    GLUCPOC 446 (H) 04/04/2017 11:45 AM    GLUCPOC 566 (H) 04/04/2017 10:26 AM        Lab Results   Component Value Date/Time    Creatinine 3.00 04/04/2017 12:20 PM       Active Orders   Diet    DIET CLEAR LIQUID        PO intake: Patient Vitals for the past 72 hrs:   % Diet Eaten   04/04/17 1300 75 %       Current hospital DM medication: gtt. Current drip rate is 10.8 units per hour and blood sugar at 1417 was 421 mg/dl. Will continue to follow as needed.     Thank you  Dominguez Shah, COLE, CDE

## 2017-04-04 NOTE — PROGRESS NOTES
1200-Pt admitted to ICU 11 from OR holding. Pt oriented to self only. MONTALVO and follows commands. Denies pain. Insulin gtt ordered. Bruno 4032 sent    1042- Nephrology consult called to Dr. Meena Miller    [phone]- Bedside report given to Nino Minor RN, using SBAR format. Pt resting comfortably at this time. Insulin gtt infusing per protocol.

## 2017-04-04 NOTE — IP AVS SNAPSHOT
Summary of Care Report The Summary of Care report has been created to help improve care coordination. Users with access to [employer] or [address] (Web-based application) may access additional patient information including the Discharge Summary. If you are not currently a [address] user and need more information, please call the number listed below in the [place] [address] section and ask to be connected with Medical Records. Facility Information Name Address Phone [address] [phone] Patient Information Patient Name Sex  Yudith Escobar ([id number]) Male 1950 Discharge Information Admitting Provider Service Area Unit Chloe Stringer MD /  [address] Med Surg / [phone] Discharge Provider Discharge Date/Time Discharge Disposition Destination (none) 2017 (Pending) SNF (none) Patient Language Language ENGLISH [13] Hospital Problems as of 2017  Reviewed: 2017  6:48 AM by Leeanne Ramírez CRNA Class Noted - Resolved Last Modified POA Active Problems Dementia (Chronic)  2010 - Present 2017 by Marquise Yancey MD Yes Entered by Elma Salazar MD  
  Overview Signed 2010  1:44 PM by Elma Salazar MD  
   Moderate. DM (diabetes mellitus), type 2, uncontrolled ([facility] [address] (Chronic)  2010 - Present 2017 by Marquise Yancey MD Yes Entered by Susan Roche MD  
  Alzheimer disease  2010 - Present 2017 by Marquise Yancey MD Yes Entered by Victorino Mccoy MD  
  HTN (hypertension) (Chronic)  2010 - Present 2017 by Marquise Yancey MD Yes Entered by Fidelia Dykes. Scout Ramirez MD  
  * (AMAN)PRL (diabetic ketoacidoses) [facility])  7/10/2011 - Present 2017 by Eugenie Hart NP Yes Entered by Unique Cantu MD  
  CKD (chronic kidney disease) stage 3, GFR 30-59 ml/min (Chronic) Chronic 12/13/2013 - Present 4/4/2017 by Wai Keys MD Yes Entered by Lizbeth Allen MD  
  
Non-Hospital Problems as of 4/8/2017  Reviewed: 4/7/2017  6:48 AM by John Dugan CRNA Class Noted - Resolved Last Modified Active Problems Pure hypercholesterolemia  1/2/2010 - Present 1/21/2017 by Elsi Jin MD  
  Entered by Vijaya Mayorga MD  
  Prostate cancer [facility])  1/2/2010 - Present 1/21/2017 by Elsi Jin MD  
  Entered by Vijaya Mayorga MD  
  Overview Signed 1/2/2010  1:43 PM by Vijaya Mayorga MD  
   Early stage--watchful waiting 2009 Blind one eye  1/2/2010 - Present 1/21/2017 by Elsi Jin MD  
  Entered by Vijaya Mayorga MD  
  Overview Signed 1/2/2010  1:45 PM by Vijaya Mayorga MD  
   Retinal detatchment right eye Weakness generalized  8/16/2010 - Present 1/21/2017 by Elsi Jin MD  
  Entered by Ara Isaacs MD  
  Renal failure, acute [facility])  12/20/2010 - Present 12/21/2010 Entered by Paco Russell. Otoniel Braga MD  
  CAD (coronary artery disease)  12/20/2010 - Present 1/21/2017 by Elsi Jin MD  
  Entered by Paco Russell. Otoniel Braga MD  
  History of GI bleed Present on Admission 11/12/2014 - Present 1/21/2017 by Elsi Jin MD  
  Entered by Unique Cantu MD  
  Counseling regarding goals of care  1/19/2017 - Present 1/19/2017 by Juvencio Posey NP Entered by Juvencio Posey NP Do not resuscitate discussion  1/19/2017 - Present 1/21/2017 by Elsi Jin MD  
  Entered by Juvencio Posey NP Anemia in chronic kidney disease Chronic 1/21/2017 - Present 1/21/2017 by Elsi Jin MD  
  Entered by Elsi Jin MD  
  Pneumonia  2/4/2017 - Present 2/4/2017 by Juan David Armas MD  
  Entered by Juan David Armas MD  
  Dyspnea  2/6/2017 - Present 2/6/2017 by Juvencio Posey NP   Entered by Juvencio Posey NP  
  
 You are allergic to the following Allergen Reactions Ace Inhibitors Unknown (comments) Arb-Angiotensin Receptor Antagonist Unknown (comments) Current Discharge Medication List  
  
START taking these medications Dose & Instructions Dispensing Information Comments  
 mupirocin 2 % ointment Commonly known as:  [employer] Apply  to affected area two (2) times a day for 7 days. Quantity:  22 g Refills:  0 CONTINUE these medications which have CHANGED Dose & Instructions Dispensing Information Comments  
 acetaminophen 325 mg tablet Commonly known as:  TYLENOL What changed:   
- how much to take 
- additional instructions Dose:  650 mg Take 2 Tabs by mouth every four (4) hours as needed. For pain. Quantity:  100 Tab Refills:  0  
   
 amLODIPine 10 mg tablet Commonly known as:  Julia Bustamante What changed:   
- medication strength 
- how much to take - Another medication with the same name was removed. Continue taking this medication, and follow the directions you see here. Dose:  10 mg Take 1 Tab by mouth daily. Quantity:  30 Tab Refills:  0  
   
 insulin glargine 100 unit/mL injection Commonly known as:  LANTUS What changed:  how much to take Dose:  5 Units 5 Units by SubCUTAneous route nightly. Quantity:  1 Vial  
Refills:  0  
   
 oxyCODONE IR 5 mg immediate release tablet Commonly known as:  Monica Morton What changed:  when to take this Dose:  5 mg Take 1 Tab by mouth every four (4) hours as needed for Pain. Max Daily Amount: 30 mg.  
 Quantity:  20 Tab Refills:  0 CONTINUE these medications which have NOT CHANGED Dose & Instructions Dispensing Information Comments  
 albuterol 2.5 mg /3 mL (0.083 %) nebulizer solution Commonly known as:  PROVENTIL VENTOLIN Dose:  2.5 mg  
2.5 mg by Nebulization route every four (4) hours as needed for Shortness of Breath. Refills:  0 Aranesp (Polysorbate) 25 mcg/0.42 mL injection Generic drug:  darbepoetin shaye Dose:  25 mcg 25 mcg by SubCUTAneous route every seven (7) days. Give on the first dialysis treatment each week Refills:  0  
   
 calcitRIOL 0.25 mcg capsule Commonly known as:  ROCALTROL Dose:  0.25 mcg Take 0.25 mcg by mouth every Monday, Wednesday, Friday. With each dialysis treatment Refills:  0  
   
 calcium acetate 667 mg Tab tablet Commonly known as:  PHOSLO Dose:  667 mg Take 667 mg by mouth three (3) times daily (with meals). Refills:  0  
   
 glucagon 1 mg injection Commonly known as:  [place] Dose:  1 mg  
1 mL by IntraMUSCular route as needed for Hypoglycemia. Quantity:  1 Vial  
Refills:  0 HumaLOG 100 unit/mL injection Generic drug:  insulin lispro  
 by SubCUTAneous route Before breakfast, lunch, and dinner. Give per sliding scale:  0-160 = 0 units 161-300 = 2 units 301-400 = 4 units Refills:  0  
   
 mirtazapine 15 mg tablet Commonly known as:  Elsi Nichols Dose:  15 mg Take 15 mg by mouth nightly. Indications: MAJOR DEPRESSIVE DISORDER Refills:  0  
   
 omeprazole 10 mg capsule Commonly known as:  PRILOSEC Dose:  10 mg Take 10 mg by mouth daily. Indications: GASTROESOPHAGEAL REFLUX Refills:  0  
   
 pravastatin 10 mg tablet Commonly known as:  PRAVACHOL Dose:  10 mg Take 1 Tab by mouth nightly. Quantity:  30 Tab Refills:  0 SLOW  mg (45 mg iron) ER tablet Generic drug:  ferrous sulfate Dose:  142 mg Take 142 mg by mouth Daily (before breakfast). Refills:  0  
   
 tamsulosin 0.4 mg capsule Commonly known as:  FLOMAX Dose:  0.4 mg Take 0.4 mg by mouth daily. Refills:  0 STOP taking these medications Comments  
 insulin aspart 100 unit/mL injection Commonly known as:  Jacinto Mercado Current Immunizations Name Date Influenza Vaccine 11/8/2016, 10/1/2014 Influenza Vaccine Split 12/8/2011, 9/21/2010 Pneumococcal Vaccine (Unspecified Type) 8/16/2010 Surgery Information ID Date/Time Status Primary Surgeon All Procedures Location [id number] 4/4/2017 0930 Unposted Doc Shah MD INSERTION LEFT UPPER ARM ARTERIO VENOUS GRAFT  (BLOCK) [facility] MAIN OR    
 [id number] 4/7/2017 0730 Posted Doc Shah MD INSERT LEFT UPPER ARM AV GRAFT  [facility] MAIN OR Follow-up Information Follow up With Details Comments Contact [facility] PSYCHIATRIC)   [address] [facility] [address] 
[phone] Doc Shah MD Schedule an appointment as soon as possible for a visit in 2 weeks For follow up of dialysis graft placement [address] 
[phone] Urszula Simon MD Call As needed for primary care [address] [place] [address] [address] [address] 
[phone] Discharge Instructions Discharging provider: Penny Gonsalez MD 
 
Primary care provider: Urszula Simon MD 
 
[address] [facility] COURSE: 
 
Chris Caraballo. is a 61 y.o. male with history of dementia, CAD, prostate cancer, ESRD on HD (M/W/F), HTN, uncontrolled DM-2 who presents withhyperglycemia. He had originally been scheduled for vascular access surgery for his dialysis, but had been found to be hyperglycemic with fingerstick glucose of 627. His surgery was postponed because of this and we were asked to admit patient for glucose control.   
 
DKA (POA) - resolved and BG improved, A1c 10.3 but having episodes of hypoglycemia now - Was on insulin drip, ivf and in icu setting. 
- Reduce glargine to 5 units nightly, add bed-time diabetic snack and continue sliding scale. 
  
Hypertension, in setting of known new ESRD Appreciate nephrology service who has already seen patient. Medications adjusted as needed. 
  
New ESRD 
- UE AV graft placed 4/7 
- Pain control - Continue IHD 
- Nephrology consulted - follow up with regular dialysis - Vascular surgery consulted - follow up with Dr. Dion Cooper Memory impairment per H&P note: 
\"- originally listed in chart as secondary to dementia, though per sister's report, this may be due to TBI (head injury more than 30 years ago) 
- supportive care\" 
  
History of prostate cancer per h&P 
- follow-up as outpatient FOLLOW-UP CARE RECOMMENDATIONS: 
 
APPOINTMENTS: 
Follow-up Information Follow up With Details Comments Contact [facility] PSYCHIATRIC)   [address] [facility] [address] 
[phone] Josee Sheridan MD Schedule an appointment as soon as possible for a visit in 2 weeks For follow up of dialysis graft placement [facility] [address] [place] [address] 
[phone] Sindi Vazquez MD Call As needed for primary care [address] [facility] [address] [place] [address] 
[phone] It is very important that you keep follow-up appointment(s). Bring discharge papers, medication list (and/or medication bottles) to follow-up appointments for review by outpatient provider(s). ONGOING TREATMENT PLAN: Continue pain control for dialysis graft placement, follow up with vascular surgery, continue dialysis, adjust diabetes meds as needed Specific symptoms to watch for: chest pain, shortness of breath, fever, chills, nausea, vomiting, diarrhea, change in mentation, falling, weakness, bleeding. DIET:  Diabetic Diet and Renal Diet, please schedule diabetic bed-time snack ACTIVITY:  Activity as tolerated WOUND CARE: Apply mupirocin to dialysis graft site twice daily and cover. GOALS OF CARE: 
  Eventual return to home/independent/assisted living  
x  Long term SNF Hospice No rehospitalization Patient condition at discharge:  
Functional status Poor   
x  Deconditioned Independent Cognition Letty Lo Forgetful (some sensescence) x  Dementia Catheters/lines (plus indication) Childs PICC   
  PEG   
x  Right chest dialysis catheter Code status Full code   
x  DNR Margaret Odell . . . . . . . . . . . . . . . . . . . . . . . . . . . . . . . . . . . . . . . . . . . . . . . . . . . . . . . . . . . . . . . . . . . . . . Margaret Odell CHRONIC MEDICAL CONDITIONS: 
Problem List as of 4/8/2017  Date Reviewed: 4/7/2017 Codes Class Noted - Resolved Dyspnea ICD-10-CM: R06.00 
ICD-9-CM: 786.09  2/6/2017 - Present Pneumonia ICD-10-CM: J18.9 ICD-9-CM: 918  2/4/2017 - Present Anemia in chronic kidney disease ICD-10-CM: N18.9, D63.1 ICD-9-CM: 285.21, 585.9 Chronic 1/21/2017 - Present Counseling regarding goals of care ICD-10-CM: Z71.89 ICD-9-CM: V65.49  1/19/2017 - Present Do not resuscitate discussion ICD-10-CM: Z71.89 ICD-9-CM: V65.49  1/19/2017 - Present History of GI bleed ICD-10-CM: Z87.19 ICD-9-CM: V12.79 Present on Admission 11/12/2014 - Present CKD (chronic kidney disease) stage 3, GFR 30-59 ml/min (Chronic) ICD-10-CM: N18.3 ICD-9-CM: 876. 3 Chronic 12/13/2013 - Present * (Principal)DKA (diabetic ketoacidoses) ([facility] [address] ICD-10-CM: E13.10 ICD-9-CM: 250.10  7/10/2011 - Present [facility] ICD-10-CM: N17.9 ICD-9-CM: 584.9  12/20/2010 - Present CAD (coronary artery disease) ICD-10-CM: I25.10 ICD-9-CM: 414.00  12/20/2010 - Present HTN (hypertension) (Chronic) ICD-10-CM: I10 
ICD-9-CM: 401.9  12/20/2010 - Present Alzheimer disease ICD-10-CM: G30.9 ICD-9-CM: 331.0  9/19/2010 - Present DM (diabetes mellitus), type 2, uncontrolled (HCC) (Chronic) ICD-10-CM: E11.65 ICD-9-CM: 250.02  8/16/2010 - Present Weakness generalized ICD-10-CM: R53.1 ICD-9-CM: 780.79  8/16/2010 - Present Pure hypercholesterolemia ICD-10-CM: E78.00 ICD-9-CM: 272.0  1/2/2010 - Present  Prostate cancer [facility]) ICD-10-CM: D72 
 ICD-9-CM: 185  1/2/2010 - Present Overview Signed 1/2/2010  1:43 PM by Li Mace MD  
  Early stage--watchful waiting 2009 Dementia (Chronic) ICD-10-CM: F03.90 ICD-9-CM: 294.20  1/2/2010 - Present Overview Signed 1/2/2010  1:44 PM by Li Mace MD  
  Moderate. Blind one eye ICD-10-CM: H54.40 ICD-9-CM: 369.60  1/2/2010 - Present Overview Signed 1/2/2010  1:45 PM by Li Mace MD  
  Retinal detatchment right eye RESOLVED: Pneumonia ICD-10-CM: J18.9 ICD-9-CM: 330  11/8/2014 - 1/21/2017 RESOLVED: Proteinuria (Chronic) ICD-10-CM: R80.9 ICD-9-CM: 791.0  12/13/2013 - 1/21/2017 RESOLVED: Abdominal pain ICD-10-CM: R10.9 ICD-9-CM: 789.00  12/13/2013 - 1/21/2017 RESOLVED: Hematuria ICD-10-CM: R31.9 ICD-9-CM: 599.70  12/13/2013 - 1/21/2017 RESOLVED: Hyperkalemia ICD-10-CM: E87.5 ICD-9-CM: 276.7  6/14/2012 - 1/21/2017 RESOLVED: Hyponatremia ICD-10-CM: E87.1 ICD-9-CM: 276.1  6/14/2012 - 1/21/2017 RESOLVED: Hyperglycemia ICD-10-CM: R73.9 ICD-9-CM: 790.29  6/14/2012 - 1/21/2017 RESOLVED: Type II or unspecified type diabetes mellitus with hyperosmolarity, uncontrolled ICD-10-CM: E11.00 ICD-9-CM: 250.22  12/11/2011 - 1/21/2017 RESOLVED: Pain, upper extremity ICD-10-CM: M79.603 ICD-9-CM: 729.5  10/1/2011 - 1/21/2017 RESOLVED: Hypokalemia ICD-10-CM: E87.6 ICD-9-CM: 276.8  12/20/2010 - 1/21/2017 RESOLVED: Hypocalcemia ICD-10-CM: E83.51 
ICD-9-CM: 275.41  12/20/2010 - 1/21/2017 RESOLVED: Hyperkalemia ICD-10-CM: E87.5 ICD-9-CM: 276.7  12/19/2010 - 1/21/2017 RESOLVED: Dehydration ICD-10-CM: E86.0 ICD-9-CM: 276.51  12/19/2010 - 1/21/2017 RESOLVED: Acute hyperkalemia ICD-10-CM: E87.5 ICD-9-CM: 276.7  9/19/2010 - 1/21/2017 RESOLVED: Hyponatremia ICD-10-CM: E87.1 ICD-9-CM: 276.1  9/19/2010 - 1/21/2017 RESOLVED: [facility] failure[facility] ICD-10-CM: N17.9 ICD-9-CM: 584.9  9/19/2010 - 1/21/2017 RESOLVED: Volume depletion ICD-10-CM: E86.9 ICD-9-CM: 276.50  9/19/2010 - 1/21/2017 RESOLVED: Type II or unspecified type diabetes mellitus with hyperosmolarity, uncontrolled ICD-10-CM: E11.00 ICD-9-CM: 250.22  9/19/2010 - 1/21/2017 RESOLVED: Acute renal failure (HCC) ICD-10-CM: N17.9 ICD-9-CM: 258. 9 Acute 8/16/2010 - 1/21/2017 RESOLVED: Hypotension ICD-10-CM: I95.9 ICD-9-CM: 458.9  8/16/2010 - 1/21/2017 Chart Review Routing History Recipient Method Report Sent By Anuradha Ko MD, MD  
Phone: [phone] In Basket Note Review Cecy Guzmán [[id number]] 10/15/2010  3:07 PM 10/15/2010  
 lorie singh Fax: [phone] Fax Provider Comm Report Cecy Guzmán [[id number]] 10/15/2010  3:07 PM 10/15/2010 Cyrus Art MD  
Phone: [phone] In Basket Notes/Transcriptions Miriam Durham MD [[id number]] 3/3/2011  2:01 AM 03/02/2011 Alex Barnett MD  
Phone: [phone] In Basket Notes/Transcriptions Miriam Durham MD [[id number]] 3/3/2011  2:03 AM 03/02/2011 Cyrus Art MD  
Phone: [phone] In Basket Note Review Ludivina Zapien MD [[id number]] 4/27/2011  2:43 PM 04/27/2011 Cyrus Art MD  
Phone: [phone] In Basket IP Auto Routed Notes Alexandro Villanueva [[id number]] 11/8/2014  3:48 PM 11/08/2014 Cyrus Art MD  
Phone: [phone] In Basket IP Auto Routed Notes Alexandro Villanueva [[id number]] 11/8/2014  4:47 PM 11/08/2014 Cyrus Art MD  
Phone: [phone] In Basket Notes/Transcriptions Jean Arias MD [[id number]] 11/10/2014  6:47 PM 11/10/2014 Jean Arias MD  
Fax: [phone] Phone: [phone] Fax Notes/Transcriptions MD Anthony Martell 11/10/2014  6:47 PM 11/10/2014 Cyrus Art MD  
Phone: [phone] In Basket Notes/Transcriptions MD Anthony Martell 11/12/2014  2:25 PM 11/12/2014 Jean Arias MD  
Fax: [phone] Phone: [phone] Fax Notes/Transcriptions Luke Rodriguez MD [facility] 11/12/2014  2:25 PM 11/12/2014 Elma Salazar MD  
Phone: [phone] In Basket Notes/Transcriptions Luke Rodriguez MD [facility] 11/12/2014  2:25 PM 11/12/2014 Luke Rodriguez MD  
Fax: [phone] Phone: [phone] Fax Notes/Transcriptions Luke Rodriguez MD [facility] 11/12/2014  2:25 PM 11/12/2014 Elma Salazar MD  
Phone: [phone] In Basket IP Auto Routed Notes Evelin Odell MD [[id number]] 11/12/2014  3:07 PM 11/12/2014 Elma Salazar MD  
Phone: [phone] In Basket IP Auto Routed Notes Evelin Odell MD [[id number]] 11/12/2014  3:24 PM 11/12/2014  
 ale Fax: [phone] Fax [facility] CUSTOM LAB REPORT Matt Ryder [[id number]] 12/18/2015  9:40 AM 12/17/2015 [facility] CUSTOM LAB REPORT Matt Ryder [[id number]] 12/18/2015  9:40 AM 12/17/2015 [facility] CUSTOM LAB REPORT Matt Shig [[id number]] 12/18/2015  9:40 AM 12/17/2015 [facility] CUSTOM LAB REPORT Matt Ryder [[id number]] 12/18/2015  9:40 AM 12/17/2015 [facility] CUSTOM LAB REPORT Matt Shig [[id number]] 12/18/2015  9:40 AM 12/17/2015 [facility] CUSTOM LAB REPORT Matt Shig [[id number]] 12/18/2015  9:40 AM 12/17/2015 [facility] CUSTOM LAB REPORT Matt Shig [[id number]] 12/18/2015  9:40 AM 12/17/2015  
 ale Fax: [phone] Fax [facility] IP AMB RESULT REPORT Milagros Gutierrez Aleksey [[id number]] 12/18/2015  9:41 AM 12/17/2015 Elizabeth Castellon MD  
Fax: [phone] Phone: [phone] Fax Steve Moody MD NOTES AUTO ROUTING REPORT MD Racheal Hunt 1/18/2017  5:46 AM 01/18/2017 Elizabeth Castellon MD  
Fax: [phone] Phone: [phone] Fax Steve Moody MD NOTES AUTO ROUTING REPORT Praveen Gil MD [[id number]] 1/24/2017 12:45 PM 01/24/2017 Elizabeth Castellon MD  
Fax: [phone] Phone: [phone] Fax Steve Moody MD NOTES AUTO ROUTING REPORT Estrella Braxton MD [[id number]] 2/4/2017  9:41 PM 02/04/2017 Elizabeth Castellon MD  
Fax: [phone] Phone: [phone] [facility] NOTES AUTO ROUTING REPORT Melani Medellin MD [[id number]] 2/4/2017 10:12 PM 02/04/2017 Saurav Hughes MD  
Fax: [phone] Phone: [phone] Fax Giles Torres MD NOTES AUTO ROUTING REPORT Mauricio Haven Gupta MD [[id number]] 2/10/2017  9:01 AM 02/10/2017  
 ale Fax: [phone] Fax Notes Report Iza Lukas [[id number]] 2/20/2017  1:25 PM 2/10/2017  
 ale Fax: [phone] Fax Notes Report Iza Lukas [[id number]] 2/20/2017  1:26 PM 2/9/2017  
 ale Fax: [phone] Fax Notes Report Iza Lukas [[id number]] 2/20/2017  1:27 PM 2/5/2017  
 ale Fax: [phone] Fax Notes Report Iza Lukas [[id number]] 2/20/2017  1:27 PM 2/4/2017  
 ale Fax: [phone] Fax [facility] CUSTOM REPORT Iza Lukas [[id number]] 2/20/2017  1:48 PM 2/6/2017  
 ale Fax: [phone] Fax Notes Report Iza Lukas [[id number]] 2/20/2017  2:02 PM 2/10/2017 Saurav Hughes MD  
Fax: [phone] Phone: [phone] Fax Giles Torres MD NOTES AUTO ROUTING REPORT Kendell Wheeler MD [[id number]] 4/4/2017  3:44 PM 04/04/2017 Saurav Hughes MD  
Fax: [phone] Phone: [phone] Fax Giles Torres MD NOTES AUTO ROUTING REPORT Ivet Krishna MD [[id number]] 4/8/2017  8:32 AM 04/08/2017 Saurav Hughes MD  
Fax: [phone] Phone: [phone] Fax Giles Torres MD NOTES AUTO ROUTING REPORT Ivet Krishna MD [[id number]] 4/8/2017  8:44 AM 04/08/2017

## 2017-04-04 NOTE — PROGRESS NOTES
Care Management: Chart reviewed. Patient arrived for elective, scheduled Insertion of Left Upper Arm Arterio Venous Graft & found to have blood sugar of 627 & 551. MD canceled case & patient was admitted to ICU on Insulin drip. PMH: HTN, CAD, DM, ESRD on HD, prostate cancer, & injury/situation causing inability to remember requiring constant direction. I spoke to patient's sister, Marky Matute at [phone], who is the medical POA & explained role of Care Management in transitions of care. Confirmed address, phone, PCP, & insurance. Patient does not have an Advanced Directive, but his sister is the medical POA & she is bringing in paperwork to confirm. Prescriptions are provided by his facility, [address] [place] [address] at [phone]. Living Situation: Disposition Planning: Patient is a long term care patient at [address] [place] [address]. He does not have any DME. Per patient's sister, patient is independent with ADL's, with direction & guidance. He is completely dependent for IADL's. Patient's sister would like patient to return to [address] [place] [address] & she advised that the facility will provide transportation back to the [address] [place] [address]. Call placed & message left for admissions to clarify that they can take patient back & provide transportation, when he is medically stable. Will need to send referral, once this is established. Also need to clarify whether or not patient has Medicaid for his long term bed and where he goes for dialysis. Care Management will continue to follow. MICHAEL Alonso BSN CCM     Care Management Interventions  PCP Verified by CM: Yes (PCP: Dr. Lawrence Almanza. ... Souleymane Yusuf followed at [address] [place] [address]. )  Palliative Care Consult (Criteria: CHF and RRAT>21): No  Reason for No Palliative Care Consult: Other (see comment) (Does not meet criteria.)  Transition of Care Consult (CM Consult):  Other (Per patient's sister, Marky Matute, patient will return to [address] [place] [address] Rehab to long term care. )  MyChart Signup: No  Discharge Durable Medical Equipment: No  Physical Therapy Consult: No  Occupational Therapy Consult: No  Speech Therapy Consult: No  Current Support Network: Nursing Facility ([facility] long term care.)  Confirm Follow Up Transport: Other (see comment) (Per patient's sister, [facility] [address] will come to pick patient up at discharge.  )

## 2017-04-04 NOTE — IP AVS SNAPSHOT
Current Discharge Medication List  
  
START taking these medications Dose & Instructions Dispensing Information Comments Morning Noon Evening Bedtime  
 mupirocin 2 % ointment Commonly known as:  [facility] Your last dose was: Your next dose is:    
   
   
 Apply  to affected area two (2) times a day for 7 days. Quantity:  22 g Refills:  0 CONTINUE these medications which have CHANGED Dose & Instructions Dispensing Information Comments Morning Noon Evening Bedtime  
 acetaminophen 325 mg tablet Commonly known as:  TYLENOL What changed:   
- how much to take 
- additional instructions Your last dose was: Your next dose is:    
   
   
 Dose:  650 mg Take 2 Tabs by mouth every four (4) hours as needed. For pain. Quantity:  100 Tab Refills:  0  
     
   
   
   
  
 amLODIPine 10 mg tablet Commonly known as:  Juliana Miller What changed:   
- medication strength 
- how much to take - Another medication with the same name was removed. Continue taking this medication, and follow the directions you see here. Your last dose was: Your next dose is:    
   
   
 Dose:  10 mg Take 1 Tab by mouth daily. Quantity:  30 Tab Refills:  0  
     
   
   
   
  
 insulin glargine 100 unit/mL injection Commonly known as:  LANTUS What changed:  how much to take Your last dose was: Your next dose is:    
   
   
 Dose:  5 Units 5 Units by SubCUTAneous route nightly. Quantity:  1 Vial  
Refills:  0  
     
   
   
   
  
 oxyCODONE IR 5 mg immediate release tablet Commonly known as:  Marni Galvez What changed:  when to take this Your last dose was: Your next dose is:    
   
   
 Dose:  5 mg Take 1 Tab by mouth every four (4) hours as needed for Pain. Max Daily Amount: 30 mg.  
 Quantity:  20 Tab Refills:  0 CONTINUE these medications which have NOT CHANGED Dose & Instructions Dispensing Information Comments Morning Noon Evening Bedtime  
 albuterol 2.5 mg /3 mL (0.083 %) nebulizer solution Commonly known as:  PROVENTIL VENTOLIN Your last dose was: Your next dose is:    
   
   
 Dose:  2.5 mg  
2.5 mg by Nebulization route every four (4) hours as needed for Shortness of Breath. Refills:  0 Aranesp (Polysorbate) 25 mcg/0.42 mL injection Generic drug:  darbepoetin shaye Your last dose was: Your next dose is:    
   
   
 Dose:  25 mcg 25 mcg by SubCUTAneous route every seven (7) days. Give on the first dialysis treatment each week Refills:  0  
     
   
   
   
  
 calcitRIOL 0.25 mcg capsule Commonly known as:  ROCALTROL Your last dose was: Your next dose is:    
   
   
 Dose:  0.25 mcg Take 0.25 mcg by mouth every Monday, Wednesday, Friday. With each dialysis treatment Refills:  0  
     
   
   
   
  
 calcium acetate 667 mg Tab tablet Commonly known as:  PHOSLO Your last dose was: Your next dose is:    
   
   
 Dose:  667 mg Take 667 mg by mouth three (3) times daily (with meals). Refills:  0  
     
   
   
   
  
 glucagon 1 mg injection Commonly known as:  [place] Your last dose was: Your next dose is:    
   
   
 Dose:  1 mg  
1 mL by IntraMUSCular route as needed for Hypoglycemia. Quantity:  1 Vial  
Refills:  0 HumaLOG 100 unit/mL injection Generic drug:  insulin lispro Your last dose was: Your next dose is:    
   
   
 by SubCUTAneous route Before breakfast, lunch, and dinner. Give per sliding scale:  0-160 = 0 units 161-300 = 2 units 301-400 = 4 units Refills:  0  
     
   
   
   
  
 mirtazapine 15 mg tablet Commonly known as:  Carlus Inch Your last dose was:     
   
Your next dose is:    
   
   
 Dose:  15 mg  
 Take 15 mg by mouth nightly. Indications: MAJOR DEPRESSIVE DISORDER Refills:  0  
     
   
   
   
  
 omeprazole 10 mg capsule Commonly known as:  PRILOSEC Your last dose was: Your next dose is:    
   
   
 Dose:  10 mg Take 10 mg by mouth daily. Indications: GASTROESOPHAGEAL REFLUX Refills:  0  
     
   
   
   
  
 pravastatin 10 mg tablet Commonly known as:  PRAVACHOL Your last dose was: Your next dose is:    
   
   
 Dose:  10 mg Take 1 Tab by mouth nightly. Quantity:  30 Tab Refills:  0 SLOW  mg (45 mg iron) ER tablet Generic drug:  ferrous sulfate Your last dose was: Your next dose is:    
   
   
 Dose:  142 mg Take 142 mg by mouth Daily (before breakfast). Refills:  0  
     
   
   
   
  
 tamsulosin 0.4 mg capsule Commonly known as:  FLOMAX Your last dose was: Your next dose is:    
   
   
 Dose:  0.4 mg Take 0.4 mg by mouth daily. Refills:  0 STOP taking these medications   
 insulin aspart 100 unit/mL injection Commonly known as:  Ganesh Konga Where to Get Your Medications Information on where to get these meds will be given to you by the nurse or doctor. ! Ask your nurse or doctor about these medications  
  acetaminophen 325 mg tablet  
 amLODIPine 10 mg tablet  
 insulin glargine 100 unit/mL injection  
 mupirocin 2 % ointment  
 oxyCODONE IR 5 mg immediate release tablet  
 pravastatin 10 mg tablet

## 2017-04-04 NOTE — ANESTHESIA PREPROCEDURE EVALUATION
Anesthetic History   No history of anesthetic complications            Review of Systems / Medical History  Patient summary reviewed, nursing notes reviewed and pertinent labs reviewed    Pulmonary  Within defined limits                 Neuro/Psych   Within defined limits           Cardiovascular  Within defined limits  Hypertension          CAD         GI/Hepatic/Renal  Within defined limits              Endo/Other  Within defined limits  Diabetes         Other Findings              Physical Exam    Airway  Mallampati: II  TM Distance: > 6 cm  Neck ROM: normal range of motion   Mouth opening: Normal     Cardiovascular  Regular rate and rhythm,  S1 and S2 normal,  no murmur, click, rub, or gallop             Dental  No notable dental hx       Pulmonary  Breath sounds clear to auscultation               Abdominal  GI exam deferred       Other Findings            Anesthetic Plan    ASA: 2  Anesthesia type: regional - supraclavicular block      Post-op pain plan if not by surgeon: peripheral nerve block single    Induction: Intravenous  Anesthetic plan and risks discussed with: Patient

## 2017-04-04 NOTE — ROUTINE PROCESS
Primary Nurse Elizabeth Rivera RN and Hugo Hughes RN performed a dual skin assessment on this patient No impairment noted  Luke score is 16

## 2017-04-04 NOTE — PERIOP NOTES
Spoke with Eric Ayoub office nurse concerning her need to contact Admitting Hospitalist per Dr. Serg Evans to receive consult for admission. Eric Ngo was also instructed to call Supervisor in Bed Placement for assistance and direction.

## 2017-04-04 NOTE — PROGRESS NOTES
Intensivist    Pt admitted to ICU for DKA after presenting for AV fistula and was found to be hyperglycemic. Case was cancelled and has been admitted by the hospitalist service. He has ESRD in HD and a h/o DM. He also has underlying dementia. BS was > 600  AG 22    Insulin gtt has been started per protocol    Allergies   Allergen Reactions    Ace Inhibitors Unknown (comments)    Arb-Angiotensin Receptor Antagonist Unknown (comments)     Past Medical History:   Diagnosis Date    Alzheimer disease     CAD (coronary artery disease)     Cancer ([facility]     prostate    CKD (chronic kidney disease) stage 4, GFR 15-29 ml/min (HCC)     DM (diabetes mellitus), type 2, uncontrolled ([facility]     [facility]     Hyperlipidemia     Hypertension     Other ill-defined conditions     blind R eye-retina detachment    Other ill-defined conditions     right cerebellopontine angle lipoma. Past Surgical History:   Procedure Laterality Date    COLONOSCOPY,DIAGNOSTIC  11/12/2014         HX HEENT      retinal detachment    HX SHOULDER ARTHROSCOPY      right    HX UROLOGICAL      prostate bx    UPPER GI ENDOSCOPY,BIOPSY  11/12/2014          Prior to Admission medications    Medication Sig Start Date End Date Taking? Authorizing Provider   calcium acetate (PHOSLO) 667 mg tab tablet Take 1,334 mg by mouth three (3) times daily (with meals). Yes Historical Provider   insulin glargine (LANTUS) 100 unit/mL injection 6 Units by SubCUTAneous route nightly. Yes Historical Provider   amLODIPine (NORVASC) 10 mg tablet Take 1 Tab by mouth daily. 1/22/17  Yes Gal Gilbert MD   mirtazapine (REMERON) 15 mg tablet Take 15 mg by mouth nightly. Indications: MAJOR DEPRESSIVE DISORDER   Yes Yumi De La O MD   omeprazole (PRILOSEC) 10 mg capsule Take 10 mg by mouth daily. Indications: GASTROESOPHAGEAL REFLUX   Yes Yumi De La O MD   tamsulosin (FLOMAX) 0.4 mg capsule Take 0.4 mg by mouth daily.    Yes Yumi De La O MD calcitRIOL (ROCALTROL) 0.25 mcg capsule Take 0.25 mcg by mouth daily. Yes Yumi De La O MD   insulin aspart (NOVOLOG) 100 unit/mL injection Novolog BEFORE MEALS: 0-90: hold, : 1 unit, 176-225: 2 units, 226-300: 3 units, 301 +    : 4 units, Novolog at BEDTIME - 225-300: 1 unit, 301 + 2 units  Patient taking differently: Novolog BEFORE MEALS: 0-160: hold, 161-300: 2 unit, 301-400: 4 units Novolog at BEDTIME - 225-300: 1 unit, 301-400  2 units 9/30/15  Yes Ladi Beasley MD   ferrous sulfate (SLOW FE) 142 mg (45 mg iron) ER tablet Take 142 mg by mouth Daily (before breakfast). Yes Yumi De La O MD   pravastatin (PRAVACHOL) 10 mg tablet Take 1 Tab by mouth nightly. 3/19/12  Yes Daiana Vanegas MD   OXYCODONE HCL,TEREPHTH/ASPIRIN (OXYCODONE HCL-OXYCODONE-ASA PO) Take 5 mg by mouth three (3) times daily as needed. Historical Provider   albuterol-ipratropium (DUO-NEB) 2.5 mg-0.5 mg/3 ml nebu 3 mL by Nebulization route. Historical Provider   glucose blood VI test strips (ASCENSIA AUTODISC VI, ONE TOUCH ULTRA TEST VI) strip Monitor glucose before meals and at bedtime and as needed for symptoms - 4-5 times daily. 9/30/15   Ladi Beasley MD   glucagon (GLUCAGEN) 1 mg injection 1 mL by IntraMUSCular route as needed for Hypoglycemia. 6/25/12   Deepti Ayoub MD   acetaminophen (TYLENOL) 325 mg tablet Take 325 mg by mouth every four (4) hours as needed. Historical Provider       Social History     Social History    Marital status:      Spouse name: N/A    Number of children: N/A    Years of education: N/A     Occupational History    Not on file.      Social History Main Topics    Smoking status: Never Smoker    Smokeless tobacco: Never Used    Alcohol use No    Drug use: No    Sexual activity: Not Currently     Other Topics Concern    Not on file     Social History Narrative     Patient Vitals for the past 4 hrs:   Temp Pulse Resp BP SpO2   04/04/17 1300 - 77 29 175/75 100 %   04/04/17 1200 98.7 °F (37.1 °C) 71 15 158/73 100 %   04/04/17 1132 - 78 14 169/74 100 %       No distress  Alert  No accessory use  Symmetrical chest expansion  CTA  RRR    Recent Labs      04/04/17   1220   WBC  5.7   HGB  10.4*   HCT  31.1*   PLT  211     Recent Labs      04/04/17   1220   NA  137   K  3.5   CL  99   CO2  23   GLU  417*   BUN  13   CREA  3.00*   CA  8.3*   MG  2.3   PHOS  1.4*       No results for input(s): TROIQ, CPK, CKMB in the last 72 hours.     Impression:    Active Problems:    DKA (diabetic ketoacidoses) ([facility] (7/10/2011)      DM (diabetes mellitus), type 2, uncontrolled ([facility] (8/16/2010)      CKD (chronic kidney disease) stage 3, GFR 30-59 ml/min (12/13/2013)      Dementia (1/2/2010)      Alzheimer disease (9/19/2010)      HTN (hypertension) (12/20/2010)    Plan:  --insulin gtt  --follow up lytes  --Renal to see    Eliza Schuler MD

## 2017-04-05 LAB
ANION GAP BLD CALC-SCNC: 9 MMOL/L (ref 5–15)
ATRIAL RATE: 77 BPM
BUN SERPL-MCNC: 13 MG/DL (ref 6–20)
BUN/CREAT SERPL: 5 (ref 12–20)
CALCIUM SERPL-MCNC: 8.4 MG/DL (ref 8.5–10.1)
CALCULATED P AXIS, ECG09: -17 DEGREES
CALCULATED R AXIS, ECG10: 4 DEGREES
CALCULATED T AXIS, ECG11: 0 DEGREES
CHLORIDE SERPL-SCNC: 102 MMOL/L (ref 97–108)
CO2 SERPL-SCNC: 25 MMOL/L (ref 21–32)
CREAT SERPL-MCNC: 2.68 MG/DL (ref 0.7–1.3)
DIAGNOSIS, 93000: NORMAL
GLUCOSE BLD STRIP.AUTO-MCNC: 103 MG/DL (ref 65–100)
GLUCOSE BLD STRIP.AUTO-MCNC: 107 MG/DL (ref 65–100)
GLUCOSE BLD STRIP.AUTO-MCNC: 127 MG/DL (ref 65–100)
GLUCOSE BLD STRIP.AUTO-MCNC: 161 MG/DL (ref 65–100)
GLUCOSE BLD STRIP.AUTO-MCNC: 185 MG/DL (ref 65–100)
GLUCOSE BLD STRIP.AUTO-MCNC: 186 MG/DL (ref 65–100)
GLUCOSE BLD STRIP.AUTO-MCNC: 190 MG/DL (ref 65–100)
GLUCOSE BLD STRIP.AUTO-MCNC: 234 MG/DL (ref 65–100)
GLUCOSE BLD STRIP.AUTO-MCNC: 238 MG/DL (ref 65–100)
GLUCOSE BLD STRIP.AUTO-MCNC: 294 MG/DL (ref 65–100)
GLUCOSE BLD STRIP.AUTO-MCNC: 313 MG/DL (ref 65–100)
GLUCOSE BLD STRIP.AUTO-MCNC: 87 MG/DL (ref 65–100)
GLUCOSE BLD STRIP.AUTO-MCNC: 91 MG/DL (ref 65–100)
GLUCOSE BLD STRIP.AUTO-MCNC: 96 MG/DL (ref 65–100)
GLUCOSE SERPL-MCNC: 83 MG/DL (ref 65–100)
HBV SURFACE AG SER QL: 0.79 INDEX
HBV SURFACE AG SER QL: NEGATIVE
MAGNESIUM SERPL-MCNC: 1.7 MG/DL (ref 1.6–2.4)
P-R INTERVAL, ECG05: 118 MS
POTASSIUM SERPL-SCNC: 3.5 MMOL/L (ref 3.5–5.1)
Q-T INTERVAL, ECG07: 412 MS
QRS DURATION, ECG06: 66 MS
QTC CALCULATION (BEZET), ECG08: 466 MS
SERVICE CMNT-IMP: ABNORMAL
SERVICE CMNT-IMP: NORMAL
SODIUM SERPL-SCNC: 136 MMOL/L (ref 136–145)
VENTRICULAR RATE, ECG03: 77 BPM

## 2017-04-05 PROCEDURE — 74011250637 HC RX REV CODE- 250/637: Performed by: STUDENT IN AN ORGANIZED HEALTH CARE EDUCATION/TRAINING PROGRAM

## 2017-04-05 PROCEDURE — 83735 ASSAY OF MAGNESIUM: CPT | Performed by: STUDENT IN AN ORGANIZED HEALTH CARE EDUCATION/TRAINING PROGRAM

## 2017-04-05 PROCEDURE — 36415 COLL VENOUS BLD VENIPUNCTURE: CPT | Performed by: STUDENT IN AN ORGANIZED HEALTH CARE EDUCATION/TRAINING PROGRAM

## 2017-04-05 PROCEDURE — 87340 HEPATITIS B SURFACE AG IA: CPT | Performed by: INTERNAL MEDICINE

## 2017-04-05 PROCEDURE — 82962 GLUCOSE BLOOD TEST: CPT

## 2017-04-05 PROCEDURE — 74011250636 HC RX REV CODE- 250/636: Performed by: STUDENT IN AN ORGANIZED HEALTH CARE EDUCATION/TRAINING PROGRAM

## 2017-04-05 PROCEDURE — 93041 RHYTHM ECG TRACING: CPT

## 2017-04-05 PROCEDURE — 74011250636 HC RX REV CODE- 250/636: Performed by: INTERNAL MEDICINE

## 2017-04-05 PROCEDURE — 80048 BASIC METABOLIC PNL TOTAL CA: CPT | Performed by: STUDENT IN AN ORGANIZED HEALTH CARE EDUCATION/TRAINING PROGRAM

## 2017-04-05 PROCEDURE — 74011636637 HC RX REV CODE- 636/637: Performed by: PHYSICIAN ASSISTANT

## 2017-04-05 PROCEDURE — 74011000250 HC RX REV CODE- 250: Performed by: INTERNAL MEDICINE

## 2017-04-05 PROCEDURE — 65660000001 HC RM ICU INTERMED STEPDOWN

## 2017-04-05 PROCEDURE — 74011250636 HC RX REV CODE- 250/636: Performed by: PHYSICIAN ASSISTANT

## 2017-04-05 RX ORDER — DEXTROSE 50 % IN WATER (D50W) INTRAVENOUS SYRINGE
12.5-25 AS NEEDED
Status: DISCONTINUED | OUTPATIENT
Start: 2017-04-05 | End: 2017-04-05

## 2017-04-05 RX ORDER — HYDRALAZINE HYDROCHLORIDE 20 MG/ML
20 INJECTION INTRAMUSCULAR; INTRAVENOUS
Status: DISCONTINUED | OUTPATIENT
Start: 2017-04-05 | End: 2017-04-08 | Stop reason: HOSPADM

## 2017-04-05 RX ORDER — SODIUM CHLORIDE 9 MG/ML
25 INJECTION, SOLUTION INTRAVENOUS CONTINUOUS
Status: DISCONTINUED | OUTPATIENT
Start: 2017-04-05 | End: 2017-04-06

## 2017-04-05 RX ORDER — INSULIN LISPRO 100 [IU]/ML
INJECTION, SOLUTION INTRAVENOUS; SUBCUTANEOUS
Status: DISCONTINUED | OUTPATIENT
Start: 2017-04-05 | End: 2017-04-08 | Stop reason: HOSPADM

## 2017-04-05 RX ORDER — MAGNESIUM SULFATE 100 %
4 CRYSTALS MISCELLANEOUS AS NEEDED
Status: DISCONTINUED | OUTPATIENT
Start: 2017-04-05 | End: 2017-04-05

## 2017-04-05 RX ORDER — CEFAZOLIN SODIUM IN 0.9 % NACL 2 G/50 ML
2 INTRAVENOUS SOLUTION, PIGGYBACK (ML) INTRAVENOUS
Status: COMPLETED | OUTPATIENT
Start: 2017-04-07 | End: 2017-04-07

## 2017-04-05 RX ORDER — INSULIN GLARGINE 100 [IU]/ML
6 INJECTION, SOLUTION SUBCUTANEOUS DAILY
Status: DISCONTINUED | OUTPATIENT
Start: 2017-04-05 | End: 2017-04-08

## 2017-04-05 RX ADMIN — HEPARIN SODIUM 5000 UNITS: 5000 INJECTION, SOLUTION INTRAVENOUS; SUBCUTANEOUS at 06:27

## 2017-04-05 RX ADMIN — Medication 10 ML: at 22:13

## 2017-04-05 RX ADMIN — Medication 10 ML: at 06:31

## 2017-04-05 RX ADMIN — CALCITRIOL 0.25 MCG: 0.25 CAPSULE, LIQUID FILLED ORAL at 08:36

## 2017-04-05 RX ADMIN — HYDRALAZINE HYDROCHLORIDE 20 MG: 20 INJECTION INTRAMUSCULAR; INTRAVENOUS at 09:09

## 2017-04-05 RX ADMIN — HEPARIN SODIUM 5000 UNITS: 5000 INJECTION, SOLUTION INTRAVENOUS; SUBCUTANEOUS at 17:30

## 2017-04-05 RX ADMIN — PRAVASTATIN SODIUM 10 MG: 10 TABLET ORAL at 22:11

## 2017-04-05 RX ADMIN — HYDRALAZINE HYDROCHLORIDE 10 MG: 20 INJECTION INTRAMUSCULAR; INTRAVENOUS at 00:00

## 2017-04-05 RX ADMIN — INSULIN GLARGINE 6 UNITS: 100 INJECTION, SOLUTION SUBCUTANEOUS at 09:08

## 2017-04-05 RX ADMIN — SODIUM CHLORIDE 25 ML/HR: 900 INJECTION, SOLUTION INTRAVENOUS at 11:22

## 2017-04-05 RX ADMIN — ONDANSETRON 4 MG: 2 INJECTION INTRAMUSCULAR; INTRAVENOUS at 15:58

## 2017-04-05 RX ADMIN — PANTOPRAZOLE SODIUM 40 MG: 40 TABLET, DELAYED RELEASE ORAL at 08:36

## 2017-04-05 RX ADMIN — SODIUM CHLORIDE 10 MG: 9 INJECTION INTRAMUSCULAR; INTRAVENOUS; SUBCUTANEOUS at 18:22

## 2017-04-05 RX ADMIN — TAMSULOSIN HYDROCHLORIDE 0.4 MG: 0.4 CAPSULE ORAL at 08:36

## 2017-04-05 RX ADMIN — Medication 10 ML: at 15:58

## 2017-04-05 RX ADMIN — AMLODIPINE BESYLATE 10 MG: 5 TABLET ORAL at 08:36

## 2017-04-05 RX ADMIN — MIRTAZAPINE 15 MG: 15 TABLET, FILM COATED ORAL at 22:11

## 2017-04-05 RX ADMIN — FERROUS SULFATE TAB 325 MG (65 MG ELEMENTAL FE) 325 MG: 325 (65 FE) TAB at 08:36

## 2017-04-05 NOTE — PROGRESS NOTES
Care Management: Left message for [facility] [place] at [phone] at [phone], to inquire regarding whether or not he has Medicaid, if patient is using Medicare SNF days or long term care, & learn where he goes for dialysis MWF. Referral sent via [employer] to determine whether or not they will take him back. Care Management will continue to follow. MICHAEL AlonsoN CCM     Addendum at 2:00 PM: Received response from [address] and learned that they will accept patient back to long term care bed. Patient has [facility], which includes both Medicare & Medicaid.   Patient goes to [address] [place] [address], MICHAEL MESSERN CCM

## 2017-04-05 NOTE — PROGRESS NOTES
2000 Bedside and Verbal shift change report given to Greta Pena RN (oncoming nurse) by [facility], RN (offgoing nurse). Report included the following information SBAR, Kardex, ED Summary, Procedure Summary, Intake/Output, MAR and Recent Results. 2000 Assumed care of pt. Pt oriented to self only, following commands, no c/o pain, nausea. BG normalized post-hypoglycemia treatment. VSS on RA. Insulin gtt per glucostablizer. 2157 Insulin gtt OFF per glucostabilizer. 2319 Dr. Vimal Shaw updated on pt status, PTA meds, SBPs 170s. Orders received for one time dose IV hydralazine 10mg. 0000 Reassessment complete. No changes. SBPs 150s post-hydralazine administration. Insulin gtt remains OFF.  0213 ST segment looks depressed on monitor, EKG obtained. 0400 Reassessment complete. No changes. Insulin gtt remains OFF.    0800 Bedside and Verbal shift change report given to [facility], RN (oncoming nurse) by Greta Pena RN (offgoing nurse). Report included the following information SBAR, Kardex, ED Summary, Procedure Summary, Intake/Output, MAR and Recent Results. Shift Summary Pt slept ~3-4hrs. CAM ICU (-). Insulin gtt OFF. Hypertensive overnight, SBPs 160s-180s. MD aware. One time order Hydralazine given x1. Remains oriented to self only. Voiding in urinal. VSS on RA. Orders for HD today.     Problem: Falls - Risk of  Goal: *Knowledge of fall prevention  Outcome: Not Progressing Towards Goal  Pt confused, not appropriate for teaching    Problem: Patient Education: Go to Patient Education Activity  Goal: Patient/Family Education  Outcome: Not Progressing Towards Goal  Pt confused, not appropriate for teaching    Problem: Patient Education: Go to Patient Education Activity  Goal: Patient/Family Education  Outcome: Not Progressing Towards Goal  Pt confused, not appropriate for teaching    Problem: DKA: Day 1  Goal: Activity/Safety  Outcome: Progressing Towards Goal  Up with one assist  Goal: Diagnostic Test/Procedures  Outcome: Progressing Towards Goal  q6h BMPs  Goal: Nutrition/Diet  Outcome: Progressing Towards Goal  Tolerating clears  Goal: Respiratory  Outcome: Progressing Towards Goal  RA    Problem: Patient Education: Go to Patient Education Activity  Goal: Patient/Family Education  Outcome: Not Progressing Towards Goal  Pt confused, not appropriate for teaching    Problem: Diabetes Self-Management  Goal: *Insulin pump training  Outcome: Resolved/Met Date Met:  04/04/17  N/A    Problem: Patient Education: Go to Patient Education Activity  Goal: Patient/Family Education  Outcome: Not Progressing Towards Goal  Pt confused, not appropriate for teaching

## 2017-04-05 NOTE — PROGRESS NOTES
6564- Bedside report received from Luma Remy RN, using [employer]. Pt resting comfortably at this time. Insulin gtt per protocol. 9201- Lantus given per MD order. 4693- Hydralazine 20mg given IV per PRN order for SBP > 160 (/73 (99))    1123- Insulin gtt stopped. 1558- Zofran 4mg given IV per PRN order for nausea    1810- Pt still complaining of nausea. Dr. Pretson Chao paged    3098- Compazine 10mg given IV per PRN order for nausea    1930- Bedside report given to Luma Remy RN, using SBAR format. Pt still c/o slight nausea at this time. VSS.   Awaiting bed on IMCU

## 2017-04-05 NOTE — PROGRESS NOTES
Hospitalist Progress Note        Date of Service:  2017  NAME:  Liliana Soriano :  1950  MRN:  [id number]      Admission Summary:   Per h&p note: \"History of present illness  Liliana Soriano is a 61 y.o. male with history of dementia, CAD, prostate cancer, ESRD on HD (M/W/F), HTN, uncontrolled DM-2 who presents with hyperglycemia. He had originally been scheduled for vascular access surgery for his dialysis, but had been found to be hyperglycemic with fingerstick glucose of 627. His surgery was postponed because of this and we were asked to admit patient for glucose control. He is a poor historian secondary to dementia, answers most questions vaguely or answers with \"I don't know. \" He does report feeling generally unwell, but cannot elaborate much further. No reported pain anywhere. Supplemental information obtained from his sister and chart review. He follows with Dr. Mason Molina from endocrinology and has been noted to be non-compliant with diabetic diet, often eating whatever sweets he has available at his nursing home / rehab facility. His fingersticks often run too high to even quantify. Unclear if he has had polyuria, polydipsia or polyphagia. No reported fevers, chills or recent illness. \"             Interval history / Subjective:   No complaints, he cannot recall any questions asked (eg. Where he lived prior to this admission, if he is seen by kidney specialist, who is pcp is, etc)     Assessment & Plan:     DKA secondary to noncompliance  Was on insulin drip, ivf and in icu setting. [facility] closed and plans to transfer (refer to 2017 pulmonary note)  Started on glargine with sliding scale. Hypertension, in setting of known new ESRD  Appreciate nephrology service who has already seen patient. Medications adjusted as needed.     New ESRD, nephrology service on board     Memory impairment per H&P note:  \"- originally listed in chart as secondary to dementia, though per sister's report, this may be due to TBI (head injury more than 30 years ago)  - supportive care\"    History of prostate cancer per h&P  - follow-up as outpatient    Code: DNR (refer to h&p note)    DVT prophylaxis: on heparin    Disposition:TBD     Hospital Problems  Date Reviewed: 4/4/2017          Codes Class Noted POA    CKD (chronic kidney disease) stage 3, GFR 30-59 ml/min (Chronic) ICD-10-CM: N18.3  ICD-9-CM: 519. 3 Chronic 12/13/2013 Yes        DKA (diabetic ketoacidoses) ([facility] ICD-10-CM: E13.10  ICD-9-CM: 250.10  7/10/2011 Yes        HTN (hypertension) (Chronic) ICD-10-CM: I10  ICD-9-CM: 401.9  12/20/2010 Yes        Alzheimer disease ICD-10-CM: G30.9  ICD-9-CM: 331.0  9/19/2010 Yes        DM (diabetes mellitus), type 2, uncontrolled (HCC) (Chronic) ICD-10-CM: E11.65  ICD-9-CM: 250.02  8/16/2010 Yes        Dementia (Chronic) ICD-10-CM: F03.90  ICD-9-CM: 294.20  1/2/2010 Yes    Overview Signed 1/2/2010  1:44 PM by Susanna Pina MD     Moderate. Review of Systems:   Patient unable to cooperate to perform 10 point ROS    Vital Signs:    Last 24hrs VS reviewed since prior progress note. Most recent are:  Visit Vitals    /52    Pulse 100    Temp 98.1 °F (36.7 °C)    Resp 19    Ht 5' 4\" (1.626 m)    Wt 54.6 kg (120 lb 5.9 oz)    SpO2 99%    BMI 20.66 kg/m2         Intake/Output Summary (Last 24 hours) at 04/05/17 1017  Last data filed at 04/05/17 1000   Gross per 24 hour   Intake          2229.46 ml   Output             1070 ml   Net          1159.46 ml        Physical Examination:             Constitutional:  No acute distress    ENT:  Oral mucous moist, oropharynx benign. Neck supple,    Resp:  CTA bilaterally. No wheezing/rhonchi/rales. No accessory muscle use   CV:  Regular rhythm, normal rate, no murmurs, gallops, rubs    GI:  Soft, non distended, non tender.  normoactive bowel sounds     Musculoskeletal:  No edema, warm, 2+ pulses throughout    Neurologic:  Moves all extremities.   Pt is not cooperating for eval.               Labs:     Recent Labs      04/04/17   1220   WBC  5.7   HGB  10.4*   HCT  31.1*   PLT  211     Recent Labs      04/05/17   0015  04/04/17   2048  04/04/17   1632  04/04/17   1220   NA  136  136  136  137   K  3.5  3.4*  3.0*  3.5   CL  102  100  100  99   CO2  25  25  24  23   BUN  13  14  13  13   CREA  2.68*  2.75*  2.86*  3.00*   GLU  83  166*  205*  417*   CA  8.4*  8.6  8.6  8.3*   MG  1.7  1.8  2.0  2.3   PHOS   --    --    --   1.4*     Lab Results   Component Value Date/Time    Folate 53.6 11/11/2014 03:40 AM     Lab Results   Component Value Date/Time    Cholesterol, total 154 02/06/2017 02:36 AM    HDL Cholesterol 121 02/06/2017 02:36 AM    LDL, calculated 19.8 02/06/2017 02:36 AM    Triglyceride 66 02/06/2017 02:36 AM    CHOL/HDL Ratio 1.3 02/06/2017 02:36 AM     Lab Results   Component Value Date/Time    Glucose (POC) 313 04/05/2017 09:35 AM    Glucose (POC) 294 04/05/2017 08:32 AM    Glucose (POC) 234 04/05/2017 07:29 AM    Glucose (POC) 190 04/05/2017 06:26 AM    Glucose (POC) 185 04/05/2017 05:23 AM     Lab Results   Component Value Date/Time    Color YELLOW/STRAW 02/06/2017 02:36 AM    Appearance CLEAR 02/06/2017 02:36 AM    Specific gravity 1.008 02/06/2017 02:36 AM    Specific gravity 1.010 01/18/2017 03:41 AM    pH (UA) 7.5 02/06/2017 02:36 AM    Protein 100 02/06/2017 02:36 AM    Glucose 100 02/06/2017 02:36 AM    Ketone NEGATIVE  02/06/2017 02:36 AM    Bilirubin NEGATIVE  02/06/2017 02:36 AM    Urobilinogen 0.2 02/06/2017 02:36 AM    Nitrites NEGATIVE  02/06/2017 02:36 AM    Leukocyte Esterase NEGATIVE  02/06/2017 02:36 AM    Epithelial cells FEW 02/06/2017 02:36 AM    Bacteria NEGATIVE  02/06/2017 02:36 AM    WBC 0-4 02/06/2017 02:36 AM    RBC  02/06/2017 02:36 AM                    Marlis Schirmer, MD

## 2017-04-05 NOTE — PROGRESS NOTES
Occupational Therapy Note  4/5/2017    Orders acknowledged, chart reviewed. Screening completed in room with pt, pt alert and oriented to person only. Pt demonstrates WFL strength, coordination and balance to complete ADLs. Per chart, pt lives at [facility] and has cues to complete ADLs at baseline. Per screening, pt is at functional baseline, no acute OT needs at this time. Please re-consult should pt have decline in status with ADLs. Recommend with nursing patient to complete as able in order to maintain strength, endurance and independence: ADLs with supervision/setup, OOB to chair 3x/day and mobilizing to the bathroom for toileting with supervision, 1-person assist. Thank you for your assistance.          Anupama Ortiz, OTR/L

## 2017-04-05 NOTE — PROGRESS NOTES
Pulmonary, Critical Care, and Sleep Medicine~Progress Note    Name: Breonna Ashford MRN: [id number]   : 1950 Hospital: [facility]    Date: 2017 8:23 AM Admission: 2017     IMPRESSION:   · DKA, gap now closed  · ESRD  · HTN  · CAD  · Reported dementia   · DNR      PLAN:   · Heparin/protonix  · D/c insulin drip  · Start lantus, ISS  · BP control   · IVF per orders  · Advance diet   · Can move to IMCU  · Discussed with attending; once out of ICU we will be available again to see if needed      Daily Progression:    Denies CP, n/v    OBJECTIVE:     Vital Signs:       Visit Vitals    /86    Pulse 84    Temp 98.1 °F (36.7 °C)    Resp 19    Ht 5' 4\" (1.626 m)    Wt 54.6 kg (120 lb 5.9 oz)    SpO2 98%    BMI 20.66 kg/m2      Temp (24hrs), Av.1 °F (36.7 °C), Min:97.7 °F (36.5 °C), Max:98.7 °F (37.1 °C)     Intake/Output:     Last shift:      Last 3 shifts:  1901 -  0700  In: 1955 [P.O.:960; I.V.:995]  Out: 850 [Urine:850]        Intake/Output Summary (Last 24 hours) at 17 0823  Last data filed at 17 0700   Gross per 24 hour   Intake          1954.95 ml   Output              850 ml   Net          1104.95 ml       Imaging:  I have personally reviewed these radiographic films and reports:  17: chest x-ray    \"AP portable erect view of the chest is obtained. The tip of the Robbie catheter is in the right atrium. There is no pneumothorax or. The lung fields are clear.  The heart size is normal.     IMPRESSION  impression: Robbie catheter in place\"   I have personally reviewed PFTs:   n/a       Physical Exam:                                        Exam Findings Other   General: No resp distress noted, appears stated age    [de-identified]:  No ulcers, JVD not elevated, no cervical LAD    Chest: No pectus deformity, normal chest rise b/l    HEART:  RRR, no murmurs/rubs/gallops    Lungs:  CTA b/l, no rhonchi/crackles/wheeze, diminished BS at bases    ABD: Soft/NT, non rigid mildly distended    EXT: No cyanosis/clubbing/edema, normal peripheral pulses    Skin: No rashes or ulcers, no mottling    Neuro: A/O        Medications:  Current Facility-Administered Medications   Medication Dose Route Frequency    insulin lispro (HUMALOG) injection   SubCUTAneous AC&HS    acetaminophen (TYLENOL) tablet 325 mg  325 mg Oral Q4H PRN    albuterol-ipratropium (DUO-NEB) 2.5 MG-0.5 MG/3 ML  3 mL Nebulization Q6H PRN    amLODIPine (NORVASC) tablet 10 mg  10 mg Oral DAILY    calcitRIOL (ROCALTROL) capsule 0.25 mcg  0.25 mcg Oral DAILY    ferrous sulfate tablet 325 mg  1 Tab Oral DAILY WITH BREAKFAST    mirtazapine (REMERON) tablet 15 mg  15 mg Oral QHS    pantoprazole (PROTONIX) tablet 40 mg  40 mg Oral DAILY    pravastatin (PRAVACHOL) tablet 10 mg  10 mg Oral QHS    tamsulosin (FLOMAX) capsule 0.4 mg  0.4 mg Oral DAILY AFTER BREAKFAST    sodium chloride (NS) flush 5-10 mL  5-10 mL IntraVENous Q8H    sodium chloride (NS) flush 5-10 mL  5-10 mL IntraVENous PRN    insulin lispro (HUMALOG) injection   SubCUTAneous TIDAC    glucose chewable tablet 16 g  4 Tab Oral PRN    dextrose (D50W) injection syrg 12.5-25 g  12.5-25 g IntraVENous PRN    glucagon (GLUCAGEN) injection 1 mg  1 mg IntraMUSCular PRN    heparin (porcine) injection 5,000 Units  5,000 Units SubCUTAneous Q12H    ondansetron (ZOFRAN) injection 4 mg  4 mg IntraVENous Q8H PRN    albumin human 25% (BUMINATE) solution 12.5 g  12.5 g IntraVENous DIALYSIS PRN    heparin (porcine) 1,000 unit/mL injection 1,000 Units  1,000 Units InterCATHeter DIALYSIS PRN    epoetin shaye (EPOGEN;PROCRIT) injection 4,000 Units  4,000 Units SubCUTAneous DIALYSIS MON, WED & FRI    dextrose 5% - 0.45% NaCl with KCl 20 mEq/L infusion  50 mL/hr IntraVENous CONTINUOUS       Labs:  ABG No results for input(s): PHI, PCO2I, PO2I, HCO3I, SO2I, FIO2I in the last 72 hours.      CBC Recent Labs      04/04/17   1220   WBC  5.7   HGB  10.4*   HCT  31.1*   PLT  211   MCV  90.1   MCH  84.6        Metabolic  Panel Recent Labs      04/05/17   0015  04/04/17   2048  04/04/17   1632  04/04/17   1220   NA  136  136  136  137   K  3.5  3.4*  3.0*  3.5   CL  102  100  100  99   CO2  25  25  24  23   GLU  83  166*  205*  417*   BUN  13  14  13  13   CREA  2.68*  2.75*  2.86*  3.00*   CA  8.4*  8.6  8.6  8.3*   MG  1.7  1.8  2.0  2.3   PHOS   --    --    --   1.4*        Pertinent Labs                ABHI Acosta  4/5/2017

## 2017-04-05 NOTE — DIABETES MGMT
DTC Progress Note    Recommendations/ Comments: Consult received for assessment of home management and hospital blood glucose management. Note insulin gtt d/c at 0833 today. BG's overnight ranged from 87 - 190 and rate was \"0\". BG began to rise this morning. At 4774 transitioned off gtt;  and rate 2.3. Rate for the previous 6 hours was 2.3  He received Lantus 6 units for transition, which is his home dose. An order is in place for Lantus 6 units daily and high sensitivity correction scale. Noted elevated A1c 10.3% and BG on admission 4/4/17 was 627. Poor control PTA supports consideration of need for higher dose of Lantus  Also, he was on a Novolog scale at meals PTA. If appropriate, please consider  Increase Lantus to 9 units daily (weight based insulin requirement is 13 units)  Add lispro 3 units AC if eating > 50% of his tray  Document po intake      Per care management, pt with hx of dementia. Resides at [address] [place] [address] and will be discharged there. Not appropriate for assessment of home management. Chart reviewed on [facility]. .    Patient is a 61 y.o. male with known diabetes on Lantus 6 units once at night and   Novolog Sliding Scale          < 90 - 0,                                           91 - 175 -1,                                        176 - 225 - 2,                                        226 - 300 - 3,                                              > 300 - 4.     A1c:   Lab Results   Component Value Date/Time    Hemoglobin A1c 10.3 04/04/2017 12:20 PM    Hemoglobin A1c 9.1 02/09/2017 02:01 AM    Hemoglobin A1c, External 7.7 08/12/2015    Hemoglobin A1c, External 7.7 08/07/2015       Recent Glucose Results:   Lab Results   Component Value Date/Time    GLU 83 04/05/2017 12:15 AM     (H) 04/04/2017 08:48 PM     (H) 04/04/2017 04:32 PM    GLUCPOC 238 (H) 04/05/2017 10:31 AM    GLUCPOC 313 (H) 04/05/2017 09:35 AM    GLUCPOC 294 (H) 04/05/2017 08:32 AM        Lab Results   Component Value Date/Time    Creatinine 2.68 04/05/2017 12:15 AM       Active Orders   Diet    DIET DIABETIC WITH OPTIONS Consistent Carb 1800kcal; Regular    DIET NPO        PO intake:   Patient Vitals for the past 72 hrs:   % Diet Eaten   04/04/17 1300 75 %       Current hospital DM medication: gtt. Current drip rate is 10.8 units per hour and blood sugar at 1417 was 421 mg/dl. Will continue to follow as needed.     Thank you  Luis Fernando Velasquez RN, CDE

## 2017-04-05 NOTE — PROGRESS NOTES
Vascular  Events noted  Will try and place his access on Friday am; vein mapping showed no suitable vein other than upper arm loop av graft. Glucose dramatically improved. Many thanks to the excellent care from the hospitalist service.

## 2017-04-05 NOTE — PROGRESS NOTES
Physical Therapy  4/5/17    Order received. Patient chart reviewed. Patient with dementia at baseline. Oriented to person only at this time. Received sitting EOB finishing bathing with PCTs. Patient demonstrating independence with mobility consistent with his baseline level PTA. Strength, ROM, coordination WNLs. PT assessment not indicated at this time. Please re-consult if change in status occurs. Recommend continue mobility with supervision as desired by patient. Delmis Curran, PT, DPT

## 2017-04-05 NOTE — PROGRESS NOTES
NUTRITION COMPLETE ASSESSMENT    RECOMMENDATIONS:   1. Adjust insulin per DTC recommendations (see note earlier today):   - Increase Lantus to 9 units daily (wt based insulin requirement is 13 units)   - Add lispro 3 units AC if eating > 50% of his tray  2. Encourage PO intake at meals and with Nepro  3. Continue daily weights     Interventions/Plan:   Food/Nutrient Delivery:   (mechanical soft diet) Commercial supplement (Nepro TID)          Assessment:   Reason for Assessment: [x]BPA/MST Referral (unsure wt loss)    Diet: Consistent carb (1800kcal)  Supplements: none  Nutritionally Significant Medications: [x] Reviewed & Includes: ancef, calcitriol, epogen, lantus (6units), remeron, protonix,   Meal Intake:   Patient Vitals for the past 100 hrs:   % Diet Eaten   04/05/17 1300 5 %   04/04/17 1300 75 %     Pre-Hospitalization:  Diet at Home: consistent CHO, mechanical soft  Vitamins/Supplements: Yes (Nepro TID)    Current Hospitalization:   Appetite: Fair  PO Ability:   Average po intake:   Average supplements intake:        Subjective: Oh yeah I like those shakes. I can't think of any favorite foods right now. Objective:  Pt admitted for DKA. PMHx: dementia, CAD, prostate CA, TBI, ESRD on HD, HTN, hyperlipidemia, DM. Plan for vascular access noted but delayed d/t hyperglycemia. Vascular hoped to place access on Friday. Wt hx difficult to interpret with large fluctuations noted. Appears failure well nourished. Predict current wt is likely close to accurate with bedscale used and consistent over last 48hrs. Target wt for HD noted as 57kg. Severe wt loss of 15%x 1 month. BMI WNL but 85%IBW ? If may be related to fluid status with HD.   Wt Readings from Last 10 Encounters:   04/05/17 54.6 kg (120 lb 5.9 oz)   03/02/17 64.9 kg (143 lb)   02/28/17 63.1 kg (139 lb 1.8 oz)   02/22/17 63.1 kg (139 lb 1.8 oz)   02/10/17 52.3 kg (115 lb 4.8 oz)   01/18/17 72.7 kg (160 lb 5.1 oz)   01/16/17 56.7 kg (125 lb)   11/23/16 54 kg (119 lb)   09/30/16 55.3 kg (122 lb)   09/15/16 54.4 kg (120 lb)     Poor compliance with DM diet PTA per H&P. Lives at [facility] per chart review. A1c 10.1%. On mechanical soft diet with Nepro TID. Question if some wt loss may be related to poorly controlled BG, in addition to increased needs while on HD. Spoke with pt today. Poor historian but does like Nepro shakes, cannot remember how often he drink. Will adjust diet order for chewing and add Nepro TID (1275kcal, 57g protein). Will continue to follow for PO intake, supplement consumption, BG control, and wt trends. Estimated Nutrition Needs:   Kcals/day: 1650 Kcals/day (1650-1925kcal)  Protein: 66 g (66-76g (1.2-1.4g/kg))  Fluid: 1650 ml (1ml/kcal or per renal)  Based On: Kcal/kg - specify (Comment) (30-35kcal/kg)  Weight Used: Actual wt (54.6kg)    Pt expected to meet estimated nutrient needs:  []   Yes     []  No [x] Unable to predict at this time  Nutrition Diagnosis:   1.  Unintended weight loss related to increased protein/energy needs; ?poorly controled BG as evidenced by ESRD on HD, hyperglycemia on admit    Goals:     Consumption of at least 50% of meals and 2 Nepro daily in 5-7 days; BG <180mg/dl     Monitoring & Evaluation:    - Liquid meal replacement, Total energy intake, Protein intake   - Weight/weight change, Electrolyte and renal profile     Previous Nutrition Goals Met:   N/A  Previous Recommendations:    N/A    Education & Discharge Needs:   [x] None Identified   [] Identified and addressed    [] Participated in care plan, discharge planning, and/or interdisciplinary rounds        Cultural, Zoroastrianism and ethnic food preferences identified: None    Skin Integrity: [x]Intact  []Other  Edema: [x]None []Other  Last BM: PTA  Food Allergies: [x]None []Other  Diet Restrictions: Cultural/Faith Preference(s): None     Anthropometrics:    Weight Loss Metrics 4/5/2017 4/4/2017 3/2/2017 2/28/2017 2/22/2017 2/10/2017 1/18/2017   Today's Wt 120 lb 5.9 oz - 143 lb 139 lb 1.8 oz 139 lb 1.8 oz 115 lb 4.8 oz 160 lb 5.1 oz   BMI - 20.66 kg/m2 24.55 kg/m2 23.15 kg/m2 23.15 kg/m2 19.19 kg/m2 26.68 kg/m2      Weight Source: Bed  Height: 5' 4\" (162.6 cm),    Body mass index is 20.66 kg/(m^2).   IBW : 64.4 kg (142 lb), % IBW (Calculated): 84.77 %   ,      Labs:    Lab Results   Component Value Date/Time    Sodium 136 04/05/2017 12:15 AM    Potassium 3.5 04/05/2017 12:15 AM    Chloride 102 04/05/2017 12:15 AM    CO2 25 04/05/2017 12:15 AM    Glucose 83 04/05/2017 12:15 AM    BUN 13 04/05/2017 12:15 AM    Creatinine 2.68 04/05/2017 12:15 AM    Calcium 8.4 04/05/2017 12:15 AM    Magnesium 1.7 04/05/2017 12:15 AM    Phosphorus 1.4 04/04/2017 12:20 PM    Albumin 3.5 02/22/2017 10:00 AM     Lab Results   Component Value Date/Time    Hemoglobin A1c 10.3 04/04/2017 12:20 PM    Hemoglobin A1c (POC) 9.1 04/07/2011    Hemoglobin A1c, External 7.7 08/12/2015     Rodrigo Vega RD

## 2017-04-05 NOTE — PROGRESS NOTES
Name: Naveen Lowry MRN: [id number]  : 1950      Assessment:  ESRD   HTN  DM-2 -uncontrolled with DKA (AG of 22)- now gap is closed  Dementia  Anemia of ESRD  SHPT with low Phosphorus     Pt is new ESRD. His Cr is actually little better today then yesterday, partly from IVF. He is making urine.      Plan/Recommendations:  Hold HD today; Check labs in AM-based on labs, will reassess need for HD tomm  Reduce IVF to [facility] and d/c by tom  Insulin drip being transitioned to Lantus/SSI  Ct  EPO  I have stopped Phoslo- while phos is low. Resume in future when appetite improves and Phosphorus is high    Subjective:  Resting.  No acute c/o, but minimally verbal and reports \"I don't know\" to most questions    ROS:   UTO    Exam:  Visit Vitals    /73    Pulse 85    Temp 98.1 °F (36.7 °C)    Resp 21    Ht 5' 4\" (1.626 m)    Wt 54.6 kg (120 lb 5.9 oz)    SpO2 100%    BMI 20.66 kg/m2       NAD  Clear ant, no distress  RRR, no rub  No edema  Alert awake    Current Facility-Administered Medications   Medication Dose Route Frequency Last Dose    insulin lispro (HUMALOG) injection   SubCUTAneous AC&HS      insulin glargine (LANTUS) injection 6 Units  6 Units SubCUTAneous DAILY 6 Units at 17 0908    hydrALAZINE (APRESOLINE) 20 mg/mL injection 20 mg  20 mg IntraVENous Q6H PRN 20 mg at 17 0909    0.9% sodium chloride infusion  25 mL/hr IntraVENous CONTINUOUS      acetaminophen (TYLENOL) tablet 325 mg  325 mg Oral Q4H PRN      albuterol-ipratropium (DUO-NEB) 2.5 MG-0.5 MG/3 ML  3 mL Nebulization Q6H PRN      amLODIPine (NORVASC) tablet 10 mg  10 mg Oral DAILY 10 mg at 17 0836    calcitRIOL (ROCALTROL) capsule 0.25 mcg  0.25 mcg Oral DAILY 0.25 mcg at 17 0836    ferrous sulfate tablet 325 mg  1 Tab Oral DAILY WITH BREAKFAST 325 mg at 04/05/17 0836    mirtazapine (REMERON) tablet 15 mg  15 mg Oral QHS 15 mg at 04/04/17 2235    pantoprazole (PROTONIX) tablet 40 mg  40 mg Oral DAILY 40 mg at 04/05/17 0836    pravastatin (PRAVACHOL) tablet 10 mg  10 mg Oral QHS 10 mg at 04/04/17 2235    tamsulosin (FLOMAX) capsule 0.4 mg  0.4 mg Oral DAILY AFTER BREAKFAST 0.4 mg at 04/05/17 0836    sodium chloride (NS) flush 5-10 mL  5-10 mL IntraVENous Q8H 10 mL at 04/05/17 0631    sodium chloride (NS) flush 5-10 mL  5-10 mL IntraVENous PRN      glucose chewable tablet 16 g  4 Tab Oral PRN      dextrose (D50W) injection syrg 12.5-25 g  12.5-25 g IntraVENous PRN 25 g at 04/04/17 1937    glucagon (GLUCAGEN) injection 1 mg  1 mg IntraMUSCular PRN      heparin (porcine) injection 5,000 Units  5,000 Units SubCUTAneous Q12H 5,000 Units at 04/05/17 0627    ondansetron (ZOFRAN) injection 4 mg  4 mg IntraVENous Q8H PRN      albumin human 25% (BUMINATE) solution 12.5 g  12.5 g IntraVENous DIALYSIS PRN      heparin (porcine) 1,000 unit/mL injection 1,000 Units  1,000 Units InterCATHeter DIALYSIS PRN      epoetin shaye (EPOGEN;PROCRIT) injection 4,000 Units  4,000 Units SubCUTAneous DIALYSIS MON, WED & FRI      dextrose 5% - 0.45% NaCl with KCl 20 mEq/L infusion  50 mL/hr IntraVENous CONTINUOUS 50 mL/hr at 04/05/17 0748       Labs/Data:    Lab Results   Component Value Date/Time    WBC 5.7 04/04/2017 12:20 PM    Hemoglobin (POC) 10.5 04/04/2017 09:16 AM    HGB 10.4 04/04/2017 12:20 PM    Hematocrit (POC) 31 04/04/2017 09:16 AM    HCT 31.1 04/04/2017 12:20 PM    PLATELET 788 20/96/4972 12:20 PM    MCV 90.1 04/04/2017 12:20 PM       Lab Results   Component Value Date/Time    Sodium 136 04/05/2017 12:15 AM    Potassium 3.5 04/05/2017 12:15 AM    Chloride 102 04/05/2017 12:15 AM    CO2 25 04/05/2017 12:15 AM    Anion gap 9 04/05/2017 12:15 AM    Glucose 83 04/05/2017 12:15 AM    BUN 13 04/05/2017 12:15 AM    Creatinine 2.68 04/05/2017 12:15 AM    BUN/Creatinine ratio 5 04/05/2017 12:15 AM    GFR est AA 29 04/05/2017 12:15 AM    GFR est non-AA 24 04/05/2017 12:15 AM    Calcium 8.4 04/05/2017 12:15 AM       Wt Readings from Last 3 Encounters:   04/05/17 54.6 kg (120 lb 5.9 oz)   03/02/17 64.9 kg (143 lb)   02/28/17 63.1 kg (139 lb 1.8 oz)         Intake/Output Summary (Last 24 hours) at 04/05/17 1001  Last data filed at 04/05/17 0900   Gross per 24 hour   Intake          2175.99 ml   Output             1070 ml   Net          1105.99 ml       Patient seen and examined. Chart reviewed. Labs, data and other pertinent notes reviewed in last 24 hrs.     PMH/SH/FH reviewed and unchanged compared to H&P    Discussed with pt      Abbie Oscar MD

## 2017-04-06 ENCOUNTER — ANESTHESIA EVENT (OUTPATIENT)
Dept: SURGERY | Age: 67
DRG: 628 | End: 2017-04-06
Payer: MEDICARE

## 2017-04-06 LAB
ANION GAP BLD CALC-SCNC: 9 MMOL/L (ref 5–15)
BACTERIA SPEC CULT: ABNORMAL
BACTERIA SPEC CULT: ABNORMAL
BUN SERPL-MCNC: 13 MG/DL (ref 6–20)
BUN/CREAT SERPL: 4 (ref 12–20)
CALCIUM SERPL-MCNC: 8.6 MG/DL (ref 8.5–10.1)
CHLORIDE SERPL-SCNC: 105 MMOL/L (ref 97–108)
CO2 SERPL-SCNC: 24 MMOL/L (ref 21–32)
CREAT SERPL-MCNC: 3.32 MG/DL (ref 0.7–1.3)
GLUCOSE BLD STRIP.AUTO-MCNC: 151 MG/DL (ref 65–100)
GLUCOSE BLD STRIP.AUTO-MCNC: 202 MG/DL (ref 65–100)
GLUCOSE BLD STRIP.AUTO-MCNC: 204 MG/DL (ref 65–100)
GLUCOSE BLD STRIP.AUTO-MCNC: 245 MG/DL (ref 65–100)
GLUCOSE BLD STRIP.AUTO-MCNC: 53 MG/DL (ref 65–100)
GLUCOSE SERPL-MCNC: 56 MG/DL (ref 65–100)
MAGNESIUM SERPL-MCNC: 1.7 MG/DL (ref 1.6–2.4)
PHOSPHATE SERPL-MCNC: 2.4 MG/DL (ref 2.6–4.7)
POTASSIUM SERPL-SCNC: 3.5 MMOL/L (ref 3.5–5.1)
SERVICE CMNT-IMP: ABNORMAL
SODIUM SERPL-SCNC: 138 MMOL/L (ref 136–145)

## 2017-04-06 PROCEDURE — 80048 BASIC METABOLIC PNL TOTAL CA: CPT | Performed by: INTERNAL MEDICINE

## 2017-04-06 PROCEDURE — 74011250636 HC RX REV CODE- 250/636: Performed by: STUDENT IN AN ORGANIZED HEALTH CARE EDUCATION/TRAINING PROGRAM

## 2017-04-06 PROCEDURE — 83735 ASSAY OF MAGNESIUM: CPT | Performed by: INTERNAL MEDICINE

## 2017-04-06 PROCEDURE — 82962 GLUCOSE BLOOD TEST: CPT

## 2017-04-06 PROCEDURE — 74011636637 HC RX REV CODE- 636/637: Performed by: PHYSICIAN ASSISTANT

## 2017-04-06 PROCEDURE — 74011000250 HC RX REV CODE- 250: Performed by: STUDENT IN AN ORGANIZED HEALTH CARE EDUCATION/TRAINING PROGRAM

## 2017-04-06 PROCEDURE — 74011636637 HC RX REV CODE- 636/637: Performed by: INTERNAL MEDICINE

## 2017-04-06 PROCEDURE — 84100 ASSAY OF PHOSPHORUS: CPT | Performed by: INTERNAL MEDICINE

## 2017-04-06 PROCEDURE — 65660000000 HC RM CCU STEPDOWN

## 2017-04-06 PROCEDURE — 36415 COLL VENOUS BLD VENIPUNCTURE: CPT | Performed by: INTERNAL MEDICINE

## 2017-04-06 PROCEDURE — 74011250637 HC RX REV CODE- 250/637: Performed by: INTERNAL MEDICINE

## 2017-04-06 PROCEDURE — 74011250637 HC RX REV CODE- 250/637: Performed by: STUDENT IN AN ORGANIZED HEALTH CARE EDUCATION/TRAINING PROGRAM

## 2017-04-06 RX ORDER — OXYCODONE HYDROCHLORIDE 5 MG/1
5 TABLET ORAL
Status: ON HOLD | COMMUNITY
End: 2017-04-08

## 2017-04-06 RX ORDER — MUPIROCIN 20 MG/G
OINTMENT TOPICAL 2 TIMES DAILY
Status: DISCONTINUED | OUTPATIENT
Start: 2017-04-07 | End: 2017-04-08 | Stop reason: HOSPADM

## 2017-04-06 RX ORDER — ALBUTEROL SULFATE 0.83 MG/ML
2.5 SOLUTION RESPIRATORY (INHALATION)
COMMUNITY

## 2017-04-06 RX ORDER — INSULIN LISPRO 100 [IU]/ML
INJECTION, SOLUTION INTRAVENOUS; SUBCUTANEOUS
COMMUNITY
End: 2019-01-01

## 2017-04-06 RX ORDER — AMLODIPINE BESYLATE 5 MG/1
5 TABLET ORAL DAILY
Status: ON HOLD | COMMUNITY
End: 2017-04-08

## 2017-04-06 RX ADMIN — INSULIN LISPRO 2 UNITS: 100 INJECTION, SOLUTION INTRAVENOUS; SUBCUTANEOUS at 12:56

## 2017-04-06 RX ADMIN — MIRTAZAPINE 15 MG: 15 TABLET, FILM COATED ORAL at 23:17

## 2017-04-06 RX ADMIN — Medication 10 ML: at 23:17

## 2017-04-06 RX ADMIN — FERROUS SULFATE TAB 325 MG (65 MG ELEMENTAL FE) 325 MG: 325 (65 FE) TAB at 09:47

## 2017-04-06 RX ADMIN — PANTOPRAZOLE SODIUM 40 MG: 40 TABLET, DELAYED RELEASE ORAL at 09:47

## 2017-04-06 RX ADMIN — TAMSULOSIN HYDROCHLORIDE 0.4 MG: 0.4 CAPSULE ORAL at 09:47

## 2017-04-06 RX ADMIN — PRAVASTATIN SODIUM 10 MG: 10 TABLET ORAL at 23:17

## 2017-04-06 RX ADMIN — CALCITRIOL 0.25 MCG: 0.25 CAPSULE, LIQUID FILLED ORAL at 09:47

## 2017-04-06 RX ADMIN — HEPARIN SODIUM 5000 UNITS: 5000 INJECTION, SOLUTION INTRAVENOUS; SUBCUTANEOUS at 06:00

## 2017-04-06 RX ADMIN — HEPARIN SODIUM 5000 UNITS: 5000 INJECTION, SOLUTION INTRAVENOUS; SUBCUTANEOUS at 18:50

## 2017-04-06 RX ADMIN — AMLODIPINE BESYLATE 10 MG: 5 TABLET ORAL at 09:51

## 2017-04-06 RX ADMIN — INSULIN GLARGINE 6 UNITS: 100 INJECTION, SOLUTION SUBCUTANEOUS at 09:51

## 2017-04-06 RX ADMIN — Medication 10 ML: at 06:35

## 2017-04-06 RX ADMIN — DEXTROSE MONOHYDRATE 25 G: 25 INJECTION, SOLUTION INTRAVENOUS at 06:32

## 2017-04-06 RX ADMIN — INSULIN LISPRO 1 UNITS: 100 INJECTION, SOLUTION INTRAVENOUS; SUBCUTANEOUS at 23:17

## 2017-04-06 NOTE — PROGRESS NOTES
Transfer Medication Reconciliation:    Information obtained from: [facility]    Significant PMH/Disease States:   Past Medical History:   Diagnosis Date    Alzheimer disease     CAD (coronary artery disease)     Cancer ([facility]     prostate    CKD (chronic kidney disease) stage 4, GFR 15-29 ml/min ([facility]     DM (diabetes mellitus), type 2, uncontrolled ([facility]     Hemodialysis patient ([facility]     Hyperlipidemia     Hypertension     Other ill-defined conditions     blind R eye-retina detachment    Other ill-defined conditions     right cerebellopontine angle lipoma. Chief Complaint for this Admission:  hyperglycemia    Allergies:  Ace inhibitors and Arb-angiotensin receptor antagonist    Prior to Admission Medications:   Prior to Admission Medications   Prescriptions Last Dose Informant Patient Reported? Taking? albuterol (PROVENTIL VENTOLIN) 2.5 mg /3 mL (0.083 %) nebulizer solution   Yes Yes   Si.5 mg by Nebulization route every four (4) hours as needed for Shortness of Breath. amLODIPine (NORVASC) 5 mg tablet   Yes Yes   Sig: Take 5 mg by mouth daily. calcitRIOL (ROCALTROL) 0.25 mcg capsule 4/3/2017 at Unknown time Transfer Papers Yes Yes   Sig: Take 0.25 mcg by mouth every Monday, Wednesday, Friday. With each dialysis treatment   calcium acetate (PHOSLO) 667 mg tab tablet 4/3/2017 at Unknown time  Yes Yes   Sig: Take 667 mg by mouth three (3) times daily (with meals). darbepoetin shaye (ARANESP, POLYSORBATE,) 25 mcg/0.42 mL injection   Yes Yes   Si mcg by SubCUTAneous route every seven (7) days. Give on the first dialysis treatment each week   ferrous sulfate (SLOW FE) 142 mg (45 mg iron) ER tablet 4/3/2017 at Unknown time  Yes Yes   Sig: Take 142 mg by mouth Daily (before breakfast). glucagon (GLUCAGEN) 1 mg injection   No Yes   Si mL by IntraMUSCular route as needed for Hypoglycemia.    insulin glargine (LANTUS) 100 unit/mL injection 4/3/2017 at Unknown time  Yes Yes Si Units by SubCUTAneous route nightly. insulin lispro (HUMALOG) 100 unit/mL injection   Yes Yes   Sig: by SubCUTAneous route Before breakfast, lunch, and dinner. Give per sliding scale:    0-160 = 0 units  161-300 = 2 units  301-400 = 4 units   mirtazapine (REMERON) 15 mg tablet 4/3/2017 at Unknown time Transfer Papers Yes Yes   Sig: Take 15 mg by mouth nightly. Indications: MAJOR DEPRESSIVE DISORDER   omeprazole (PRILOSEC) 10 mg capsule 4/3/2017 at Unknown time Transfer Papers Yes Yes   Sig: Take 10 mg by mouth daily. Indications: GASTROESOPHAGEAL REFLUX   oxyCODONE IR (ROXICODONE) 5 mg immediate release tablet   Yes Yes   Sig: Take 5 mg by mouth three (3) times daily as needed for Pain.   tamsulosin (FLOMAX) 0.4 mg capsule 4/3/2017 at Unknown time Transfer Papers Yes Yes   Sig: Take 0.4 mg by mouth daily. Facility-Administered Medications: None         Comments/Recommendations: Medication list updated with the following changes: Added:  1. Aranesp - receives at dialysis center    Changed:  1. Albuterol neb - the patient receives this instead of duo-nebs  2. Amlodipine 5 mg daily (from 10 mg)  3. Calcitriol MWF, with each dialysis treatment (from daily)  4. Calcium acetate 667 mg with meals (from 1334 mg)  5. Humalog sliding scale  6. Oxycodone 5 mg TID (from oxycodone + aspirin)    Removed:  1. Acetaminophen  2. Pravastatin     Gavin Hickey, Pharm. D., BCPS

## 2017-04-06 NOTE — DIABETES MGMT
DTC Progress Note    Recommendations/ Comments: Patient hypoglycemic this morning. If appropriate, please consider discontinuing Lantus. Per care management, pt with hx of dementia. Resides at [address] [place] [address] and will be discharged there. Not appropriate for assessment of home management. Chart reviewed on [facility]. .    Patient is a 61 y.o. male with known diabetes on Lantus 6 units once at night and   Novolog Sliding Scale          < 90 - 0,                                           91 - 175 -1,                                        176 - 225 - 2,                                        226 - 300 - 3,                                              > 300 - 4. A1c:   Lab Results   Component Value Date/Time    Hemoglobin A1c 10.3 04/04/2017 12:20 PM    Hemoglobin A1c 9.1 02/09/2017 02:01 AM    Hemoglobin A1c, External 7.7 08/12/2015    Hemoglobin A1c, External 7.7 08/07/2015       Recent Glucose Results:   Lab Results   Component Value Date/Time    GLU 56 (L) 04/06/2017 04:02 AM    GLUCPOC 204 (H) 04/06/2017 06:47 AM    GLUCPOC 53 (L) 04/06/2017 06:30 AM    GLUCPOC 96 04/05/2017 10:12 PM        Lab Results   Component Value Date/Time    Creatinine 3.32 04/06/2017 04:02 AM       Active Orders   Diet    DIET DIABETIC WITH OPTIONS Consistent Carb 2000kcal; Mechanical Soft; 2 GM NA (House Low NA)    DIET NPO        PO intake:   Patient Vitals for the past 72 hrs:   % Diet Eaten   04/05/17 1800 5 %   04/05/17 1300 5 %   04/04/17 1300 75 %       Current hospital DM medication: lantus 6 units, humalog for correction, high sensitivity  Will continue to follow as needed.     Thank you  Violet Adame, MS, RN, CDE

## 2017-04-06 NOTE — PROGRESS NOTES
Hospitalist Progress Note        Date of Service:  2017  NAME:  Dain Pappas. :  1950  MRN:  [id number]      Admission Summary:   Per h&p note: \"History of present illness  Dain Cheatham is a 61 y.o. male with history of dementia, CAD, prostate cancer, ESRD on HD (M/W/F), HTN, uncontrolled DM-2 who presents with hyperglycemia. He had originally been scheduled for vascular access surgery for his dialysis, but had been found to be hyperglycemic with fingerstick glucose of 627. His surgery was postponed because of this and we were asked to admit patient for glucose control. He is a poor historian secondary to dementia, answers most questions vaguely or answers with \"I don't know. \" He does report feeling generally unwell, but cannot elaborate much further. No reported pain anywhere. Supplemental information obtained from his sister and chart review. He follows with Dr. Hayden Gallo from endocrinology and has been noted to be non-compliant with diabetic diet, often eating whatever sweets he has available at his nursing home / rehab facility. His fingersticks often run too high to even quantify. Unclear if he has had polyuria, polydipsia or polyphagia. No reported fevers, chills or recent illness. \"             Interval history / Subjective:   No complaints, lying in bed     Assessment & Plan:     DKA secondary to noncompliance  Was on insulin drip, ivf and in icu setting. Gap has closed and plans to transfer (refer to 2017 pulmonary note)  Started on glargine with sliding scale. Glucose drop at 56 early this AM but since then no issues. Conservative management with insulin dosing in setting of ESRD, will likely need insulin adjustment when dialysis begins. Hypertension, in setting of known new ESRD  Appreciate nephrology service who has already seen patient. Medications adjusted as needed.     New ESRD, nephrology service on board  Tentative plans for UE AV graft on Friday     Memory impairment per H&P note:  \"- originally listed in chart as secondary to dementia, though per sister's report, this may be due to TBI (head injury more than 30 years ago)  - supportive care\"    History of prostate cancer per h&P  - follow-up as outpatient    Code: DNR (refer to h&p note)    DVT prophylaxis: on heparin    Disposition:TBD     Hospital Problems  Date Reviewed: 4/6/2017          Codes Class Noted POA    CKD (chronic kidney disease) stage 3, GFR 30-59 ml/min (Chronic) ICD-10-CM: N18.3  ICD-9-CM: 710. 3 Chronic 12/13/2013 Yes        * (Principal)DKA (diabetic ketoacidoses) ([facility] ICD-10-CM: E13.10  ICD-9-CM: 250.10  7/10/2011 Yes        HTN (hypertension) (Chronic) ICD-10-CM: I10  ICD-9-CM: 401.9  12/20/2010 Yes        Alzheimer disease ICD-10-CM: G30.9  ICD-9-CM: 331.0  9/19/2010 Yes        DM (diabetes mellitus), type 2, uncontrolled (HCC) (Chronic) ICD-10-CM: E11.65  ICD-9-CM: 250.02  8/16/2010 Yes        Dementia (Chronic) ICD-10-CM: F03.90  ICD-9-CM: 294.20  1/2/2010 Yes    Overview Signed 1/2/2010  1:44 PM by Nely Castellano MD     Moderate. Review of Systems:   Patient unable to cooperate to perform 10 point ROS    Vital Signs:    Last 24hrs VS reviewed since prior progress note. Most recent are:  Visit Vitals    /73 (BP 1 Location: Right arm, BP Patient Position: At rest)    Pulse 68    Temp 98.1 °F (36.7 °C)    Resp 20    Ht 5' 4\" (1.626 m)    Wt 54.7 kg (120 lb 9.5 oz)    SpO2 99%    BMI 20.7 kg/m2           Physical Examination:             Constitutional:  No acute distress    ENT:  Oral mucous moist, oropharynx benign. Neck supple,    Resp:  CTA bilaterally. No wheezing/rhonchi/rales. No accessory muscle use   CV:  Regular rhythm, normal rate, no murmurs, gallops, rubs    GI:  Soft, non distended, non tender.  normoactive bowel sounds     Musculoskeletal:  No edema, warm, 2+ pulses throughout    Neurologic: Moves all extremities. CN II-XII reviewed               Labs:     Recent Labs      04/04/17   1220   WBC  5.7   HGB  10.4*   HCT  31.1*   PLT  211     Recent Labs      04/06/17   0402  04/05/17   0015  04/04/17 2048   04/04/17   1220   NA  138  136  136   < >  137   K  3.5  3.5  3.4*   < >  3.5   CL  105  102  100   < >  99   CO2  24  25  25   < >  23   BUN  13  13  14   < >  13   CREA  3.32*  2.68*  2.75*   < >  3.00*   GLU  56*  83  166*   < >  417*   CA  8.6  8.4*  8.6   < >  8.3*   MG  1.7  1.7  1.8   < >  2.3   PHOS  2.4*   --    --    --   1.4*    < > = values in this interval not displayed.      Lab Results   Component Value Date/Time    Folate 53.6 11/11/2014 03:40 AM     Lab Results   Component Value Date/Time    Cholesterol, total 154 02/06/2017 02:36 AM    HDL Cholesterol 121 02/06/2017 02:36 AM    LDL, calculated 19.8 02/06/2017 02:36 AM    Triglyceride 66 02/06/2017 02:36 AM    CHOL/HDL Ratio 1.3 02/06/2017 02:36 AM     Lab Results   Component Value Date/Time    Glucose (POC) 204 04/06/2017 06:47 AM    Glucose (POC) 53 04/06/2017 06:30 AM    Glucose (POC) 96 04/05/2017 10:12 PM    Glucose (POC) 91 04/05/2017 04:53 PM    Glucose (POC) 127 04/05/2017 12:19 PM     Lab Results   Component Value Date/Time    Color YELLOW/STRAW 02/06/2017 02:36 AM    Appearance CLEAR 02/06/2017 02:36 AM    Specific gravity 1.008 02/06/2017 02:36 AM    Specific gravity 1.010 01/18/2017 03:41 AM    pH (UA) 7.5 02/06/2017 02:36 AM    Protein 100 02/06/2017 02:36 AM    Glucose 100 02/06/2017 02:36 AM    Ketone NEGATIVE  02/06/2017 02:36 AM    Bilirubin NEGATIVE  02/06/2017 02:36 AM    Urobilinogen 0.2 02/06/2017 02:36 AM    Nitrites NEGATIVE  02/06/2017 02:36 AM    Leukocyte Esterase NEGATIVE  02/06/2017 02:36 AM    Epithelial cells FEW 02/06/2017 02:36 AM    Bacteria NEGATIVE  02/06/2017 02:36 AM    WBC 0-4 02/06/2017 02:36 AM    RBC  02/06/2017 02:36 AM                    Linda Guzmán MD

## 2017-04-06 NOTE — PROGRESS NOTES
Vascular  Glucose much improved  Cr up to 3.32  Plan left upper arm av graft tomorrow am unless otherwise directed by nephrology

## 2017-04-06 NOTE — PROGRESS NOTES
Name: Dayana Barragan. MRN: [id number]  : 1950      Assessment:  ESRD   HTN  DM-2 -uncontrolled with DKA (AG of 22)- now gap is closed  Dementia  Anemia of ESRD  SHPT with low Phosphorus     Pt is new ESRD. Cr is upto 3.32 today. Will need HD. He is plan for AVG tomm     Plan/Recommendations:  HD today for 3 hrs  L AVG tomm per vascular surgery  D/C oral iron  Insulin per primary team  Ct  EPO  I have stopped Phoslo- while phos is low. Resume in future when appetite improves and Phosphorus is high    Subjective:  Resting. Not much interactive.  Curled up in bed    ROS:   UTO    Exam:  Visit Vitals    /73 (BP 1 Location: Right arm, BP Patient Position: At rest)    Pulse 68    Temp 98.1 °F (36.7 °C)    Resp 20    Ht 5' 4\" (1.626 m)    Wt 54.7 kg (120 lb 9.5 oz)    SpO2 99%    BMI 20.7 kg/m2       NAD  Clear ant, no distress  RRR, no rub  No edema  Alert awake    Current Facility-Administered Medications   Medication Dose Route Frequency Last Dose    insulin lispro (HUMALOG) injection   SubCUTAneous AC&HS Stopped at 17 1219    insulin glargine (LANTUS) injection 6 Units  6 Units SubCUTAneous DAILY 6 Units at 17 0951    hydrALAZINE (APRESOLINE) 20 mg/mL injection 20 mg  20 mg IntraVENous Q6H PRN 20 mg at 17 0909    0.9% sodium chloride infusion  25 mL/hr IntraVENous CONTINUOUS 25 mL/hr at 17 1122    [START ON 2017] ceFAZolin in 0.9% NS (ANCEF) IVPB soln 2 g  2 g IntraVENous ON CALL TO OR      prochlorperazine (COMPAZINE) with saline injection 10 mg  10 mg IntraVENous Q6H PRN 10 mg at 17 1822    acetaminophen (TYLENOL) tablet 325 mg  325 mg Oral Q4H PRN      albuterol-ipratropium (DUO-NEB) 2.5 MG-0.5 MG/3 ML  3 mL Nebulization Q6H PRN      amLODIPine (NORVASC) tablet 10 mg  10 mg Oral DAILY 10 mg at 17 9112  calcitRIOL (ROCALTROL) capsule 0.25 mcg  0.25 mcg Oral DAILY 0.25 mcg at 04/06/17 0947    ferrous sulfate tablet 325 mg  1 Tab Oral DAILY WITH BREAKFAST 325 mg at 04/06/17 0947    mirtazapine (REMERON) tablet 15 mg  15 mg Oral QHS 15 mg at 04/05/17 2211    pantoprazole (PROTONIX) tablet 40 mg  40 mg Oral DAILY 40 mg at 04/06/17 0947    pravastatin (PRAVACHOL) tablet 10 mg  10 mg Oral QHS 10 mg at 04/05/17 2211    tamsulosin (FLOMAX) capsule 0.4 mg  0.4 mg Oral DAILY AFTER BREAKFAST 0.4 mg at 04/06/17 0947    sodium chloride (NS) flush 5-10 mL  5-10 mL IntraVENous Q8H 10 mL at 04/06/17 0635    sodium chloride (NS) flush 5-10 mL  5-10 mL IntraVENous PRN      glucose chewable tablet 16 g  4 Tab Oral PRN      dextrose (D50W) injection syrg 12.5-25 g  12.5-25 g IntraVENous PRN 25 g at 04/06/17 0231    glucagon (GLUCAGEN) injection 1 mg  1 mg IntraMUSCular PRN      heparin (porcine) injection 5,000 Units  5,000 Units SubCUTAneous Q12H 5,000 Units at 04/06/17 0600    ondansetron (ZOFRAN) injection 4 mg  4 mg IntraVENous Q8H PRN 4 mg at 04/05/17 1558    albumin human 25% (BUMINATE) solution 12.5 g  12.5 g IntraVENous DIALYSIS PRN      heparin (porcine) 1,000 unit/mL injection 1,000 Units  1,000 Units InterCATHeter DIALYSIS PRN      epoetin shaye (EPOGEN;PROCRIT) injection 4,000 Units  4,000 Units SubCUTAneous DIALYSIS MON, WED & FRI Stopped at 04/05/17 2100       Labs/Data:    Lab Results   Component Value Date/Time    WBC 5.7 04/04/2017 12:20 PM    Hemoglobin (POC) 10.5 04/04/2017 09:16 AM    HGB 10.4 04/04/2017 12:20 PM    Hematocrit (POC) 31 04/04/2017 09:16 AM    HCT 31.1 04/04/2017 12:20 PM    PLATELET 647 99/87/0165 12:20 PM    MCV 90.1 04/04/2017 12:20 PM       Lab Results   Component Value Date/Time    Sodium 138 04/06/2017 04:02 AM    Potassium 3.5 04/06/2017 04:02 AM    Chloride 105 04/06/2017 04:02 AM    CO2 24 04/06/2017 04:02 AM    Anion gap 9 04/06/2017 04:02 AM    Glucose 56 04/06/2017 04:02 AM    BUN 13 04/06/2017 04:02 AM    Creatinine 3.32 04/06/2017 04:02 AM    BUN/Creatinine ratio 4 04/06/2017 04:02 AM    GFR est AA 23 04/06/2017 04:02 AM    GFR est non-AA 19 04/06/2017 04:02 AM    Calcium 8.6 04/06/2017 04:02 AM       Wt Readings from Last 3 Encounters:   04/06/17 54.7 kg (120 lb 9.5 oz)   03/02/17 64.9 kg (143 lb)   02/28/17 63.1 kg (139 lb 1.8 oz)         Intake/Output Summary (Last 24 hours) at 04/06/17 1036  Last data filed at 04/06/17 0600   Gross per 24 hour   Intake           761.61 ml   Output              500 ml   Net           261.61 ml       Patient seen and examined. Chart reviewed. Labs, data and other pertinent notes reviewed in last 24 hrs.     PMH/SH/FH reviewed and unchanged compared to H&P    Discussed with pt and My Zhu MD

## 2017-04-06 NOTE — CDMP QUERY
Please clarify if this patient is being treated/managed for:    =>Hypokalemia in the setting of K+3.0 requiring Kcl  =>Other Explanation of clinical findings  =>Unable to Determine (no explanation of clinical findings)    The medical record reflects the following clinical findings, treatment, and risk factors:    Presentation: dx uncontrolled DM2 DKA, K+3.0, 3.5  Treatment: po 40meq Kcl, lab monitoring    Please clarify and document your clinical opinion in the progress notes and discharge summary including the definitive and/or presumptive diagnosis, (suspected or probable), related to the above clinical findings. Please include clinical findings supporting your diagnosis.   Thank Braydon Roblero RN  [address]     [phone]

## 2017-04-06 NOTE — ROUTINE PROCESS
TRANSFER - OUT REPORT:    Verbal report given to Carson Goff on Maria Esther Del Toro, who was transferred to [facility] for routine progression of care. Report consisted of patients Situation, Background, Assessment and Recommendations(SBAR).   MICHAEL Alonso BSN CCM

## 2017-04-06 NOTE — PROGRESS NOTES
2000 Bedside and Verbal shift change report given to Sophia Blackburn RN (oncoming nurse) by Misty Harper RN (offgoing nurse). Report included the following information SBAR, Kardex, ED Summary, Procedure Summary, Intake/Output, MAR and Recent Results. 2000 Assumed care of pt. Pt resting comfortably. No c/o pain, nausea. VSS on RA. Informed of NPO status starting at 0000.  0000 Reassessment complete. No changes. Pt NPO.  0400 Reassessment complete. No changes. 0700 TRANSFER - OUT REPORT:    Verbal report given to RN(name) on Rexann Quiet.  being transferred to [facility]) for routine progression of care       Report consisted of patients Situation, Background, Assessment and   Recommendations(SBAR). Information from the following report(s) SBAR, Kardex, OR Summary, Procedure Summary, Intake/Output, MAR and Recent Results was reviewed with the receiving nurse. Lines:   Peripheral IV 04/04/17 Right Hand (Active)   Site Assessment Clean, dry, & intact 4/6/2017  4:00 AM   Phlebitis Assessment 0 4/6/2017  4:00 AM   Infiltration Assessment 0 4/6/2017  4:00 AM   Dressing Status Clean, dry, & intact 4/6/2017  4:00 AM   Dressing Type Transparent 4/6/2017  4:00 AM   Hub Color/Line Status Pink;Capped 4/6/2017  4:00 AM   Action Taken Open ports on tubing capped 4/6/2017  4:00 AM   Alcohol Cap Used Yes 4/6/2017  4:00 AM       Peripheral IV 04/04/17 Left Forearm (Active)   Site Assessment Clean, dry, & intact 4/6/2017  4:00 AM   Phlebitis Assessment 0 4/6/2017  4:00 AM   Infiltration Assessment 0 4/6/2017  4:00 AM   Dressing Status Clean, dry, & intact 4/6/2017  4:00 AM   Dressing Type Transparent 4/6/2017  4:00 AM   Hub Color/Line Status Pink; Infusing;Patent 4/6/2017  4:00 AM   Action Taken Open ports on tubing capped 4/6/2017  4:00 AM   Alcohol Cap Used Yes 4/6/2017  4:00 AM        Opportunity for questions and clarification was provided.       Patient transported with:   Registered Nurse      Shift Summary Pt slept ~9-10hrs. CAM ICU (-). VSS on RA. Pt to go for AV graft placement today, consent on chart. Uneventful shift.     Problem: Falls - Risk of  Goal: *Knowledge of fall prevention  Outcome: Not Progressing Towards Goal  Pt with baseline alz, oriented to self only, not appropriate for teaching    Problem: Patient Education: Go to Patient Education Activity  Goal: Patient/Family Education  Outcome: Not Progressing Towards Goal  Pt with baseline alz, oriented to self only, not appropriate for teaching    Problem: DKA: Day 2  Goal: Activity/Safety  Outcome: Progressing Towards Goal  Up with one assist  Goal: Respiratory  Outcome: Progressing Towards Goal  RA  Goal: *Tolerating diet  Outcome: Progressing Towards Goal  PRN Zofran, Compazine for nausea; NPO at midnight for vascular access sx in AM  Goal: *Demonstrates progressive activity  Outcome: Progressing Towards Goal  Up with one assist    Problem: Patient Education: Go to Patient Education Activity  Goal: Patient/Family Education  Outcome: Not Progressing Towards Goal  Pt with baseline alz, oriented to self only, not appropriate for teaching    Problem: Patient Education: Go to Patient Education Activity  Goal: Patient/Family Education  Outcome: Not Progressing Towards Goal  Pt with baseline alz, oriented to self only, not appropriate for teaching

## 2017-04-07 ENCOUNTER — ANESTHESIA (OUTPATIENT)
Dept: SURGERY | Age: 67
DRG: 628 | End: 2017-04-07
Payer: MEDICARE

## 2017-04-07 LAB
ANION GAP BLD CALC-SCNC: 22 MMOL/L (ref 5–15)
ANION GAP BLD CALC-SCNC: 8 MMOL/L (ref 5–15)
BUN BLD-MCNC: <3 MG/DL (ref 9–20)
BUN SERPL-MCNC: 4 MG/DL (ref 6–20)
BUN/CREAT SERPL: 2 (ref 12–20)
CA-I BLD-MCNC: 0.91 MMOL/L (ref 1.12–1.32)
CALCIUM SERPL-MCNC: 8.2 MG/DL (ref 8.5–10.1)
CHLORIDE BLD-SCNC: 103 MMOL/L (ref 98–107)
CHLORIDE SERPL-SCNC: 104 MMOL/L (ref 97–108)
CO2 BLD-SCNC: 21 MMOL/L (ref 21–32)
CO2 SERPL-SCNC: 28 MMOL/L (ref 21–32)
CREAT BLD-MCNC: 2 MG/DL (ref 0.6–1.3)
CREAT SERPL-MCNC: 1.97 MG/DL (ref 0.7–1.3)
GLUCOSE BLD STRIP.AUTO-MCNC: 120 MG/DL (ref 65–100)
GLUCOSE BLD STRIP.AUTO-MCNC: 129 MG/DL (ref 65–100)
GLUCOSE BLD STRIP.AUTO-MCNC: 135 MG/DL (ref 65–100)
GLUCOSE BLD STRIP.AUTO-MCNC: 77 MG/DL (ref 65–100)
GLUCOSE BLD STRIP.AUTO-MCNC: 80 MG/DL (ref 65–100)
GLUCOSE BLD-MCNC: 126 MG/DL (ref 65–100)
GLUCOSE SERPL-MCNC: 130 MG/DL (ref 65–100)
HCT VFR BLD CALC: 31 % (ref 36.6–50.3)
HGB BLD-MCNC: 10.5 GM/DL (ref 12.1–17)
MAGNESIUM SERPL-MCNC: 1.6 MG/DL (ref 1.6–2.4)
POTASSIUM BLD-SCNC: 3.6 MMOL/L (ref 3.5–5.1)
POTASSIUM SERPL-SCNC: 3.5 MMOL/L (ref 3.5–5.1)
SERVICE CMNT-IMP: ABNORMAL
SERVICE CMNT-IMP: NORMAL
SERVICE CMNT-IMP: NORMAL
SODIUM BLD-SCNC: 141 MMOL/L (ref 136–145)
SODIUM SERPL-SCNC: 140 MMOL/L (ref 136–145)

## 2017-04-07 PROCEDURE — 74011250636 HC RX REV CODE- 250/636: Performed by: SURGERY

## 2017-04-07 PROCEDURE — 74011250636 HC RX REV CODE- 250/636

## 2017-04-07 PROCEDURE — 90935 HEMODIALYSIS ONE EVALUATION: CPT

## 2017-04-07 PROCEDURE — 77030010507 HC ADH SKN DERMBND J&J -B: Performed by: SURGERY

## 2017-04-07 PROCEDURE — 74011250637 HC RX REV CODE- 250/637: Performed by: INTERNAL MEDICINE

## 2017-04-07 PROCEDURE — 80048 BASIC METABOLIC PNL TOTAL CA: CPT | Performed by: INTERNAL MEDICINE

## 2017-04-07 PROCEDURE — 03170ZD BYPASS RIGHT BRACHIAL ARTERY TO UPPER ARM VEIN, OPEN APPROACH: ICD-10-PCS | Performed by: SURGERY

## 2017-04-07 PROCEDURE — 74011250636 HC RX REV CODE- 250/636: Performed by: HOSPITALIST

## 2017-04-07 PROCEDURE — 36415 COLL VENOUS BLD VENIPUNCTURE: CPT | Performed by: STUDENT IN AN ORGANIZED HEALTH CARE EDUCATION/TRAINING PROGRAM

## 2017-04-07 PROCEDURE — 77030018846 HC SOL IRR STRL H20 ICUM -A: Performed by: SURGERY

## 2017-04-07 PROCEDURE — 76010000153 HC OR TIME 1.5 TO 2 HR: Performed by: SURGERY

## 2017-04-07 PROCEDURE — 77030013079 HC BLNKT BAIR HGGR 3M -A: Performed by: ANESTHESIOLOGY

## 2017-04-07 PROCEDURE — 83735 ASSAY OF MAGNESIUM: CPT | Performed by: STUDENT IN AN ORGANIZED HEALTH CARE EDUCATION/TRAINING PROGRAM

## 2017-04-07 PROCEDURE — 74011250636 HC RX REV CODE- 250/636: Performed by: STUDENT IN AN ORGANIZED HEALTH CARE EDUCATION/TRAINING PROGRAM

## 2017-04-07 PROCEDURE — 77030032490 HC SLV COMPR SCD KNE COVD -B: Performed by: SURGERY

## 2017-04-07 PROCEDURE — 74011250637 HC RX REV CODE- 250/637: Performed by: STUDENT IN AN ORGANIZED HEALTH CARE EDUCATION/TRAINING PROGRAM

## 2017-04-07 PROCEDURE — 77030003601 HC NDL NRV BLK BBMI -A

## 2017-04-07 PROCEDURE — 74011250637 HC RX REV CODE- 250/637: Performed by: ANESTHESIOLOGY

## 2017-04-07 PROCEDURE — C1768 GRAFT, VASCULAR: HCPCS | Performed by: SURGERY

## 2017-04-07 PROCEDURE — 64418 NJX AA&/STRD SPRSCAP NRV: CPT | Performed by: ANESTHESIOLOGY

## 2017-04-07 PROCEDURE — 5A1D00Z PERFORMANCE OF URINARY FILTRATION, SINGLE: ICD-10-PCS | Performed by: HOSPITALIST

## 2017-04-07 PROCEDURE — 77030011640 HC PAD GRND REM COVD -A: Performed by: SURGERY

## 2017-04-07 PROCEDURE — 77030031139 HC SUT VCRL2 J&J -A: Performed by: SURGERY

## 2017-04-07 PROCEDURE — 74011250636 HC RX REV CODE- 250/636: Performed by: ANESTHESIOLOGY

## 2017-04-07 PROCEDURE — 90935 HEMODIALYSIS ONE EVALUATION: CPT | Performed by: ANESTHESIOLOGY

## 2017-04-07 PROCEDURE — 74011250636 HC RX REV CODE- 250/636: Performed by: INTERNAL MEDICINE

## 2017-04-07 PROCEDURE — 74011250636 HC RX REV CODE- 250/636: Performed by: PHYSICIAN ASSISTANT

## 2017-04-07 PROCEDURE — 65270000029 HC RM PRIVATE

## 2017-04-07 PROCEDURE — 77030002987 HC SUT PROL J&J -B: Performed by: SURGERY

## 2017-04-07 PROCEDURE — 77030020256 HC SOL INJ NACL 0.9%  500ML: Performed by: SURGERY

## 2017-04-07 PROCEDURE — 76210000006 HC OR PH I REC 0.5 TO 1 HR: Performed by: SURGERY

## 2017-04-07 PROCEDURE — 77030014008 HC SPNG HEMSTAT J&J -C: Performed by: SURGERY

## 2017-04-07 PROCEDURE — 80047 BASIC METABLC PNL IONIZED CA: CPT

## 2017-04-07 PROCEDURE — 76060000034 HC ANESTHESIA 1.5 TO 2 HR: Performed by: SURGERY

## 2017-04-07 PROCEDURE — 82962 GLUCOSE BLOOD TEST: CPT

## 2017-04-07 PROCEDURE — 77030002986 HC SUT PROL J&J -A: Performed by: SURGERY

## 2017-04-07 PROCEDURE — 74011636637 HC RX REV CODE- 636/637: Performed by: PHYSICIAN ASSISTANT

## 2017-04-07 PROCEDURE — 74011250637 HC RX REV CODE- 250/637: Performed by: SURGERY

## 2017-04-07 PROCEDURE — 77030008467 HC STPLR SKN COVD -B: Performed by: SURGERY

## 2017-04-07 DEVICE — GRAFT VASC L40CM DIA7MM EPTFE HEP THN WALLED PROPATEN: Type: IMPLANTABLE DEVICE | Site: ARM | Status: FUNCTIONAL

## 2017-04-07 RX ORDER — MORPHINE SULFATE 10 MG/ML
2 INJECTION, SOLUTION INTRAMUSCULAR; INTRAVENOUS
Status: DISCONTINUED | OUTPATIENT
Start: 2017-04-07 | End: 2017-04-07

## 2017-04-07 RX ORDER — SODIUM CHLORIDE 0.9 % (FLUSH) 0.9 %
5-10 SYRINGE (ML) INJECTION AS NEEDED
Status: DISCONTINUED | OUTPATIENT
Start: 2017-04-07 | End: 2017-04-08 | Stop reason: HOSPADM

## 2017-04-07 RX ORDER — OXYCODONE HYDROCHLORIDE 5 MG/1
5 TABLET ORAL
Status: DISCONTINUED | OUTPATIENT
Start: 2017-04-07 | End: 2017-04-08 | Stop reason: HOSPADM

## 2017-04-07 RX ORDER — INSULIN LISPRO 100 [IU]/ML
INJECTION, SOLUTION INTRAVENOUS; SUBCUTANEOUS
Status: DISCONTINUED | OUTPATIENT
Start: 2017-04-07 | End: 2017-04-07 | Stop reason: SDUPTHER

## 2017-04-07 RX ORDER — FENTANYL CITRATE 50 UG/ML
25 INJECTION, SOLUTION INTRAMUSCULAR; INTRAVENOUS
Status: COMPLETED | OUTPATIENT
Start: 2017-04-07 | End: 2017-04-07

## 2017-04-07 RX ORDER — MIDAZOLAM HYDROCHLORIDE 1 MG/ML
1 INJECTION, SOLUTION INTRAMUSCULAR; INTRAVENOUS AS NEEDED
Status: DISCONTINUED | OUTPATIENT
Start: 2017-04-07 | End: 2017-04-07 | Stop reason: HOSPADM

## 2017-04-07 RX ORDER — ALBUTEROL SULFATE 0.83 MG/ML
2.5 SOLUTION RESPIRATORY (INHALATION)
Status: DISCONTINUED | OUTPATIENT
Start: 2017-04-07 | End: 2017-04-08 | Stop reason: HOSPADM

## 2017-04-07 RX ORDER — SODIUM CHLORIDE 9 MG/ML
25 INJECTION, SOLUTION INTRAVENOUS CONTINUOUS
Status: DISCONTINUED | OUTPATIENT
Start: 2017-04-07 | End: 2017-04-07

## 2017-04-07 RX ORDER — HYDROMORPHONE HYDROCHLORIDE 1 MG/ML
0.2 INJECTION, SOLUTION INTRAMUSCULAR; INTRAVENOUS; SUBCUTANEOUS
Status: DISCONTINUED | OUTPATIENT
Start: 2017-04-07 | End: 2017-04-07

## 2017-04-07 RX ORDER — DIPHENHYDRAMINE HYDROCHLORIDE 50 MG/ML
12.5 INJECTION, SOLUTION INTRAMUSCULAR; INTRAVENOUS AS NEEDED
Status: ACTIVE | OUTPATIENT
Start: 2017-04-07 | End: 2017-04-07

## 2017-04-07 RX ORDER — MIDAZOLAM HYDROCHLORIDE 1 MG/ML
1 INJECTION, SOLUTION INTRAMUSCULAR; INTRAVENOUS
Status: DISCONTINUED | OUTPATIENT
Start: 2017-04-07 | End: 2017-04-08 | Stop reason: HOSPADM

## 2017-04-07 RX ORDER — DEXTROSE, SODIUM CHLORIDE, SODIUM LACTATE, POTASSIUM CHLORIDE, AND CALCIUM CHLORIDE 5; .6; .31; .03; .02 G/100ML; G/100ML; G/100ML; G/100ML; G/100ML
125 INJECTION, SOLUTION INTRAVENOUS CONTINUOUS
Status: DISCONTINUED | OUTPATIENT
Start: 2017-04-07 | End: 2017-04-07 | Stop reason: HOSPADM

## 2017-04-07 RX ORDER — SODIUM CHLORIDE, SODIUM LACTATE, POTASSIUM CHLORIDE, CALCIUM CHLORIDE 600; 310; 30; 20 MG/100ML; MG/100ML; MG/100ML; MG/100ML
125 INJECTION, SOLUTION INTRAVENOUS CONTINUOUS
Status: DISCONTINUED | OUTPATIENT
Start: 2017-04-07 | End: 2017-04-08

## 2017-04-07 RX ORDER — PROPOFOL 10 MG/ML
INJECTION, EMULSION INTRAVENOUS
Status: DISCONTINUED | OUTPATIENT
Start: 2017-04-07 | End: 2017-04-07 | Stop reason: HOSPADM

## 2017-04-07 RX ORDER — HEPARIN SODIUM 1000 [USP'U]/ML
INJECTION, SOLUTION INTRAVENOUS; SUBCUTANEOUS AS NEEDED
Status: DISCONTINUED | OUTPATIENT
Start: 2017-04-07 | End: 2017-04-07 | Stop reason: HOSPADM

## 2017-04-07 RX ORDER — LIDOCAINE HYDROCHLORIDE 10 MG/ML
0.5 INJECTION, SOLUTION EPIDURAL; INFILTRATION; INTRACAUDAL; PERINEURAL AS NEEDED
Status: DISCONTINUED | OUTPATIENT
Start: 2017-04-07 | End: 2017-04-07 | Stop reason: HOSPADM

## 2017-04-07 RX ORDER — SODIUM CHLORIDE, SODIUM LACTATE, POTASSIUM CHLORIDE, CALCIUM CHLORIDE 600; 310; 30; 20 MG/100ML; MG/100ML; MG/100ML; MG/100ML
125 INJECTION, SOLUTION INTRAVENOUS CONTINUOUS
Status: DISCONTINUED | OUTPATIENT
Start: 2017-04-07 | End: 2017-04-07 | Stop reason: HOSPADM

## 2017-04-07 RX ORDER — OXYCODONE HYDROCHLORIDE 5 MG/1
5 TABLET ORAL AS NEEDED
Status: DISCONTINUED | OUTPATIENT
Start: 2017-04-07 | End: 2017-04-07

## 2017-04-07 RX ORDER — ONDANSETRON 2 MG/ML
4 INJECTION INTRAMUSCULAR; INTRAVENOUS AS NEEDED
Status: DISCONTINUED | OUTPATIENT
Start: 2017-04-07 | End: 2017-04-07

## 2017-04-07 RX ORDER — MORPHINE SULFATE 10 MG/ML
4 INJECTION, SOLUTION INTRAMUSCULAR; INTRAVENOUS
Status: DISCONTINUED | OUTPATIENT
Start: 2017-04-07 | End: 2017-04-07 | Stop reason: HOSPADM

## 2017-04-07 RX ORDER — SODIUM CHLORIDE 0.9 % (FLUSH) 0.9 %
5-10 SYRINGE (ML) INJECTION EVERY 8 HOURS
Status: DISCONTINUED | OUTPATIENT
Start: 2017-04-07 | End: 2017-04-08 | Stop reason: HOSPADM

## 2017-04-07 RX ORDER — AMLODIPINE BESYLATE 5 MG/1
5 TABLET ORAL DAILY
Status: DISCONTINUED | OUTPATIENT
Start: 2017-04-07 | End: 2017-04-08

## 2017-04-07 RX ORDER — FENTANYL CITRATE 50 UG/ML
50 INJECTION, SOLUTION INTRAMUSCULAR; INTRAVENOUS AS NEEDED
Status: DISCONTINUED | OUTPATIENT
Start: 2017-04-07 | End: 2017-04-07 | Stop reason: HOSPADM

## 2017-04-07 RX ORDER — PROPOFOL 10 MG/ML
INJECTION, EMULSION INTRAVENOUS AS NEEDED
Status: DISCONTINUED | OUTPATIENT
Start: 2017-04-07 | End: 2017-04-07 | Stop reason: HOSPADM

## 2017-04-07 RX ORDER — PROTAMINE SULFATE 10 MG/ML
INJECTION, SOLUTION INTRAVENOUS AS NEEDED
Status: DISCONTINUED | OUTPATIENT
Start: 2017-04-07 | End: 2017-04-07 | Stop reason: HOSPADM

## 2017-04-07 RX ADMIN — CEFAZOLIN 2 G: 1 INJECTION, POWDER, FOR SOLUTION INTRAMUSCULAR; INTRAVENOUS; PARENTERAL at 07:40

## 2017-04-07 RX ADMIN — HEPARIN SODIUM 5000 UNITS: 5000 INJECTION, SOLUTION INTRAVENOUS; SUBCUTANEOUS at 18:00

## 2017-04-07 RX ADMIN — FENTANYL CITRATE 25 MCG: 50 INJECTION, SOLUTION INTRAMUSCULAR; INTRAVENOUS at 09:40

## 2017-04-07 RX ADMIN — SODIUM CHLORIDE 25 ML/HR: 900 INJECTION, SOLUTION INTRAVENOUS at 07:20

## 2017-04-07 RX ADMIN — Medication 10 ML: at 21:48

## 2017-04-07 RX ADMIN — AMLODIPINE BESYLATE 10 MG: 5 TABLET ORAL at 10:54

## 2017-04-07 RX ADMIN — PROPOFOL 20 MG: 10 INJECTION, EMULSION INTRAVENOUS at 08:25

## 2017-04-07 RX ADMIN — MIRTAZAPINE 15 MG: 15 TABLET, FILM COATED ORAL at 21:47

## 2017-04-07 RX ADMIN — MIDAZOLAM HYDROCHLORIDE 1 MG: 1 INJECTION, SOLUTION INTRAMUSCULAR; INTRAVENOUS at 07:05

## 2017-04-07 RX ADMIN — PROPOFOL 30 MG: 10 INJECTION, EMULSION INTRAVENOUS at 07:47

## 2017-04-07 RX ADMIN — FENTANYL CITRATE 25 MCG: 50 INJECTION, SOLUTION INTRAMUSCULAR; INTRAVENOUS at 07:00

## 2017-04-07 RX ADMIN — PROPOFOL 20 MG: 10 INJECTION, EMULSION INTRAVENOUS at 08:50

## 2017-04-07 RX ADMIN — PANTOPRAZOLE SODIUM 40 MG: 40 TABLET, DELAYED RELEASE ORAL at 10:54

## 2017-04-07 RX ADMIN — OXYCODONE HYDROCHLORIDE 5 MG: 5 TABLET ORAL at 10:53

## 2017-04-07 RX ADMIN — FENTANYL CITRATE 25 MCG: 50 INJECTION, SOLUTION INTRAMUSCULAR; INTRAVENOUS at 09:34

## 2017-04-07 RX ADMIN — Medication 10 ML: at 23:30

## 2017-04-07 RX ADMIN — EPOETIN ALFA 4000 UNITS: 4000 SOLUTION INTRAVENOUS; SUBCUTANEOUS at 21:37

## 2017-04-07 RX ADMIN — FENTANYL CITRATE 25 MCG: 50 INJECTION, SOLUTION INTRAMUSCULAR; INTRAVENOUS at 09:29

## 2017-04-07 RX ADMIN — PRAVASTATIN SODIUM 10 MG: 10 TABLET ORAL at 21:47

## 2017-04-07 RX ADMIN — OXYCODONE HYDROCHLORIDE 5 MG: 5 TABLET ORAL at 21:46

## 2017-04-07 RX ADMIN — HEPARIN SODIUM 5000 UNITS: 1000 INJECTION, SOLUTION INTRAVENOUS; SUBCUTANEOUS at 07:53

## 2017-04-07 RX ADMIN — PROTAMINE SULFATE 20 MG: 10 INJECTION, SOLUTION INTRAVENOUS at 08:44

## 2017-04-07 RX ADMIN — CALCITRIOL 0.25 MCG: 0.25 CAPSULE, LIQUID FILLED ORAL at 10:53

## 2017-04-07 RX ADMIN — INSULIN GLARGINE 6 UNITS: 100 INJECTION, SOLUTION SUBCUTANEOUS at 11:11

## 2017-04-07 RX ADMIN — TAMSULOSIN HYDROCHLORIDE 0.4 MG: 0.4 CAPSULE ORAL at 10:54

## 2017-04-07 RX ADMIN — SODIUM CHLORIDE, SODIUM LACTATE, POTASSIUM CHLORIDE, AND CALCIUM CHLORIDE 125 ML/HR: 600; 310; 30; 20 INJECTION, SOLUTION INTRAVENOUS at 20:34

## 2017-04-07 RX ADMIN — AMLODIPINE BESYLATE 5 MG: 5 TABLET ORAL at 13:09

## 2017-04-07 RX ADMIN — PROPOFOL 75 MCG/KG/MIN: 10 INJECTION, EMULSION INTRAVENOUS at 07:36

## 2017-04-07 RX ADMIN — HYDRALAZINE HYDROCHLORIDE 20 MG: 20 INJECTION INTRAMUSCULAR; INTRAVENOUS at 21:43

## 2017-04-07 RX ADMIN — SODIUM CHLORIDE, SODIUM LACTATE, POTASSIUM CHLORIDE, AND CALCIUM CHLORIDE 125 ML/HR: 600; 310; 30; 20 INJECTION, SOLUTION INTRAVENOUS at 12:45

## 2017-04-07 RX ADMIN — OXYCODONE HYDROCHLORIDE 5 MG: 5 TABLET ORAL at 15:49

## 2017-04-07 RX ADMIN — FENTANYL CITRATE 25 MCG: 50 INJECTION, SOLUTION INTRAMUSCULAR; INTRAVENOUS at 09:45

## 2017-04-07 RX ADMIN — PROPOFOL 30 MG: 10 INJECTION, EMULSION INTRAVENOUS at 08:15

## 2017-04-07 NOTE — PROGRESS NOTES
0600- Pt transported to OR.  0745- Bedside and Verbal shift change report given to Antonio Clifton (oncoming nurse) by Cam Sanchez RN (offgoing nurse). Report included the following information SBAR, Kardex, Intake/Output, MAR, Recent Results and Cardiac Rhythm NSR.

## 2017-04-07 NOTE — PROGRESS NOTES
TRANSFER - OUT REPORT:    Verbal report given to Kimberley(name) on Valerio .  being transferred to [address] [place] [address](unit) for routine progression of care       Report consisted of patients Situation, Background, Assessment and   Recommendations(SBAR). Information from the following report(s) SBAR, Kardex, Intake/Output, MAR and Cardiac Rhythm NSR was reviewed with the receiving nurse. Lines:   Peripheral IV 04/04/17 Right Hand (Active)   Site Assessment Clean, dry, & intact 4/7/2017  9:19 AM   Phlebitis Assessment 0 4/7/2017  9:19 AM   Infiltration Assessment 0 4/7/2017  9:19 AM   Dressing Status Clean, dry, & intact 4/7/2017  9:19 AM   Dressing Type Transparent 4/7/2017  9:19 AM   Hub Color/Line Status Infusing 4/7/2017  9:19 AM   Action Taken Open ports on tubing capped 4/7/2017  9:19 AM   Alcohol Cap Used Yes 4/7/2017  9:19 AM       Peripheral IV 04/07/17 Right Hand (Active)   Site Assessment Clean, dry, & intact 4/7/2017  9:19 AM   Phlebitis Assessment 0 4/7/2017  9:19 AM   Infiltration Assessment 0 4/7/2017  9:19 AM   Dressing Status Clean, dry, & intact 4/7/2017  9:19 AM   Dressing Type Transparent 4/7/2017  9:19 AM   Hub Color/Line Status Capped 4/7/2017  9:19 AM   Action Taken Open ports on tubing capped 4/7/2017  9:19 AM   Alcohol Cap Used Yes 4/7/2017  9:19 AM        Opportunity for questions and clarification was provided.       Patient transported with:   A[place]ing

## 2017-04-07 NOTE — ROUTINE PROCESS
Patient: Saurabh Mccauley. MRN: [id number]  SSN: [id number]   YOB: 1950  Age: 61 y.o. Sex: male     Patient is status post Procedure(s):  INSERT LEFT UPPER ARM AV GRAFT . Surgeon(s) and Role:     * Flaco Beard MD - Primary    Irrigation:  2000 U Heparin in 500 ml Normal Saline                  Peripheral IV 04/04/17 Right Hand (Active)   Site Assessment Clean, dry, & intact 4/6/2017  4:00 AM   Phlebitis Assessment 0 4/6/2017  4:00 AM   Infiltration Assessment 0 4/6/2017  4:00 AM   Dressing Status Clean, dry, & intact 4/6/2017  4:00 AM   Dressing Type Transparent 4/6/2017  4:00 AM   Hub Color/Line Status Pink;Capped 4/6/2017  4:00 AM   Action Taken Open ports on tubing capped 4/6/2017  4:00 AM   Alcohol Cap Used Yes 4/6/2017  4:00 AM       Peripheral IV 04/07/17 Right Hand (Active)        Hemodialysis Catheter (Active)   Central Line Being Utilized Yes 4/6/2017  4:00 AM   Criteria for Appropriate Use Dialysis/apheresis 4/6/2017  4:00 AM   Site Assessment Clean, dry, & intact 4/6/2017  4:00 AM   Date of Last Dressing Change 04/04/17 4/6/2017  4:00 AM   Dressing Status Clean, dry, & intact 4/6/2017  4:00 AM   Dressing Type Disk with Chlorhexadine gluconate (CHG); Transparent 4/6/2017  4:00 AM   Proximal Hub Color/Line Status Red;Capped 4/6/2017  4:00 AM   Distal Hub Color/Line Status Blue;Capped 4/6/2017  4:00 AM                       Dressing/Packing:  Wound Arm Upper;Left;Medial-DRESSING TYPE: 4 x 4;Special tape (comment); Staples (Medipore Tape) (04/07/17 0700):  ]    Other:  Regional Block

## 2017-04-07 NOTE — PERIOP NOTES
TRANSFER - OUT REPORT:    Verbal report given to geno (name) on Dayana Barragan.  being transferred to 412(unit) for routine post - op       Report consisted of patients Situation, Background, Assessment and   Recommendations(SBAR). Time Pre op antibiotic given:  Anesthesia Stop time: 908  Childs Present on Transfer to floor:no  Order for Childs on Chart:no    Information from the following report(s) SBAR, OR Summary, Procedure Summary, Intake/Output and MAR, NSR was reviewed with the receiving nurse. Opportunity for questions and clarification was provided. Is the patient on 02? NO       L/Min        Other     Is the patient on a monitor? YES    Is the nurse transporting with the patient? YES    Surgical Waiting Area notified of patient's transfer from PACU? YES      The following personal items collected during your admission accompanied patient upon transfer:   Dental Appliance: Dental Appliances: Uppers  Vision:    Hearing Aid:    Jewelry: Jewelry: None  Clothing: Clothing:  (clothing to PACU)  Other Valuables:  Other Valuables: None  Valuables sent to safe:      NO BELONGINGS TO PACU

## 2017-04-07 NOTE — ANESTHESIA POSTPROCEDURE EVALUATION
Post-Anesthesia Evaluation and Assessment    Patient: Chance Richardson. MRN: [id number]  SSN: [id number]    YOB: 1950  Age: 61 y.o. Sex: male       Cardiovascular Function/Vital Signs  Visit Vitals    /61    Pulse 75    Temp 36.4 °C (97.5 °F)    Resp 16    Ht 5' 4\" (1.626 m)    Wt 49.9 kg (110 lb 0.2 oz)    SpO2 100%    BMI 18.88 kg/m2       Patient is status post regional anesthesia for Procedure(s):  INSERT LEFT UPPER ARM AV GRAFT . Nausea/Vomiting: None    Postoperative hydration reviewed and adequate. Pain:  Pain Scale 1: FLACC (04/07/17 0942)  Pain Intensity 1: 0 (04/07/17 0942)   Managed    Neurological Status:   Neuro (WDL): Exceptions to WDL (04/07/17 0932)  Neuro  Neurologic State: Alert (04/07/17 0932)  Orientation Level: Disoriented to place; Disoriented to situation;Disoriented to time;Oriented to person (04/07/17 0932)  Cognition: Appropriate decision making; Appropriate for age attention/concentration; Appropriate safety awareness; Follows commands (04/06/17 2039)  Speech: Clear (04/06/17 2039)  Assessment L Pupil: Fixed;Irregularly shaped (04/05/17 2000)  Size L Pupil (mm): 4 (04/05/17 2000)  Assessment R Pupil: Brisk;Round (04/05/17 2000)  Size R Pupil (mm): 3 (04/05/17 2000)  LUE Motor Response: Numbness (partial numbness- pt also reports pain to left arm) (04/07/17 0932)  LLE Motor Response: Purposeful (04/07/17 0932)  RUE Motor Response: Purposeful (04/07/17 0932)  RLE Motor Response: Purposeful (04/07/17 0932)   At baseline    Mental Status and Level of Consciousness: Arousable    Pulmonary Status:   O2 Device: Room air (04/07/17 0945)   Adequate oxygenation and airway patent    Complications related to anesthesia: None    Post-anesthesia assessment completed.  No concerns    Signed By: Benita Barba MD     April 7, 2017

## 2017-04-07 NOTE — ANESTHESIA PREPROCEDURE EVALUATION
Anesthetic History   No history of anesthetic complications            Review of Systems / Medical History  Patient summary reviewed, nursing notes reviewed and pertinent labs reviewed    Pulmonary  Within defined limits                 Neuro/Psych   Within defined limits           Cardiovascular    Hypertension          CAD         GI/Hepatic/Renal  Within defined limits              Endo/Other    Diabetes         Other Findings              Physical Exam    Airway  Mallampati: II  TM Distance: > 6 cm  Neck ROM: normal range of motion   Mouth opening: Normal     Cardiovascular  Regular rate and rhythm,  S1 and S2 normal,  no murmur, click, rub, or gallop             Dental  No notable dental hx       Pulmonary  Breath sounds clear to auscultation               Abdominal  GI exam deferred       Other Findings            Anesthetic Plan    ASA: 2  Anesthesia type: regional - supraclavicular block      Post-op pain plan if not by surgeon: peripheral nerve block single    Induction: Intravenous  Anesthetic plan and risks discussed with: Patient

## 2017-04-07 NOTE — PROGRESS NOTES
Name: Rikki Leigh MRN: [id number]  : 1950      Assessment:  ESRD -has good RRF. May be OK with HD 2 days per week for now (monday and Friday)  HTN  DM-2 -uncontrolled with mild DKA (AG of 22)- resolved  Dementia  Anemia of ESRD  SHPT with low Phosphorus     Pt is new ESRD. S/p HD yesterday. L AVG this am     Plan/Recommendations:  Next HD tentatively on Monday, if pt still here  Oral iron stopped  Ct Calcitriol; no binders  Insulin per primary team  Ct  EPO  I have stopped Phoslo- while phos is low. Resume in future when appetite improves and Phosphorus is high    Subjective:  Resting.   S/p L AVG surgery earlier today    ROS:   Non verbal mostly    Exam:  Visit Vitals    /69 (BP 1 Location: Right arm, BP Patient Position: At rest)    Pulse 80    Temp 97.5 °F (36.4 °C)    Resp 18    Ht 5' 4\" (1.626 m)    Wt 49.9 kg (110 lb 0.2 oz)    SpO2 100%    BMI 18.88 kg/m2       NAD  Clear ant, no distress  RRR, no rub  No edema; LUE covered with drsg  Alert awake    Current Facility-Administered Medications   Medication Dose Route Frequency Last Dose    sodium chloride (NS) flush 5-10 mL  5-10 mL IntraVENous Q8H      sodium chloride (NS) flush 5-10 mL  5-10 mL IntraVENous PRN      lactated ringers infusion  125 mL/hr IntraVENous CONTINUOUS Stopped at 17 1000    sodium chloride (NS) flush 5-10 mL  5-10 mL IntraVENous PRN      oxyCODONE IR (ROXICODONE) tablet 5 mg  5 mg Oral PRN 5 mg at 17 1053    morphine injection 2 mg  2 mg IntraVENous Multiple      HYDROmorphone (PF) (DILAUDID) injection 0.2 mg  0.2 mg IntraVENous Multiple      ondansetron (ZOFRAN) injection 4 mg  4 mg IntraVENous PRN      midazolam (VERSED) injection 1 mg  1 mg IntraVENous Q5MIN PRN      diphenhydrAMINE (BENADRYL) injection 12.5 mg  12.5 mg IntraVENous PRN      meperidine (DEMEROL) injection 12.5 mg  12.5 mg IntraVENous ONCE      ePHEDrine (MISTOLE) 50 mg/mL injection 5 mg  5 mg IntraVENous PRN      mupirocin (BACTROBAN) 2 % ointment   Topical BID      insulin lispro (HUMALOG) injection   SubCUTAneous AC&HS Stopped at 04/07/17 0930    insulin glargine (LANTUS) injection 6 Units  6 Units SubCUTAneous DAILY 6 Units at 04/07/17 1111    hydrALAZINE (APRESOLINE) 20 mg/mL injection 20 mg  20 mg IntraVENous Q6H PRN 20 mg at 04/05/17 0909    prochlorperazine (COMPAZINE) with saline injection 10 mg  10 mg IntraVENous Q6H PRN 10 mg at 04/05/17 1822    acetaminophen (TYLENOL) tablet 325 mg  325 mg Oral Q4H PRN      albuterol-ipratropium (DUO-NEB) 2.5 MG-0.5 MG/3 ML  3 mL Nebulization Q6H PRN      amLODIPine (NORVASC) tablet 10 mg  10 mg Oral DAILY 10 mg at 04/07/17 1054    calcitRIOL (ROCALTROL) capsule 0.25 mcg  0.25 mcg Oral DAILY 0.25 mcg at 04/07/17 1053    mirtazapine (REMERON) tablet 15 mg  15 mg Oral QHS 15 mg at 04/06/17 2317    pantoprazole (PROTONIX) tablet 40 mg  40 mg Oral DAILY 40 mg at 04/07/17 1054    pravastatin (PRAVACHOL) tablet 10 mg  10 mg Oral QHS 10 mg at 04/06/17 2317    tamsulosin (FLOMAX) capsule 0.4 mg  0.4 mg Oral DAILY AFTER BREAKFAST 0.4 mg at 04/07/17 1054    sodium chloride (NS) flush 5-10 mL  5-10 mL IntraVENous Q8H 10 mL at 04/06/17 2317    sodium chloride (NS) flush 5-10 mL  5-10 mL IntraVENous PRN      glucose chewable tablet 16 g  4 Tab Oral PRN      dextrose (D50W) injection syrg 12.5-25 g  12.5-25 g IntraVENous PRN 25 g at 04/06/17 4372    glucagon (GLUCAGEN) injection 1 mg  1 mg IntraMUSCular PRN      heparin (porcine) injection 5,000 Units  5,000 Units SubCUTAneous Q12H Stopped at 04/07/17 0600    ondansetron (ZOFRAN) injection 4 mg  4 mg IntraVENous Q8H PRN 4 mg at 04/05/17 1558    albumin human 25% (BUMINATE) solution 12.5 g  12.5 g IntraVENous DIALYSIS PRN      heparin (porcine) 1,000 unit/mL injection 1,000 Units 1,000 Units InterCATHeter DIALYSIS PRN      epoetin shaye (EPOGEN;PROCRIT) injection 4,000 Units  4,000 Units SubCUTAneous DIALYSIS MON, WED & FRI Stopped at 04/05/17 2100       Labs/Data:    Lab Results   Component Value Date/Time    WBC 5.7 04/04/2017 12:20 PM    Hemoglobin (POC) 10.5 04/07/2017 07:30 AM    HGB 10.4 04/04/2017 12:20 PM    Hematocrit (POC) 31 04/07/2017 07:30 AM    HCT 31.1 04/04/2017 12:20 PM    PLATELET 859 39/21/6935 12:20 PM    MCV 90.1 04/04/2017 12:20 PM       Lab Results   Component Value Date/Time    Sodium 140 04/07/2017 05:47 AM    Potassium 3.5 04/07/2017 05:47 AM    Chloride 104 04/07/2017 05:47 AM    CO2 28 04/07/2017 05:47 AM    Anion gap 8 04/07/2017 05:47 AM    Glucose 130 04/07/2017 05:47 AM    BUN 4 04/07/2017 05:47 AM    Creatinine 1.97 04/07/2017 05:47 AM    BUN/Creatinine ratio 2 04/07/2017 05:47 AM    GFR est AA 42 04/07/2017 05:47 AM    GFR est non-AA 34 04/07/2017 05:47 AM    Calcium 8.2 04/07/2017 05:47 AM       Wt Readings from Last 3 Encounters:   04/07/17 49.9 kg (110 lb 0.2 oz)   03/02/17 64.9 kg (143 lb)   02/28/17 63.1 kg (139 lb 1.8 oz)         Intake/Output Summary (Last 24 hours) at 04/07/17 1128  Last data filed at 04/07/17 0932   Gross per 24 hour   Intake              175 ml   Output             1225 ml   Net            -1050 ml       Patient seen and examined. Chart reviewed. Labs, data and other pertinent notes reviewed in last 24 hrs.     PMH/SH/FH reviewed and unchanged compared to H&P    Discussed with pt      Abbie Oscar MD

## 2017-04-07 NOTE — PROGRESS NOTES
[facility]                         [phone]  Vitals Pre Post Assessment Pre Post   /66 162/68 LOC A&O x2 A&O x2   HR 77 77 Lungs Clear Clear   Temp 98.4 98.4 Cardiac Regular rhythm Regular rhythm   Resp 18 18 Skin Warm, dry, intact Warm, dry, intact   Weight 54.7kg -1kg Edema Trace None noted      Pain 0/10 0/10     Orders   Duration: Start: 5911 End: 0245 Total: 3:00   Dialyzer: Revaclear   K Bath: 3   Ca Bath: 2.5   Na / Bicarb: 140 / 35   Target Fluid Removal: 1000mL     Access   Type & Location: Right subclavian CVC   Comments:                            +aspiration +flush with 10mL normal saline. Dressing clean, dry, intact- dated 4/4/17. No s/s of infection noted. Labs   Hep B status / date: Negative - 4/5/17   Obtained/Reviewed  Critical Results Called Orders, meds, labs reviewed. Consent obtained. n/a     Meds Given   Name Dose Route                    Total Liters Process: 68.7   Net Fluid Removed: 1000mL      Comments   Time Out Done: Yes, by MARYANNE Lomeli at 5645   Primary Nurse Rpt Pre: Armida Azar RN   Primary Nurse Rpt Post: Armida Azar RN   Pt Education: Procedural   Care Plan: Continue HD as ordered by the Nephrologit   Tx Summary: Tx initiated with 200mL normal saline prime given. All vitals signs remained stable during tx. Overall, tx was tolerated well without incident. At the conclusion of tx, all possible blood was returned to the pt with a 300mL normal saline rinseback. CVC flushed with 10mL normal saline in each port, both lines clamped, and new end caps placed. Pt remains in bed resting in stable condition. Report exchanged with primary nurse.

## 2017-04-07 NOTE — PROGRESS NOTES
Hospitalist Progress Note  Yuly Dickerson MD        Date of Service:  2017  NAME:  Saurabh Mccauley. :  1950  MRN:  [id number]      Admission Summary:     Saurabh Edwards is a 61 y.o. male with history of dementia, CAD, prostate cancer, ESRD on HD (M/W/F), HTN, uncontrolled DM-2 who presents withhyperglycemia. He had originally been scheduled for vascular access surgery for his dialysis, but had been found to be hyperglycemic with fingerstick glucose of 627. His surgery was postponed because of this and we were asked to admit patient for glucose control.       Interval history / Subjective:   Mr. Eliz Lim is confused why his arm hurts. Assessment & Plan:     DKA secondary to noncompliance (POA) - resolved and BG improved  - Was on insulin drip, ivf and in icu setting.  - Started on glargine with sliding scale. Hypertension, in setting of known new ESRD  Appreciate nephrology service who has already seen patient. Medications adjusted as needed. New ESRD  - UE AV graft placed   - Pain control  - Continue IHD  - Nephrology, vascular surgery consulted    Memory impairment per H&P note:  \"- originally listed in chart as secondary to dementia, though per sister's report, this may be due to TBI (head injury more than 30 years ago)  - supportive care\"    History of prostate cancer per h&P  - follow-up as outpatient    Code: DNR (refer to h&p note)  DVT prophylaxis: on heparin  Disposition:TBD     Hospital Problems  Date Reviewed: 2017          Codes Class Noted POA    CKD (chronic kidney disease) stage 3, GFR 30-59 ml/min (Chronic) ICD-10-CM: N18.3  ICD-9-CM: 318. 3 Chronic 2013 Yes        * (Principal)DKA (diabetic ketoacidoses) (HCC) ICD-10-CM: E13.10  ICD-9-CM: 250.10  7/10/2011 Yes        HTN (hypertension) (Chronic) ICD-10-CM: I10  ICD-9-CM: 401.9  2010 Yes        Alzheimer disease ICD-10-CM: G30.9  ICD-9-CM: 331.0  9/19/2010 Yes        DM (diabetes mellitus), type 2, uncontrolled ([address] (Chronic) ICD-10-CM: E11.65  ICD-9-CM: 250.02  8/16/2010 Yes        Dementia (Chronic) ICD-10-CM: F03.90  ICD-9-CM: 294.20  1/2/2010 Yes    Overview Signed 1/2/2010  1:44 PM by Geovanny Lawrence MD     Moderate. Review of Systems:   Patient unable to cooperate to perform 10 point ROS    Vital Signs:    Last 24hrs VS reviewed since prior progress note. Most recent are:  Visit Vitals    /69 (BP 1 Location: Right arm, BP Patient Position: At rest)    Pulse 80    Temp 97.5 °F (36.4 °C)    Resp 18    Ht 5' 4\" (1.626 m)    Wt 49.9 kg (110 lb 0.2 oz)    SpO2 100%    BMI 18.88 kg/m2           Physical Examination:             Constitutional:  No acute distress    ENT:  Oral mucous moist, oropharynx benign. Neck supple,    Resp:  CTA bilaterally. No wheezing/rhonchi/rales. No accessory muscle use   CV:  Regular rhythm, normal rate, no murmurs, gallops, rubs    GI:  Soft, non distended, non tender. normoactive bowel sounds     Musculoskeletal:  No edema, warm, 2+ pulses throughout. Left arm wound dressed    Neurologic:  Moves all extremities. CN II-XII reviewed               Labs:     No results for input(s): WBC, HGB, HCT, PLT, HGBEXT, HCTEXT, PLTEXT, HGBEXT, HCTEXT, PLTEXT in the last 72 hours.   Recent Labs      04/07/17   0547  04/06/17   0402  04/05/17   0015   NA  140  138  136   K  3.5  3.5  3.5   CL  104  105  102   CO2  28  24  25   BUN  4*  13  13   CREA  1.97*  3.32*  2.68*   GLU  130*  56*  83   CA  8.2*  8.6  8.4*   MG  1.6  1.7  1.7   PHOS   --   2.4*   --      Lab Results   Component Value Date/Time    Folate 53.6 11/11/2014 03:40 AM     Lab Results   Component Value Date/Time    Cholesterol, total 154 02/06/2017 02:36 AM    HDL Cholesterol 121 02/06/2017 02:36 AM    LDL, calculated 19.8 02/06/2017 02:36 AM    Triglyceride 66 02/06/2017 02:36 AM    CHOL/HDL Ratio 1.3 02/06/2017 02:36 AM Lab Results   Component Value Date/Time    Glucose (POC) 135 04/07/2017 11:30 AM    Glucose (POC) 129 04/07/2017 09:19 AM    Glucose (POC) 126 04/07/2017 07:30 AM    Glucose (POC) 245 04/06/2017 10:47 PM    Glucose (POC) 151 04/06/2017 04:51 PM    Glucose (POC) 202 04/06/2017 12:19 PM     Lab Results   Component Value Date/Time    Color YELLOW/STRAW 02/06/2017 02:36 AM    Appearance CLEAR 02/06/2017 02:36 AM    Specific gravity 1.008 02/06/2017 02:36 AM    Specific gravity 1.010 01/18/2017 03:41 AM    pH (UA) 7.5 02/06/2017 02:36 AM    Protein 100 02/06/2017 02:36 AM    Glucose 100 02/06/2017 02:36 AM    Ketone NEGATIVE  02/06/2017 02:36 AM    Bilirubin NEGATIVE  02/06/2017 02:36 AM    Urobilinogen 0.2 02/06/2017 02:36 AM    Nitrites NEGATIVE  02/06/2017 02:36 AM    Leukocyte Esterase NEGATIVE  02/06/2017 02:36 AM    Epithelial cells FEW 02/06/2017 02:36 AM    Bacteria NEGATIVE  02/06/2017 02:36 AM    WBC 0-4 02/06/2017 02:36 AM    RBC  02/06/2017 02:36 AM                    Sofía Tilley MD

## 2017-04-07 NOTE — ANESTHESIA PROCEDURE NOTES
Peripheral Block    Start time: 4/7/2017 7:05 AM  End time: 4/7/2017 7:10 AM  Performed by: Marbella Stone  Authorized by: Jamel GOLDBERG       Pre-procedure:    Indications: post-op pain management and primary anesthetic    Preanesthetic Checklist: patient identified, risks and benefits discussed, site marked, timeout performed, anesthesia consent given and patient being monitored    Timeout Time: 07:05          Block Type:   Block Type:  Supraclavicular  Monitoring:  Standard ASA monitoring, continuous pulse ox, frequent vital sign checks, heart rate, oxygen and responsive to questions  Injection Technique:  Single shot  Procedures: nerve stimulator    Patient Position: supine  Prep: chlorhexidine    Location:  Supraclavicular    Assessment:    Injection Assessment:

## 2017-04-07 NOTE — ANESTHESIA PROCEDURE NOTES
Peripheral Block    Start time: 4/7/2017 7:00 AM  End time: 4/7/2017 7:10 AM  Performed by: Deisy Espinosa  Authorized by: Zoey GOLDBERG       Pre-procedure: Indications: at surgeon's request and post-op pain management    Preanesthetic Checklist: risks and benefits discussed, site marked and timeout performed    Timeout Time: 07:00          Block Type:   Block Type:   Interscalene  Laterality:  Left  Monitoring:  Standard ASA monitoring, continuous pulse ox, frequent vital sign checks, heart rate, responsive to questions and oxygen  Injection Technique:  Single shot  Procedures: ultrasound guided and nerve stimulator    Patient Position: supine  Prep: betadine and povidone-iodine 7.5% surgical scrub    Location:  Interscalene  Needle Type:  Stimuplex  Needle Gauge:  22 G  Needle Localization:  Nerve stimulator and ultrasound guidance  Motor Response: minimal motor response >0.4 mA    Medication Injected:  0.5%  ropivacaine  Volume (mL):  30    Assessment:  Number of attempts:  1  Injection Assessment:  Incremental injection every 5 mL, local visualized surrounding nerve on ultrasound, negative aspiration for blood, no paresthesia, negative aspiration for CSF and ultrasound image on chart  Patient tolerance:  Patient tolerated the procedure well with no immediate complications

## 2017-04-07 NOTE — ROUTINE PROCESS
Patient: Debbie Baumann. MRN: [id number]  SSN: [id number]   YOB: 1950  Age: 61 y.o. Sex: male     Patient is status post Procedure(s):  INSERT LEFT UPPER ARM AV GRAFT . Surgeon(s) and Role:     * Preston Nixon MD - Primary    Irrigation:  2000 U heparin in 500 ml Normal Saline                  Peripheral IV 04/04/17 Right Hand (Active)   Site Assessment Clean, dry, & intact 4/6/2017  4:00 AM   Phlebitis Assessment 0 4/6/2017  4:00 AM   Infiltration Assessment 0 4/6/2017  4:00 AM   Dressing Status Clean, dry, & intact 4/6/2017  4:00 AM   Dressing Type Transparent 4/6/2017  4:00 AM   Hub Color/Line Status Pink;Capped 4/6/2017  4:00 AM   Action Taken Open ports on tubing capped 4/6/2017  4:00 AM   Alcohol Cap Used Yes 4/6/2017  4:00 AM       Peripheral IV 04/07/17 Right Hand (Active)        Hemodialysis Catheter (Active)   Central Line Being Utilized Yes 4/6/2017  4:00 AM   Criteria for Appropriate Use Dialysis/apheresis 4/6/2017  4:00 AM   Site Assessment Clean, dry, & intact 4/6/2017  4:00 AM   Date of Last Dressing Change 04/04/17 4/6/2017  4:00 AM   Dressing Status Clean, dry, & intact 4/6/2017  4:00 AM   Dressing Type Disk with Chlorhexadine gluconate (CHG); Transparent 4/6/2017  4:00 AM   Proximal Hub Color/Line Status Red;Capped 4/6/2017  4:00 AM   Distal Hub Color/Line Status Blue;Capped 4/6/2017  4:00 AM                       Dressing/Packing:  Wound Arm Upper;Left;Medial-DRESSING TYPE: 4 x 4;Special tape (comment); Staples (Medipore Tape) (04/07/17 0700)  :  ]    Other:  Regional Block

## 2017-04-07 NOTE — BRIEF OP NOTE
BRIEF OPERATIVE NOTE    Date of Procedure: 4/7/2017   Preoperative Diagnosis: END STAGE RENAL DISEASE  Postoperative Diagnosis: END STAGE RENAL DISEASE    Procedure(s):  INSERT LEFT UPPER ARM AV GRAFT   Surgeon(s) and Role:     Apolonia Pierce MD - Primary            Surgical Staff:  Circ-1: Georgianna Boeck, RN  Scrub RN-1: Jian Garcia RN  Surg Asst-1: Paticia Purple Hulings  Event Time In   Incision Start 6795   Incision Close 0852     Anesthesia: Regional   Estimated Blood Loss: *125mls**  Specimens: * No specimens in log *   Findings: *good artery and vein with good thrill at the conclusion   Complications: *none**  Implants:   Implant Name Type Inv.  Item Serial No.  Lot No. LRB No. Used Action   GRAFT TW STRTCH [id number] -- PROPATEN - [id number]   GRAFT TW STRTCH [id number] -- [employer]ATEN    [employer]   Left 1 Implanted

## 2017-04-07 NOTE — PROGRESS NOTES
Sent updates via [facility] to [facility] where the patient lives. The patient is not ready for discharge today but may be ready over the weekend. He has [employer].  asked [facility]  if they can take the patient over the weekend or if they have to wait for Monday to get insurance authorization. They stated that they could bring him back to the facility without insurance authorization.  Estephania Roy RN BS CRM

## 2017-04-07 NOTE — PROGRESS NOTES
TRANSFER - IN REPORT:    Verbal report received from [facility]) on Elma Dover.  being received from [facility]) for routine progression of care      Report consisted of patients Situation, Background, Assessment and   Recommendations(SBAR). Information from the following report(s) SBAR, Intake/Output, MAR and Recent Results was reviewed with the receiving nurse. Opportunity for questions and clarification was provided. Assessment completed upon patients arrival to unit and care assumed.

## 2017-04-07 NOTE — OP NOTES
[address] [facility]   [address] [facility], [address]   OP NOTE       Name:  Sangita Salazar   MR#:  [id number]   :  1950   Account #:  [de-identified]    Surgery Date:  2017   Date of Adm:  2017       PREOPERATIVE DIAGNOSIS: End-stage renal disease. POSTOPERATIVE DIAGNOSIS: End-stage renal disease. PROCEDURES PERFORMED: Right upper arm arteriovenous graft. SURGEON: Simon Echeverria. Connor Wei MD    ANESTHESIA: Axillary block and standby and modified anesthesia   care. ESTIMATED BLOOD LOSS: 125 mL. SPECIMENS REMOVED: None. COMPLICATIONS: None. DRAINS: No drains. INDICATIONS: A 59-year-old with altered mental status and end-stage   renal disease for placement of AV graft. The patient was admitted for   his AV graft placement 3 days ago and he was noted to be in DKA with   a glucose over 600. The hospitalist service admitted him and has   controlled his glucose and he is now for graft placement. DESCRIPTION OF PROCEDURE: After informed consent with the   arm identified and marked, and prepped and draped, a thorough time-  out was done. An incision was made in the medial upper arm basilic vein and brachial   artery was dissected free. Counter incisions were made and a 7 mm   thin-walled [place] graft was tunneled. The patient was given 5000   units of heparin. Flow was interrupted on the artery, which was   positioned laterally and an arteriotomy made in the brachial artery after   placement of clamps and an end-to-side anastomosis with running 6-0   Prolene was performed. The vein was then clamped and opened. End-  to-side anastomosis after fashioning the graft was performed. Flow   was restored with an excellent thrill, pulse and bruit. There was a good   brachial pulse distally. Hemostasis was obtained with 20 mg of protamine sulfate and Fibrillar. The wounds were closed in layers with Vicryl and skin staples. Sterile   dressings were applied. The patient tolerated the procedure well.         Reena Miranda.MD COSME / PATIENCE   D:  04/07/2017   09:04   T:  04/07/2017   09:40   Job #:  [id number]

## 2017-04-08 VITALS
BODY MASS INDEX: 16.3 KG/M2 | RESPIRATION RATE: 18 BRPM | SYSTOLIC BLOOD PRESSURE: 156 MMHG | TEMPERATURE: 98.4 F | HEART RATE: 96 BPM | OXYGEN SATURATION: 100 % | DIASTOLIC BLOOD PRESSURE: 74 MMHG | HEIGHT: 64 IN | WEIGHT: 95.46 LBS

## 2017-04-08 LAB
GLUCOSE BLD STRIP.AUTO-MCNC: 143 MG/DL (ref 65–100)
GLUCOSE BLD STRIP.AUTO-MCNC: 246 MG/DL (ref 65–100)
GLUCOSE BLD STRIP.AUTO-MCNC: 64 MG/DL (ref 65–100)
GLUCOSE BLD STRIP.AUTO-MCNC: 70 MG/DL (ref 65–100)
MAGNESIUM SERPL-MCNC: 1.8 MG/DL (ref 1.6–2.4)
SERVICE CMNT-IMP: ABNORMAL
SERVICE CMNT-IMP: NORMAL

## 2017-04-08 PROCEDURE — 74011636637 HC RX REV CODE- 636/637: Performed by: INTERNAL MEDICINE

## 2017-04-08 PROCEDURE — 74011250637 HC RX REV CODE- 250/637: Performed by: NURSE PRACTITIONER

## 2017-04-08 PROCEDURE — 74011250636 HC RX REV CODE- 250/636: Performed by: STUDENT IN AN ORGANIZED HEALTH CARE EDUCATION/TRAINING PROGRAM

## 2017-04-08 PROCEDURE — 74011636637 HC RX REV CODE- 636/637: Performed by: HOSPITALIST

## 2017-04-08 PROCEDURE — 74011250637 HC RX REV CODE- 250/637: Performed by: STUDENT IN AN ORGANIZED HEALTH CARE EDUCATION/TRAINING PROGRAM

## 2017-04-08 PROCEDURE — 36415 COLL VENOUS BLD VENIPUNCTURE: CPT | Performed by: PHYSICIAN ASSISTANT

## 2017-04-08 PROCEDURE — 74011250636 HC RX REV CODE- 250/636: Performed by: ANESTHESIOLOGY

## 2017-04-08 PROCEDURE — 83735 ASSAY OF MAGNESIUM: CPT | Performed by: PHYSICIAN ASSISTANT

## 2017-04-08 PROCEDURE — 74011250637 HC RX REV CODE- 250/637: Performed by: INTERNAL MEDICINE

## 2017-04-08 PROCEDURE — 82962 GLUCOSE BLOOD TEST: CPT

## 2017-04-08 RX ORDER — INSULIN GLARGINE 100 [IU]/ML
5 INJECTION, SOLUTION SUBCUTANEOUS DAILY
Status: DISCONTINUED | OUTPATIENT
Start: 2017-04-08 | End: 2017-04-08 | Stop reason: HOSPADM

## 2017-04-08 RX ORDER — AMLODIPINE BESYLATE 10 MG/1
10 TABLET ORAL DAILY
Qty: 30 TAB | Refills: 0 | Status: SHIPPED
Start: 2017-04-08 | End: 2019-01-01

## 2017-04-08 RX ORDER — OXYCODONE HYDROCHLORIDE 5 MG/1
5 TABLET ORAL
Qty: 20 TAB | Refills: 0 | Status: SHIPPED | OUTPATIENT
Start: 2017-04-08 | End: 2019-01-01

## 2017-04-08 RX ORDER — ACETAMINOPHEN 325 MG/1
650 TABLET ORAL
Qty: 100 TAB | Refills: 0 | Status: SHIPPED
Start: 2017-04-08

## 2017-04-08 RX ORDER — MUPIROCIN 20 MG/G
OINTMENT TOPICAL 2 TIMES DAILY
Qty: 22 G | Refills: 0 | Status: SHIPPED
Start: 2017-04-08 | End: 2017-04-15

## 2017-04-08 RX ORDER — INSULIN GLARGINE 100 [IU]/ML
5 INJECTION, SOLUTION SUBCUTANEOUS
Qty: 1 VIAL | Refills: 0 | Status: SHIPPED
Start: 2017-04-08 | End: 2019-01-01 | Stop reason: DRUGHIGH

## 2017-04-08 RX ORDER — PRAVASTATIN SODIUM 10 MG/1
10 TABLET ORAL
Qty: 30 TAB | Refills: 0 | Status: SHIPPED
Start: 2017-04-08 | End: 2019-01-01

## 2017-04-08 RX ADMIN — Medication 10 ML: at 07:16

## 2017-04-08 RX ADMIN — CALCITRIOL 0.25 MCG: 0.25 CAPSULE, LIQUID FILLED ORAL at 08:45

## 2017-04-08 RX ADMIN — AMLODIPINE BESYLATE 10 MG: 5 TABLET ORAL at 08:46

## 2017-04-08 RX ADMIN — PANTOPRAZOLE SODIUM 40 MG: 40 TABLET, DELAYED RELEASE ORAL at 08:46

## 2017-04-08 RX ADMIN — TAMSULOSIN HYDROCHLORIDE 0.4 MG: 0.4 CAPSULE ORAL at 08:45

## 2017-04-08 RX ADMIN — Medication 16 G: at 07:14

## 2017-04-08 RX ADMIN — MUPIROCIN: 20 OINTMENT TOPICAL at 08:47

## 2017-04-08 RX ADMIN — SODIUM CHLORIDE, SODIUM LACTATE, POTASSIUM CHLORIDE, AND CALCIUM CHLORIDE 125 ML/HR: 600; 310; 30; 20 INJECTION, SOLUTION INTRAVENOUS at 07:52

## 2017-04-08 RX ADMIN — HEPARIN SODIUM 5000 UNITS: 5000 INJECTION, SOLUTION INTRAVENOUS; SUBCUTANEOUS at 07:15

## 2017-04-08 RX ADMIN — Medication 10 ML: at 07:15

## 2017-04-08 RX ADMIN — INSULIN GLARGINE 5 UNITS: 100 INJECTION, SOLUTION SUBCUTANEOUS at 09:01

## 2017-04-08 RX ADMIN — INSULIN LISPRO 2 UNITS: 100 INJECTION, SOLUTION INTRAVENOUS; SUBCUTANEOUS at 12:01

## 2017-04-08 NOTE — DISCHARGE INSTRUCTIONS
Discharging provider: Abril Johnson MD    Primary care provider: Noel Craig MD    [address] [facility] COURSE:    Breonna Ashford is a 61 y.o. male with history of dementia, CAD, prostate cancer, ESRD on HD (M/W/F), HTN, uncontrolled DM-2 who presents withhyperglycemia. He had originally been scheduled for vascular access surgery for his dialysis, but had been found to be hyperglycemic with fingerstick glucose of 627. His surgery was postponed because of this and we were asked to admit patient for glucose control.      DKA (POA) - resolved and BG improved, A1c 10.3 but having episodes of hypoglycemia now  - Was on insulin drip, ivf and in icu setting.  - Reduce glargine to 5 units nightly, add bed-time diabetic snack and continue sliding scale.     Hypertension, in setting of known new ESRD  Appreciate nephrology service who has already seen patient.   Medications adjusted as needed.     New ESRD  - UE AV graft placed 4/7  - Pain control  - Continue IHD  - Nephrology consulted - follow up with regular dialysis  - Vascular surgery consulted - follow up with Dr. Elisabet Kim    Memory impairment per H&P note:  \"- originally listed in chart as secondary to dementia, though per sister's report, this may be due to TBI (head injury more than 30 years ago)  - supportive care\"     History of prostate cancer per h&P  - follow-up as outpatient    FOLLOW-UP CARE RECOMMENDATIONS:    APPOINTMENTS:  Follow-up Information     Follow up With Details Comments [address] [facility] PSYCHIATRIC)   [address] [facility]    Topher Blanco MD Schedule an appointment as soon as possible for a visit in 2 weeks For follow up of dialysis graft placement   [address]      Noel Craig MD Call As needed for primary care [address] [facility] [address]  [phone]           It is very important that you keep follow-up appointment(s). Bring discharge papers, medication list (and/or medication bottles) to follow-up appointments for review by outpatient provider(s). ONGOING TREATMENT PLAN: Continue pain control for dialysis graft placement, follow up with vascular surgery, continue dialysis, adjust diabetes meds as needed    Specific symptoms to watch for: chest pain, shortness of breath, fever, chills, nausea, vomiting, diarrhea, change in mentation, falling, weakness, bleeding. DIET:  Diabetic Diet and Renal Diet, please schedule diabetic bed-time snack    ACTIVITY:  Activity as tolerated    WOUND CARE: Apply mupirocin to dialysis graft site twice daily and cover. GOALS OF CARE:    Eventual return to home/independent/assisted living   x  Long term SNF      Hospice     No rehospitalization     Patient condition at discharge:   Functional status    Poor    x  Deconditioned      Independent   Cognition    Lucid     Forgetful (some sensescence)   x  Dementia   Catheters/lines (plus indication)    Childs     PICC      PEG    x  Right chest dialysis catheter   Code status    Full code    x  DNR    . . . . . . . . . . . . . . . . . . . . . . . . . . . . . . . . . . . . . . . . . . . . . . . . . . . . . . . . . . . . . . . . . . . . . . . Margaret Odell      CHRONIC MEDICAL CONDITIONS:  Problem List as of 4/8/2017  Date Reviewed: 4/7/2017          Codes Class Noted - Resolved    Dyspnea ICD-10-CM: R06.00  ICD-9-CM: 786.09  2/6/2017 - Present        Pneumonia ICD-10-CM: J18.9  ICD-9-CM: 669  2/4/2017 - Present        Anemia in chronic kidney disease ICD-10-CM: N18.9, D63.1  ICD-9-CM: 285.21, 585.9 Chronic 1/21/2017 - Present        Counseling regarding goals of care ICD-10-CM: Z71.89  ICD-9-CM: V65.49  1/19/2017 - Present        Do not resuscitate discussion ICD-10-CM: Z71.89  ICD-9-CM: V65.49  1/19/2017 - Present        History of GI bleed ICD-10-CM: Z87.19  ICD-9-CM: V12.79 Present on Admission 11/12/2014 - Present        CKD (chronic kidney disease) stage 3, GFR 30-59 ml/min (Chronic) ICD-10-CM: N18.3  ICD-9-CM: 585. 3 Chronic 12/13/2013 - Present        * (Principal)DKA (diabetic ketoacidoses) ([facility] ICD-10-CM: E13.10  ICD-9-CM: 250.10  7/10/2011 - Present        Renal failure, acute ([facility] ICD-10-CM: N17.9  ICD-9-CM: 584.9  12/20/2010 - Present        CAD (coronary artery disease) ICD-10-CM: I25.10  ICD-9-CM: 414.00  12/20/2010 - Present        HTN (hypertension) (Chronic) ICD-10-CM: I10  ICD-9-CM: 401.9  12/20/2010 - Present        Alzheimer disease ICD-10-CM: G30.9  ICD-9-CM: 331.0  9/19/2010 - Present        DM (diabetes mellitus), type 2, uncontrolled (HCC) (Chronic) ICD-10-CM: E11.65  ICD-9-CM: 250.02  8/16/2010 - Present        Weakness generalized ICD-10-CM: R53.1  ICD-9-CM: 780.79  8/16/2010 - Present        Pure hypercholesterolemia ICD-10-CM: E78.00  ICD-9-CM: 272.0  1/2/2010 - Present        Prostate cancer ([facility] ICD-10-CM: C61  ICD-9-CM: 752  1/2/2010 - Present    Overview Signed 1/2/2010  1:43 PM by Elma Salazar MD     Early stage--watchful waiting 2009             Dementia (Chronic) ICD-10-CM: F03.90  ICD-9-CM: 294.20  1/2/2010 - Present    Overview Signed 1/2/2010  1:44 PM by Elma Salazar MD     Moderate.              Blind one eye ICD-10-CM: H54.40  ICD-9-CM: 369.60  1/2/2010 - Present    Overview Signed 1/2/2010  1:45 PM by Elma Salazar MD     Retinal detatchment right eye             RESOLVED: Pneumonia ICD-10-CM: J18.9  ICD-9-CM: 573  11/8/2014 - 1/21/2017        RESOLVED: Proteinuria (Chronic) ICD-10-CM: R80.9  ICD-9-CM: 791.0  12/13/2013 - 1/21/2017        RESOLVED: Abdominal pain ICD-10-CM: R10.9  ICD-9-CM: 789.00  12/13/2013 - 1/21/2017        RESOLVED: Hematuria ICD-10-CM: R31.9  ICD-9-CM: 599.70  12/13/2013 - 1/21/2017        RESOLVED: Hyperkalemia ICD-10-CM: E87.5  ICD-9-CM: 276.7  6/14/2012 - 1/21/2017        RESOLVED: Hyponatremia ICD-10-CM: E87.1  ICD-9-CM: 276.1  6/14/2012 - 1/21/2017 RESOLVED: Hyperglycemia ICD-10-CM: R73.9  ICD-9-CM: 790.29  6/14/2012 - 1/21/2017        RESOLVED: Type II or unspecified type diabetes mellitus with hyperosmolarity, uncontrolled ICD-10-CM: E11.00  ICD-9-CM: 250.22  12/11/2011 - 1/21/2017        RESOLVED: Pain, upper extremity ICD-10-CM: M79.603  ICD-9-CM: 729.5  10/1/2011 - 1/21/2017        RESOLVED: Hypokalemia ICD-10-CM: E87.6  ICD-9-CM: 276.8  12/20/2010 - 1/21/2017        RESOLVED: Hypocalcemia ICD-10-CM: E83.51  ICD-9-CM: 275.41  12/20/2010 - 1/21/2017        RESOLVED: Hyperkalemia ICD-10-CM: E87.5  ICD-9-CM: 276.7  12/19/2010 - 1/21/2017        RESOLVED: Dehydration ICD-10-CM: E86.0  ICD-9-CM: 276.51  12/19/2010 - 1/21/2017        RESOLVED: Acute hyperkalemia ICD-10-CM: E87.5  ICD-9-CM: 276.7  9/19/2010 - 1/21/2017        RESOLVED: Hyponatremia ICD-10-CM: E87.1  ICD-9-CM: 276.1  9/19/2010 - 1/21/2017        RESOLVED: Renal failure, acute ([facility] ICD-10-CM: N17.9  ICD-9-CM: 584.9  9/19/2010 - 1/21/2017        RESOLVED: Volume depletion ICD-10-CM: E86.9  ICD-9-CM: 276.50  9/19/2010 - 1/21/2017        RESOLVED: Type II or unspecified type diabetes mellitus with hyperosmolarity, uncontrolled ICD-10-CM: E11.00  ICD-9-CM: 250.22  9/19/2010 - 1/21/2017        RESOLVED: Acute renal failure ([facility] ICD-10-CM: N17.9  ICD-9-CM: 584. 9 Acute 8/16/2010 - 1/21/2017        RESOLVED: Hypotension ICD-10-CM: I95.9  ICD-9-CM: 458.9  8/16/2010 - 1/21/2017

## 2017-04-08 NOTE — PROGRESS NOTES
Spiritual Care Assessment/Progress Notes    Michell Lezama [id number]  [id number]    1950  61 y.o.  male    Patient Telephone Number: [phone] (home)   Judaism Affiliation: Ary Patterson   Language: English   Extended Emergency Contact Information  Primary Emergency Contact: Meredith Ellis ([address] Proxy Per Pt'S Mother)   [address] Phone: [phone]  Work Phone: [phone]  Mobile Phone: [phone]  Relation: Other Relative  Secondary Emergency Contact: [address] Phone: [phone]  Relation: Child  Mother: Kobe De Santiago Phone: [phone]   Patient Active Problem List    Diagnosis Date Noted    Dyspnea 02/06/2017    Pneumonia 02/04/2017    Anemia in chronic kidney disease 01/21/2017     Class: Chronic    Counseling regarding goals of care 01/19/2017    Do not resuscitate discussion 01/19/2017    History of GI bleed 11/12/2014     Class: Present on Admission    CKD (chronic kidney disease) stage 3, GFR 30-59 ml/min 12/13/2013     Class: Chronic    DKA (diabetic ketoacidoses) ([facility] 07/10/2011    Renal failure, acute ([facility] 12/20/2010    CAD (coronary artery disease) 12/20/2010    HTN (hypertension) 12/20/2010    Alzheimer disease 09/19/2010    DM (diabetes mellitus), type 2, uncontrolled ([facility] 08/16/2010    Weakness generalized 08/16/2010    Pure hypercholesterolemia 01/02/2010    Prostate cancer ([facility] 01/02/2010    Dementia 01/02/2010    Blind one eye 01/02/2010        Date: 4/8/2017       Level of Judaism/Spiritual Activity:  [x]         Involved in oliverio tradition/spiritual practice    []         Not involved in oliverio tradition/spiritual practice  [x]         Spiritually oriented    []         Claims no spiritual orientation    []         seeking spiritual identity  []         Feels alienated from Alevism practice/tradition  []         Feels angry about Alevism practice/tradition  [x] Spirituality/Alevism tradition is a resource for coping at this time. []         Not able to assess due to medical condition    Services Provided Today:  []         crisis intervention    []         reading Scriptures  [x]         spiritual assessment    []         prayer  []         empathic listening/emotional support  []         rites and rituals (cite in comments)  []         life review     []         Alevism support  []         theological development   []         advocacy  []         ethical dialog     []         blessing  []         bereavement support    []         support to family  []         anticipatory grief support   []         help with AMD  []         spiritual guidance    []         meditation      Spiritual Care Needs  []         Emotional Support  []         Spiritual/Jew Care  []         Loss/Adjustment  []         Advocacy/Referral                /Ethics  [x]         No needs expressed at               this time  []         Other: (note in               comments)  [address]  []         Follow up visits with               pt/family  []         Provide materials  []         Schedule sacraments  []         Contact Community               Clergy  []         Follow up as needed  []         Other: (note in               comments)     Comments: Initial spiritual assessment in Med Surg. Patient did not express any concerns at time of visit with . Patient was expecting to discharge this afternoon. Camilo Hussein M.S., M.Div.   32 [facility] ([id number])

## 2017-04-08 NOTE — PROGRESS NOTES
[facility] called to set up transportation if facility will accept patient back today. I spoke with Ms. Desire Jon nurse on [facility] at [facility] [phone]. She stated they could accept patient back today. His room is 16B. Called [employer] for transportation at 1pm.Ref # [id number]. Nurse is to call report to Ms. Desire Jon prior to patient leaving facility.   Maricel Walker RN CRM

## 2017-04-08 NOTE — PROGRESS NOTES
Called report to [facility]. Spoke with Ms. Tia Ponce in the [employer].  Transport is set up for 1pm.

## 2017-04-08 NOTE — DISCHARGE SUMMARY
Discharge Summary     Patient:  Tamra Zimmerman. MRN: [id number]       YOB: 1950       Age: 61 y.o. Date of admission:  4/4/2017    Date of discharge:  4/8/2017    Primary care provider: Dr. Altagracia Lopes MD    Admitting provider:  Bernarda Keith MD    Discharging provider:  Kenn Rich 91.: [phone]. If unavailable, call [phone] and ask the  to page the triage hospitalist.    Consultations  · IP CONSULT TO NEPHROLOGY    Procedures  · Procedure(s):  · INSERT LEFT UPPER ARM AV GRAFT     Discharge destination: SNF. The patient is stable for discharge. Admission diagnosis  · ESRD  · DKA (diabetic ketoacidoses) ([facility]  · END STAGE RENAL DISEASE    Current Discharge Medication List      CONTINUE these medications which have CHANGED    Details   acetaminophen (TYLENOL) 325 mg tablet Take 2 Tabs by mouth every four (4) hours as needed. For pain. Qty: 100 Tab, Refills: 0      pravastatin (PRAVACHOL) 10 mg tablet Take 1 Tab by mouth nightly. Qty: 30 Tab, Refills: 0      oxyCODONE IR (ROXICODONE) 5 mg immediate release tablet Take 1 Tab by mouth every four (4) hours as needed for Pain. Max Daily Amount: 30 mg.  Qty: 20 Tab, Refills: 0      amLODIPine (NORVASC) 10 mg tablet Take 1 Tab by mouth daily. Qty: 30 Tab, Refills: 0      insulin glargine (LANTUS) 100 unit/mL injection 5 Units by SubCUTAneous route nightly. Qty: 1 Vial, Refills: 0         CONTINUE these medications which have NOT CHANGED    Details   albuterol (PROVENTIL VENTOLIN) 2.5 mg /3 mL (0.083 %) nebulizer solution 2.5 mg by Nebulization route every four (4) hours as needed for Shortness of Breath. darbepoetin shaye (ARANESP, POLYSORBATE,) 25 mcg/0.42 mL injection 25 mcg by SubCUTAneous route every seven (7) days.  Give on the first dialysis treatment each week      insulin lispro (HUMALOG) 100 unit/mL injection by SubCUTAneous route Before breakfast, lunch, and dinner. Give per sliding scale:    0-160 = 0 units  161-300 = 2 units  301-400 = 4 units      calcium acetate (PHOSLO) 667 mg tab tablet Take 667 mg by mouth three (3) times daily (with meals). mirtazapine (REMERON) 15 mg tablet Take 15 mg by mouth nightly. Indications: MAJOR DEPRESSIVE DISORDER      omeprazole (PRILOSEC) 10 mg capsule Take 10 mg by mouth daily. Indications: GASTROESOPHAGEAL REFLUX      tamsulosin (FLOMAX) 0.4 mg capsule Take 0.4 mg by mouth daily. calcitRIOL (ROCALTROL) 0.25 mcg capsule Take 0.25 mcg by mouth every Monday, Wednesday, Friday. With each dialysis treatment      ferrous sulfate (SLOW FE) 142 mg (45 mg iron) ER tablet Take 142 mg by mouth Daily (before breakfast). glucagon (GLUCAGEN) 1 mg injection 1 mL by IntraMUSCular route as needed for Hypoglycemia. Qty: 1 Vial, Refills: 0         STOP taking these medications       albuterol-ipratropium (DUO-NEB) 2.5 mg-0.5 mg/3 ml nebu Comments:   Reason for Stopping:         insulin aspart (NOVOLOG) 100 unit/mL injection Comments:   Reason for Stopping: Follow-up Information     Follow up With Details Comments 213 [facility] PSYCHIATRIC)   [address]    Samira Dill MD Schedule an appointment as soon as possible for a visit in 2 weeks For follow up of dialysis graft placement   [address]      Sauravjun Hughes MD Call As needed for primary care [address]  [phone]            Final discharge diagnoses and brief hospital course  Please also refer to the admission H&P for details on the presenting problem. Yana Moe. is a 61 y.o. male with history of dementia, CAD, prostate cancer, ESRD on HD (M/W/F), HTN, uncontrolled DM-2 who presents withhyperglycemia.  He had originally been scheduled for vascular access surgery for his dialysis, but had been found to be hyperglycemic with fingerstick glucose of 627. His surgery was postponed because of this and we were asked to admit patient for glucose control.      DKA (POA) - resolved and BG improved, A1c 10.3 but having episodes of hypoglycemia now  - Was on insulin drip, ivf and in icu setting.  - Reduce glargine to 5 units nightly, add bed-time diabetic snack and continue sliding scale.     Hypertension, in setting of known new ESRD  Appreciate nephrology service who has already seen patient. Medications adjusted as needed.     New ESRD  - UE AV graft placed 4/7  - Pain control  - Continue IHD  - Nephrology consulted - follow up with regular dialysis  - Vascular surgery consulted - follow up with Dr. Quoc Quiroz    Memory impairment per H&P note:  \"- originally listed in chart as secondary to dementia, though per sister's report, this may be due to TBI (head injury more than 30 years ago)  - supportive care\"     History of prostate cancer per h&P  - follow-up as outpatient    FOLLOW-UP CARE RECOMMENDATIONS:     It is very important that you keep follow-up appointment(s). Bring discharge papers, medication list (and/or medication bottles) to follow-up appointments for review by outpatient provider(s). ONGOING TREATMENT PLAN: Continue pain control for dialysis graft placement, follow up with vascular surgery, continue dialysis, adjust diabetes meds as needed    Specific symptoms to watch for: chest pain, shortness of breath, fever, chills, nausea, vomiting, diarrhea, change in mentation, falling, weakness, bleeding. DIET:  Diabetic Diet and Renal Diet, please schedule diabetic bed-time snack    ACTIVITY:  Activity as tolerated    WOUND CARE: Apply mupirocin to dialysis graft site twice daily and cover.     Physical examination at discharge  Visit Vitals    /71 (BP 1 Location: Right arm)    Pulse (!) 102    Temp 97.6 °F (36.4 °C)    Resp 18    Ht 5' 4\" (1.626 m)    Wt 43.3 kg (95 lb 7.4 oz)    SpO2 100%    BMI 16.39 kg/m2       Constitutional: No acute distress    ENT: Oral mucous moist, oropharynx benign. Neck supple,    Resp: CTA bilaterally. No wheezing/rhonchi/rales. No accessory muscle use   CV: Regular rhythm, normal rate, no murmurs, gallops, rubs   GI: Soft, non distended, non tender. normoactive bowel sounds    Musculoskeletal: No edema, warm, 2+ pulses throughout. Left arm wound dressed   Neurologic: Alert and oriented to self only, Moves all extremities. CN II-XII reviewed       Pertinent imaging studies:    CXR 4/4/17  AP portable erect view of the chest is obtained. The tip of the Robbie catheter  is in the right atrium. There is no pneumothorax or. The lung fields are clear. The heart size is normal.  impression: Robbie catheter in place. No results for input(s): WBC, HGB, HCT, PLT, HGBEXT, HCTEXT, PLTEXT in the last 72 hours. Recent Labs      04/08/17   0530  04/07/17   0547  04/06/17   0402   NA   --   140  138   K   --   3.5  3.5   CL   --   104  105   CO2   --   28  24   BUN   --   4*  13   CREA   --   1.97*  3.32*   GLU   --   130*  56*   CA   --   8.2*  8.6   MG  1.8  1.6  1.7   PHOS   --    --   2.4*     No results for input(s): SGOT, GPT, AP, TBIL, TP, ALB, GLOB, GGT, AML, LPSE in the last 72 hours. No lab exists for component: AMYP, HLPSE  No results for input(s): INR, PTP, APTT in the last 72 hours. No lab exists for component: INREXT   No results for input(s): FE, TIBC, PSAT, FERR in the last 72 hours. No results for input(s): PH, PCO2, PO2 in the last 72 hours. No results for input(s): CPK, CKMB in the last 72 hours.     No lab exists for component: TROPONINI  No components found for: [place]    Chronic Diagnoses:    Problem List as of 4/8/2017  Date Reviewed: 4/7/2017          Codes Class Noted - Resolved    Dyspnea ICD-10-CM: R06.00  ICD-9-CM: 786.09  2/6/2017 - Present Pneumonia ICD-10-CM: J18.9  ICD-9-CM: 738  2/4/2017 - Present        Anemia in chronic kidney disease ICD-10-CM: N18.9, D63.1  ICD-9-CM: 285.21, 585.9 Chronic 1/21/2017 - Present        Counseling regarding goals of care ICD-10-CM: Z71.89  ICD-9-CM: V65.49  1/19/2017 - Present        Do not resuscitate discussion ICD-10-CM: Z71.89  ICD-9-CM: V65.49  1/19/2017 - Present        History of GI bleed ICD-10-CM: Z87.19  ICD-9-CM: V12.79 Present on Admission 11/12/2014 - Present        CKD (chronic kidney disease) stage 3, GFR 30-59 ml/min (Chronic) ICD-10-CM: N18.3  ICD-9-CM: 655. 3 Chronic 12/13/2013 - Present        * (Principal)DKA (diabetic ketoacidoses) ([facility] ICD-10-CM: E13.10  ICD-9-CM: 250.10  7/10/2011 - Present        Renal failure, acute ([facility] ICD-10-CM: N17.9  ICD-9-CM: 584.9  12/20/2010 - Present        CAD (coronary artery disease) ICD-10-CM: I25.10  ICD-9-CM: 414.00  12/20/2010 - Present        HTN (hypertension) (Chronic) ICD-10-CM: I10  ICD-9-CM: 401.9  12/20/2010 - Present        Alzheimer disease ICD-10-CM: G30.9  ICD-9-CM: 331.0  9/19/2010 - Present        DM (diabetes mellitus), type 2, uncontrolled (HCC) (Chronic) ICD-10-CM: E11.65  ICD-9-CM: 250.02  8/16/2010 - Present        Weakness generalized ICD-10-CM: R53.1  ICD-9-CM: 780.79  8/16/2010 - Present        Pure hypercholesterolemia ICD-10-CM: E78.00  ICD-9-CM: 272.0  1/2/2010 - Present        Prostate cancer ([facility] ICD-10-CM: C61  ICD-9-CM: 790  1/2/2010 - Present    Overview Signed 1/2/2010  1:43 PM by Susanna Pina MD     Early stage--watchful waiting 2009             Dementia (Chronic) ICD-10-CM: F03.90  ICD-9-CM: 294.20  1/2/2010 - Present    Overview Signed 1/2/2010  1:44 PM by Susanna Pina MD     Moderate.              Blind one eye ICD-10-CM: H54.40  ICD-9-CM: 369.60  1/2/2010 - Present    Overview Signed 1/2/2010  1:45 PM by Susanna Pina MD     Retinal detatchment right eye             RESOLVED: Pneumonia ICD-10-CM: J18.9  ICD-9-CM: 873 11/8/2014 - 1/21/2017        RESOLVED: Proteinuria (Chronic) ICD-10-CM: R80.9  ICD-9-CM: 791.0  12/13/2013 - 1/21/2017        RESOLVED: Abdominal pain ICD-10-CM: R10.9  ICD-9-CM: 789.00  12/13/2013 - 1/21/2017        RESOLVED: Hematuria ICD-10-CM: R31.9  ICD-9-CM: 599.70  12/13/2013 - 1/21/2017        RESOLVED: Hyperkalemia ICD-10-CM: E87.5  ICD-9-CM: 276.7  6/14/2012 - 1/21/2017        RESOLVED: Hyponatremia ICD-10-CM: E87.1  ICD-9-CM: 276.1  6/14/2012 - 1/21/2017        RESOLVED: Hyperglycemia ICD-10-CM: R73.9  ICD-9-CM: 790.29  6/14/2012 - 1/21/2017        RESOLVED: Type II or unspecified type diabetes mellitus with hyperosmolarity, uncontrolled ICD-10-CM: E11.00  ICD-9-CM: 250.22  12/11/2011 - 1/21/2017        RESOLVED: Pain, upper extremity ICD-10-CM: M79.603  ICD-9-CM: 729.5  10/1/2011 - 1/21/2017        RESOLVED: Hypokalemia ICD-10-CM: E87.6  ICD-9-CM: 276.8  12/20/2010 - 1/21/2017        RESOLVED: Hypocalcemia ICD-10-CM: E83.51  ICD-9-CM: 275.41  12/20/2010 - 1/21/2017        RESOLVED: Hyperkalemia ICD-10-CM: E87.5  ICD-9-CM: 276.7  12/19/2010 - 1/21/2017        RESOLVED: Dehydration ICD-10-CM: E86.0  ICD-9-CM: 276.51  12/19/2010 - 1/21/2017        RESOLVED: Acute hyperkalemia ICD-10-CM: E87.5  ICD-9-CM: 276.7  9/19/2010 - 1/21/2017        RESOLVED: Hyponatremia ICD-10-CM: E87.1  ICD-9-CM: 276.1  9/19/2010 - 1/21/2017        RESOLVED: Renal failure, acute ([facility] ICD-10-CM: N17.9  ICD-9-CM: 584.9  9/19/2010 - 1/21/2017        RESOLVED: Volume depletion ICD-10-CM: E86.9  ICD-9-CM: 276.50  9/19/2010 - 1/21/2017        RESOLVED: Type II or unspecified type diabetes mellitus with hyperosmolarity, uncontrolled ICD-10-CM: E11.00  ICD-9-CM: 250.22  9/19/2010 - 1/21/2017        RESOLVED: Acute renal failure ([facility] ICD-10-CM: N17.9  ICD-9-CM: 584. 9 Acute 8/16/2010 - 1/21/2017        RESOLVED: Hypotension ICD-10-CM: I95.9  ICD-9-CM: 458.9  8/16/2010 - 1/21/2017              Time spent on discharge related activities today greater than 30 minutes. Signed:  Yadira Delacruz MD                 Hospitalist, Internal Medicine      Cc:  Radha Metcalf MD

## 2017-05-05 ENCOUNTER — HOSPITAL ENCOUNTER (EMERGENCY)
Age: 67
Discharge: HOME | End: 2017-05-05
Attending: EMERGENCY MEDICINE | Admitting: EMERGENCY MEDICINE
Payer: MEDICARE

## 2017-05-05 VITALS
RESPIRATION RATE: 18 BRPM | HEART RATE: 94 BPM | TEMPERATURE: 97.7 F | HEIGHT: 64 IN | WEIGHT: 95.46 LBS | SYSTOLIC BLOOD PRESSURE: 121 MMHG | DIASTOLIC BLOOD PRESSURE: 63 MMHG | OXYGEN SATURATION: 100 % | BODY MASS INDEX: 16.3 KG/M2

## 2017-05-05 DIAGNOSIS — T82.9XXA COMPLICATION OF ARTERIOVENOUS DIALYSIS FISTULA, INITIAL ENCOUNTER: Primary | ICD-10-CM

## 2017-05-05 LAB
ALBUMIN SERPL BCP-MCNC: 3.3 G/DL (ref 3.5–5)
ALBUMIN/GLOB SERPL: 0.9 {RATIO} (ref 1.1–2.2)
ALP SERPL-CCNC: 344 U/L (ref 45–117)
ALT SERPL-CCNC: 76 U/L (ref 12–78)
ANION GAP BLD CALC-SCNC: 9 MMOL/L (ref 5–15)
AST SERPL W P-5'-P-CCNC: 59 U/L (ref 15–37)
BASOPHILS # BLD AUTO: 0 K/UL (ref 0–0.1)
BASOPHILS # BLD: 1 % (ref 0–1)
BILIRUB SERPL-MCNC: 1.1 MG/DL (ref 0.2–1)
BUN SERPL-MCNC: 5 MG/DL (ref 6–20)
BUN/CREAT SERPL: 3 (ref 12–20)
CALCIUM SERPL-MCNC: 8.3 MG/DL (ref 8.5–10.1)
CHLORIDE SERPL-SCNC: 102 MMOL/L (ref 97–108)
CO2 SERPL-SCNC: 29 MMOL/L (ref 21–32)
CREAT SERPL-MCNC: 1.86 MG/DL (ref 0.7–1.3)
EOSINOPHIL # BLD: 0.2 K/UL (ref 0–0.4)
EOSINOPHIL NFR BLD: 3 % (ref 0–7)
ERYTHROCYTE [DISTWIDTH] IN BLOOD BY AUTOMATED COUNT: 15.4 % (ref 11.5–14.5)
GLOBULIN SER CALC-MCNC: 3.5 G/DL (ref 2–4)
GLUCOSE SERPL-MCNC: 133 MG/DL (ref 65–100)
HCT VFR BLD AUTO: 36.2 % (ref 36.6–50.3)
HGB BLD-MCNC: 12.2 G/DL (ref 12.1–17)
LACTATE SERPL-SCNC: 1.4 MMOL/L (ref 0.4–2)
LYMPHOCYTES # BLD AUTO: 20 % (ref 12–49)
LYMPHOCYTES # BLD: 1.3 K/UL (ref 0.8–3.5)
MCH RBC QN AUTO: 30.1 PG (ref 26–34)
MCHC RBC AUTO-ENTMCNC: 33.7 G/DL (ref 30–36.5)
MCV RBC AUTO: 89.4 FL (ref 80–99)
MONOCYTES # BLD: 1 K/UL (ref 0–1)
MONOCYTES NFR BLD AUTO: 16 % (ref 5–13)
NEUTS SEG # BLD: 4.1 K/UL (ref 1.8–8)
NEUTS SEG NFR BLD AUTO: 60 % (ref 32–75)
PLATELET # BLD AUTO: 217 K/UL (ref 150–400)
POTASSIUM SERPL-SCNC: 3.7 MMOL/L (ref 3.5–5.1)
PROT SERPL-MCNC: 6.8 G/DL (ref 6.4–8.2)
RBC # BLD AUTO: 4.05 M/UL (ref 4.1–5.7)
SODIUM SERPL-SCNC: 140 MMOL/L (ref 136–145)
WBC # BLD AUTO: 6.6 K/UL (ref 4.1–11.1)

## 2017-05-05 PROCEDURE — 99283 EMERGENCY DEPT VISIT LOW MDM: CPT

## 2017-05-05 PROCEDURE — 36415 COLL VENOUS BLD VENIPUNCTURE: CPT | Performed by: EMERGENCY MEDICINE

## 2017-05-05 PROCEDURE — 85025 COMPLETE CBC W/AUTO DIFF WBC: CPT | Performed by: EMERGENCY MEDICINE

## 2017-05-05 PROCEDURE — 80053 COMPREHEN METABOLIC PANEL: CPT | Performed by: EMERGENCY MEDICINE

## 2017-05-05 PROCEDURE — 83605 ASSAY OF LACTIC ACID: CPT | Performed by: EMERGENCY MEDICINE

## 2017-05-05 NOTE — ED NOTES
Bedside shift change report given to Edilma Dyer RN (oncoming nurse) received from Sara Franklin RN (offgoing nurse). Report included the following information SBAR, Kardex, ED Summary, Procedure Summary, MAR and Recent Results.

## 2017-05-05 NOTE — ED NOTES
Assumed care of pt at this time, received bedside report from [facility] with pt included in care. Pt is resting in room, with call bell in reach. All questions answered.

## 2017-05-05 NOTE — PROGRESS NOTES
Patient with [address] needing transport from ED back to [facility] where he is a long term resident. [employer] [employer] - [phone] - called by CM to arrange transport - Trip Ref #[id number] - called at 5:42. [employer] has up to 3 hours to provide transport - may call for trip enquiry at [phone]. Nursing notified and PCS placed on room chart.     Elver Proctor RN, BSN, [facility]  ED Case Manager  [phone]

## 2017-05-06 NOTE — ED NOTES
Pt discharged with written instructions at this time. Pt verbalizes understanding and all questions were answered. Discharged by Marian Weinberg, in stable condition, with [facility].

## 2017-05-08 ENCOUNTER — APPOINTMENT (OUTPATIENT)
Dept: GENERAL RADIOLOGY | Age: 67
End: 2017-05-08
Attending: EMERGENCY MEDICINE
Payer: MEDICARE

## 2017-05-08 ENCOUNTER — HOSPITAL ENCOUNTER (EMERGENCY)
Age: 67
Discharge: HOME | End: 2017-05-08
Attending: EMERGENCY MEDICINE
Payer: MEDICARE

## 2017-05-08 VITALS
OXYGEN SATURATION: 100 % | DIASTOLIC BLOOD PRESSURE: 62 MMHG | WEIGHT: 123.24 LBS | HEIGHT: 65 IN | TEMPERATURE: 97.8 F | HEART RATE: 78 BPM | SYSTOLIC BLOOD PRESSURE: 147 MMHG | RESPIRATION RATE: 12 BRPM | BODY MASS INDEX: 20.53 KG/M2

## 2017-05-08 DIAGNOSIS — N18.6 ESRD ON DIALYSIS (HCC): ICD-10-CM

## 2017-05-08 DIAGNOSIS — R78.81 BACTEREMIA: Primary | ICD-10-CM

## 2017-05-08 DIAGNOSIS — Z99.2 ESRD ON DIALYSIS (HCC): ICD-10-CM

## 2017-05-08 LAB
ALBUMIN SERPL BCP-MCNC: 3.1 G/DL (ref 3.5–5)
ALBUMIN/GLOB SERPL: 0.9 {RATIO} (ref 1.1–2.2)
ALP SERPL-CCNC: 286 U/L (ref 45–117)
ALT SERPL-CCNC: 49 U/L (ref 12–78)
ANION GAP BLD CALC-SCNC: 8 MMOL/L (ref 5–15)
AST SERPL W P-5'-P-CCNC: 32 U/L (ref 15–37)
BASOPHILS # BLD AUTO: 0 K/UL (ref 0–0.1)
BASOPHILS # BLD: 0 % (ref 0–1)
BILIRUB SERPL-MCNC: 1 MG/DL (ref 0.2–1)
BUN SERPL-MCNC: 20 MG/DL (ref 6–20)
BUN/CREAT SERPL: 5 (ref 12–20)
CALCIUM SERPL-MCNC: 8.7 MG/DL (ref 8.5–10.1)
CHLORIDE SERPL-SCNC: 98 MMOL/L (ref 97–108)
CO2 SERPL-SCNC: 27 MMOL/L (ref 21–32)
CREAT SERPL-MCNC: 3.9 MG/DL (ref 0.7–1.3)
EOSINOPHIL # BLD: 0.2 K/UL (ref 0–0.4)
EOSINOPHIL NFR BLD: 3 % (ref 0–7)
ERYTHROCYTE [DISTWIDTH] IN BLOOD BY AUTOMATED COUNT: 15.5 % (ref 11.5–14.5)
GLOBULIN SER CALC-MCNC: 3.5 G/DL (ref 2–4)
GLUCOSE SERPL-MCNC: 139 MG/DL (ref 65–100)
HCT VFR BLD AUTO: 31.9 % (ref 36.6–50.3)
HGB BLD-MCNC: 10.7 G/DL (ref 12.1–17)
LYMPHOCYTES # BLD AUTO: 19 % (ref 12–49)
LYMPHOCYTES # BLD: 1.3 K/UL (ref 0.8–3.5)
MAGNESIUM SERPL-MCNC: 2.4 MG/DL (ref 1.6–2.4)
MCH RBC QN AUTO: 29.6 PG (ref 26–34)
MCHC RBC AUTO-ENTMCNC: 33.5 G/DL (ref 30–36.5)
MCV RBC AUTO: 88.1 FL (ref 80–99)
MONOCYTES # BLD: 0.6 K/UL (ref 0–1)
MONOCYTES NFR BLD AUTO: 9 % (ref 5–13)
NEUTS SEG # BLD: 4.6 K/UL (ref 1.8–8)
NEUTS SEG NFR BLD AUTO: 69 % (ref 32–75)
PLATELET # BLD AUTO: 188 K/UL (ref 150–400)
POTASSIUM SERPL-SCNC: 4.6 MMOL/L (ref 3.5–5.1)
PROT SERPL-MCNC: 6.6 G/DL (ref 6.4–8.2)
RBC # BLD AUTO: 3.62 M/UL (ref 4.1–5.7)
SODIUM SERPL-SCNC: 133 MMOL/L (ref 136–145)
WBC # BLD AUTO: 6.7 K/UL (ref 4.1–11.1)

## 2017-05-08 PROCEDURE — 99284 EMERGENCY DEPT VISIT MOD MDM: CPT

## 2017-05-08 PROCEDURE — 83735 ASSAY OF MAGNESIUM: CPT | Performed by: EMERGENCY MEDICINE

## 2017-05-08 PROCEDURE — 74011250637 HC RX REV CODE- 250/637: Performed by: EMERGENCY MEDICINE

## 2017-05-08 PROCEDURE — 36415 COLL VENOUS BLD VENIPUNCTURE: CPT | Performed by: EMERGENCY MEDICINE

## 2017-05-08 PROCEDURE — 85025 COMPLETE CBC W/AUTO DIFF WBC: CPT | Performed by: EMERGENCY MEDICINE

## 2017-05-08 PROCEDURE — 80053 COMPREHEN METABOLIC PANEL: CPT | Performed by: EMERGENCY MEDICINE

## 2017-05-08 PROCEDURE — 71020 XR CHEST PA LAT: CPT

## 2017-05-08 PROCEDURE — 87040 BLOOD CULTURE FOR BACTERIA: CPT | Performed by: EMERGENCY MEDICINE

## 2017-05-08 RX ORDER — SODIUM CHLORIDE 0.9 % (FLUSH) 0.9 %
5-10 SYRINGE (ML) INJECTION EVERY 8 HOURS
Status: DISCONTINUED | OUTPATIENT
Start: 2017-05-08 | End: 2017-05-08 | Stop reason: HOSPADM

## 2017-05-08 RX ORDER — SODIUM CHLORIDE 0.9 % (FLUSH) 0.9 %
5-10 SYRINGE (ML) INJECTION AS NEEDED
Status: DISCONTINUED | OUTPATIENT
Start: 2017-05-08 | End: 2017-05-08 | Stop reason: HOSPADM

## 2017-05-08 RX ORDER — ONDANSETRON 4 MG/1
4 TABLET, FILM COATED ORAL AS NEEDED
COMMUNITY
End: 2019-01-01

## 2017-05-08 RX ORDER — CLONIDINE HYDROCHLORIDE 0.1 MG/1
0.1 TABLET ORAL AS NEEDED
COMMUNITY
End: 2019-01-01

## 2017-05-08 RX ORDER — LOPERAMIDE HCL 2 MG
4 TABLET ORAL AS NEEDED
COMMUNITY
End: 2019-01-01

## 2017-05-08 RX ORDER — CALCIUM CARBONATE 200(500)MG
1 TABLET,CHEWABLE ORAL AS NEEDED
COMMUNITY
End: 2019-01-01

## 2017-05-08 RX ORDER — HYDROCODONE BITARTRATE AND ACETAMINOPHEN 7.5; 325 MG/1; MG/1
1 TABLET ORAL
Status: COMPLETED | OUTPATIENT
Start: 2017-05-08 | End: 2017-05-08

## 2017-05-08 RX ORDER — NITROGLYCERIN 0.4 MG/1
0.4 TABLET SUBLINGUAL
COMMUNITY
End: 2019-01-01

## 2017-05-08 RX ADMIN — HYDROCODONE BITARTRATE AND ACETAMINOPHEN 1 TABLET: 7.5; 325 TABLET ORAL at 14:51

## 2017-05-08 RX ADMIN — Medication 10 ML: at 14:51

## 2017-05-08 NOTE — DISCHARGE INSTRUCTIONS
Kidney Dialysis: Care Instructions  Your Care Instructions  Dialysis is a process that filters wastes from the blood when your kidneys can no longer do the job. It is not a cure, but it can help you live longer and feel better. It is a lifesaving treatment when you have kidney failure. Normal kidneys work 24 hours a day to clean wastes from your blood. Your kidneys are not able to do this job, so a process called dialysis will do some of the work for your kidneys. You and your doctor will decide which type of dialysis you should have. Peritoneal dialysis uses the lining of your belly (peritoneum) to filter your blood. You can do it at home, on a daily basis. Hemodialysis uses a man-made filter called a dialyzer to clean your blood. Most people need to go to a hospital or clinic 3 days a week for several hours each time. Sometimes hemodialysis can be done at home. It is normal to have questions about your treatment, and you have a right to know what is happening to you. Learning about dialysis can help you take an active role in your treatment. Dialysis does not cure kidney disease, but it can help you live longer and feel better. You will need to follow your diet and treatment schedule carefully. Follow-up care is a key part of your treatment and safety. Be sure to make and go to all appointments, and call your doctor if you are having problems. It's also a good idea to know your test results and keep a list of the medicines you take. What do you need to know about peritoneal dialysis? Peritoneal dialysis uses the lining of your belly (or peritoneal membrane) to filter your blood. Before you can begin peritoneal dialysis, your doctor will need to place a thin tube called a catheter in your belly. This is the dialysis access. · Peritoneal dialysis can be done at home or in any clean place. You may be able to do it while you sleep. · You can do it by yourself.  You do not have to rely on help from others. · You can do it at the times you choose as long as you do the right number of treatments. · It has to be done every day of the week. · Some people find it hard to do all the required steps. · It increases your chance for a serious infection of the lining of the belly (peritoneum). What do you need to know about hemodialysis? Hemodialysis uses a man-made membrane called a dialyzer to clean your blood. You are connected to the dialyzer by tubes attached to your blood vessels. Before you start hemodialysis, your doctor will create a site where the blood can flow in and out of your body during your dialysis sessions. This site is called the vascular access. It may be a fistula, made by connecting an artery and a vein. Or it may be a graft, which is a tube implanted under your skin. · Hemodialysis is done mainly by trained health workers who can watch for any problems. · It allows you to be in contact with other people having dialysis. This can help provide emotional support. · You can schedule your treatments in the evenings so you can keep working. · You may be able to do home hemodialysis, which gives you more control over your schedule. · It usually needs to be done on a set schedule 3 times a week. · It can cause side effects. The most common side effects are low blood pressure and muscle cramps. These can often be treated easily. · It requires needle sticks during every treatment, which bothers some people. Others get used to it and even do the needle sticks themselves. How can you care for yourself at home? · Be sure to have all of your dialysis sessions. Do not try to shorten or skip your sessions. You have a better chance of a longer and healthier life by getting your full treatment. · Your doctor or health care team will show you the steps you need to go through each day before, during, and after dialysis. Be sure to follow these steps.  If you do not understand a step, talk to your team.  · Your doctor and dietitian will help you design menus that follow your diet. Be sure to follow your diet guidelines. ¨ You will need to limit fluids and certain foods that contain salt (sodium), potassium, and phosphorus. ¨ You may need to follow a heart-healthy diet to keep the fat (cholesterol) in your blood under control. ¨ You may need higher levels of protein in your diet. · Your doctor may recommend certain vitamins. But do not take any other medicine, including over-the-counter medicines, vitamins, and herbal products, without talking to your doctor first.  · Do not smoke. Smoking raises your risk of many health problems, including more kidney damage. If you need help quitting, talk to your doctor about stop-smoking programs and medicines. These can increase your chances of quitting for good. · Do not take ibuprofen (Advil, Motrin), naproxen (Aleve), or similar medicines, unless your doctor tells you to. These medicines may make kidney problems worse. When should you call for help? Call 911 anytime you think you may need emergency care. For example, call if:  · You passed out (lost consciousness). · You have severe shortness of breath. Call your doctor now or seek immediate medical care if:  · You have swelling in your hands or feet. · You are dizzy or lightheaded, or you feel like you may faint. · You have an unusual weight gain. · You have trembling or trouble thinking clearly. · You have a fever. Watch closely for changes in your health, and be sure to contact your doctor if:  · You have any problems with dialysis or your diet. Where can you learn more? Go to http://ángel-lori.info/. Enter Y275 in the search box to learn more about \"Kidney Dialysis: Care Instructions. \"  Current as of: November 20, 2015  Content Version: 11.2  © 5736-2984 [email or web address].  Care instructions adapted under license by [employer] (which disclaims liability or warranty for this information). If you have questions about a medical condition or this instruction, always ask your healthcare professional. [place] [address] any warranty or liability for your use of this information.

## 2017-05-08 NOTE — ED NOTES
Bedside shift change report given to Arleen Coronado RN (oncoming nurse) by this RN (offgoing nurse). Report included the following information SBAR.

## 2017-05-08 NOTE — ED NOTES
Assumed care of patient in the Emergency Department. Acknowledged the patient and visitors with a smile. Introduced myself politely and with respect. Duration informed the patient and family about wait times and recovery times. Explanation advised patient and visitors and the procedures while in the Department and whom to contact for assistance. Thank you fostered gratitude and thanked the patient and visitors for choosing [id number] [facility] ED. Bedside and verbal shift change report received from Nuria Todd RN. MD Rosalina Otto to speak with MD Shira Sales about the patient being seen as an outpatient. Patient is resting comfortably, bed in the lowest position, side rails raised, call bell in hand, lights dim. Instructed patient to not get up without assistance, and to ring the call bell for any questions or concerns. Updated patient on the plan of care.

## 2017-05-08 NOTE — PROGRESS NOTES
Nursing provided phone number for [facility] to call for discharge transport.     Yancy Fairchild RN, BSN, ACM  ED Case Manager  [phone]

## 2017-05-08 NOTE — ED PROVIDER NOTES
HPI Comments: Yusef Horton is a 61 y.o. Male, with a pertinent PMHx of HTN, DM, hyperlipidemia, and chronic kidney disease, who presents via EMS to the ED for an evaluation of a possible LUE infection of dialysis fistula culture bottle. Per chart review, pt was in the ED on 5/5/17 for a possible LUE infection of his dialysis fistula. Pt states he is scheduled every Monday, Wednesday, and Fridays for dialysis. Pt reports he went to his scheduled dialysis today. Pt notes he is not feeling any symptoms currently. Pt specifically denies fever, nausea, and vomiting. Social hx: - Tobacco use, - EtOH use, - Illicit drug use    PCP: Sydnie Khan MD  Nephrology: Jona Lesches, MD    History is limited due to the condition of the patient. The history is provided by the patient, the EMS personnel and medical records. No  was used. Past Medical History:   Diagnosis Date    Alzheimer disease     CAD (coronary artery disease)     Cancer ([facility]     prostate    Chronic kidney disease     CKD (chronic kidney disease) stage 4, GFR 15-29 ml/min ([facility])     DM (diabetes mellitus), type 2, uncontrolled ([facility]     Hemodialysis patient ([facility]     Hyperlipidemia     Hypertension     Other ill-defined conditions     blind R eye-retina detachment    Other ill-defined conditions     right cerebellopontine angle lipoma. Past Surgical History:   Procedure Laterality Date    COLONOSCOPY,DIAGNOSTIC  11/12/2014         HX HEENT      retinal detachment    HX SHOULDER ARTHROSCOPY      right    HX UROLOGICAL      prostate bx    UPPER GI ENDOSCOPY,BIOPSY  11/12/2014              Family History:   Problem Relation Age of Onset    Heart Disease Mother      Pacemaker    Cancer Father        Social History     Social History    Marital status:      Spouse name: N/A    Number of children: N/A    Years of education: N/A     Occupational History    Not on file.      Social History Main Topics    Smoking status: Never Smoker    Smokeless tobacco: Never Used    Alcohol use No    Drug use: No    Sexual activity: Not Currently     Other Topics Concern    Not on file     Social History Narrative         ALLERGIES: Ace inhibitors and Arb-angiotensin receptor antagonist    Review of Systems   Unable to perform ROS: Dementia       Vitals:    05/08/17 1130 05/08/17 1402 05/08/17 1529 05/08/17 1612   BP: 148/71 140/68 (!) 128/93    Pulse: 78      Resp: 12      Temp:       SpO2: 100% 100% 100%    Weight:    55.9 kg (123 lb 3.8 oz)   Height:                Physical Exam   Constitutional: He is oriented to person, place, and time. He appears well-developed and well-nourished. No distress. HENT:   Head: Normocephalic and atraumatic. Mouth/Throat: Oropharynx is clear and moist. No oropharyngeal exudate. Eyes: Conjunctivae and EOM are normal. Pupils are equal, round, and reactive to light. Neck: Normal range of motion. Neck supple. No JVD present. No tracheal deviation present. Cardiovascular: Normal rate, regular rhythm, normal heart sounds and intact distal pulses. No murmur heard. Pulmonary/Chest: Effort normal and breath sounds normal. No stridor. No respiratory distress. He has no wheezes. He has no rales. He exhibits no tenderness. Dialysis cath in Right chest wall no overlying erythema,    Abdominal: Soft. He exhibits no distension. There is no tenderness. There is no rebound and no guarding. Musculoskeletal: Normal range of motion. He exhibits no edema or tenderness. AV Fistula LUE- no overlying redness, + thrill     Neurological: He is alert and oriented to person, place, and time. No cranial nerve deficit. No gross motor or sensory deficits    Skin: Skin is warm and dry. He is not diaphoretic. Psychiatric: He has a normal mood and affect. His behavior is normal.   Nursing note and vitals reviewed.        MDM  Number of Diagnoses or Management Options  Bacteremia:   Diagnosis management comments: DDx: Bacteremia        Amount and/or Complexity of Data Reviewed  Clinical lab tests: ordered and reviewed  Tests in the radiology section of CPT®: ordered and reviewed  Tests in the medicine section of CPT®: ordered and reviewed  Obtain history from someone other than the patient: yes (EMS, Medical Records)  Review and summarize past medical records: yes  Discuss the patient with other providers: yes (Nephrology, Vascular Surgery)  Independent visualization of images, tracings, or specimens: yes    Patient Progress  Patient progress: stable    ED Course       Procedures         CONSULT NOTE:  1:46 PM  Nick Salvador DO spoke with Dr. Jaya [facility],  Specialty: Nephrology  Discussed patient's hx, disposition, and available diagnostic and imaging results. Reviewed care plans. Consultant agrees with plans as outlined. Dr. Becki Francisco says to admit to hospitalist. He states the culture was taken off of his catheter on Friday. He had vancomycin and and gentamicin today. Procedure Note- Peripheral IV Access  2:17 PM  Performed by: DO Nick Harper DO gained IV access using 20 gauge needle because the patient had no vascular access. After cleaning the site with alcohol prep, the Right Cephalic vein was localized with ultrasound guidance in an anterior approach. Line confirmation was obtained by direct visualization and good blood return. No anaesthetic was used. The line was successfully flushed with normal saline and was secured with transparent tape. Estimated blood loss: 0 cc  The procedure took 16-30 minutes, and pt tolerated well. Written by Renzo Anna ED Scribe, as dictated by Nick Salvador DO.      CONSULT NOTE:  3:30 PM  Nick Salvador DO spoke with Dr. Maico Childs,  Specialty: Vascular Surgery  Discussed patient's hx, disposition, and available diagnostic and imaging results. Reviewed care plans.   Dr. Kaushal Paula has seen pt does not feel AV fistula is infected. SIGN OUT:  2:56 PM  Patient's presentation, labs/imaging and plan of care was reviewed with Susangeline S. Gerldine Schlatter, MD as part of sign out. They will follow up on consult as part of the plan discussed with the patient. Susangeline S. Gerldine Schlatter, MD's assistance in completion of this plan is greatly appreciated but it should be noted that I will be the provider of record for this patient. [facility]    This note is prepared by Isabel Minor, acting as Scribe for [facility], [address] [place] [address]: The scribe's documentation has been prepared under my direction and personally reviewed by me in its entirety. I confirm that the note above accurately reflects all work, treatment, procedures, and medical decision making performed by me. LABORATORY TESTS:  Recent Results (from the past 12 hour(s))   CBC WITH AUTOMATED DIFF    Collection Time: 05/08/17  2:49 PM   Result Value Ref Range    WBC 6.7 4.1 - 11.1 K/uL    RBC 3.62 (L) 4.10 - 5.70 M/uL    HGB 10.7 (L) 12.1 - 17.0 g/dL    HCT 31.9 (L) 36.6 - 50.3 %    MCV 88.1 80.0 - 99.0 FL    MCH 29.6 26.0 - 34.0 PG    MCHC 33.5 30.0 - 36.5 g/dL    RDW 15.5 (H) 11.5 - 14.5 %    PLATELET 977 537 - 063 K/uL    NEUTROPHILS 69 32 - 75 %    LYMPHOCYTES 19 12 - 49 %    MONOCYTES 9 5 - 13 %    EOSINOPHILS 3 0 - 7 %    BASOPHILS 0 0 - 1 %    ABS. NEUTROPHILS 4.6 1.8 - 8.0 K/UL    ABS. LYMPHOCYTES 1.3 0.8 - 3.5 K/UL    ABS. MONOCYTES 0.6 0.0 - 1.0 K/UL    ABS. EOSINOPHILS 0.2 0.0 - 0.4 K/UL    ABS.  BASOPHILS 0.0 0.0 - 0.1 K/UL   METABOLIC PANEL, COMPREHENSIVE    Collection Time: 05/08/17  2:49 PM   Result Value Ref Range    Sodium 133 (L) 136 - 145 mmol/L    Potassium 4.6 3.5 - 5.1 mmol/L    Chloride 98 97 - 108 mmol/L    CO2 27 21 - 32 mmol/L    Anion gap 8 5 - 15 mmol/L    Glucose 139 (H) 65 - 100 mg/dL    BUN 20 6 - 20 MG/DL    Creatinine 3.90 (H) 0.70 - 1.30 MG/DL    BUN/Creatinine ratio 5 (L) 12 - 20      GFR est AA 19 (L) >60 ml/min/1.73m2    GFR est non-AA 16 (L) >60 ml/min/1.73m2    Calcium 8.7 8.5 - 10.1 MG/DL    Bilirubin, total 1.0 0.2 - 1.0 MG/DL    ALT (SGPT) 49 12 - 78 U/L    AST (SGOT) 32 15 - 37 U/L    Alk. phosphatase 286 (H) 45 - 117 U/L    Protein, total 6.6 6.4 - 8.2 g/dL    Albumin 3.1 (L) 3.5 - 5.0 g/dL    Globulin 3.5 2.0 - 4.0 g/dL    A-G Ratio 0.9 (L) 1.1 - 2.2     MAGNESIUM    Collection Time: 05/08/17  2:49 PM   Result Value Ref Range    Magnesium 2.4 1.6 - 2.4 mg/dL       IMAGING RESULTS:  CXR Results  (Last 48 hours)               05/08/17 1507  XR CHEST PA LAT Final result    Impression:  IMPRESSION:   1. Right IJ approach catheter appears unchanged. Narrative:  INDICATION: . bacteremia, admission   COMPARISON: Previous chest xray, 4/4/2017. Dorothy García FINDINGS: PA and lateral view of the chest.    .   Lines/tubes/surgical: Right IJ approach catheter projects to terminate in the   right atrium, similar to comparison. Heart/mediastinum: Unremarkable. Lungs/pleura:  No focal consolidation or mass. No visualized pleural effusion or   pneumothorax. Additional Comments: Posttraumatic changes in the left shoulder. Dorothy García MEDICATIONS GIVEN:  Medications   sodium chloride (NS) flush 5-10 mL (10 mL IntraVENous Given 5/8/17 1451)   sodium chloride (NS) flush 5-10 mL (not administered)   HYDROcodone-acetaminophen (NORCO) 7.5-325 mg per tablet 1 Tab (1 Tab Oral Given 5/8/17 1451)     Pt seen by Vascular surgery, they have discussed with Nephrology felt pt could be discharged home  IMPRESSION:  1. Bacteremia    2. ESRD on dialysis ([facility]        PLAN:  1. Discharge home  Current Discharge Medication List        2.    Follow-up Information     Follow up With Details Comments Contact Info    Elmira Avalos MD Schedule an appointment as soon as possible for a visit in 1 day  [address]  [address] [facility]      Mateusz Cline MD Schedule an appointment as soon as possible for a visit in 1 day  [address] [phone]          3. Return to ED if worse     DISCHARGE NOTE  15:47  The patient has been re-evaluated and is ready for discharge. Reviewed available results with patient. Counseled pt on diagnosis and care plan. Pt has expressed understanding, and all questions have been answered. Pt agrees with plan and agrees to F/U as recommended, or return to the ED if their sxs worsen. Discharge instructions have been provided and explained to the pt, along with reasons to return to the ED. This note is prepared by Michi Campos, acting as Scribe for [employer]. Rey Looney, [address] [place] [address]. Rey Looney MD: The scribe's documentation has been prepared under my direction and personally reviewed by me in its entirety. I confirm that the note above accurately reflects all work, treatment, procedures, and medical decision making performed by me.

## 2017-05-08 NOTE — CONSULTS
Vascular    I do not think AVG is infected  Discussed w/ Dr Amanda Rodriguez  He can be discharged  Will arrange for outpatient f/u tomorrow to D/C permcath and angio AVG

## 2017-05-08 NOTE — ED NOTES
Assumed care of patient. Bedside shift change report received from Janett Jay (offgoing nurse) by Yen Flynn (oncoming nurse). Given using SBAR, ED summary, MAR and recent results.  Pt resting quietly in bed

## 2017-05-08 NOTE — ED NOTES
Bedside shift change report given to Candelaria KELLY (oncoming nurse) by Sunita Sevilla (offgoing nurse). Given using SBAR, ED summary, MAR and recent results.

## 2017-05-08 NOTE — ED NOTES
Verbal report given to Samira Marquez at [facility] on YAMILE Ramos.  Will collaborate with MD Lesley Lawrence in regards to discharge

## 2017-05-08 NOTE — ED NOTES
Assumed care of patient. Patient placed in position of comfort. Call bell in reach. Skin warm and dry. Respirations even and unlabored. In no apparent distress at this time. Sent to ED via EMS for infection of LUE dialysis fistula-Gram Postive Cocci in culture bottles drawn on 05/05/17. Pt is alert and oriented to person, place and situation; disoriented to time at baseline. [place] provided. Will continue to monitor.

## 2017-05-08 NOTE — ED NOTES
Dr Ledezma Rather at bedside to update patient on plan of care. Pt aware he will be admitted. Dr Ledezma Rather will be back shortly to start US guided IV.  All supplies at bedside

## 2017-05-14 LAB
BACTERIA SPEC CULT: NORMAL
SERVICE CMNT-IMP: NORMAL

## 2019-01-01 ENCOUNTER — PATIENT OUTREACH (OUTPATIENT)
Dept: CASE MANAGEMENT | Age: 69
End: 2019-01-01

## 2019-01-01 ENCOUNTER — HOSPITAL ENCOUNTER (INPATIENT)
Age: 69
LOS: 2 days | Discharge: HOME | DRG: 291 | End: 2019-12-03
Attending: EMERGENCY MEDICINE | Admitting: INTERNAL MEDICINE
Payer: MEDICARE

## 2019-01-01 ENCOUNTER — APPOINTMENT (OUTPATIENT)
Dept: NON INVASIVE DIAGNOSTICS | Age: 69
DRG: 291 | End: 2019-01-01
Attending: INTERNAL MEDICINE
Payer: MEDICARE

## 2019-01-01 ENCOUNTER — HOSPITAL ENCOUNTER (EMERGENCY)
Age: 69
Discharge: NURSING, REHAB OR LONG-TERM CARE | End: 2019-12-27
Attending: EMERGENCY MEDICINE
Payer: MEDICARE

## 2019-01-01 ENCOUNTER — APPOINTMENT (OUTPATIENT)
Dept: GENERAL RADIOLOGY | Age: 69
DRG: 291 | End: 2019-01-01
Attending: EMERGENCY MEDICINE
Payer: MEDICARE

## 2019-01-01 ENCOUNTER — APPOINTMENT (OUTPATIENT)
Dept: CT IMAGING | Age: 69
End: 2019-01-01
Attending: EMERGENCY MEDICINE
Payer: MEDICARE

## 2019-01-01 VITALS
RESPIRATION RATE: 13 BRPM | SYSTOLIC BLOOD PRESSURE: 162 MMHG | TEMPERATURE: 98.6 F | OXYGEN SATURATION: 100 % | DIASTOLIC BLOOD PRESSURE: 72 MMHG | HEART RATE: 88 BPM

## 2019-01-01 VITALS
DIASTOLIC BLOOD PRESSURE: 60 MMHG | HEART RATE: 86 BPM | SYSTOLIC BLOOD PRESSURE: 123 MMHG | OXYGEN SATURATION: 100 % | BODY MASS INDEX: 20.53 KG/M2 | TEMPERATURE: 98 F | HEIGHT: 65 IN | WEIGHT: 123.24 LBS | RESPIRATION RATE: 16 BRPM

## 2019-01-01 DIAGNOSIS — N18.6 ESRD (END STAGE RENAL DISEASE) (HCC): ICD-10-CM

## 2019-01-01 DIAGNOSIS — J96.01 ACUTE RESPIRATORY FAILURE WITH HYPOXIA (HCC): Primary | ICD-10-CM

## 2019-01-01 DIAGNOSIS — S01.01XA LACERATION OF SCALP, INITIAL ENCOUNTER: Primary | ICD-10-CM

## 2019-01-01 DIAGNOSIS — J81.0 ACUTE PULMONARY EDEMA (HCC): ICD-10-CM

## 2019-01-01 DIAGNOSIS — E16.2 HYPOGLYCEMIA: ICD-10-CM
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
## 2019-01-01 DIAGNOSIS — S09.90XA CLOSED HEAD INJURY, INITIAL ENCOUNTER: ICD-10-CM

## 2019-01-01 LAB
ALBUMIN SERPL-MCNC: 3.3 G/DL (ref 3.5–5)
ALBUMIN/GLOB SERPL: 1.1 {RATIO} (ref 1.1–2.2)
ALP SERPL-CCNC: 137 U/L (ref 45–117)
ALT SERPL-CCNC: 24 U/L (ref 12–78)
ANION GAP SERPL CALC-SCNC: 7 MMOL/L (ref 5–15)
ANION GAP SERPL CALC-SCNC: 8 MMOL/L (ref 5–15)
ANION GAP SERPL CALC-SCNC: 8 MMOL/L (ref 5–15)
ARTERIAL PATENCY WRIST A: ABNORMAL
AST SERPL-CCNC: 28 U/L (ref 15–37)
ATRIAL RATE: 89 BPM
AV VELOCITY RATIO: 0.75
BACTERIA SPEC CULT: ABNORMAL
BACTERIA SPEC CULT: ABNORMAL
BASE EXCESS BLDV CALC-SCNC: 8 MMOL/L
BASOPHILS # BLD: 0.1 K/UL (ref 0–0.1)
BASOPHILS # BLD: 0.1 K/UL (ref 0–0.1)
BASOPHILS NFR BLD: 1 % (ref 0–1)
BASOPHILS NFR BLD: 1 % (ref 0–1)
BDY SITE: ABNORMAL
BILIRUB SERPL-MCNC: 0.6 MG/DL (ref 0.2–1)
BNP SERPL-MCNC: ABNORMAL PG/ML
BUN SERPL-MCNC: 15 MG/DL (ref 6–20)
BUN SERPL-MCNC: 20 MG/DL (ref 6–20)
BUN SERPL-MCNC: 30 MG/DL (ref 6–20)
BUN/CREAT SERPL: 4 (ref 12–20)
BUN/CREAT SERPL: 6 (ref 12–20)
BUN/CREAT SERPL: 7 (ref 12–20)
CALCIUM SERPL-MCNC: 8.2 MG/DL (ref 8.5–10.1)
CALCIUM SERPL-MCNC: 8.3 MG/DL (ref 8.5–10.1)
CALCIUM SERPL-MCNC: 8.8 MG/DL (ref 8.5–10.1)
CALCULATED P AXIS, ECG09: 21 DEGREES
CALCULATED R AXIS, ECG10: 6 DEGREES
CALCULATED T AXIS, ECG11: 41 DEGREES
CHLORIDE SERPL-SCNC: 100 MMOL/L (ref 97–108)
CHLORIDE SERPL-SCNC: 95 MMOL/L (ref 97–108)
CHLORIDE SERPL-SCNC: 99 MMOL/L (ref 97–108)
CO2 SERPL-SCNC: 28 MMOL/L (ref 21–32)
CREAT SERPL-MCNC: 3.37 MG/DL (ref 0.7–1.3)
CREAT SERPL-MCNC: 3.66 MG/DL (ref 0.7–1.3)
CREAT SERPL-MCNC: 4.5 MG/DL (ref 0.7–1.3)
DIAGNOSIS, 93000: NORMAL
DIFFERENTIAL METHOD BLD: ABNORMAL
DIFFERENTIAL METHOD BLD: ABNORMAL
ECHO AO ROOT DIAM: 3.33 CM
ECHO AV AREA PEAK VELOCITY: 2.2 CM2
ECHO AV AREA/BSA PEAK VELOCITY: 1.4 CM2/M2
ECHO AV PEAK GRADIENT: 8.4 MMHG
ECHO AV PEAK VELOCITY: 144.85 CM/S
ECHO EST RA PRESSURE: 10 MMHG
ECHO LA AREA 4C: 20.5 CM2
ECHO LA MAJOR AXIS: 3.39 CM
ECHO LA TO AORTIC ROOT RATIO: 1.02
ECHO LA VOL 4C: 52.81 ML (ref 18–58)
ECHO LA VOLUME INDEX A4C: 32.8 ML/M2 (ref 16–28)
ECHO LV E' LATERAL VELOCITY: 10.64 CM/S
ECHO LV E' SEPTAL VELOCITY: 7.42 CM/S
ECHO LV EDV TEICHHOLZ: 0.74 ML
ECHO LV ESV TEICHHOLZ: 0.59 ML
ECHO LV INTERNAL DIMENSION DIASTOLIC: 5.08 CM (ref 4.2–5.9)
ECHO LV INTERNAL DIMENSION SYSTOLIC: 4.59 CM
ECHO LV IVSD: 1.3 CM (ref 0.6–1)
ECHO LV MASS 2D: 251.9 G (ref 88–224)
ECHO LV MASS INDEX 2D: 156.4 G/M2 (ref 49–115)
ECHO LV POSTERIOR WALL DIASTOLIC: 0.9 CM (ref 0.6–1)
ECHO LV POSTERIOR WALL SYSTOLIC: 0.96 CM
ECHO LVOT DIAM: 1.94 CM
ECHO LVOT PEAK GRADIENT: 4.7 MMHG
ECHO LVOT PEAK VELOCITY: 107.94 CM/S
ECHO LVOT SV: 60.2 ML
ECHO LVOT VTI: 20.3 CM
ECHO MV A VELOCITY: 99.95 CM/S
ECHO MV AREA VTI: 1.6 CM2
ECHO MV E DECELERATION TIME (DT): 140.4 MS
ECHO MV E VELOCITY: 137.97 CM/S
ECHO MV E/A RATIO: 1.38
ECHO MV E/E' LATERAL: 12.97
ECHO MV E/E' RATIO (AVERAGED): 15.78
ECHO MV E/E' SEPTAL: 18.59
ECHO MV MAX VELOCITY: 153.53 CM/S
ECHO MV MEAN GRADIENT: 3.7 MMHG
ECHO MV MEAN INFLOW VELOCITY: 0.88 M/S
ECHO MV PEAK GRADIENT: 9.4 MMHG
ECHO MV REGURGITANT RADIUS PISA: 0.49 CM
ECHO MV VTI: 36.94 CM
ECHO PULMONARY ARTERY SYSTOLIC PRESSURE (PASP): 80.8 MMHG
ECHO RA AREA 4C: 16.66 CM2
ECHO RIGHT VENTRICULAR SYSTOLIC PRESSURE (RVSP): 80.8 MMHG
ECHO TV REGURGITANT MAX VELOCITY: 420.71 CM/S
ECHO TV REGURGITANT PEAK GRADIENT: 70.8 MMHG
EOSINOPHIL # BLD: 0.6 K/UL (ref 0–0.4)
EOSINOPHIL # BLD: 0.8 K/UL (ref 0–0.4)
EOSINOPHIL NFR BLD: 11 % (ref 0–7)
EOSINOPHIL NFR BLD: 8 % (ref 0–7)
ERYTHROCYTE [DISTWIDTH] IN BLOOD BY AUTOMATED COUNT: 16.4 % (ref 11.5–14.5)
ERYTHROCYTE [DISTWIDTH] IN BLOOD BY AUTOMATED COUNT: 16.4 % (ref 11.5–14.5)
ERYTHROCYTE [DISTWIDTH] IN BLOOD BY AUTOMATED COUNT: 16.5 % (ref 11.5–14.5)
EST. AVERAGE GLUCOSE BLD GHB EST-MCNC: 137 MG/DL
GAS FLOW.O2 O2 DELIVERY SYS: ABNORMAL L/MIN
GAS FLOW.O2 SETTING OXYMISER: 1 L/M
GLOBULIN SER CALC-MCNC: 3.1 G/DL (ref 2–4)
GLUCOSE BLD STRIP.AUTO-MCNC: 152 MG/DL (ref 65–100)
GLUCOSE BLD STRIP.AUTO-MCNC: 163 MG/DL (ref 65–100)
GLUCOSE BLD STRIP.AUTO-MCNC: 209 MG/DL (ref 65–100)
GLUCOSE BLD STRIP.AUTO-MCNC: 215 MG/DL (ref 65–100)
GLUCOSE BLD STRIP.AUTO-MCNC: 241 MG/DL (ref 65–100)
GLUCOSE BLD STRIP.AUTO-MCNC: 281 MG/DL (ref 65–100)
GLUCOSE BLD STRIP.AUTO-MCNC: 338 MG/DL (ref 65–100)
GLUCOSE BLD STRIP.AUTO-MCNC: 436 MG/DL (ref 65–100)
GLUCOSE BLD STRIP.AUTO-MCNC: 44 MG/DL (ref 65–100)
GLUCOSE BLD STRIP.AUTO-MCNC: 46 MG/DL (ref 65–100)
GLUCOSE BLD STRIP.AUTO-MCNC: 472 MG/DL (ref 65–100)
GLUCOSE BLD STRIP.AUTO-MCNC: 62 MG/DL (ref 65–100)
GLUCOSE BLD STRIP.AUTO-MCNC: 71 MG/DL (ref 65–100)
GLUCOSE BLD STRIP.AUTO-MCNC: 90 MG/DL (ref 65–100)
GLUCOSE SERPL-MCNC: 188 MG/DL (ref 65–100)
GLUCOSE SERPL-MCNC: 294 MG/DL (ref 65–100)
GLUCOSE SERPL-MCNC: 316 MG/DL (ref 65–100)
HBA1C MFR BLD: 6.4 % (ref 4–5.6)
HBV SURFACE AG SER QL: 0.28 INDEX
HBV SURFACE AG SER QL: NEGATIVE
HCO3 BLDV-SCNC: 31.3 MMOL/L (ref 23–28)
HCT VFR BLD AUTO: 25.3 % (ref 36.6–50.3)
HCT VFR BLD AUTO: 29.7 % (ref 36.6–50.3)
HCT VFR BLD AUTO: 31.2 % (ref 36.6–50.3)
HGB BLD-MCNC: 10.1 G/DL (ref 12.1–17)
HGB BLD-MCNC: 8.3 G/DL (ref 12.1–17)
HGB BLD-MCNC: 9.5 G/DL (ref 12.1–17)
IMM GRANULOCYTES # BLD AUTO: 0 K/UL (ref 0–0.04)
IMM GRANULOCYTES # BLD AUTO: 0 K/UL (ref 0–0.04)
IMM GRANULOCYTES NFR BLD AUTO: 0 % (ref 0–0.5)
IMM GRANULOCYTES NFR BLD AUTO: 0 % (ref 0–0.5)
LVFS 2D: 9.77 %
LVOT MG: 2.31 MMHG
LVOT MV: 0.71 CM/S
LVSV (TEICH): 16.37 ML
LYMPHOCYTES # BLD: 0.7 K/UL (ref 0.8–3.5)
LYMPHOCYTES # BLD: 0.8 K/UL (ref 0.8–3.5)
LYMPHOCYTES NFR BLD: 12 % (ref 12–49)
LYMPHOCYTES NFR BLD: 9 % (ref 12–49)
MCH RBC QN AUTO: 29.9 PG (ref 26–34)
MCH RBC QN AUTO: 30 PG (ref 26–34)
MCH RBC QN AUTO: 30.4 PG (ref 26–34)
MCHC RBC AUTO-ENTMCNC: 32 G/DL (ref 30–36.5)
MCHC RBC AUTO-ENTMCNC: 32.4 G/DL (ref 30–36.5)
MCHC RBC AUTO-ENTMCNC: 32.8 G/DL (ref 30–36.5)
MCV RBC AUTO: 92.3 FL (ref 80–99)
MCV RBC AUTO: 92.7 FL (ref 80–99)
MCV RBC AUTO: 93.7 FL (ref 80–99)
MONOCYTES # BLD: 0.6 K/UL (ref 0–1)
MONOCYTES # BLD: 0.6 K/UL (ref 0–1)
MONOCYTES NFR BLD: 8 % (ref 5–13)
MONOCYTES NFR BLD: 8 % (ref 5–13)
MV DEC SLOPE: 9.83
NEUTS SEG # BLD: 4.7 K/UL (ref 1.8–8)
NEUTS SEG # BLD: 5.3 K/UL (ref 1.8–8)
NEUTS SEG NFR BLD: 68 % (ref 32–75)
NEUTS SEG NFR BLD: 74 % (ref 32–75)
NRBC # BLD: 0 K/UL (ref 0–0.01)
NRBC BLD-RTO: 0 PER 100 WBC
P-R INTERVAL, ECG05: 112 MS
PCO2 BLDV: 41.5 MMHG (ref 41–51)
PH BLDV: 7.49 [PH] (ref 7.32–7.42)
PLATELET # BLD AUTO: 199 K/UL (ref 150–400)
PLATELET # BLD AUTO: 214 K/UL (ref 150–400)
PLATELET # BLD AUTO: 251 K/UL (ref 150–400)
PMV BLD AUTO: 10.3 FL (ref 8.9–12.9)
PMV BLD AUTO: 10.4 FL (ref 8.9–12.9)
PMV BLD AUTO: 11.3 FL (ref 8.9–12.9)
PO2 BLDV: 24 MMHG (ref 25–40)
POTASSIUM SERPL-SCNC: 4.2 MMOL/L (ref 3.5–5.1)
POTASSIUM SERPL-SCNC: 4.4 MMOL/L (ref 3.5–5.1)
POTASSIUM SERPL-SCNC: 4.8 MMOL/L (ref 3.5–5.1)
PROCALCITONIN SERPL-MCNC: 6.17 NG/ML
PROT SERPL-MCNC: 6.4 G/DL (ref 6.4–8.2)
Q-T INTERVAL, ECG07: 390 MS
QRS DURATION, ECG06: 74 MS
QTC CALCULATION (BEZET), ECG08: 474 MS
RBC # BLD AUTO: 2.73 M/UL (ref 4.1–5.7)
RBC # BLD AUTO: 3.17 M/UL (ref 4.1–5.7)
RBC # BLD AUTO: 3.38 M/UL (ref 4.1–5.7)
RBC MORPH BLD: ABNORMAL
SAO2 % BLDV: 48 % (ref 65–88)
SERVICE CMNT-IMP: ABNORMAL
SERVICE CMNT-IMP: NORMAL
SERVICE CMNT-IMP: NORMAL
SODIUM SERPL-SCNC: 131 MMOL/L (ref 136–145)
SODIUM SERPL-SCNC: 134 MMOL/L (ref 136–145)
SODIUM SERPL-SCNC: 136 MMOL/L (ref 136–145)
SPECIMEN TYPE: ABNORMAL
TROPONIN I SERPL-MCNC: <0.05 NG/ML
VENTRICULAR RATE, ECG03: 89 BPM
WBC # BLD AUTO: 5 K/UL (ref 4.1–11.1)
WBC # BLD AUTO: 7 K/UL (ref 4.1–11.1)
WBC # BLD AUTO: 7.3 K/UL (ref 4.1–11.1)

## 2019-01-01 PROCEDURE — 85025 COMPLETE CBC W/AUTO DIFF WBC: CPT

## 2019-01-01 PROCEDURE — 65660000000 HC RM CCU STEPDOWN

## 2019-01-01 PROCEDURE — 82803 BLOOD GASES ANY COMBINATION: CPT

## 2019-01-01 PROCEDURE — 75810000293 HC SIMP/SUPERF WND  RPR

## 2019-01-01 PROCEDURE — 36415 COLL VENOUS BLD VENIPUNCTURE: CPT

## 2019-01-01 PROCEDURE — 99285 EMERGENCY DEPT VISIT HI MDM: CPT

## 2019-01-01 PROCEDURE — 90935 HEMODIALYSIS ONE EVALUATION: CPT

## 2019-01-01 PROCEDURE — 96374 THER/PROPH/DIAG INJ IV PUSH: CPT

## 2019-01-01 PROCEDURE — 96375 TX/PRO/DX INJ NEW DRUG ADDON: CPT

## 2019-01-01 PROCEDURE — 97162 PT EVAL MOD COMPLEX 30 MIN: CPT

## 2019-01-01 PROCEDURE — 74011250636 HC RX REV CODE- 250/636: Performed by: INTERNAL MEDICINE

## 2019-01-01 PROCEDURE — 74011250637 HC RX REV CODE- 250/637: Performed by: INTERNAL MEDICINE

## 2019-01-01 PROCEDURE — 82962 GLUCOSE BLOOD TEST: CPT

## 2019-01-01 PROCEDURE — 99284 EMERGENCY DEPT VISIT MOD MDM: CPT

## 2019-01-01 PROCEDURE — 5A1D70Z PERFORMANCE OF URINARY FILTRATION, INTERMITTENT, LESS THAN 6 HOURS PER DAY: ICD-10-PCS | Performed by: EMERGENCY MEDICINE

## 2019-01-01 PROCEDURE — 84484 ASSAY OF TROPONIN QUANT: CPT

## 2019-01-01 PROCEDURE — 83880 ASSAY OF NATRIURETIC PEPTIDE: CPT

## 2019-01-01 PROCEDURE — 85027 COMPLETE CBC AUTOMATED: CPT

## 2019-01-01 PROCEDURE — 87340 HEPATITIS B SURFACE AG IA: CPT

## 2019-01-01 PROCEDURE — 77030040361 HC SLV COMPR DVT MDII -B

## 2019-01-01 PROCEDURE — 94760 N-INVAS EAR/PLS OXIMETRY 1: CPT

## 2019-01-01 PROCEDURE — 70450 CT HEAD/BRAIN W/O DYE: CPT

## 2019-01-01 PROCEDURE — 80048 BASIC METABOLIC PNL TOTAL CA: CPT

## 2019-01-01 PROCEDURE — 74011636637 HC RX REV CODE- 636/637: Performed by: INTERNAL MEDICINE

## 2019-01-01 PROCEDURE — 74011000258 HC RX REV CODE- 258

## 2019-01-01 PROCEDURE — 97116 GAIT TRAINING THERAPY: CPT

## 2019-01-01 PROCEDURE — 93005 ELECTROCARDIOGRAM TRACING: CPT

## 2019-01-01 PROCEDURE — 84145 PROCALCITONIN (PCT): CPT

## 2019-01-01 PROCEDURE — 83036 HEMOGLOBIN GLYCOSYLATED A1C: CPT

## 2019-01-01 PROCEDURE — 94761 N-INVAS EAR/PLS OXIMETRY MLT: CPT

## 2019-01-01 PROCEDURE — 97530 THERAPEUTIC ACTIVITIES: CPT

## 2019-01-01 PROCEDURE — 71045 X-RAY EXAM CHEST 1 VIEW: CPT

## 2019-01-01 PROCEDURE — 93306 TTE W/DOPPLER COMPLETE: CPT

## 2019-01-01 PROCEDURE — 74011250636 HC RX REV CODE- 250/636: Performed by: EMERGENCY MEDICINE

## 2019-01-01 PROCEDURE — 80053 COMPREHEN METABOLIC PANEL: CPT

## 2019-01-01 RX ORDER — ACETAMINOPHEN 325 MG/1
650 TABLET ORAL
Status: DISCONTINUED | OUTPATIENT
Start: 2019-01-01 | End: 2019-01-01 | Stop reason: HOSPADM

## 2019-01-01 RX ORDER — DEXTROSE MONOHYDRATE 25 G/50ML
25-50 INJECTION, SOLUTION INTRAVENOUS AS NEEDED
Status: DISCONTINUED | OUTPATIENT
Start: 2019-01-01 | End: 2019-01-01 | Stop reason: HOSPADM

## 2019-01-01 RX ORDER — INSULIN GLARGINE 100 [IU]/ML
5 INJECTION, SOLUTION SUBCUTANEOUS
Status: DISCONTINUED | OUTPATIENT
Start: 2019-01-01 | End: 2019-01-01 | Stop reason: HOSPADM

## 2019-01-01 RX ORDER — INSULIN LISPRO 100 [IU]/ML
INJECTION, SOLUTION INTRAVENOUS; SUBCUTANEOUS
Status: DISCONTINUED | OUTPATIENT
Start: 2019-01-01 | End: 2019-01-01 | Stop reason: HOSPADM

## 2019-01-01 RX ORDER — ASPIRIN 81 MG/1
81 TABLET ORAL DAILY
COMMUNITY

## 2019-01-01 RX ORDER — AMLODIPINE BESYLATE 5 MG/1
10 TABLET ORAL DAILY
Status: DISCONTINUED | OUTPATIENT
Start: 2019-01-01 | End: 2019-01-01 | Stop reason: HOSPADM

## 2019-01-01 RX ORDER — FUROSEMIDE 10 MG/ML
40 INJECTION INTRAMUSCULAR; INTRAVENOUS
Status: COMPLETED | OUTPATIENT
Start: 2019-01-01 | End: 2019-01-01

## 2019-01-01 RX ORDER — CALCIUM ACETATE 667 MG/1
2 CAPSULE ORAL
COMMUNITY
End: 2019-01-01

## 2019-01-01 RX ORDER — CALCIUM ACETATE 667 MG/1
667 CAPSULE ORAL
Status: DISCONTINUED | OUTPATIENT
Start: 2019-01-01 | End: 2019-01-01 | Stop reason: HOSPADM

## 2019-01-01 RX ORDER — LIDOCAINE HYDROCHLORIDE AND EPINEPHRINE 20; 10 MG/ML; UG/ML
0.75 INJECTION, SOLUTION INFILTRATION; PERINEURAL ONCE
Status: DISCONTINUED | OUTPATIENT
Start: 2019-01-01 | End: 2019-01-01 | Stop reason: HOSPADM

## 2019-01-01 RX ORDER — ONDANSETRON 2 MG/ML
4 INJECTION INTRAMUSCULAR; INTRAVENOUS
Status: DISCONTINUED | OUTPATIENT
Start: 2019-01-01 | End: 2019-01-01 | Stop reason: HOSPADM

## 2019-01-01 RX ORDER — DOCUSATE SODIUM 100 MG/1
100 CAPSULE, LIQUID FILLED ORAL
Status: DISCONTINUED | OUTPATIENT
Start: 2019-01-01 | End: 2019-01-01 | Stop reason: HOSPADM

## 2019-01-01 RX ORDER — ONDANSETRON 2 MG/ML
4 INJECTION INTRAMUSCULAR; INTRAVENOUS
Status: COMPLETED | OUTPATIENT
Start: 2019-01-01 | End: 2019-01-01

## 2019-01-01 RX ORDER — PANTOPRAZOLE SODIUM 40 MG/1
40 TABLET, DELAYED RELEASE ORAL DAILY
Status: DISCONTINUED | OUTPATIENT
Start: 2019-01-01 | End: 2019-01-01 | Stop reason: HOSPADM

## 2019-01-01 RX ORDER — BUTALBITAL, ACETAMINOPHEN AND CAFFEINE 300; 40; 50 MG/1; MG/1; MG/1
1 CAPSULE ORAL
COMMUNITY
End: 2019-01-01

## 2019-01-01 RX ORDER — INSULIN ASPART 100 [IU]/ML
2-10 INJECTION, SOLUTION INTRAVENOUS; SUBCUTANEOUS
COMMUNITY

## 2019-01-01 RX ORDER — CALCITRIOL 0.25 UG/1
0.25 CAPSULE ORAL
Status: SHIPPED | COMMUNITY
Start: 2019-01-01

## 2019-01-01 RX ORDER — CALCITRIOL 0.25 UG/1
0.25 CAPSULE ORAL
Status: DISCONTINUED | OUTPATIENT
Start: 2019-01-01 | End: 2019-01-01 | Stop reason: HOSPADM

## 2019-01-01 RX ORDER — AMLODIPINE BESYLATE 10 MG/1
10 TABLET ORAL DAILY
Qty: 30 TAB | Refills: 1 | Status: SHIPPED | OUTPATIENT
Start: 2019-01-01 | End: 2020-01-01

## 2019-01-01 RX ORDER — CALCIUM ACETATE 667 MG/1
667 CAPSULE ORAL
Qty: 90 CAP | Refills: 0 | Status: SHIPPED | OUTPATIENT
Start: 2019-01-01 | End: 2020-01-01

## 2019-01-01 RX ORDER — PREDNISOLONE ACETATE 10 MG/ML
1 SUSPENSION/ DROPS OPHTHALMIC 2 TIMES DAILY
COMMUNITY

## 2019-01-01 RX ORDER — INSULIN LISPRO 100 [IU]/ML
10 INJECTION, SOLUTION INTRAVENOUS; SUBCUTANEOUS ONCE
Status: DISCONTINUED | OUTPATIENT
Start: 2019-01-01 | End: 2019-01-01

## 2019-01-01 RX ORDER — SODIUM CHLORIDE 0.9 % (FLUSH) 0.9 %
5-40 SYRINGE (ML) INJECTION EVERY 8 HOURS
Status: DISCONTINUED | OUTPATIENT
Start: 2019-01-01 | End: 2019-01-01 | Stop reason: HOSPADM

## 2019-01-01 RX ORDER — HEPARIN SODIUM 5000 [USP'U]/ML
5000 INJECTION, SOLUTION INTRAVENOUS; SUBCUTANEOUS EVERY 12 HOURS
Status: DISCONTINUED | OUTPATIENT
Start: 2019-01-01 | End: 2019-01-01 | Stop reason: HOSPADM

## 2019-01-01 RX ORDER — MIRTAZAPINE 15 MG/1
15 TABLET, FILM COATED ORAL
Status: DISCONTINUED | OUTPATIENT
Start: 2019-01-01 | End: 2019-01-01 | Stop reason: HOSPADM

## 2019-01-01 RX ORDER — TAMSULOSIN HYDROCHLORIDE 0.4 MG/1
0.4 CAPSULE ORAL DAILY
Status: DISCONTINUED | OUTPATIENT
Start: 2019-01-01 | End: 2019-01-01 | Stop reason: HOSPADM

## 2019-01-01 RX ORDER — PRAVASTATIN SODIUM 10 MG/1
10 TABLET ORAL
Status: DISCONTINUED | OUTPATIENT
Start: 2019-01-01 | End: 2019-01-01 | Stop reason: HOSPADM

## 2019-01-01 RX ORDER — LANOLIN ALCOHOL/MO/W.PET/CERES
325 CREAM (GRAM) TOPICAL
Status: DISCONTINUED | OUTPATIENT
Start: 2019-01-01 | End: 2019-01-01 | Stop reason: HOSPADM

## 2019-01-01 RX ORDER — MAGNESIUM SULFATE 100 %
4 CRYSTALS MISCELLANEOUS AS NEEDED
Status: DISCONTINUED | OUTPATIENT
Start: 2019-01-01 | End: 2019-01-01 | Stop reason: HOSPADM

## 2019-01-01 RX ORDER — SODIUM CHLORIDE 0.9 % (FLUSH) 0.9 %
5-40 SYRINGE (ML) INJECTION AS NEEDED
Status: DISCONTINUED | OUTPATIENT
Start: 2019-01-01 | End: 2019-01-01 | Stop reason: HOSPADM

## 2019-01-01 RX ORDER — DEXTROSE MONOHYDRATE 25 G/50ML
INJECTION, SOLUTION INTRAVENOUS
Status: COMPLETED
Start: 2019-01-01 | End: 2019-01-01

## 2019-01-01 RX ORDER — ALBUTEROL SULFATE 0.83 MG/ML
2.5 SOLUTION RESPIRATORY (INHALATION)
Status: DISCONTINUED | OUTPATIENT
Start: 2019-01-01 | End: 2019-01-01 | Stop reason: HOSPADM

## 2019-01-01 RX ORDER — LANOLIN ALCOHOL/MO/W.PET/CERES
325 CREAM (GRAM) TOPICAL
Status: SHIPPED | COMMUNITY
Start: 2019-01-01

## 2019-01-01 RX ORDER — ALBUMIN HUMAN 250 G/1000ML
12.5 SOLUTION INTRAVENOUS
Status: DISCONTINUED | OUTPATIENT
Start: 2019-01-01 | End: 2019-01-01 | Stop reason: HOSPADM

## 2019-01-01 RX ORDER — INSULIN LISPRO 100 [IU]/ML
INJECTION, SOLUTION INTRAVENOUS; SUBCUTANEOUS
Status: DISCONTINUED | OUTPATIENT
Start: 2019-01-01 | End: 2019-01-01

## 2019-01-01 RX ORDER — PANTOPRAZOLE SODIUM 40 MG/1
40 TABLET, DELAYED RELEASE ORAL DAILY
Qty: 30 TAB | Refills: 0 | Status: SHIPPED | OUTPATIENT
Start: 2019-01-01 | End: 2020-01-01

## 2019-01-01 RX ORDER — INSULIN LISPRO 100 [IU]/ML
5 INJECTION, SOLUTION INTRAVENOUS; SUBCUTANEOUS ONCE
Status: COMPLETED | OUTPATIENT
Start: 2019-01-01 | End: 2019-01-01

## 2019-01-01 RX ORDER — OXYCODONE HYDROCHLORIDE 5 MG/1
5 TABLET ORAL
Status: DISCONTINUED | OUTPATIENT
Start: 2019-01-01 | End: 2019-01-01 | Stop reason: HOSPADM

## 2019-01-01 RX ORDER — INSULIN GLARGINE 100 [IU]/ML
15 INJECTION, SOLUTION SUBCUTANEOUS
COMMUNITY
End: 2019-01-01

## 2019-01-01 RX ORDER — METOCLOPRAMIDE 5 MG/1
5 TABLET ORAL
COMMUNITY
End: 2019-01-01

## 2019-01-01 RX ADMIN — FUROSEMIDE 40 MG: 10 INJECTION, SOLUTION INTRAMUSCULAR; INTRAVENOUS at 15:58

## 2019-01-01 RX ADMIN — CALCITRIOL 0.25 MCG: 0.25 CAPSULE ORAL at 21:57

## 2019-01-01 RX ADMIN — Medication 10 ML: at 18:18

## 2019-01-01 RX ADMIN — TAMSULOSIN HYDROCHLORIDE 0.4 MG: 0.4 CAPSULE ORAL at 08:18

## 2019-01-01 RX ADMIN — HEPARIN SODIUM 5000 UNITS: 5000 INJECTION INTRAVENOUS; SUBCUTANEOUS at 08:19

## 2019-01-01 RX ADMIN — INSULIN LISPRO 1 UNITS: 100 INJECTION, SOLUTION INTRAVENOUS; SUBCUTANEOUS at 21:58

## 2019-01-01 RX ADMIN — TAMSULOSIN HYDROCHLORIDE 0.4 MG: 0.4 CAPSULE ORAL at 08:02

## 2019-01-01 RX ADMIN — Medication 10 ML: at 06:14

## 2019-01-01 RX ADMIN — INSULIN LISPRO 5 UNITS: 100 INJECTION, SOLUTION INTRAVENOUS; SUBCUTANEOUS at 12:23

## 2019-01-01 RX ADMIN — CALCIUM ACETATE 667 MG: 667 CAPSULE ORAL at 08:00

## 2019-01-01 RX ADMIN — INSULIN GLARGINE 5 UNITS: 100 INJECTION, SOLUTION SUBCUTANEOUS at 21:58

## 2019-01-01 RX ADMIN — AMLODIPINE BESYLATE 10 MG: 5 TABLET ORAL at 08:02

## 2019-01-01 RX ADMIN — INSULIN LISPRO 4 UNITS: 100 INJECTION, SOLUTION INTRAVENOUS; SUBCUTANEOUS at 18:17

## 2019-01-01 RX ADMIN — PRAVASTATIN SODIUM 10 MG: 10 TABLET ORAL at 21:59

## 2019-01-01 RX ADMIN — PANTOPRAZOLE SODIUM 40 MG: 40 TABLET, DELAYED RELEASE ORAL at 08:19

## 2019-01-01 RX ADMIN — Medication 10 ML: at 05:31

## 2019-01-01 RX ADMIN — FERROUS SULFATE TAB 325 MG (65 MG ELEMENTAL FE) 325 MG: 325 (65 FE) TAB at 08:02

## 2019-01-01 RX ADMIN — DOCUSATE SODIUM 100 MG: 100 CAPSULE, LIQUID FILLED ORAL at 08:19

## 2019-01-01 RX ADMIN — CALCIUM ACETATE 667 MG: 667 CAPSULE ORAL at 08:02

## 2019-01-01 RX ADMIN — AMLODIPINE BESYLATE 10 MG: 5 TABLET ORAL at 08:18

## 2019-01-01 RX ADMIN — INSULIN LISPRO 5 UNITS: 100 INJECTION, SOLUTION INTRAVENOUS; SUBCUTANEOUS at 12:32

## 2019-01-01 RX ADMIN — PRAVASTATIN SODIUM 10 MG: 10 TABLET ORAL at 23:01

## 2019-01-01 RX ADMIN — CALCIUM ACETATE 667 MG: 667 CAPSULE ORAL at 18:18

## 2019-01-01 RX ADMIN — HEPARIN SODIUM 5000 UNITS: 5000 INJECTION INTRAVENOUS; SUBCUTANEOUS at 22:56

## 2019-01-01 RX ADMIN — MIRTAZAPINE 15 MG: 15 TABLET, FILM COATED ORAL at 21:57

## 2019-01-01 RX ADMIN — PANTOPRAZOLE SODIUM 40 MG: 40 TABLET, DELAYED RELEASE ORAL at 08:02

## 2019-01-01 RX ADMIN — FERROUS SULFATE TAB 325 MG (65 MG ELEMENTAL FE) 325 MG: 325 (65 FE) TAB at 12:23

## 2019-01-01 RX ADMIN — CALCIUM ACETATE 667 MG: 667 CAPSULE ORAL at 12:32

## 2019-01-01 RX ADMIN — Medication 16 G: at 08:01

## 2019-01-01 RX ADMIN — HEPARIN SODIUM 5000 UNITS: 5000 INJECTION INTRAVENOUS; SUBCUTANEOUS at 21:58

## 2019-01-01 RX ADMIN — MIRTAZAPINE 15 MG: 15 TABLET, FILM COATED ORAL at 23:01

## 2019-01-01 RX ADMIN — HEPARIN SODIUM 5000 UNITS: 5000 INJECTION INTRAVENOUS; SUBCUTANEOUS at 08:01

## 2019-01-01 RX ADMIN — ONDANSETRON 4 MG: 2 INJECTION INTRAMUSCULAR; INTRAVENOUS at 17:36

## 2019-01-01 RX ADMIN — Medication 10 ML: at 22:08

## 2019-01-01 RX ADMIN — DEXTROSE MONOHYDRATE 25 G: 25 INJECTION, SOLUTION INTRAVENOUS at 13:32

## 2019-02-02 NOTE — IP AVS SNAPSHOT
[address] [place] [address] 
[phone] Patient: Vani Bagley. MRN: [id number] DRM:[id number] You are allergic to the following Allergen Reactions Ace Inhibitors Unknown (comments) Arb-Angiotensin Receptor Antagonist Unknown (comments) Recent Documentation Height Weight BMI Smoking Status 1.626 m 43.3 kg 16.39 kg/m2 Never Smoker Emergency Contacts Name Discharge Info Relation Home Work Mobile [facility] Care Proxy Per Pt'S Mother) DISCHARGE CAREGIVER [3] Other Relative [6] [phone] Maribel Pretty  Child [2] [phone] 214 [facility]  Parent [1] [phone] About your hospitalization You were admitted on:  April 4, 2017 [facility] You were discharged on:  April 8, 2017 Unit phone number:  [phone] Why you were hospitalized Your primary diagnosis was:  Dka (Diabetic Ketoacidoses) (Hcc) Your diagnoses also included:  Dm (Diabetes Mellitus), Type 2, Uncontrolled (Hcc), Alzheimer Disease, Ckd (Chronic Kidney Disease) Stage 3, Gfr 30-59 Ml/Min, Dementia, Htn (Hypertension) Providers Seen During Your Hospitalizations Provider Role Specialty Primary office phone Kelly Llanes MD Attending Provider Vascular Surgery [phone] Felix Tom MD Attending Provider Internal Medicine [phone] Sissy Carrera MD Attending Provider Internal Medicine [phone] Scottie Woo MD Attending Provider Internal Medicine [phone] Your Primary Care Physician (PCP) Primary Care Physician Office Phone Office Fax Anitra Toth [phone] Follow-up Information Follow up With Details Comments Contact Info [facility] PSYCHIATRIC)   [address] [facility] [address] 
[phone] Javid Coyne MD Schedule an appointment as soon as possible for a visit in 2 weeks For follow up of dialysis graft placement [address] [place] [address] 
[phone] Alysia Crandall MD Call As needed for primary care [address] [facility] A Jacky Fay [address] 
[phone] Current Discharge Medication List  
  
START taking these medications Dose & Instructions Dispensing Information Comments Morning Noon Evening Bedtime  
 mupirocin 2 % ointment Commonly known as:  [facility] Your last dose was: Your next dose is:    
   
   
 Apply  to affected area two (2) times a day for 7 days. Quantity:  22 g Refills:  0 CONTINUE these medications which have CHANGED Dose & Instructions Dispensing Information Comments Morning Noon Evening Bedtime  
 acetaminophen 325 mg tablet Commonly known as:  TYLENOL What changed:   
- how much to take 
- additional instructions Your last dose was: Your next dose is:    
   
   
 Dose:  650 mg Take 2 Tabs by mouth every four (4) hours as needed. For pain. Quantity:  100 Tab Refills:  0  
     
   
   
   
  
 amLODIPine 10 mg tablet Commonly known as:  Yousif Beaman What changed:   
- medication strength 
- how much to take - Another medication with the same name was removed. Continue taking this medication, and follow the directions you see here. Your last dose was: Your next dose is:    
   
   
 Dose:  10 mg Take 1 Tab by mouth daily. Quantity:  30 Tab Refills:  0  
     
   
   
   
  
 insulin glargine 100 unit/mL injection Commonly known as:  LANTUS What changed:  how much to take Your last dose was: Your next dose is:    
   
   
 Dose:  5 Units 5 Units by SubCUTAneous route nightly. Quantity:  1 Vial  
Refills:  0 oxyCODONE IR 5 mg immediate release tablet Commonly known as:  Promise Cook What changed:  when to take this Your last dose was: Your next dose is:    
   
   
 Dose:  5 mg Take 1 Tab by mouth every four (4) hours as needed for Pain. Max Daily Amount: 30 mg.  
 Quantity:  20 Tab Refills:  0 CONTINUE these medications which have NOT CHANGED Dose & Instructions Dispensing Information Comments Morning Noon Evening Bedtime  
 albuterol 2.5 mg /3 mL (0.083 %) nebulizer solution Commonly known as:  PROVENTIL VENTOLIN Your last dose was: Your next dose is:    
   
   
 Dose:  2.5 mg  
2.5 mg by Nebulization route every four (4) hours as needed for Shortness of Breath. Refills:  0 Aranesp (Polysorbate) 25 mcg/0.42 mL injection Generic drug:  darbepoetin shaye Your last dose was: Your next dose is:    
   
   
 Dose:  25 mcg 25 mcg by SubCUTAneous route every seven (7) days. Give on the first dialysis treatment each week Refills:  0  
     
   
   
   
  
 calcitRIOL 0.25 mcg capsule Commonly known as:  ROCALTROL Your last dose was: Your next dose is:    
   
   
 Dose:  0.25 mcg Take 0.25 mcg by mouth every Monday, Wednesday, Friday. With each dialysis treatment Refills:  0  
     
   
   
   
  
 calcium acetate 667 mg Tab tablet Commonly known as:  PHOSLO Your last dose was: Your next dose is:    
   
   
 Dose:  667 mg Take 667 mg by mouth three (3) times daily (with meals). Refills:  0  
     
   
   
   
  
 glucagon 1 mg injection Commonly known as:  Katelyn Sheldon Your last dose was: Your next dose is:    
   
   
 Dose:  1 mg  
1 mL by IntraMUSCular route as needed for Hypoglycemia. Quantity:  1 Vial  
Refills:  0 HumaLOG 100 unit/mL injection Generic drug:  insulin lispro Your last dose was: Your next dose is:    
   
   
 by SubCUTAneous route Before breakfast, lunch, and dinner. Give per sliding scale:  0-160 = 0 units 161-300 = 2 units 301-400 = 4 units Refills:  0  
     
   
   
   
  
 mirtazapine 15 mg tablet Commonly known as:  Arlina De La O Your last dose was: Your next dose is:    
   
   
 Dose:  15 mg Take 15 mg by mouth nightly. Indications: MAJOR DEPRESSIVE DISORDER Refills:  0  
     
   
   
   
  
 omeprazole 10 mg capsule Commonly known as:  PRILOSEC Your last dose was: Your next dose is:    
   
   
 Dose:  10 mg Take 10 mg by mouth daily. Indications: GASTROESOPHAGEAL REFLUX Refills:  0  
     
   
   
   
  
 pravastatin 10 mg tablet Commonly known as:  PRAVACHOL Your last dose was: Your next dose is:    
   
   
 Dose:  10 mg Take 1 Tab by mouth nightly. Quantity:  30 Tab Refills:  0 SLOW  mg (45 mg iron) ER tablet Generic drug:  ferrous sulfate Your last dose was: Your next dose is:    
   
   
 Dose:  142 mg Take 142 mg by mouth Daily (before breakfast). Refills:  0  
     
   
   
   
  
 tamsulosin 0.4 mg capsule Commonly known as:  FLOMAX Your last dose was: Your next dose is:    
   
   
 Dose:  0.4 mg Take 0.4 mg by mouth daily. Refills:  0 STOP taking these medications   
 insulin aspart 100 unit/mL injection Commonly known as:  Christine Santiago Where to Get Your Medications Information on where to get these meds will be given to you by the nurse or doctor. ! Ask your nurse or doctor about these medications  
  acetaminophen 325 mg tablet  
 amLODIPine 10 mg tablet  
 insulin glargine 100 unit/mL injection  
 mupirocin 2 % ointment  
 oxyCODONE IR 5 mg immediate release tablet  
 pravastatin 10 mg tablet Discharge Instructions Discharging provider: Edison Gilliland MD 
 Primary care provider: Rico Hernandez MD 
 
[address] [facility] COURSE: 
 
Meka Arauz is a 61 y.o. male with history of dementia, CAD, prostate cancer, ESRD on HD (M/W/F), HTN, uncontrolled DM-2 who presents withhyperglycemia. He had originally been scheduled for vascular access surgery for his dialysis, but had been found to be hyperglycemic with fingerstick glucose of 627. His surgery was postponed because of this and we were asked to admit patient for glucose control.   
 
DKA (POA) - resolved and BG improved, A1c 10.3 but having episodes of hypoglycemia now - Was on insulin drip, ivf and in icu setting. 
- Reduce glargine to 5 units nightly, add bed-time diabetic snack and continue sliding scale. 
  
Hypertension, in setting of known new ESRD Appreciate nephrology service who has already seen patient. Medications adjusted as needed. 
  
New ESRD 
- UE AV graft placed 4/7 
- Pain control 
- Continue IHD 
- Nephrology consulted - follow up with regular dialysis - Vascular surgery consulted - follow up with Dr. Thelma Sun Memory impairment per H&P note: 
\"- originally listed in chart as secondary to dementia, though per sister's report, this may be due to TBI (head injury more than 30 years ago) 
- supportive care\" 
  
History of prostate cancer per h&P 
- follow-up as outpatient FOLLOW-UP CARE RECOMMENDATIONS: 
 
APPOINTMENTS: 
Follow-up Information Follow up With Details Comments Contact Info [facility] PSYCHIATRIC)   [facility] [address] [place] [address] 
[phone] Stevan Wayne MD Schedule an appointment as soon as possible for a visit in 2 weeks For follow up of dialysis graft placement [address] [place] [address] 
[phone] Rico Hernandez MD Call As needed for primary care [address] [place] [address] [address] [place] [address] [phone] It is very important that you keep follow-up appointment(s). Bring discharge papers, medication list (and/or medication bottles) to follow-up appointments for review by outpatient provider(s). ONGOING TREATMENT PLAN: Continue pain control for dialysis graft placement, follow up with vascular surgery, continue dialysis, adjust diabetes meds as needed Specific symptoms to watch for: chest pain, shortness of breath, fever, chills, nausea, vomiting, diarrhea, change in mentation, falling, weakness, bleeding. DIET:  Diabetic Diet and Renal Diet, please schedule diabetic bed-time snack ACTIVITY:  Activity as tolerated WOUND CARE: Apply mupirocin to dialysis graft site twice daily and cover. GOALS OF CARE: 
  Eventual return to home/independent/assisted living  
x  Long term SNF Hospice No rehospitalization Patient condition at discharge:  
Functional status Poor   
x  Deconditioned Independent Cognition Raydell Livings Forgetful (some sensescence) x  Dementia Catheters/lines (plus indication) Childs PICC   
  PEG   
x  Right chest dialysis catheter Code status Full code   
x  DNR Sesar Abdi . . . . . . . . . . . . . . . . . . . . . . . . . . . . . . . . . . . . . . . . . . . . . . . . . . . . . . . . . . . . . . . . . . . . . . Sesar Abdi CHRONIC MEDICAL CONDITIONS: 
Problem List as of 4/8/2017  Date Reviewed: 4/7/2017 Codes Class Noted - Resolved Dyspnea ICD-10-CM: R06.00 
ICD-9-CM: 786.09  2/6/2017 - Present Pneumonia ICD-10-CM: J18.9 ICD-9-CM: 049  2/4/2017 - Present Anemia in chronic kidney disease ICD-10-CM: N18.9, D63.1 ICD-9-CM: 285.21, 585.9 Chronic 1/21/2017 - Present Counseling regarding goals of care ICD-10-CM: Z71.89 ICD-9-CM: V65.49  1/19/2017 - Present Do not resuscitate discussion ICD-10-CM: Z71.89 ICD-9-CM: V65.49  1/19/2017 - Present History of GI bleed ICD-10-CM: Z87.19 ICD-9-CM: V12.79 Present on Admission 11/12/2014 - Present CKD (chronic kidney disease) stage 3, GFR 30-59 ml/min (Chronic) ICD-10-CM: N18.3 ICD-9-CM: 179. 3 Chronic 12/13/2013 - Present * (Principal)DKA (diabetic ketoacidoses) ([facility].) ICD-10-CM: E13.10 ICD-9-CM: 250.10  7/10/2011 - Present Renal failure, acute ([facility].) ICD-10-CM: N17.9 ICD-9-CM: 584.9  12/20/2010 - Present CAD (coronary artery disease) ICD-10-CM: I25.10 ICD-9-CM: 414.00  12/20/2010 - Present HTN (hypertension) (Chronic) ICD-10-CM: I10 
ICD-9-CM: 401.9  12/20/2010 - Present Alzheimer disease ICD-10-CM: G30.9 ICD-9-CM: 331.0  9/19/2010 - Present DM (diabetes mellitus), type 2, uncontrolled (HCC) (Chronic) ICD-10-CM: E11.65 ICD-9-CM: 250.02  8/16/2010 - Present Weakness generalized ICD-10-CM: R53.1 ICD-9-CM: 780.79  8/16/2010 - Present Pure hypercholesterolemia ICD-10-CM: E78.00 ICD-9-CM: 272.0  1/2/2010 - Present Prostate cancer [facility]) ICD-10-CM: F84 ICD-9-CM: 185  1/2/2010 - Present Overview Signed 1/2/2010  1:43 PM by Zeina Anthony MD  
  Early stage--watchful waiting 2009 Dementia (Chronic) ICD-10-CM: F03.90 ICD-9-CM: 294.20  1/2/2010 - Present Overview Signed 1/2/2010  1:44 PM by Zeina Anthony MD  
  Moderate. Blind one eye ICD-10-CM: H54.40 ICD-9-CM: 369.60  1/2/2010 - Present Overview Signed 1/2/2010  1:45 PM by Zeina Anthony MD  
  Retinal detatchment right eye RESOLVED: Pneumonia ICD-10-CM: J18.9 ICD-9-CM: 562  11/8/2014 - 1/21/2017 RESOLVED: Proteinuria (Chronic) ICD-10-CM: R80.9 ICD-9-CM: 791.0  12/13/2013 - 1/21/2017 RESOLVED: Abdominal pain ICD-10-CM: R10.9 ICD-9-CM: 789.00  12/13/2013 - 1/21/2017 RESOLVED: Hematuria ICD-10-CM: R31.9 ICD-9-CM: 599.70  12/13/2013 - 1/21/2017 RESOLVED: Hyperkalemia ICD-10-CM: E87.5 ICD-9-CM: 276.7  6/14/2012 - 1/21/2017 RESOLVED: Hyponatremia ICD-10-CM: E87.1 ICD-9-CM: 276.1  6/14/2012 - 1/21/2017 RESOLVED: Hyperglycemia ICD-10-CM: R73.9 ICD-9-CM: 790.29  6/14/2012 - 1/21/2017 RESOLVED: Type II or unspecified type diabetes mellitus with hyperosmolarity, uncontrolled ICD-10-CM: E11.00 ICD-9-CM: 250.22  12/11/2011 - 1/21/2017 RESOLVED: Pain, upper extremity ICD-10-CM: M79.603 ICD-9-CM: 729.5  10/1/2011 - 1/21/2017 RESOLVED: Hypokalemia ICD-10-CM: E87.6 ICD-9-CM: 276.8  12/20/2010 - 1/21/2017 RESOLVED: Hypocalcemia ICD-10-CM: E83.51 
ICD-9-CM: 275.41  12/20/2010 - 1/21/2017 RESOLVED: Hyperkalemia ICD-10-CM: E87.5 ICD-9-CM: 276.7  12/19/2010 - 1/21/2017 RESOLVED: Dehydration ICD-10-CM: E86.0 ICD-9-CM: 276.51  12/19/2010 - 1/21/2017 RESOLVED: Acute hyperkalemia ICD-10-CM: E87.5 ICD-9-CM: 276.7  9/19/2010 - 1/21/2017 RESOLVED: Hyponatremia ICD-10-CM: E87.1 ICD-9-CM: 276.1  9/19/2010 - 1/21/2017 RESOLVED: Renal failure, acute ([facility] ICD-10-CM: N17.9 ICD-9-CM: 584.9  9/19/2010 - 1/21/2017 RESOLVED: Volume depletion ICD-10-CM: E86.9 ICD-9-CM: 276.50  9/19/2010 - 1/21/2017 RESOLVED: Type II or unspecified type diabetes mellitus with hyperosmolarity, uncontrolled ICD-10-CM: E11.00 ICD-9-CM: 250.22  9/19/2010 - 1/21/2017 RESOLVED: Acute renal failure (HCC) ICD-10-CM: N17.9 ICD-9-CM: 783. 9 Acute 8/16/2010 - 1/21/2017 RESOLVED: Hypotension ICD-10-CM: I95.9 ICD-9-CM: 458.9  8/16/2010 - 1/21/2017 Discharge Orders None [facility] Announcement We are excited to announce that we are making your provider's discharge notes available to you in [employer]. You will see these notes when they are completed and signed by the physician that discharged you from your recent hospital stay.   If you have any questions or concerns about any information you see in [employer], please call the Health Information Department where you were seen or reach out to your Primary Care Provider for more information about your plan of care. Introducing [facility] [place] [facility]! [employer] introduces [employer] patient portal. Now you can access parts of your medical record, email your doctor's office, and request medication refills online. 1. In your internet browser, go to [email or web address] 2. Click on the First Time User? Click Here link in the Sign In box. You will see the New Member Sign Up page. 3. Enter your [employer] Access Code exactly as it appears below. You will not need to use this code after youve completed the sign-up process. If you do not sign up before the expiration date, you must request a new code. · [employer] Access Code: [email or web address] Expires: 5/23/2017 11:03 AM 
 
4. Enter the last four digits of your Social Security Number (xxxx) and Date of Birth (mm/dd/yyyy) as indicated and click Submit. You will be taken to the next sign-up page. 5. Create a [employer] ID. This will be your [employer] login ID and cannot be changed, so think of one that is secure and easy to remember. 6. Create a [employer] password. You can change your password at any time. 7. Enter your Password Reset Question and Answer. This can be used at a later time if you forget your password. 8. Enter your e-mail address. You will receive e-mail notification when new information is available in [address] 9. Click Sign Up. You can now view and download portions of your medical record. 10. Click the Download Summary menu link to download a portable copy of your medical information. If you have questions, please visit the Frequently Asked Questions section of the [employer] website. Remember, [employer] is NOT to be used for urgent needs. For medical emergencies, dial 911. Now available from your iPhone and Android! General Information Please provide this summary of care documentation to your next provider.  
  
  
    
    
 Patient Signature: ____________________________________________________________ Date:  ____________________________________________________________  
  
Alvarez End Provider Signature:  ____________________________________________________________ Date:  ____________________________________________________________ SYNCOPE

## 2019-12-01 PROBLEM — J81.0 ACUTE PULMONARY EDEMA (HCC): Status: ACTIVE | Noted: 2019-01-01

## 2019-12-01 NOTE — DIALYSIS
[facility]  [phone] Vitals   Pre   Post   Assessment   Pre   Post    
Temp  Temp: 97.8 °F (36.6 °C) (12/01/19 1830)  97.8 LOC  Oriented to self Oriented x2 HR   Pulse (Heart Rate): 91 (12/01/19 1830) 93 Lungs   diminished on 3lnc  diminished on 2lnc B/P   BP: 150/65 (12/01/19 1830) 137/71 Cardiac   B/p elevated, NSR  b/p wnl, nsr Resp   Resp Rate: 23 (12/01/19 1830) 20 Skin   intact  intact Pain level   0 0 Edema  None noted 
 
 none Orders: Duration:   Start:    1828 End:    2100 Total:   2.5hrs Dialyzer:   Dialyzer/Set Up Inspection: Scott Alvarado (12/01/19 1830) K Bath:   Dialysate K (mEq/L): 3 (12/01/19 1830) Ca Bath:   Dialysate CA (mEq/L): 2.5 (12/01/19 1830) Na/Bicarb:   Dialysate NA (mEq/L): 140 (12/01/19 1830) Target Fluid Removal:   Goal/Amount of Fluid to Remove (mL): 4000 mL (12/01/19 1830) Access Type & Location:   LT arm graft, good thrill and bruit noted. No signs of infection present. Area cleaned and accessed with 15g needles x2 without any issues. Good blood flow noted Labs Obtained/Reviewed Critical Results Called   Date when labs were drawn- 
Hgb-   
HGB Date Value Ref Range Status 12/01/2019 10.1 (L) 12.1 - 17.0 g/dL Final  
 
K-   
Potassium Date Value Ref Range Status 12/01/2019 4.2 3.5 - 5.1 mmol/L Final  
 
Ca-  
Calcium Date Value Ref Range Status 12/01/2019 8.2 (L) 8.5 - 10.1 MG/DL Final  
 
Bun-  
BUN Date Value Ref Range Status 12/01/2019 30 (H) 6 - 20 MG/DL Final  
 
Creat-  
Creatinine Date Value Ref Range Status 12/01/2019 4.50 (H) 0.70 - 1.30 MG/DL Final  
 
  
Medications/ Blood Products Given Name   Dose   Route and Time None ordered to be given during tx Blood Volume Processed (BVP):    61.6 Net Fluid Removed:  4kgs Comments Time Out Done: 56 Primary Nurse Rpt Pre:Alysia Gupta RN  
Primary Nurse Rpt Post:Elizabeth Griffin RN 
Pt Education:ESRD 
 Care Plan:ESRD Tx Summary: 
1800 Dr Shaun Bojorquez at bedside, informed that it would probably be best to run pt 2.5hrs instead of 2hrs to possibly pull off 4kg.  gave verbal order to increase time to 2.5hrs 1828 dialysis started after consents signed by daughter due to pt's dementia. HEP B AG drawn at start of treatment 2100 Dialysis completed and blood rinsed back. Needles removed and pressure held till bleeding stopped. Dressing applied to each site, pt stable Admiting Diagnosis:Fluid overload [facility] - Informed Consent Verified: Yes (12/01/19 1830) Fiorella Consent - obtained Hepatitis Status- Pending drawn today, will bleach machine after treatment Machine #- Machine Number: [id number] (12/01/19 1830) Telemetry status-NSR Pre-dialysis wt. -

## 2019-12-01 NOTE — ED PROVIDER NOTES
EMERGENCY DEPARTMENT HISTORY AND PHYSICAL EXAM 
 
 
Date: 12/1/2019 Patient Name: Soila Post. History of Presenting Illness No chief complaint on file. History Provided By: Patient HPI: Soila Post., 81 y.o. male with PMHx as noted below presents the emergency department with chief complaint of shortness of breath and hypoglycemia. History unobtainable from the patient secondary to altered mental status. When asked, patient has no clue why he is here and is unable to answer any questions. Uncertain as to when the symptoms may have started. Also unclear as to whether or not the patient may have received his dialysis on Friday and there really is no other history available on patient arrival with exception his blood glucose is in the 40s this morning and was treated with oral glucose. PCP: Arabella Pimentel MD 
 
Current Facility-Administered Medications Medication Dose Route Frequency Provider Last Rate Last Dose  albumin human 25% (BUMINATE) solution 12.5 g  12.5 g IntraVENous DIALYSIS PRN Kimani Murdock MD      
 albuterol (PROVENTIL VENTOLIN) nebulizer solution 2.5 mg  2.5 mg Nebulization Q4H PRN Adam Lazar MD      
 [START ON 12/2/2019] calcitRIOL (ROCALTROL) capsule 0.25 mcg  0.25 mcg Oral Q MON, WED & FRI Adam Lazar MD      
 [START ON 12/2/2019] calcium acetate (PHOSLO) capsule 667 mg  667 mg Oral TID WITH MEALS Kimani Murdock MD      
[address] [place] [address] ON 12/2/2019] ferrous sulfate tablet 325 mg  325 mg Oral ACB Adam Lazar MD      
 [START ON 12/2/2019] pantoprazole (PROTONIX) tablet 40 mg  40 mg Oral DAILY Kimani Valenzuela MD      
 oxyCODONE IR (ROXICODONE) tablet 5 mg  5 mg Oral Q4H PRN Adam Lazar MD      
 pravastatin (PRAVACHOL) tablet 10 mg  10 mg Oral QHS Adam Lazar MD      
 [START ON 12/2/2019] tamsulosin (FLOMAX) capsule 0.4 mg  0.4 mg Oral DAILY Adam Lazar MD      
  sodium chloride (NS) flush 5-40 mL  5-40 mL IntraVENous Q8H Golla, Leopoldo Player, MD      
 sodium chloride (NS) flush 5-40 mL  5-40 mL IntraVENous PRN Hawa Walker MD      
 acetaminophen (TYLENOL) tablet 650 mg  650 mg Oral Q6H PRN Hawa Walker MD      
 ondansetron [facility]) injection 4 mg  4 mg IntraVENous Q6H PRN Hawa Walker MD      
 docusate sodium (COLACE) capsule 100 mg  100 mg Oral DAILY PRN Hawa Walker MD      
 heparin (porcine) injection 5,000 Units  5,000 Units SubCUTAneous Q12H Golla, Leopoldo Player, MD      
 [START ON 12/2/2019] amLODIPine (NORVASC) tablet 10 mg  10 mg Oral DAILY Golla, Leopoldo Player, MD      
 mirtazapine (REMERON) tablet 15 mg  15 mg Oral QHS Hawa Walker MD      
 
Current Outpatient Medications Medication Sig Dispense Refill  cloNIDine HCl (CATAPRES) 0.1 mg tablet Take 0.1 mg by mouth as needed.  DIPHENHYDRAMINE HCL (BENADRYL IJ) 25 mg by IntraVENous route as needed. For itching and anxiety.  loperamide (IMMODIUM) 2 mg tablet Take 4 mg by mouth as needed for Diarrhea.  ondansetron hcl (ZOFRAN) 4 mg tablet 4 mg by IntraVENous route as needed for Nausea.  calcium carbonate (TUMS) 200 mg calcium (500 mg) chew Take 1 Tab by mouth as needed.  nitroglycerin (NITROSTAT) 0.4 mg SL tablet 0.4 mg by SubLINGual route every five (5) minutes as needed for Chest Pain.  PROTEIN SUPPLEMENT (LIQUACEL PO) Take 1 oz by mouth.  acetaminophen (TYLENOL) 325 mg tablet Take 2 Tabs by mouth every four (4) hours as needed. For pain. 100 Tab 0  pravastatin (PRAVACHOL) 10 mg tablet Take 1 Tab by mouth nightly. 30 Tab 0  
 oxyCODONE IR (ROXICODONE) 5 mg immediate release tablet Take 1 Tab by mouth every four (4) hours as needed for Pain. Max Daily Amount: 30 mg. 20 Tab 0  
 amLODIPine (NORVASC) 10 mg tablet Take 1 Tab by mouth daily. 30 Tab 0  
 insulin glargine (LANTUS) 100 unit/mL injection 5 Units by SubCUTAneous route nightly.  1 Vial 0  
  albuterol (PROVENTIL VENTOLIN) 2.5 mg /3 mL (0.083 %) nebulizer solution 2.5 mg by Nebulization route every four (4) hours as needed for Shortness of Breath.  darbepoetin shaye (ARANESP, POLYSORBATE,) 25 mcg/0.42 mL injection 25 mcg by SubCUTAneous route every seven (7) days. Give on the first dialysis treatment each week  insulin lispro (HUMALOG) 100 unit/mL injection by SubCUTAneous route Before breakfast, lunch, and dinner. Give per sliding scale: 
 
0-160 = 0 units 161-300 = 2 units 301-400 = 4 units  calcium acetate (PHOSLO) 667 mg tab tablet Take 667 mg by mouth three (3) times daily (with meals).  mirtazapine (REMERON) 15 mg tablet Take 15 mg by mouth nightly. Indications: MAJOR DEPRESSIVE DISORDER    
 omeprazole (PRILOSEC) 10 mg capsule Take 10 mg by mouth daily. Indications: GASTROESOPHAGEAL REFLUX  tamsulosin (FLOMAX) 0.4 mg capsule Take 0.4 mg by mouth daily.  calcitRIOL (ROCALTROL) 0.25 mcg capsule Take 0.25 mcg by mouth every Monday, Wednesday, Friday. With each dialysis treatment  ferrous sulfate (SLOW FE) 142 mg (45 mg iron) ER tablet Take 142 mg by mouth Daily (before breakfast).  glucagon (GLUCAGEN) 1 mg injection 1 mL by IntraMUSCular route as needed for Hypoglycemia. 1 Vial 0 Past History Past Medical History: 
Past Medical History:  
Diagnosis Date  Alzheimer disease  CAD (coronary artery disease)  Cancer [facility])   
 prostate  Chronic kidney disease  CKD (chronic kidney disease) stage 4, GFR 15-29 ml/min ([facility])  DM (diabetes mellitus), type 2, uncontrolled ([facility]  Hemodialysis patient ([facility]  Hyperlipidemia  Hypertension  Other ill-defined conditions   
 blind R eye-retina detachment  Other ill-defined conditions   
 right cerebellopontine angle lipoma. Past Surgical History: 
Past Surgical History:  
Procedure Laterality Date  COLONOSCOPY,DIAGNOSTIC  11/12/2014  JOSÉ BROWN    
 retinal detachment  HX SHOULDER ARTHROSCOPY    
 right  HX UROLOGICAL    
 prostate bx  UPPER GI ENDOSCOPY,BIOPSY  11/12/2014 Family History: 
Family History Problem Relation Age of Onset  Heart Disease Mother Pacemaker  Cancer Father Social History: 
Social History Tobacco Use  Smoking status: Never Smoker  Smokeless tobacco: Never Used Substance Use Topics  Alcohol use: No  
 Drug use: No  
 
 
Allergies: Allergies Allergen Reactions  Ace Inhibitors Unknown (comments)  Arb-Angiotensin Receptor Antagonist Unknown (comments) Review of Systems Review of Systems Unable to perform ROS: Mental status change Physical Exam  
Physical Exam 
 
GENERAL: alert, no acute distress EYES: PEERL, No injection, discharge or icterus. ENT: Mucous membranes pink and moist. 
NECK: Supple LUNGS: Airway patent. Labored respirations, crackles bilateral 
HEART: Regular rate and rhythm. No peripheral edema ABDOMEN: Non-distended and non-tender, without guarding or rebound. SKIN:  warm, dry EXTREMITIES: Without swelling, tenderness or deformity, symmetric with normal ROM 
NEUROLOGICAL: Alert, confused Diagnostic Study Results Labs - Recent Results (from the past 12 hour(s)) GLUCOSE, POC Collection Time: 12/01/19  1:21 PM  
Result Value Ref Range Glucose (POC) 71 65 - 100 mg/dL Performed by Lore García EKG, 12 LEAD, INITIAL Collection Time: 12/01/19  1:26 PM  
Result Value Ref Range Ventricular Rate 89 BPM  
 Atrial Rate 89 BPM  
 P-R Interval 112 ms QRS Duration 74 ms Q-T Interval 390 ms QTC Calculation (Bezet) 474 ms Calculated P Axis 21 degrees Calculated R Axis 6 degrees Calculated T Axis 41 degrees Diagnosis Normal sinus rhythm Nonspecific T wave abnormality Prolonged QT When compared with ECG of 05-APR-2017 02:13, No significant change was found CBC WITH AUTOMATED DIFF  
 Collection Time: 12/01/19  1:39 PM  
Result Value Ref Range WBC 7.3 4.1 - 11.1 K/uL  
 RBC 3.38 (L) 4.10 - 5.70 M/uL  
 HGB 10.1 (L) 12.1 - 17.0 g/dL HCT 31.2 (L) 36.6 - 50.3 % MCV 92.3 80.0 - 99.0 FL  
 MCH 29.9 26.0 - 34.0 PG  
 MCHC 32.4 30.0 - 36.5 g/dL  
 RDW 16.5 (H) 11.5 - 14.5 % PLATELET 655 727 - 316 K/uL MPV 11.3 8.9 - 12.9 FL  
 NRBC 0.0 0  WBC ABSOLUTE NRBC 0.00 0.00 - 0.01 K/uL NEUTROPHILS 74 32 - 75 % LYMPHOCYTES 9 (L) 12 - 49 % MONOCYTES 8 5 - 13 % EOSINOPHILS 8 (H) 0 - 7 % BASOPHILS 1 0 - 1 % IMMATURE GRANULOCYTES 0 0.0 - 0.5 % ABS. NEUTROPHILS 5.3 1.8 - 8.0 K/UL  
 ABS. LYMPHOCYTES 0.7 (L) 0.8 - 3.5 K/UL  
 ABS. MONOCYTES 0.6 0.0 - 1.0 K/UL  
 ABS. EOSINOPHILS 0.6 (H) 0.0 - 0.4 K/UL  
 ABS. BASOPHILS 0.1 0.0 - 0.1 K/UL  
 ABS. IMM. GRANS. 0.0 0.00 - 0.04 K/UL  
 DF AUTOMATED    
 RBC COMMENTS ANISOCYTOSIS 1+ 
    
 RBC COMMENTS HYPOCHROMIA PRESENT 
    
 RBC COMMENTS SCHISTOCYTES PRESENT 
    
 RBC COMMENTS FEW   
GLUCOSE, POC Collection Time: 12/01/19  1:47 PM  
Result Value Ref Range Glucose (POC) 209 (H) 65 - 100 mg/dL Performed by Dayana Rice POC VENOUS BLOOD GAS Collection Time: 12/01/19  2:05 PM  
Result Value Ref Range Device: NASAL CANNULA Flow rate (POC) 1 L/M  
 pH, venous (POC) 7.485 (H) 7.32 - 7.42    
 pCO2, venous (POC) 41.5 41 - 51 MMHG  
 pO2, venous (POC) 24 (L) 25 - 40 mmHg HCO3, venous (POC) 31.3 (H) 23.0 - 28.0 MMOL/L  
 sO2, venous (POC) 48 (L) 65 - 88 % Base excess, venous (POC) 8 mmol/L Allens test (POC) N/A Site OTHER Specimen type (POC) VENOUS BLOOD    
NT-PRO BNP Collection Time: 12/01/19  2:12 PM  
Result Value Ref Range NT pro-BNP >35,000 (H) <125 PG/ML  
TROPONIN I Collection Time: 12/01/19  2:12 PM  
Result Value Ref Range Troponin-I, Qt. <0.05 <0.05 ng/mL METABOLIC PANEL, COMPREHENSIVE Collection Time: 12/01/19  2:12 PM  
Result Value Ref Range Sodium 134 (L) 136 - 145 mmol/L Potassium 4.2 3.5 - 5.1 mmol/L Chloride 99 97 - 108 mmol/L  
 CO2 28 21 - 32 mmol/L Anion gap 7 5 - 15 mmol/L Glucose 188 (H) 65 - 100 mg/dL BUN 30 (H) 6 - 20 MG/DL Creatinine 4.50 (H) 0.70 - 1.30 MG/DL  
 BUN/Creatinine ratio 7 (L) 12 - 20 GFR est AA 16 (L) >60 ml/min/1.73m2 GFR est non-AA 13 (L) >60 ml/min/1.73m2 Calcium 8.2 (L) 8.5 - 10.1 MG/DL Bilirubin, total 0.6 0.2 - 1.0 MG/DL  
 ALT (SGPT) 24 12 - 78 U/L  
 AST (SGOT) 28 15 - 37 U/L Alk. phosphatase 137 (H) 45 - 117 U/L Protein, total 6.4 6.4 - 8.2 g/dL Albumin 3.3 (L) 3.5 - 5.0 g/dL Globulin 3.1 2.0 - 4.0 g/dL A-G Ratio 1.1 1.1 - 2.2 GLUCOSE, POC Collection Time: 12/01/19  3:45 PM  
Result Value Ref Range Glucose (POC) 163 (H) 65 - 100 mg/dL Performed by Stephanie Travis PCT PROCALCITONIN Collection Time: 12/01/19  7:50 PM  
Result Value Ref Range Procalcitonin 6.17 ng/mL TROPONIN I Collection Time: 12/01/19  8:47 PM  
Result Value Ref Range Troponin-I, Qt. <0.05 <0.05 ng/mL GLUCOSE, POC Collection Time: 12/01/19  8:55 PM  
Result Value Ref Range Glucose (POC) 152 (H) 65 - 100 mg/dL Performed by Nathanael Hawkins RN Radiologic Studies -  
XR CHEST PORT Final Result IMPRESSION: Congestive failure with interstitial edema and bilateral pleural  
effusions and probable overlying atelectasis though underlying infectious  
infiltrate is not excluded. CT Results  (Last 48 hours) None CXR Results  (Last 48 hours) 12/01/19 1416  XR CHEST PORT Final result Impression:  IMPRESSION: Congestive failure with interstitial edema and bilateral pleural  
effusions and probable overlying atelectasis though underlying infectious  
infiltrate is not excluded. Narrative:  EXAM: XR CHEST PORT INDICATION: SOB  
   
COMPARISON: None.   
   
FINDINGS: A portable AP radiograph of the chest was obtained at 13:56 hours. The  
patient is on a cardiac monitor. There or bilateral pleural effusions with  
overlying airspace opacity, pulmonary vascular congestion, and diffuse  
interstitial prominence. There is no pneumothorax. The cardiac and mediastinal  
contours are normal.  The bones and soft tissues are grossly within normal  
limits. Medical Decision Making I am the first provider for this patient. I reviewed the vital signs, available nursing notes, past medical history, past surgical history, family history and social history. Vital Signs-Reviewed the patient's vital signs. Patient Vitals for the past 12 hrs: 
 Temp Pulse Resp BP SpO2  
12/01/19 2100 97.8 °F (36.6 °C) 90 21 135/71 97 % 12/01/19 2045  85 15 149/71 100 % 12/01/19 2030  85 15 145/66 100 % 12/01/19 2015  85 14 143/65 100 % 12/01/19 2000  90 18 149/72 100 % 12/01/19 1945  (!) 101 20 169/74 100 % 12/01/19 1930  86 14 168/79 100 % 12/01/19 1915  85 18 160/68 100 % 12/01/19 1900  86 19 151/77 99 % 12/01/19 1845  84 18 167/58 100 % 12/01/19 1830 97.8 °F (36.6 °C) 91 23 150/65 96 % 12/01/19 1730  82 25 161/64 91 % 12/01/19 1715  87 21 156/63 91 % 12/01/19 1700  87 23 155/64 92 % 12/01/19 1545  86 21 150/66 92 % 12/01/19 1415  88 26 150/73 93 % 12/01/19 1400  88 27 160/72 95 % 12/01/19 1345  87 27 148/69 94 % 12/01/19 1332 97.6 °F (36.4 °C) 89 28 153/75 (!) 89 % Records Reviewed: Nursing Notes and Old Medical Records Provider Notes (Medical Decision Making):  
Patient is a 89year-old male with history of end-stage renal disease coming to the emergency department with hypoglycemia and hypoxia. On arrival as noted have labored respirations with crackles throughout. Differential diagnosis includes hypoglycemia, volume overload, heart failure, pneumonia, developed abnormality, ACS among others.   Hyperglycemia likely secondary to poor p.o. intake in the setting of insulin use as this is seem to correct after oral glucose. Chest x-ray was notable for pulmonary edema however his work of breathing significantly improved after supplemental oxygen. Venous blood gas was reassuring so we will hold off on BiPAP for now. We will plan to discuss with nephrology and admit for further management. ED Course:  
Initial assessment performed. The patients presenting problems have been discussed, and they are in agreement with the care plan formulated and outlined with them. I have encouraged them to ask questions as they arise throughout their visit. PROGRESS: 
The patient has been re-evaluated and sx have improved. Reviewed available results with patient and have counseled them on diagnosis and care plan. They have expressed understanding, and all their questions have been answered. They agree with plan for admission. CONSULT: Discussed case with Dr. Montana Conner, nephrology, will evaluate for dialysis. CONSULT: 
Jonathan Hogan MD spoke with the hospitalist.  Discussed HPI and PE, available diagnostic tests and clinical findings. He is in agreement with care plans as outlined and will evaluate for admission Admit Note Patient is being admitted to the hospitalist.  The results of their tests and reasons for their admission have been discussed with them and/or available family. They convey agreement and understanding for the need to be admitted and for their admission diagnosis. Consultation has been made with the inpatient physician specialist for hospitalization. Disposition: 
admission PLAN: 
1. Admit Diagnosis Clinical Impression: 1. Acute respiratory failure with hypoxia ([address] 2. Acute pulmonary edema (HCC) 3. ESRD (end stage renal disease) ([address] 4. Hypoglycemia Please note that this dictation was completed with Dragon, computer voice recognition software.   Quite often unanticipated grammatical, syntax, homophones, and other interpretive errors are inadvertently transcribed by the computer software. Please disregard these errors. Additionally, please excuse any errors that have escaped final proofreading.

## 2019-12-01 NOTE — CONSULTS
Seen and examined Thanks for the consult A/P: 
ESRD MWF at [facility] AVF Volume overload Hypoglycemia HD now with UF Will benefit this admission from echocardio Recheck chest Xray in AM  
Discussed with him,family and kirsten RODRIGUEZ is here to dialyse him

## 2019-12-02 NOTE — PROGRESS NOTES
Pharmacy Clarification of the Prior to Admission Medication Regimen Retrospective to the Admission Medication Reconciliation The patient was not interviewed regarding clarification of the prior to admission medication regimen. Patient was transferred from [address] [place] [address] and Rehab, to [id number] [place] [address], with a current med list.    
 
Information Obtained From: Transfer papers Recommendations/Findings: The following amendments were made to the patient's active medication list on file at [id number] [facility]:  
 
1) Additions:  
aspirin delayed-release 81 mg tablet 
butalbital-acetaminophen-caff (FIORICET) -40 mg per capsule 
insulin aspart U-100 (NOVOLOG U-100 INSULIN ASPART) 100 unit/mL injection 
prednisoLONE acetate (PRED FORTE) 1 % ophthalmic suspension 
metoclopramide HCl (REGLAN) 5 mg tablet 
vit Bcomp,C/folic acid/zinc (RENAL MULTIVITAMIN/ZINC PO) 2) Removals:  
Amlodipine Calcitriol Tums Aranesp Benadryl Ferrous Sulfate Humalog Immodium Remerom Nitrostat Oxycodone Omeprazole Pravastatin Murtaza Odom 3) Changes: 
insulin glargine (LANTUS SOLOSTAR U-100 INSULIN) 100 unit/mL (3 mL) inpn (Old regimen: 5 units QHS /New regimen: 15 units QHS) 4) Pertinent Pharmacy Findings: 
Updated patients preferred outpatient pharmacy to: [facility] did not update the outpatient pharmacy due to the patient living at a SNF  
 
 PTA medication list was corrected to the following:  
 
Prior to Admission Medications Prescriptions Last Dose Informant Taking?  
acetaminophen (TYLENOL) 325 mg tablet 2019 at Unknown time Transfer Papers Yes Sig: Take 2 Tabs by mouth every four (4) hours as needed. For pain. albuterol (PROVENTIL VENTOLIN) 2.5 mg /3 mL (0.083 %) nebulizer solution 2019 at Unknown time Transfer Papers Yes Si.5 mg by Nebulization route every four (4) hours as needed for Shortness of Breath. aspirin delayed-release 81 mg tablet 2019 at Unknown time Transfer Papers Yes Sig: Take 81 mg by mouth daily. butalbital-acetaminophen-caff (FIORICET) -40 mg per capsule 2019 at Unknown time Transfer Papers Yes Sig: Take 1 Cap by mouth every eight (8) hours as needed for Pain. calcium acetate (PHOSLO) 667 mg cap 2019 at Unknown time Transfer Papers Yes Sig: Take 2 Caps by mouth three (3) times daily (with meals). cloNIDine HCl (CATAPRES) 0.1 mg tablet 2019 at Unknown time Transfer Papers Yes Sig: Take 0.1 mg by mouth as needed. glucagon (GLUCAGEN) 1 mg injection 2019 at Unknown time Transfer Papers Yes Si mL by IntraMUSCular route as needed for Hypoglycemia. insulin aspart U-100 (NOVOLOG U-100 INSULIN ASPART) 100 unit/mL injection 2019 at Unknown time Transfer Papers Yes Sig: by SubCUTAneous route two (2) times a day. Blood Glucose (mg/dL)       Insulin Dose (units) 151-220                               2  
            221-260                               4  
            261-300                               5  
            301-350                               8  
            351-450                               10  
            450+                                   Call MD  
insulin glargine (LANTUS SOLOSTAR U-100 INSULIN) 100 unit/mL (3 mL) inpn 2019 at Unknown time Transfer Papers Yes Sig: 15 Units by SubCUTAneous route nightly. Blood Glucose (mg/dL)       Insulin Dose (units) 1-100                               0  
            101-450                           15  
metoclopramide HCl (REGLAN) 5 mg tablet 2019 at Unknown time Transfer Papers Yes Sig: Take 5 mg by mouth Before breakfast, lunch, and dinner. ondansetron hcl (ZOFRAN) 4 mg tablet 2019 at Unknown time Transfer Papers Yes Si mg by IntraVENous route as needed for Nausea. prednisoLONE acetate (PRED FORTE) 1 % ophthalmic suspension 2019 at Unknown time Transfer Papers Yes Sig: Administer 1 Drop to right eye two (2) times a day. tamsulosin (FLOMAX) 0.4 mg capsule 11/25/2019 at Unknown time Transfer Papers Yes Sig: Take 0.4 mg by mouth daily. vit Bcomp,C/folic acid/zinc (RENAL MULTIVITAMIN/ZINC PO) 11/25/2019 at Unknown time Transfer Papers Yes Sig: Take 1 Tab by mouth daily. Facility-Administered Medications: None Thank you, 
Joana Hines Medication History Pharmacy Technician

## 2019-12-02 NOTE — ED NOTES
TRANSFER - OUT REPORT: 
 
Verbal report given to MICHAEL Lockwood (name) on Nati Garcia.  being transferred to [facility] (unit) for routine progression of care Report consisted of patients Situation, Background, Assessment and  
Recommendations(SBAR). Information from the following report(s) SBAR, Kardex, ED Summary, Intake/Output, MAR, Recent Results and Med Rec Status was reviewed with the receiving nurse. Lines:  
Peripheral IV 12/01/19 Right Antecubital (Active) Site Assessment Clean, dry, & intact 12/2/2019 12:34 AM  
Phlebitis Assessment 0 12/2/2019 12:34 AM  
Infiltration Assessment 0 12/2/2019 12:34 AM  
Dressing Status Clean, dry, & intact 12/2/2019 12:34 AM  
Dressing Type Tape;Transparent 12/2/2019 12:34 AM  
Hub Color/Line Status Pink;Capped;Flushed 12/2/2019 12:34 AM  
  
 
Opportunity for questions and clarification was provided. Patient transported with: 
 [employer]

## 2019-12-02 NOTE — PROGRESS NOTES
Pharmacy  Heparin Dose Adjustment Indication: VTE prophylaxis Current Dose: Heparin 5000 units subcutaneously every 8 hours Weight Ht Readings from Last 1 Encounters:  
12/01/19 165.1 cm (65\") Height Wt Readings from Last 1 Encounters:  
12/01/19 55.9 kg (123 lb 3.8 oz) BMI Body mass index is 20.51 kg/m². Impression/Plan:  
Change to  Heparin 5000 units subcutaneously every 12 hours per protocol for low weight patients <60 kg Thanks, Bruna Roblero, PharmD

## 2019-12-02 NOTE — ED NOTES
Report received from [facility]. They advised of the patient's chief complaint, current status, orders completed (to include IV access/medications/radiology testing), outstanding orders that still need to be completed, and the treatment plan. Questions asked and answered prior to assumption of care.

## 2019-12-02 NOTE — PROGRESS NOTES
Problem: Mobility Impaired (Adult and Pediatric) Goal: *Acute Goals and Plan of Care (Insert Text) Description FUNCTIONAL STATUS PRIOR TO ADMISSION: Patient is a poor historian, however reports being independent for mobility and ambulates without AD. HOME SUPPORT PRIOR TO ADMISSION: Per chart, patient lives in [address] [place] [address] at [facility]. Patient unable to verbalize where he lives due to being a poor historian. Physical Therapy Goals Initiated 12/2/2019 1. Patient will move from supine to sit and sit to supine , scoot up and down and roll side to side in bed with independence within 7 day(s). 2.  Patient will transfer from bed to chair and chair to bed with independence using the least restrictive device within 7 day(s). 3.  Patient will perform sit to stand with independence within 7 day(s). 4.  Patient will ambulate with supervision/set-up for 150 feet with the least restrictive device within 7 day(s). Outcome: Progressing Towards Goal 
 PHYSICAL THERAPY EVALUATION Patient: Anjel Valentine (80 y.o. male) Date: 12/2/2019 Primary Diagnosis: Acute pulmonary edema (HCC) [J81.0] Precautions:  Fall(Blind) ASSESSMENT Based on the objective data described below, the patient presents with impaired functional mobility secondary to impaired standing balance, impaired gait mechanics, generalized weakness, and decreased activity tolerance following admission for acute pulmonary edema. Patient on 1 L/min O2 NC with SpO2 96% at rest and SpO2 94% on RA at rest. SpO2 85% at the lowest with activity on RA, recovering quickly with replacement of supplemental O2. Patient very resistant towards mobility, therefore limited activity performed. Patient needs constant verbal cueing due to impaired vision. Gait slow and shuffled, likely due to impaired vision in an unfamiliar environment. Discharge recommendations TBD based on progress in acute PT. Current Level of Function Impacting Discharge (mobility/balance): supervision for scooting, CGA/min A for supine<>sit transfers, CGA for sit<>stand transfers, min A for short distance gait without AD. Intact sitting balance. Good-fair standing balance without AD. Functional Outcome Measure: The patient scored 45/100 on the Barthel Index outcome measure which is indicative of 55% impairment. Other factors to consider for discharge: impaired cognition; increased fall risk Patient will benefit from skilled therapy intervention to address the above noted impairments. PLAN : 
Recommendations and Planned Interventions: bed mobility training, transfer training, gait training, therapeutic exercises, patient and family training/education, and therapeutic activities Frequency/Duration: Patient will be followed by physical therapy:  4 times a week to address goals. Recommendation for discharge: (in order for the patient to meet his/her long term goals) To be determined: SNF rehab vs return to LTC with increased assistance pending progress in acute PT This discharge recommendation: 
Has not yet been discussed the attending provider and/or case management IF patient discharges home will need the following DME: to be determined (TBD) SUBJECTIVE:  
Patient stated I don't want to get up.  OBJECTIVE DATA SUMMARY:  
HISTORY:   
Past Medical History:  
Diagnosis Date Alzheimer disease CAD (coronary artery disease) Cancer [facility])   
 prostate Chronic kidney disease CKD (chronic kidney disease) stage 4, GFR 15-29 ml/min ([facility])   
 DM (diabetes mellitus), type 2, uncontrolled ([facility] [address] Hemodialysis patient ([facility] [address] Hyperlipidemia Hypertension Other ill-defined conditions   
 blind R eye-retina detachment Other ill-defined conditions   
 right cerebellopontine angle lipoma. Past Surgical History:  
Procedure Laterality Date COLONOSCOPY,DIAGNOSTIC  11/12/2014 HX HEENT    
 retinal detachment HX SHOULDER ARTHROSCOPY    
 right HX UROLOGICAL    
 prostate bx UPPER GI ENDOSCOPY,BIOPSY  11/12/2014 Personal factors and/or comorbidities impacting plan of care: alzheimer's disease; CAD; HTN; blind Home Situation Home Environment: Skilled nursing facility Care Facility Name: [facility] # Steps to Enter: 0 One/Two Story Residence: One story Living Alone: No 
Support Systems: Skilled nursing facility Patient Expects to be Discharged to[de-identified] Skilled nursing facility EXAMINATION/PRESENTATION/DECISION MAKING:  
Critical Behavior: 
Neurologic State: Alert Orientation Level: Disoriented to place, Disoriented to situation, Disoriented to time, Oriented to person Cognition: Appropriate decision making, Appropriate for age attention/concentration, Appropriate safety awareness, Follows commands Hearing: Auditory Auditory Impairment: None Skin:  Intact Edema: None Range Of Motion: 
AROM: Within functional limits PROM: Within functional limits Strength:   
Strength: Generally decreased, functional 
  
  
  
  
  
  
Tone & Sensation:  
Tone: Normal 
  
  
  
  
  
  
  
  
   
Coordination: 
Coordination: Generally decreased, functional 
Vision:  
  
Functional Mobility: 
Bed Mobility: 
  
Supine to Sit: Contact guard assistance Sit to Supine: Minimum assistance Scooting: Supervision Transfers: 
Sit to Stand: Contact guard assistance Stand to Sit: Contact guard assistance Balance:  
Sitting: Intact Standing: Impaired Standing - Static: Good Standing - Dynamic : Fair Ambulation/Gait Training: 
Distance (ft): 5 Feet (ft)(forwards/backwards) Assistive Device: Gait belt Ambulation - Level of Assistance: Minimal assistance; Additional time Gait Description (WDL): Exceptions to [facility] Gait Abnormalities: Decreased step clearance; Path deviations; Shuffling gait Base of Support: Narrowed Speed/Rosenda: Slow;Shuffled Step Length: Left shortened;Right shortened Functional Measure: 
Barthel Index: 
 
Bathin Bladder: 10 Bowels: 10 
Groomin Dressin Feeding: 10 Mobility: 0 Stairs: 0 Toilet Use: 5 Transfer (Bed to Chair and Back): 5 Total: 45/100 The Barthel ADL Index: Guidelines 1. The index should be used as a record of what a patient does, not as a record of what a patient could do. 2. The main aim is to establish degree of independence from any help, physical or verbal, however minor and for whatever reason. 3. The need for supervision renders the patient not independent. 4. A patient's performance should be established using the best available evidence. Asking the patient, friends/relatives and nurses are the usual sources, but direct observation and common sense are also important. However direct testing is not needed. 5. Usually the patient's performance over the preceding 24-48 hours is important, but occasionally longer periods will be relevant. 6. Middle categories imply that the patient supplies over 50 per cent of the effort. 7. Use of aids to be independent is allowed. Adriana Cordero., Barthel, D.W. ([id number]). Functional evaluation: the Barthel Index. [facility] (14)2. RUSS Howard, Marek Aguirre., Sunshine Biggs., Lizbeth Parr, [address] [place] [address] (). Measuring the change indisability after inpatient rehabilitation; comparison of the responsiveness of the Barthel Index and Functional Stone Measure. Journal of Neurology, Neurosurgery, and Psychiatry, 66(4), 820-406. Julianna Moore, [place].NATALY, LONNIE Lord, & Lady Gay, M.A. (2004.) Assessment of post-stroke quality of life in cost-effectiveness studies: The usefulness of the Barthel Index and the EuroQoL-5D. [facility], 86, [phone] Physical Therapy Evaluation Charge Determination History Examination Presentation Decision-Making HIGH Complexity :3+ comorbidities / personal factors will impact the outcome/ POC  HIGH Complexity : 4+ Standardized tests and measures addressing body structure, function, activity limitation and / or participation in recreation  MEDIUM Complexity : Evolving with changing characteristics  Other outcome measures Barthel Index  MEDIUM Based on the above components, the patient evaluation is determined to be of the following complexity level: MEDIUM Pain Rating: 
No pain complaints Activity Tolerance:  
Fair, desaturates with exertion and requires oxygen, and requires rest breaks Please refer to the flowsheet for vital signs taken during this treatment. After treatment patient left in no apparent distress:  
Supine in bed, Call bell within reach, and Side rails x 3 
 
COMMUNICATION/EDUCATION:  
The patients plan of care was discussed with: Physical Therapist and Registered Nurse. Fall prevention education was provided and the patient/caregiver indicated understanding., Patient/family have participated as able in goal setting and plan of care. , and Patient/family agree to work toward stated goals and plan of care. Thank you for this referral. 
Gopal Flores, PT, DPT Time Calculation: 17 mins

## 2019-12-02 NOTE — PROGRESS NOTES
Orders received, chart reviewed and patient evaluated by physical therapy. Pending progression with skilled acute physical therapy, recommend: To be determined: SNF vs HHPT Recommend with nursing patient to complete as able in order to maintain strength, endurance and independence: OOB to chair 3x/day with min A and ambulating with min A. Thank you for your assistance. Full evaluation to follow.

## 2019-12-02 NOTE — CONSULTS
[facility] Name:  Silvio Heredia 
MR#:  [id number] :  1950 ACCOUNT #:  [de-identified] DATE OF SERVICE:  2019 REASON FOR CONSULTATION:  Seen for end-stage renal disease. Thanks for the consult. HISTORY OF PRESENT ILLNESS:  We had the pleasure of seeing the patient, a 82-year-old male with end-stage renal disease, Monday, Wednesday, Friday at   Dialysis Unit, supposed to have dialysis on Friday. Another issue on him, he has had blindness in both eyes and he has been seeing Ophthalmology before. The issue happened that he got more short of breath, gets hypoglycemic, nauseous, dizzy, where he states can be very hypoxic and then found to be in CHF. I do not see any recent echo since 2017, but he will benefit from echo at this time. PAST MEDICAL HISTORY:  Include 1. End-stage renal disease, hemodialysis Monday, Wednesday and Friday  . 2. Hypertension. 3.  Blindness. 4.  Diabetes. 5.  Hypoglycemia. 6.  Dyslipidemia. SOCIAL HISTORY:  Reviewed. FAMILY HISTORY:  Reviewed. MEDICATIONS AS OUTPATIENT:  Clonidine, Zofran, Imodium among others. Inpatient medications is Zithromax and Rocephin. REVIEW OF SYSTEMS:  Look at the HPI. Rest is negative. ALLERGY:  TO ACE, ARB. PHYSICAL EXAMINATION: 
GENERAL:  Minimal distress, very poor historian, keeps saying \"I do not know what is going on\". VITAL SIGNS:  Blood pressure 161/64, saturating 91%, T-max 97.6. NECK:  JVD is positive. LUNGS:  Bilateral crackles. EXTREMITIES:  No edema in the legs. NEUROLOGIC:  Alert, oriented to time. ABDOMEN:  Soft. LABS:  As follows:  pH is 7.48, 41, 24 oxygen, that is a venous specimen. Hemoglobin 10.1, platelets 514, WBC 7.1. Sodium 134, potassium 4.2, BUN 30, creatinine 4.5, calcium is 8.2, very elevated NT-proBNP. Chest x-ray:  CHF, pleural effusion. IMPRESSION: 
1.   End-stage renal disease, hemodialysis Monday, Wednesday, and Friday , the last dialysis was supposed to be on Friday. 2.  Arteriovenous fistula. 3.  Hypoxia supposed to be related to volume overload, cannot rule out other etiology, but for now, we will need to dialyze him on fluid. 4.  Blindness. RECOMMENDATIONS:  As follows: 1.    orders in for dialysis now with fluid removal. 
2.  We will benefit this admission from echocardiogram. 
3.  Recheck x-ray after we pull fluid in the morning. 4.  Discussed at length with him, dialysis nurse and family. MD JUSTIN Martínez/[email or web address] 
D:  12/01/2019 17:58 
T:  12/01/2019 23:52 
JOB #:  [id number]

## 2019-12-02 NOTE — PROGRESS NOTES
Bedside and Verbal shift change report given to Chen Anderson RN (oncoming nurse) by Jarrett Hargrove RN (offgoing nurse). Report included the following information SBAR, Kardex, Intake/Output and MAR.

## 2019-12-02 NOTE — H&P
Hospitalist Admission Note NAME: Anjel Campos. :  1950 MRN:  [id number] Date/Time:  2019 8:19 PM 
 
Patient PCP: Columba Ozuna MD 
________________________________________________________________________ My assessment of this patient's clinical condition and my plan of care is as follows. Assessment / Plan: 
Acute pulmonary edema Suspected acute diastolic congestive heart failure exacerbation  
end-stage renal disease on hemodialysis Acute hypoxic respiratory failure due to pulmonary edema 
-Chest x-ray shows pulmonary edema. BNP is 35,000. 
-Renal consulted for hemodialysis needs. We will check 2D echocardiogram. 
-Strict I's and O's, daily weights and low-salt diet 
-Repeat BMP in a.m. 
-Continue oxygen by nasal cannula and wean as tolerated. Currently he is on 1 L of oxygen Hypoglycemia likely related to poor oral intake 
-Blood sugars were around 40 at  nursing home and was 71 on arrival 
-On Lantus 5 units daily 
-Check hemoglobin A1c 
-Check blood sugars every 4 hourly. Consider adjusting dose of her Lantus based on hemoglobin A1c Hypertension Dyslipidemia History of GERD 
BPH 
-Continue home clonidine 
-Continue home statin 
-Continue home PPI 
-Continue home Flomax Iron deficiency anemia 
-Continue ferrous sulfate Depression 
-Continue Remeron  
-Resume home PhosLo Code Status: Full code Surrogate Decision Maker: Zully Muse DVT Prophylaxis: Heparin Baseline: From [address] [place] [address] Subjective: CHIEF COMPLAINT: Shortness of breath and low blood sugars HISTORY OF PRESENT ILLNESS:    
Dashawn Morel is a 81 y.o.   male who presents with past medical history of end-stage renal disease on hemodialysis, diabetes mellitus is coming to the hospital with chief complaints of shortness of breath and low blood sugars.   Patient is a resident of [facility] and was noted by staff over there to be short of breath. Patient reports that shortness of breath is worse with minimal exertion. He reports cough with small amount of whitish phlegm. He does not report any chest pain. He was noted to have low blood sugars and sugars were around 40 this a.m. which improved with juice. He does not report any abdominal pain, nausea or vomiting. Denies chest pain. Denies fever or chills. On arrival to the hospital, he was noted to have stable vitals. On lab work he was noted to have a hemoglobin of 10.1. CMP showed a creatinine of 4.50. Liver function tests were within normal limits. proBNP was elevated at 35,000. First troponin was normal at 0.05. Chest x-ray showed pulmonary edema nephrology was consulted for hemodialysis needs. We were asked to admit for work up and evaluation of the above problems. Past Medical History:  
Diagnosis Date  Alzheimer disease  CAD (coronary artery disease)  Cancer [facility])   
 prostate  Chronic kidney disease  CKD (chronic kidney disease) stage 4, GFR 15-29 ml/min ([facility])  DM (diabetes mellitus), type 2, uncontrolled ([facility] [address]  Hemodialysis patient ([facility] [address]  Hyperlipidemia  Hypertension  Other ill-defined conditions   
 blind R eye-retina detachment  Other ill-defined conditions   
 right cerebellopontine angle lipoma. Past Surgical History:  
Procedure Laterality Date  COLONOSCOPY,DIAGNOSTIC  11/12/2014  HX HEENT    
 retinal detachment  HX SHOULDER ARTHROSCOPY    
 right  HX UROLOGICAL    
 prostate bx  UPPER GI ENDOSCOPY,BIOPSY  11/12/2014 Social History Tobacco Use  Smoking status: Never Smoker  Smokeless tobacco: Never Used Substance Use Topics  Alcohol use: No  
  
 
Family History Problem Relation Age of Onset  Heart Disease Mother Pacemaker  Cancer Father Allergies Allergen Reactions  Ace Inhibitors Unknown (comments)  Arb-Angiotensin Receptor Antagonist Unknown (comments) Prior to Admission medications Medication Sig Start Date End Date Taking? Authorizing Provider  
cloNIDine HCl (CATAPRES) 0.1 mg tablet Take 0.1 mg by mouth as needed. Yumi De La O MD  
DIPHENHYDRAMINE HCL (BENADRYL IJ) 25 mg by IntraVENous route as needed. For itching and anxiety. Yumi De La O MD  
loperamide (IMMODIUM) 2 mg tablet Take 4 mg by mouth as needed for Diarrhea. Yumi De La O MD  
ondansetron hcl (ZOFRAN) 4 mg tablet 4 mg by IntraVENous route as needed for Nausea. Yumi De La O MD  
calcium carbonate (TUMS) 200 mg calcium (500 mg) chew Take 1 Tab by mouth as needed. Yumi De La O MD  
nitroglycerin (NITROSTAT) 0.4 mg SL tablet 0.4 mg by SubLINGual route every five (5) minutes as needed for Chest Pain. Yumi De La O MD  
PROTEIN SUPPLEMENT (LIQUACEL PO) Take 1 oz by mouth. Yumi De La O MD  
acetaminophen (TYLENOL) 325 mg tablet Take 2 Tabs by mouth every four (4) hours as needed. For pain. 4/8/17   Karin Arango MD  
pravastatin (PRAVACHOL) 10 mg tablet Take 1 Tab by mouth nightly. 4/8/17   Karin Arango MD  
oxyCODONE IR (ROXICODONE) 5 mg immediate release tablet Take 1 Tab by mouth every four (4) hours as needed for Pain. Max Daily Amount: 30 mg. 4/8/17   Karin Arango MD  
amLODIPine (NORVASC) 10 mg tablet Take 1 Tab by mouth daily. 4/8/17   Karin Arango MD  
insulin glargine (LANTUS) 100 unit/mL injection 5 Units by SubCUTAneous route nightly. 4/8/17   Karin Arango MD  
albuterol (PROVENTIL VENTOLIN) 2.5 mg /3 mL (0.083 %) nebulizer solution 2.5 mg by Nebulization route every four (4) hours as needed for Shortness of Breath. Provider, Dasha  
darbepoetin shaye (ARANESP, POLYSORBATE,) 25 mcg/0.42 mL injection 25 mcg by SubCUTAneous route every seven (7) days.  Give on the first dialysis treatment each week    Provider, Historical  
 insulin lispro (HUMALOG) 100 unit/mL injection by SubCUTAneous route Before breakfast, lunch, and dinner. Give per sliding scale: 
 
0-160 = 0 units 161-300 = 2 units 301-400 = 4 units    Provider, Historical  
calcium acetate (PHOSLO) 667 mg tab tablet Take 667 mg by mouth three (3) times daily (with meals). Provider, Historical  
mirtazapine (REMERON) 15 mg tablet Take 15 mg by mouth nightly. Indications: MAJOR DEPRESSIVE DISORDER    Yumi De La O MD  
omeprazole (PRILOSEC) 10 mg capsule Take 10 mg by mouth daily. Indications: GASTROESOPHAGEAL REFLUX    Yumi De La O MD  
tamsulosin (FLOMAX) 0.4 mg capsule Take 0.4 mg by mouth daily. Yumi De La O MD  
calcitRIOL (ROCALTROL) 0.25 mcg capsule Take 0.25 mcg by mouth every Monday, Wednesday, Friday. With each dialysis treatment    Yumi De La O MD  
ferrous sulfate (SLOW FE) 142 mg (45 mg iron) ER tablet Take 142 mg by mouth Daily (before breakfast). Yumi De La O MD  
glucagon (GLUCAGEN) 1 mg injection 1 mL by IntraMUSCular route as needed for Hypoglycemia. 6/25/12   Heather Can MD  
 
 
REVIEW OF SYSTEMS:    
I am not able to complete the review of systems because: The patient is intubated and sedated The patient has altered mental status due to his acute medical problems The patient has baseline aphasia from prior stroke(s) The patient has baseline dementia and is not reliable historian The patient is in acute medical distress and unable to provide information Total of 12 systems reviewed as follows:   
   POSITIVE= underlined text  Negative = text not underlined General:  fever, chills, sweats, really bad pneumonia weight loss/gain,  
   loss of appetite Eyes:    blurred vision, eye pain, loss of vision, double vision ENT:    rhinorrhea, pharyngitis Respiratory:   cough, sputum production, SOB, LAUREANO, wheezing, pleuritic pain  
Cardiology:   chest pain, palpitations, orthopnea, PND, edema, syncope Gastrointestinal:  abdominal pain , N/V, diarrhea, dysphagia, constipation, bleeding Genitourinary:  frequency, urgency, dysuria, hematuria, incontinence Muskuloskeletal :  arthralgia, myalgia, back pain Hematology:  easy bruising, nose or gum bleeding, lymphadenopathy Dermatological: rash, ulceration, pruritis, color change / jaundice Endocrine:   hot flashes or polydipsia Neurological:  headache, d chest., confusion, focal weakness, paresthesia, Speech difficulties, memory loss, gait difficulty Psychological: Feelings of anxiety, depression, agitation Objective: VITALS:   
Visit Vitals /72 Pulse 90 Temp 97.8 °F (36.6 °C) Resp 18 Ht 5' 5\" (1.651 m) Wt 55.9 kg (123 lb 3.8 oz) SpO2 100% BMI 20.51 kg/m² PHYSICAL EXAM: 
 
General:    Alert,  no distress, appears stated age. HEENT: Atraumatic, anicteric sclerae, pink conjunctivae No oral ulcers, mucosa moist  
Neck:  Supple, symmetrical,  thyroid: non tender Lungs:   Good air entry, crackles present, no wheezing Chest wall:  No tenderness  No Accessory muscle use. Heart:   Regular  rhythm,  No  murmur   No edema Abdomen:   Soft, non-tender. Not distended. Bowel sounds normal 
Extremities: No cyanosis. No clubbing,   
  Skin turgor normal, Capillary refill normal, Radial dial pulse 2+ Skin:     Not pale. Not Jaundiced  No rashes Psych:  Not anxious or agitated. Neurologic: No facial asymmetry. No aphasia or slurred speech. Symmetrical strength, Sensation grossly intact. Alert and awake 
 
_______________________________________________________________________ Care Plan discussed with: 
  Comments Patient y Family RN y   
Care Manager Consultant:     
_______________________________________________________________________ Expected  Disposition:  
Home with Family y HH/PT/OT/RN   
SNF/LTC   
JESUS   
________________________________________________________________________ TOTAL TIME: 60 Minutes Critical Care Provided     Minutes non procedure based Comments  
 y Reviewed previous records  
>50% of visit spent in counseling and coordination of care y Discussion with patient and/or family and questions answered 
  
 
________________________________________________________________________ Signed: Marciano Villanueva MD 
 
Procedures: see electronic medical records for all procedures/Xrays and details which were not copied into this note but were reviewed prior to creation of Plan. LAB DATA REVIEWED:   
Recent Results (from the past 24 hour(s)) GLUCOSE, POC Collection Time: 12/01/19  1:21 PM  
Result Value Ref Range Glucose (POC) 71 65 - 100 mg/dL Performed by XATA EKG, 12 LEAD, INITIAL Collection Time: 12/01/19  1:26 PM  
Result Value Ref Range Ventricular Rate 89 BPM  
 Atrial Rate 89 BPM  
 P-R Interval 112 ms QRS Duration 74 ms Q-T Interval 390 ms QTC Calculation (Bezet) 474 ms Calculated P Axis 21 degrees Calculated R Axis 6 degrees Calculated T Axis 41 degrees Diagnosis Normal sinus rhythm Nonspecific T wave abnormality Prolonged QT When compared with ECG of 05-APR-2017 02:13, No significant change was found CBC WITH AUTOMATED DIFF Collection Time: 12/01/19  1:39 PM  
Result Value Ref Range WBC 7.3 4.1 - 11.1 K/uL  
 RBC 3.38 (L) 4.10 - 5.70 M/uL  
 HGB 10.1 (L) 12.1 - 17.0 g/dL HCT 31.2 (L) 36.6 - 50.3 % MCV 92.3 80.0 - 99.0 FL  
 MCH 29.9 26.0 - 34.0 PG  
 MCHC 32.4 30.0 - 36.5 g/dL  
 RDW 16.5 (H) 11.5 - 14.5 % PLATELET 931 146 - 284 K/uL MPV 11.3 8.9 - 12.9 FL  
 NRBC 0.0 0  WBC ABSOLUTE NRBC 0.00 0.00 - 0.01 K/uL NEUTROPHILS 74 32 - 75 % LYMPHOCYTES 9 (L) 12 - 49 % MONOCYTES 8 5 - 13 % EOSINOPHILS 8 (H) 0 - 7 % BASOPHILS 1 0 - 1 % IMMATURE GRANULOCYTES 0 0.0 - 0.5 % ABS. NEUTROPHILS 5.3 1.8 - 8.0 K/UL  
 ABS. LYMPHOCYTES 0.7 (L) 0.8 - 3.5 K/UL ABS. MONOCYTES 0.6 0.0 - 1.0 K/UL  
 ABS. EOSINOPHILS 0.6 (H) 0.0 - 0.4 K/UL  
 ABS. BASOPHILS 0.1 0.0 - 0.1 K/UL  
 ABS. IMM. GRANS. 0.0 0.00 - 0.04 K/UL  
 DF AUTOMATED    
 RBC COMMENTS ANISOCYTOSIS 1+ 
    
 RBC COMMENTS HYPOCHROMIA PRESENT 
    
 RBC COMMENTS SCHISTOCYTES PRESENT 
    
 RBC COMMENTS FEW   
GLUCOSE, POC Collection Time: 12/01/19  1:47 PM  
Result Value Ref Range Glucose (POC) 209 (H) 65 - 100 mg/dL Performed by Genna Vaughan POC VENOUS BLOOD GAS Collection Time: 12/01/19  2:05 PM  
Result Value Ref Range Device: NASAL CANNULA Flow rate (POC) 1 L/M  
 pH, venous (POC) 7.485 (H) 7.32 - 7.42    
 pCO2, venous (POC) 41.5 41 - 51 MMHG  
 pO2, venous (POC) 24 (L) 25 - 40 mmHg HCO3, venous (POC) 31.3 (H) 23.0 - 28.0 MMOL/L  
 sO2, venous (POC) 48 (L) 65 - 88 % Base excess, venous (POC) 8 mmol/L Allens test (POC) N/A Site OTHER Specimen type (POC) VENOUS BLOOD    
NT-PRO BNP Collection Time: 12/01/19  2:12 PM  
Result Value Ref Range NT pro-BNP >35,000 (H) <125 PG/ML  
TROPONIN I Collection Time: 12/01/19  2:12 PM  
Result Value Ref Range Troponin-I, Qt. <0.05 <0.05 ng/mL METABOLIC PANEL, COMPREHENSIVE Collection Time: 12/01/19  2:12 PM  
Result Value Ref Range Sodium 134 (L) 136 - 145 mmol/L Potassium 4.2 3.5 - 5.1 mmol/L Chloride 99 97 - 108 mmol/L  
 CO2 28 21 - 32 mmol/L Anion gap 7 5 - 15 mmol/L Glucose 188 (H) 65 - 100 mg/dL BUN 30 (H) 6 - 20 MG/DL Creatinine 4.50 (H) 0.70 - 1.30 MG/DL  
 BUN/Creatinine ratio 7 (L) 12 - 20 GFR est AA 16 (L) >60 ml/min/1.73m2 GFR est non-AA 13 (L) >60 ml/min/1.73m2 Calcium 8.2 (L) 8.5 - 10.1 MG/DL Bilirubin, total 0.6 0.2 - 1.0 MG/DL  
 ALT (SGPT) 24 12 - 78 U/L  
 AST (SGOT) 28 15 - 37 U/L Alk. phosphatase 137 (H) 45 - 117 U/L Protein, total 6.4 6.4 - 8.2 g/dL Albumin 3.3 (L) 3.5 - 5.0 g/dL Globulin 3.1 2.0 - 4.0 g/dL A-G Ratio 1.1 1.1 - 2.2 GLUCOSE, POC Collection Time: 12/01/19  3:45 PM  
Result Value Ref Range Glucose (POC) 163 (H) 65 - 100 mg/dL  Performed by Latisha GARG

## 2019-12-02 NOTE — PROGRESS NOTES
NAME: Anjel Campos. :  1950 MRN:  [id number] Assessment :    Plan: 
--ESRD  
AVF Volume overload Hypoglycemia/h -MWF at [facility]; s/p HD w/ UF on  (4 liters removed); HD with UF today Subjective: Chief Complaint:  Resting. Denies specific complaint, but is a poor historian. Review of Systems: 
 
Symptom Y/N Comments  Symptom Y/N Comments Fever/Chills    Chest Pain Poor Appetite    Edema Cough    Abdominal Pain Sputum    Joint Pain SOB/LAUREANO    Pruritis/Rash Nausea/vomit    Tolerating PT/OT Diarrhea    Tolerating Diet Constipation    Other Could not obtain due to: See above Objective: VITALS:  
Last 24hrs VS reviewed since prior progress note. Most recent are: 
Visit Vitals /67 Pulse 92 Temp 97.7 °F (36.5 °C) Resp 18 Ht 5' 5\" (1.651 m) Wt 55.9 kg (123 lb 3.8 oz) SpO2 100% BMI 20.51 kg/m² Intake/Output Summary (Last 24 hours) at 2019 0732 Last data filed at 2019 2100 Gross per 24 hour Intake  Output 4000 ml Net -4000 ml Telemetry Reviewed: PHYSICAL EXAM: 
General: NAD 
+ edema Lab Data Reviewed: (see below) Medications Reviewed: (see below) PMH/SH reviewed - no change compared to H&P 
________________________________________________________________________ Care Plan discussed with: 
Patient y Family RN Care Manager Consultant:     
 
  Comments >50% of visit spent in counseling and coordination of care    
 
________________________________________________________________________ Juanita Gracia MD  
 
Procedures: see electronic medical records for all procedures/Xrays and details which 
were not copied into this note but were reviewed prior to creation of Plan. LABS: 
Recent Labs 19 
0317 19 
1339 WBC 7.0 7.3 HGB 9.5* 10.1* HCT 29.7* 31.2*  
 251 Recent Labs 12/02/19 
0317 12/01/19 
1412  134* K 4.4 4.2  99 CO2 28 28 BUN 20 30* CREA 3.37* 4.50* * 188* CA 8.8 8.2* Recent Labs 12/01/19 
1412 SGOT 28 * TP 6.4 ALB 3.3*  
GLOB 3.1 No results for input(s): INR, PTP, APTT, INREXT in the last 72 hours. No results for input(s): FE, TIBC, PSAT, FERR in the last 72 hours. Lab Results Component Value Date/Time Folate 53.6 (H) 11/11/2014 03:40 AM  
  
No results for input(s): PH, PCO2, PO2 in the last 72 hours. No results for input(s): CPK, CKMB in the last 72 hours. No lab exists for component: TROPONINI No components found for: [place] Lab Results Component Value Date/Time Color YELLOW/STRAW 02/06/2017 02:36 AM  
 Appearance CLEAR 02/06/2017 02:36 AM  
 Specific gravity 1.008 02/06/2017 02:36 AM  
 Specific gravity 1.010 01/18/2017 03:41 AM  
 pH (UA) 7.5 02/06/2017 02:36 AM  
 Protein 100 (A) 02/06/2017 02:36 AM  
 Glucose 100 (A) 02/06/2017 02:36 AM  
 Ketone NEGATIVE  02/06/2017 02:36 AM  
 Bilirubin NEGATIVE  02/06/2017 02:36 AM  
 Urobilinogen 0.2 02/06/2017 02:36 AM  
 Nitrites NEGATIVE  02/06/2017 02:36 AM  
 Leukocyte Esterase NEGATIVE  02/06/2017 02:36 AM  
 Epithelial cells FEW 02/06/2017 02:36 AM  
 Bacteria NEGATIVE  02/06/2017 02:36 AM  
 WBC 0-4 02/06/2017 02:36 AM  
 RBC  02/06/2017 02:36 AM  
 
 
MEDICATIONS: 
Current Facility-Administered Medications Medication Dose Route Frequency  albumin human 25% (BUMINATE) solution 12.5 g  12.5 g IntraVENous DIALYSIS PRN  
 albuterol (PROVENTIL VENTOLIN) nebulizer solution 2.5 mg  2.5 mg Nebulization Q4H PRN  
 calcitRIOL (ROCALTROL) capsule 0.25 mcg  0.25 mcg Oral Q MON, WED & FRI  calcium acetate (PHOSLO) capsule 667 mg  667 mg Oral TID WITH MEALS  ferrous sulfate tablet 325 mg  325 mg Oral ACB  pantoprazole (PROTONIX) tablet 40 mg  40 mg Oral DAILY  oxyCODONE IR (ROXICODONE) tablet 5 mg  5 mg Oral Q4H PRN  pravastatin (PRAVACHOL) tablet 10 mg  10 mg Oral QHS  tamsulosin (FLOMAX) capsule 0.4 mg  0.4 mg Oral DAILY  sodium chloride (NS) flush 5-40 mL  5-40 mL IntraVENous Q8H  
 sodium chloride (NS) flush 5-40 mL  5-40 mL IntraVENous PRN  
 acetaminophen (TYLENOL) tablet 650 mg  650 mg Oral Q6H PRN  
 ondansetron (ZOFRAN) injection 4 mg  4 mg IntraVENous Q6H PRN  
 docusate sodium (COLACE) capsule 100 mg  100 mg Oral DAILY PRN  
 heparin (porcine) injection 5,000 Units  5,000 Units SubCUTAneous Q12H  
 amLODIPine (NORVASC) tablet 10 mg  10 mg Oral DAILY  mirtazapine (REMERON) tablet 15 mg  15 mg Oral QHS Current Outpatient Medications Medication Sig  cloNIDine HCl (CATAPRES) 0.1 mg tablet Take 0.1 mg by mouth as needed.  DIPHENHYDRAMINE HCL (BENADRYL IJ) 25 mg by IntraVENous route as needed. For itching and anxiety.  loperamide (IMMODIUM) 2 mg tablet Take 4 mg by mouth as needed for Diarrhea.  ondansetron hcl (ZOFRAN) 4 mg tablet 4 mg by IntraVENous route as needed for Nausea.  calcium carbonate (TUMS) 200 mg calcium (500 mg) chew Take 1 Tab by mouth as needed.  nitroglycerin (NITROSTAT) 0.4 mg SL tablet 0.4 mg by SubLINGual route every five (5) minutes as needed for Chest Pain.  PROTEIN SUPPLEMENT (LIQUACEL PO) Take 1 oz by mouth.  acetaminophen (TYLENOL) 325 mg tablet Take 2 Tabs by mouth every four (4) hours as needed. For pain.  pravastatin (PRAVACHOL) 10 mg tablet Take 1 Tab by mouth nightly.  oxyCODONE IR (ROXICODONE) 5 mg immediate release tablet Take 1 Tab by mouth every four (4) hours as needed for Pain. Max Daily Amount: 30 mg.  
 amLODIPine (NORVASC) 10 mg tablet Take 1 Tab by mouth daily.  insulin glargine (LANTUS) 100 unit/mL injection 5 Units by SubCUTAneous route nightly.   
 albuterol (PROVENTIL VENTOLIN) 2.5 mg /3 mL (0.083 %) nebulizer solution 2.5 mg by Nebulization route every four (4) hours as needed for Shortness of Breath.  darbepoetin shaye (ARANESP, POLYSORBATE,) 25 mcg/0.42 mL injection 25 mcg by SubCUTAneous route every seven (7) days. Give on the first dialysis treatment each week  insulin lispro (HUMALOG) 100 unit/mL injection by SubCUTAneous route Before breakfast, lunch, and dinner. Give per sliding scale: 
 
0-160 = 0 units 161-300 = 2 units 301-400 = 4 units  calcium acetate (PHOSLO) 667 mg tab tablet Take 667 mg by mouth three (3) times daily (with meals).  mirtazapine (REMERON) 15 mg tablet Take 15 mg by mouth nightly. Indications: MAJOR DEPRESSIVE DISORDER  
 omeprazole (PRILOSEC) 10 mg capsule Take 10 mg by mouth daily. Indications: GASTROESOPHAGEAL REFLUX  tamsulosin (FLOMAX) 0.4 mg capsule Take 0.4 mg by mouth daily.  calcitRIOL (ROCALTROL) 0.25 mcg capsule Take 0.25 mcg by mouth every Monday, Wednesday, Friday. With each dialysis treatment  ferrous sulfate (SLOW FE) 142 mg (45 mg iron) ER tablet Take 142 mg by mouth Daily (before breakfast).  glucagon (GLUCAGEN) 1 mg injection 1 mL by IntraMUSCular route as needed for Hypoglycemia.

## 2019-12-02 NOTE — ED NOTES
Bedside and Verbal shift change report given to Carroll Boss RN  (oncoming nurse) by Dusty Liz RN  (offgoing nurse). Report included the following information SBAR, Kardex, ED Summary, Intake/Output, MAR, Recent Results and Med Rec Status.

## 2019-12-02 NOTE — PROGRESS NOTES
Hospitalist Progress Note NAME: Elizabeth Simms. :  1950 MRN:  [id number] Assessment / Plan: 
Acute pulmonary edema Suspected acute diastolic congestive heart failure exacerbation  
end-stage renal disease on hemodialysis Acute hypoxic respiratory failure due to pulmonary edema 
-Chest x-ray shows pulmonary edema. BNP is 35,000. 
-Renal consulted for hemodialysis needs -TTE pending 
-Strict I's and O's, daily weights and low-salt diet 
-Continue oxygen by nasal cannula and wean as tolerated. Currently he is on 1 L of oxygen 
  
Hypoglycemia likely related to poor oral intake 
-Blood sugars were around 40 at  nursing home and was 71 on arrival 
-BS now in 400s in ED 
-restart lantus, start SSI. Monitor BS and adjust scale as needed 
-DM diet, encourage PO intake 
-On Lantus 5 units daily 
-Check hemoglobin A1c 
-Check blood sugars every 4 hourly. Consider adjusting dose of her Lantus based on  
-A1C 6.4 Hypertension Dyslipidemia History of GERD 
BPH 
-Continue home clonidine 
-Continue home statin 
-Continue home PPI 
-Continue home Flomax 
  
Iron deficiency anemia 
-Continue ferrous sulfate 
  
Depression 
-Continue Remeron  
-Resume home PhosLo 
  
  
  
Code Status: Full code Surrogate Decision Maker: Dionna Craig 
  
DVT Prophylaxis: Heparin 
  
  
Baseline: From [facility] Subjective: Chief Complaint / Reason for Physician Visit SOB improved. No new complaints, no acute events overnight Discussed with RN events overnight. Review of Systems: 
Symptom Y/N Comments  Symptom Y/N Comments Fever/Chills n   Chest Pain n   
Poor Appetite    Edema Cough y   Abdominal Pain n   
Sputum y   Joint Pain SOB/LAUREANO y   Pruritis/Rash Nausea/vomit    Tolerating PT/OT Diarrhea    Tolerating Diet y Constipation    Other Could NOT obtain due to:   
 
Objective: VITALS:  
 Last 24hrs VS reviewed since prior progress note. Most recent are: 
Patient Vitals for the past 24 hrs: 
 Temp Pulse Resp BP SpO2  
12/02/19 1100  89 19 151/66 96 % 12/02/19 0930    168/78   
12/02/19 0900  87 19 158/80 91 % 12/02/19 0818  93  168/78   
12/02/19 0800  99 19 168/78 96 % 12/02/19 0400 97.7 °F (36.5 °C) 92 18 157/67 100 % 12/02/19 0000  85 18 155/66 96 % 12/01/19 2100 97.8 °F (36.6 °C) 90 21 135/71 97 % 12/01/19 2045  85 15 149/71 100 % 12/01/19 2030  85 15 145/66 100 % 12/01/19 2015  85 14 143/65 100 % 12/01/19 2000  90 18 149/72 100 % 12/01/19 1945  (!) 101 20 169/74 100 % 12/01/19 1930  86 14 168/79 100 % 12/01/19 1915  85 18 160/68 100 % 12/01/19 1900  86 19 151/77 99 % 12/01/19 1845  84 18 167/58 100 % 12/01/19 1830 97.8 °F (36.6 °C) 91 23 150/65 96 % 12/01/19 1730  82 25 161/64 91 % 12/01/19 1715  87 21 156/63 91 % 12/01/19 1700  87 23 155/64 92 % 12/01/19 1545  86 21 150/66 92 % 12/01/19 1415  88 26 150/73 93 % 12/01/19 1400  88 27 160/72 95 % 12/01/19 1345  87 27 148/69 94 % 12/01/19 1332 97.6 °F (36.4 °C) 89 28 153/75 (!) 89 % Intake/Output Summary (Last 24 hours) at 12/2/2019 1209 Last data filed at 12/1/2019 2100 Gross per 24 hour Intake  Output 4000 ml Net -4000 ml PHYSICAL EXAM: 
General: WD, WN. Alert, cooperative, no acute distress   
EENT:  EOMI. Anicteric sclerae. MMM Resp:  CTA bilaterally, no wheezing or rales. No accessory muscle use CV:  Regular  rhythm,  No edema GI:  Soft, Non distended, Non tender.  +Bowel sounds Neurologic:  Alert and oriented X 3, normal speech, Psych:   Not anxious nor agitated Skin:  No rashes. No jaundice Reviewed most current lab test results and cultures  YES Reviewed most current radiology test results   YES Review and summation of old records today    NO Reviewed patient's current orders and MAR    YES 
 PMH/SH reviewed - no change compared to H&P 
________________________________________________________________________ Care Plan discussed with: 
  Comments Patient x Family RN x Care Manager Consultant Multidiciplinary team rounds were held today with , nursing, pharmacist and clinical coordinator. Patient's plan of care was discussed; medications were reviewed and discharge planning was addressed. ________________________________________________________________________ Total NON critical care TIME:  30   Minutes Total CRITICAL CARE TIME Spent:   Minutes non procedure based Comments >50% of visit spent in counseling and coordination of care x   
________________________________________________________________________ Rick Valencia DO  
 
Procedures: see electronic medical records for all procedures/Xrays and details which were not copied into this note but were reviewed prior to creation of Plan. LABS: 
I reviewed today's most current labs and imaging studies. Pertinent labs include: 
Recent Labs 12/02/19 0317 12/01/19 
1339 WBC 7.0 7.3 HGB 9.5* 10.1* HCT 29.7* 31.2*  
 251 Recent Labs 12/02/19 0317 12/01/19 
1412  134* K 4.4 4.2  99 CO2 28 28 * 188* BUN 20 30* CREA 3.37* 4.50* CA 8.8 8.2* ALB  --  3.3* TBILI  --  0.6 SGOT  --  28 ALT  --  24 Signed: Rick Valencia DO

## 2019-12-02 NOTE — ED NOTES
Family member called for update on patient. Rafiq Dunham would like to be notified of changes: [phone]

## 2019-12-02 NOTE — PROGRESS NOTES
1610: Patient is diabetic, inofrmed Dr. Love Shabazz via [employer] to possibly change diet to Diabeti/Renal 
1615: Received order to change diet to diabetic/renal

## 2019-12-02 NOTE — PROGRESS NOTES
TRANSFER - IN REPORT: 
 
Verbal report received from Loida(name) on Tayo Patience.  being received from ED(unit) for routine progression of care Report consisted of patients Situation, Background, Assessment and  
Recommendations(SBAR). Information from the following report(s) SBAR, Kardex, ED Summary, Procedure Summary, Intake/Output, MAR, Accordion, Recent Results, Med Rec Status and Cardiac Rhythm NSR was reviewed with the receiving nurse. Opportunity for questions and clarification was provided. Assessment completed upon patients arrival to unit and care assumed. Spoke with sister Mariano Salazar. She stated patient has a very poor memory and to please contact Ms. Diana Cornejo at [facility] where the patient lives. She is the contact there and will give medical history and information. Ms.  Diana Cornejo would also like to be updated on the plan of care. Her number is [phone].

## 2019-12-02 NOTE — PROGRESS NOTES
Pharmacy Clarification of the Prior to Admission Medication Regimen Retrospective to the Admission Medication Reconciliation The patient was not interviewed regarding clarification of the prior to admission medication regimen. Patient was transferred from [facility], to [facility], with a current med list.    
 
Information Obtained From: Transfer papers Recommendations/Findings: The following amendments were made to the patient's active medication list on file at [facility]:  
 
1) Additions:  
aspirin delayed-release 81 mg tablet 
butalbital-acetaminophen-caff (FIORICET) -40 mg per capsule 
insulin aspart U-100 (NOVOLOG U-100 INSULIN ASPART) 100 unit/mL injection 
prednisoLONE acetate (PRED FORTE) 1 % ophthalmic suspension 
metoclopramide HCl (REGLAN) 5 mg tablet 
vit Bcomp,C/folic acid/zinc (RENAL MULTIVITAMIN/ZINC PO) 2) Removals:  
Amlodipine Calcitriol Tums Aranesp Benadryl Ferrous Sulfate Humalog Immodium Remerom Nitrostat Oxycodone Omeprazole Pravastatin Mario Montes 3) Changes: 
insulin glargine (LANTUS SOLOSTAR U-100 INSULIN) 100 unit/mL (3 mL) inpn (Old regimen: 5 units QHS /New regimen: 15 units QHS) 4) Pertinent Pharmacy Findings: 
Updated patients preferred outpatient pharmacy to: [facility] did not update the outpatient pharmacy due to the patient living at a SNF Per notes, patient receives dialysis every Monday, Wednesday and Friday at [address] [place] [address] on [address] [place] [address] and he was last dialyzed on 19. PTA medication list was corrected to the following:  
 
Prior to Admission Medications Prescriptions Last Dose Informant Taking?  
acetaminophen (TYLENOL) 325 mg tablet 2019 at Unknown time Transfer Papers Yes Sig: Take 2 Tabs by mouth every four (4) hours as needed. For pain. albuterol (PROVENTIL VENTOLIN) 2.5 mg /3 mL (0.083 %) nebulizer solution 2019 at Unknown time Transfer Papers Yes Si.5 mg by Nebulization route every four (4) hours as needed for Shortness of Breath. aspirin delayed-release 81 mg tablet 2019 at Unknown time Transfer Papers Yes Sig: Take 81 mg by mouth daily. butalbital-acetaminophen-caff (FIORICET) -40 mg per capsule 2019 at Unknown time Transfer Papers Yes Sig: Take 1 Cap by mouth every eight (8) hours as needed for Pain. calcium acetate (PHOSLO) 667 mg cap 2019 at Unknown time Transfer Papers Yes Sig: Take 2 Caps by mouth three (3) times daily (with meals). cloNIDine HCl (CATAPRES) 0.1 mg tablet 2019 at Unknown time Transfer Papers Yes Sig: Take 0.1 mg by mouth as needed. glucagon (GLUCAGEN) 1 mg injection 2019 at Unknown time Transfer Papers Yes Si mL by IntraMUSCular route as needed for Hypoglycemia. insulin aspart U-100 (NOVOLOG U-100 INSULIN ASPART) 100 unit/mL injection 2019 at Unknown time Transfer Papers Yes Sig: by SubCUTAneous route two (2) times a day. Blood Glucose (mg/dL)       Insulin Dose (units) 151-220                               2  
            221-260                               4  
            261-300                               5  
            301-350                               8  
            351-450                               10  
            450+                                   Call MD  
insulin glargine (LANTUS SOLOSTAR U-100 INSULIN) 100 unit/mL (3 mL) inpn 2019 at Unknown time Transfer Papers Yes Sig: 15 Units by SubCUTAneous route nightly. Blood Glucose (mg/dL)       Insulin Dose (units) 1-100                               0  
            101-450                           15  
metoclopramide HCl (REGLAN) 5 mg tablet 2019 at Unknown time Transfer Papers Yes Sig: Take 5 mg by mouth Before breakfast, lunch, and dinner. ondansetron hcl (ZOFRAN) 4 mg tablet 2019 at Unknown time Transfer Papers Yes Si mg by IntraVENous route as needed for Nausea. prednisoLONE acetate (PRED FORTE) 1 % ophthalmic suspension 11/25/2019 at Unknown time Transfer Papers Yes Sig: Administer 1 Drop to right eye two (2) times a day. tamsulosin (FLOMAX) 0.4 mg capsule 11/25/2019 at Unknown time Transfer Papers Yes Sig: Take 0.4 mg by mouth daily. vit Bcomp,C/folic acid/zinc (RENAL MULTIVITAMIN/ZINC PO) 11/25/2019 at Unknown time Transfer Papers Yes Sig: Take 1 Tab by mouth daily. Facility-Administered Medications: None Thank you, 
Nadiya Iraheta Medication History Pharmacy Technician

## 2019-12-03 NOTE — PROGRESS NOTES
1900:Bedside shift change report given to Danette Chavez (oncoming nurse) by Daniel Paula (offgoing nurse). Report included the following information SBAR and Kardex. 0100: O2 sats 88% on room air, placed on 1L of oxygen O2 sats improved to 94% 0430: orders placed for BMP and CBC, CBC collected by dialysis RN and sent to lab. Dialysis RN recommended collecting BMP 2/3 hours after dialysis was completed will pass along to day shift  
 
0700: 
Bedside shift change report GIVEN TO Daniel Paula RN. Report included the following information SBAR and Kardex. SIGNIFICANT CHANGES DURING SHIFT:   
 
 
CONCERNS TO ADDRESS WITH MD:   
 
 
 
 
[facility] NURSING NOTE Admission Date 12/1/2019 Admission Diagnosis Acute pulmonary edema ([facility] [address] [J81.0] Consults IP CONSULT TO NEPHROLOGY Cardiac Monitoring [x] Yes [] No  
  
Purposeful Hourly Rounding [x] Yes   
Dionne Score Total Score: 4 Dionne score 3 or > [x] Bed Alarm [] Avasys [] 1:1 sitter [] Patient refused (Signed refusal form in chart) Luke Score Luke Score: 16 Luke score 14 or < [] PMT consult [] Wound Care consult  
 []  Specialty bed  [] Nutrition consult Influenza Vaccine Received Flu Vaccine for Current Season (usually Sept-March): Yes Oxygen needs? [] Room air Oxygen @  [x]1L    []2L    []3L   []4L    []5L   []6L via NC Chronic home O2 use? [] Yes [x] No 
Perform O2 challenge test and document in progress note using smartphrase (.Homeoxygen) Last bowel movement Last Bowel Movement Date: 12/01/19 Urinary Catheter LDAs Peripheral IV 12/01/19 Right Antecubital (Active) Site Assessment Clean, dry, & intact 12/3/2019  2:36 AM  
Phlebitis Assessment 0 12/3/2019  2:36 AM  
Infiltration Assessment 0 12/3/2019  2:36 AM  
Dressing Status Clean, dry, & intact 12/3/2019  2:36 AM  
Dressing Type Tape;Transparent 12/3/2019  2:36 AM  
Hub Color/Line Status Pink;Flushed 12/3/2019  2:36 AM  
                  
  
 Readmission Risk Assessment Tool Score High Risk   
      
 23 Total Score 3 Has Seen PCP in Last 6 Months (Yes=3, No=0) 2 . Living with Significant Other. Assisted Living. LTAC. SNF. or  
Rehab  
 5 Pt. Coverage (Medicare=5 , Medicaid, or Self-Pay=4) 13 Charlson Comorbidity Score (Age + Comorbid Conditions) Criteria that do not apply:  
 Patient Length of Stay (>5 days = 3) IP Visits Last 12 Months (1-3=4, 4=9, >4=11) Expected Length of Stay - - - Actual Length of Stay 2

## 2019-12-03 NOTE — PROGRESS NOTES
Problem: Falls - Risk of 
Goal: *Absence of Falls Description Document Mena Dior Fall Risk and appropriate interventions in the flowsheet. Outcome: Progressing Towards Goal 
Note: Fall Risk Interventions: 
Mobility Interventions: Bed/chair exit alarm, Patient to call before getting OOB Mentation Interventions: Adequate sleep, hydration, pain control, Bed/chair exit alarm, Door open when patient unattended Medication Interventions: Bed/chair exit alarm, Patient to call before getting OOB Elimination Interventions: Bed/chair exit alarm, Call light in reach, Patient to call for help with toileting needs Problem: Pressure Injury - Risk of 
Goal: *Prevention of pressure injury Description Document Luke Scale and appropriate interventions in the flowsheet. Outcome: Progressing Towards Goal 
Note: Pressure Injury Interventions: 
Sensory Interventions: Assess changes in LOC, Float heels, Keep linens dry and wrinkle-free, Minimize linen layers Moisture Interventions: Absorbent underpads, Maintain skin hydration (lotion/cream), Minimize layers Activity Interventions: Chair cushion, Increase time out of bed Mobility Interventions: Float heels Nutrition Interventions: Document food/fluid/supplement intake Friction and Shear Interventions: Apply protective barrier, creams and emollients, Foam dressings/transparent film/skin sealants, Minimize layers Problem: Heart Failure: Day 1 Goal: Activity/Safety Outcome: Progressing Towards Goal 
Goal: Nutrition/Diet Outcome: Progressing Towards Goal 
Goal: Medications Outcome: Progressing Towards Goal 
Goal: Respiratory Outcome: Progressing Towards Goal 
Goal: *Optimal pain control at patient's stated goal 
Outcome: Progressing Towards Goal 
Goal: *Anxiety reduced or absent Outcome: Progressing Towards Goal

## 2019-12-03 NOTE — PROGRESS NOTES
MEGAN plan: Pt will discharge to [facility] for LTC transported by [facility] [place] stretcher at 3:30 pm. Medicaid authorization number is [id number]. PCS, Facesheet, and H&P placed on chart for transport. Nursing will need to call report to [facility] at [phone]. Please send AVS, MAR, and any written prescriptions to facility in an envelope. Pt is scheduled for a new patient appt with [facility] on 1/2/2020. Pt will resume OP HD at [facility] tomorrow. CM faced D/C Summary and HD run sheets to [facility] (fx. [phone]). Pt's sister verbalized understanding of the plan. No further concerns indicated at this time. AVS updated. Patient is ready for discharge from Care Managment standpoint. Medicare pt has received, reviewed, and signed 2nd IM letter informing them of their right to appeal the discharge. Signed copy has been placed on pt bedside chart. - verbal acknowledgement by pt's sister Care Management Interventions PCP Verified by CM: Yes Mode of Transport at Discharge: [facility] [place]) [facility] Transport Time of Discharge: 8839 Transition of Care Consult (CM Consult): Long Term Care([facility], [facility]. LTC) MyChart Signup: No 
Discharge Durable Medical Equipment: No 
Physical Therapy Consult: Yes Occupational Therapy Consult: No 
Speech Therapy Consult: No 
Current Support Network: Nursing Facility(LTC at [facility]. Sister is main support system) Confirm Follow Up Transport: Wheelchair Merla Fallow Plan discussed with Pt/Family/Caregiver: Yes(discussed with sister over the phone) Discharge Location Discharge Placement: [facility] DWAYNE Hernandez Care Manager [phone]

## 2019-12-03 NOTE — DISCHARGE INSTRUCTIONS
HOSPITALIST DISCHARGE INSTRUCTIONS    NAME: Laurent Jason :  1950   MRN:  [id number]     Date/Time:  12/3/2019 2:19 PM    ADMIT DATE: 2019   DISCHARGE DATE: 12/3/2019     Attending Physician: Angle Cuadra MD      Medications: Per above medication reconciliation. Pain Management: per above medications    Recommended diet: Diabetic Diet    Recommended activity: Activity as tolerated    Wound care: None    Indwelling devices:  None    Supplemental Oxygen: None    Required Lab work: Per SNF routine    Glucose management:  Accucheck ACHS with sliding scale per SNF protocol    Code status: Full        Outside physician follow up: Follow-up Information     Follow up With Specialties Details Why Contact Info    Jasmeet Lechuga MD Internal Medicine   [address] [address] [place] [address] 16  [phone]    Ted Oscar MD Cardiology, Cardio Vascular Surgery, Internal Medicine Go on 2020 2:00 pm for Cardiology appointment [address]  [address] [address] [phone]                 Skilled nursing facility/ SNF MD responsible for above on discharge. Information obtained by :  I understand that if any problems occur once I am at home I am to contact my physician. I understand and acknowledge receipt of the instructions indicated above.                                                                                                                                            Physician's or R.N.'s Signature                                                                  Date/Time                                                                                                                                              Patient or Repres

## 2019-12-03 NOTE — PROGRESS NOTES
NAME: Judith Castorena. :  1950 MRN:  [id number] Assessment :    Plan: 
--ESRD  
AVF Volume overload Anemia of ESKD Moderate-to-severe systolic dysfunction Severe pulmonary hypertension -MWF at [facility]; s/p HD w/ UF on  and again early this am (7 liters removed in total); no further HD today Next HD Wednesday Retacrit 10 k sc biw He is stable for discharge from my standpoint. Subjective: Chief Complaint:  oob in chair. Denies specific complaint, but is a poor historian. No edema. No sob. Review of Systems: 
 
Symptom Y/N Comments  Symptom Y/N Comments Fever/Chills    Chest Pain Poor Appetite    Edema Cough    Abdominal Pain Sputum    Joint Pain SOB/LAUREANO    Pruritis/Rash Nausea/vomit    Tolerating PT/OT Diarrhea    Tolerating Diet Constipation    Other Could not obtain due to: See above Objective: VITALS:  
Last 24hrs VS reviewed since prior progress note. Most recent are: 
Visit Vitals /79 Pulse 83 Temp 98 °F (36.7 °C) (Oral) Resp 16 Ht 5' 5\" (1.651 m) Wt 55.9 kg (123 lb 3.8 oz) SpO2 (!) 88% BMI 20.51 kg/m² Intake/Output Summary (Last 24 hours) at 12/3/2019 4601 Last data filed at 12/3/2019 5487 Gross per 24 hour Intake 720 ml Output 3200 ml Net -2480 ml Telemetry Reviewed: PHYSICAL EXAM: 
General: NAD 
+ edema Lab Data Reviewed: (see below) Medications Reviewed: (see below) PMH/SH reviewed - no change compared to H&P 
________________________________________________________________________ Care Plan discussed with: 
Patient y Family RN Care Manager Consultant:     
 
  Comments >50% of visit spent in counseling and coordination of care    
 
________________________________________________________________________ She Leon MD  
 
 Procedures: see electronic medical records for all procedures/Xrays and details which 
were not copied into this note but were reviewed prior to creation of Plan. LABS: 
Recent Labs 12/03/19 
0325 12/02/19 
4719 WBC 5.0 7.0 HGB 8.3* 9.5* HCT 25.3* 29.7*  
 214 Recent Labs 12/02/19 
0317 12/01/19 
1412  134* K 4.4 4.2  99 CO2 28 28 BUN 20 30* CREA 3.37* 4.50* * 188* CA 8.8 8.2* Recent Labs 12/01/19 
1412 SGOT 28 * TP 6.4 ALB 3.3*  
GLOB 3.1 No results for input(s): INR, PTP, APTT, INREXT, INREXT in the last 72 hours. No results for input(s): FE, TIBC, PSAT, FERR in the last 72 hours. Lab Results Component Value Date/Time Folate 53.6 (H) 11/11/2014 03:40 AM  
  
No results for input(s): PH, PCO2, PO2 in the last 72 hours. No results for input(s): CPK, CKMB in the last 72 hours. No lab exists for component: TROPONINI No components found for: [place] Lab Results Component Value Date/Time Color YELLOW/STRAW 02/06/2017 02:36 AM  
 Appearance CLEAR 02/06/2017 02:36 AM  
 Specific gravity 1.008 02/06/2017 02:36 AM  
 Specific gravity 1.010 01/18/2017 03:41 AM  
 pH (UA) 7.5 02/06/2017 02:36 AM  
 Protein 100 (A) 02/06/2017 02:36 AM  
 Glucose 100 (A) 02/06/2017 02:36 AM  
 Ketone NEGATIVE  02/06/2017 02:36 AM  
 Bilirubin NEGATIVE  02/06/2017 02:36 AM  
 Urobilinogen 0.2 02/06/2017 02:36 AM  
 Nitrites NEGATIVE  02/06/2017 02:36 AM  
 Leukocyte Esterase NEGATIVE  02/06/2017 02:36 AM  
 Epithelial cells FEW 02/06/2017 02:36 AM  
 Bacteria NEGATIVE  02/06/2017 02:36 AM  
 WBC 0-4 02/06/2017 02:36 AM  
 RBC  02/06/2017 02:36 AM  
 
 
MEDICATIONS: 
Current Facility-Administered Medications Medication Dose Route Frequency  glucagon (GLUCAGEN) injection 1 mg  1 mg IntraMUSCular PRN  
 insulin lispro (HUMALOG) injection   SubCUTAneous AC&HS  
 glucose chewable tablet 16 g  4 Tab Oral PRN  
  dextrose (D50) infusion 12.5-25 g  25-50 mL IntraVENous PRN  
 glucagon (GLUCAGEN) injection 1 mg  1 mg IntraMUSCular PRN  
 insulin glargine (LANTUS) injection 5 Units  5 Units SubCUTAneous QHS  albumin human 25% (BUMINATE) solution 12.5 g  12.5 g IntraVENous DIALYSIS PRN  
 albuterol (PROVENTIL VENTOLIN) nebulizer solution 2.5 mg  2.5 mg Nebulization Q4H PRN  
 calcitRIOL (ROCALTROL) capsule 0.25 mcg  0.25 mcg Oral Q MON, WED & FRI  calcium acetate (PHOSLO) capsule 667 mg  667 mg Oral TID WITH MEALS  ferrous sulfate tablet 325 mg  325 mg Oral ACB  pantoprazole (PROTONIX) tablet 40 mg  40 mg Oral DAILY  oxyCODONE IR (ROXICODONE) tablet 5 mg  5 mg Oral Q4H PRN  pravastatin (PRAVACHOL) tablet 10 mg  10 mg Oral QHS  tamsulosin (FLOMAX) capsule 0.4 mg  0.4 mg Oral DAILY  sodium chloride (NS) flush 5-40 mL  5-40 mL IntraVENous Q8H  
 sodium chloride (NS) flush 5-40 mL  5-40 mL IntraVENous PRN  
 acetaminophen (TYLENOL) tablet 650 mg  650 mg Oral Q6H PRN  
 ondansetron (ZOFRAN) injection 4 mg  4 mg IntraVENous Q6H PRN  
 docusate sodium (COLACE) capsule 100 mg  100 mg Oral DAILY PRN  
 heparin (porcine) injection 5,000 Units  5,000 Units SubCUTAneous Q12H  
 amLODIPine (NORVASC) tablet 10 mg  10 mg Oral DAILY  mirtazapine (REMERON) tablet 15 mg  15 mg Oral QHS

## 2019-12-03 NOTE — PROGRESS NOTES
0730 
Report received from Sumat[place] Toussaint, [place]. SBAR, Kardex, ED Summary, Procedure Summary, Intake/Output, MAR, Accordion, Recent Results, Med Rec Status and Cardiac Rhythm NSR were discussed. [address]

## 2019-12-03 NOTE — DISCHARGE SUMMARY
Hospitalist Discharge Summary Patient ID: 
Yasmin Dunn 
[id number] 
81 y.o. 
1950 PCP on record: Hill Suero MD 
 
Admit date: 12/1/2019 Discharge date and time: 12/3/2019 DISCHARGE DIAGNOSIS: 
 
Acute hypoxic respiratory failure due to Acute systolic congestive heart failure exacerbation ESRD on HD 
DM2, uncontrolled with hypoglycemia likely related to poor oral intake Hypertension Dyslipidemia History of GERD 
BPH Iron deficiency anemia Depression CONSULTATIONS: 
IP CONSULT TO NEPHROLOGY Excerpted HPI from H&P of Lissa Robledo MD: 
CHIEF COMPLAINT: Shortness of breath and low blood sugars 
  
HISTORY OF PRESENT ILLNESS:    
Soraya Wilkinson is a 81 y.o.   male who presents with past medical history of end-stage renal disease on hemodialysis, diabetes mellitus is coming to the hospital with chief complaints of shortness of breath and low blood sugars. Patient is a resident of [facility] and was noted by staff over there to be short of breath. Patient reports that shortness of breath is worse with minimal exertion. He reports cough with small amount of whitish phlegm. He does not report any chest pain. He was noted to have low blood sugars and sugars were around 40 this a.m. which improved with juice. He does not report any abdominal pain, nausea or vomiting. Denies chest pain. Denies fever or chills. 
  
On arrival to the hospital, he was noted to have stable vitals. On lab work he was noted to have a hemoglobin of 10.1. CMP showed a creatinine of 4.50. Liver function tests were within normal limits. proBNP was elevated at 35,000. First troponin was normal at 0.05. Chest x-ray showed pulmonary edema nephrology was consulted for hemodialysis needs. 
  
______________________________________________________________________ DISCHARGE SUMMARY/HOSPITAL COURSE:  for full details see H&P, daily progress notes, labs, consult notes. Acute hypoxic respiratory failure due to acute systolic HF (new dx) in setting of ESRD on HD 
-Chest x-ray shows pulmonary edema.  BNP is 35,000. 
-Renal consulted for hemodialysis needs -TTE EF 36-40%, mod MR, no AS. This is worse compared to EF 60% in 2017 
-quickly improved with UF/HD 
-weaned off oxygen. % at discharge 
-Discussed with Dr Joelle Crabtree and will adjust his meds, if appropriate, like adding BB or ACEi, as they follow him at their dialysis unit. -will also refer to [facility] as OP for non urgent ischemic work up for his new cardiomyopathy.   
  
DM2 uncontrolled with Hypoglycemia likely related to poor oral intake 
-blood sugars low despite decreasing lantus to 5 units so will stop for now and continue with just SSI prn for now. -A1C 6.4 
  
Hypertension Dyslipidemia History of GERD 
BPH 
-Continue home clonidine 
-Continue home statin 
-Continue home PPI 
-Continue home Flomax 
  
Iron deficiency anemia 
-Continue ferrous sulfate 
  
Depression 
-Continue Remeron  
-Resume home PhosLo 
 
_______________________________________________________________________ Patient seen and examined by me on discharge day. Pertinent Findings: 
Gen:    Not in distress Chest: Diminished at base CVS:   Regular rhythm. No edema Abd:  Soft, not distended, not tender Neuro:  Alert, Oriented x 4, grossly non focal exam 
_______________________________________________________________________ DISCHARGE MEDICATIONS:  
Current Discharge Medication List  
  
START taking these medications Details  
ferrous sulfate 325 mg (65 mg iron) tablet Take 1 Tab by mouth Daily (before breakfast). pantoprazole (PROTONIX) 40 mg tablet Take 1 Tab by mouth daily for 30 days. Indications: gastroesophageal reflux disease Qty: 30 Tab, Refills: 0 CONTINUE these medications which have CHANGED Details amLODIPine (NORVASC) 10 mg tablet Take 1 Tab by mouth daily for 60 days. Qty: 30 Tab, Refills: 1  
  
calcitRIOL (ROCALTROL) 0.25 mcg capsule Take 1 Cap by mouth every Monday, Wednesday, Friday. calcium acetate (PHOSLO) 667 mg cap Take 1 Cap by mouth three (3) times daily (with meals) for 30 days. Qty: 90 Cap, Refills: 0 CONTINUE these medications which have NOT CHANGED Details  
aspirin delayed-release 81 mg tablet Take 81 mg by mouth daily. insulin aspart U-100 (NOVOLOG U-100 INSULIN ASPART) 100 unit/mL injection by SubCUTAneous route two (2) times a day. Blood Glucose (mg/dL)       Insulin Dose (units) 151-220                               2  
            221-260                               4  
            261-300                               5  
            301-350                               8  
            351-450                               10  
            450+                                   Call MD  
  
prednisoLONE acetate (PRED FORTE) 1 % ophthalmic suspension Administer 1 Drop to right eye two (2) times a day. vit Bcomp,C/folic acid/zinc (RENAL MULTIVITAMIN/ZINC PO) Take 1 Tab by mouth daily. acetaminophen (TYLENOL) 325 mg tablet Take 2 Tabs by mouth every four (4) hours as needed. For pain. Qty: 100 Tab, Refills: 0  
  
albuterol (PROVENTIL VENTOLIN) 2.5 mg /3 mL (0.083 %) nebulizer solution 2.5 mg by Nebulization route every four (4) hours as needed for Shortness of Breath. tamsulosin (FLOMAX) 0.4 mg capsule Take 0.4 mg by mouth daily. glucagon (GLUCAGEN) 1 mg injection 1 mL by IntraMUSCular route as needed for Hypoglycemia. Qty: 1 Vial, Refills: 0 STOP taking these medications  
  
 insulin glargine (LANTUS SOLOSTAR U-100 INSULIN) 100 unit/mL (3 mL) inpn Comments:  
Reason for Stopping:   
   
 butalbital-acetaminophen-caff (FIORICET) -40 mg per capsule Comments:  
Reason for Stopping: metoclopramide HCl (REGLAN) 5 mg tablet Comments:  
Reason for Stopping:   
   
 cloNIDine HCl (CATAPRES) 0.1 mg tablet Comments:  
Reason for Stopping:   
   
 ondansetron hcl (ZOFRAN) 4 mg tablet Comments:  
Reason for Stopping:   
   
 pravastatin (PRAVACHOL) 10 mg tablet Comments:  
Reason for Stopping:   
   
 oxyCODONE IR (ROXICODONE) 5 mg immediate release tablet Comments:  
Reason for Stopping:   
   
 insulin glargine (LANTUS) 100 unit/mL injection Comments:  
Reason for Stopping:   
   
 calcium acetate (PHOSLO) 667 mg tab tablet Comments:  
Reason for Stopping:   
   
 mirtazapine (REMERON) 15 mg tablet Comments:  
Reason for Stopping: My Recommended Diet, Activity, Wound Care, and follow-up labs are listed in the patient's Discharge Insturctions which I have personally completed and reviewed. Risk of deterioration: Low 
 
Condition at Discharge:  Stable 
_____________________________________________________________________ Disposition SNF/LTC 
____________________________________________________________________ Care Plan discussed with:  
Patient, Family, RN, Care Manager, Consultant 
 
____________________________________________________________________ Code Status: Full Code 
____________________________________________________________________ Condition at Discharge:  Stable 
_____________________________________________________________________ Follow up with: PCP : Nida Hui MD 
Follow-up Information Follow up With Specialties Details Why Contact Info Nida Hui MD Internal Medicine   [address] [facility] [address] 
[phone] [address] [place] [address] 
[phone] Edmar Silva MD Cardiology, Cardio Vascular Surgery, Internal Medicine Go on 1/2/2020 2:00 pm for Cardiology appointment [address] [place] 
[phone] Total time in minutes spent coordinating this discharge (includes going over instructions, follow-up, prescriptions, and preparing report for sign off to her PCP) :  35 minutes Signed: 
Rocio Lopez MD

## 2019-12-03 NOTE — PROCEDURES
[facility] Dialysis Team [place]  [phone] Vitals   Pre   Post   Assessment   Pre   Post    
Temp  Temp: 98 °F (36.7 °C) (12/03/19 0305)  98 LOC  Lethargic/orientedx 2 No change HR   Pulse (Heart Rate): 81 (12/03/19 0305) 83 Lungs   Clear/ 1lpm NC No change B/P   BP: 141/80 (12/03/19 0305) 144/79 Cardiac   NSR/ remote monitor No change Resp   Resp Rate: 16 (12/03/19 0305) 16 Skin   Warm/ dry No change Pain level  Pain Intensity 1: 0 (12/02/19 2337) 0 Edema  None No change Orders: Duration:   Start:    0300 End:    0535 Total:   2.5hrs Dialyzer:   Dialyzer/Set Up Inspection: Terrence Whittaker (12/03/19 0305) K Bath:   Dialysate K (mEq/L): 3 (12/03/19 0305) Ca Bath:   Dialysate CA (mEq/L): 2.5 (12/03/19 0305) Na/Bicarb:   Dialysate NA (mEq/L): 138 (12/03/19 0305) Target Fluid Removal:   Goal/Amount of Fluid to Remove (mL): 3000 mL (12/03/19 0305) Access Type & Location:   LUE AVG, +BRUIT/THRILL, cannulated with 15g needles, patent, pump at 400. Sites intact Labs Obtained/Reviewed Critical Results Called   Date when labs were drawn- 
Hgb-   
HGB Date Value Ref Range Status 12/02/2019 9.5 (L) 12.1 - 17.0 g/dL Final  
 
K-   
Potassium Date Value Ref Range Status 12/02/2019 4.4 3.5 - 5.1 mmol/L Final  
 
Ca-  
Calcium Date Value Ref Range Status 12/02/2019 8.8 8.5 - 10.1 MG/DL Final  
 
Bun-  
BUN Date Value Ref Range Status 12/02/2019 20 6 - 20 MG/DL Final  
 
Creat-  
Creatinine Date Value Ref Range Status 12/02/2019 3.37 (H) 0.70 - 1.30 MG/DL Final  
  Comment:  
  INVESTIGATED PER DELTA CHECK PROTOCOL Medications/ Blood Products Given Name   Dose   Route and Time    
none Blood Volume Processed (BVP):    56.6 Net Fluid Removed:  3000ml Comments Time Out Done: 8911 Primary Nurse Rpt Pre: Eddie Duncan RN 
Primary Nurse Rpt Post:  Eddie Duncan RN 
Pt Education: Hospital dialysis procedures Care Plan: continue treatment as ordered Tx Summary: Patient tolerated treatment well. All possible blood returned with normal saline rinse back. Needles removed, no excessive bleeding, site secured. Admiting Diagnosis: Fluid overload Pt's previous clinic- [facility] Consent signed - Informed Consent Verified: Yes (12/03/19 0305) Fiorella Consent - obtained Hepatitis Status- Negative 12/1/19 Machine #- Machine Number: [id number] (12/03/19 9923) Telemetry status-remote Pre-dialysis wt. - Pre-Dialysis Weight: 55.9 kg (123 lb 3.8 oz) (12/03/19 0305)

## 2019-12-03 NOTE — PROGRESS NOTES
Reason for Admission:   Pulmonary edema due to CHF exacerbation RRAT Score:  23 Resources/supports as identified by patient/family:  Staff at [address] [place] [address], sister, daughter, other family members Top Challenges facing patient (as identified by patient/family and CM): None indicated Finances/Medication cost?  [facility]. Receives monthly SSI income. Transportation? [facility]. arranges transportation for pt to get to/from OP HD 3x week. Financed through [employer]. Support system or lack thereof? Staff at [address] [place] [address], sister, daughter, other family members Living arrangements? Lives at [address] [place] [address] for last 7 years Self-care/ADLs/Cognition? Has assistance with ADL/IADLs from nursing staff at [address] [place] [address] Current Advanced Directive/Advance Care Plan:  Full code. No AMD on file. DDNR on file from 2017 but was incorrectly completed. Plan for utilizing home health:  Not indicated at this time. Transition of Care Plan: Return to LTC facility at [facility] Pt is a 80 yo  male admitted to [facility] on 12/1/19 for CHF exacerbation. Pt is ESRD on HD - [facility] MWF. Pt will return to [facility] and resume OP HD. Pt did not have a previous Cardiologist but will need to follow-up with one since his EF has worsened. Pt has significant visual impairments and does not ambulate very often. Pt requires assistance with ADL/IADLs. CM will continue to follow and assist with d/c planning as needed. Care Management Interventions PCP Verified by CM: Yes Mode of Transport at Discharge: [facility] Time of Discharge: 1806 Transition of Care Consult (CM Consult): Long Term Care([facility], [facility]. LTC) MyChart Signup: No 
Discharge Durable Medical Equipment: No 
 Physical Therapy Consult: Yes Occupational Therapy Consult: No 
Speech Therapy Consult: No 
Current Support Network: Nursing Facility(LTC at [facility]. Sister is main support system) Confirm Follow Up Transport: Wheelchair [facility] Jose Luis Plan discussed with Pt/Family/Caregiver: Yes(discussed with sister over the phone) Discharge [facility] DWAYNE Craig Care Manager [phone]

## 2019-12-03 NOTE — PROGRESS NOTES
Problem: Mobility Impaired (Adult and Pediatric) Goal: *Acute Goals and Plan of Care (Insert Text) Description FUNCTIONAL STATUS PRIOR TO ADMISSION: Patient is a poor historian, however reports being independent for mobility and ambulates without AD. HOME SUPPORT PRIOR TO ADMISSION: Per chart, patient lives in [address] [place] [address] at [facility]. Patient unable to verbalize where he lives due to being a poor historian. Physical Therapy Goals Initiated 12/2/2019 1. Patient will move from supine to sit and sit to supine , scoot up and down and roll side to side in bed with independence within 7 day(s). 2.  Patient will transfer from bed to chair and chair to bed with independence using the least restrictive device within 7 day(s). 3.  Patient will perform sit to stand with independence within 7 day(s). 4.  Patient will ambulate with supervision/set-up for 150 feet with the least restrictive device within 7 day(s). Outcome: Resolved/Met PHYSICAL THERAPY TREATMENT/DISCHARGE Patient: Jerrell Suh (80 y.o. male) Date: 12/3/2019 Diagnosis: Acute pulmonary edema (HCC) [J81.0] <principal problem not specified> Precautions: Bed Alarm, Fall, Contact(legally blind ) Chart, physical therapy assessment, plan of care and goals were reviewed. ASSESSMENT Patient continues with skilled PT services and has progressed towards goals. Patient is at his baseline, only needing guidance due to vision impairments. VSS on RA. Other factors to consider for discharge: LTC at [facility], vision impairment PLAN : 
Patient will be discharged from acute skilled physical therapy at this time. Rationale for discharge: 
Goals achieved Recommendation for discharge: (in order for the patient to meet his/her long term goals) LTC at [facility] 
 
This discharge recommendation: 
Has been made in collaboration with the attending provider and/or case management IF patient discharges home will need the following DME: none SUBJECTIVE:  
Patient stated Ma'am! Where is my coffee?  OBJECTIVE DATA SUMMARY:  
Critical Behavior: 
Neurologic State: Alert Orientation Level: Disoriented to person, Disoriented to place Cognition: Follows commands Functional Mobility Training: 
Bed Mobility: 
  
  
  
  
  
  
Transfers: 
Sit to Stand: Stand-by assistance Stand to Sit: Stand-by assistance Bed to Chair: Contact guard assistance Balance: 
Sitting: Intact; Without support Standing: Impaired; Without support Standing - Static: Good Standing - Dynamic : Fair Ambulation/Gait Training: 
Distance (ft): 30 Feet (ft) Assistive Device: Gait belt(HHA on R ) Ambulation - Level of Assistance: Contact guard assistance;Minimal assistance(min A for guidance in room due to vision impairments ) Gait Abnormalities: Decreased step clearance; Path deviations; Shuffling gait Base of Support: Narrowed Speed/Rosenda: Pace decreased (<100 feet/min); Shuffled Step Length: Right shortened;Left shortened Activity Tolerance:  
Fair and SpO2 stable on RA Please refer to the flowsheet for vital signs taken during this treatment. After treatment patient left in no apparent distress:  
Sitting in chair, Call bell within reach and Bed / chair alarm activated COMMUNICATION/COLLABORATION:  
The patients plan of care was discussed with: Registered Nurse and  Renée Mcpherson PT, DPT Time Calculation: 18 mins

## 2019-12-03 NOTE — PROGRESS NOTES
1555 
Discharged Patient. Called report to [facility] Hospital to SNF SBAR Handoff - [facility] Shock. 
                                                                      81 y.o.   male [address]   Room: 2217/01    [facility] 2 CARDIOPULMONARY CARE  Unit Phone# :  [phone] [address] 
[facility] 2 CARDIOPULMONARY CARE 
[address] [facility] [place] [address] Dept: [phone] Loc: [id number] SITUATION Admitted:  12/1/2019         Attending Provider:  No att. providers found Consultations:  IP CONSULT TO NEPHROLOGY 
 
PCP:  Beth Harrison MD   [phone] Treatment Team: Consulting Provider: Tori Guardado MD; Utilization Review: Baron Solange RN; Care Manager: Maria Ines Schofield Admitting Dx:  Acute pulmonary edema ([facility] [address] [J81.0] Principal Problem: <principal problem not specified> * No surgery found * of  
  
BY: * Surgery not found *             ON: * No surgery found * Code Status: Full Code Advance Directives:  
Advance Care Planning 12/2/2019 Patient's Healthcare Decision Maker is: -  
Primary Decision Maker Name -  
Primary Decision Maker Phone Number -  
Primary Decision Maker Relationship to Patient -  
Confirm Advance Directive None Does the patient have other document types - (Send w/patient) Not Received Isolation:  Contact       MDRO: MRSA Pain Medications given:  n/a    Last dose: n/a at  n/a Special Equipment needed: no  Type of equipment: 
 
hemodialysis (Not currently on dialysis) (Not currently on dialysis) (Not currently on dialysis) BACKGROUND Allergies: Allergies Allergen Reactions  Ace Inhibitors Unknown (comments)  Arb-Angiotensin Receptor Antagonist Unknown (comments) Past Medical History:  
Diagnosis Date  Alzheimer disease  CAD (coronary artery disease)  Cancer [facility])   
 prostate  Chronic kidney disease  CKD (chronic kidney disease) stage 4, GFR 15-29 ml/min ([facility])  DM (diabetes mellitus), type 2, uncontrolled ([facility]  Hemodialysis patient ([facility]  Hyperlipidemia  Hypertension  Other ill-defined conditions   
 blind R eye-retina detachment  Other ill-defined conditions   
 right cerebellopontine angle lipoma. Past Surgical History:  
Procedure Laterality Date  COLONOSCOPY,DIAGNOSTIC  11/12/2014  HX HEENT    
 retinal detachment  HX SHOULDER ARTHROSCOPY    
 right  HX UROLOGICAL    
 prostate bx  UPPER GI ENDOSCOPY,BIOPSY  11/12/2014 No medications prior to admission. Hard scripts included in transfer packet no Vaccinations:   
Immunization History Administered Date(s) Administered  (RETIRED) Pneumococcal Vaccine (Unspecified Type) 08/16/2010  Influenza Vaccine 10/01/2014, 11/08/2016  Influenza Vaccine Split 09/21/2010, 12/08/2011 Readmission Risks:    Known Risks: The Charlson CoMorbitiy Index tool is an evidenced based tool that has more automatic generated information. The tool looks at many different items such as the age of the patient, how many times they were admitted in the last calendar year, current length of stay in the hospital and their diagnosis. All of these items are pulled automatically from information documented in the chart from various places and will generate a score that predicts whether a patient is at low (less than 13), medium (13-20) or high (21 or greater) risk of being readmitted. ASSESSMENT Temp: 98 °F (36.7 °C) (12/03/19 1137) Pulse (Heart Rate): 86 (12/03/19 1137) Resp Rate: 16 (12/03/19 1137)           BP: 123/60 (12/03/19 1137) O2 Sat (%): 100 % (12/03/19 1137) Weight: 55.9 kg (123 lb 3.8 oz)    Height: 5' 5\" (165.1 cm) (12/02/19 0930) If above not within 1 hour of discharge: BP:_____  P:____  R:____ T:_____ O2 Sat: ___%  O2: ______ Active Orders Diet DIET DIABETIC CONSISTENT CARB Regular; 60-60-60 Orientation: only aware of  place and person Active Behaviors: confusion and needs assistance with meals and mobility d/t blindness Active Lines/Drains:  (Peg Tube / Childs / CL or S/L?): no 
 
Urinary Status: Oliguria     Last BM: Last Bowel Movement Date: 12/02/19 Skin Integrity: Other (comment)(Sacral scab) Mobility: Slightly limited Weight Bearing Status: WBAT (Weight Bearing as Tolerated) Gait Training Assistive Device: Gait belt(HHA on R ) Ambulation - Level of Assistance: Contact guard assistance, Minimal assistance(min A for guidance in room due to vision impairments ) Distance (ft): 30 Feet (ft) Lab Results Component Value Date/Time Glucose 294 (H) 12/03/2019 12:28 PM  
 Hemoglobin A1c 6.4 (H) 12/02/2019 03:17 AM  
 INR 1.0 06/14/2012 12:20 PM  
 INR PLEASE DISREGARD RESULTS 07/10/2011 02:30 AM  
 HGB 8.3 (L) 12/03/2019 03:25 AM  
 HGB 9.5 (L) 12/02/2019 03:17 AM  
 Hemoglobin A1c, External 7.7 08/12/2015 RECOMMENDATION See After Visit Summary (AVS) for: · Discharge instructions · After [facility] · Special equipment needed (entered pre-discharge by Care Management) · Medication Reconciliation · Follow up Appointment(s) Report given/sent by:  Germain Duverney Verbal report given to: 0[address] Gee Schafer  FAXED to:     
    
Estimated discharge time:  12/3/2019 at [place] [address]

## 2019-12-04 NOTE — PROGRESS NOTES
No transition of care outreach indicated due to patient discharge to a [address] [place] [address].

## 2019-12-10 NOTE — PROGRESS NOTES
Community Care Team documentation for patient in [facility] Initial Follow Up Patient was discharged to [facility]. Information included in this progress note has been provided to SNF. Hospital Admission and Diagnosis: [facility] 12/1-12/3 Acute hypoxic respiratory failure due to  Acute systolic congestive heart failure exacerbation RRAT Score: 23 Advance Care Planning: DDNR on file PCP : Krupa Rodríguez MD 
 
 
Spoke with SNF team.  Patient is a LTC resident at [facility]. Not receiving skilled therapy. CCT to sign off at this time. Community Care Team will follow up weekly with [facility] until discharge. Medications were not reconciled and general patient assessment was not completed during this [facility] outreach.

## 2019-12-27 NOTE — ED PROVIDER NOTES
EMERGENCY DEPARTMENT HISTORY AND PHYSICAL EXAM      Date: 12/27/2019  Patient Name: Live Marte. History of Presenting Illness     Chief Complaint   Patient presents with    Fall     PTA    Laceration     Posterior head, onset PTA       History Provided By: Patient and EMS    HPI: Live De La Cruz, 81 y.o. male  presents to the ED with cc of posterior scalp laceration due to a ground-level fall. Patient comes from [address] [place] [address]. He had a fall this afternoon but no reported loss of consciousness. Patient states he does have a headache. No nausea or vomiting. He is not on any anticoagulation. He does have a small laceration with controlled bleeding to the posterior scalp. Patient denies any other injuries. He denies any leg or hip pain. No arm pain. No chest pain or shortness of breath. There are no other complaints, changes, or physical findings at this time. PCP: Shirlee Olszewski, MD    No current facility-administered medications on file prior to encounter. Current Outpatient Medications on File Prior to Encounter   Medication Sig Dispense Refill    amLODIPine (NORVASC) 10 mg tablet Take 1 Tab by mouth daily for 60 days. 30 Tab 1    calcitRIOL (ROCALTROL) 0.25 mcg capsule Take 1 Cap by mouth every Monday, Wednesday, Friday.  calcium acetate (PHOSLO) 667 mg cap Take 1 Cap by mouth three (3) times daily (with meals) for 30 days. 90 Cap 0    ferrous sulfate 325 mg (65 mg iron) tablet Take 1 Tab by mouth Daily (before breakfast).  pantoprazole (PROTONIX) 40 mg tablet Take 1 Tab by mouth daily for 30 days. Indications: gastroesophageal reflux disease 30 Tab 0    aspirin delayed-release 81 mg tablet Take 81 mg by mouth daily.  insulin aspart U-100 (NOVOLOG U-100 INSULIN ASPART) 100 unit/mL injection by SubCUTAneous route two (2) times a day.  Blood Glucose (mg/dL)       Insulin Dose (units)              151-220                               2 221-260                               4               261-300                               5               301-350                               8               351-450                               10               450+                                   Call MD      prednisoLONE acetate (PRED FORTE) 1 % ophthalmic suspension Administer 1 Drop to right eye two (2) times a day.  vit Bcomp,C/folic acid/zinc (RENAL MULTIVITAMIN/ZINC PO) Take 1 Tab by mouth daily.  acetaminophen (TYLENOL) 325 mg tablet Take 2 Tabs by mouth every four (4) hours as needed. For pain. 100 Tab 0    albuterol (PROVENTIL VENTOLIN) 2.5 mg /3 mL (0.083 %) nebulizer solution 2.5 mg by Nebulization route every four (4) hours as needed for Shortness of Breath.  tamsulosin (FLOMAX) 0.4 mg capsule Take 0.4 mg by mouth daily.  glucagon (GLUCAGEN) 1 mg injection 1 mL by IntraMUSCular route as needed for Hypoglycemia. 1 Vial 0       Past History     Past Medical History:  Past Medical History:   Diagnosis Date    Alzheimer disease ([address]     CAD (coronary artery disease)     [facility]     prostate    Chronic kidney disease     CKD (chronic kidney disease) stage 4, GFR 15-29 ml/min ([address]     DM (diabetes mellitus), type 2, uncontrolled ([address]     Hemodialysis patient ([address]     Hyperlipidemia     Hypertension     Other ill-defined conditions(799.89)     blind R eye-retina detachment    Other ill-defined conditions(799.89)     right cerebellopontine angle lipoma.         Past Surgical History:  Past Surgical History:   Procedure Laterality Date    COLONOSCOPY,DIAGNOSTIC  11/12/2014         HX HEENT      retinal detachment    HX SHOULDER ARTHROSCOPY      right    HX UROLOGICAL      prostate bx    UPPER GI ENDOSCOPY,BIOPSY  11/12/2014            Family History:  Family History   Problem Relation Age of Onset    Heart Disease Mother         Pacemaker    Cancer Father        Social History:  Social History Tobacco Use    Smoking status: Never Smoker    Smokeless tobacco: Never Used   Substance Use Topics    Alcohol use: No    Drug use: No       Allergies: Allergies   Allergen Reactions    Ace Inhibitors Unknown (comments)    Arb-Angiotensin Receptor Antagonist Unknown (comments)         Review of Systems   Review of Systems   Constitutional: Negative for chills and fever. HENT: Negative for congestion, ear pain, rhinorrhea, sore throat and trouble swallowing. Eyes: Negative for visual disturbance. Respiratory: Negative for cough, chest tightness and shortness of breath. Cardiovascular: Negative for chest pain and palpitations. Gastrointestinal: Negative for abdominal pain, blood in stool, constipation, diarrhea, nausea and vomiting. Genitourinary: Negative for decreased urine volume, difficulty urinating, dysuria and frequency. Musculoskeletal: Negative for back pain and neck pain. Skin: Positive for wound. Negative for color change and rash. Neurological: Positive for headaches. Negative for dizziness, weakness and light-headedness. Physical Exam   Physical Exam  Vitals signs and nursing note reviewed. Constitutional:       General: He is not in acute distress. Appearance: He is well-developed. He is not ill-appearing. HENT:      Head:     Eyes:      Conjunctiva/sclera: Conjunctivae normal.   Neck:      Musculoskeletal: Neck supple. Cardiovascular:      Rate and Rhythm: Normal rate and regular rhythm. Pulmonary:      Effort: Pulmonary effort is normal. No accessory muscle usage or respiratory distress. Breath sounds: Normal breath sounds. Abdominal:      General: There is no distension. Palpations: Abdomen is soft. Tenderness: There is no tenderness. Lymphadenopathy:      Cervical: No cervical adenopathy. Skin:     General: Skin is warm and dry. Neurological:      Mental Status: He is alert. Mental status is at baseline. He is disoriented. Cranial Nerves: No cranial nerve deficit. Sensory: No sensory deficit. Diagnostic Study Results     Labs -     Recent Results (from the past 24 hour(s))   GLUCOSE, POC    Collection Time: 12/27/19  5:21 PM   Result Value Ref Range    Glucose (POC) 241 (H) 65 - 100 mg/dL    Performed by Betina Isaac RN (traveler)        Radiologic Studies -   CT HEAD WO CONT   Final Result   IMPRESSION:  No acute findings. Mild white matter hypodensity consistent with   chronic small vessel ischemic change. CT Results  (Last 48 hours)               12/27/19 1855  CT HEAD WO CONT Final result    Impression:  IMPRESSION:  No acute findings. Mild white matter hypodensity consistent with   chronic small vessel ischemic change. Narrative:  EXAMINATION:  CT HEAD WO CONT       CLINICAL INFORMATION:  Head trauma, closed, mild, GCS >= 13, no risk factors,   neuro exam normal   COMPARISON:  6/9/2011    TECHNIQUE: Routine axial head CT was performed. IV contrast was not   administered. Sagittal and coronal reconstructions were generated. CT dose reduction was achieved through use of a standardized protocol tailored   for this examination and automatic exposure control for dose modulation. FINDINGS:   No acute infarct, hemorrhage or mass. VENTRICULAR SYSTEM:  Mild prominence of the ventricles and cortical sulci,   consistent with cerebral volume loss, with progression since 2011. BASAL CISTERNS:  Patent. BRAIN PARENCHYMA:  Mild hypodensity of the cerebral white matter, most likely   due to intracranial small vessel disease. MIDLINE SHIFT:  None. CALVARIUM/ SKULL BASE: Intact. PARANASAL SINUSES AND MASTOID AIR CELLS: Clear. VISUALIZED ORBITS: No significant abnormalities. SELLA: No enlargement. CXR Results  (Last 48 hours)    None            Medical Decision Making   I am the first provider for this patient.     I reviewed the vital signs, available nursing notes, past medical history, past surgical history, family history and social history. Vital Signs-Reviewed the patient's vital signs. Patient Vitals for the past 24 hrs:   Temp Pulse Resp BP SpO2   12/27/19 1800  88 13 162/72 100 %   12/27/19 1700 98.6 °F (37 °C) 84 14 161/69 100 %         Records Reviewed: Nursing Notes, Old Medical Records and Ambulance Run Sheet    Provider Notes (Medical Decision Making):   Patient presents after ground-level fall. He has a small scalp laceration and hematoma. Given his age over 74 he does not meet [place] head CT rules. CT head was obtained and is normal.  Laceration was repaired with 2 staples. Will be discharged back to his healthcare facility. ED Course:   Initial assessment performed. The patients presenting problems have been discussed, and they are in agreement with the care plan formulated and outlined with them. I have encouraged them to ask questions as they arise throughout their visit. Orders Placed This Encounter    WOUND REPAIR    CT HEAD WO CONT    GLUCOSE, POC    lidocaine-EPINEPHrine (XYLOCAINE) 2 %-1:100,000 injection 15 mg       Wound Repair  Date/Time: 12/27/2019 8:21 PM  Performed by: attendingLocation details: scalp  Wound length:2.5 cm or less (1 cm)  Anesthesia: local infiltration    Anesthesia:  Local Anesthetic: lidocaine 1% with epinephrine  Anesthetic total: 2 mL  Foreign bodies: no foreign bodies  Skin closure: staples  Number of sutures: 2  Approximation: close  Patient tolerance: Patient tolerated the procedure well with no immediate complications  My total time at bedside, performing this procedure was 1-15 minutes. Critical Care Time:   0    Disposition:  Discharge  8:23 PM    The patient's emergency department evaluation is now complete. I have reviewed all labs, imaging, and pertinent information. I have discussed all results with the patient and/or family.   Based on our evaluation today I do believe that the patient is safe to be discharged home. The patient has been provided with at home instructions that are pertinent to their complaint today, although these may not be specific to the exact diagnosis. I have reviewed the patient's home medications and attempted to reconcile if not already done so by pharmacy or nursing staff. I have discussed all new prescriptions with the patient. The patient has been encouraged to follow-up with primary care doctor and/or specialist, and these have been discussed with the patient. The patient has been advised that they may return to the emergency department if they have any worsening symptoms and or new symptoms that are of concern to them. Verbal discharge instructions may have also been provided to the patient that may not be specifically contained in the written discharge instructions. The patient has been given opportunity to ask questions prior to discharge. PLAN:  1. Current Discharge Medication List        2. Follow-up Information     Follow up With Specialties Details Why Contact Joanne Varela MD Internal Medicine Schedule an appointment as soon as possible for a visit in 10 days For suture removal [address] [address]  [phone]          Return to ED if worse     Diagnosis     Clinical Impression:   1. Laceration of scalp, initial encounter    2. Fall, initial encounter    3. Closed head injury, initial encounter            This note will not be viewable in MyChart.

## 2019-12-27 NOTE — ED NOTES
Pt arrives to the ED via rescue with a c/c of a posterior head lac post a fall onset PTA. Pt comes from [facility], as per EMS attempted to use the restroom unassisted and fell. Pt unable to recall episode, oriented to person only. Pt is noted in stable condition, now in ED room with side rail up, bed to lowest position and call light within reach. Will continue to monitor and wait for EDP evaluation.

## 2019-12-28 NOTE — ED NOTES
Patient is waiting on a ride from [facility] to facility but has 15 in front of him at this time.

## 2019-12-28 NOTE — ED NOTES
Called Taylor Jordan from [facility] to give report. [facility] called to transport back. ETA 30 to 45 minutes.

## 2019-12-28 NOTE — ED NOTES
Report given to Keven Mobley at [facility] and [facility].

## 2019-12-28 NOTE — ED NOTES
Verbal shift change report given to Jonh Lang (oncoming nurse) by Sunil Ellison (offgoing nurse). Report included the following information SBAR, Kardex, ED Summary, Intake/Output, MAR, Recent Results and Cardiac Rhythm .

## 2020-01-01 ENCOUNTER — PATIENT OUTREACH (OUTPATIENT)
Dept: CASE MANAGEMENT | Age: 70
End: 2020-01-01

## 2020-01-01 ENCOUNTER — APPOINTMENT (OUTPATIENT)
Dept: NON INVASIVE DIAGNOSTICS | Age: 70
DRG: 264 | End: 2020-01-01
Attending: INTERNAL MEDICINE
Payer: MEDICARE

## 2020-01-01 ENCOUNTER — APPOINTMENT (OUTPATIENT)
Dept: NON INVASIVE DIAGNOSTICS | Age: 70
DRG: 264 | End: 2020-01-01
Attending: NURSE PRACTITIONER
Payer: MEDICARE

## 2020-01-01 ENCOUNTER — APPOINTMENT (OUTPATIENT)
Dept: CT IMAGING | Age: 70
DRG: 264 | End: 2020-01-01
Attending: EMERGENCY MEDICINE
Payer: MEDICARE

## 2020-01-01 ENCOUNTER — APPOINTMENT (OUTPATIENT)
Dept: GENERAL RADIOLOGY | Age: 70
DRG: 264 | End: 2020-01-01
Attending: EMERGENCY MEDICINE
Payer: MEDICARE

## 2020-01-01 ENCOUNTER — APPOINTMENT (OUTPATIENT)
Dept: GENERAL RADIOLOGY | Age: 70
DRG: 637 | End: 2020-01-01
Attending: EMERGENCY MEDICINE
Payer: MEDICARE

## 2020-01-01 ENCOUNTER — APPOINTMENT (OUTPATIENT)
Dept: GENERAL RADIOLOGY | Age: 70
DRG: 189 | End: 2020-01-01
Attending: GENERAL ACUTE CARE HOSPITAL
Payer: MEDICARE

## 2020-01-01 ENCOUNTER — APPOINTMENT (OUTPATIENT)
Dept: GENERAL RADIOLOGY | Age: 70
DRG: 189 | End: 2020-01-01
Attending: EMERGENCY MEDICINE
Payer: MEDICARE

## 2020-01-01 ENCOUNTER — APPOINTMENT (OUTPATIENT)
Dept: GENERAL RADIOLOGY | Age: 70
DRG: 264 | End: 2020-01-01
Attending: INTERNAL MEDICINE
Payer: MEDICARE

## 2020-01-01 ENCOUNTER — HOSPITAL ENCOUNTER (INPATIENT)
Age: 70
LOS: 3 days | Discharge: NURSING, REHAB OR LONG-TERM CARE | DRG: 189 | End: 2020-01-28
Attending: EMERGENCY MEDICINE | Admitting: GENERAL ACUTE CARE HOSPITAL
Payer: MEDICARE

## 2020-01-01 ENCOUNTER — APPOINTMENT (OUTPATIENT)
Dept: CT IMAGING | Age: 70
DRG: 637 | End: 2020-01-01
Attending: EMERGENCY MEDICINE
Payer: MEDICARE

## 2020-01-01 ENCOUNTER — ANESTHESIA (OUTPATIENT)
Dept: NON INVASIVE DIAGNOSTICS | Age: 70
DRG: 264 | End: 2020-01-01
Payer: MEDICARE

## 2020-01-01 ENCOUNTER — ANESTHESIA (OUTPATIENT)
Dept: SURGERY | Age: 70
DRG: 264 | End: 2020-01-01
Payer: MEDICARE

## 2020-01-01 ENCOUNTER — PATIENT OUTREACH (OUTPATIENT)
Dept: FAMILY MEDICINE CLINIC | Age: 70
End: 2020-01-01

## 2020-01-01 ENCOUNTER — APPOINTMENT (OUTPATIENT)
Dept: ULTRASOUND IMAGING | Age: 70
DRG: 264 | End: 2020-01-01
Attending: EMERGENCY MEDICINE
Payer: MEDICARE

## 2020-01-01 ENCOUNTER — APPOINTMENT (OUTPATIENT)
Dept: GENERAL RADIOLOGY | Age: 70
DRG: 264 | End: 2020-01-01
Attending: ANESTHESIOLOGY
Payer: MEDICARE

## 2020-01-01 ENCOUNTER — PATIENT OUTREACH (OUTPATIENT)
Dept: CARDIOLOGY CLINIC | Age: 70
End: 2020-01-01

## 2020-01-01 ENCOUNTER — HOSPITAL ENCOUNTER (INPATIENT)
Age: 70
LOS: 2 days | Discharge: NURSING, REHAB OR LONG-TERM CARE | DRG: 637 | End: 2020-02-13
Attending: EMERGENCY MEDICINE | Admitting: INTERNAL MEDICINE
Payer: MEDICARE

## 2020-01-01 ENCOUNTER — HOSPITAL ENCOUNTER (EMERGENCY)
Age: 70
Discharge: NURSING, REHAB OR LONG-TERM CARE | DRG: 264 | End: 2020-03-09
Attending: EMERGENCY MEDICINE
Payer: MEDICARE

## 2020-01-01 ENCOUNTER — ANESTHESIA EVENT (OUTPATIENT)
Dept: NON INVASIVE DIAGNOSTICS | Age: 70
DRG: 264 | End: 2020-01-01
Payer: MEDICARE

## 2020-01-01 ENCOUNTER — APPOINTMENT (OUTPATIENT)
Dept: GENERAL RADIOLOGY | Age: 70
DRG: 264 | End: 2020-01-01
Attending: SURGERY
Payer: MEDICARE

## 2020-01-01 ENCOUNTER — APPOINTMENT (OUTPATIENT)
Dept: VASCULAR SURGERY | Age: 70
DRG: 264 | End: 2020-01-01
Attending: INTERNAL MEDICINE
Payer: MEDICARE

## 2020-01-01 ENCOUNTER — ANESTHESIA EVENT (OUTPATIENT)
Dept: SURGERY | Age: 70
DRG: 264 | End: 2020-01-01
Payer: MEDICARE

## 2020-01-01 ENCOUNTER — APPOINTMENT (OUTPATIENT)
Dept: NON INVASIVE DIAGNOSTICS | Age: 70
DRG: 264 | End: 2020-01-01
Attending: SURGERY
Payer: MEDICARE

## 2020-01-01 ENCOUNTER — HOSPITAL ENCOUNTER (INPATIENT)
Age: 70
LOS: 9 days | DRG: 264 | End: 2020-03-19
Attending: EMERGENCY MEDICINE | Admitting: INTERNAL MEDICINE
Payer: MEDICARE

## 2020-01-01 VITALS
SYSTOLIC BLOOD PRESSURE: 127 MMHG | TEMPERATURE: 99.4 F | WEIGHT: 109.79 LBS | OXYGEN SATURATION: 98 % | HEART RATE: 103 BPM | DIASTOLIC BLOOD PRESSURE: 67 MMHG | HEIGHT: 65 IN | BODY MASS INDEX: 18.29 KG/M2 | RESPIRATION RATE: 16 BRPM

## 2020-01-01 VITALS
TEMPERATURE: 96.2 F | SYSTOLIC BLOOD PRESSURE: 115 MMHG | RESPIRATION RATE: 18 BRPM | DIASTOLIC BLOOD PRESSURE: 85 MMHG | WEIGHT: 126.3 LBS | OXYGEN SATURATION: 99 % | HEART RATE: 73 BPM | BODY MASS INDEX: 21.04 KG/M2 | HEIGHT: 65 IN

## 2020-01-01 VITALS
BODY MASS INDEX: 18.54 KG/M2 | SYSTOLIC BLOOD PRESSURE: 133 MMHG | OXYGEN SATURATION: 99 % | HEART RATE: 86 BPM | HEIGHT: 65 IN | DIASTOLIC BLOOD PRESSURE: 73 MMHG | WEIGHT: 111.3 LBS | RESPIRATION RATE: 14 BRPM | TEMPERATURE: 98.3 F

## 2020-01-01 VITALS
RESPIRATION RATE: 12 BRPM | BODY MASS INDEX: 17.76 KG/M2 | OXYGEN SATURATION: 100 % | DIASTOLIC BLOOD PRESSURE: 52 MMHG | SYSTOLIC BLOOD PRESSURE: 111 MMHG | WEIGHT: 106.7 LBS | TEMPERATURE: 97.7 F | HEART RATE: 84 BPM

## 2020-01-01 DIAGNOSIS — R63.0 POOR APPETITE: ICD-10-CM

## 2020-01-01 DIAGNOSIS — J90 CHRONIC BILATERAL PLEURAL EFFUSIONS: ICD-10-CM

## 2020-01-01 DIAGNOSIS — N18.6 ESRD ON DIALYSIS (HCC): ICD-10-CM

## 2020-01-01 DIAGNOSIS — F02.80 EARLY ONSET ALZHEIMER'S DEMENTIA WITHOUT BEHAVIORAL DISTURBANCE (HCC): ICD-10-CM

## 2020-01-01 DIAGNOSIS — T68.XXXA HYPOTHERMIA, INITIAL ENCOUNTER: ICD-10-CM

## 2020-01-01 DIAGNOSIS — A41.9 SEVERE SEPSIS (HCC): ICD-10-CM

## 2020-01-01 DIAGNOSIS — R65.20 SEVERE SEPSIS (HCC): ICD-10-CM

## 2020-01-01 DIAGNOSIS — Z71.89 COUNSELING REGARDING GOALS OF CARE: ICD-10-CM

## 2020-01-01 DIAGNOSIS — Z99.2 ESRD ON DIALYSIS (HCC): ICD-10-CM

## 2020-01-01 DIAGNOSIS — I42.9 CARDIOMYOPATHY, UNSPECIFIED TYPE (HCC): ICD-10-CM

## 2020-01-01 DIAGNOSIS — J81.0 ACUTE PULMONARY EDEMA (HCC): Primary | ICD-10-CM

## 2020-01-01 DIAGNOSIS — G30.9 ALZHEIMER'S DEMENTIA WITHOUT BEHAVIORAL DISTURBANCE, UNSPECIFIED TIMING OF DEMENTIA ONSET: ICD-10-CM

## 2020-01-01 DIAGNOSIS — R31.9 URINARY TRACT INFECTION WITH HEMATURIA, SITE UNSPECIFIED: Primary | ICD-10-CM

## 2020-01-01 DIAGNOSIS — J18.9 HAP (HOSPITAL-ACQUIRED PNEUMONIA): ICD-10-CM

## 2020-01-01 DIAGNOSIS — G93.41 ACUTE METABOLIC ENCEPHALOPATHY: ICD-10-CM

## 2020-01-01 DIAGNOSIS — N39.0 URINARY TRACT INFECTION WITH HEMATURIA, SITE UNSPECIFIED: Primary | ICD-10-CM

## 2020-01-01 DIAGNOSIS — F02.80 ALZHEIMER'S DEMENTIA WITHOUT BEHAVIORAL DISTURBANCE, UNSPECIFIED TIMING OF DEMENTIA ONSET: ICD-10-CM

## 2020-01-01 DIAGNOSIS — F03.90 DEMENTIA WITHOUT BEHAVIORAL DISTURBANCE, UNSPECIFIED DEMENTIA TYPE: Chronic | ICD-10-CM

## 2020-01-01 DIAGNOSIS — I33.0 VEGETATION OF HEART VALVE: ICD-10-CM

## 2020-01-01 DIAGNOSIS — G30.0 EARLY ONSET ALZHEIMER'S DEMENTIA WITHOUT BEHAVIORAL DISTURBANCE (HCC): ICD-10-CM

## 2020-01-01 DIAGNOSIS — R78.81 BACTEREMIA: Primary | ICD-10-CM
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
## 2020-01-01 DIAGNOSIS — J96.01 ACUTE RESPIRATORY FAILURE WITH HYPOXIA (HCC): ICD-10-CM

## 2020-01-01 DIAGNOSIS — E11.649 UNCONTROLLED TYPE 2 DIABETES MELLITUS WITH HYPOGLYCEMIA WITHOUT COMA (HCC): Primary | ICD-10-CM

## 2020-01-01 DIAGNOSIS — R63.4 WEIGHT LOSS, UNINTENTIONAL: ICD-10-CM

## 2020-01-01 LAB
ABO + RH BLD: NORMAL
ALBUMIN SERPL-MCNC: 1.9 G/DL (ref 3.5–5)
ALBUMIN SERPL-MCNC: 1.9 G/DL (ref 3.5–5)
ALBUMIN SERPL-MCNC: 2 G/DL (ref 3.5–5)
ALBUMIN SERPL-MCNC: 2.1 G/DL (ref 3.5–5)
ALBUMIN SERPL-MCNC: 2.2 G/DL (ref 3.5–5)
ALBUMIN SERPL-MCNC: 2.3 G/DL (ref 3.5–5)
ALBUMIN SERPL-MCNC: 2.3 G/DL (ref 3.5–5)
ALBUMIN SERPL-MCNC: 2.5 G/DL (ref 3.5–5)
ALBUMIN SERPL-MCNC: 2.9 G/DL (ref 3.5–5)
ALBUMIN SERPL-MCNC: 3.2 G/DL (ref 3.5–5)
ALBUMIN SERPL-MCNC: 3.3 G/DL (ref 3.5–5)
ALBUMIN SERPL-MCNC: 3.4 G/DL (ref 3.5–5)
ALBUMIN/GLOB SERPL: 0.6 {RATIO} (ref 1.1–2.2)
ALBUMIN/GLOB SERPL: 0.7 {RATIO} (ref 1.1–2.2)
ALBUMIN/GLOB SERPL: 0.8 {RATIO} (ref 1.1–2.2)
ALBUMIN/GLOB SERPL: 0.8 {RATIO} (ref 1.1–2.2)
ALBUMIN/GLOB SERPL: 0.9 {RATIO} (ref 1.1–2.2)
ALBUMIN/GLOB SERPL: 1 {RATIO} (ref 1.1–2.2)
ALP SERPL-CCNC: 196 U/L (ref 45–117)
ALP SERPL-CCNC: 204 U/L (ref 45–117)
ALP SERPL-CCNC: 236 U/L (ref 45–117)
ALP SERPL-CCNC: 240 U/L (ref 45–117)
ALP SERPL-CCNC: 256 U/L (ref 45–117)
ALP SERPL-CCNC: 281 U/L (ref 45–117)
ALP SERPL-CCNC: 304 U/L (ref 45–117)
ALP SERPL-CCNC: 307 U/L (ref 45–117)
ALP SERPL-CCNC: 410 U/L (ref 45–117)
ALP SERPL-CCNC: 420 U/L (ref 45–117)
ALP SERPL-CCNC: 461 U/L (ref 45–117)
ALP SERPL-CCNC: 536 U/L (ref 45–117)
ALT SERPL-CCNC: 15 U/L (ref 12–78)
ALT SERPL-CCNC: 18 U/L (ref 12–78)
ALT SERPL-CCNC: 19 U/L (ref 12–78)
ALT SERPL-CCNC: 21 U/L (ref 12–78)
ALT SERPL-CCNC: 21 U/L (ref 12–78)
ALT SERPL-CCNC: 22 U/L (ref 12–78)
ALT SERPL-CCNC: 28 U/L (ref 12–78)
ALT SERPL-CCNC: 31 U/L (ref 12–78)
ALT SERPL-CCNC: 36 U/L (ref 12–78)
ALT SERPL-CCNC: 49 U/L (ref 12–78)
ALT SERPL-CCNC: 66 U/L (ref 12–78)
ALT SERPL-CCNC: 73 U/L (ref 12–78)
ANION GAP SERPL CALC-SCNC: 11 MMOL/L (ref 5–15)
ANION GAP SERPL CALC-SCNC: 12 MMOL/L (ref 5–15)
ANION GAP SERPL CALC-SCNC: 5 MMOL/L (ref 5–15)
ANION GAP SERPL CALC-SCNC: 6 MMOL/L (ref 5–15)
ANION GAP SERPL CALC-SCNC: 7 MMOL/L (ref 5–15)
ANION GAP SERPL CALC-SCNC: 8 MMOL/L (ref 5–15)
ANION GAP SERPL CALC-SCNC: 9 MMOL/L (ref 5–15)
APPEARANCE UR: CLEAR
ARTERIAL PATENCY WRIST A: YES
AST SERPL-CCNC: 13 U/L (ref 15–37)
AST SERPL-CCNC: 17 U/L (ref 15–37)
AST SERPL-CCNC: 18 U/L (ref 15–37)
AST SERPL-CCNC: 19 U/L (ref 15–37)
AST SERPL-CCNC: 22 U/L (ref 15–37)
AST SERPL-CCNC: 29 U/L (ref 15–37)
AST SERPL-CCNC: 29 U/L (ref 15–37)
AST SERPL-CCNC: 30 U/L (ref 15–37)
AST SERPL-CCNC: 30 U/L (ref 15–37)
AST SERPL-CCNC: 57 U/L (ref 15–37)
AST SERPL-CCNC: 59 U/L (ref 15–37)
AST SERPL-CCNC: 80 U/L (ref 15–37)
ATRIAL RATE: 109 BPM
ATRIAL RATE: 85 BPM
ATRIAL RATE: 96 BPM
AV VELOCITY RATIO: 0.89
BACTERIA SPEC CULT: ABNORMAL
BACTERIA SPEC CULT: NORMAL
BACTERIA URNS QL MICRO: ABNORMAL /HPF
BASE EXCESS BLD CALC-SCNC: 3 MMOL/L
BASOPHILS # BLD: 0 K/UL (ref 0–0.1)
BASOPHILS # BLD: 0.1 K/UL (ref 0–0.1)
BASOPHILS NFR BLD: 0 % (ref 0–1)
BASOPHILS NFR BLD: 1 % (ref 0–1)
BDY SITE: ABNORMAL
BILIRUB DIRECT SERPL-MCNC: 1.1 MG/DL (ref 0–0.2)
BILIRUB DIRECT SERPL-MCNC: 1.2 MG/DL (ref 0–0.2)
BILIRUB SERPL-MCNC: 1.1 MG/DL (ref 0.2–1)
BILIRUB SERPL-MCNC: 1.3 MG/DL (ref 0.2–1)
BILIRUB SERPL-MCNC: 1.4 MG/DL (ref 0.2–1)
BILIRUB SERPL-MCNC: 1.6 MG/DL (ref 0.2–1)
BILIRUB SERPL-MCNC: 1.7 MG/DL (ref 0.2–1)
BILIRUB SERPL-MCNC: 1.7 MG/DL (ref 0.2–1)
BILIRUB SERPL-MCNC: 2.1 MG/DL (ref 0.2–1)
BILIRUB SERPL-MCNC: 2.3 MG/DL (ref 0.2–1)
BILIRUB SERPL-MCNC: 2.5 MG/DL (ref 0.2–1)
BILIRUB SERPL-MCNC: 2.8 MG/DL (ref 0.2–1)
BILIRUB SERPL-MCNC: 2.9 MG/DL (ref 0.2–1)
BILIRUB SERPL-MCNC: 3 MG/DL (ref 0.2–1)
BILIRUB UR QL CFM: NEGATIVE
BLD PROD TYP BPU: NORMAL
BLOOD GROUP ANTIBODIES SERPL: NORMAL
BPU ID: NORMAL
BUN SERPL-MCNC: 12 MG/DL (ref 6–20)
BUN SERPL-MCNC: 12 MG/DL (ref 6–20)
BUN SERPL-MCNC: 13 MG/DL (ref 6–20)
BUN SERPL-MCNC: 15 MG/DL (ref 6–20)
BUN SERPL-MCNC: 17 MG/DL (ref 6–20)
BUN SERPL-MCNC: 20 MG/DL (ref 6–20)
BUN SERPL-MCNC: 29 MG/DL (ref 6–20)
BUN SERPL-MCNC: 30 MG/DL (ref 6–20)
BUN SERPL-MCNC: 37 MG/DL (ref 6–20)
BUN SERPL-MCNC: 39 MG/DL (ref 6–20)
BUN SERPL-MCNC: 42 MG/DL (ref 6–20)
BUN SERPL-MCNC: 42 MG/DL (ref 6–20)
BUN SERPL-MCNC: 45 MG/DL (ref 6–20)
BUN SERPL-MCNC: 50 MG/DL (ref 6–20)
BUN SERPL-MCNC: 59 MG/DL (ref 6–20)
BUN SERPL-MCNC: 59 MG/DL (ref 6–20)
BUN SERPL-MCNC: 70 MG/DL (ref 6–20)
BUN/CREAT SERPL: 10 (ref 12–20)
BUN/CREAT SERPL: 10 (ref 12–20)
BUN/CREAT SERPL: 11 (ref 12–20)
BUN/CREAT SERPL: 12 (ref 12–20)
BUN/CREAT SERPL: 13 (ref 12–20)
BUN/CREAT SERPL: 13 (ref 12–20)
BUN/CREAT SERPL: 14 (ref 12–20)
BUN/CREAT SERPL: 15 (ref 12–20)
BUN/CREAT SERPL: 15 (ref 12–20)
BUN/CREAT SERPL: 16 (ref 12–20)
BUN/CREAT SERPL: 3 (ref 12–20)
BUN/CREAT SERPL: 4 (ref 12–20)
BUN/CREAT SERPL: 4 (ref 12–20)
BUN/CREAT SERPL: 5 (ref 12–20)
BUN/CREAT SERPL: 6 (ref 12–20)
BUN/CREAT SERPL: 7 (ref 12–20)
BUN/CREAT SERPL: 8 (ref 12–20)
CA-I BLD-SCNC: 1.2 MMOL/L (ref 1.12–1.32)
CALCIUM SERPL-MCNC: 6.6 MG/DL (ref 8.5–10.1)
CALCIUM SERPL-MCNC: 6.8 MG/DL (ref 8.5–10.1)
CALCIUM SERPL-MCNC: 7 MG/DL (ref 8.5–10.1)
CALCIUM SERPL-MCNC: 7.1 MG/DL (ref 8.5–10.1)
CALCIUM SERPL-MCNC: 7.6 MG/DL (ref 8.5–10.1)
CALCIUM SERPL-MCNC: 7.6 MG/DL (ref 8.5–10.1)
CALCIUM SERPL-MCNC: 7.7 MG/DL (ref 8.5–10.1)
CALCIUM SERPL-MCNC: 7.7 MG/DL (ref 8.5–10.1)
CALCIUM SERPL-MCNC: 7.9 MG/DL (ref 8.5–10.1)
CALCIUM SERPL-MCNC: 8 MG/DL (ref 8.5–10.1)
CALCIUM SERPL-MCNC: 8 MG/DL (ref 8.5–10.1)
CALCIUM SERPL-MCNC: 8.1 MG/DL (ref 8.5–10.1)
CALCIUM SERPL-MCNC: 8.2 MG/DL (ref 8.5–10.1)
CALCIUM SERPL-MCNC: 8.2 MG/DL (ref 8.5–10.1)
CALCIUM SERPL-MCNC: 8.6 MG/DL (ref 8.5–10.1)
CALCIUM SERPL-MCNC: 8.8 MG/DL (ref 8.5–10.1)
CALCIUM SERPL-MCNC: 8.9 MG/DL (ref 8.5–10.1)
CALCIUM SERPL-MCNC: 9.1 MG/DL (ref 8.5–10.1)
CALCULATED P AXIS, ECG09: 33 DEGREES
CALCULATED P AXIS, ECG09: 41 DEGREES
CALCULATED P AXIS, ECG09: 78 DEGREES
CALCULATED R AXIS, ECG10: 0 DEGREES
CALCULATED R AXIS, ECG10: 15 DEGREES
CALCULATED R AXIS, ECG10: 3 DEGREES
CALCULATED T AXIS, ECG11: -59 DEGREES
CALCULATED T AXIS, ECG11: -75 DEGREES
CALCULATED T AXIS, ECG11: 37 DEGREES
CC UR VC: ABNORMAL
CHLORIDE SERPL-SCNC: 100 MMOL/L (ref 97–108)
CHLORIDE SERPL-SCNC: 101 MMOL/L (ref 97–108)
CHLORIDE SERPL-SCNC: 102 MMOL/L (ref 97–108)
CHLORIDE SERPL-SCNC: 102 MMOL/L (ref 97–108)
CHLORIDE SERPL-SCNC: 103 MMOL/L (ref 97–108)
CHLORIDE SERPL-SCNC: 103 MMOL/L (ref 97–108)
CHLORIDE SERPL-SCNC: 105 MMOL/L (ref 97–108)
CHLORIDE SERPL-SCNC: 106 MMOL/L (ref 97–108)
CHLORIDE SERPL-SCNC: 92 MMOL/L (ref 97–108)
CHLORIDE SERPL-SCNC: 92 MMOL/L (ref 97–108)
CHLORIDE SERPL-SCNC: 94 MMOL/L (ref 97–108)
CHLORIDE SERPL-SCNC: 96 MMOL/L (ref 97–108)
CHLORIDE SERPL-SCNC: 97 MMOL/L (ref 97–108)
CHLORIDE SERPL-SCNC: 97 MMOL/L (ref 97–108)
CHLORIDE SERPL-SCNC: 98 MMOL/L (ref 97–108)
CHOLEST SERPL-MCNC: 88 MG/DL
CK SERPL-CCNC: 126 U/L (ref 39–308)
CO2 SERPL-SCNC: 17 MMOL/L (ref 21–32)
CO2 SERPL-SCNC: 20 MMOL/L (ref 21–32)
CO2 SERPL-SCNC: 22 MMOL/L (ref 21–32)
CO2 SERPL-SCNC: 23 MMOL/L (ref 21–32)
CO2 SERPL-SCNC: 24 MMOL/L (ref 21–32)
CO2 SERPL-SCNC: 25 MMOL/L (ref 21–32)
CO2 SERPL-SCNC: 25 MMOL/L (ref 21–32)
CO2 SERPL-SCNC: 26 MMOL/L (ref 21–32)
CO2 SERPL-SCNC: 27 MMOL/L (ref 21–32)
CO2 SERPL-SCNC: 27 MMOL/L (ref 21–32)
CO2 SERPL-SCNC: 29 MMOL/L (ref 21–32)
CO2 SERPL-SCNC: 30 MMOL/L (ref 21–32)
CO2 SERPL-SCNC: 33 MMOL/L (ref 21–32)
COLOR UR: ABNORMAL
COMMENT, HOLDF: NORMAL
COMMENT, HOLDF: NORMAL
CORTIS SERPL-MCNC: 16.3 UG/DL
CREAT SERPL-MCNC: 2.04 MG/DL (ref 0.7–1.3)
CREAT SERPL-MCNC: 2.16 MG/DL (ref 0.7–1.3)
CREAT SERPL-MCNC: 2.32 MG/DL (ref 0.7–1.3)
CREAT SERPL-MCNC: 2.55 MG/DL (ref 0.7–1.3)
CREAT SERPL-MCNC: 2.78 MG/DL (ref 0.7–1.3)
CREAT SERPL-MCNC: 2.82 MG/DL (ref 0.7–1.3)
CREAT SERPL-MCNC: 3 MG/DL (ref 0.7–1.3)
CREAT SERPL-MCNC: 3.04 MG/DL (ref 0.7–1.3)
CREAT SERPL-MCNC: 3.31 MG/DL (ref 0.7–1.3)
CREAT SERPL-MCNC: 3.33 MG/DL (ref 0.7–1.3)
CREAT SERPL-MCNC: 3.46 MG/DL (ref 0.7–1.3)
CREAT SERPL-MCNC: 3.82 MG/DL (ref 0.7–1.3)
CREAT SERPL-MCNC: 3.98 MG/DL (ref 0.7–1.3)
CREAT SERPL-MCNC: 4.96 MG/DL (ref 0.7–1.3)
CREAT SERPL-MCNC: 5.71 MG/DL (ref 0.7–1.3)
CREAT SERPL-MCNC: 5.72 MG/DL (ref 0.7–1.3)
CREAT SERPL-MCNC: 5.91 MG/DL (ref 0.7–1.3)
CROSSMATCH RESULT,%XM: NORMAL
DIAGNOSIS, 93000: NORMAL
DIFFERENTIAL METHOD BLD: ABNORMAL
ECHO AO ROOT DIAM: 3.45 CM
ECHO AV AREA PEAK VELOCITY: 2.1 CM2
ECHO AV AREA/BSA PEAK VELOCITY: 1.3 CM2/M2
ECHO AV PEAK GRADIENT: 3 MMHG
ECHO AV PEAK VELOCITY: 87.24 CM/S
ECHO EST RA PRESSURE: 10 MMHG
ECHO LA AREA 4C: 19.2 CM2
ECHO LA MAJOR AXIS: 2.79 CM
ECHO LA TO AORTIC ROOT RATIO: 0.81
ECHO LA VOL 4C: 48.53 ML (ref 18–58)
ECHO LA VOLUME INDEX A4C: 31.13 ML/M2 (ref 16–28)
ECHO LV E' LATERAL VELOCITY: 7.83 CM/S
ECHO LV E' SEPTAL VELOCITY: 5.26 CM/S
ECHO LV EDV TEICHHOLZ: 0.67 ML
ECHO LV ESV TEICHHOLZ: 0.58 ML
ECHO LV INTERNAL DIMENSION DIASTOLIC: 4.85 CM (ref 4.2–5.9)
ECHO LV INTERNAL DIMENSION SYSTOLIC: 4.56 CM
ECHO LV IVSD: 1.14 CM (ref 0.6–1)
ECHO LV POSTERIOR WALL SYSTOLIC: 1.01 CM
ECHO LVOT DIAM: 1.72 CM
ECHO LVOT PEAK GRADIENT: 2.4 MMHG
ECHO LVOT PEAK VELOCITY: 77.7 CM/S
ECHO LVOT SV: 31.6 ML
ECHO LVOT VTI: 13.58 CM
ECHO MV A VELOCITY: 89.66 CM/S
ECHO MV AREA VTI: 1.3 CM2
ECHO MV E DECELERATION TIME (DT): 116.7 MS
ECHO MV E VELOCITY: 77.58 CM/S
ECHO MV E/A RATIO: 0.87
ECHO MV E/E' LATERAL: 9.91
ECHO MV E/E' RATIO (AVERAGED): 12.33
ECHO MV E/E' SEPTAL: 14.75
ECHO MV MAX VELOCITY: 109.43 CM/S
ECHO MV MEAN GRADIENT: 2.1 MMHG
ECHO MV MEAN INFLOW VELOCITY: 0.69 M/S
ECHO MV PEAK GRADIENT: 4.8 MMHG
ECHO MV REGURGITANT RADIUS PISA: 0.9 CM
ECHO MV VTI: 24.27 CM
ECHO PULMONARY ARTERY SYSTOLIC PRESSURE (PASP): 44.4 MMHG
ECHO PULMONARY ARTERY SYSTOLIC PRESSURE (PASP): 54 MMHG
ECHO RA AREA 4C: 12.24 CM2
ECHO RIGHT VENTRICULAR SYSTOLIC PRESSURE (RVSP): 44.4 MMHG
ECHO TV REGURGITANT MAX VELOCITY: 293.46 CM/S
ECHO TV REGURGITANT PEAK GRADIENT: 34.4 MMHG
EOSINOPHIL # BLD: 0 K/UL (ref 0–0.4)
EOSINOPHIL # BLD: 0.2 K/UL (ref 0–0.4)
EOSINOPHIL # BLD: 0.3 K/UL (ref 0–0.4)
EOSINOPHIL NFR BLD: 0 % (ref 0–7)
EOSINOPHIL NFR BLD: 2 % (ref 0–7)
EOSINOPHIL NFR BLD: 3 % (ref 0–7)
EOSINOPHIL NFR BLD: 5 % (ref 0–7)
EOSINOPHIL NFR BLD: 5 % (ref 0–7)
EPITH CASTS URNS QL MICRO: ABNORMAL /LPF
ERYTHROCYTE [DISTWIDTH] IN BLOOD BY AUTOMATED COUNT: 15.7 % (ref 11.5–14.5)
ERYTHROCYTE [DISTWIDTH] IN BLOOD BY AUTOMATED COUNT: 16.2 % (ref 11.5–14.5)
ERYTHROCYTE [DISTWIDTH] IN BLOOD BY AUTOMATED COUNT: 16.5 % (ref 11.5–14.5)
ERYTHROCYTE [DISTWIDTH] IN BLOOD BY AUTOMATED COUNT: 16.6 % (ref 11.5–14.5)
ERYTHROCYTE [DISTWIDTH] IN BLOOD BY AUTOMATED COUNT: 16.9 % (ref 11.5–14.5)
ERYTHROCYTE [DISTWIDTH] IN BLOOD BY AUTOMATED COUNT: 17.4 % (ref 11.5–14.5)
ERYTHROCYTE [DISTWIDTH] IN BLOOD BY AUTOMATED COUNT: 17.4 % (ref 11.5–14.5)
ERYTHROCYTE [DISTWIDTH] IN BLOOD BY AUTOMATED COUNT: 17.5 % (ref 11.5–14.5)
ERYTHROCYTE [DISTWIDTH] IN BLOOD BY AUTOMATED COUNT: 17.5 % (ref 11.5–14.5)
ERYTHROCYTE [DISTWIDTH] IN BLOOD BY AUTOMATED COUNT: 17.9 % (ref 11.5–14.5)
ERYTHROCYTE [DISTWIDTH] IN BLOOD BY AUTOMATED COUNT: 17.9 % (ref 11.5–14.5)
ERYTHROCYTE [DISTWIDTH] IN BLOOD BY AUTOMATED COUNT: 18 % (ref 11.5–14.5)
ERYTHROCYTE [DISTWIDTH] IN BLOOD BY AUTOMATED COUNT: 18.2 % (ref 11.5–14.5)
ERYTHROCYTE [DISTWIDTH] IN BLOOD BY AUTOMATED COUNT: 18.6 % (ref 11.5–14.5)
ERYTHROCYTE [DISTWIDTH] IN BLOOD BY AUTOMATED COUNT: 19.3 % (ref 11.5–14.5)
EST. AVERAGE GLUCOSE BLD GHB EST-MCNC: 177 MG/DL
GAS FLOW.O2 O2 DELIVERY SYS: ABNORMAL L/MIN
GAS FLOW.O2 SETTING OXYMISER: 6 L/M
GGT SERPL-CCNC: 240 U/L (ref 15–85)
GLOBULIN SER CALC-MCNC: 2.7 G/DL (ref 2–4)
GLOBULIN SER CALC-MCNC: 2.9 G/DL (ref 2–4)
GLOBULIN SER CALC-MCNC: 3 G/DL (ref 2–4)
GLOBULIN SER CALC-MCNC: 3 G/DL (ref 2–4)
GLOBULIN SER CALC-MCNC: 3.1 G/DL (ref 2–4)
GLOBULIN SER CALC-MCNC: 3.2 G/DL (ref 2–4)
GLOBULIN SER CALC-MCNC: 3.3 G/DL (ref 2–4)
GLOBULIN SER CALC-MCNC: 3.4 G/DL (ref 2–4)
GLOBULIN SER CALC-MCNC: 3.5 G/DL (ref 2–4)
GLOBULIN SER CALC-MCNC: 3.7 G/DL (ref 2–4)
GLUCOSE BLD STRIP.AUTO-MCNC: 100 MG/DL (ref 65–100)
GLUCOSE BLD STRIP.AUTO-MCNC: 102 MG/DL (ref 65–100)
GLUCOSE BLD STRIP.AUTO-MCNC: 104 MG/DL (ref 65–100)
GLUCOSE BLD STRIP.AUTO-MCNC: 107 MG/DL (ref 65–100)
GLUCOSE BLD STRIP.AUTO-MCNC: 110 MG/DL (ref 65–100)
GLUCOSE BLD STRIP.AUTO-MCNC: 115 MG/DL (ref 65–100)
GLUCOSE BLD STRIP.AUTO-MCNC: 116 MG/DL (ref 65–100)
GLUCOSE BLD STRIP.AUTO-MCNC: 118 MG/DL (ref 65–100)
GLUCOSE BLD STRIP.AUTO-MCNC: 120 MG/DL (ref 65–100)
GLUCOSE BLD STRIP.AUTO-MCNC: 120 MG/DL (ref 65–100)
GLUCOSE BLD STRIP.AUTO-MCNC: 122 MG/DL (ref 65–100)
GLUCOSE BLD STRIP.AUTO-MCNC: 126 MG/DL (ref 65–100)
GLUCOSE BLD STRIP.AUTO-MCNC: 134 MG/DL (ref 65–100)
GLUCOSE BLD STRIP.AUTO-MCNC: 139 MG/DL (ref 65–100)
GLUCOSE BLD STRIP.AUTO-MCNC: 143 MG/DL (ref 65–100)
GLUCOSE BLD STRIP.AUTO-MCNC: 144 MG/DL (ref 65–100)
GLUCOSE BLD STRIP.AUTO-MCNC: 145 MG/DL (ref 65–100)
GLUCOSE BLD STRIP.AUTO-MCNC: 146 MG/DL (ref 65–100)
GLUCOSE BLD STRIP.AUTO-MCNC: 154 MG/DL (ref 65–100)
GLUCOSE BLD STRIP.AUTO-MCNC: 155 MG/DL (ref 65–100)
GLUCOSE BLD STRIP.AUTO-MCNC: 156 MG/DL (ref 65–100)
GLUCOSE BLD STRIP.AUTO-MCNC: 156 MG/DL (ref 65–100)
GLUCOSE BLD STRIP.AUTO-MCNC: 157 MG/DL (ref 65–100)
GLUCOSE BLD STRIP.AUTO-MCNC: 158 MG/DL (ref 65–100)
GLUCOSE BLD STRIP.AUTO-MCNC: 158 MG/DL (ref 65–100)
GLUCOSE BLD STRIP.AUTO-MCNC: 160 MG/DL (ref 65–100)
GLUCOSE BLD STRIP.AUTO-MCNC: 161 MG/DL (ref 65–100)
GLUCOSE BLD STRIP.AUTO-MCNC: 168 MG/DL (ref 65–100)
GLUCOSE BLD STRIP.AUTO-MCNC: 169 MG/DL (ref 65–100)
GLUCOSE BLD STRIP.AUTO-MCNC: 173 MG/DL (ref 65–100)
GLUCOSE BLD STRIP.AUTO-MCNC: 174 MG/DL (ref 65–100)
GLUCOSE BLD STRIP.AUTO-MCNC: 184 MG/DL (ref 65–100)
GLUCOSE BLD STRIP.AUTO-MCNC: 187 MG/DL (ref 65–100)
GLUCOSE BLD STRIP.AUTO-MCNC: 188 MG/DL (ref 65–100)
GLUCOSE BLD STRIP.AUTO-MCNC: 188 MG/DL (ref 65–100)
GLUCOSE BLD STRIP.AUTO-MCNC: 189 MG/DL (ref 65–100)
GLUCOSE BLD STRIP.AUTO-MCNC: 202 MG/DL (ref 65–100)
GLUCOSE BLD STRIP.AUTO-MCNC: 207 MG/DL (ref 65–100)
GLUCOSE BLD STRIP.AUTO-MCNC: 211 MG/DL (ref 65–100)
GLUCOSE BLD STRIP.AUTO-MCNC: 214 MG/DL (ref 65–100)
GLUCOSE BLD STRIP.AUTO-MCNC: 218 MG/DL (ref 65–100)
GLUCOSE BLD STRIP.AUTO-MCNC: 219 MG/DL (ref 65–100)
GLUCOSE BLD STRIP.AUTO-MCNC: 219 MG/DL (ref 65–100)
GLUCOSE BLD STRIP.AUTO-MCNC: 220 MG/DL (ref 65–100)
GLUCOSE BLD STRIP.AUTO-MCNC: 221 MG/DL (ref 65–100)
GLUCOSE BLD STRIP.AUTO-MCNC: 224 MG/DL (ref 65–100)
GLUCOSE BLD STRIP.AUTO-MCNC: 233 MG/DL (ref 65–100)
GLUCOSE BLD STRIP.AUTO-MCNC: 241 MG/DL (ref 65–100)
GLUCOSE BLD STRIP.AUTO-MCNC: 248 MG/DL (ref 65–100)
GLUCOSE BLD STRIP.AUTO-MCNC: 256 MG/DL (ref 65–100)
GLUCOSE BLD STRIP.AUTO-MCNC: 256 MG/DL (ref 65–100)
GLUCOSE BLD STRIP.AUTO-MCNC: 259 MG/DL (ref 65–100)
GLUCOSE BLD STRIP.AUTO-MCNC: 261 MG/DL (ref 65–100)
GLUCOSE BLD STRIP.AUTO-MCNC: 269 MG/DL (ref 65–100)
GLUCOSE BLD STRIP.AUTO-MCNC: 275 MG/DL (ref 65–100)
GLUCOSE BLD STRIP.AUTO-MCNC: 284 MG/DL (ref 65–100)
GLUCOSE BLD STRIP.AUTO-MCNC: 285 MG/DL (ref 65–100)
GLUCOSE BLD STRIP.AUTO-MCNC: 344 MG/DL (ref 65–100)
GLUCOSE BLD STRIP.AUTO-MCNC: 358 MG/DL (ref 65–100)
GLUCOSE BLD STRIP.AUTO-MCNC: 36 MG/DL (ref 65–100)
GLUCOSE BLD STRIP.AUTO-MCNC: 47 MG/DL (ref 65–100)
GLUCOSE BLD STRIP.AUTO-MCNC: 47 MG/DL (ref 65–100)
GLUCOSE BLD STRIP.AUTO-MCNC: 49 MG/DL (ref 65–100)
GLUCOSE BLD STRIP.AUTO-MCNC: 52 MG/DL (ref 65–100)
GLUCOSE BLD STRIP.AUTO-MCNC: 56 MG/DL (ref 65–100)
GLUCOSE BLD STRIP.AUTO-MCNC: 57 MG/DL (ref 65–100)
GLUCOSE BLD STRIP.AUTO-MCNC: 59 MG/DL (ref 65–100)
GLUCOSE BLD STRIP.AUTO-MCNC: 60 MG/DL (ref 65–100)
GLUCOSE BLD STRIP.AUTO-MCNC: 61 MG/DL (ref 65–100)
GLUCOSE BLD STRIP.AUTO-MCNC: 62 MG/DL (ref 65–100)
GLUCOSE BLD STRIP.AUTO-MCNC: 62 MG/DL (ref 65–100)
GLUCOSE BLD STRIP.AUTO-MCNC: 63 MG/DL (ref 65–100)
GLUCOSE BLD STRIP.AUTO-MCNC: 64 MG/DL (ref 65–100)
GLUCOSE BLD STRIP.AUTO-MCNC: 67 MG/DL (ref 65–100)
GLUCOSE BLD STRIP.AUTO-MCNC: 67 MG/DL (ref 65–100)
GLUCOSE BLD STRIP.AUTO-MCNC: 68 MG/DL (ref 65–100)
GLUCOSE BLD STRIP.AUTO-MCNC: 69 MG/DL (ref 65–100)
GLUCOSE BLD STRIP.AUTO-MCNC: 70 MG/DL (ref 65–100)
GLUCOSE BLD STRIP.AUTO-MCNC: 71 MG/DL (ref 65–100)
GLUCOSE BLD STRIP.AUTO-MCNC: 71 MG/DL (ref 65–100)
GLUCOSE BLD STRIP.AUTO-MCNC: 75 MG/DL (ref 65–100)
GLUCOSE BLD STRIP.AUTO-MCNC: 75 MG/DL (ref 65–100)
GLUCOSE BLD STRIP.AUTO-MCNC: 77 MG/DL (ref 65–100)
GLUCOSE BLD STRIP.AUTO-MCNC: 78 MG/DL (ref 65–100)
GLUCOSE BLD STRIP.AUTO-MCNC: 81 MG/DL (ref 65–100)
GLUCOSE BLD STRIP.AUTO-MCNC: 83 MG/DL (ref 65–100)
GLUCOSE BLD STRIP.AUTO-MCNC: 84 MG/DL (ref 65–100)
GLUCOSE BLD STRIP.AUTO-MCNC: 85 MG/DL (ref 65–100)
GLUCOSE BLD STRIP.AUTO-MCNC: 92 MG/DL (ref 65–100)
GLUCOSE BLD STRIP.AUTO-MCNC: 95 MG/DL (ref 65–100)
GLUCOSE BLD STRIP.AUTO-MCNC: 95 MG/DL (ref 65–100)
GLUCOSE BLD STRIP.AUTO-MCNC: 97 MG/DL (ref 65–100)
GLUCOSE BLD STRIP.AUTO-MCNC: 97 MG/DL (ref 65–100)
GLUCOSE BLD STRIP.AUTO-MCNC: 98 MG/DL (ref 65–100)
GLUCOSE SERPL-MCNC: 102 MG/DL (ref 65–100)
GLUCOSE SERPL-MCNC: 116 MG/DL (ref 65–100)
GLUCOSE SERPL-MCNC: 121 MG/DL (ref 65–100)
GLUCOSE SERPL-MCNC: 125 MG/DL (ref 65–100)
GLUCOSE SERPL-MCNC: 133 MG/DL (ref 65–100)
GLUCOSE SERPL-MCNC: 138 MG/DL (ref 65–100)
GLUCOSE SERPL-MCNC: 151 MG/DL (ref 65–100)
GLUCOSE SERPL-MCNC: 167 MG/DL (ref 65–100)
GLUCOSE SERPL-MCNC: 216 MG/DL (ref 65–100)
GLUCOSE SERPL-MCNC: 238 MG/DL (ref 65–100)
GLUCOSE SERPL-MCNC: 263 MG/DL (ref 65–100)
GLUCOSE SERPL-MCNC: 267 MG/DL (ref 65–100)
GLUCOSE SERPL-MCNC: 39 MG/DL (ref 65–100)
GLUCOSE SERPL-MCNC: 48 MG/DL (ref 65–100)
GLUCOSE SERPL-MCNC: 58 MG/DL (ref 65–100)
GLUCOSE SERPL-MCNC: 61 MG/DL (ref 65–100)
GLUCOSE SERPL-MCNC: 74 MG/DL (ref 65–100)
GLUCOSE UR STRIP.AUTO-MCNC: 250 MG/DL
GRAM STN SPEC: ABNORMAL
GRAM STN SPEC: ABNORMAL
HBA1C MFR BLD: 7.8 % (ref 4–5.6)
HBV SURFACE AB SER QL: NONREACTIVE
HBV SURFACE AB SER-ACNC: <3.1 MIU/ML
HBV SURFACE AG SER QL: 0.6 INDEX
HBV SURFACE AG SER QL: <0.1 INDEX
HBV SURFACE AG SER QL: NEGATIVE
HBV SURFACE AG SER QL: NEGATIVE
HCO3 BLD-SCNC: 27.1 MMOL/L (ref 22–26)
HCT VFR BLD AUTO: 18.2 % (ref 36.6–50.3)
HCT VFR BLD AUTO: 20.3 % (ref 36.6–50.3)
HCT VFR BLD AUTO: 21 % (ref 36.6–50.3)
HCT VFR BLD AUTO: 21.3 % (ref 36.6–50.3)
HCT VFR BLD AUTO: 22.2 % (ref 36.6–50.3)
HCT VFR BLD AUTO: 22.7 % (ref 36.6–50.3)
HCT VFR BLD AUTO: 22.7 % (ref 36.6–50.3)
HCT VFR BLD AUTO: 25.3 % (ref 36.6–50.3)
HCT VFR BLD AUTO: 28.2 % (ref 36.6–50.3)
HCT VFR BLD AUTO: 29.6 % (ref 36.6–50.3)
HCT VFR BLD AUTO: 30.6 % (ref 36.6–50.3)
HCT VFR BLD AUTO: 34.9 % (ref 36.6–50.3)
HCT VFR BLD AUTO: 35.4 % (ref 36.6–50.3)
HCT VFR BLD AUTO: 36.4 % (ref 36.6–50.3)
HCT VFR BLD AUTO: 37.7 % (ref 36.6–50.3)
HDLC SERPL-MCNC: 53 MG/DL
HDLC SERPL: 1.7 {RATIO} (ref 0–5)
HGB BLD-MCNC: 10 G/DL (ref 12.1–17)
HGB BLD-MCNC: 10.4 G/DL (ref 12.1–17)
HGB BLD-MCNC: 11.3 G/DL (ref 12.1–17)
HGB BLD-MCNC: 11.6 G/DL (ref 12.1–17)
HGB BLD-MCNC: 12.5 G/DL (ref 12.1–17)
HGB BLD-MCNC: 12.6 G/DL (ref 12.1–17)
HGB BLD-MCNC: 6 G/DL (ref 12.1–17)
HGB BLD-MCNC: 6.7 G/DL (ref 12.1–17)
HGB BLD-MCNC: 7.3 G/DL (ref 12.1–17)
HGB BLD-MCNC: 7.4 G/DL (ref 12.1–17)
HGB BLD-MCNC: 7.5 G/DL (ref 12.1–17)
HGB BLD-MCNC: 7.8 G/DL (ref 12.1–17)
HGB BLD-MCNC: 8.1 G/DL (ref 12.1–17)
HGB BLD-MCNC: 9.1 G/DL (ref 12.1–17)
HGB BLD-MCNC: 9.6 G/DL (ref 12.1–17)
HGB UR QL STRIP: ABNORMAL
HYALINE CASTS URNS QL MICRO: ABNORMAL /LPF (ref 0–5)
IMM GRANULOCYTES # BLD AUTO: 0 K/UL (ref 0–0.04)
IMM GRANULOCYTES # BLD AUTO: 0.1 K/UL (ref 0–0.04)
IMM GRANULOCYTES # BLD AUTO: 0.1 K/UL (ref 0–0.04)
IMM GRANULOCYTES NFR BLD AUTO: 0 % (ref 0–0.5)
IMM GRANULOCYTES NFR BLD AUTO: 1 % (ref 0–0.5)
IMM GRANULOCYTES NFR BLD AUTO: 1 % (ref 0–0.5)
KETONES UR QL STRIP.AUTO: NEGATIVE MG/DL
LACTATE SERPL-SCNC: 0.8 MMOL/L (ref 0.4–2)
LACTATE SERPL-SCNC: 1 MMOL/L (ref 0.4–2)
LACTATE SERPL-SCNC: 1.7 MMOL/L (ref 0.4–2)
LACTATE SERPL-SCNC: 1.7 MMOL/L (ref 0.4–2)
LACTATE SERPL-SCNC: 2 MMOL/L (ref 0.4–2)
LACTATE SERPL-SCNC: 2.1 MMOL/L (ref 0.4–2)
LACTATE SERPL-SCNC: 2.9 MMOL/L (ref 0.4–2)
LACTATE SERPL-SCNC: 4.2 MMOL/L (ref 0.4–2)
LDLC SERPL CALC-MCNC: 25.4 MG/DL (ref 0–100)
LEUKOCYTE ESTERASE UR QL STRIP.AUTO: ABNORMAL
LIPID PROFILE,FLP: NORMAL
LVFS 2D: 5.95 %
LVOT MG: 0.96 MMHG
LVOT MV: 0.45 CM/S
LVSV (TEICH): 9.55 ML
LYMPHOCYTES # BLD: 0.1 K/UL (ref 0.8–3.5)
LYMPHOCYTES # BLD: 0.2 K/UL (ref 0.8–3.5)
LYMPHOCYTES # BLD: 0.4 K/UL (ref 0.8–3.5)
LYMPHOCYTES # BLD: 0.5 K/UL (ref 0.8–3.5)
LYMPHOCYTES # BLD: 0.5 K/UL (ref 0.8–3.5)
LYMPHOCYTES # BLD: 0.9 K/UL (ref 0.8–3.5)
LYMPHOCYTES # BLD: 0.9 K/UL (ref 0.8–3.5)
LYMPHOCYTES # BLD: 1.6 K/UL (ref 0.8–3.5)
LYMPHOCYTES NFR BLD: 1 % (ref 12–49)
LYMPHOCYTES NFR BLD: 16 % (ref 12–49)
LYMPHOCYTES NFR BLD: 19 % (ref 12–49)
LYMPHOCYTES NFR BLD: 29 % (ref 12–49)
LYMPHOCYTES NFR BLD: 3 % (ref 12–49)
LYMPHOCYTES NFR BLD: 4 % (ref 12–49)
LYMPHOCYTES NFR BLD: 4 % (ref 12–49)
LYMPHOCYTES NFR BLD: 6 % (ref 12–49)
MAGNESIUM SERPL-MCNC: 2.2 MG/DL (ref 1.6–2.4)
MAGNESIUM SERPL-MCNC: 2.4 MG/DL (ref 1.6–2.4)
MAGNESIUM SERPL-MCNC: 2.5 MG/DL (ref 1.6–2.4)
MCH RBC QN AUTO: 27.6 PG (ref 26–34)
MCH RBC QN AUTO: 27.8 PG (ref 26–34)
MCH RBC QN AUTO: 28.2 PG (ref 26–34)
MCH RBC QN AUTO: 28.3 PG (ref 26–34)
MCH RBC QN AUTO: 28.3 PG (ref 26–34)
MCH RBC QN AUTO: 28.4 PG (ref 26–34)
MCH RBC QN AUTO: 28.6 PG (ref 26–34)
MCH RBC QN AUTO: 28.6 PG (ref 26–34)
MCH RBC QN AUTO: 28.7 PG (ref 26–34)
MCH RBC QN AUTO: 28.9 PG (ref 26–34)
MCH RBC QN AUTO: 28.9 PG (ref 26–34)
MCH RBC QN AUTO: 29 PG (ref 26–34)
MCH RBC QN AUTO: 29.1 PG (ref 26–34)
MCHC RBC AUTO-ENTMCNC: 32.4 G/DL (ref 30–36.5)
MCHC RBC AUTO-ENTMCNC: 32.8 G/DL (ref 30–36.5)
MCHC RBC AUTO-ENTMCNC: 32.9 G/DL (ref 30–36.5)
MCHC RBC AUTO-ENTMCNC: 33 G/DL (ref 30–36.5)
MCHC RBC AUTO-ENTMCNC: 33 G/DL (ref 30–36.5)
MCHC RBC AUTO-ENTMCNC: 33.4 G/DL (ref 30–36.5)
MCHC RBC AUTO-ENTMCNC: 33.8 G/DL (ref 30–36.5)
MCHC RBC AUTO-ENTMCNC: 34 G/DL (ref 30–36.5)
MCHC RBC AUTO-ENTMCNC: 34 G/DL (ref 30–36.5)
MCHC RBC AUTO-ENTMCNC: 34.3 G/DL (ref 30–36.5)
MCHC RBC AUTO-ENTMCNC: 34.4 G/DL (ref 30–36.5)
MCHC RBC AUTO-ENTMCNC: 34.7 G/DL (ref 30–36.5)
MCHC RBC AUTO-ENTMCNC: 35.7 G/DL (ref 30–36.5)
MCHC RBC AUTO-ENTMCNC: 35.7 G/DL (ref 30–36.5)
MCHC RBC AUTO-ENTMCNC: 36 G/DL (ref 30–36.5)
MCV RBC AUTO: 78.9 FL (ref 80–99)
MCV RBC AUTO: 79.4 FL (ref 80–99)
MCV RBC AUTO: 80.1 FL (ref 80–99)
MCV RBC AUTO: 80.3 FL (ref 80–99)
MCV RBC AUTO: 82.5 FL (ref 80–99)
MCV RBC AUTO: 83.4 FL (ref 80–99)
MCV RBC AUTO: 83.9 FL (ref 80–99)
MCV RBC AUTO: 84.5 FL (ref 80–99)
MCV RBC AUTO: 85.5 FL (ref 80–99)
MCV RBC AUTO: 85.7 FL (ref 80–99)
MCV RBC AUTO: 85.7 FL (ref 80–99)
MCV RBC AUTO: 86 FL (ref 80–99)
MCV RBC AUTO: 86.8 FL (ref 80–99)
MCV RBC AUTO: 86.8 FL (ref 80–99)
MCV RBC AUTO: 88.6 FL (ref 80–99)
MONOCYTES # BLD: 0.5 K/UL (ref 0–1)
MONOCYTES # BLD: 0.5 K/UL (ref 0–1)
MONOCYTES # BLD: 0.6 K/UL (ref 0–1)
MONOCYTES # BLD: 0.6 K/UL (ref 0–1)
MONOCYTES # BLD: 0.7 K/UL (ref 0–1)
MONOCYTES # BLD: 1.1 K/UL (ref 0–1)
MONOCYTES NFR BLD: 10 % (ref 5–13)
MONOCYTES NFR BLD: 11 % (ref 5–13)
MONOCYTES NFR BLD: 13 % (ref 5–13)
MONOCYTES NFR BLD: 13 % (ref 5–13)
MONOCYTES NFR BLD: 5 % (ref 5–13)
MONOCYTES NFR BLD: 6 % (ref 5–13)
MONOCYTES NFR BLD: 7 % (ref 5–13)
MONOCYTES NFR BLD: 8 % (ref 5–13)
MV DEC SLOPE: 6.65
NEUTS BAND NFR BLD MANUAL: 2 %
NEUTS BAND NFR BLD MANUAL: 4 %
NEUTS SEG # BLD: 11.6 K/UL (ref 1.8–8)
NEUTS SEG # BLD: 11.8 K/UL (ref 1.8–8)
NEUTS SEG # BLD: 2.8 K/UL (ref 1.8–8)
NEUTS SEG # BLD: 3.1 K/UL (ref 1.8–8)
NEUTS SEG # BLD: 3.8 K/UL (ref 1.8–8)
NEUTS SEG # BLD: 4.9 K/UL (ref 1.8–8)
NEUTS SEG # BLD: 6.6 K/UL (ref 1.8–8)
NEUTS SEG # BLD: 9.3 K/UL (ref 1.8–8)
NEUTS SEG NFR BLD: 52 % (ref 32–75)
NEUTS SEG NFR BLD: 65 % (ref 32–75)
NEUTS SEG NFR BLD: 67 % (ref 32–75)
NEUTS SEG NFR BLD: 83 % (ref 32–75)
NEUTS SEG NFR BLD: 85 % (ref 32–75)
NEUTS SEG NFR BLD: 87 % (ref 32–75)
NEUTS SEG NFR BLD: 88 % (ref 32–75)
NEUTS SEG NFR BLD: 91 % (ref 32–75)
NITRITE UR QL STRIP.AUTO: NEGATIVE
NRBC # BLD: 0 K/UL (ref 0–0.01)
NRBC # BLD: 0.02 K/UL (ref 0–0.01)
NRBC BLD-RTO: 0 PER 100 WBC
NRBC BLD-RTO: 0.1 PER 100 WBC
NRBC BLD-RTO: 0.2 PER 100 WBC
P-R INTERVAL, ECG05: 108 MS
P-R INTERVAL, ECG05: 86 MS
P-R INTERVAL, ECG05: 92 MS
PCO2 BLD: 39.9 MMHG (ref 35–45)
PH BLD: 7.44 [PH] (ref 7.35–7.45)
PH UR STRIP: 7 [PH] (ref 5–8)
PHOSPHATE SERPL-MCNC: 3.1 MG/DL (ref 2.6–4.7)
PHOSPHATE SERPL-MCNC: 3.9 MG/DL (ref 2.6–4.7)
PHOSPHATE SERPL-MCNC: 5.6 MG/DL (ref 2.6–4.7)
PHOSPHATE SERPL-MCNC: 7.5 MG/DL (ref 2.6–4.7)
PLATELET # BLD AUTO: 107 K/UL (ref 150–400)
PLATELET # BLD AUTO: 112 K/UL (ref 150–400)
PLATELET # BLD AUTO: 114 K/UL (ref 150–400)
PLATELET # BLD AUTO: 140 K/UL (ref 150–400)
PLATELET # BLD AUTO: 150 K/UL (ref 150–400)
PLATELET # BLD AUTO: 152 K/UL (ref 150–400)
PLATELET # BLD AUTO: 161 K/UL (ref 150–400)
PLATELET # BLD AUTO: 167 K/UL (ref 150–400)
PLATELET # BLD AUTO: 176 K/UL (ref 150–400)
PLATELET # BLD AUTO: 179 K/UL (ref 150–400)
PLATELET # BLD AUTO: 198 K/UL (ref 150–400)
PLATELET # BLD AUTO: 203 K/UL (ref 150–400)
PLATELET # BLD AUTO: 77 K/UL (ref 150–400)
PLATELET # BLD AUTO: 80 K/UL (ref 150–400)
PLATELET # BLD AUTO: 90 K/UL (ref 150–400)
PMV BLD AUTO: 10.1 FL (ref 8.9–12.9)
PMV BLD AUTO: 10.1 FL (ref 8.9–12.9)
PMV BLD AUTO: 10.3 FL (ref 8.9–12.9)
PMV BLD AUTO: 10.5 FL (ref 8.9–12.9)
PMV BLD AUTO: 10.9 FL (ref 8.9–12.9)
PMV BLD AUTO: 11.1 FL (ref 8.9–12.9)
PMV BLD AUTO: 11.3 FL (ref 8.9–12.9)
PMV BLD AUTO: 11.5 FL (ref 8.9–12.9)
PMV BLD AUTO: 11.6 FL (ref 8.9–12.9)
PMV BLD AUTO: 11.6 FL (ref 8.9–12.9)
PMV BLD AUTO: 11.8 FL (ref 8.9–12.9)
PMV BLD AUTO: 11.9 FL (ref 8.9–12.9)
PMV BLD AUTO: 12.5 FL (ref 8.9–12.9)
PMV BLD AUTO: 12.5 FL (ref 8.9–12.9)
PMV BLD AUTO: 9.1 FL (ref 8.9–12.9)
PO2 BLD: 55 MMHG (ref 80–100)
POTASSIUM SERPL-SCNC: 3.1 MMOL/L (ref 3.5–5.1)
POTASSIUM SERPL-SCNC: 3.3 MMOL/L (ref 3.5–5.1)
POTASSIUM SERPL-SCNC: 3.7 MMOL/L (ref 3.5–5.1)
POTASSIUM SERPL-SCNC: 3.8 MMOL/L (ref 3.5–5.1)
POTASSIUM SERPL-SCNC: 4 MMOL/L (ref 3.5–5.1)
POTASSIUM SERPL-SCNC: 4.3 MMOL/L (ref 3.5–5.1)
POTASSIUM SERPL-SCNC: 4.4 MMOL/L (ref 3.5–5.1)
POTASSIUM SERPL-SCNC: 4.6 MMOL/L (ref 3.5–5.1)
POTASSIUM SERPL-SCNC: 4.7 MMOL/L (ref 3.5–5.1)
POTASSIUM SERPL-SCNC: 4.8 MMOL/L (ref 3.5–5.1)
POTASSIUM SERPL-SCNC: 5.1 MMOL/L (ref 3.5–5.1)
POTASSIUM SERPL-SCNC: 5.1 MMOL/L (ref 3.5–5.1)
POTASSIUM SERPL-SCNC: 5.6 MMOL/L (ref 3.5–5.1)
POTASSIUM SERPL-SCNC: 5.8 MMOL/L (ref 3.5–5.1)
POTASSIUM SERPL-SCNC: 6.1 MMOL/L (ref 3.5–5.1)
PROCALCITONIN SERPL-MCNC: 0.51 NG/ML
PROCALCITONIN SERPL-MCNC: 1.09 NG/ML
PROT SERPL-MCNC: 4.6 G/DL (ref 6.4–8.2)
PROT SERPL-MCNC: 4.8 G/DL (ref 6.4–8.2)
PROT SERPL-MCNC: 5.1 G/DL (ref 6.4–8.2)
PROT SERPL-MCNC: 5.1 G/DL (ref 6.4–8.2)
PROT SERPL-MCNC: 5.3 G/DL (ref 6.4–8.2)
PROT SERPL-MCNC: 5.3 G/DL (ref 6.4–8.2)
PROT SERPL-MCNC: 5.6 G/DL (ref 6.4–8.2)
PROT SERPL-MCNC: 5.7 G/DL (ref 6.4–8.2)
PROT SERPL-MCNC: 6 G/DL (ref 6.4–8.2)
PROT SERPL-MCNC: 6.6 G/DL (ref 6.4–8.2)
PROT SERPL-MCNC: 6.9 G/DL (ref 6.4–8.2)
PROT SERPL-MCNC: 7 G/DL (ref 6.4–8.2)
PROT UR STRIP-MCNC: >300 MG/DL
PTH-INTACT SERPL-MCNC: 174.1 PG/ML (ref 18.4–88)
Q-T INTERVAL, ECG07: 348 MS
Q-T INTERVAL, ECG07: 368 MS
Q-T INTERVAL, ECG07: 382 MS
QRS DURATION, ECG06: 68 MS
QRS DURATION, ECG06: 70 MS
QRS DURATION, ECG06: 74 MS
QTC CALCULATION (BEZET), ECG08: 437 MS
QTC CALCULATION (BEZET), ECG08: 468 MS
QTC CALCULATION (BEZET), ECG08: 482 MS
RBC # BLD AUTO: 2.17 M/UL (ref 4.1–5.7)
RBC # BLD AUTO: 2.34 M/UL (ref 4.1–5.7)
RBC # BLD AUTO: 2.58 M/UL (ref 4.1–5.7)
RBC # BLD AUTO: 2.66 M/UL (ref 4.1–5.7)
RBC # BLD AUTO: 2.66 M/UL (ref 4.1–5.7)
RBC # BLD AUTO: 2.75 M/UL (ref 4.1–5.7)
RBC # BLD AUTO: 2.86 M/UL (ref 4.1–5.7)
RBC # BLD AUTO: 3.15 M/UL (ref 4.1–5.7)
RBC # BLD AUTO: 3.38 M/UL (ref 4.1–5.7)
RBC # BLD AUTO: 3.46 M/UL (ref 4.1–5.7)
RBC # BLD AUTO: 3.57 M/UL (ref 4.1–5.7)
RBC # BLD AUTO: 3.94 M/UL (ref 4.1–5.7)
RBC # BLD AUTO: 4.08 M/UL (ref 4.1–5.7)
RBC # BLD AUTO: 4.31 M/UL (ref 4.1–5.7)
RBC # BLD AUTO: 4.4 M/UL (ref 4.1–5.7)
RBC #/AREA URNS HPF: ABNORMAL /HPF (ref 0–5)
RBC MORPH BLD: ABNORMAL
SAMPLES BEING HELD,HOLD: NORMAL
SAMPLES BEING HELD,HOLD: NORMAL
SAO2 % BLD: 90 % (ref 92–97)
SERVICE CMNT-IMP: ABNORMAL
SERVICE CMNT-IMP: NORMAL
SODIUM SERPL-SCNC: 127 MMOL/L (ref 136–145)
SODIUM SERPL-SCNC: 128 MMOL/L (ref 136–145)
SODIUM SERPL-SCNC: 129 MMOL/L (ref 136–145)
SODIUM SERPL-SCNC: 131 MMOL/L (ref 136–145)
SODIUM SERPL-SCNC: 132 MMOL/L (ref 136–145)
SODIUM SERPL-SCNC: 132 MMOL/L (ref 136–145)
SODIUM SERPL-SCNC: 133 MMOL/L (ref 136–145)
SODIUM SERPL-SCNC: 134 MMOL/L (ref 136–145)
SODIUM SERPL-SCNC: 136 MMOL/L (ref 136–145)
SODIUM SERPL-SCNC: 138 MMOL/L (ref 136–145)
SP GR UR REFRACTOMETRY: 1.02 (ref 1–1.03)
SPECIMEN EXP DATE BLD: NORMAL
SPECIMEN TYPE: ABNORMAL
STATUS OF UNIT,%ST: NORMAL
TOTAL RESP. RATE, ITRR: 21
TRIGL SERPL-MCNC: 48 MG/DL (ref ?–150)
TROPONIN I SERPL-MCNC: 0.23 NG/ML
TROPONIN I SERPL-MCNC: 0.27 NG/ML
TROPONIN I SERPL-MCNC: <0.05 NG/ML
TSH SERPL DL<=0.05 MIU/L-ACNC: 6.66 UIU/ML (ref 0.36–3.74)
UA: UC IF INDICATED,UAUC: ABNORMAL
UNIT DIVISION, %UDIV: 0
UR CULT HOLD, URHOLD: NORMAL
UROBILINOGEN UR QL STRIP.AUTO: 0.2 EU/DL (ref 0.2–1)
VANCOMYCIN SERPL-MCNC: 16.8 UG/ML
VENTRICULAR RATE, ECG03: 109 BPM
VENTRICULAR RATE, ECG03: 85 BPM
VENTRICULAR RATE, ECG03: 96 BPM
VLDLC SERPL CALC-MCNC: 9.6 MG/DL
WBC # BLD AUTO: 10.2 K/UL (ref 4.1–11.1)
WBC # BLD AUTO: 12.4 K/UL (ref 4.1–11.1)
WBC # BLD AUTO: 13.4 K/UL (ref 4.1–11.1)
WBC # BLD AUTO: 13.4 K/UL (ref 4.1–11.1)
WBC # BLD AUTO: 15.7 K/UL (ref 4.1–11.1)
WBC # BLD AUTO: 17.6 K/UL (ref 4.1–11.1)
WBC # BLD AUTO: 18.4 K/UL (ref 4.1–11.1)
WBC # BLD AUTO: 20.3 K/UL (ref 4.1–11.1)
WBC # BLD AUTO: 23.8 K/UL (ref 4.1–11.1)
WBC # BLD AUTO: 4.8 K/UL (ref 4.1–11.1)
WBC # BLD AUTO: 5 K/UL (ref 4.1–11.1)
WBC # BLD AUTO: 5.4 K/UL (ref 4.1–11.1)
WBC # BLD AUTO: 5.7 K/UL (ref 4.1–11.1)
WBC # BLD AUTO: 5.8 K/UL (ref 4.1–11.1)
WBC # BLD AUTO: 8.1 K/UL (ref 4.1–11.1)
WBC URNS QL MICRO: ABNORMAL /HPF (ref 0–4)

## 2020-01-01 PROCEDURE — 74011250636 HC RX REV CODE- 250/636: Performed by: SURGERY

## 2020-01-01 PROCEDURE — 82962 GLUCOSE BLOOD TEST: CPT

## 2020-01-01 PROCEDURE — 93005 ELECTROCARDIOGRAM TRACING: CPT

## 2020-01-01 PROCEDURE — 86900 BLOOD TYPING SEROLOGIC ABO: CPT

## 2020-01-01 PROCEDURE — 65610000006 HC RM INTENSIVE CARE

## 2020-01-01 PROCEDURE — 74011000250 HC RX REV CODE- 250: Performed by: INTERNAL MEDICINE

## 2020-01-01 PROCEDURE — 36415 COLL VENOUS BLD VENIPUNCTURE: CPT

## 2020-01-01 PROCEDURE — 65270000029 HC RM PRIVATE

## 2020-01-01 PROCEDURE — 74011250636 HC RX REV CODE- 250/636: Performed by: INTERNAL MEDICINE

## 2020-01-01 PROCEDURE — 77030008684 HC TU ET CUF COVD -B: Performed by: NURSE ANESTHETIST, CERTIFIED REGISTERED

## 2020-01-01 PROCEDURE — 94761 N-INVAS EAR/PLS OXIMETRY MLT: CPT

## 2020-01-01 PROCEDURE — 71045 X-RAY EXAM CHEST 1 VIEW: CPT

## 2020-01-01 PROCEDURE — 74011250637 HC RX REV CODE- 250/637: Performed by: SURGERY

## 2020-01-01 PROCEDURE — 87040 BLOOD CULTURE FOR BACTERIA: CPT

## 2020-01-01 PROCEDURE — 84145 PROCALCITONIN (PCT): CPT

## 2020-01-01 PROCEDURE — 82550 ASSAY OF CK (CPK): CPT

## 2020-01-01 PROCEDURE — 90935 HEMODIALYSIS ONE EVALUATION: CPT

## 2020-01-01 PROCEDURE — 74011250637 HC RX REV CODE- 250/637: Performed by: NURSE PRACTITIONER

## 2020-01-01 PROCEDURE — 85027 COMPLETE CBC AUTOMATED: CPT

## 2020-01-01 PROCEDURE — 74011000250 HC RX REV CODE- 250: Performed by: SURGERY

## 2020-01-01 PROCEDURE — 74011636637 HC RX REV CODE- 636/637: Performed by: INTERNAL MEDICINE

## 2020-01-01 PROCEDURE — 99285 EMERGENCY DEPT VISIT HI MDM: CPT

## 2020-01-01 PROCEDURE — 85025 COMPLETE CBC W/AUTO DIFF WBC: CPT

## 2020-01-01 PROCEDURE — 83605 ASSAY OF LACTIC ACID: CPT

## 2020-01-01 PROCEDURE — 74011250637 HC RX REV CODE- 250/637: Performed by: HOSPITALIST

## 2020-01-01 PROCEDURE — 74011636637 HC RX REV CODE- 636/637: Performed by: GENERAL ACUTE CARE HOSPITAL

## 2020-01-01 PROCEDURE — 86923 COMPATIBILITY TEST ELECTRIC: CPT

## 2020-01-01 PROCEDURE — 74011000250 HC RX REV CODE- 250: Performed by: NURSE ANESTHETIST, CERTIFIED REGISTERED

## 2020-01-01 PROCEDURE — 87077 CULTURE AEROBIC IDENTIFY: CPT

## 2020-01-01 PROCEDURE — 77030026438 HC STYL ET INTUB CARD -A: Performed by: NURSE ANESTHETIST, CERTIFIED REGISTERED

## 2020-01-01 PROCEDURE — 77010033678 HC OXYGEN DAILY

## 2020-01-01 PROCEDURE — 94760 N-INVAS EAR/PLS OXIMETRY 1: CPT

## 2020-01-01 PROCEDURE — 74011250636 HC RX REV CODE- 250/636: Performed by: EMERGENCY MEDICINE

## 2020-01-01 PROCEDURE — 87075 CULTR BACTERIA EXCEPT BLOOD: CPT

## 2020-01-01 PROCEDURE — 80053 COMPREHEN METABOLIC PANEL: CPT

## 2020-01-01 PROCEDURE — 87186 SC STD MICRODIL/AGAR DIL: CPT

## 2020-01-01 PROCEDURE — 76705 ECHO EXAM OF ABDOMEN: CPT

## 2020-01-01 PROCEDURE — 5A1D70Z PERFORMANCE OF URINARY FILTRATION, INTERMITTENT, LESS THAN 6 HOURS PER DAY: ICD-10-PCS | Performed by: INTERNAL MEDICINE

## 2020-01-01 PROCEDURE — 36600 WITHDRAWAL OF ARTERIAL BLOOD: CPT

## 2020-01-01 PROCEDURE — 36430 TRANSFUSION BLD/BLD COMPNT: CPT

## 2020-01-01 PROCEDURE — 83735 ASSAY OF MAGNESIUM: CPT

## 2020-01-01 PROCEDURE — 77030031139 HC SUT VCRL2 J&J -A: Performed by: SURGERY

## 2020-01-01 PROCEDURE — 94640 AIRWAY INHALATION TREATMENT: CPT

## 2020-01-01 PROCEDURE — 76060000031 HC ANESTHESIA FIRST 0.5 HR

## 2020-01-01 PROCEDURE — 74011636637 HC RX REV CODE- 636/637: Performed by: SURGERY

## 2020-01-01 PROCEDURE — 74011250637 HC RX REV CODE- 250/637: Performed by: INTERNAL MEDICINE

## 2020-01-01 PROCEDURE — 74011636320 HC RX REV CODE- 636/320: Performed by: EMERGENCY MEDICINE

## 2020-01-01 PROCEDURE — 77030018390 HC SPNG HEMSTAT2 J&J -B: Performed by: SURGERY

## 2020-01-01 PROCEDURE — 87340 HEPATITIS B SURFACE AG IA: CPT

## 2020-01-01 PROCEDURE — C1750 CATH, HEMODIALYSIS,LONG-TERM: HCPCS | Performed by: SURGERY

## 2020-01-01 PROCEDURE — 96365 THER/PROPH/DIAG IV INF INIT: CPT

## 2020-01-01 PROCEDURE — 93306 TTE W/DOPPLER COMPLETE: CPT

## 2020-01-01 PROCEDURE — 74011250636 HC RX REV CODE- 250/636: Performed by: ANESTHESIOLOGY

## 2020-01-01 PROCEDURE — 77030011640 HC PAD GRND REM COVD -A: Performed by: SURGERY

## 2020-01-01 PROCEDURE — 82803 BLOOD GASES ANY COMBINATION: CPT

## 2020-01-01 PROCEDURE — 87147 CULTURE TYPE IMMUNOLOGIC: CPT

## 2020-01-01 PROCEDURE — 82977 ASSAY OF GGT: CPT

## 2020-01-01 PROCEDURE — 74011250637 HC RX REV CODE- 250/637: Performed by: GENERAL ACUTE CARE HOSPITAL

## 2020-01-01 PROCEDURE — 74011250636 HC RX REV CODE- 250/636: Performed by: NURSE PRACTITIONER

## 2020-01-01 PROCEDURE — 74011000258 HC RX REV CODE- 258: Performed by: EMERGENCY MEDICINE

## 2020-01-01 PROCEDURE — 65660000000 HC RM CCU STEPDOWN

## 2020-01-01 PROCEDURE — B24BZZ4 ULTRASONOGRAPHY OF HEART WITH AORTA, TRANSESOPHAGEAL: ICD-10-PCS | Performed by: INTERNAL MEDICINE

## 2020-01-01 PROCEDURE — 74011250636 HC RX REV CODE- 250/636: Performed by: GENERAL ACUTE CARE HOSPITAL

## 2020-01-01 PROCEDURE — 80048 BASIC METABOLIC PNL TOTAL CA: CPT

## 2020-01-01 PROCEDURE — 76060000034 HC ANESTHESIA 1.5 TO 2 HR: Performed by: SURGERY

## 2020-01-01 PROCEDURE — 84100 ASSAY OF PHOSPHORUS: CPT

## 2020-01-01 PROCEDURE — 84484 ASSAY OF TROPONIN QUANT: CPT

## 2020-01-01 PROCEDURE — 02HV33Z INSERTION OF INFUSION DEVICE INTO SUPERIOR VENA CAVA, PERCUTANEOUS APPROACH: ICD-10-PCS | Performed by: ANESTHESIOLOGY

## 2020-01-01 PROCEDURE — 70450 CT HEAD/BRAIN W/O DYE: CPT

## 2020-01-01 PROCEDURE — 77030002996 HC SUT SLK J&J -A: Performed by: SURGERY

## 2020-01-01 PROCEDURE — 74011250636 HC RX REV CODE- 250/636: Performed by: NURSE ANESTHETIST, CERTIFIED REGISTERED

## 2020-01-01 PROCEDURE — 81001 URINALYSIS AUTO W/SCOPE: CPT

## 2020-01-01 PROCEDURE — 93971 EXTREMITY STUDY: CPT

## 2020-01-01 PROCEDURE — 77030040922 HC BLNKT HYPOTHRM STRY -A

## 2020-01-01 PROCEDURE — 74011000250 HC RX REV CODE- 250

## 2020-01-01 PROCEDURE — 80202 ASSAY OF VANCOMYCIN: CPT

## 2020-01-01 PROCEDURE — 96375 TX/PRO/DX INJ NEW DRUG ADDON: CPT

## 2020-01-01 PROCEDURE — 77030002933 HC SUT MCRYL J&J -A: Performed by: SURGERY

## 2020-01-01 PROCEDURE — 93312 ECHO TRANSESOPHAGEAL: CPT

## 2020-01-01 PROCEDURE — 74011000250 HC RX REV CODE- 250: Performed by: HOSPITALIST

## 2020-01-01 PROCEDURE — 74011000272 HC RX REV CODE- 272: Performed by: SURGERY

## 2020-01-01 PROCEDURE — 96361 HYDRATE IV INFUSION ADD-ON: CPT

## 2020-01-01 PROCEDURE — 74011000258 HC RX REV CODE- 258: Performed by: NURSE PRACTITIONER

## 2020-01-01 PROCEDURE — 74011000250 HC RX REV CODE- 250: Performed by: GENERAL ACUTE CARE HOSPITAL

## 2020-01-01 PROCEDURE — 96374 THER/PROPH/DIAG INJ IV PUSH: CPT

## 2020-01-01 PROCEDURE — 77030002986 HC SUT PROL J&J -A: Performed by: SURGERY

## 2020-01-01 PROCEDURE — 80061 LIPID PANEL: CPT

## 2020-01-01 PROCEDURE — P9045 ALBUMIN (HUMAN), 5%, 250 ML: HCPCS | Performed by: NURSE ANESTHETIST, CERTIFIED REGISTERED

## 2020-01-01 PROCEDURE — P9016 RBC LEUKOCYTES REDUCED: HCPCS

## 2020-01-01 PROCEDURE — 82533 TOTAL CORTISOL: CPT

## 2020-01-01 PROCEDURE — 74011000258 HC RX REV CODE- 258: Performed by: HOSPITALIST

## 2020-01-01 PROCEDURE — 74011250636 HC RX REV CODE- 250/636: Performed by: HOSPITALIST

## 2020-01-01 PROCEDURE — 82248 BILIRUBIN DIRECT: CPT

## 2020-01-01 PROCEDURE — 74011250636 HC RX REV CODE- 250/636: Performed by: NEUROMUSCULOSKELETAL MEDICINE & OMM

## 2020-01-01 PROCEDURE — 83036 HEMOGLOBIN GLYCOSYLATED A1C: CPT

## 2020-01-01 PROCEDURE — 05HM33Z INSERTION OF INFUSION DEVICE INTO RIGHT INTERNAL JUGULAR VEIN, PERCUTANEOUS APPROACH: ICD-10-PCS | Performed by: SURGERY

## 2020-01-01 PROCEDURE — 76010000153 HC OR TIME 1.5 TO 2 HR: Performed by: SURGERY

## 2020-01-01 PROCEDURE — 87086 URINE CULTURE/COLONY COUNT: CPT

## 2020-01-01 PROCEDURE — 74011000258 HC RX REV CODE- 258

## 2020-01-01 PROCEDURE — 77030029684 HC NEB SM VOL KT MONA -A

## 2020-01-01 PROCEDURE — 74011636637 HC RX REV CODE- 636/637: Performed by: HOSPITALIST

## 2020-01-01 PROCEDURE — 76210000002 HC OR PH I REC 3 TO 3.5 HR: Performed by: SURGERY

## 2020-01-01 PROCEDURE — 77030018836 HC SOL IRR NACL ICUM -A: Performed by: SURGERY

## 2020-01-01 PROCEDURE — 03PY0JZ REMOVAL OF SYNTHETIC SUBSTITUTE FROM UPPER ARTERY, OPEN APPROACH: ICD-10-PCS | Performed by: SURGERY

## 2020-01-01 PROCEDURE — 77030018836 HC SOL IRR NACL ICUM -A

## 2020-01-01 PROCEDURE — 96360 HYDRATION IV INFUSION INIT: CPT

## 2020-01-01 PROCEDURE — 74011000258 HC RX REV CODE- 258: Performed by: SURGERY

## 2020-01-01 PROCEDURE — 71275 CT ANGIOGRAPHY CHEST: CPT

## 2020-01-01 PROCEDURE — 84443 ASSAY THYROID STIM HORMONE: CPT

## 2020-01-01 PROCEDURE — 87205 SMEAR GRAM STAIN: CPT

## 2020-01-01 PROCEDURE — 77030020256 HC SOL INJ NACL 0.9%  500ML: Performed by: SURGERY

## 2020-01-01 PROCEDURE — 86706 HEP B SURFACE ANTIBODY: CPT

## 2020-01-01 PROCEDURE — 77030040392 HC DRSG OPTIFOAM MDII -A

## 2020-01-01 PROCEDURE — 83970 ASSAY OF PARATHORMONE: CPT

## 2020-01-01 RX ORDER — PHENYLEPHRINE HYDROCHLORIDE 10 MG/ML
INJECTION INTRAVENOUS
Status: DISPENSED
Start: 2020-01-01 | End: 2020-01-01

## 2020-01-01 RX ORDER — ONDANSETRON 2 MG/ML
4 INJECTION INTRAMUSCULAR; INTRAVENOUS
Status: DISCONTINUED | OUTPATIENT
Start: 2020-01-01 | End: 2020-01-01 | Stop reason: HOSPADM

## 2020-01-01 RX ORDER — TAMSULOSIN HYDROCHLORIDE 0.4 MG/1
0.4 CAPSULE ORAL DAILY
Status: DISCONTINUED | OUTPATIENT
Start: 2020-01-01 | End: 2020-01-01 | Stop reason: HOSPADM

## 2020-01-01 RX ORDER — LIDOCAINE HYDROCHLORIDE 20 MG/ML
INJECTION, SOLUTION EPIDURAL; INFILTRATION; INTRACAUDAL; PERINEURAL AS NEEDED
Status: DISCONTINUED | OUTPATIENT
Start: 2020-01-01 | End: 2020-01-01 | Stop reason: HOSPADM

## 2020-01-01 RX ORDER — LIDOCAINE HYDROCHLORIDE 10 MG/ML
INJECTION, SOLUTION EPIDURAL; INFILTRATION; INTRACAUDAL; PERINEURAL
Status: DISPENSED
Start: 2020-01-01 | End: 2020-01-01

## 2020-01-01 RX ORDER — INSULIN LISPRO 100 [IU]/ML
10 INJECTION, SOLUTION INTRAVENOUS; SUBCUTANEOUS 2 TIMES DAILY WITH MEALS
Status: DISCONTINUED | OUTPATIENT
Start: 2020-01-01 | End: 2020-01-01

## 2020-01-01 RX ORDER — ACETAMINOPHEN 325 MG/1
650 TABLET ORAL
Status: DISCONTINUED | OUTPATIENT
Start: 2020-01-01 | End: 2020-01-01 | Stop reason: HOSPADM

## 2020-01-01 RX ORDER — CALCITRIOL 0.25 UG/1
0.25 CAPSULE ORAL
Status: DISCONTINUED | OUTPATIENT
Start: 2020-01-01 | End: 2020-01-01 | Stop reason: HOSPADM

## 2020-01-01 RX ORDER — DEXTROSE 50 % IN WATER (D50W) INTRAVENOUS SYRINGE
25 ONCE
Status: COMPLETED | OUTPATIENT
Start: 2020-01-01 | End: 2020-01-01

## 2020-01-01 RX ORDER — LEVOFLOXACIN 5 MG/ML
750 INJECTION, SOLUTION INTRAVENOUS ONCE
Status: COMPLETED | OUTPATIENT
Start: 2020-01-01 | End: 2020-01-01

## 2020-01-01 RX ORDER — AMLODIPINE BESYLATE 5 MG/1
10 TABLET ORAL DAILY
Status: DISCONTINUED | OUTPATIENT
Start: 2020-01-01 | End: 2020-01-01 | Stop reason: HOSPADM

## 2020-01-01 RX ORDER — MAGNESIUM SULFATE 100 %
4 CRYSTALS MISCELLANEOUS AS NEEDED
Status: DISCONTINUED | OUTPATIENT
Start: 2020-01-01 | End: 2020-01-01 | Stop reason: HOSPADM

## 2020-01-01 RX ORDER — LANOLIN ALCOHOL/MO/W.PET/CERES
325 CREAM (GRAM) TOPICAL
Status: DISCONTINUED | OUTPATIENT
Start: 2020-01-01 | End: 2020-01-01 | Stop reason: HOSPADM

## 2020-01-01 RX ORDER — ASPIRIN 81 MG/1
81 TABLET ORAL DAILY
Status: DISCONTINUED | OUTPATIENT
Start: 2020-01-01 | End: 2020-01-01 | Stop reason: HOSPADM

## 2020-01-01 RX ORDER — ALBUTEROL SULFATE 0.83 MG/ML
2.5 SOLUTION RESPIRATORY (INHALATION)
Status: DISCONTINUED | OUTPATIENT
Start: 2020-01-01 | End: 2020-01-01 | Stop reason: HOSPADM

## 2020-01-01 RX ORDER — LEVOFLOXACIN 5 MG/ML
750 INJECTION, SOLUTION INTRAVENOUS EVERY 24 HOURS
Status: DISCONTINUED | OUTPATIENT
Start: 2020-01-01 | End: 2020-01-01

## 2020-01-01 RX ORDER — DEXTROSE MONOHYDRATE 100 MG/ML
0-250 INJECTION, SOLUTION INTRAVENOUS AS NEEDED
Status: DISCONTINUED | OUTPATIENT
Start: 2020-01-01 | End: 2020-01-01 | Stop reason: HOSPADM

## 2020-01-01 RX ORDER — FENTANYL CITRATE 50 UG/ML
25 INJECTION, SOLUTION INTRAMUSCULAR; INTRAVENOUS
Status: DISCONTINUED | OUTPATIENT
Start: 2020-01-01 | End: 2020-01-01 | Stop reason: HOSPADM

## 2020-01-01 RX ORDER — SODIUM CHLORIDE 0.9 % (FLUSH) 0.9 %
5-40 SYRINGE (ML) INJECTION EVERY 8 HOURS
Status: DISCONTINUED | OUTPATIENT
Start: 2020-01-01 | End: 2020-01-01 | Stop reason: HOSPADM

## 2020-01-01 RX ORDER — AMLODIPINE BESYLATE 5 MG/1
10 TABLET ORAL DAILY
Status: DISCONTINUED | OUTPATIENT
Start: 2020-01-01 | End: 2020-01-01

## 2020-01-01 RX ORDER — PANTOPRAZOLE SODIUM 40 MG/1
40 TABLET, DELAYED RELEASE ORAL
Status: DISCONTINUED | OUTPATIENT
Start: 2020-01-01 | End: 2020-01-01 | Stop reason: HOSPADM

## 2020-01-01 RX ORDER — PHENYLEPHRINE HCL IN 0.9% NACL 0.4MG/10ML
SYRINGE (ML) INTRAVENOUS AS NEEDED
Status: DISCONTINUED | OUTPATIENT
Start: 2020-01-01 | End: 2020-01-01 | Stop reason: HOSPADM

## 2020-01-01 RX ORDER — DEXTROSE MONOHYDRATE 50 MG/ML
25 INJECTION, SOLUTION INTRAVENOUS CONTINUOUS
Status: DISCONTINUED | OUTPATIENT
Start: 2020-01-01 | End: 2020-01-01 | Stop reason: SDUPTHER

## 2020-01-01 RX ORDER — DRONABINOL 2.5 MG/1
2.5 CAPSULE ORAL 2 TIMES DAILY
COMMUNITY

## 2020-01-01 RX ORDER — DEXTROSE 50 % IN WATER (D50W) INTRAVENOUS SYRINGE
Status: COMPLETED
Start: 2020-01-01 | End: 2020-01-01

## 2020-01-01 RX ORDER — PROPOFOL 10 MG/ML
INJECTION, EMULSION INTRAVENOUS AS NEEDED
Status: DISCONTINUED | OUTPATIENT
Start: 2020-01-01 | End: 2020-01-01 | Stop reason: HOSPADM

## 2020-01-01 RX ORDER — CARVEDILOL 6.25 MG/1
6.25 TABLET ORAL 2 TIMES DAILY WITH MEALS
Status: DISCONTINUED | OUTPATIENT
Start: 2020-01-01 | End: 2020-01-01 | Stop reason: HOSPADM

## 2020-01-01 RX ORDER — DEXTROSE MONOHYDRATE 100 MG/ML
0-250 INJECTION, SOLUTION INTRAVENOUS AS NEEDED
Status: DISCONTINUED | OUTPATIENT
Start: 2020-01-01 | End: 2020-01-01

## 2020-01-01 RX ORDER — ONDANSETRON 4 MG/1
4 TABLET, ORALLY DISINTEGRATING ORAL
Status: DISCONTINUED | OUTPATIENT
Start: 2020-01-01 | End: 2020-01-01 | Stop reason: SDUPTHER

## 2020-01-01 RX ORDER — LEVOTHYROXINE SODIUM 75 UG/1
75 TABLET ORAL
Status: DISCONTINUED | OUTPATIENT
Start: 2020-01-01 | End: 2020-01-01 | Stop reason: HOSPADM

## 2020-01-01 RX ORDER — SODIUM CHLORIDE 9 MG/ML
INJECTION, SOLUTION INTRAVENOUS
Status: DISCONTINUED | OUTPATIENT
Start: 2020-01-01 | End: 2020-01-01 | Stop reason: HOSPADM

## 2020-01-01 RX ORDER — PREDNISOLONE ACETATE 10 MG/ML
1 SUSPENSION/ DROPS OPHTHALMIC 2 TIMES DAILY
Status: DISCONTINUED | OUTPATIENT
Start: 2020-01-01 | End: 2020-01-01 | Stop reason: HOSPADM

## 2020-01-01 RX ORDER — DEXTROSE 50 % IN WATER (D50W) INTRAVENOUS SYRINGE
25-50 AS NEEDED
Status: DISCONTINUED | OUTPATIENT
Start: 2020-01-01 | End: 2020-01-01 | Stop reason: HOSPADM

## 2020-01-01 RX ORDER — INSULIN ASPART 100 [IU]/ML
20 INJECTION, SOLUTION INTRAVENOUS; SUBCUTANEOUS EVERY EVENING
Status: ON HOLD | COMMUNITY
End: 2020-01-01 | Stop reason: SDUPTHER

## 2020-01-01 RX ORDER — FENTANYL CITRATE 50 UG/ML
25-50 INJECTION, SOLUTION INTRAMUSCULAR; INTRAVENOUS
Status: DISCONTINUED | OUTPATIENT
Start: 2020-01-01 | End: 2020-01-01

## 2020-01-01 RX ORDER — DEXTROSE MONOHYDRATE 50 MG/ML
25 INJECTION, SOLUTION INTRAVENOUS CONTINUOUS
Status: DISCONTINUED | OUTPATIENT
Start: 2020-01-01 | End: 2020-01-01

## 2020-01-01 RX ORDER — HEPARIN SODIUM 5000 [USP'U]/ML
5000 INJECTION, SOLUTION INTRAVENOUS; SUBCUTANEOUS EVERY 12 HOURS
Status: DISCONTINUED | OUTPATIENT
Start: 2020-01-01 | End: 2020-01-01 | Stop reason: HOSPADM

## 2020-01-01 RX ORDER — SODIUM CHLORIDE 0.9 % (FLUSH) 0.9 %
5-40 SYRINGE (ML) INJECTION AS NEEDED
Status: DISCONTINUED | OUTPATIENT
Start: 2020-01-01 | End: 2020-01-01 | Stop reason: HOSPADM

## 2020-01-01 RX ORDER — HYDROMORPHONE HYDROCHLORIDE 1 MG/ML
.2-.5 INJECTION, SOLUTION INTRAMUSCULAR; INTRAVENOUS; SUBCUTANEOUS
Status: DISCONTINUED | OUTPATIENT
Start: 2020-01-01 | End: 2020-01-01 | Stop reason: HOSPADM

## 2020-01-01 RX ORDER — CARVEDILOL 3.12 MG/1
3.12 TABLET ORAL 2 TIMES DAILY WITH MEALS
Status: DISCONTINUED | OUTPATIENT
Start: 2020-01-01 | End: 2020-01-01

## 2020-01-01 RX ORDER — INSULIN LISPRO 100 [IU]/ML
INJECTION, SOLUTION INTRAVENOUS; SUBCUTANEOUS
Status: DISCONTINUED | OUTPATIENT
Start: 2020-01-01 | End: 2020-01-01 | Stop reason: HOSPADM

## 2020-01-01 RX ORDER — LIDOCAINE HYDROCHLORIDE 10 MG/ML
0.1 INJECTION, SOLUTION EPIDURAL; INFILTRATION; INTRACAUDAL; PERINEURAL AS NEEDED
Status: DISCONTINUED | OUTPATIENT
Start: 2020-01-01 | End: 2020-01-01 | Stop reason: HOSPADM

## 2020-01-01 RX ORDER — DEXTROSE MONOHYDRATE 25 G/50ML
INJECTION, SOLUTION INTRAVENOUS
Status: COMPLETED
Start: 2020-01-01 | End: 2020-01-01

## 2020-01-01 RX ORDER — LEVOFLOXACIN 500 MG/1
500 TABLET, FILM COATED ORAL
Qty: 1 TAB | Refills: 0 | Status: SHIPPED
Start: 2020-01-01 | End: 2020-01-01

## 2020-01-01 RX ORDER — LORAZEPAM 2 MG/ML
0.5 INJECTION INTRAMUSCULAR
Status: ACTIVE | OUTPATIENT
Start: 2020-01-01 | End: 2020-01-01

## 2020-01-01 RX ORDER — HEPARIN SODIUM 1000 [USP'U]/ML
1000 INJECTION, SOLUTION INTRAVENOUS; SUBCUTANEOUS
Status: DISCONTINUED | OUTPATIENT
Start: 2020-01-01 | End: 2020-01-01 | Stop reason: HOSPADM

## 2020-01-01 RX ORDER — HEPARIN SODIUM 1000 [USP'U]/ML
2400 INJECTION, SOLUTION INTRAVENOUS; SUBCUTANEOUS
Status: DISCONTINUED | OUTPATIENT
Start: 2020-01-01 | End: 2020-01-01 | Stop reason: HOSPADM

## 2020-01-01 RX ORDER — SODIUM CHLORIDE 0.9 % (FLUSH) 0.9 %
5-10 SYRINGE (ML) INJECTION AS NEEDED
Status: DISCONTINUED | OUTPATIENT
Start: 2020-01-01 | End: 2020-01-01 | Stop reason: HOSPADM

## 2020-01-01 RX ORDER — LIDOCAINE HYDROCHLORIDE 20 MG/ML
15 SOLUTION OROPHARYNGEAL ONCE
Status: COMPLETED | OUTPATIENT
Start: 2020-01-01 | End: 2020-01-01

## 2020-01-01 RX ORDER — ONDANSETRON 4 MG/1
4 TABLET, FILM COATED ORAL
COMMUNITY

## 2020-01-01 RX ORDER — PANTOPRAZOLE SODIUM 40 MG/1
40 TABLET, DELAYED RELEASE ORAL DAILY
Status: DISCONTINUED | OUTPATIENT
Start: 2020-01-01 | End: 2020-01-01 | Stop reason: HOSPADM

## 2020-01-01 RX ORDER — SODIUM CHLORIDE 9 MG/ML
250 INJECTION, SOLUTION INTRAVENOUS AS NEEDED
Status: DISCONTINUED | OUTPATIENT
Start: 2020-01-01 | End: 2020-01-01 | Stop reason: HOSPADM

## 2020-01-01 RX ORDER — ALBUMIN HUMAN 50 G/1000ML
SOLUTION INTRAVENOUS AS NEEDED
Status: DISCONTINUED | OUTPATIENT
Start: 2020-01-01 | End: 2020-01-01 | Stop reason: HOSPADM

## 2020-01-01 RX ORDER — MIDAZOLAM HYDROCHLORIDE 1 MG/ML
0.5 INJECTION, SOLUTION INTRAMUSCULAR; INTRAVENOUS
Status: DISCONTINUED | OUTPATIENT
Start: 2020-01-01 | End: 2020-01-01 | Stop reason: HOSPADM

## 2020-01-01 RX ORDER — SODIUM CHLORIDE 0.9 % (FLUSH) 0.9 %
10 SYRINGE (ML) INJECTION
Status: COMPLETED | OUTPATIENT
Start: 2020-01-01 | End: 2020-01-01

## 2020-01-01 RX ORDER — DEXTROSE 50 % IN WATER (D50W) INTRAVENOUS SYRINGE
12.5-25 AS NEEDED
Status: DISCONTINUED | OUTPATIENT
Start: 2020-01-01 | End: 2020-01-01 | Stop reason: HOSPADM

## 2020-01-01 RX ORDER — HEPARIN SODIUM 5000 [USP'U]/ML
5000 INJECTION, SOLUTION INTRAVENOUS; SUBCUTANEOUS EVERY 8 HOURS
Status: DISCONTINUED | OUTPATIENT
Start: 2020-01-01 | End: 2020-01-01

## 2020-01-01 RX ORDER — EPHEDRINE SULFATE/0.9% NACL/PF 50 MG/5 ML
SYRINGE (ML) INTRAVENOUS AS NEEDED
Status: DISCONTINUED | OUTPATIENT
Start: 2020-01-01 | End: 2020-01-01 | Stop reason: HOSPADM

## 2020-01-01 RX ORDER — LEVOFLOXACIN 5 MG/ML
500 INJECTION, SOLUTION INTRAVENOUS
Status: DISCONTINUED | OUTPATIENT
Start: 2020-01-01 | End: 2020-01-01

## 2020-01-01 RX ORDER — ONDANSETRON 2 MG/ML
INJECTION INTRAMUSCULAR; INTRAVENOUS AS NEEDED
Status: DISCONTINUED | OUTPATIENT
Start: 2020-01-01 | End: 2020-01-01 | Stop reason: HOSPADM

## 2020-01-01 RX ORDER — AMLODIPINE BESYLATE 10 MG/1
10 TABLET ORAL DAILY
COMMUNITY

## 2020-01-01 RX ORDER — SUCCINYLCHOLINE CHLORIDE 20 MG/ML
INJECTION INTRAMUSCULAR; INTRAVENOUS AS NEEDED
Status: DISCONTINUED | OUTPATIENT
Start: 2020-01-01 | End: 2020-01-01 | Stop reason: HOSPADM

## 2020-01-01 RX ORDER — IPRATROPIUM BROMIDE AND ALBUTEROL SULFATE 2.5; .5 MG/3ML; MG/3ML
3 SOLUTION RESPIRATORY (INHALATION)
Status: DISCONTINUED | OUTPATIENT
Start: 2020-01-01 | End: 2020-01-01

## 2020-01-01 RX ORDER — INSULIN LISPRO 100 [IU]/ML
INJECTION, SOLUTION INTRAVENOUS; SUBCUTANEOUS
Status: DISCONTINUED | OUTPATIENT
Start: 2020-01-01 | End: 2020-01-01

## 2020-01-01 RX ORDER — IPRATROPIUM BROMIDE AND ALBUTEROL SULFATE 2.5; .5 MG/3ML; MG/3ML
3 SOLUTION RESPIRATORY (INHALATION)
Status: DISPENSED | OUTPATIENT
Start: 2020-01-01 | End: 2020-01-01

## 2020-01-01 RX ORDER — ALBUMIN HUMAN 250 G/1000ML
12.5 SOLUTION INTRAVENOUS
Status: DISCONTINUED | OUTPATIENT
Start: 2020-01-01 | End: 2020-01-01 | Stop reason: HOSPADM

## 2020-01-01 RX ORDER — LEVOFLOXACIN 5 MG/ML
750 INJECTION, SOLUTION INTRAVENOUS
Status: DISCONTINUED | OUTPATIENT
Start: 2020-01-01 | End: 2020-01-01

## 2020-01-01 RX ORDER — HEPARIN SODIUM 5000 [USP'U]/ML
5000 INJECTION, SOLUTION INTRAVENOUS; SUBCUTANEOUS EVERY 12 HOURS
Status: DISCONTINUED | OUTPATIENT
Start: 2020-01-01 | End: 2020-01-01

## 2020-01-01 RX ORDER — EPINEPHRINE 1 MG/ML
INJECTION, SOLUTION, CONCENTRATE INTRAVENOUS AS NEEDED
Status: DISCONTINUED | OUTPATIENT
Start: 2020-01-01 | End: 2020-01-01 | Stop reason: HOSPADM

## 2020-01-01 RX ORDER — DIPHENHYDRAMINE HYDROCHLORIDE 50 MG/ML
12.5 INJECTION, SOLUTION INTRAMUSCULAR; INTRAVENOUS AS NEEDED
Status: DISCONTINUED | OUTPATIENT
Start: 2020-01-01 | End: 2020-01-01 | Stop reason: HOSPADM

## 2020-01-01 RX ORDER — ALBUMIN HUMAN 50 G/1000ML
SOLUTION INTRAVENOUS
Status: DISPENSED
Start: 2020-01-01 | End: 2020-01-01

## 2020-01-01 RX ORDER — SODIUM CHLORIDE 9 MG/ML
100 INJECTION, SOLUTION INTRAVENOUS CONTINUOUS
Status: DISCONTINUED | OUTPATIENT
Start: 2020-01-01 | End: 2020-01-01

## 2020-01-01 RX ORDER — MAGNESIUM SULFATE 100 %
4 CRYSTALS MISCELLANEOUS AS NEEDED
Status: DISCONTINUED | OUTPATIENT
Start: 2020-01-01 | End: 2020-01-01

## 2020-01-01 RX ORDER — DEXTROSE MONOHYDRATE AND SODIUM CHLORIDE 5; .45 G/100ML; G/100ML
50 INJECTION, SOLUTION INTRAVENOUS CONTINUOUS
Status: DISCONTINUED | OUTPATIENT
Start: 2020-01-01 | End: 2020-01-01

## 2020-01-01 RX ORDER — LEVOFLOXACIN 5 MG/ML
500 INJECTION, SOLUTION INTRAVENOUS
Status: DISCONTINUED | OUTPATIENT
Start: 2020-01-01 | End: 2020-01-01 | Stop reason: HOSPADM

## 2020-01-01 RX ORDER — INSULIN ASPART 100 [IU]/ML
10 INJECTION, SOLUTION INTRAVENOUS; SUBCUTANEOUS EVERY EVENING
Qty: 3 ML | Refills: 0 | Status: SHIPPED
Start: 2020-01-01 | End: 2020-01-01 | Stop reason: SDUPTHER

## 2020-01-01 RX ORDER — LEVOTHYROXINE SODIUM 75 UG/1
75 TABLET ORAL
Qty: 30 TAB | Refills: 0 | Status: SHIPPED | OUTPATIENT
Start: 2020-01-01

## 2020-01-01 RX ORDER — FACIAL-BODY WIPES
10 EACH TOPICAL ONCE
Status: COMPLETED | OUTPATIENT
Start: 2020-01-01 | End: 2020-01-01

## 2020-01-01 RX ORDER — POLYMYXIN B SULFATE AND TRIMETHOPRIM 1; 10000 MG/ML; [USP'U]/ML
1 SOLUTION OPHTHALMIC EVERY 6 HOURS
Status: DISCONTINUED | OUTPATIENT
Start: 2020-01-01 | End: 2020-01-01 | Stop reason: HOSPADM

## 2020-01-01 RX ORDER — DEXTROSE MONOHYDRATE 100 MG/ML
75 INJECTION, SOLUTION INTRAVENOUS CONTINUOUS
Status: DISCONTINUED | OUTPATIENT
Start: 2020-01-01 | End: 2020-01-01

## 2020-01-01 RX ORDER — FENTANYL CITRATE 50 UG/ML
INJECTION, SOLUTION INTRAMUSCULAR; INTRAVENOUS AS NEEDED
Status: DISCONTINUED | OUTPATIENT
Start: 2020-01-01 | End: 2020-01-01 | Stop reason: HOSPADM

## 2020-01-01 RX ORDER — AMLODIPINE BESYLATE 5 MG/1
2.5 TABLET ORAL DAILY
Status: DISCONTINUED | OUTPATIENT
Start: 2020-01-01 | End: 2020-01-01

## 2020-01-01 RX ORDER — POLYMYXIN B SULFATE AND TRIMETHOPRIM 1; 10000 MG/ML; [USP'U]/ML
1 SOLUTION OPHTHALMIC EVERY 6 HOURS
Qty: 1 BOTTLE | Refills: 0 | Status: SHIPPED
Start: 2020-01-01 | End: 2020-01-01

## 2020-01-01 RX ORDER — INSULIN GLARGINE 100 [IU]/ML
5 INJECTION, SOLUTION SUBCUTANEOUS
Status: DISCONTINUED | OUTPATIENT
Start: 2020-01-01 | End: 2020-01-01

## 2020-01-01 RX ORDER — FENTANYL CITRATE 50 UG/ML
50 INJECTION, SOLUTION INTRAMUSCULAR; INTRAVENOUS AS NEEDED
Status: DISCONTINUED | OUTPATIENT
Start: 2020-01-01 | End: 2020-01-01

## 2020-01-01 RX ORDER — PANTOPRAZOLE SODIUM 40 MG/1
40 TABLET, DELAYED RELEASE ORAL DAILY
COMMUNITY

## 2020-01-01 RX ORDER — MIDAZOLAM HYDROCHLORIDE 1 MG/ML
.5-1 INJECTION, SOLUTION INTRAMUSCULAR; INTRAVENOUS
Status: DISCONTINUED | OUTPATIENT
Start: 2020-01-01 | End: 2020-01-01

## 2020-01-01 RX ORDER — INSULIN ASPART 100 [IU]/ML
10 INJECTION, SOLUTION INTRAVENOUS; SUBCUTANEOUS 2 TIMES DAILY
COMMUNITY
End: 2020-01-01

## 2020-01-01 RX ORDER — DEXTROSE MONOHYDRATE AND SODIUM CHLORIDE 5; .45 G/100ML; G/100ML
25 INJECTION, SOLUTION INTRAVENOUS CONTINUOUS
Status: DISCONTINUED | OUTPATIENT
Start: 2020-01-01 | End: 2020-01-01

## 2020-01-01 RX ORDER — CEPHALEXIN 500 MG/1
500 CAPSULE ORAL 4 TIMES DAILY
Qty: 28 CAP | Refills: 0 | Status: SHIPPED | OUTPATIENT
Start: 2020-01-01 | End: 2020-01-01

## 2020-01-01 RX ADMIN — Medication 80 MCG: at 11:12

## 2020-01-01 RX ADMIN — PREDNISOLONE ACETATE 1 DROP: 10 SUSPENSION/ DROPS OPHTHALMIC at 17:15

## 2020-01-01 RX ADMIN — LIDOCAINE HYDROCHLORIDE 60 MG: 20 INJECTION, SOLUTION EPIDURAL; INFILTRATION; INTRACAUDAL; PERINEURAL at 14:39

## 2020-01-01 RX ADMIN — NEPHROCAP 1 CAPSULE: 1 CAP ORAL at 08:56

## 2020-01-01 RX ADMIN — Medication 10 ML: at 13:45

## 2020-01-01 RX ADMIN — INSULIN LISPRO 2 UNITS: 100 INJECTION, SOLUTION INTRAVENOUS; SUBCUTANEOUS at 12:31

## 2020-01-01 RX ADMIN — ASPIRIN 81 MG: 81 TABLET ORAL at 10:56

## 2020-01-01 RX ADMIN — INSULIN LISPRO 2 UNITS: 100 INJECTION, SOLUTION INTRAVENOUS; SUBCUTANEOUS at 11:18

## 2020-01-01 RX ADMIN — ASPIRIN 81 MG: 81 TABLET ORAL at 09:40

## 2020-01-01 RX ADMIN — FERROUS SULFATE TAB 325 MG (65 MG ELEMENTAL FE) 325 MG: 325 (65 FE) TAB at 06:42

## 2020-01-01 RX ADMIN — HEPARIN SODIUM 5000 UNITS: 5000 INJECTION INTRAVENOUS; SUBCUTANEOUS at 21:44

## 2020-01-01 RX ADMIN — PREDNISOLONE ACETATE 1 DROP: 10 SUSPENSION/ DROPS OPHTHALMIC at 08:58

## 2020-01-01 RX ADMIN — FERROUS SULFATE TAB 325 MG (65 MG ELEMENTAL FE) 325 MG: 325 (65 FE) TAB at 09:40

## 2020-01-01 RX ADMIN — INSULIN LISPRO 2 UNITS: 100 INJECTION, SOLUTION INTRAVENOUS; SUBCUTANEOUS at 08:38

## 2020-01-01 RX ADMIN — LIDOCAINE HYDROCHLORIDE 15 ML: 20 SOLUTION ORAL; TOPICAL at 09:30

## 2020-01-01 RX ADMIN — LEVOTHYROXINE SODIUM 75 MCG: 0.15 TABLET ORAL at 07:00

## 2020-01-01 RX ADMIN — CARVEDILOL 6.25 MG: 6.25 TABLET, FILM COATED ORAL at 17:39

## 2020-01-01 RX ADMIN — SODIUM CHLORIDE 250 ML: 900 INJECTION, SOLUTION INTRAVENOUS at 17:31

## 2020-01-01 RX ADMIN — HEPARIN SODIUM 5000 UNITS: 5000 INJECTION INTRAVENOUS; SUBCUTANEOUS at 22:50

## 2020-01-01 RX ADMIN — INSULIN LISPRO 10 UNITS: 100 INJECTION, SOLUTION INTRAVENOUS; SUBCUTANEOUS at 18:19

## 2020-01-01 RX ADMIN — PREDNISOLONE ACETATE 1 DROP: 10 SUSPENSION/ DROPS OPHTHALMIC at 10:59

## 2020-01-01 RX ADMIN — Medication 10 ML: at 05:38

## 2020-01-01 RX ADMIN — PROPOFOL 20 MG: 10 INJECTION, EMULSION INTRAVENOUS at 11:00

## 2020-01-01 RX ADMIN — ASPIRIN 81 MG: 81 TABLET ORAL at 09:08

## 2020-01-01 RX ADMIN — LEVOTHYROXINE SODIUM 75 MCG: 0.15 TABLET ORAL at 08:37

## 2020-01-01 RX ADMIN — HEPARIN SODIUM 2400 UNITS: 1000 INJECTION INTRAVENOUS; SUBCUTANEOUS at 16:35

## 2020-01-01 RX ADMIN — INSULIN LISPRO 3 UNITS: 100 INJECTION, SOLUTION INTRAVENOUS; SUBCUTANEOUS at 12:48

## 2020-01-01 RX ADMIN — CARVEDILOL 6.25 MG: 6.25 TABLET, FILM COATED ORAL at 09:19

## 2020-01-01 RX ADMIN — EPOETIN ALFA-EPBX 10000 UNITS: 10000 INJECTION, SOLUTION INTRAVENOUS; SUBCUTANEOUS at 21:22

## 2020-01-01 RX ADMIN — AMLODIPINE BESYLATE 10 MG: 5 TABLET ORAL at 08:48

## 2020-01-01 RX ADMIN — EPOETIN ALFA-EPBX 10000 UNITS: 10000 INJECTION, SOLUTION INTRAVENOUS; SUBCUTANEOUS at 01:34

## 2020-01-01 RX ADMIN — Medication 200 MCG: at 14:43

## 2020-01-01 RX ADMIN — PANTOPRAZOLE SODIUM 40 MG: 40 TABLET, DELAYED RELEASE ORAL at 09:46

## 2020-01-01 RX ADMIN — INSULIN LISPRO 2 UNITS: 100 INJECTION, SOLUTION INTRAVENOUS; SUBCUTANEOUS at 11:53

## 2020-01-01 RX ADMIN — SUCCINYLCHOLINE CHLORIDE 20 MG: 20 INJECTION, SOLUTION INTRAMUSCULAR; INTRAVENOUS at 14:40

## 2020-01-01 RX ADMIN — MIDAZOLAM 1 MG: 1 INJECTION INTRAMUSCULAR; INTRAVENOUS at 09:36

## 2020-01-01 RX ADMIN — PANTOPRAZOLE SODIUM 40 MG: 40 TABLET, DELAYED RELEASE ORAL at 08:48

## 2020-01-01 RX ADMIN — Medication 10 ML: at 22:51

## 2020-01-01 RX ADMIN — HEPARIN SODIUM 2400 UNITS: 1000 INJECTION INTRAVENOUS; SUBCUTANEOUS at 00:05

## 2020-01-01 RX ADMIN — Medication 10 MG: at 11:12

## 2020-01-01 RX ADMIN — BENZOCAINE, BUTAMBEN, AND TETRACAINE HYDROCHLORIDE 1 SPRAY: .028; .004; .004 AEROSOL, SPRAY TOPICAL at 09:32

## 2020-01-01 RX ADMIN — SODIUM CHLORIDE: 900 INJECTION, SOLUTION INTRAVENOUS at 14:31

## 2020-01-01 RX ADMIN — ASPIRIN 81 MG: 81 TABLET ORAL at 09:00

## 2020-01-01 RX ADMIN — INSULIN LISPRO 2 UNITS: 100 INJECTION, SOLUTION INTRAVENOUS; SUBCUTANEOUS at 17:11

## 2020-01-01 RX ADMIN — VANCOMYCIN HYDROCHLORIDE 750 MG: 750 INJECTION, POWDER, LYOPHILIZED, FOR SOLUTION INTRAVENOUS at 21:27

## 2020-01-01 RX ADMIN — TAMSULOSIN HYDROCHLORIDE 0.4 MG: 0.4 CAPSULE ORAL at 09:58

## 2020-01-01 RX ADMIN — DEXTROSE MONOHYDRATE 25 ML/HR: 5 INJECTION, SOLUTION INTRAVENOUS at 22:31

## 2020-01-01 RX ADMIN — HEPARIN SODIUM 5000 UNITS: 5000 INJECTION INTRAVENOUS; SUBCUTANEOUS at 11:00

## 2020-01-01 RX ADMIN — INSULIN GLARGINE 5 UNITS: 100 INJECTION, SOLUTION SUBCUTANEOUS at 22:49

## 2020-01-01 RX ADMIN — AMLODIPINE BESYLATE 10 MG: 5 TABLET ORAL at 09:08

## 2020-01-01 RX ADMIN — HEPARIN SODIUM 5000 UNITS: 5000 INJECTION INTRAVENOUS; SUBCUTANEOUS at 22:45

## 2020-01-01 RX ADMIN — CALCITRIOL CAPSULES 0.25 MCG 0.25 MCG: 0.25 CAPSULE ORAL at 00:30

## 2020-01-01 RX ADMIN — PROPOFOL 30 MG: 10 INJECTION, EMULSION INTRAVENOUS at 10:56

## 2020-01-01 RX ADMIN — PREDNISOLONE ACETATE 1 DROP: 10 SUSPENSION/ DROPS OPHTHALMIC at 18:32

## 2020-01-01 RX ADMIN — CEFEPIME HYDROCHLORIDE 1 G: 1 INJECTION, POWDER, FOR SOLUTION INTRAMUSCULAR; INTRAVENOUS at 13:07

## 2020-01-01 RX ADMIN — TAMSULOSIN HYDROCHLORIDE 0.4 MG: 0.4 CAPSULE ORAL at 08:37

## 2020-01-01 RX ADMIN — Medication 1 AMPULE: at 09:14

## 2020-01-01 RX ADMIN — SODIUM CHLORIDE 500 ML: 900 INJECTION, SOLUTION INTRAVENOUS at 19:16

## 2020-01-01 RX ADMIN — INSULIN LISPRO 2 UNITS: 100 INJECTION, SOLUTION INTRAVENOUS; SUBCUTANEOUS at 18:13

## 2020-01-01 RX ADMIN — AMLODIPINE BESYLATE 10 MG: 5 TABLET ORAL at 08:17

## 2020-01-01 RX ADMIN — HEPARIN SODIUM 2400 UNITS: 1000 INJECTION INTRAVENOUS; SUBCUTANEOUS at 17:45

## 2020-01-01 RX ADMIN — ASPIRIN 81 MG: 81 TABLET ORAL at 09:15

## 2020-01-01 RX ADMIN — PREDNISOLONE ACETATE 1 DROP: 10 SUSPENSION/ DROPS OPHTHALMIC at 08:18

## 2020-01-01 RX ADMIN — CARVEDILOL 6.25 MG: 6.25 TABLET, FILM COATED ORAL at 17:05

## 2020-01-01 RX ADMIN — Medication 5 MG: at 11:07

## 2020-01-01 RX ADMIN — DEXTROSE MONOHYDRATE 25 G: 25 INJECTION, SOLUTION INTRAVENOUS at 06:42

## 2020-01-01 RX ADMIN — FERROUS SULFATE TAB 325 MG (65 MG ELEMENTAL FE) 325 MG: 325 (65 FE) TAB at 09:46

## 2020-01-01 RX ADMIN — SODIUM CHLORIDE 500 ML: 900 INJECTION, SOLUTION INTRAVENOUS at 19:15

## 2020-01-01 RX ADMIN — TAMSULOSIN HYDROCHLORIDE 0.4 MG: 0.4 CAPSULE ORAL at 10:56

## 2020-01-01 RX ADMIN — PREDNISOLONE ACETATE 1 DROP: 10 SUSPENSION/ DROPS OPHTHALMIC at 17:40

## 2020-01-01 RX ADMIN — PREDNISOLONE ACETATE 1 DROP: 10 SUSPENSION/ DROPS OPHTHALMIC at 18:11

## 2020-01-01 RX ADMIN — INSULIN LISPRO 3 UNITS: 100 INJECTION, SOLUTION INTRAVENOUS; SUBCUTANEOUS at 12:09

## 2020-01-01 RX ADMIN — FERROUS SULFATE TAB 325 MG (65 MG ELEMENTAL FE) 325 MG: 325 (65 FE) TAB at 10:58

## 2020-01-01 RX ADMIN — FERROUS SULFATE TAB 325 MG (65 MG ELEMENTAL FE) 325 MG: 325 (65 FE) TAB at 09:08

## 2020-01-01 RX ADMIN — Medication 10 ML: at 06:07

## 2020-01-01 RX ADMIN — INSULIN LISPRO 2 UNITS: 100 INJECTION, SOLUTION INTRAVENOUS; SUBCUTANEOUS at 11:32

## 2020-01-01 RX ADMIN — PREDNISOLONE ACETATE 1 DROP: 10 SUSPENSION/ DROPS OPHTHALMIC at 19:07

## 2020-01-01 RX ADMIN — TAMSULOSIN HYDROCHLORIDE 0.4 MG: 0.4 CAPSULE ORAL at 09:40

## 2020-01-01 RX ADMIN — PANTOPRAZOLE SODIUM 40 MG: 40 TABLET, DELAYED RELEASE ORAL at 09:15

## 2020-01-01 RX ADMIN — FERROUS SULFATE TAB 325 MG (65 MG ELEMENTAL FE) 325 MG: 325 (65 FE) TAB at 08:17

## 2020-01-01 RX ADMIN — LEVOTHYROXINE SODIUM 75 MCG: 0.15 TABLET ORAL at 06:53

## 2020-01-01 RX ADMIN — VANCOMYCIN HYDROCHLORIDE 750 MG: 750 INJECTION, POWDER, LYOPHILIZED, FOR SOLUTION INTRAVENOUS at 01:23

## 2020-01-01 RX ADMIN — POLYMYXIN B SULFATE, TRIMETHOPRIM SULFATE 1 DROP: 10000; 1 SOLUTION/ DROPS OPHTHALMIC at 19:07

## 2020-01-01 RX ADMIN — FENTANYL CITRATE 50 MCG: 50 INJECTION, SOLUTION INTRAMUSCULAR; INTRAVENOUS at 15:26

## 2020-01-01 RX ADMIN — POLYMYXIN B SULFATE, TRIMETHOPRIM SULFATE 1 DROP: 10000; 1 SOLUTION/ DROPS OPHTHALMIC at 13:00

## 2020-01-01 RX ADMIN — PREDNISOLONE ACETATE 1 DROP: 10 SUSPENSION/ DROPS OPHTHALMIC at 09:41

## 2020-01-01 RX ADMIN — HEPARIN SODIUM 5000 UNITS: 5000 INJECTION INTRAVENOUS; SUBCUTANEOUS at 22:32

## 2020-01-01 RX ADMIN — PANTOPRAZOLE SODIUM 40 MG: 40 TABLET, DELAYED RELEASE ORAL at 14:05

## 2020-01-01 RX ADMIN — SODIUM CHLORIDE: 900 INJECTION, SOLUTION INTRAVENOUS at 10:46

## 2020-01-01 RX ADMIN — Medication 10 ML: at 05:30

## 2020-01-01 RX ADMIN — CALCITRIOL 0.25 MCG: 0.25 CAPSULE ORAL at 21:32

## 2020-01-01 RX ADMIN — FENTANYL CITRATE 25 MCG: 50 INJECTION INTRAMUSCULAR; INTRAVENOUS at 09:36

## 2020-01-01 RX ADMIN — PANTOPRAZOLE SODIUM 40 MG: 40 TABLET, DELAYED RELEASE ORAL at 09:00

## 2020-01-01 RX ADMIN — INSULIN LISPRO 2 UNITS: 100 INJECTION, SOLUTION INTRAVENOUS; SUBCUTANEOUS at 13:30

## 2020-01-01 RX ADMIN — ONDANSETRON HYDROCHLORIDE 4 MG: 2 INJECTION, SOLUTION INTRAMUSCULAR; INTRAVENOUS at 16:14

## 2020-01-01 RX ADMIN — ACETAMINOPHEN 650 MG: 325 TABLET ORAL at 21:00

## 2020-01-01 RX ADMIN — PANTOPRAZOLE SODIUM 40 MG: 40 TABLET, DELAYED RELEASE ORAL at 09:08

## 2020-01-01 RX ADMIN — HEPARIN SODIUM 5000 UNITS: 5000 INJECTION INTRAVENOUS; SUBCUTANEOUS at 09:08

## 2020-01-01 RX ADMIN — DEXTROSE MONOHYDRATE 12.5 G: 25 INJECTION, SOLUTION INTRAVENOUS at 11:56

## 2020-01-01 RX ADMIN — TAMSULOSIN HYDROCHLORIDE 0.4 MG: 0.4 CAPSULE ORAL at 09:46

## 2020-01-01 RX ADMIN — PREDNISOLONE ACETATE 1 DROP: 10 SUSPENSION/ DROPS OPHTHALMIC at 08:47

## 2020-01-01 RX ADMIN — LEVOFLOXACIN 750 MG: 5 INJECTION, SOLUTION INTRAVENOUS at 05:00

## 2020-01-01 RX ADMIN — DEXTROSE MONOHYDRATE 25 G: 25 INJECTION, SOLUTION INTRAVENOUS at 08:54

## 2020-01-01 RX ADMIN — Medication 10 ML: at 21:33

## 2020-01-01 RX ADMIN — Medication 40 MCG: at 11:03

## 2020-01-01 RX ADMIN — POLYMYXIN B SULFATE, TRIMETHOPRIM SULFATE 1 DROP: 10000; 1 SOLUTION/ DROPS OPHTHALMIC at 01:21

## 2020-01-01 RX ADMIN — HEPARIN SODIUM 5000 UNITS: 5000 INJECTION INTRAVENOUS; SUBCUTANEOUS at 22:06

## 2020-01-01 RX ADMIN — Medication 16 G: at 18:45

## 2020-01-01 RX ADMIN — TAMSULOSIN HYDROCHLORIDE 0.4 MG: 0.4 CAPSULE ORAL at 08:56

## 2020-01-01 RX ADMIN — Medication 40 MCG: at 10:57

## 2020-01-01 RX ADMIN — PROPOFOL 80 MG: 10 INJECTION, EMULSION INTRAVENOUS at 14:40

## 2020-01-01 RX ADMIN — ACETAMINOPHEN 650 MG: 325 TABLET ORAL at 00:27

## 2020-01-01 RX ADMIN — PREDNISOLONE ACETATE 1 DROP: 10 SUSPENSION/ DROPS OPHTHALMIC at 17:57

## 2020-01-01 RX ADMIN — Medication 10 ML: at 07:03

## 2020-01-01 RX ADMIN — TAMSULOSIN HYDROCHLORIDE 0.4 MG: 0.4 CAPSULE ORAL at 09:08

## 2020-01-01 RX ADMIN — PREDNISOLONE ACETATE 1 DROP: 10 SUSPENSION/ DROPS OPHTHALMIC at 17:38

## 2020-01-01 RX ADMIN — HEPARIN SODIUM 5000 UNITS: 5000 INJECTION INTRAVENOUS; SUBCUTANEOUS at 09:46

## 2020-01-01 RX ADMIN — VANCOMYCIN HYDROCHLORIDE 750 MG: 750 INJECTION, POWDER, LYOPHILIZED, FOR SOLUTION INTRAVENOUS at 14:08

## 2020-01-01 RX ADMIN — LEVOFLOXACIN 750 MG: 5 INJECTION, SOLUTION INTRAVENOUS at 14:42

## 2020-01-01 RX ADMIN — HEPARIN SODIUM 5000 UNITS: 5000 INJECTION INTRAVENOUS; SUBCUTANEOUS at 08:18

## 2020-01-01 RX ADMIN — IOPAMIDOL 80 ML: 755 INJECTION, SOLUTION INTRAVENOUS at 15:00

## 2020-01-01 RX ADMIN — INSULIN LISPRO 3 UNITS: 100 INJECTION, SOLUTION INTRAVENOUS; SUBCUTANEOUS at 08:53

## 2020-01-01 RX ADMIN — BENZOCAINE, BUTAMBEN, AND TETRACAINE HYDROCHLORIDE 1 SPRAY: .028; .004; .004 AEROSOL, SPRAY TOPICAL at 10:48

## 2020-01-01 RX ADMIN — CALCITRIOL CAPSULES 0.25 MCG 0.25 MCG: 0.25 CAPSULE ORAL at 21:35

## 2020-01-01 RX ADMIN — ASPIRIN 81 MG: 81 TABLET ORAL at 14:04

## 2020-01-01 RX ADMIN — Medication 10 ML: at 20:20

## 2020-01-01 RX ADMIN — LEVOTHYROXINE SODIUM 75 MCG: 0.15 TABLET ORAL at 09:40

## 2020-01-01 RX ADMIN — ASPIRIN 81 MG: 81 TABLET ORAL at 08:37

## 2020-01-01 RX ADMIN — PANTOPRAZOLE SODIUM 40 MG: 40 TABLET, DELAYED RELEASE ORAL at 08:47

## 2020-01-01 RX ADMIN — NEPHROCAP 1 CAPSULE: 1 CAP ORAL at 09:00

## 2020-01-01 RX ADMIN — ALBUMIN (HUMAN) 250 ML: 2.5 SOLUTION INTRAVENOUS at 15:20

## 2020-01-01 RX ADMIN — NEPHROCAP 1 CAPSULE: 1 CAP ORAL at 09:15

## 2020-01-01 RX ADMIN — HEPARIN SODIUM 5000 UNITS: 5000 INJECTION INTRAVENOUS; SUBCUTANEOUS at 22:24

## 2020-01-01 RX ADMIN — INSULIN LISPRO 2 UNITS: 100 INJECTION, SOLUTION INTRAVENOUS; SUBCUTANEOUS at 22:00

## 2020-01-01 RX ADMIN — FERROUS SULFATE TAB 325 MG (65 MG ELEMENTAL FE) 325 MG: 325 (65 FE) TAB at 08:48

## 2020-01-01 RX ADMIN — ONDANSETRON 4 MG: 2 INJECTION INTRAMUSCULAR; INTRAVENOUS at 15:21

## 2020-01-01 RX ADMIN — PANTOPRAZOLE SODIUM 40 MG: 40 TABLET, DELAYED RELEASE ORAL at 09:58

## 2020-01-01 RX ADMIN — INSULIN LISPRO 2 UNITS: 100 INJECTION, SOLUTION INTRAVENOUS; SUBCUTANEOUS at 21:27

## 2020-01-01 RX ADMIN — DEXTROSE MONOHYDRATE 75 ML/HR: 10 INJECTION, SOLUTION INTRAVENOUS at 02:54

## 2020-01-01 RX ADMIN — Medication 0.01 MG: at 15:22

## 2020-01-01 RX ADMIN — DEXTROSE MONOHYDRATE 25 G: 25 INJECTION, SOLUTION INTRAVENOUS at 04:24

## 2020-01-01 RX ADMIN — PANTOPRAZOLE SODIUM 40 MG: 40 TABLET, DELAYED RELEASE ORAL at 08:37

## 2020-01-01 RX ADMIN — PANTOPRAZOLE SODIUM 40 MG: 40 TABLET, DELAYED RELEASE ORAL at 09:40

## 2020-01-01 RX ADMIN — PREDNISOLONE ACETATE 1 DROP: 10 SUSPENSION/ DROPS OPHTHALMIC at 20:18

## 2020-01-01 RX ADMIN — DEXTROSE MONOHYDRATE 75 ML/HR: 10 INJECTION, SOLUTION INTRAVENOUS at 09:19

## 2020-01-01 RX ADMIN — ACETAMINOPHEN 650 MG: 325 TABLET ORAL at 00:26

## 2020-01-01 RX ADMIN — SODIUM CHLORIDE 60 MCG/MIN: 900 INJECTION, SOLUTION INTRAVENOUS at 17:16

## 2020-01-01 RX ADMIN — HEPARIN SODIUM 5000 UNITS: 5000 INJECTION INTRAVENOUS; SUBCUTANEOUS at 21:32

## 2020-01-01 RX ADMIN — ASPIRIN 81 MG: 81 TABLET ORAL at 09:58

## 2020-01-01 RX ADMIN — PREDNISOLONE ACETATE 1 DROP: 10 SUSPENSION/ DROPS OPHTHALMIC at 08:53

## 2020-01-01 RX ADMIN — NEPHROCAP 1 CAPSULE: 1 CAP ORAL at 10:58

## 2020-01-01 RX ADMIN — PANTOPRAZOLE SODIUM 40 MG: 40 TABLET, DELAYED RELEASE ORAL at 08:56

## 2020-01-01 RX ADMIN — TAMSULOSIN HYDROCHLORIDE 0.4 MG: 0.4 CAPSULE ORAL at 09:15

## 2020-01-01 RX ADMIN — NEPHROCAP 1 CAPSULE: 1 CAP ORAL at 09:58

## 2020-01-01 RX ADMIN — Medication 0.01 MG: at 14:51

## 2020-01-01 RX ADMIN — DEXTROSE MONOHYDRATE 25 G: 25 INJECTION, SOLUTION INTRAVENOUS at 07:50

## 2020-01-01 RX ADMIN — LIDOCAINE HYDROCHLORIDE 40 MG: 20 INJECTION, SOLUTION INTRAVENOUS at 10:55

## 2020-01-01 RX ADMIN — DEXTROSE MONOHYDRATE AND SODIUM CHLORIDE 50 ML/HR: 5; .45 INJECTION, SOLUTION INTRAVENOUS at 09:01

## 2020-01-01 RX ADMIN — ASPIRIN 81 MG: 81 TABLET ORAL at 09:46

## 2020-01-01 RX ADMIN — TAMSULOSIN HYDROCHLORIDE 0.4 MG: 0.4 CAPSULE ORAL at 09:00

## 2020-01-01 RX ADMIN — FERROUS SULFATE TAB 325 MG (65 MG ELEMENTAL FE) 325 MG: 325 (65 FE) TAB at 07:00

## 2020-01-01 RX ADMIN — DEXTROSE MONOHYDRATE 25 G: 25 INJECTION, SOLUTION INTRAVENOUS at 05:00

## 2020-01-01 RX ADMIN — PREDNISOLONE ACETATE 1 DROP: 10 SUSPENSION/ DROPS OPHTHALMIC at 18:44

## 2020-01-01 RX ADMIN — LEVOTHYROXINE SODIUM 75 MCG: 0.15 TABLET ORAL at 06:42

## 2020-01-01 RX ADMIN — ASPIRIN 81 MG: 81 TABLET ORAL at 08:17

## 2020-01-01 RX ADMIN — INSULIN LISPRO 4 UNITS: 100 INJECTION, SOLUTION INTRAVENOUS; SUBCUTANEOUS at 13:07

## 2020-01-01 RX ADMIN — PROPOFOL 20 MG: 10 INJECTION, EMULSION INTRAVENOUS at 10:58

## 2020-01-01 RX ADMIN — HEPARIN SODIUM 5000 UNITS: 5000 INJECTION INTRAVENOUS; SUBCUTANEOUS at 08:49

## 2020-01-01 RX ADMIN — Medication 10 ML: at 22:45

## 2020-01-01 RX ADMIN — LEVOTHYROXINE SODIUM 75 MCG: 0.15 TABLET ORAL at 08:29

## 2020-01-01 RX ADMIN — PREDNISOLONE ACETATE 1 DROP: 10 SUSPENSION/ DROPS OPHTHALMIC at 17:49

## 2020-01-01 RX ADMIN — FERROUS SULFATE TAB 325 MG (65 MG ELEMENTAL FE) 325 MG: 325 (65 FE) TAB at 08:37

## 2020-01-01 RX ADMIN — INSULIN LISPRO 5 UNITS: 100 INJECTION, SOLUTION INTRAVENOUS; SUBCUTANEOUS at 18:11

## 2020-01-01 RX ADMIN — ASPIRIN 81 MG: 81 TABLET ORAL at 10:06

## 2020-01-01 RX ADMIN — Medication 40 MCG: at 11:01

## 2020-01-01 RX ADMIN — INSULIN LISPRO 3 UNITS: 100 INJECTION, SOLUTION INTRAVENOUS; SUBCUTANEOUS at 18:18

## 2020-01-01 RX ADMIN — CARVEDILOL 3.12 MG: 3.12 TABLET, FILM COATED ORAL at 18:31

## 2020-01-01 RX ADMIN — PANTOPRAZOLE SODIUM 40 MG: 40 TABLET, DELAYED RELEASE ORAL at 10:05

## 2020-01-01 RX ADMIN — CALCITRIOL CAPSULES 0.25 MCG 0.25 MCG: 0.25 CAPSULE ORAL at 20:18

## 2020-01-01 RX ADMIN — SODIUM CHLORIDE 100 ML/HR: 900 INJECTION, SOLUTION INTRAVENOUS at 16:00

## 2020-01-01 RX ADMIN — TAMSULOSIN HYDROCHLORIDE 0.4 MG: 0.4 CAPSULE ORAL at 08:17

## 2020-01-01 RX ADMIN — CARVEDILOL 3.12 MG: 3.12 TABLET, FILM COATED ORAL at 10:57

## 2020-01-01 RX ADMIN — PREDNISOLONE ACETATE 1 DROP: 10 SUSPENSION/ DROPS OPHTHALMIC at 14:06

## 2020-01-01 RX ADMIN — DEXTROSE MONOHYDRATE 25 ML/HR: 5 INJECTION, SOLUTION INTRAVENOUS at 12:08

## 2020-01-01 RX ADMIN — TAMSULOSIN HYDROCHLORIDE 0.4 MG: 0.4 CAPSULE ORAL at 14:05

## 2020-01-01 RX ADMIN — Medication 10 ML: at 13:08

## 2020-01-01 RX ADMIN — NEPHROCAP 1 CAPSULE: 1 CAP ORAL at 10:04

## 2020-01-01 RX ADMIN — FERROUS SULFATE TAB 325 MG (65 MG ELEMENTAL FE) 325 MG: 325 (65 FE) TAB at 08:47

## 2020-01-01 RX ADMIN — Medication 10 ML: at 06:29

## 2020-01-01 RX ADMIN — Medication 10 ML: at 07:02

## 2020-01-01 RX ADMIN — ASPIRIN 81 MG: 81 TABLET ORAL at 08:49

## 2020-01-01 RX ADMIN — Medication 16 G: at 11:45

## 2020-01-01 RX ADMIN — AMLODIPINE BESYLATE 10 MG: 5 TABLET ORAL at 08:47

## 2020-01-01 RX ADMIN — FERROUS SULFATE TAB 325 MG (65 MG ELEMENTAL FE) 325 MG: 325 (65 FE) TAB at 06:53

## 2020-01-01 RX ADMIN — Medication 1 CAPSULE: at 09:08

## 2020-01-01 RX ADMIN — PREDNISOLONE ACETATE 1 DROP: 10 SUSPENSION/ DROPS OPHTHALMIC at 08:39

## 2020-01-01 RX ADMIN — INSULIN LISPRO 2 UNITS: 100 INJECTION, SOLUTION INTRAVENOUS; SUBCUTANEOUS at 09:07

## 2020-01-01 RX ADMIN — INSULIN LISPRO 2 UNITS: 100 INJECTION, SOLUTION INTRAVENOUS; SUBCUTANEOUS at 21:11

## 2020-01-01 RX ADMIN — Medication 10 ML: at 22:25

## 2020-01-01 RX ADMIN — AMLODIPINE BESYLATE 10 MG: 5 TABLET ORAL at 09:46

## 2020-01-01 RX ADMIN — PREDNISOLONE ACETATE 1 DROP: 10 SUSPENSION/ DROPS OPHTHALMIC at 11:54

## 2020-01-01 RX ADMIN — DEXTROSE MONOHYDRATE AND SODIUM CHLORIDE 50 ML/HR: 5; .45 INJECTION, SOLUTION INTRAVENOUS at 21:27

## 2020-01-01 RX ADMIN — ACETAMINOPHEN 650 MG: 325 TABLET ORAL at 15:21

## 2020-01-01 RX ADMIN — FERROUS SULFATE TAB 325 MG (65 MG ELEMENTAL FE) 325 MG: 325 (65 FE) TAB at 08:33

## 2020-01-01 RX ADMIN — CALCITRIOL 0.25 MCG: 0.25 CAPSULE ORAL at 22:24

## 2020-01-01 RX ADMIN — FENTANYL CITRATE 50 MCG: 50 INJECTION, SOLUTION INTRAMUSCULAR; INTRAVENOUS at 14:39

## 2020-01-01 RX ADMIN — Medication 10 MG: at 11:10

## 2020-01-01 RX ADMIN — ASPIRIN 81 MG: 81 TABLET ORAL at 08:56

## 2020-01-01 RX ADMIN — VANCOMYCIN HYDROCHLORIDE 750 MG: 750 INJECTION, POWDER, LYOPHILIZED, FOR SOLUTION INTRAVENOUS at 20:19

## 2020-01-01 RX ADMIN — IPRATROPIUM BROMIDE AND ALBUTEROL SULFATE 3 ML: .5; 3 SOLUTION RESPIRATORY (INHALATION) at 20:11

## 2020-01-01 RX ADMIN — PREDNISOLONE ACETATE 1 DROP: 10 SUSPENSION/ DROPS OPHTHALMIC at 09:30

## 2020-01-01 RX ADMIN — Medication 10 ML: at 01:05

## 2020-01-01 RX ADMIN — PHENYLEPHRINE HYDROCHLORIDE 40 MCG/MIN: 10 INJECTION INTRAVENOUS at 14:45

## 2020-01-01 RX ADMIN — BISACODYL 10 MG: 10 SUPPOSITORY RECTAL at 09:46

## 2020-01-01 RX ADMIN — ASPIRIN 81 MG: 81 TABLET ORAL at 08:47

## 2020-01-01 RX ADMIN — NEPHROCAP 1 CAPSULE: 1 CAP ORAL at 14:05

## 2020-01-01 RX ADMIN — IPRATROPIUM BROMIDE AND ALBUTEROL SULFATE 3 ML: .5; 3 SOLUTION RESPIRATORY (INHALATION) at 15:54

## 2020-01-01 RX ADMIN — TAMSULOSIN HYDROCHLORIDE 0.4 MG: 0.4 CAPSULE ORAL at 10:04

## 2020-01-01 RX ADMIN — CALCITRIOL CAPSULES 0.25 MCG 0.25 MCG: 0.25 CAPSULE ORAL at 21:22

## 2020-01-01 RX ADMIN — TAMSULOSIN HYDROCHLORIDE 0.4 MG: 0.4 CAPSULE ORAL at 08:49

## 2020-01-01 RX ADMIN — TAMSULOSIN HYDROCHLORIDE 0.4 MG: 0.4 CAPSULE ORAL at 08:47

## 2020-01-01 RX ADMIN — ONDANSETRON 4 MG: 2 INJECTION INTRAMUSCULAR; INTRAVENOUS at 17:29

## 2020-01-01 RX ADMIN — INSULIN LISPRO 2 UNITS: 100 INJECTION, SOLUTION INTRAVENOUS; SUBCUTANEOUS at 17:38

## 2020-01-01 RX ADMIN — PREDNISOLONE ACETATE 1 DROP: 10 SUSPENSION/ DROPS OPHTHALMIC at 09:06

## 2020-01-01 RX ADMIN — NEPHROCAP 1 CAPSULE: 1 CAP ORAL at 08:18

## 2020-01-01 RX ADMIN — VANCOMYCIN HYDROCHLORIDE 1000 MG: 1 INJECTION, POWDER, LYOPHILIZED, FOR SOLUTION INTRAVENOUS at 13:48

## 2020-01-01 RX ADMIN — NEPHROCAP 1 CAPSULE: 1 CAP ORAL at 09:40

## 2020-01-01 RX ADMIN — LEVOTHYROXINE SODIUM 75 MCG: 0.15 TABLET ORAL at 10:57

## 2020-01-01 RX ADMIN — PREDNISOLONE ACETATE 1 DROP: 10 SUSPENSION/ DROPS OPHTHALMIC at 08:36

## 2020-01-01 RX ADMIN — POLYMYXIN B SULFATE, TRIMETHOPRIM SULFATE 1 DROP: 10000; 1 SOLUTION/ DROPS OPHTHALMIC at 07:02

## 2020-01-01 RX ADMIN — Medication 80 MCG: at 11:07

## 2020-01-01 RX ADMIN — AMLODIPINE BESYLATE 10 MG: 5 TABLET ORAL at 08:56

## 2020-01-01 RX ADMIN — PANTOPRAZOLE SODIUM 40 MG: 40 TABLET, DELAYED RELEASE ORAL at 10:58

## 2020-01-01 RX ADMIN — INSULIN LISPRO 2 UNITS: 100 INJECTION, SOLUTION INTRAVENOUS; SUBCUTANEOUS at 19:02

## 2020-01-01 RX ADMIN — Medication 10 ML: at 15:00

## 2020-01-01 RX ADMIN — PREDNISOLONE ACETATE 1 DROP: 10 SUSPENSION/ DROPS OPHTHALMIC at 09:47

## 2020-01-01 RX ADMIN — HEPARIN SODIUM 5000 UNITS: 5000 INJECTION INTRAVENOUS; SUBCUTANEOUS at 08:38

## 2020-01-01 RX ADMIN — HEPARIN SODIUM 2400 UNITS: 1000 INJECTION INTRAVENOUS; SUBCUTANEOUS at 09:18

## 2020-01-01 RX ADMIN — Medication 40 MCG: at 10:58

## 2020-01-01 RX ADMIN — Medication 80 MCG: at 11:09

## 2020-01-01 RX ADMIN — NEPHROCAP 1 CAPSULE: 1 CAP ORAL at 08:48

## 2020-01-01 RX ADMIN — INSULIN LISPRO 5 UNITS: 100 INJECTION, SOLUTION INTRAVENOUS; SUBCUTANEOUS at 13:59

## 2020-01-01 RX ADMIN — PREDNISOLONE ACETATE 1 DROP: 10 SUSPENSION/ DROPS OPHTHALMIC at 22:51

## 2020-01-01 RX ADMIN — DEXTROSE MONOHYDRATE: 25 INJECTION, SOLUTION INTRAVENOUS at 02:35

## 2020-01-01 RX ADMIN — ACETAMINOPHEN 650 MG: 325 TABLET ORAL at 22:06

## 2020-01-01 RX ADMIN — PANTOPRAZOLE SODIUM 40 MG: 40 TABLET, DELAYED RELEASE ORAL at 08:17

## 2020-01-01 RX ADMIN — Medication 1 AMPULE: at 22:43

## 2020-01-01 RX ADMIN — PIPERACILLIN AND TAZOBACTAM 3.38 G: 3; .375 INJECTION, POWDER, LYOPHILIZED, FOR SOLUTION INTRAVENOUS at 14:09

## 2020-01-01 RX ADMIN — Medication 80 MCG: at 11:06

## 2020-01-01 RX ADMIN — INSULIN LISPRO 2 UNITS: 100 INJECTION, SOLUTION INTRAVENOUS; SUBCUTANEOUS at 22:24

## 2020-01-01 RX ADMIN — Medication 10 ML: at 22:33

## 2020-01-01 RX ADMIN — LIDOCAINE HYDROCHLORIDE 15 ML: 20 SOLUTION ORAL; TOPICAL at 10:48

## 2020-01-01 RX ADMIN — INSULIN LISPRO 2 UNITS: 100 INJECTION, SOLUTION INTRAVENOUS; SUBCUTANEOUS at 16:21

## 2020-01-25 PROBLEM — J81.1 PULMONARY EDEMA: Status: ACTIVE | Noted: 2020-01-01

## 2020-01-25 NOTE — H&P
Hospitalist Admission Note NAME: Mohan Vieira. :  1950 MRN:  [id number] Date/Time:  2020 2:32 PM 
 
Patient PCP: Gabby Coy MD 
______________________________________________________________________ Given the patient's current clinical presentation, I have a high level of concern for decompensation if discharged from the emergency department. Complex decision making was performed, which includes reviewing the patient's available past medical records, laboratory results, and x-ray films. My assessment of this patient's clinical condition and my plan of care is as follows. Assessment / Plan: 
Acute hypoxic respiratory failure, secondary to pulmonary edema in the setting of ESRD on HD TTS 
? PNA 
-Admit Telemetry 
-Continue O2 supplementation - currently on 4L NC - wean as tolerated -Nephrology Consult - Dr. Robbins Lunch aware - pt for HD today 
-CXR: IMPRESSION: Interval increase in left pleural effusion with relatively stable 
right pleural effusion. Moderate pulmonary edema not significantly changed with 
bibasilar airspace opacities 
-Pt given IV abx by the ER due to possible pneumonia. Will hold off on reordering as this may just be pulmonary edema. Therefore repeat CXR after HD, whether tmr AM or shortly after dialysis. DMII 
-FS monitoring, SS 
-Lantus 5 units - increase as pt's PO intake improves Alzheimer's Dementia Legally blind HTN 
HLD 
-Resume home meds Code Status: DDNR on file Surrogate Decision Maker: Sister DVT Prophylaxis: Heparin GI Prophylaxis: not indicated Baseline: Dependent IADLs Subjective: CHIEF COMPLAINT: SOB HISTORY OF PRESENT ILLNESS:    
Isis Quiroz is a 81 y.o.  Amrican male who presents with CC listed above. Pt with history of dementia, and therefore history is difficult to obtain.  Pt does not know what his dialysis schedule is, who takes him to the sessions, where he lives, and where he currently is. We were asked to admit for work up and evaluation of the above problems. Past Medical History:  
Diagnosis Date  Alzheimer disease ([facility]  CAD (coronary artery disease)  Cancer [facility])   
 prostate  Chronic kidney disease  CKD (chronic kidney disease) stage 4, GFR 15-29 ml/min ([facility])  DM (diabetes mellitus), type 2, uncontrolled ([facility]  Hemodialysis patient ([facility]  Hyperlipidemia  Hypertension  Other ill-defined conditions(799.89)   
 blind R eye-retina detachment  Other ill-defined conditions(799.89) right cerebellopontine angle lipoma. Past Surgical History:  
Procedure Laterality Date  COLONOSCOPY,DIAGNOSTIC  11/12/2014  HX HEENT    
 retinal detachment  HX SHOULDER ARTHROSCOPY    
 right  HX UROLOGICAL    
 prostate bx  UPPER GI ENDOSCOPY,BIOPSY  11/12/2014 Social History Tobacco Use  Smoking status: Never Smoker  Smokeless tobacco: Never Used Substance Use Topics  Alcohol use: No  
  
 
Family History Problem Relation Age of Onset  Heart Disease Mother Pacemaker  Cancer Father Allergies Allergen Reactions  Ace Inhibitors Unknown (comments)  Arb-Angiotensin Receptor Antagonist Unknown (comments) Prior to Admission medications Medication Sig Start Date End Date Taking? Authorizing Provider  
insulin aspart U-100 (NOVOLOG FLEXPEN U-100 INSULIN) 100 unit/mL (3 mL) inpn 10 Units by SubCUTAneous route daily. Indications: every morning   Yes Provider, Historical  
insulin aspart U-100 (NOVOLOG FLEXPEN U-100 INSULIN) 100 unit/mL (3 mL) inpn 20 Units by SubCUTAneous route every evening. Yes Provider, Historical  
pantoprazole (PROTONIX) 40 mg tablet Take 40 mg by mouth daily. Yes Provider, Historical  
amLODIPine (NORVASC) 10 mg tablet Take 1 Tab by mouth daily for 60 days. 12/4/19 2/2/20 Yes Jorge Garcia MD  
calcitRIOL (ROCALTROL) 0.25 mcg capsule Take 1 Cap by mouth every Monday, Wednesday, Friday. 12/4/19  Yes Jorge Garcia MD  
ferrous sulfate 325 mg (65 mg iron) tablet Take 1 Tab by mouth Daily (before breakfast). 12/4/19  Yes Jorge Garcia MD  
aspirin delayed-release 81 mg tablet Take 81 mg by mouth daily. Yes Provider, Historical  
insulin aspart U-100 (NOVOLOG U-100 INSULIN ASPART) 100 unit/mL injection by SubCUTAneous route two (2) times a day. Blood Glucose (mg/dL)       Insulin Dose (units) 151-220                               2  
            221-260                               4  
            261-300                               5  
            301-350                               8  
            351-450                               10  
            450+                                   Call MD   Yes Provider, Historical  
prednisoLONE acetate (PRED FORTE) 1 % ophthalmic suspension Administer 1 Drop to right eye two (2) times a day. Yes Provider, Historical  
vit Bcomp,C/folic acid/zinc (RENAL MULTIVITAMIN/ZINC PO) Take 1 Tab by mouth daily. Yes Provider, Historical  
acetaminophen (TYLENOL) 325 mg tablet Take 2 Tabs by mouth every four (4) hours as needed. For pain. 4/8/17  Yes Ann Marie Arango MD  
albuterol (PROVENTIL VENTOLIN) 2.5 mg /3 mL (0.083 %) nebulizer solution 2.5 mg by Nebulization route every four (4) hours as needed for Shortness of Breath. Yes Provider, Historical  
tamsulosin (FLOMAX) 0.4 mg capsule Take 0.4 mg by mouth daily. Yes Jaylyn, MD Yumi  
glucagon (GLUCAGEN) 1 mg injection 1 mL by IntraMUSCular route as needed for Hypoglycemia. 6/25/12  Yes Halle Fuentes MD  
 
 
REVIEW OF SYSTEMS:    
I am not able to complete the review of systems because: The patient is intubated and sedated The patient has altered mental status due to his acute medical problems The patient has baseline aphasia from prior stroke(s)  
x The patient has baseline dementia and is not reliable historian The patient is in acute medical distress and unable to provide information Total of 12 systems reviewed as follows:   
   POSITIVE= underlined text  Negative = text not underlined General:  fever, chills, sweats, generalized weakness, weight loss/gain,  
   loss of appetite Eyes:    blurred vision, eye pain, loss of vision, double vision ENT:    rhinorrhea, pharyngitis Respiratory:   cough, sputum production, SOB, LAUREANO, wheezing, pleuritic pain  
Cardiology:   chest pain, palpitations, orthopnea, PND, edema, syncope Gastrointestinal:  abdominal pain , N/V, diarrhea, dysphagia, constipation, bleeding Genitourinary:  frequency, urgency, dysuria, hematuria, incontinence Muskuloskeletal :  arthralgia, myalgia, back pain Hematology:  easy bruising, nose or gum bleeding, lymphadenopathy Dermatological: rash, ulceration, pruritis, color change / jaundice Endocrine:   hot flashes or polydipsia Neurological:  headache, dizziness, confusion, focal weakness, paresthesia, Speech difficulties, memory loss, gait difficulty Psychological: Feelings of anxiety, depression, agitation Objective: VITALS:   
Visit Vitals /61 Pulse 94 Temp 97.8 °F (36.6 °C) Resp 23 Ht 5' 5\" (1.651 m) Wt 55.8 kg (123 lb) SpO2 99% BMI 20.47 kg/m² PHYSICAL EXAM: 
 
General:    Alert, uncooperative, elderly, frail HEENT: Atraumatic, injected conjunctivae No oral ulcers, mucosa moist, throat clear, dentition fair Neck:  Supple, symmetr Lungs:   Clear to auscultation bilaterally. No Wheezing or Rhonchi. No rales. Chest wall:  No tenderness  No Accessory muscle use. Heart:   Regular  rhythm,  No  murmur   No edema Abdomen:   Soft, non-tender. Not distended. Bowel sounds normal 
Extremities: No cyanosis.   No clubbing,   
 Skin turgor normal, Capillary refill normal, Radial dial pulse 2+ Skin:     Not pale. Not Jaundiced  No rashes Psych:  Poor insight Neurologic: Legally blind, alert, oriented x0 
 
_______________________________________________________________________ Care Plan discussed with: 
  Comments Patient x Family RN x Care Manager Consultant:     
_______________________________________________________________________ Expected  Disposition:  
Home with Family HH/PT/OT/RN x  
SNF/LTC   
JESUS   
________________________________________________________________________ TOTAL TIME:  55 Minutes Critical Care Provided     Minutes non procedure based Comments  
 x Reviewed previous records  
>50% of visit spent in counseling and coordination of care  Discussion with patient and/or family and questions answered 
  
 
________________________________________________________________________ Signed: Rosa Maria Matos MD 
 
Procedures: see electronic medical records for all procedures/Xrays and details which were not copied into this note but were reviewed prior to creation of Plan. LAB DATA REVIEWED:   
Recent Results (from the past 24 hour(s)) EKG, 12 LEAD, INITIAL Collection Time: 01/25/20 11:39 AM  
Result Value Ref Range Ventricular Rate 96 BPM  
 Atrial Rate 96 BPM  
 P-R Interval 108 ms QRS Duration 74 ms Q-T Interval 382 ms QTC Calculation (Bezet) 482 ms Calculated P Axis 33 degrees Calculated R Axis 0 degrees Calculated T Axis -59 degrees Diagnosis Sinus rhythm with short OH Nonspecific T wave abnormality When compared with ECG of 01-DEC-2019 13:26, No significant change was found CBC WITH AUTOMATED DIFF Collection Time: 01/25/20 12:03 PM  
Result Value Ref Range WBC 5.7 4.1 - 11.1 K/uL  
 RBC 3.94 (L) 4.10 - 5.70 M/uL  
 HGB 11.3 (L) 12.1 - 17.0 g/dL HCT 34.9 (L) 36.6 - 50.3 %  MCV 88.6 80.0 - 99.0 FL  
 MCH 28.7 26.0 - 34.0 PG  
 MCHC 32.4 30.0 - 36.5 g/dL  
 RDW 16.6 (H) 11.5 - 14.5 % PLATELET 376 434 - 655 K/uL MPV 9.1 8.9 - 12.9 FL  
 NRBC 0.0 0  WBC ABSOLUTE NRBC 0.00 0.00 - 0.01 K/uL NEUTROPHILS 67 32 - 75 % LYMPHOCYTES 16 12 - 49 % MONOCYTES 13 5 - 13 % EOSINOPHILS 3 0 - 7 % BASOPHILS 1 0 - 1 % IMMATURE GRANULOCYTES 0 0.0 - 0.5 % ABS. NEUTROPHILS 3.8 1.8 - 8.0 K/UL  
 ABS. LYMPHOCYTES 0.9 0.8 - 3.5 K/UL  
 ABS. MONOCYTES 0.7 0.0 - 1.0 K/UL  
 ABS. EOSINOPHILS 0.2 0.0 - 0.4 K/UL  
 ABS. BASOPHILS 0.1 0.0 - 0.1 K/UL  
 ABS. IMM. GRANS. 0.0 0.00 - 0.04 K/UL  
 DF AUTOMATED METABOLIC PANEL, COMPREHENSIVE Collection Time: 01/25/20 12:03 PM  
Result Value Ref Range Sodium 136 136 - 145 mmol/L Potassium 4.7 3.5 - 5.1 mmol/L Chloride 100 97 - 108 mmol/L  
 CO2 29 21 - 32 mmol/L Anion gap 7 5 - 15 mmol/L Glucose 125 (H) 65 - 100 mg/dL BUN 20 6 - 20 MG/DL Creatinine 3.00 (H) 0.70 - 1.30 MG/DL  
 BUN/Creatinine ratio 7 (L) 12 - 20 GFR est AA 25 (L) >60 ml/min/1.73m2 GFR est non-AA 21 (L) >60 ml/min/1.73m2 Calcium 9.1 8.5 - 10.1 MG/DL Bilirubin, total 2.1 (H) 0.2 - 1.0 MG/DL  
 ALT (SGPT) 36 12 - 78 U/L  
 AST (SGOT) 29 15 - 37 U/L Alk. phosphatase 304 (H) 45 - 117 U/L Protein, total 6.9 6.4 - 8.2 g/dL Albumin 3.4 (L) 3.5 - 5.0 g/dL Globulin 3.5 2.0 - 4.0 g/dL A-G Ratio 1.0 (L) 1.1 - 2.2 CK W/ REFLX CKMB Collection Time: 01/25/20 12:03 PM  
Result Value Ref Range  39 - 308 U/L  
TROPONIN I Collection Time: 01/25/20 12:03 PM  
Result Value Ref Range Troponin-I, Qt. <0.05 <0.05 ng/mL LACTIC ACID Collection Time: 01/25/20  1:40 PM  
Result Value Ref Range Lactic acid 0.8 0.4 - 2.0 MMOL/L  
POC EG7 Collection Time: 01/25/20  1:42 PM  
Result Value Ref Range Calcium, ionized (POC) 1.20 1. 12 - 1.32 mmol/L  
 pH (POC) 7.440 7.35 - 7.45    
 pCO2 (POC) 39.9 35.0 - 45.0 MMHG pO2 (POC) 55 (L) 80 - 100 MMHG  
 HCO3 (POC) 27.1 (H) 22 - 26 MMOL/L Base excess (POC) 3 mmol/L  
 sO2 (POC) 90 (L) 92 - 97 % Site LEFT RADIAL Device: NASAL CANNULA Flow rate (POC) 6 L/M Allens test (POC) YES Specimen type (POC) ARTERIAL Total resp.  rate 21

## 2020-01-25 NOTE — ED NOTES
resp here to assist with placing patient on bipap, patient is refusing to keep mask on, abg's obtained, patient place on nc at 6 l and tolerated well. md made aware of patient status

## 2020-01-25 NOTE — ED NOTES
Patient is restless, crying out that he can't breath, patient requesting nrb to be removed. Mask removed nc placed on patient, sats cont. To drop.

## 2020-01-25 NOTE — PROCEDURES
[address] Dialysis Team [facility]  [phone] Vitals   Pre   Post   Assessment   Pre   Post    
Temp  Temp: 97.8 °F (36.6 °C) (01/25/20 1518)  98.0 oral LOC  A&OX3 A&OX3 HR   Pulse (Heart Rate): 93 (01/25/20 1518) 94 Lungs   Patient is on O2 at 6L NC. Lungs diminished with crackles in bases Patient with O2 at 6L. Lungs B/P   BP: 136/62 (01/25/20 1518) 139/74 Cardiac   Bedside telemetry, NSR per primary RN, HRR S1 S2 present NSR, HRR, S1 S2 present Resp   Resp Rate: 21 (01/25/20 1518) 16 Skin   Warm, dry, and intact  Warm, dry, and intact Pain level  0 0 Edema  Trace, BLE and LUE Trace LUE Orders: Duration:   Start:    15:18 End:    18:19 Total:   3 hrs Dialyzer:   Dialyzer/Set Up Inspection: Hilaria Cea (01/25/20 1518) K Bath:   Dialysate K (mEq/L): 2 (01/25/20 1518) Ca Bath:   Dialysate CA (mEq/L): 2.5 (01/25/20 1518) Na/Bicarb:   Dialysate NA (mEq/L): 140 (01/25/20 1518) Target Fluid Removal:   Goal/Amount of Fluid to Remove (mL): 2500 mL (01/25/20 1518) Access Type & Location:   MICHELL AVG: skin CDI. +B/T. No s/s of infection. No issues with cannulation or hemostasis. Running well at . Labs Obtained/Reviewed Critical Results Called   Date when labs were drawn- 
Hgb-   
HGB Date Value Ref Range Status 01/25/2020 11.3 (L) 12.1 - 17.0 g/dL Final  
 
K-   
Potassium Date Value Ref Range Status 01/25/2020 4.7 3.5 - 5.1 mmol/L Final  
 
Ca-  
Calcium Date Value Ref Range Status 01/25/2020 9.1 8.5 - 10.1 MG/DL Final  
 
Bun-  
BUN Date Value Ref Range Status 01/25/2020 20 6 - 20 MG/DL Final  
 
Creat-  
Creatinine Date Value Ref Range Status 01/25/2020 3.00 (H) 0.70 - 1.30 MG/DL Final  
 
  
Medications/ Blood Products Given Name   Dose   Route and Time    
none Blood Volume Processed (BVP):   75.4 L Net Fluid Removed:  3500 ml Comments Time Out Done: 15:10 Primary Nurse Rpt Pre: Hermelindo Berkowitz RN 
 Primary Nurse Rpt Post: Shira Mendez RN Pt Education: procedural 
Care Plan: ongoing Tx Summary:  
Arrived to patient room set up and tested machine and water per policy. Patient is  A&Ox3, he is legally blind so verbal consent obtained with X2 RNs to verify. Consent signed & on file. SBAR received from [address] [place] [address]. 15:18- Pt cannulated with 66Y needles per policy & without issue. Labs drawn per request/ order. VSS. Dialysis Tx initiated. Dialysis access visualized and lines intact. 15:30- Pt resting quietly. VSS, denies pain or SOB. Dialysis access visualized and lines intact. 16:00- Pt resting quietly. VSS, denies pain or SOB. Dialysis access visualized and lines intact. 16:30- Pt resting quietly. VSS, denies pain or SOB. Dialysis access visualized and lines intact. 17:00- Pt resting quietly. VSS, denies pain or SOB. Dialysis access visualized and lines intact. 17:30- Pt resting quietly. VSS, denies pain or SOB. Dialysis access visualized and lines intact. 18:00- Pt resting quietly. VSS, denies pain or SOB. Dialysis access visualized and lines intact. 18:19- Tx ended. VSS. All possible blood returned to patient. Hemostasis achieved without issue. Bed locked and in the lowest position, call bell and belongings in reach. SBAR given to Primary, MICHAEL Mendez. Patient is stable at time of my departure. All Dialysis related medications have been reviewed. Admiting Diagnosis: Bilateral pleural effusions Pt's previous clinic- [facility] Consent signed - Informed Consent Verified: Yes (01/25/20 1518) Fiorella Consent - on file Hepatitis Status- HbAg unknown, drawn 01/25/20 Machine #- Machine Number: [id number] (01/25/20 1518) Telemetry status- bedside, NSR per primary RN

## 2020-01-25 NOTE — ED NOTES
Patient continues to be restless. nrb mask replaced md made aware of patient status change and new orders.

## 2020-01-25 NOTE — CONSULTS
Seen and examined Thanks for the consult A/P: 
ESRD MWF / missed HD yesterday AVF Volume overload Blindness HD today;Brigette aware Lung imaging to see if clears up after dialysis

## 2020-01-25 NOTE — ED TRIAGE NOTES
Received patient from ems, patient is unable to answer questions. Patient missed dialysis due to being sob.

## 2020-01-25 NOTE — PROGRESS NOTES
Pharmacy Clarification of Prior to Admission Medication Regimen The patient was not interviewed regarding clarification of the prior to admission medication regimen. Patient was transferred from [facility], to [facility], with a current med list.   
 
Information Obtained From: Transfer Papers Pertinent Pharmacy Findings: 
Updated patients preferred outpatient pharmacy to: [facility] did not update the outpatient pharmacy due to the patient living at a SNF Per transfer papers, patient receives dialysis every Monday, Wednesday, and Friday at [facility]. Patient was last dialyzed 20. PTA medication list was corrected to the following:  
 
Prior to Admission Medications Prescriptions Last Dose Informant Taking?  
acetaminophen (TYLENOL) 325 mg tablet 2020 at Unknown time Transfer Papers Yes Sig: Take 2 Tabs by mouth every four (4) hours as needed. For pain. albuterol (PROVENTIL VENTOLIN) 2.5 mg /3 mL (0.083 %) nebulizer solution 2020 at Unknown time Transfer Papers Yes Si.5 mg by Nebulization route every four (4) hours as needed for Shortness of Breath. amLODIPine (NORVASC) 10 mg tablet 2020 at Unknown time Transfer Papers Yes Sig: Take 1 Tab by mouth daily for 60 days. aspirin delayed-release 81 mg tablet 2020 at Unknown time Transfer Papers Yes Sig: Take 81 mg by mouth daily. calcitRIOL (ROCALTROL) 0.25 mcg capsule 2020 at Unknown time Transfer Papers Yes Sig: Take 1 Cap by mouth every Monday, Wednesday, Friday. ferrous sulfate 325 mg (65 mg iron) tablet 2020 at Unknown time Transfer Papers Yes Sig: Take 1 Tab by mouth Daily (before breakfast). glucagon (GLUCAGEN) 1 mg injection 2019 at Unknown time Transfer Papers Yes Si mL by IntraMUSCular route as needed for Hypoglycemia. insulin aspart U-100 (NOVOLOG FLEXPEN U-100 INSULIN) 100 unit/mL (3 mL) inpn 2020 at Unknown time Transfer Papers Yes Sig: 10 Units by SubCUTAneous route daily. Indications: every morning  
insulin aspart U-100 (NOVOLOG FLEXPEN U-100 INSULIN) 100 unit/mL (3 mL) inpn 2020 at Unknown time Transfer Papers Yes Si Units by SubCUTAneous route every evening. insulin aspart U-100 (NOVOLOG U-100 INSULIN ASPART) 100 unit/mL injection 2020 at Unknown time Transfer Papers Yes Sig: by SubCUTAneous route two (2) times a day. Blood Glucose (mg/dL)       Insulin Dose (units) 151-220                               2  
            221-260                               4  
            261-300                               5  
            301-350                               8  
            351-450                               10  
            450+                                   Call MD  
pantoprazole (PROTONIX) 40 mg tablet 2020 at Unknown time Transfer Papers Yes Sig: Take 40 mg by mouth daily. prednisoLONE acetate (PRED FORTE) 1 % ophthalmic suspension 2020 at Unknown time Transfer Papers Yes Sig: Administer 1 Drop to right eye two (2) times a day. tamsulosin (FLOMAX) 0.4 mg capsule 2020 at Unknown time Transfer Papers Yes Sig: Take 0.4 mg by mouth daily. vit Bcomp,C/folic acid/zinc (RENAL MULTIVITAMIN/ZINC PO) 2020 at Unknown time Transfer Papers Yes Sig: Take 1 Tab by mouth daily. Facility-Administered Medications: None Thank you, 
Heydi Morris Medication History Pharmacy Technician

## 2020-01-25 NOTE — ED PROVIDER NOTES
EMERGENCY DEPARTMENT HISTORY AND PHYSICAL EXAM 
 
 
Date: 1/25/2020 Patient Name: Nadiya Boyd. Patient Age and Sex: 81 y.o. male History of Presenting Illness Chief Complaint Patient presents with  Shortness of Breath The patient presents to the ED from [facility] with shortness of breath, EMS states that patient did not go to dialysis today. Patient states that he doesnt know why he didnt know, no one told him. History Provided By: Patient HPI: Nadiya Boyd. 61-year-old male with past medical history of end-stage renal disease on dialysis presenting today from [address] [place] [address] for shortness of breath. The patient is a poor historian. He has a history of Alzheimer's disease, CAD, diabetes. He reports that he may have been short of breath for the past several days and maybe has a cough. He is unsure of whether or not he has had fever. He does not have chest pain right now. He typically is able to use 2 L of oxygen as needed, and was brought in by EMS on 6 L nasal cannula with a O2 sat of 85%. He is unsure whether or not he has been compliant with his dialysis. There are no other complaints, changes, or physical findings at this time. PCP: Ed Albarran MD 
 
No current facility-administered medications on file prior to encounter. Current Outpatient Medications on File Prior to Encounter Medication Sig Dispense Refill  insulin aspart U-100 (NOVOLOG FLEXPEN U-100 INSULIN) 100 unit/mL (3 mL) inpn 10 Units by SubCUTAneous route daily. Indications: every morning  insulin aspart U-100 (NOVOLOG FLEXPEN U-100 INSULIN) 100 unit/mL (3 mL) inpn 20 Units by SubCUTAneous route every evening.  pantoprazole (PROTONIX) 40 mg tablet Take 40 mg by mouth daily.  amLODIPine (NORVASC) 10 mg tablet Take 1 Tab by mouth daily for 60 days. 30 Tab 1  calcitRIOL (ROCALTROL) 0.25 mcg capsule Take 1 Cap by mouth every Monday, Wednesday, Friday.  ferrous sulfate 325 mg (65 mg iron) tablet Take 1 Tab by mouth Daily (before breakfast).  aspirin delayed-release 81 mg tablet Take 81 mg by mouth daily.  insulin aspart U-100 (NOVOLOG U-100 INSULIN ASPART) 100 unit/mL injection by SubCUTAneous route two (2) times a day. Blood Glucose (mg/dL)       Insulin Dose (units) 151-220                               2  
            221-260                               4  
            261-300                               5  
            301-350                               8  
            351-450                               10  
            450+                                   Call MD    
 prednisoLONE acetate (PRED FORTE) 1 % ophthalmic suspension Administer 1 Drop to right eye two (2) times a day.  vit Bcomp,C/folic acid/zinc (RENAL MULTIVITAMIN/ZINC PO) Take 1 Tab by mouth daily.  acetaminophen (TYLENOL) 325 mg tablet Take 2 Tabs by mouth every four (4) hours as needed. For pain. 100 Tab 0  
 albuterol (PROVENTIL VENTOLIN) 2.5 mg /3 mL (0.083 %) nebulizer solution 2.5 mg by Nebulization route every four (4) hours as needed for Shortness of Breath.  tamsulosin (FLOMAX) 0.4 mg capsule Take 0.4 mg by mouth daily.  glucagon (GLUCAGEN) 1 mg injection 1 mL by IntraMUSCular route as needed for Hypoglycemia. 1 Vial 0 Past History Past Medical History: 
Past Medical History:  
Diagnosis Date  Alzheimer disease ([facility]  CAD (coronary artery disease)  Cancer [facility])   
 prostate  Chronic kidney disease  CKD (chronic kidney disease) stage 4, GFR 15-29 ml/min ([facility])  DM (diabetes mellitus), type 2, uncontrolled ([facility]  Hemodialysis patient ([facility]  Hyperlipidemia  Hypertension  Other ill-defined conditions(799.89)   
 blind R eye-retina detachment  Other ill-defined conditions(799.89) right cerebellopontine angle lipoma. Past Surgical History: 
Past Surgical History: Procedure Laterality Date  COLONOSCOPY,DIAGNOSTIC  11/12/2014  HX HEENT    
 retinal detachment  HX SHOULDER ARTHROSCOPY    
 right  HX UROLOGICAL    
 prostate bx  UPPER GI ENDOSCOPY,BIOPSY  11/12/2014 Family History: 
Family History Problem Relation Age of Onset  Heart Disease Mother Pacemaker  Cancer Father Social History: 
Social History Tobacco Use  Smoking status: Never Smoker  Smokeless tobacco: Never Used Substance Use Topics  Alcohol use: No  
 Drug use: No  
 
 
Allergies: Allergies Allergen Reactions  Ace Inhibitors Unknown (comments)  Arb-Angiotensin Receptor Antagonist Unknown (comments) Review of Systems Constitutional: No  fever,  No  headache Skin: No  rash, No  jaundice HEENT: No  nasal congestion, No  eye drainage. Resp: No cough,  No  Wheezing, + dyspnea CV: No chest pain, No  palpitations GI: No vomiting,  No  diarrhea.,  No  constipation : No dysuria,  No  hematuria MSK: No joint pain,  No  trauma Neuro: No numbness, No  tingling Psych: No suicidal, No  paranoid Physical Exam  
 
Patient Vitals for the past 12 hrs: 
 Temp Pulse Resp BP SpO2  
01/25/20 1518 97.8 °F (36.6 °C) 93 21 136/62 94 % 01/25/20 1500  94 22 136/62 94 % 01/25/20 1400 97.8 °F (36.6 °C) 94 23 135/61 99 % 01/25/20 1330  97 23 127/60 (!) 88 % 01/25/20 1314     (!) 88 % 01/25/20 1300  98 28 127/77 (!) 89 % 01/25/20 1138   22  95 % 01/25/20 1134 97.7 °F (36.5 °C) 97 28 133/79 (!) 88 % General: alert, moderate distress Eyes: EOMI, normal conjunctiva ENT: moist mucous membranes. Neck: Active, full ROM of neck. Skin: No rashes. no jaundice Lungs: Equal chest expansion. moderate respiratory distress. decreased lung sounds in the bases using supraclavicular accessory muscles and using intercostal accessory muscles Heart: regular rate     no peripheral edema   2+ radial pulses and DPs bilaterally Abd:  non distended soft, nontender. No rebound tenderness. No guarding Back: Full ROM 
MSK: Full, active ROM in all 4 extremities. Neuro: Person; normal speech;  
Psych: Cooperative with exam; Appropriate mood and affect Diagnostic Study Results Labs - Recent Results (from the past 12 hour(s)) EKG, 12 LEAD, INITIAL Collection Time: 01/25/20 11:39 AM  
Result Value Ref Range Ventricular Rate 96 BPM  
 Atrial Rate 96 BPM  
 P-R Interval 108 ms QRS Duration 74 ms Q-T Interval 382 ms QTC Calculation (Bezet) 482 ms Calculated P Axis 33 degrees Calculated R Axis 0 degrees Calculated T Axis -59 degrees Diagnosis Sinus rhythm with short UT Nonspecific T wave abnormality When compared with ECG of 01-DEC-2019 13:26, No significant change was found CBC WITH AUTOMATED DIFF Collection Time: 01/25/20 12:03 PM  
Result Value Ref Range WBC 5.7 4.1 - 11.1 K/uL  
 RBC 3.94 (L) 4.10 - 5.70 M/uL  
 HGB 11.3 (L) 12.1 - 17.0 g/dL HCT 34.9 (L) 36.6 - 50.3 % MCV 88.6 80.0 - 99.0 FL  
 MCH 28.7 26.0 - 34.0 PG  
 MCHC 32.4 30.0 - 36.5 g/dL  
 RDW 16.6 (H) 11.5 - 14.5 % PLATELET 962 256 - 157 K/uL MPV 9.1 8.9 - 12.9 FL  
 NRBC 0.0 0  WBC ABSOLUTE NRBC 0.00 0.00 - 0.01 K/uL NEUTROPHILS 67 32 - 75 % LYMPHOCYTES 16 12 - 49 % MONOCYTES 13 5 - 13 % EOSINOPHILS 3 0 - 7 % BASOPHILS 1 0 - 1 % IMMATURE GRANULOCYTES 0 0.0 - 0.5 % ABS. NEUTROPHILS 3.8 1.8 - 8.0 K/UL  
 ABS. LYMPHOCYTES 0.9 0.8 - 3.5 K/UL  
 ABS. MONOCYTES 0.7 0.0 - 1.0 K/UL  
 ABS. EOSINOPHILS 0.2 0.0 - 0.4 K/UL  
 ABS. BASOPHILS 0.1 0.0 - 0.1 K/UL  
 ABS. IMM. GRANS. 0.0 0.00 - 0.04 K/UL  
 DF AUTOMATED METABOLIC PANEL, COMPREHENSIVE Collection Time: 01/25/20 12:03 PM  
Result Value Ref Range Sodium 136 136 - 145 mmol/L Potassium 4.7 3.5 - 5.1 mmol/L Chloride 100 97 - 108 mmol/L  
 CO2 29 21 - 32 mmol/L  Anion gap 7 5 - 15 mmol/L  
 Glucose 125 (H) 65 - 100 mg/dL BUN 20 6 - 20 MG/DL Creatinine 3.00 (H) 0.70 - 1.30 MG/DL  
 BUN/Creatinine ratio 7 (L) 12 - 20 GFR est AA 25 (L) >60 ml/min/1.73m2 GFR est non-AA 21 (L) >60 ml/min/1.73m2 Calcium 9.1 8.5 - 10.1 MG/DL Bilirubin, total 2.1 (H) 0.2 - 1.0 MG/DL  
 ALT (SGPT) 36 12 - 78 U/L  
 AST (SGOT) 29 15 - 37 U/L Alk. phosphatase 304 (H) 45 - 117 U/L Protein, total 6.9 6.4 - 8.2 g/dL Albumin 3.4 (L) 3.5 - 5.0 g/dL Globulin 3.5 2.0 - 4.0 g/dL A-G Ratio 1.0 (L) 1.1 - 2.2 CK W/ REFLX CKMB Collection Time: 01/25/20 12:03 PM  
Result Value Ref Range  39 - 308 U/L  
TROPONIN I Collection Time: 01/25/20 12:03 PM  
Result Value Ref Range Troponin-I, Qt. <0.05 <0.05 ng/mL LACTIC ACID Collection Time: 01/25/20  1:40 PM  
Result Value Ref Range Lactic acid 0.8 0.4 - 2.0 MMOL/L  
POC EG7 Collection Time: 01/25/20  1:42 PM  
Result Value Ref Range Calcium, ionized (POC) 1.20 1. 12 - 1.32 mmol/L  
 pH (POC) 7.440 7.35 - 7.45    
 pCO2 (POC) 39.9 35.0 - 45.0 MMHG  
 pO2 (POC) 55 (L) 80 - 100 MMHG  
 HCO3 (POC) 27.1 (H) 22 - 26 MMOL/L Base excess (POC) 3 mmol/L  
 sO2 (POC) 90 (L) 92 - 97 % Site LEFT RADIAL Device: NASAL CANNULA Flow rate (POC) 6 L/M Allens test (POC) YES Specimen type (POC) ARTERIAL Total resp. rate 21 Radiologic Studies -  
XR CHEST PORT Final Result IMPRESSION: Interval increase in left pleural effusion with relatively stable  
right pleural effusion. Moderate pulmonary edema not significantly changed with  
bibasilar airspace opacities XR CHEST PORT    (Results Pending) CT Results  (Last 48 hours) None CXR Results  (Last 48 hours) 01/25/20 1149  XR CHEST PORT Final result Impression:  IMPRESSION: Interval increase in left pleural effusion with relatively stable  
right pleural effusion. Moderate pulmonary edema not significantly changed with bibasilar airspace opacities Narrative:  EXAM: XR CHEST PORT INDICATION: Shortness of breath COMPARISON: 12/1/2019 FINDINGS: A portable AP radiograph of the chest was obtained at 1125 hours. The  
patient is on a cardiac monitor. Moderate bilateral pleural effusions of  
moderate pulmonary edema are again noted. Interval increase in the left pleural  
effusion. Heart size is borderline. Bibasilar airspace opacities are present. Mabeline Sink The bones and soft tissues are grossly within normal limits. Medical Decision Making Differential Diagnosis: Pneumonia, pneumothorax, pulmonary edema, fluid overload, electrolyte arrangement I reviewed the vital signs, available nursing notes, past medical history, past surgical history, family history and social history and old medical records. On my interpretation, Laboratory workup is significant for unremarkable CBC, creatinine is elevated and at the patient's baseline, troponin negative, lactate negative, blood gas without hypercapnia, PO2 is decreased On my interpretation of the radiology studies chest x-ray shows evidence of bilateral pleural effusions and also moderate pulmonary edema On my interpretation of the EKG normal sinus rhythm at a rate of 96, QTc is 42, no ST elevation or depression, T wave flattening in multiple leads Management/ED course: Patient presents today with shortness of breath, hypoxia on room air. Placed on nasal cannula oxygen this did improve his position saturation, he did desat again, we placed him on a facemask briefly. I treated him with breathing treatment which improved his saturations, he does have pulmonary edema and bilateral pleural effusions and has increased work of breathing. I spoke with nephrology who will see the patient and arrange for dialysis. Ultimately the patient is admitted to the hospitalist service. ED Course: Initial assessment performed. The patients presenting problems have been discussed, and they are in agreement with the care plan formulated and outlined with them. I have encouraged them to ask questions as they arise throughout their visit. Procedures: 
 
Critical Care Time: CRITICAL CARE NOTE : 
 
11:44 AM 
 
IMPENDING DETERIORATION -Airway and Respiratory ASSOCIATED RISK FACTORS - Hypoxia MANAGEMENT- Bedside Assessment and Supervision of Care INTERPRETATION -  Xrays, Blood Gases, ECG and Blood Pressure INTERVENTIONS - hemodynamic mngmt and Airway assessments CASE REVIEW - Hospitalist, Medical Sub-Specialist and Nursing TREATMENT RESPONSE -Improved PERFORMED BY - Self NOTES   : 
 
I have spent 45 minutes of critical care time involved in lab review, consultations with specialist, family decision- making, bedside attention and documentation. During this entire length of time I was immediately available to the patient . Disposition: Admitted Admission Note: 
Patient is being admitted to the hospital by Service: Hospitalist.  The results of their tests and reasons for their admission have been discussed with them and available family. They convey agreement and understanding for the need to be admitted and for their admission diagnosis. Diagnosis Clinical Impression: 1. Acute pulmonary edema (HCC) 2. Acute respiratory failure with hypoxia ([address] 3. HAP (hospital-acquired pneumonia) 4. Chronic bilateral pleural effusions Attestations: 
Sloane Melvin MD 
 
 
 
Please note that this dictation was completed with [employer], the computer voice recognition software. Quite often unanticipated grammatical, syntax, homophones, and other interpretive errors are inadvertently transcribed by the computer software. Please disregard these errors. Please excuse any errors that have escaped final proofreading. Thank you.

## 2020-01-25 NOTE — ED NOTES
Report called to  [place], patient currently receiving dialysis, will transport after treatment complete. All questions answered.

## 2020-01-25 NOTE — PROGRESS NOTES
Pharmacy Automatic Renal Dosing Protocol - Antimicrobials Indication for Antimicrobials: pneumonia Current Regimen of Each Antimicrobial: 
Levofloxacin 750 mg x 1 f/b 500 mg q48h, day 1, 20 Zosyn 3.375 g q12h, day 1, 20 Previous Antimicrobial Therapy: 
None, patient in ER Significant Cultures: no cultures at this time Radiology / Imaging results: (X-ray, CT scan or MRI): portable chest xray: IMPRESSION: Interval increase in left pleural effusion with relatively stable 
right pleural effusion. Moderate pulmonary edema not significantly changed with 
bibasilar airspace opacities Paralysis, amputations, malnutrition: none, patient is on dialysis Labs: 
Recent Labs  
  20 
1203 CREA 3.00* BUN 20 WBC 5.7 Temp (24hrs), Av.7 °F (36.5 °C), Min:97.7 °F (36.5 °C), Max:97.7 °F (36.5 °C) Creatinine Clearance (mL/min) or Dialysis: dialysis Impression/Plan:  
· Doses adjusted due to renal function · Antimicrobial stop date TBD Pharmacy will follow daily and adjust medications as appropriate for renal function and/or serum levels. Thank you, Jalil Naqvi

## 2020-01-26 NOTE — PROGRESS NOTES
RENAL  PROGRESS NOTE Subjective:  
resting Objective: VITALS SIGNS:   
Visit Vitals /73 (BP 1 Location: Right arm, BP Patient Position: At rest) Pulse 70 Temp (!) 94.6 °F (34.8 °C) Resp 16 Ht 5' 5\" (1.651 m) Wt 55.8 kg (123 lb) SpO2 100% BMI 20.47 kg/m² O2 Device: Nasal cannula O2 Flow Rate (L/min): 6 l/min Temp (24hrs), Av.5 °F (35.8 °C), Min:93 °F (33.9 °C), Max:98.1 °F (36.7 °C) PHYSICAL EXAM: 
resting DATA REVIEW:  
 
INTAKE / OUTPUT:  
Last shift:      No intake/output data recorded. Last 3 shifts:  1901 -  0700 In: 480 [P.O.:480] Out: 3500 Intake/Output Summary (Last 24 hours) at 2020 9790 Last data filed at 2020 5785 Gross per 24 hour Intake 480 ml Output 3500 ml Net -7182 ml  
 
 
 
LABS:  
Recent Labs  
  20 
0329 20 
1203 WBC 5.4 5.7 HGB 11.6* 11.3* HCT 35.4* 34.9*  
 150 Recent Labs  
  20 
0329 20 
1203  136  
K 3.3* 4.7 CL 98 100 CO2 33* 29 GLU 39* 125* BUN 12 20 CREA 2.16* 3.00* CA 8.9 9.1 MG 2.2  --   
PHOS 3.1  --   
ALB 3.2* 3.4* TBILI 2.3* 2.1*  
SGOT 18 29 ALT 31 36 Assessment:  
 
 
ESRD MWF sp urgent HD on 20 for volume overload AVF Volume overload Blindness Plan:  
HD in AM 
Will follow Katlyn Forrester MD

## 2020-01-26 NOTE — PROGRESS NOTES
Bedside and Verbal shift change report given to [place] (oncoming nurse) by Adam Ramos (offgoing nurse). Report included the following information SBAR, Kardex, Intake/Output and MAR.

## 2020-01-26 NOTE — PROGRESS NOTES
Hospitalist Progress Note NAME: Janith Pallas. :  1950 MRN:  [id number] Assessment / Plan: 
Acute hypoxic respiratory failure, secondary to pulmonary edema in the setting of ESRD on HD TTS 
ESRD Underlying HCAP cannot be excluded  
-s/p urgent HD   
no leukocytosis/fever 
cxray today with slight improvement in vascular congestion pattern - c/w AB for possible underlying pneumonia  
- O2 to keep radni >90%, wean as tolerated, assess for home O2 on DC Not on home O2  
-Nephrology help appreciated: HD in am  
-CXR on admission: Interval increase in left pleural effusion with relatively stable 
right pleural effusion. Moderate pulmonary edema not significantly changed with 
bibasilar airspace opacities Hypothermia  
- can be due to hypoglycemia or underlying infectious process 
-monitor 
-bear hugger as needed DMII with hypoglycemia  
- recurrent hypoglycemia since this am in 30-40s 
- hold all insulin / lantus 
-start low rate d5w , adjust as needed, close watch of fluid status with ESRD  
 
HTN 
-BP stable 
-cont home meds  
  
Alzheimer's Dementia - MS at baseline 
-watch for delirium with acute medical problems Hypokalemia Legally blind BPH, cont Flomax HLD 
 
 
 
  
Code Status: DDNR on file Surrogate Decision Maker: Sister DVT Prophylaxis: Heparin Baseline: Dependent IADLs; [place] [facility] resident Recommended Disposition: back to [facility] Subjective: Chief Complaint / Reason for Physician Visit: following hypoglycemia / respiratory failure / esrd He was seen and examined today in am  
He was awake and communicative He denies any complaints Discussed with RN events overnight. Review of Systems: 
Symptom Y/N Comments  Symptom Y/N Comments Fever/Chills    Chest Pain Poor Appetite    Edema Cough    Abdominal Pain Sputum    Joint Pain SOB/LAUREANO    Pruritis/Rash Nausea/vomit    Tolerating PT/OT Diarrhea    Tolerating Diet Constipation    Other Could NOT obtain due to: Dementia Objective: VITALS:  
Last 24hrs VS reviewed since prior progress note. Most recent are: 
Patient Vitals for the past 24 hrs: 
 Temp Pulse Resp BP SpO2  
01/26/20 1104 (!) 93.8 °F (34.3 °C) 73 12 147/81 100 % 01/26/20 1043 (!) 93.8 °F (34.3 °C)      
01/26/20 0832 (!) 94.6 °F (34.8 °C)      
01/26/20 0814 (!) 93.2 °F (34 °C)      
01/26/20 0751 (!) 93 °F (33.9 °C) 70 16 151/73 100 % 01/26/20 0315 97.3 °F (36.3 °C) 77 17 133/59 100 % 01/26/20 0003 98.1 °F (36.7 °C) 95 17 151/69 100 % 01/25/20 2109 97.8 °F (36.6 °C) 91 17 151/74 100 % 01/25/20 2045  87 24 158/73 100 % 01/25/20 2030  84 18 153/72 100 % 01/25/20 2015  87 16 145/68 100 % 01/25/20 2000  88 15 150/76 100 % 01/25/20 1945  88 14 163/66 100 % 01/25/20 1930  88 14 151/80 100 % 01/25/20 1915  89 14 146/76 100 % 01/25/20 1900  91 15 150/73 100 % 01/25/20 1845  87 14 135/71 100 % 01/25/20 1830  92 15 139/74 100 % 01/25/20 1819 98 °F (36.7 °C) 94 16 139/74 100 % 01/25/20 1815  93 15 132/67 100 % 01/25/20 1800  92 15 130/73 100 % 01/25/20 1745  93 16 130/69 100 % 01/25/20 1730  92 17 127/64 100 % 01/25/20 1715  94 17 126/64 100 % 01/25/20 1700  95 19 129/65 96 % 01/25/20 1645  94 17 130/63 99 % 01/25/20 1630  93 17 113/64 100 % 01/25/20 1615  93 16 136/65 100 % 01/25/20 1600  93 18 144/69 98 % 01/25/20 1545  92 18 148/68 99 % 01/25/20 1530  92 22 151/67 97 % 01/25/20 1518 97.8 °F (36.6 °C) 93 21 136/62 94 % 01/25/20 1500  94 22 136/62 94 % 01/25/20 1400 97.8 °F (36.6 °C) 94 23 135/61 99 % 01/25/20 1330  97 23 127/60 (!) 88 % 01/25/20 1314     (!) 88 % 01/25/20 1300  98 28 127/77 (!) 89 % Intake/Output Summary (Last 24 hours) at 1/26/2020 1215 Last data filed at 1/26/2020 6247 Gross per 24 hour Intake 480 ml Output 3500 ml Net -3020 ml  
  
 
 PHYSICAL EXAM: 
General: WD, WN. Alert, cooperative, no acute distress   
EENT:  EOMI. Anicteric sclerae. MMM Resp:  CTA bilaterally, no wheezing or rales. No accessory muscle use CV:  Regular  rhythm,  No edema GI:  Soft, Non distended, Non tender.  +Bowel sounds Neurologic:  Alert and oriented X 1-2, normal speech, Psych:   Poor insight. Not anxious nor agitated Skin:  No rashes. No jaundice Reviewed most current lab test results and cultures  YES Reviewed most current radiology test results   YES Review and summation of old records today    NO Reviewed patient's current orders and MAR    YES 
PMH/SH reviewed - no change compared to H&P 
________________________________________________________________________ Care Plan discussed with: 
  Comments Patient y Family RN yelisa Rapid response RN Care Manager Consultant Multidiciplinary team rounds were held today with , nursing, pharmacist and clinical coordinator. Patient's plan of care was discussed; medications were reviewed and discharge planning was addressed. ________________________________________________________________________ Total NON critical care TIME: 52   Minutes Total CRITICAL CARE TIME Spent:   Minutes non procedure based Comments >50% of visit spent in counseling and coordination of care    
________________________________________________________________________ Yvonne Chen MD  
 
Procedures: see electronic medical records for all procedures/Xrays and details which were not copied into this note but were reviewed prior to creation of Plan. LABS: 
I reviewed today's most current labs and imaging studies. Pertinent labs include: 
Recent Labs  
  01/26/20 
0329 01/25/20 
1203 WBC 5.4 5.7 HGB 11.6* 11.3* HCT 35.4* 34.9*  
 150 Recent Labs  
  01/26/20 0329 01/25/20 
1203  136  
K 3.3* 4.7 CL 98 100 CO2 33* 29 GLU 39* 125* BUN 12 20 CREA 2.16* 3.00* CA 8.9 9.1 MG 2.2  --   
PHOS 3.1  --   
ALB 3.2* 3.4* TBILI 2.3* 2.1*  
SGOT 18 29 ALT 31 36 Signed: Kedar Birch MD

## 2020-01-26 NOTE — PROGRESS NOTES
Pharmacy  Heparin Dose Adjustment Indication: VTE prophylaxis Current Dose: Heparin 5000 units subcutaneously every 8 hours Weight Ht Readings from Last 1 Encounters:  
01/25/20 165.1 cm (65\") Height Wt Readings from Last 1 Encounters:  
01/25/20 55.8 kg (123 lb) BMI Body mass index is 20.47 kg/m². Impression/Plan:  
Change to  Heparin 5000 units subcutaneously every 12 hours per protocol for low weight patients <60 kg Thanks, Rohit Colón, PharmD

## 2020-01-26 NOTE — CONSULTS
[facility] Name:  Rachel Madrigal 
MR#:  [id number] :  1950 ACCOUNT #:  [de-identified] DATE OF SERVICE:  2020 REASON FOR CONSULTATION:  Seen for end-stage renal disease. Thanks for the consult. HISTORY OF PRESENT ILLNESS:  We had the pleasure of seeing the patient. He is a poor historian. Unfortunately, decreased vision, almost blind. I am not sure if he has been compliant with dialysis, came here and ended up being short of breath and he had volume overload on the chest x-ray needing dialysis. PAST MEDICAL HISTORY: 
1. End-stage renal disease on hemodialysis supposedly on Monday, Wednesday, Friday at [address] [place] [address]  . 2. Hypertension. 3.  Blindness. 4.  Dyslipidemia. 5.  CAD. 6.  History of prostate CA. ALLERGIES:  TO ACE AND ARB. SOCIAL HISTORY:  Reviewed. FAMILY HISTORY:  Reviewed. REVIEW OF SYSTEMS:  Look at the HPI. Rest is negative. PHYSICAL EXAMINATION: 
GENERAL:  Minimal distress. VITAL SIGNS:  Saturating 88% to 99%. Blood pressure 133/79. NECK:  JVD is positive. ABDOMEN:  Soft. EXTREMITIES:  No edema. AV fistula patent. NEUROLOGIC:  Alert and oriented to place. LABORATORY DATA:  Sodium 136, potassium 4.7, bicarb 29, BUN 20, creatinine of 3, calcium is 9.1, albumin is 3.4. Hemoglobin 11.3, platelets 863, WBC 5.7. Chest x-ray showed left pleural effusion and pulmonary edema. IMPRESSION: 
1. End-stage renal disease, on hemodialysis on Monday, Wednesday, and Friday at the [facility]  . We are not sure if he went to dialysis yesterday. 2.  Volume overload. 3.  Arteriovenous fistula. 4.  Blindness. RECOMMENDATION: 
1. Dialysis today with fluid removal. 
2.   . 
3. Recommend to recheck imaging of his lungs to make sure there is no other pulmonary problems or volume overload. 4.  Discussed already with ER physician and Fiorella   and they put him on the schedule for dialysis today.  
 
 
Martin Ledbetter MD 
 
 
 [id number] 
D:  01/25/2020 14:25 T:  01/25/2020 20:25 
JOB #:  [id number]

## 2020-01-27 NOTE — PROGRESS NOTES
Name: Iliana Recinos. MRN: [id number] : 1950 Assessment: 
ESRD (MWF) HTN Fluid overload Hyponatremia : Pt got his HD early am today. Got 2.5 Kg UF. His Na should be better post HD today. Plan/Recommendations: 
Next HD on Wed, if pt still here I ordered formal fluid restriction order of 1.5 L/day Subjective: 
Resting. basln confusion from dementia. Does not remember that he got HD today am 
 
ROS:  
Deferred- not reliable Exam: 
Visit Vitals /77 (BP 1 Location: Right arm, BP Patient Position: At rest) Pulse 86 Temp 98.6 °F (37 °C) Resp 16 Ht 5' 5\" (1.651 m) Wt 55.8 kg (123 lb) SpO2 100% Comment: 6L BMI 20.47 kg/m² NAD 
R conjuctival injection Clear with dec BS- L > R 
RRR 
L AVG patent Alert awake Current Facility-Administered Medications Medication Dose Route Frequency Last Dose  trimethoprim-polymyxin b (POLYTRIM) 10,000 unit- 1 mg/mL ophthalmic solution 1 Drop  1 Drop Both Eyes Q6H    
 [START ON 2020] lactobac ac& pc-s.therm-b.anim (RAH Q/RISAQUAD)  1 Cap Oral DAILY  insulin lispro (HUMALOG) injection   SubCUTAneous TIDAC  dextrose 10% infusion 0-250 mL  0-250 mL IntraVENous PRN    
 glucose chewable tablet 16 g  4 Tab Oral PRN    
 glucagon (GLUCAGEN) injection 1 mg  1 mg IntraMUSCular PRN    
 levoFLOXacin (LEVAQUIN) 750 mg in D5W IVPB  750 mg IntraVENous Q48H 750 mg at 20 0500  
 sodium chloride (NS) flush 5-40 mL  5-40 mL IntraVENous Q8H 10 mL at 20 8526  sodium chloride (NS) flush 5-40 mL  5-40 mL IntraVENous PRN    
 amLODIPine (NORVASC) tablet 10 mg  10 mg Oral DAILY 10 mg at 20 0847  aspirin delayed-release tablet 81 mg  81 mg Oral DAILY 81 mg at 20 0847  calcitRIOL (ROCALTROL) capsule 0.25 mcg  0.25 mcg Oral Q MON, WED & FRI    
  ferrous sulfate tablet 325 mg  325 mg Oral  mg at 01/27/20 2076  tamsulosin (FLOMAX) capsule 0.4 mg  0.4 mg Oral DAILY 0.4 mg at 01/27/20 0847  prednisoLONE acetate (PRED FORTE) 1 % ophthalmic suspension 1 Drop  1 Drop Right Eye BID 1 Drop at 01/27/20 0847  
 pantoprazole (PROTONIX) tablet 40 mg  40 mg Oral DAILY 40 mg at 01/27/20 0847  
 sodium chloride (NS) flush 5-40 mL  5-40 mL IntraVENous Q8H Stopped at 01/25/20 2200  
 sodium chloride (NS) flush 5-40 mL  5-40 mL IntraVENous PRN    
 ondansetron (ZOFRAN) injection 4 mg  4 mg IntraVENous Q4H PRN    
 heparin (porcine) injection 5,000 Units  5,000 Units SubCUTAneous Q12H 5,000 Units at 01/27/20 1100 Labs/Data: 
 
Lab Results Component Value Date/Time WBC 5.0 01/27/2020 12:11 AM  
 Hemoglobin (POC) 10.5 (L) 04/07/2017 07:30 AM  
 HGB 10.0 (L) 01/27/2020 12:11 AM  
 Hematocrit (POC) 31 (L) 04/07/2017 07:30 AM  
 HCT 29.6 (L) 01/27/2020 12:11 AM  
 PLATELET 834 (L) 31/22/0059 12:11 AM  
 MCV 85.5 01/27/2020 12:11 AM  
 
 
Lab Results Component Value Date/Time Sodium 128 (L) 01/27/2020 12:11 AM  
 Potassium 4.4 01/27/2020 12:11 AM  
 Chloride 92 (L) 01/27/2020 12:11 AM  
 CO2 30 01/27/2020 12:11 AM  
 Anion gap 6 01/27/2020 12:11 AM  
 Glucose 267 (H) 01/27/2020 12:11 AM  
 BUN 15 01/27/2020 12:11 AM  
 Creatinine 2.78 (H) 01/27/2020 12:11 AM  
 BUN/Creatinine ratio 5 (L) 01/27/2020 12:11 AM  
 GFR est AA 28 (L) 01/27/2020 12:11 AM  
 GFR est non-AA 23 (L) 01/27/2020 12:11 AM  
 Calcium 8.0 (L) 01/27/2020 12:11 AM  
 
 
Wt Readings from Last 3 Encounters:  
01/25/20 55.8 kg (123 lb) 12/02/19 55.9 kg (123 lb 3.8 oz) 05/08/17 55.9 kg (123 lb 3.8 oz) Intake/Output Summary (Last 24 hours) at 1/27/2020 1249 Last data filed at 1/27/2020 7523 Gross per 24 hour Intake 120 ml Output 2500 ml Net -2380 ml Patient seen and examined. Chart reviewed. Labs, data and other pertinent notes reviewed in last 24 hrs. PMH/SH/FH reviewed and unchanged compared to H&P Discussed with pt/HD RN Eva Perdomo MD

## 2020-01-27 NOTE — PROGRESS NOTES
Hospitalist Progress Note NAME: Nish Chen. :  1950 MRN:  [id number] Assessment / Plan: 
Acute hypoxic respiratory failure, secondary to pulmonary edema in the setting of ESRD on HD TTS 
ESRD Underlying HCAP cannot be excluded  
-s/p urgent HD   
no leukocytosis/fever 
cxray  after HD  with slight improvement in vascular congestion pattern - c/w AB for possible underlying pneumonia: LVQ x 7 days   
- O2 to keep randi >90%, wean as tolerated, assess for home O2 on DC Not on home O2  
-Nephrology help appreciated: HD today  
-CXR on admission: Interval increase in left pleural effusion with relatively stable 
right pleural effusion. Moderate pulmonary edema not significantly changed with 
bibasilar airspace opacities Hypothermia  
- likely was due to hypoglycemia or underlying infectious process 
-resolved DMII with hypoglycemia - BS now 200-300 Stop d5w 
-start SS as neede with meals alone to avoid hypoglycemia If BS remain stable add slowly lantus at bedtime R eye conjunctivitis 
-eye gtt HTN 
-BP stable 
-cont home meds  
  
Alzheimer's Dementia - MS at baseline 
-watch for delirium with acute medical problems Hypokalemia Legally blind BPH, cont Flomax HLD 
 
 
 
  
Code Status: DDNR on file Surrogate Decision Maker: Sister DVT Prophylaxis: Heparin Baseline: Dependent IADLs; [place] 8 resident Recommended Disposition: back to NH once ok with renal /off O2/ BC stable , hopefully soon 1-2 days Subjective: Chief Complaint / Reason for Physician Visit: following hypoglycemia / respiratory failure / esrd Noted R eye erythema/discharge Denies pain/dyspnea Discussed with RN events overnight. Review of Systems: 
Symptom Y/N Comments  Symptom Y/N Comments Fever/Chills    Chest Pain Poor Appetite    Edema Cough    Abdominal Pain Sputum    Joint Pain SOB/LAUREANO    Pruritis/Rash Nausea/vomit    Tolerating PT/OT Diarrhea    Tolerating Diet Constipation    Other Could NOT obtain due to: Dementia Objective: VITALS:  
Last 24hrs VS reviewed since prior progress note. Most recent are: 
Patient Vitals for the past 24 hrs: 
 Temp Pulse Resp BP SpO2  
01/27/20 1105 98.6 °F (37 °C) 86 16 142/77 100 % 01/27/20 0755 98 °F (36.7 °C) 86 16 156/83 100 % 01/27/20 0340 98 °F (36.7 °C) 88 18 122/74   
01/27/20 0330  80 16 117/74   
01/27/20 0315  82 16 119/77   
01/27/20 0300  80 18 127/78   
01/27/20 0245  80 18 135/59   
01/27/20 0230  (!) 110 20 147/81   
01/27/20 0215  (!) 101 18 (!) 163/94   
01/27/20 0200  (!) 101 18 (!) 166/92   
01/27/20 0145  84 18 157/81   
01/27/20 0130  84 20 142/88   
01/27/20 0115  83 20 130/56   
01/27/20 0100  88 18 133/76   
01/27/20 0045  79 20 156/86   
01/27/20 0030  81 18 147/86   
01/27/20 0015  80 18 148/86   
01/27/20 0000 98.8 °F (37.1 °C) 79 20 160/78   
01/26/20 2002 98.1 °F (36.7 °C) 82 17 145/80 100 % 01/26/20 1551 97.4 °F (36.3 °C) 86 14 133/79 100 % 01/26/20 1340 95.8 °F (35.4 °C)      
01/26/20 1223 (!) 94.3 °F (34.6 °C) 82 16 146/84 100 % Intake/Output Summary (Last 24 hours) at 1/27/2020 1217 Last data filed at 1/27/2020 0906 Gross per 24 hour Intake 360 ml Output 2500 ml Net -2140 ml PHYSICAL EXAM: 
General: WD, WN. Alert, cooperative, no acute distress   
EENT:  EOMI. Anicteric sclerae. MMM Resp:  CTA bilaterally, no wheezing or rales. No accessory muscle use CV:  Regular  rhythm,  No edema GI:  Soft, Non distended, Non tender.  +Bowel sounds Neurologic:  Alert and oriented X 1-2 ( at the hospital), normal speech, Psych:   Poor insight. Not anxious nor agitated Skin:  No rashes. No jaundice Reviewed most current lab test results and cultures  YES Reviewed most current radiology test results   YES Review and summation of old records today    NO 
 Reviewed patient's current orders and MAR    YES 
PMH/SH reviewed - no change compared to H&P 
________________________________________________________________________ Care Plan discussed with: 
  Comments Patient y Family RN y   
Care Manager y Consultant     
                 y Multidiciplinary team rounds were held today with , nursing, pharmacist and clinical coordinator. Patient's plan of care was discussed; medications were reviewed and discharge planning was addressed. ________________________________________________________________________ Total NON critical care TIME: 35   Minutes Total CRITICAL CARE TIME Spent:   Minutes non procedure based Comments >50% of visit spent in counseling and coordination of care y   
________________________________________________________________________ Carroll Ames MD  
 
Procedures: see electronic medical records for all procedures/Xrays and details which were not copied into this note but were reviewed prior to creation of Plan. LABS: 
I reviewed today's most current labs and imaging studies. Pertinent labs include: 
Recent Labs  
  01/27/20 
0011 01/26/20 
0329 01/25/20 
1203 WBC 5.0 5.4 5.7 HGB 10.0* 11.6* 11.3* HCT 29.6* 35.4* 34.9*  
* 176 150 Recent Labs  
  01/27/20 
0011 01/26/20 
0329 01/25/20 
1203 * 136 136  
K 4.4 3.3* 4.7 CL 92* 98 100 CO2 30 33* 29  
* 39* 125* BUN 15 12 20 CREA 2.78* 2.16* 3.00* CA 8.0* 8.9 9.1 MG  --  2.2  --   
PHOS  --  3.1  --   
ALB  --  3.2* 3.4* TBILI  --  2.3* 2.1*  
SGOT  --  18 29 ALT  --  31 36 Signed: Carroll Ames MD

## 2020-01-27 NOTE — PROGRESS NOTES
Bedside and Verbal shift change report given to [address] (oncoming nurse) by Yue Cazares RN(offgoing nurse). Report included the following information SBAR, Kardex, Intake/Output and MAR.

## 2020-01-27 NOTE — PROGRESS NOTES
Reason for Admission:   Pulmonary edema//PNA RUR Score:     27 Do you (patient/family) have any concerns for transition/discharge? no 
           
Plan for utilizing home health:   Return to [facility] Current Advanced Directive/Advance Care Plan:  Not in epic Transition of Care Plan:      Pt admitted with respiratory failure, is DNR, Medicare//Medicaid, PCP Dr Nuvia Jarrell, is from [facility] who can accept back on Wednesday if stable, will need [facility] at d/c, and oxygen needs to be assessed for need at d/c. Has a DDNR on file which needs to be completed by Stephanie Epperson prior to d/c. He goes to [facility] dialysis. Please schedule for early morning Wednesday HD. Care Management Interventions PCP Verified by CM: Yes Transition of Care Consult (CM Consult): Discharge Planning MyChart Signup: No 
Discharge Durable Medical Equipment: No 
Physical Therapy Consult: No 
Occupational Therapy Consult: No 
Speech Therapy Consult: No 
Current Support Network: [address] [place] [address] Confirm Follow Up Transport: Other (see comment) Discharge Location Discharge Placement: Home

## 2020-01-27 NOTE — PROGRESS NOTES
[id number] Patient is now or room air with sats of 100% Bedside and Verbal shift change report given to [address] [place] [address] (oncoming nurse) by Tarsha Voss (offgoing nurse). Report included the following information SBAR, Kardex, Intake/Output and MAR.

## 2020-01-27 NOTE — DIALYSIS
[facility] Dialysis Team [place]  [phone] Vitals   Pre   Post   Assessment   Pre   Post    
Temp  Temp: 98.8 °F (37.1 °C) (01/27/20 0000)  98 LOC  oriented to self only, pleasantly confused Oriented to self HR   Pulse (Heart Rate): 79 (01/27/20 0000) 88 Lungs   Diminished on 5lnc  diminished on 5lnc B/P   BP: 160/78 (01/27/20 0000) 122/74 Cardiac   B/p wnl  b/p wnl Resp   Resp Rate: 20 (01/27/20 0000) 20 Skin   intact  intact Pain level  0 0 Edema  +2 in lt arm 
 
 +2 in lt arm Orders: Duration:   Start:    0004 End:    0337 Total:   3.5hrs Dialyzer:   Dialyzer/Set Up Inspection: Joleen Mcnair (01/27/20 0000) K Bath:   Dialysate K (mEq/L): 3 (01/27/20 0000) Ca Bath:   Dialysate CA (mEq/L): 2.5 (01/27/20 0000) Na/Bicarb:   Dialysate NA (mEq/L): 140 (01/27/20 0000) Target Fluid Removal:   Goal/Amount of Fluid to Remove (mL): 2500 mL (01/27/20 0000) Access Type & Location:   Lt arm graft, good thrill and bruit noted. Area cleaned and accessed with 15g needles x2 without any issues. Good blood flow and no signs of infection present Labs Obtained/Reviewed Critical Results Called   Date when labs were drawn- 
Hgb-   
HGB Date Value Ref Range Status 01/27/2020 10.0 (L) 12.1 - 17.0 g/dL Final  
 
K-   
Potassium Date Value Ref Range Status 01/27/2020 4.4 3.5 - 5.1 mmol/L Final  
  Comment:  
  INVESTIGATED PER DELTA CHECK PROTOCOL Ca-  
Calcium Date Value Ref Range Status 01/27/2020 8.0 (L) 8.5 - 10.1 MG/DL Final  
 
Bun-  
BUN Date Value Ref Range Status 01/27/2020 15 6 - 20 MG/DL Final  
 
Creat-  
Creatinine Date Value Ref Range Status 01/27/2020 2.78 (H) 0.70 - 1.30 MG/DL Final  
 
  
Medications/ Blood Products Given Name   Dose   Route and Time None ordered to be given during dialysis Blood Volume Processed (BVP):    89.6 Net Fluid Removed:  2.5kg Comments Time Out Done: 3817 Primary Nurse Rpt Pre:Karis JIMENEZ RN 
Primary Nurse Rpt Post:Karis BRADY RN 
Pt Education:ESRD Care Plan:ESRD Tx Summary: 
0004 dialysis started 3492 Dialysis completed and blood rinsed back. Needles removed and pressure held till bleeding stopped. Dressing applied to each site, pt stable Admiting Diagnosis:Pulmonary edema Pt's previous [address] Consent signed - Informed Consent Verified: Yes (01/27/20 0000) Fiorella Consent - on file Hepatitis Status- NEG AG 1/25/20 Machine #- Machine Number: [id number] (01/27/20 0000) Telemetry status-not on tele Pre-dialysis wt. -

## 2020-01-27 NOTE — PROGRESS NOTES
Bedside and Verbal shift change report given to Karis RN (oncoming nurse) by Kassi Kelly RN (offgoing nurse). Report included the following information SBAR, Kardex, Intake/Output, MAR and Recent Results.

## 2020-01-28 NOTE — PROGRESS NOTES
Hospital to [facility] Handoff [facility] Body. 
                                                                      81 y.o.   male [address]   Room: [facility]9/01    [facility] [address]  Unit Phone# :  [phone] [address] 
MRM 3 MED TELE 
[address] [place] [address] Dept: [phone] Loc: [id number] SITUATION Admitted:  1/25/2020         Attending Provider:  Raiza Luis MD    
 
Consultations:  IP CONSULT TO NEPHROLOGY 
 
PCP:  Марина Oneill MD   [phone] Treatment Team: Attending Provider: Raiza Luis MD; Consulting Provider: Jorge Alberto Cueva MD; Utilization Review: Abdirahman Weinstein; Care Manager: Guy Dickey RN Admitting Dx:  Pulmonary edema [J81.1] Principal Problem: <principal problem not specified> * No surgery found * of  
  
BY: * Surgery not found *             ON: * No surgery found * Code Status: DNR Advance Directives:  
Advance Care Planning 1/26/2020 Patient's Healthcare Decision Maker is: -  
Primary Decision Maker Name -  
Primary Decision Maker Phone Number -  
Primary Decision Maker Relationship to Patient -  
Confirm Advance Directive Yes, on file Does the patient have other document types Do Not Resuscitate (Send w/patient) Not Received Isolation:  Contact       MDRO: MRSA Pain Medications given:  none Special Equipment needed: no  Type of equipment: 
 
hemodialysis (Not currently on dialysis) (Not currently on dialysis) (Not currently on dialysis) BACKGROUND Allergies: Allergies Allergen Reactions  Ace Inhibitors Unknown (comments)  Arb-Angiotensin Receptor Antagonist Unknown (comments) Past Medical History:  
Diagnosis Date  Alzheimer disease ([facility] [address]  CAD (coronary artery disease)  Cancer [facility])   
 prostate  Chronic kidney disease  CKD (chronic kidney disease) stage 4, GFR 15-29 ml/min (HCC)  DM (diabetes mellitus), type 2, uncontrolled ([facility]  Hemodialysis patient ([facility]  Hyperlipidemia  Hypertension  Other ill-defined conditions(799.89)   
 blind R eye-retina detachment  Other ill-defined conditions(799.89) right cerebellopontine angle lipoma. Past Surgical History:  
Procedure Laterality Date  COLONOSCOPY,DIAGNOSTIC  11/12/2014  HX HEENT    
 retinal detachment  HX SHOULDER ARTHROSCOPY    
 right  HX UROLOGICAL    
 prostate bx  UPPER GI ENDOSCOPY,BIOPSY  11/12/2014 Medications Prior to Admission Medication Sig  
 insulin aspart U-100 (NOVOLOG FLEXPEN U-100 INSULIN) 100 unit/mL (3 mL) inpn 10 Units by SubCUTAneous route daily. Indications: every morning  pantoprazole (PROTONIX) 40 mg tablet Take 40 mg by mouth daily.  amLODIPine (NORVASC) 10 mg tablet Take 1 Tab by mouth daily for 60 days.  calcitRIOL (ROCALTROL) 0.25 mcg capsule Take 1 Cap by mouth every Monday, Wednesday, Friday.  ferrous sulfate 325 mg (65 mg iron) tablet Take 1 Tab by mouth Daily (before breakfast).  aspirin delayed-release 81 mg tablet Take 81 mg by mouth daily.  insulin aspart U-100 (NOVOLOG U-100 INSULIN ASPART) 100 unit/mL injection by SubCUTAneous route two (2) times a day. Blood Glucose (mg/dL)       Insulin Dose (units) 151-220                               2  
            221-260                               4  
            261-300                               5  
            301-350                               8  
            351-450                               10  
            450+                                   Call MD  
 prednisoLONE acetate (PRED FORTE) 1 % ophthalmic suspension Administer 1 Drop to right eye two (2) times a day.  vit Bcomp,C/folic acid/zinc (RENAL MULTIVITAMIN/ZINC PO) Take 1 Tab by mouth daily.  acetaminophen (TYLENOL) 325 mg tablet Take 2 Tabs by mouth every four (4) hours as needed. For pain.  albuterol (PROVENTIL VENTOLIN) 2.5 mg /3 mL (0.083 %) nebulizer solution 2.5 mg by Nebulization route every four (4) hours as needed for Shortness of Breath.  tamsulosin (FLOMAX) 0.4 mg capsule Take 0.4 mg by mouth daily.  glucagon (GLUCAGEN) 1 mg injection 1 mL by IntraMUSCular route as needed for Hypoglycemia.  [DISCONTINUED] insulin aspart U-100 (NOVOLOG FLEXPEN U-100 INSULIN) 100 unit/mL (3 mL) inpn 20 Units by SubCUTAneous route every evening. Hard scripts included in transfer packet no Vaccinations:   
Immunization History Administered Date(s) Administered  (RETIRED) Pneumococcal Vaccine (Unspecified Type) 08/16/2010  Influenza Vaccine 10/01/2014, 11/08/2016  Influenza Vaccine Split 09/21/2010, 12/08/2011 Readmission Risks:    Known Risks: fall The Charlson Co[employer]bitiy Index tool is an evidenced based tool that has more automatic generated information. The tool looks at many different items such as the age of the patient, how many times they were admitted in the last calendar year, current length of stay in the hospital and their diagnosis. All of these items are pulled automatically from information documented in the chart from various places and will generate a score that predicts whether a patient is at low (less than 13), medium (13-20) or high (21 or greater) risk of being readmitted. ASSESSMENT Temp: 98.3 °F (36.8 °C) (01/28/20 1050) Pulse (Heart Rate): 86 (01/28/20 1050) Resp Rate: 14 (01/28/20 1050)           BP: 133/73 (01/28/20 1050) O2 Sat (%): 99 % (01/28/20 1050) Weight: 50.5 kg (111 lb 4.8 oz)    Height: 5' 5\" (165.1 cm) (01/25/20 2100) If above not within 1 hour of discharge: 
 
BP:_____  P:____  R:____ T:_____ O2 Sat: ___%  O2: ______ Active Orders Diet DIET DIABETIC CONSISTENT CARB Regular; 2 GM NA (House Low NA); FR 1500ML Orientation: only aware of  person Active Behaviors: None Active Lines/Drains:  (Peg Tube / Childs / CL or S/L?): no 
 
Urinary Status: Has not voided(HD  pt)     Last BM: Last Bowel Movement Date: 01/27/20 Mobility: Slightly limited Weight Bearing Status: WBAT (Weight Bearing as Tolerated) Lab Results Component Value Date/Time Glucose 263 (H) 01/28/2020 10:17 AM  
 Hemoglobin A1c 6.4 (H) 12/02/2019 03:17 AM  
 INR 1.0 06/14/2012 12:20 PM  
 INR PLEASE DISREGARD RESULTS 07/10/2011 02:30 AM  
 HGB 10.0 (L) 01/27/2020 12:11 AM  
 HGB 11.6 (L) 01/26/2020 03:29 AM  
 Hemoglobin A1c, External 7.7 08/12/2015 RECOMMENDATION See After Visit Summary (AVS) for: · Discharge instructions · [facility] · Special equipment needed (entered pre-discharge by Care Management) · Medication Reconciliation · Follow up Appointment(s) Report given/sent by:  Crista Gordillo Verbal report given to: Jennifer Estimated discharge time:  1/28/2020 at 1344

## 2020-01-28 NOTE — PROGRESS NOTES
Initial Nutrition Assessment: 
 
INTERVENTIONS/RECOMMENDATIONS:  
· RD will change diet to diabetic consistent carb, 2gm Na+ FR 1500 mL for BG control · RD will order Nepro BID which provides ~850 kcal and 38 g protein and 23% of total fluid ASSESSMENT:  
1/28: Chart reviewed, med noted for respiratoy failure, secondary to pulmonary edema in the setting of ESRD, PMH of DMII, Alzheimer's, CAD, HTN, DKA, , hypocholesterolemia, early stage prostate cancer, ESRD. Attends dialysis regularly. Visted pt at bedside, reports noticing wt loss unable to provide specifics (how much wt, previous body wt) reports having poor appetite. Desires to improve intake. Pt agreeable to ONS to help with wt gain. Due to poor intake and wt loss (~9% in 2 mo, ~12 lbs) pt meets criteria for Moderate Malnutrition. Last K and phos WNL, elevated BG noted, will adjust diet to CCD/2gm Na+ with 1500 mL FR Please document Moderate Protein Calorie Malnutrition in patient diagnoses. Patient meets criteria for as evidenced by:  
ASPEN Malnutrition Criteria Acute Illness, Chronic Illness, or Social/Enviornmental: Acute illness Energy Intake: Less than/equal to 50% est energy req for greater than/equal to 5 days Weight Loss: 7.5% x 3 mos Body Fat Loss: Mild Muscle Mass Loss: Mild ASPEN Malnutrition Score - Acute Illness: 9 Acute Illness - Malnutrition Diagnosis: Moderate malnutrition. Diet Order: Renal 
% Eaten:   
Patient Vitals for the past 72 hrs: 
 % Diet Eaten 01/28/20 0904 25 % 01/27/20 1750 25 % 01/26/20 1801 0 % 01/26/20 1223 25 % 01/26/20 0832 25 %  
  
%Supplement Intake:   
Pertinent Medications: [x]Reviewed []Other: humalog, Protonix, bowel regimen, iron Pertinent Labs: [x]Reviewed []Other -233 Food Allergies: [x]None []Other Last BM:1/27    [x]Active     []Hyperactive  []Hypoactive       [] Absent BS Skin:    [x] Intact   [] Incision  [] Breakdown  [] Other: Anthropometrics: Height: 5' 5\" (165.1 cm) Weight: 50.5 kg (111 lb 4.8 oz) IBW (%IBW):   ( ) UBW (%UBW):   (  %) Last Weight Metrics: 
Weight Loss Metrics 1/28/2020 12/2/2019 5/8/2017 5/5/2017 4/8/2017 4/4/2017 3/2/2017 Today's Wt 111 lb 4.8 oz 123 lb 3.8 oz 123 lb 3.8 oz 95 lb 7.4 oz 95 lb 7.4 oz - 143 lb BMI 18.52 kg/m2 20.51 kg/m2 20.51 kg/m2 16.39 kg/m2 - 16.39 kg/m2 24.55 kg/m2 BMI: Body mass index is 18.52 kg/m². This BMI is indicative of: 
 [x]Underweight    []Normal    []Overweight    [] Obesity   [] Extreme Obesity (BMI>40) Estimated Nutrition Needs (Based on):  
3332 Kcals/day(BMR (1207) x AF (1.3) + 250 kcal) , 75 g(1.5 g/kg bw) Protein Carbohydrate: At Least 130 g/day  Fluids: 1500 mL/day (1ml/kcal) NUTRITION DIAGNOSES:  
Problem:  Inadequate energy intake Etiology: related to poor appetitie Signs/Symptoms: as evidenced by ~9% wt loss in ~2 mo (~12 lbs) NUTRITION INTERVENTIONS: 
Meals/Snacks: General/healthful diet   Supplements: Commercial supplement (Nepro BID) GOAL:  
consume >50% meals and 240 ml ONS next 3-5 days LEARNING NEEDS (Diet, Food/Nutrient-Drug Interaction):  
 [x] None Identified 
 [] Identified and Education Provided/Documented 
 [] Identified and Pt declined/was not appropriate Cultureal, Mandaeism, OR Ethnic Dietary Needs:  
 [x] None Identified 
 [] Identified and Addressed 
 
 [x] Interdisciplinary Care Plan Reviewed/Documented  
 [x] Discharge Planning: Continue consistent carb low Na diet, renal restrictions as needed MONITORING /EVALUATION:  
Food/Nutrient Intake Outcomes: Total energy intake Physical Signs/Symptoms Outcomes: Weight/weight change, Glucose profile NUTRITION RISK:  
 [x] Patient At Nutritional Risk        [] Patient Not At Nutritional Risk PT SEEN FOR:  
 []  MD Consult: []Calorie Count []Diabetic Diet Education []Diet Education []Electrolyte Management []General Nutrition Management and Supplements []Management of Tube Feeding []TPN Recommendations []  RN Referral:  []MST score >=2 
   []Enteral/Parenteral Nutrition PTA []Pregnant: Gestational DM or Multigestation 
   []Pressure Ulcer/Wound Care needs [x]  Low BMI 
[]  CHANDAN Gallegos Pager [phone] Weekend Pager [phone]

## 2020-01-28 NOTE — PROGRESS NOTES
Na 127 this morning , pt is asymptomatic Spoke with Dr Nely Iyer over the phone He is ok  To let the patient be discharged today with fluid restriction 1.5liter Repeat BMP in 2days POA updated

## 2020-01-28 NOTE — PROGRESS NOTES
Transition of Care Plan:      Pt admitted with respiratory failure, is DNR, Medicare//Medicaid, PCP Dr Abhinav Sharif, is [facility] who can accept back on Tuesday if stable, will need [facility] at d/c, and pt is now on RA. Has a DDNR on file which needs to be completed by Vilma Velazquez and MD prior to d/c-  He goes to [facility] dialysis. Please schedule for early morning Wednesday HD- I have called [facility] to request this 10a  MD requests d/c today. SNF can accept-they are aware DDNR needs to be completed, . On hold for d/c per MD-will check Na level first 
 
1300  All in agreement with 2pm [facility] ambulance transport to [address] [facility]. Please call report to [phone], send emar, d/c instructions, Rx for narcotics if any, facesheet//ambulance form//completed DDNR(on clipboard). Thanks

## 2020-01-28 NOTE — PROGRESS NOTES
Name: Irvine Gaucher. MRN: [id number] : 1950 Assessment: 
ESRD (MWF) HTN Fluid overload Hyponatremia Anemia of ESRD 
SHPT 
 
: Na still low. Has multiple liquids at bedside. He lacks insight. : Pt got his HD early am today. Got 2.5 Kg UF. His Na should be better post HD today. Plan/Recommendations: 
Ok for d/c from renal standpoint. Counseled to restrict fluid intake to 1.5 L/day Next HD on Wed, if pt still here D/W Dr. Taisha Delacruz Subjective: \"Can you turn the TV on? \" Confused at basln \"I think I live here\" ROS:  
Deferred- not reliable Exam: 
Visit Vitals /73 (BP 1 Location: Right arm, BP Patient Position: At rest) Pulse 86 Temp 98.3 °F (36.8 °C) Resp 14 Ht 5' 5\" (1.651 m) Wt 50.5 kg (111 lb 4.8 oz) SpO2 99% BMI 18.52 kg/m² NAD 
R conjuctival injection Clear with dec BS- L > R 
RRR Alert awake 
basln confused Current Facility-Administered Medications Medication Dose Route Frequency Last Dose  trimethoprim-polymyxin b (POLYTRIM) 10,000 unit- 1 mg/mL ophthalmic solution 1 Drop  1 Drop Both Eyes Q6H 1 Drop at 20 4373  lactobac ac& pc-s.therm-b.anim (RAH Q/RISAQUAD)  1 Cap Oral DAILY 1 Cap at 20 1338  insulin lispro (HUMALOG) injection   SubCUTAneous TIDAC 2 Units at 20 1118  [START ON 2020] levoFLOXacin (LEVAQUIN) 500 mg in D5W IVPB  500 mg IntraVENous Q48H    
 dextrose 10% infusion 0-250 mL  0-250 mL IntraVENous PRN    
 glucose chewable tablet 16 g  4 Tab Oral PRN    
 glucagon (GLUCAGEN) injection 1 mg  1 mg IntraMUSCular PRN    
 sodium chloride (NS) flush 5-40 mL  5-40 mL IntraVENous Q8H 10 mL at 20 0703  sodium chloride (NS) flush 5-40 mL  5-40 mL IntraVENous PRN    
 amLODIPine (NORVASC) tablet 10 mg  10 mg Oral DAILY 10 mg at 20 8716  
 aspirin delayed-release tablet 81 mg  81 mg Oral DAILY 81 mg at 01/28/20 0421  calcitRIOL (ROCALTROL) capsule 0.25 mcg  0.25 mcg Oral Q MON, WED & FRI 0.25 mcg at 01/27/20 2132  ferrous sulfate tablet 325 mg  325 mg Oral  mg at 01/28/20 0908  tamsulosin (FLOMAX) capsule 0.4 mg  0.4 mg Oral DAILY 0.4 mg at 01/28/20 0908  prednisoLONE acetate (PRED FORTE) 1 % ophthalmic suspension 1 Drop  1 Drop Right Eye BID 1 Drop at 01/28/20 8279  pantoprazole (PROTONIX) tablet 40 mg  40 mg Oral DAILY 40 mg at 01/28/20 0908  sodium chloride (NS) flush 5-40 mL  5-40 mL IntraVENous Q8H 10 mL at 01/28/20 0702  sodium chloride (NS) flush 5-40 mL  5-40 mL IntraVENous PRN    
 ondansetron (ZOFRAN) injection 4 mg  4 mg IntraVENous Q4H PRN    
 heparin (porcine) injection 5,000 Units  5,000 Units SubCUTAneous Q12H 5,000 Units at 01/28/20 0908 Labs/Data: 
 
Lab Results Component Value Date/Time WBC 5.0 01/27/2020 12:11 AM  
 Hemoglobin (POC) 10.5 (L) 04/07/2017 07:30 AM  
 HGB 10.0 (L) 01/27/2020 12:11 AM  
 Hematocrit (POC) 31 (L) 04/07/2017 07:30 AM  
 HCT 29.6 (L) 01/27/2020 12:11 AM  
 PLATELET 248 (L) 66/25/3773 12:11 AM  
 MCV 85.5 01/27/2020 12:11 AM  
 
 
Lab Results Component Value Date/Time Sodium 127 (L) 01/28/2020 10:17 AM  
 Potassium 4.6 01/28/2020 10:17 AM  
 Chloride 92 (L) 01/28/2020 10:17 AM  
 CO2 27 01/28/2020 10:17 AM  
 Anion gap 8 01/28/2020 10:17 AM  
 Glucose 263 (H) 01/28/2020 10:17 AM  
 BUN 13 01/28/2020 10:17 AM  
 Creatinine 3.31 (H) 01/28/2020 10:17 AM  
 BUN/Creatinine ratio 4 (L) 01/28/2020 10:17 AM  
 GFR est AA 23 (L) 01/28/2020 10:17 AM  
 GFR est non-AA 19 (L) 01/28/2020 10:17 AM  
 Calcium 8.2 (L) 01/28/2020 10:17 AM  
 
 
Wt Readings from Last 3 Encounters:  
01/28/20 50.5 kg (111 lb 4.8 oz) 12/02/19 55.9 kg (123 lb 3.8 oz) 05/08/17 55.9 kg (123 lb 3.8 oz) Intake/Output Summary (Last 24 hours) at 1/28/2020 5961 Last data filed at 1/28/2020 0109 Gross per 24 hour Intake 600 ml Output  Net 600 ml Patient seen and examined. Chart reviewed. Labs, data and other pertinent notes reviewed in last 24 hrs. PMH/SH/FH reviewed and unchanged compared to H&P Discussed with pt and Dr. Filiberto Colon MD

## 2020-01-28 NOTE — PROGRESS NOTES
Bedside and Verbal shift change report given to Pablito Bhatt (oncoming nurse) by Anuj Watkins RN (offgoing nurse). Report included the following information SBAR, Kardex, Intake/Output, MAR, Accordion, Recent Results and Med Rec Status.

## 2020-01-28 NOTE — DISCHARGE SUMMARY
Hospitalist Discharge Summary Patient ID: 
Nathanial Lesch 
[id number] 
50 y.o. 
1950 
1/25/2020 PCP on record: iJm Jade MD 
 
Admit date: 1/25/2020 Discharge date and time: 1/28/2020 DISCHARGE DIAGNOSIS: 
cute hypoxic respiratory failure, secondary to pulmonary edema in the setting of ESRD on HD TTS 
ESRD Underlying HCAP cannot be excluded Hypothermia DMII with hypoglycemia R eye conjunctivitis HTN  
Alzheimer's Dementia Hypokalemia Legally blind BPH, HLD 
 
CONSULTATIONS: 
IP CONSULT TO NEPHROLOGY Excerpted HPI from H&P of Razia Hicks MD: 
Michael Isaacs is a 81 y.o.  Amrican male who presents with CC listed above. Pt with history of dementia, and therefore history is difficult to obtain. Pt does not know what his dialysis schedule is, who takes him to the sessions, where he lives, and where he currently is.  
  
We were asked to admit for work up and evaluation of the above problems. ______________________________________________________________________ DISCHARGE SUMMARY/HOSPITAL COURSE:  for full details see H&P, daily progress notes, labs, consult notes. Acute hypoxic respiratory failure, secondary to pulmonary edema in the setting of ESRD on HD TTS resolved ESRD Underlying HCAP cannot be excluded  
-s/p urgent HD 1/25  
no leukocytosis/fever 
cxray on 01/27 with slight improvement in vascular congestion pattern - c/w Levaquin  for possible underlying pneumonia , end date 01/30 Pt saturating well on RA  
-Nephrology help appreciated: HD in am  
-CXR on admission: Interval increase in left pleural effusion with relatively stable 
right pleural effusion. Moderate pulmonary edema not significantly changed with 
bibasilar airspace opacities - clinically due better  
  
Hyponatremia Na 127 , corrected with glucose level is 129 on 01/28 Fluid restriction at 1.5liter Discussed with nephro Repeat BMP in 2days Hypothermia resolved  
- can be due to hypoglycemia or underlying infectious process 
-temp 98degrees F  
 
DMII with hypoglycemia resolved Now hyperglycemia 
- recurrent hypoglycemia s/p D5-  
- now BS in the 200's C/w home insulin regimen at reduced doses  
  
HTN 
-BP stable 
-cont home meds  
  
Alzheimer's Dementia - MS at baseline 
-watch for delirium with acute medical problems  
  
Hypokalemia Legally blind BPH, cont Flomax HLD 
 
_______________________________________________________________________ Patient seen and examined by me on discharge day. Pertinent Findings: 
Gen:    Not in distress Chest: Clear lungs CVS:   Regular rhythm. No edema Abd:  Soft, not distended, not tender Neuro:  Alert, orientedx2 
_______________________________________________________________________ DISCHARGE MEDICATIONS:  
Current Discharge Medication List  
  
START taking these medications Details L. acidoph & paracasei- S therm- Bifido (RAH-Q/RISAQUAD) 8 billion cell cap cap Take 1 Cap by mouth daily for 10 days. Qty: 10 Cap, Refills: 0  
  
levoFLOXacin (LEVAQUIN) 500 mg tablet Take 1 Tab by mouth every fourty-eight (48) hours for 2 days. Qty: 1 Tab, Refills: 0  
  
trimethoprim-polymyxin b (POLYTRIM) ophthalmic solution Administer 1 Drop to both eyes every six (6) hours for 20 doses. Qty: 1 Bottle, Refills: 0 CONTINUE these medications which have CHANGED Details  
!! insulin aspart U-100 (NOVOLOG FLEXPEN U-100 INSULIN) 100 unit/mL (3 mL) inpn 10 Units by SubCUTAneous route every evening for 30 days. Qty: 3 mL, Refills: 0  
  
 !! - Potential duplicate medications found. Please discuss with provider. CONTINUE these medications which have NOT CHANGED Details  
!! insulin aspart U-100 (NOVOLOG FLEXPEN U-100 INSULIN) 100 unit/mL (3 mL) inpn 10 Units by SubCUTAneous route daily. Indications: every morning  
  
pantoprazole (PROTONIX) 40 mg tablet Take 40 mg by mouth daily. amLODIPine (NORVASC) 10 mg tablet Take 1 Tab by mouth daily for 60 days. Qty: 30 Tab, Refills: 1  
  
calcitRIOL (ROCALTROL) 0.25 mcg capsule Take 1 Cap by mouth every Monday, Wednesday, Friday. ferrous sulfate 325 mg (65 mg iron) tablet Take 1 Tab by mouth Daily (before breakfast). aspirin delayed-release 81 mg tablet Take 81 mg by mouth daily. insulin aspart U-100 (NOVOLOG U-100 INSULIN ASPART) 100 unit/mL injection by SubCUTAneous route two (2) times a day. Blood Glucose (mg/dL)       Insulin Dose (units) 151-220                               2  
            221-260                               4  
            261-300                               5  
            301-350                               8  
            351-450                               10  
            450+                                   Call MD  
  
prednisoLONE acetate (PRED FORTE) 1 % ophthalmic suspension Administer 1 Drop to right eye two (2) times a day. vit Bcomp,C/folic acid/zinc (RENAL MULTIVITAMIN/ZINC PO) Take 1 Tab by mouth daily. acetaminophen (TYLENOL) 325 mg tablet Take 2 Tabs by mouth every four (4) hours as needed. For pain. Qty: 100 Tab, Refills: 0  
  
albuterol (PROVENTIL VENTOLIN) 2.5 mg /3 mL (0.083 %) nebulizer solution 2.5 mg by Nebulization route every four (4) hours as needed for Shortness of Breath. tamsulosin (FLOMAX) 0.4 mg capsule Take 0.4 mg by mouth daily. glucagon (GLUCAGEN) 1 mg injection 1 mL by IntraMUSCular route as needed for Hypoglycemia. Qty: 1 Vial, Refills: 0  
  
 !! - Potential duplicate medications found. Please discuss with provider. Patient Follow Up Instructions: Activity: Activity as tolerated Diet: Diabetic Diet Wound Care: None needed Follow-up with PCP  in 7 days. Follow-up tests/labs : repeat BMP in 2days Follow-up Information Follow up With Specialties Details Why Contact Info Mihir Augustin MD Internal Medicine   [address] [facility] [place] [address] 
[phone] 
  
  
 
________________________________________________________________ Risk of deterioration: Moderate Condition at Discharge:  Stable 
__________________________________________________________________ Disposition SNF/LTC 
 
____________________________________________________________________ Code Status: DNR/DNI 
___________________________________________________________________ Total time in minutes spent coordinating this discharge (includes going over instructions, follow-up, prescriptions, and preparing report for sign off to her PCP) :  40 minutes Signed: 
Aristides Ly MD

## 2020-01-28 NOTE — DISCHARGE INSTRUCTIONS
HOSPITALIST DISCHARGE INSTRUCTIONS    NAME: Frank Coulter. :  1950   MRN:  [id number]     Date/Time:  2020 9:58 AM    ADMIT DATE: 2020   DISCHARGE DATE: 2020     Attending Physician: Rashi Araiza MD    DISCHARGE DIAGNOSIS:  Acute hypoxic respiratory failure, secondary to pulmonary edema in the setting of ESRD on HD TTS  ESRD   Underlying HCAP cannot be excluded   Hypothermia   DMII with hypoglycemia   R eye conjunctivitis  HTN   Alzheimer's Dementia  Hypokalemia   Legally blind  BPH,  HLD     Medications: Per above medication reconciliation. Pain Management: per above medications    Recommended diet: Renal diabetic diet and fluid restriction 1.5liter     Recommended activity: Activity as tolerated    Wound care: None    Indwelling devices:  None    Supplemental Oxygen: None    Required Lab work: repeat BMP in 2days to check na level   Glucose management:  Accucheck ACHS with sliding scale per SNF protocol    Code status: DNR        Outside physician follow up: Follow-up Information     Follow up With Specialties Details Why Contact Info    Tari Farfan MD Internal Medicine   [address] [address] [place] [address]  [phone]                 Skilled nursing facility/ SNF MD responsible for above on discharge. Information obtained by :  I understand that if any problems occur once I am at home I am to contact my physician. I understand and acknowledge receipt of the instructions indicated above.                                                                                                                                            Physician's or R.N.'s Signature                                                                  Date/Time                                                                                                                                              Patient or Repres

## 2020-02-04 NOTE — PROGRESS NOTES
Community Care Team documentation for patient in [facility] Initial Follow Up Patient was discharged to [facility]. Information included in this progress note has been provided to SNF. Hospital Admission and Diagnosis: [facility] 01/25/2020-01/28/2020 cute hypoxic respiratory failure, secondary to pulmonary edema PCP : Freddy Lomas MD 
 
SNF Attending:  Dr. Isela Mullen Spoke with SNF team.  SNF Medical Update: Dialysis patient; PT/OT Update: variable participation in therapy; walks with handheld assist; currently refusing to eat; minimal nutritional intake. Possible transition to Hospice. Therapy plan is for two weeks then transition back to LTC at [facility] will follow up weekly with [facility] until discharge. Medications were not reconciled and general patient assessment was not completed during this [facility] outreach. Chanel 23, [place], [address] This note will not be viewable in [address]

## 2020-02-11 PROBLEM — T68.XXXA HYPOTHERMIA: Status: ACTIVE | Noted: 2020-01-01

## 2020-02-11 PROBLEM — E16.2 HYPOGLYCEMIA: Status: ACTIVE | Noted: 2020-01-01

## 2020-02-11 NOTE — PROGRESS NOTES
1500- Pt arrived to room. VSS. No complaints of pain. Primary Nurse Isaac Tilley, MICHAEL and Rgoers Dupree, RN performed a dual skin assessment on this patient Impairment noted- see wound doc flow sheet Luke score is 18 Unable to completed admission data base d/t AMS.   
 
1900- Report given to oncoming nurse by Shira Chun RN

## 2020-02-11 NOTE — PROGRESS NOTES
Pharmacy Clarification of the Prior to Admission Medication Regimen Retrospective to the Admission Medication Reconciliation The patient was not interviewed regarding clarification of the prior to admission medication regimen. Patient was transferred from [facility], to [facility], with a current med list. Pharmacy called [facility], [phone], and spoke with Jeff ([place]) (caregiver), who was able to verify the patient's last administered doses. Information Obtained From: Transfer papers Recommendations/Findings: The following amendments were made to the patient's active medication list on file at [facility]:  
 
1) Additions:  
amLODIPine (NORVASC) 10 mg tablet 
ondansetron hcl (ZOFRAN) 4 mg tablet 2) Removals: None 3) Changes:None 4) Pertinent Pharmacy Findings: 
Updated patients preferred outpatient pharmacy to: [facility] did not update the outpatient pharmacy due to the patient living at a SNF  
acetaminophen (TYLENOL) 325 mg tablet: Per SNF this PRN agent has not been given as of 2020. Per SNF Nurse, patient receives dialysis at the [facility] Mon/Wed/Fri. Patient was last dialyzed 2/10/2020 and received full treatment. PTA medication list was corrected to the following:  
 
Prior to Admission Medications Prescriptions Last Dose Informant Taking?  
acetaminophen (TYLENOL) 325 mg tablet Not Taking at Unknown time Transfer Papers No  
Sig: Take 2 Tabs by mouth every four (4) hours as needed. For pain. albuterol (PROVENTIL VENTOLIN) 2.5 mg /3 mL (0.083 %) nebulizer solution 2020 at Unknown time Transfer Papers Yes Si.5 mg by Nebulization route every four (4) hours as needed for Shortness of Breath. amLODIPine (NORVASC) 10 mg tablet 2/10/2020 at Unknown time Transfer Papers Yes Sig: Take 10 mg by mouth daily. aspirin delayed-release 81 mg tablet 2/10/2020 at Unknown time Transfer Papers Yes Sig: Take 81 mg by mouth daily. calcitRIOL (ROCALTROL) 0.25 mcg capsule 2/10/2020 at Unknown time Transfer Papers Yes Sig: Take 1 Cap by mouth every Monday, Wednesday, Friday. ferrous sulfate 325 mg (65 mg iron) tablet 2/10/2020 at Unknown time Transfer Papers Yes Sig: Take 1 Tab by mouth Daily (before breakfast). glucagon (GLUCAGEN) 1 mg injection 2020 at Unknown time Transfer Papers Yes Si mL by IntraMUSCular route as needed for Hypoglycemia. insulin aspart U-100 (NOVOLOG FLEXPEN U-100 INSULIN) 100 unit/mL (3 mL) inpn 2/10/2020 at Unknown time Transfer Papers Yes Sig: 10 Units by SubCUTAneous route two (2) times a day. Patient takes 10 units BID plus sliding scale. Indications: every morning  
insulin aspart U-100 (NOVOLOG U-100 INSULIN ASPART) 100 unit/mL injection 2020 at Unknown time Transfer Papers Yes Si-10 Units by SubCUTAneous route two (2) times daily as needed. Patient takes 10 units BID plus sliding scale Blood Glucose (mg/dL)       Insulin Dose (additional units) 151-220                               2  
            221-260                               4  
            261-300                               5  
            301-350                               8  
            351-450                               10  
            >450                                   Call MD  
ondansetron hcl (ZOFRAN) 4 mg tablet 2020 at Unknown time Transfer Papers Yes Sig: Take 4 mg by mouth every six (6) hours as needed for Nausea or Vomiting. pantoprazole (PROTONIX) 40 mg tablet 2/10/2020 at Unknown time Transfer Papers Yes Sig: Take 40 mg by mouth daily. prednisoLONE acetate (PRED FORTE) 1 % ophthalmic suspension 2/10/2020 at Unknown time Transfer Papers Yes Sig: Administer 1 Drop to right eye two (2) times a day. tamsulosin (FLOMAX) 0.4 mg capsule 2/10/2020 at Unknown time Transfer Papers Yes Sig: Take 0.4 mg by mouth daily. vit Bcomp,C/folic acid/zinc (RENAL MULTIVITAMIN/ZINC PO) 2/10/2020 at Unknown time Transfer Papers Yes Sig: Take 1 Tab by mouth daily. Facility-Administered Medications: None Thank you, Jennifer Sanford, [facility] Certified Medication History Technician

## 2020-02-11 NOTE — ED NOTES
Pt resting comfortably in stretcher. Pt shows improved level of alertness and responds appropriately.

## 2020-02-11 NOTE — ED TRIAGE NOTES
Pt arrived EMS from [address] [address] with hypoglycemia. Staff at [facility] facility found Pt unresponsive with snoring resparations. BG check at facility was 34. IM glucagon IM administered by facility and EMS was called. Per EMS, Pt alert and responsive on their arrival. BG check on arrival to ED [facility] is 67. VS are stable.

## 2020-02-11 NOTE — PROGRESS NOTES
Spiritual Care Assessment/Progress Note [facility] 
 
 
NAME: Kalie Mcgee MRN: [id number] AGE: 81 y.o. SEX: male Gnosticism Affiliation: [facility]  
Language: [place] 2/11/2020     Total Time (in minutes): 17 Spiritual Assessment begun in [facility] 2 PROGRESSIVE CARE through conversation with: 
  
    [x]Patient        [] Family    [] Friend(s) Reason for Consult: Palliative Care, Initial/Spiritual Assessment Spiritual beliefs: (Please include comment if needed) [x] Identifies with a oliverio tradition:     
   [] Supported by a oliverio community:        
   [] Claims no spiritual orientation:       
   [] Seeking spiritual identity:            
   [] Adheres to an individual form of spirituality:       
   [] Not able to assess:                   
 
    
Identified resources for coping:  
   [x] Prayer                           
   [] Music                  [] Guided Imagery 
   [] Family/friends                 [] Pet visits [] Devotional reading                         [] Unknown 
   [] Other:                                          
 
 
Interventions offered during this visit: (See comments for more details) Patient Interventions: Affirmation of emotions/emotional suffering, Affirmation of oliverio, Prayer (assurance of), Iconic (affirming the presence of God/Higher Power) Plan of Care: 
 
 [] Support spiritual and/or cultural needs  
 [] Support AMD and/or advance care planning process    
 [] Support grieving process 
 [] Coordinate Rites and/or Rituals  
 [] Coordination with community clergy 
 [x] No spiritual needs identified at this time 
 [] Detailed Plan of Care below (See Comments)  [] Make referral to Music Therapy 
[] Make referral to Pet Therapy    
[] Make referral to Addiction services 
[] Make referral to [facility] 
[] Make referral to Spiritual Care Partner 
[] No future visits requested       
[x] Follow up visits as needed Comments: The patient was visited on the Progressive Care unit to make a spiritual assessment. The patient did not have family/friends present during the visit. The patient greeted me appropriately and stated that he is alright now that he has his coffee. The patient took sip after sip as we talked. After explaining the purpose of the visit as being spiritual, the patient stated that he liked that. The patient agreed that perhaps I should come back and visit after he has enjoyed his cup of coffee. Advised of  availability. Chaplains will follow as able and/or needed. Rev. Belle Hernandez EdD  MDiv  For [facility] Page [phone])

## 2020-02-11 NOTE — H&P
Hospitalist Admission Note NAME: Janith Pallas. :  1950 MRN:  [id number] Date/Time:  2020 1:10 PM 
 
Patient PCP: Ghada Osman MD 
______________________________________________________________________ Given the patient's current clinical presentation, I have a high level of concern for decompensation if discharged from the emergency department. Complex decision making was performed, which includes reviewing the patient's available past medical records, laboratory results, and x-ray films. My assessment of this patient's clinical condition and my plan of care is as follows. Assessment / Plan: 
 
Acute encephalopathy resolving likely dt hypoglycemia Hypoglycemia resolving  D10  prn Hypothermia Blood cx/check la Check procal 
Hold abbx for now  No clear infection. DC d10 Cont w bedhugger Ct head  No acute intracranial hemorrhage, mass or infarct. ESRD on HD MWF Anemia contw supplements 
-Admit Telemetry 
-Continue O2 supplementation --Nephrology Consult -  
-CXR: IMPRESSION: 
1. The small left pleural effusion is similar in size to slightly decreased in 
volume compared to 2020. 
2. Interval improved aeration of the right lung with resolution of the 
previously seen right pleural effusion. DMII 
-FS monitoring, SS 
-hold insulin now start SS as needed with meals alone to avoid hypoglycemia If BS remain stable add slowly lantus at bedtime  
 
  
Alzheimer's Dementia MS at baseline 
-watch for delirium with acute medical problems Legally blind HTN 
HLD 
-Resume home meds 
  
Code Status: DDNR on file Surrogate Decision Maker: Sister DVT Prophylaxis: Heparin GI Prophylaxis: not indicated Baseline: Dependent IADLs Subjective: CHIEF COMPLAINT: low sugar HISTORY OF PRESENT ILLNESS:    
Ata Amaral is a 81 y.o. black male with a past medical history as documented below presented to the emergency room with acute encephalopathy unresponsive prior to arrival patient was noted to have low sugar at the nursing facility patient resides at the [facility]. per nursing  Pt  appeared to be altered this morning when they checked his sugar was in the low 30s 911 was called patient was given IV glucagon  followed by 10 D10 patient in the emergency room remain with low sugars started on D10. Patient has history of end-stage renal disease gets dialysis Monday Wednesday Friday was dialyzed yesterday history of hypertension here recently was discharged from this hospital 2 weeks ago for acute respiratory failure secondary to edema and pneumonia. Patient has dementia. Legally blind. Patient when I examined the patient in the emergency room  was more alert because she was were hovering around 300. D10 was discontinue patient is able to answer questions appropriately wants to drink. Denies any chest pain no difficulty breathing. No headache no fever chills. We were asked to admit for work up and evaluation of the above problems. Past Medical History:  
Diagnosis Date  Alzheimer disease ([facility] [address]  CAD (coronary artery disease)  Cancer [facility])   
 prostate  Chronic kidney disease  CKD (chronic kidney disease) stage 4, GFR 15-29 ml/min ([facility])  DM (diabetes mellitus), type 2, uncontrolled ([facility] [address] [facility]  Hemodialysis patient ([facility] [facility]  Hyperlipidemia  Hypertension  Other ill-defined conditions(799.89)   
 blind R eye-retina detachment  Other ill-defined conditions(799.89) right cerebellopontine angle lipoma. Past Surgical History:  
Procedure Laterality Date  COLONOSCOPY,DIAGNOSTIC  11/12/2014  HX HEENT    
 retinal detachment  HX SHOULDER ARTHROSCOPY    
 right  HX UROLOGICAL    
 prostate bx  UPPER GI ENDOSCOPY,BIOPSY  11/12/2014 Social History Tobacco Use  
  Smoking status: Never Smoker  Smokeless tobacco: Never Used Substance Use Topics  Alcohol use: No  
  
 
Family History Problem Relation Age of Onset  Heart Disease Mother Pacemaker  Cancer Father Allergies Allergen Reactions  Ace Inhibitors Unknown (comments)  Arb-Angiotensin Receptor Antagonist Unknown (comments) Prior to Admission medications Medication Sig Start Date End Date Taking? Authorizing Provider  
ondansetron hcl (ZOFRAN) 4 mg tablet Take 4 mg by mouth every six (6) hours as needed for Nausea or Vomiting. Yes Provider, Historical  
amLODIPine (NORVASC) 10 mg tablet Take 10 mg by mouth daily. Yes Provider, Historical  
insulin aspart U-100 (NOVOLOG FLEXPEN U-100 INSULIN) 100 unit/mL (3 mL) inpn 10 Units by SubCUTAneous route two (2) times a day. Patient takes 10 units BID plus sliding scale. Indications: every morning   Yes Provider, Historical  
pantoprazole (PROTONIX) 40 mg tablet Take 40 mg by mouth daily. Yes Provider, Historical  
calcitRIOL (ROCALTROL) 0.25 mcg capsule Take 1 Cap by mouth every Monday, Wednesday, Friday. 12/4/19  Yes Maia Monroy MD  
ferrous sulfate 325 mg (65 mg iron) tablet Take 1 Tab by mouth Daily (before breakfast). 12/4/19  Yes Maia Monroy MD  
aspirin delayed-release 81 mg tablet Take 81 mg by mouth daily. Yes Provider, Historical  
insulin aspart U-100 (NOVOLOG U-100 INSULIN ASPART) 100 unit/mL injection 2-10 Units by SubCUTAneous route two (2) times daily as needed. Patient takes 10 units BID plus sliding scale Blood Glucose (mg/dL)       Insulin Dose (additional units)             151-220                               2  
            221-260                               4  
            261-300                               5  
            301-350                               8  
            351-450                               10  
 >450                                   Call MD   Yes Provider, Historical  
prednisoLONE acetate (PRED FORTE) 1 % ophthalmic suspension Administer 1 Drop to right eye two (2) times a day. Yes Provider, Historical  
vit Bcomp,C/folic acid/zinc (RENAL MULTIVITAMIN/ZINC PO) Take 1 Tab by mouth daily. Yes Provider, Historical  
albuterol (PROVENTIL VENTOLIN) 2.5 mg /3 mL (0.083 %) nebulizer solution 2.5 mg by Nebulization route every four (4) hours as needed for Shortness of Breath. Yes Provider, Historical  
tamsulosin (FLOMAX) 0.4 mg capsule Take 0.4 mg by mouth daily. Yes Other, MD Yumi  
glucagon (GLUCAGEN) 1 mg injection 1 mL by IntraMUSCular route as needed for Hypoglycemia. 6/25/12  Yes Otoniel Mayer MD  
acetaminophen (TYLENOL) 325 mg tablet Take 2 Tabs by mouth every four (4) hours as needed. For pain. 4/8/17   Khang Arango MD  
 
 
REVIEW OF SYSTEMS:    
I am not able to complete the review of systems because: The patient is intubated and sedated The patient has altered mental status due to his acute medical problems The patient has baseline aphasia from prior stroke(s)  
x The patient has baseline dementia and is not reliable historian The patient is in acute medical distress and unable to provide information Objective: VITALS:   
Visit Vitals /74 Pulse 76 Temp 96 °F (35.6 °C) Resp 15 SpO2 98% PHYSICAL EXAM: 
 
General:    Alert, cooperative, no distress, appears stated age. L AVG patent thin male HEENT: Atraumatic, anicteric sclerae, pink conjunctivae No oral ulcers, mucosa moist, throat clear, dentition fair Neck:  Supple, symmetrical,  thyroid: non tender no meningismus Lungs:   Clear to auscultation bilaterally. No Wheezing or Rhonchi. No rales. Chest wall:  No tenderness  No Accessory muscle use. Heart:   Regular  rhythm,  No  murmur   No edema Abdomen:   Soft, non-tender. Not distended. Bowel sounds normal 
Extremities: No cyanosis. No clubbing,   
  Skin turgor normal, Capillary refill normal, Radial dial pulse 2+ Skin:     Not pale. Not Jaundiced  No rashes Psych:  Unable  To assess . Not depressed. Not anxious or agitated. Neurologic: EOMs intact. No facial asymmetry. No aphasia or slurred speech. Symmetrical strength, Sensation grossly intact. Alert and oriented X 1.  
 
_______________________________________________________________________ Care Plan discussed with: 
  Comments Patient x Family RN x Care Manager x Consultant:  x   
_______________________________________________________________________ Expected  Disposition:  
Home with Family HH/PT/OT/RN   
SNF/LTC x  
JESUS   
________________________________________________________________________ TOTAL TIME:  60 Minutes Critical Care Provided     Minutes non procedure based Comments  
 x Reviewed previous records  
>50% of visit spent in counseling and coordination of care x Discussion with patient and/or family and questions answered 
  
 
________________________________________________________________________ Signed: Yaakov Hernandez MD 
 
Procedures: see electronic medical records for all procedures/Xrays and details which were not copied into this note but were reviewed prior to creation of Plan. LAB DATA REVIEWED:   
Recent Results (from the past 24 hour(s)) GLUCOSE, POC Collection Time: 02/11/20  8:44 AM  
Result Value Ref Range Glucose (POC) 67 65 - 100 mg/dL Performed by Amanda Bermeo RN   
EKG, 12 LEAD, INITIAL Collection Time: 02/11/20  9:05 AM  
Result Value Ref Range Ventricular Rate 85 BPM  
 Atrial Rate 85 BPM  
 P-R Interval 92 ms QRS Duration 70 ms Q-T Interval 368 ms QTC Calculation (Bezet) 437 ms Calculated P Axis 41 degrees Calculated R Axis 3 degrees Calculated T Axis -75 degrees Diagnosis Sinus rhythm with short RI Septal infarct , age undetermined When compared with ECG of 25-JAN-2020 11:39, No significant change was found CBC WITH AUTOMATED DIFF Collection Time: 02/11/20  9:15 AM  
Result Value Ref Range WBC 8.1 4.1 - 11.1 K/uL  
 RBC 4.40 4. 10 - 5.70 M/uL  
 HGB 12.6 12.1 - 17.0 g/dL HCT 37.7 36.6 - 50.3 % MCV 85.7 80.0 - 99.0 FL  
 MCH 28.6 26.0 - 34.0 PG  
 MCHC 33.4 30.0 - 36.5 g/dL  
 RDW 16.9 (H) 11.5 - 14.5 % PLATELET 553 126 - 232 K/uL MPV 10.1 8.9 - 12.9 FL  
 NRBC 0.0 0  WBC ABSOLUTE NRBC 0.00 0.00 - 0.01 K/uL NEUTROPHILS 83 (H) 32 - 75 % LYMPHOCYTES 6 (L) 12 - 49 % MONOCYTES 7 5 - 13 % EOSINOPHILS 2 0 - 7 % BASOPHILS 1 0 - 1 % IMMATURE GRANULOCYTES 1 (H) 0.0 - 0.5 % ABS. NEUTROPHILS 6.6 1.8 - 8.0 K/UL  
 ABS. LYMPHOCYTES 0.5 (L) 0.8 - 3.5 K/UL  
 ABS. MONOCYTES 0.6 0.0 - 1.0 K/UL  
 ABS. EOSINOPHILS 0.2 0.0 - 0.4 K/UL  
 ABS. BASOPHILS 0.1 0.0 - 0.1 K/UL  
 ABS. IMM. GRANS. 0.1 (H) 0.00 - 0.04 K/UL  
 DF AUTOMATED    
 RBC COMMENTS ANISOCYTOSIS 
1+ METABOLIC PANEL, COMPREHENSIVE Collection Time: 02/11/20  9:15 AM  
Result Value Ref Range Sodium 132 (L) 136 - 145 mmol/L Potassium 4.4 3.5 - 5.1 mmol/L Chloride 98 97 - 108 mmol/L  
 CO2 26 21 - 32 mmol/L Anion gap 8 5 - 15 mmol/L Glucose 151 (H) 65 - 100 mg/dL BUN 12 6 - 20 MG/DL Creatinine 3.46 (H) 0.70 - 1.30 MG/DL  
 BUN/Creatinine ratio 3 (L) 12 - 20 GFR est AA 22 (L) >60 ml/min/1.73m2 GFR est non-AA 18 (L) >60 ml/min/1.73m2 Calcium 8.6 8.5 - 10.1 MG/DL Bilirubin, total 1.7 (H) 0.2 - 1.0 MG/DL  
 ALT (SGPT) 73 12 - 78 U/L  
 AST (SGOT) 80 (H) 15 - 37 U/L Alk. phosphatase 461 (H) 45 - 117 U/L Protein, total 7.0 6.4 - 8.2 g/dL Albumin 3.3 (L) 3.5 - 5.0 g/dL Globulin 3.7 2.0 - 4.0 g/dL A-G Ratio 0.9 (L) 1.1 - 2.2 GLUCOSE, POC Collection Time: 02/11/20  9:36 AM  
Result Value Ref Range Glucose (POC) 110 (H) 65 - 100 mg/dL Performed by Hugo Mantilla RN   
LACTIC ACID Collection Time: 02/11/20 10:06 AM  
Result Value Ref Range Lactic acid 2.1 (HH) 0.4 - 2.0 MMOL/L  
GLUCOSE, POC Collection Time: 02/11/20 10:53 AM  
Result Value Ref Range Glucose (POC) 285 (H) 65 - 100 mg/dL Performed by Alicia Aguilera (traveler) GLUCOSE, POC Collection Time: 02/11/20 12:05 PM  
Result Value Ref Range Glucose (POC) 344 (H) 65 - 100 mg/dL Performed by Alicia Aguilera (traveler) PROCALCITONIN Collection Time: 02/11/20 12:06 PM  
Result Value Ref Range Procalcitonin 1.09 ng/mL

## 2020-02-11 NOTE — ED PROVIDER NOTES
EMERGENCY DEPARTMENT HISTORY AND PHYSICAL EXAM 
 
 
Please note that this dictation was completed with Wowboard, the computer voice recognition software. Quite often unanticipated grammatical, syntax, homophones, and other interpretive errors are inadvertently transcribed by the computer software. Please disregard these errors and any errors that have escaped final proofreading. Thank you. Date: 2/11/2020 Patient Name: Tiara Cochran. Patient Age and Sex: 81 y.o. male History of Presenting Illness Chief Complaint Patient presents with  Low Blood Sugar History Provided By: Patient and EMS 
 
HPI: Tiara Tadeo, 81 y.o. male with past medical history as documented below presents to the ED with c/o of acute unresponsive episode prior to arrival with low blood sugar readings. According to staff at [address] [place] [address], patient appeared to be altered this morning and they checked her blood sugar was noted to be in the 30s. EMS was called, they did give IM glucagon followed by D10, patient remains altered however apparently more perked up than EMS arrival  He does have a history of ESRD, gets dialysis Monday, Wednesday, Friday, history of hypertension and hyponatremia. Patient was recently admitted at [facility] for acute respiratory failure, secondary pulmonary edema and noncompliance, H CAP, hypothermia. Patient does have dementia at baseline. He is also legally blind. History is limited secondary to dementia and altered mental status. 
  
Per chart review, patient was empirically cover with IV antibiotics during hospitalization with Levaquin for possible underlying pneumonia. Patient completed this on January 30. Patient was discharged home without oxygen requirement. He also had hyponatremia that corrected itself with fluid restriction. There are no other complaints, changes or physical findings at this time. PCP: Ebonie Villanueva MD 
 
Past History Past Medical History: Past Medical History:  
Diagnosis Date  Alzheimer disease ([facility] [address]  CAD (coronary artery disease)  Cancer [facility])   
 prostate  Chronic kidney disease  CKD (chronic kidney disease) stage 4, GFR 15-29 ml/min ([facility])  DM (diabetes mellitus), type 2, uncontrolled ([facility] [address]  [facility]  Hyperlipidemia  Hypertension  Other ill-defined conditions(799.89)   
 blind R eye-retina detachment  Other ill-defined conditions(799.89) right cerebellopontine angle lipoma. Past Surgical History: 
Past Surgical History:  
Procedure Laterality Date  COLONOSCOPY,DIAGNOSTIC  11/12/2014  HX HEENT    
 retinal detachment  HX SHOULDER ARTHROSCOPY    
 right  HX UROLOGICAL    
 prostate bx  UPPER GI ENDOSCOPY,BIOPSY  11/12/2014 Family History: 
Family History Problem Relation Age of Onset  Heart Disease Mother Pacemaker  Cancer Father Social History: 
Social History Tobacco Use  Smoking status: Never Smoker  Smokeless tobacco: Never Used Substance Use Topics  Alcohol use: No  
 Drug use: No  
 
 
Allergies: Allergies Allergen Reactions  Ace Inhibitors Unknown (comments)  Arb-Angiotensin Receptor Antagonist Unknown (comments) Current Medications: No current facility-administered medications on file prior to encounter. Current Outpatient Medications on File Prior to Encounter Medication Sig Dispense Refill  ondansetron hcl (ZOFRAN) 4 mg tablet Take 4 mg by mouth every six (6) hours as needed for Nausea or Vomiting.  amLODIPine (NORVASC) 10 mg tablet Take 10 mg by mouth daily.  insulin aspart U-100 (NOVOLOG FLEXPEN U-100 INSULIN) 100 unit/mL (3 mL) inpn 10 Units by SubCUTAneous route two (2) times a day. Patient takes 10 units BID plus sliding scale. Indications: every morning  pantoprazole (PROTONIX) 40 mg tablet Take 40 mg by mouth daily.  calcitRIOL (ROCALTROL) 0.25 mcg capsule Take 1 Cap by mouth every Monday, Wednesday, Friday.  ferrous sulfate 325 mg (65 mg iron) tablet Take 1 Tab by mouth Daily (before breakfast).  aspirin delayed-release 81 mg tablet Take 81 mg by mouth daily.  insulin aspart U-100 (NOVOLOG U-100 INSULIN ASPART) 100 unit/mL injection 2-10 Units by SubCUTAneous route two (2) times daily as needed. Patient takes 10 units BID plus sliding scale Blood Glucose (mg/dL)       Insulin Dose (additional units) 151-220                               2  
            221-260                               4  
            261-300                               5  
            301-350                               8  
            351-450                               10  
            >450                                   Call MD    
 prednisoLONE acetate (PRED FORTE) 1 % ophthalmic suspension Administer 1 Drop to right eye two (2) times a day.  vit Bcomp,C/folic acid/zinc (RENAL MULTIVITAMIN/ZINC PO) Take 1 Tab by mouth daily.  albuterol (PROVENTIL VENTOLIN) 2.5 mg /3 mL (0.083 %) nebulizer solution 2.5 mg by Nebulization route every four (4) hours as needed for Shortness of Breath.  tamsulosin (FLOMAX) 0.4 mg capsule Take 0.4 mg by mouth daily.  glucagon (GLUCAGEN) 1 mg injection 1 mL by IntraMUSCular route as needed for Hypoglycemia. 1 Vial 0  
 [DISCONTINUED] insulin aspart U-100 (NOVOLOG FLEXPEN U-100 INSULIN) 100 unit/mL (3 mL) inpn 10 Units by SubCUTAneous route every evening for 30 days. 3 mL 0  
 acetaminophen (TYLENOL) 325 mg tablet Take 2 Tabs by mouth every four (4) hours as needed. For pain. 100 Tab 0 Review of Systems Review of Systems Unable to perform ROS: Mental status change Physical Exam  
Physical Exam 
Vitals signs and nursing note reviewed. Constitutional:   
   General: He is in acute distress. Appearance: He is well-developed. He is ill-appearing, toxic-appearing and diaphoretic. Comments: 94.4 F temp HENT:  
   Head: Normocephalic and atraumatic. Mouth/Throat:  
   Pharynx: No posterior oropharyngeal erythema. Eyes:  
   Conjunctiva/sclera: Conjunctivae normal.  
   Pupils: Pupils are equal, round, and reactive to light. Neck: Musculoskeletal: Normal range of motion. Cardiovascular:  
   Rate and Rhythm: Normal rate and regular rhythm. Heart sounds: Normal heart sounds. No murmur. No friction rub. No gallop. Pulmonary:  
   Effort: Pulmonary effort is normal. No respiratory distress. Breath sounds: Normal breath sounds. No wheezing or rales. Chest:  
   Chest wall: No tenderness. Abdominal:  
   General: Bowel sounds are normal. There is no distension. Palpations: Abdomen is soft. There is no mass. Tenderness: There is no abdominal tenderness. There is no guarding or rebound. Musculoskeletal: Normal range of motion. General: No tenderness or deformity. Skin: 
   General: Skin is warm. Findings: No rash. Comments: LUE AVF with good thrill Neurological:  
   Mental Status: He is alert. He is disoriented. Cranial Nerves: No cranial nerve deficit. Motor: No abnormal muscle tone. Coordination: Coordination normal.  
   Deep Tendon Reflexes: Reflexes normal.  
Psychiatric:     
   Behavior: Behavior is cooperative. Diagnostic Study Results Labs - Recent Results (from the past 24 hour(s)) GLUCOSE, POC Collection Time: 02/11/20  8:44 AM  
Result Value Ref Range Glucose (POC) 67 65 - 100 mg/dL Performed by Carolina Betancourt RN   
EKG, 12 LEAD, INITIAL Collection Time: 02/11/20  9:05 AM  
Result Value Ref Range Ventricular Rate 85 BPM  
 Atrial Rate 85 BPM  
 P-R Interval 92 ms QRS Duration 70 ms Q-T Interval 368 ms QTC Calculation (Bezet) 437 ms Calculated P Axis 41 degrees Calculated R Axis 3 degrees Calculated T Axis -75 degrees Diagnosis Sinus rhythm with short MO Septal infarct , age undetermined When compared with ECG of 25-JAN-2020 11:39, No significant change was found CBC WITH AUTOMATED DIFF Collection Time: 02/11/20  9:15 AM  
Result Value Ref Range WBC 8.1 4.1 - 11.1 K/uL  
 RBC 4.40 4. 10 - 5.70 M/uL  
 HGB 12.6 12.1 - 17.0 g/dL HCT 37.7 36.6 - 50.3 % MCV 85.7 80.0 - 99.0 FL  
 MCH 28.6 26.0 - 34.0 PG  
 MCHC 33.4 30.0 - 36.5 g/dL  
 RDW 16.9 (H) 11.5 - 14.5 % PLATELET 782 596 - 039 K/uL MPV 10.1 8.9 - 12.9 FL  
 NRBC 0.0 0  WBC ABSOLUTE NRBC 0.00 0.00 - 0.01 K/uL NEUTROPHILS 83 (H) 32 - 75 % LYMPHOCYTES 6 (L) 12 - 49 % MONOCYTES 7 5 - 13 % EOSINOPHILS 2 0 - 7 % BASOPHILS 1 0 - 1 % IMMATURE GRANULOCYTES 1 (H) 0.0 - 0.5 % ABS. NEUTROPHILS 6.6 1.8 - 8.0 K/UL  
 ABS. LYMPHOCYTES 0.5 (L) 0.8 - 3.5 K/UL  
 ABS. MONOCYTES 0.6 0.0 - 1.0 K/UL  
 ABS. EOSINOPHILS 0.2 0.0 - 0.4 K/UL  
 ABS. BASOPHILS 0.1 0.0 - 0.1 K/UL  
 ABS. IMM. GRANS. 0.1 (H) 0.00 - 0.04 K/UL  
 DF AUTOMATED    
 RBC COMMENTS ANISOCYTOSIS 
1+ METABOLIC PANEL, COMPREHENSIVE Collection Time: 02/11/20  9:15 AM  
Result Value Ref Range Sodium 132 (L) 136 - 145 mmol/L Potassium 4.4 3.5 - 5.1 mmol/L Chloride 98 97 - 108 mmol/L  
 CO2 26 21 - 32 mmol/L Anion gap 8 5 - 15 mmol/L Glucose 151 (H) 65 - 100 mg/dL BUN 12 6 - 20 MG/DL Creatinine 3.46 (H) 0.70 - 1.30 MG/DL  
 BUN/Creatinine ratio 3 (L) 12 - 20 GFR est AA 22 (L) >60 ml/min/1.73m2 GFR est non-AA 18 (L) >60 ml/min/1.73m2 Calcium 8.6 8.5 - 10.1 MG/DL Bilirubin, total 1.7 (H) 0.2 - 1.0 MG/DL  
 ALT (SGPT) 73 12 - 78 U/L  
 AST (SGOT) 80 (H) 15 - 37 U/L Alk. phosphatase 461 (H) 45 - 117 U/L Protein, total 7.0 6.4 - 8.2 g/dL Albumin 3.3 (L) 3.5 - 5.0 g/dL Globulin 3.7 2.0 - 4.0 g/dL A-G Ratio 0.9 (L) 1.1 - 2.2 GLUCOSE, POC  
 Collection Time: 02/11/20  9:36 AM  
Result Value Ref Range Glucose (POC) 110 (H) 65 - 100 mg/dL Performed by Una Kaur RN   
LACTIC ACID Collection Time: 02/11/20 10:06 AM  
Result Value Ref Range Lactic acid 2.1 (HH) 0.4 - 2.0 MMOL/L  
GLUCOSE, POC Collection Time: 02/11/20 10:53 AM  
Result Value Ref Range Glucose (POC) 285 (H) 65 - 100 mg/dL Performed by Jose Galdamez (traveler) GLUCOSE, POC Collection Time: 02/11/20 12:05 PM  
Result Value Ref Range Glucose (POC) 344 (H) 65 - 100 mg/dL Performed by Jose Galdamez (traveler) PROCALCITONIN Collection Time: 02/11/20 12:06 PM  
Result Value Ref Range Procalcitonin 1.09 ng/mL GLUCOSE, POC Collection Time: 02/11/20  2:31 PM  
Result Value Ref Range Glucose (POC) 284 (H) 65 - 100 mg/dL Performed by Jose Galdamez (traveler) LACTIC ACID Collection Time: 02/11/20  4:18 PM  
Result Value Ref Range Lactic acid 1.0 0.4 - 2.0 MMOL/L  
GLUCOSE, POC Collection Time: 02/11/20  4:27 PM  
Result Value Ref Range Glucose (POC) 256 (H) 65 - 100 mg/dL Performed by Carson Freire(CON) Radiologic Studies -  
CT HEAD WO CONT Final Result IMPRESSION: No acute intracranial hemorrhage, mass or infarct. XR CHEST PORT Final Result IMPRESSION:   
1. The small left pleural effusion is similar in size to slightly decreased in  
volume compared to 1/26/2020.  
2. Interval improved aeration of the right lung with resolution of the  
previously seen right pleural effusion. CT Results  (Last 48 hours) 02/11/20 0928  CT HEAD WO CONT Final result Impression:  IMPRESSION: No acute intracranial hemorrhage, mass or infarct. Narrative:  INDICATION: weakness, unresponsive episode Exam: Noncontrast CT of the brain is performed with 5 mm collimation. CT dose reduction was achieved with the use of the standardized protocol tailored for this examination and automatic exposure control for dose  
modulation. FINDINGS: There is mild diffuse cortical atrophy. There is no acute intracranial  
hemorrhage, mass, mass effect or herniation. Ventricular system is normal. The  
gray-white matter differentiation is well-preserved. The mastoid air cells are  
well pneumatized. CXR Results  (Last 48 hours) 02/11/20 0905  XR CHEST PORT Final result Impression:  IMPRESSION:   
1. The small left pleural effusion is similar in size to slightly decreased in  
volume compared to 1/26/2020.  
2. Interval improved aeration of the right lung with resolution of the  
previously seen right pleural effusion. Narrative:  EXAM:  XR CHEST PORT INDICATION: weakness, recent PNA COMPARISON: 1/26/2020. TECHNIQUE: Single frontal view of the chest.  
   
FINDINGS: Small left pleural effusion is similar to slightly decreased in volume  
compared to 1/26/2020. Improved aeration of the right lung with interval  
resolution of the previously seen right pleural effusion. No visualized  
pneumothorax bilaterally. Chronic osseous deformity of the left distal clavicle,  
acromioclavicular, and coracoclavicular joints. Chronic left inferolateral rib  
fractures. Medical Decision Making I am the first provider for this patient. I reviewed the vital signs, available nursing notes, past medical history, past surgical history, family history and social history. Vital Signs-Reviewed the patient's vital signs. Patient Vitals for the past 24 hrs: 
 Temp Pulse Resp BP SpO2  
02/11/20 1502 98.1 °F (36.7 °C) 78 20 153/71 99 % 02/11/20 1430 97.8 °F (36.6 °C) 91 18 145/73 100 % 02/11/20 1415  77 13 135/61 98 % 02/11/20 1400  84 19 132/64 99 % 02/11/20 1345  78 14 135/63 98 % 02/11/20 1330  80 19 145/70 98 % 02/11/20 1315  78 16 155/74 95 % 02/11/20 1300  78 16 152/75 97 % 02/11/20 1245  76 15 157/74 97 % 02/11/20 1230  76 15 146/74 98 % 02/11/20 1215  77 12 148/74 98 % 02/11/20 1200  77 15 152/76 97 % 02/11/20 1145  74 14 152/74 98 % 02/11/20 1130 96 °F (35.6 °C) 75 16 150/72 99 % 02/11/20 1115  73 11 156/75 98 % 02/11/20 1100  77 16  100 % 02/11/20 1045  71 13 144/79 98 % 02/11/20 1030  70 18 146/72 100 % 02/11/20 1015  69 11 152/74 100 % 02/11/20 1000  71 14  100 % 02/11/20 0945  76 12 155/79 100 % 02/11/20 0925     100 % 02/11/20 0913  86 25  100 % 02/11/20 0849 (!) 94.4 °F (34.7 °C) 90 18 175/90 100 % Pulse Oximetry Analysis - 100% on RA Cardiac Monitor:  
Rate: 90 bpm 
Rhythm: Normal Sinus Rhythm ED EKG interpretation: 
Rhythm: normal sinus rhythm; and regular . Rate (approx.): 85; Axis: normal; P wave: normal; QRS interval: normal ; ST/T wave: normal; Other findings: normal. This EKG was interpreted by Isis Ochoa M.D. Records Reviewed: Nursing Notes, Old Medical Records, Previous electrocardiograms, Previous Radiology Studies and Previous Laboratory Studies Provider Notes (Medical Decision Making):  
Patient presenting with altered mental status. DDx: medication toxicity, infection, anemia, electrolyte/metabolic anomoly, hypercapnea, stroke/bleed/mass, dehydration, illicit drug intoxication. Will obtain labwork, UA, EKG and CT imaging of the head, chest xray. Will consider adding toxicologic workup if history unclear or warrants further investigation of toxic source. Will continue to monitor and reassess for admission. ED Course:  
Initial assessment performed. The patients presenting problems have been discussed, and they are in agreement with the care plan formulated and outlined with them. I have encouraged them to ask questions as they arise throughout their visit.  
 
 
I reviewed our electronic medical record system for any past medical records that were available that may contribute to the patient's current condition, the nursing notes and vital signs from today's visit. Alvaro Pendleton MD 
 
ED Orders Placed : 
Orders Placed This Encounter  CULTURE, BLOOD, PAIRED  XR CHEST PORT  
 CT HEAD WO CONT  CBC WITH AUTOMATED DIFF  
 METABOLIC PANEL, COMPREHENSIVE  LACTIC ACID, PLASMA  LACTIC ACID, PLASMA  PROCALCITONIN  
 HEMOGLOBIN A1C  
 CBC W/O DIFF  
 METABOLIC PANEL, COMPREHENSIVE  
 MAGNESIUM  
 PHOSPHORUS  
 LACTIC ACID  DIET DIABETIC CONSISTENT CARB Regular  POC GLUCOSE  MEASURE RECTAL TEMPERATURE  
 NURSING-MISCELLANEOUS: Initiate Hypoglycemia protocol if blood glucose is less than 80 mg/dL CONTINUOUS  
 NURSING-MISCELLANEOUS: FOR CONSCIOUS PATIENT: Administer 4 ounces fruit juice OR regular soda OR 4 glucose tablets. CONTINUOUS  
 NURSING-MISCELLANEOUS: If patient NPO, unconscious or unable to swallow and If Blood Glucose between 60 and 80 mg/dL: Follow instructions below IF PATIENT NPO, UNCONSCIOUS OR UNABLE TO SWALLOW AND IF BLOOD GLUCOSE BETWEEN 60 AND 80 MG/DL: GIVE 25 . ..  NURSING-MISCELLANEOUS: Repeat finger stick blood glucose in 15 minutes AFTER treatment, if less than 80 repeat hypoglycemic protocol and notify provider. CONTINUOUS  
 NURSING-MISCELLANEOUS: Following Hypoglycemic Protocol: Once patient is fully alert and BG greater than 80 provide a small snack,  if NPO consider IV fluids with dextrose. CONTINUOUS  
 NURSING-MISCELLANEOUS: Document all interventions in the Electronic Medical  Record (EMR). CONTINUOUS  
 NURSING-MISCELLANEOUS: Blood glucose targets -- ICU: 140 - 180 mg/dL;  NON-ICU: 100 - 180 mg/dL CONTINUOUS  
 IP CONSULT TO PALLIATIVE CARE - PROVIDER  
 IP CONSULT TO NEPHROLOGY  CONTACT ISOLATION  
 GLUCOSE, POC  GLUCOSE, POC  GLUCOSE, POC  GLUCOSE, POC  GLUCOSE, POC  GLUCOSE, POC  EKG 12 LEAD INITIAL  
 INSERT PERIPHERAL IV ONE TIME STAT  HEMODIALYSIS INPATIENT Duration (hr): 3; Dialysate Bath:  K: 3; Dialysate Bath:  CA: 2.5; Target Fluid Removal (mL): 1,500 EVERY MWF Routine [address]  dextrose 10% infusion  ondansetron hcl (ZOFRAN) 4 mg tablet  amLODIPine (NORVASC) 10 mg tablet  acetaminophen (TYLENOL) tablet 650 mg  
 albuterol (PROVENTIL VENTOLIN) nebulizer solution 2.5 mg  
 amLODIPine (NORVASC) tablet 10 mg  
 aspirin delayed-release tablet 81 mg  
 calcitRIOL (ROCALTROL) capsule 0.25 mcg  ferrous sulfate tablet 325 mg  
 insulin lispro (HUMALOG) injection 10 Units  ondansetron (ZOFRAN ODT) tablet 4 mg  pantoprazole (PROTONIX) tablet 40 mg  
 prednisoLONE acetate (PRED FORTE) 1 % ophthalmic suspension 1 Drop  tamsulosin (FLOMAX) capsule 0.4 mg  B complex-vitaminC-folic acid (NEPHROCAP) cap  glucose chewable tablet 16 g  
 glucagon (GLUCAGEN) injection 1 mg  dextrose 10% infusion 0-250 mL  insulin lispro (HUMALOG) injection  INITIAL PHYSICIAN ORDER: INPATIENT Stepdown; 9. Other (further clarification in H&P documentation)  IP CONSULT TO DIABETES MANAGEMENT  IP CONSULT TO WOUND CARE  
 IP CONSULT TO MOBILITY SERVICES [facility]) ED Medications Administered: 
Medications  
dextrose 10% infusion (0 mL/hr IntraVENous Stopped 2/11/20 1207)  
acetaminophen (TYLENOL) tablet 650 mg (has no administration in time range)  
albuterol (PROVENTIL VENTOLIN) nebulizer solution 2.5 mg (has no administration in time range) amLODIPine (NORVASC) tablet 10 mg (has no administration in time range) aspirin delayed-release tablet 81 mg (has no administration in time range)  
calcitRIOL (ROCALTROL) capsule 0.25 mcg (has no administration in time range)  
ferrous sulfate tablet 325 mg (has no administration in time range)  
insulin lispro (HUMALOG) injection 10 Units (has no administration in time range)  
ondansetron (ZOFRAN ODT) tablet 4 mg (has no administration in time range) pantoprazole (PROTONIX) tablet 40 mg (has no administration in time range) prednisoLONE acetate (PRED FORTE) 1 % ophthalmic suspension 1 Drop (has no administration in time range)  
tamsulosin (FLOMAX) capsule 0.4 mg (has no administration in time range) B complex-vitaminC-folic acid (NEPHROCAP) cap (has no administration in time range) glucose chewable tablet 16 g (has no administration in time range) glucagon (GLUCAGEN) injection 1 mg (has no administration in time range) dextrose 10% infusion 0-250 mL (has no administration in time range)  
insulin lispro (HUMALOG) injection (has no administration in time range) Progress Note: 
Pt arrives hypothermic, hypoglycemic, sepsis bundle initiated, plan for xuan hugger, warming blanket, oral and IV glucose Progress Note: 
D10 drip started given persistent hypoglycemia Progress Note: 
Temp improving, mental status improving Progress Note: 
Plan for admission, nephro evaluation, endo eval? Palliative to see patient as well. Progress Note: 
Discussed with PCP over perfect serve, pt had been deteriorating at rehab facility, will need palliative to see while inpatient. Consult Note: 
Keshawn Lynn MD spoke with Dr. Albania Watson, Specialty: Hospitalist 
Discussed pt's hx, disposition, and available diagnostic and imaging results. Reviewed care plans. Agree with management and plan thus far. Consultant will evaluate pt for admission. CRITICAL CARE NOTE : 
IMPENDING DETERIORATION -Cardiovascular, CNS, Metabolic, Renal and Hepatic ASSOCIATED RISK FACTORS - Hypotension, Shock, Hypoxia, Metabolic changes, Dehydration, Vascular Compromise and CNS Decompensation MANAGEMENT- Bedside Assessment and Supervision of Care INTERPRETATION -  Xrays, CT Scan, ECG, Blood Pressure and Cardiac Output Measures INTERVENTIONS - hemodynamic mngmt, Neurologic interventions  and Metobolic interventions CASE REVIEW - Hospitalist, Nursing, Family and PCP 
 TREATMENT RESPONSE -Improved PERFORMED BY - Self NOTES   : 
I personally spent 55 minutes of critical care time. This is time spent at this critically ill patient's bedside actively involved in patient care as well as the coordination of care and discussions with the patient's family. This includes time involved in lab review, consultations with specialist, family decision-making, bedside attention and documentation. During this entire length of time I was immediately available to the patient. This does not include any procedural time which has been billed separately. Critical Care: The reason for providing this level of medical care for this critically-ill patient was due to a critical illness that impaired one or more vital organ systems, such that there was a high probability of imminent or life-threatening deterioration in the patient's condition. This care involved the highest level of preparedness to intervene urgently. This care involved high complexity decision making to assess, manipulate, and support vital system functions, to treat this degree of vital organ system failure, and to prevent further life threatening deterioration of the patients condition requiring frequent assessments and interventions. Shira Carpio MD 
 
Disposition: Admit Patient is being admitted to the hospital. The results of their tests and reasons for their admission have been discussed with them and/or available family. I have discussed the case with the admitting specialist and expressed my high concern for decompensation if patient is discharged from the ED. The patient and/or available family convey agreement and understanding for the need to be admitted and for their admission diagnosis. Consultation has been made with the inpatient physician specialist for hospitalization. Diagnosis Clinical Impression: 1. Uncontrolled type 2 diabetes mellitus with hypoglycemia without coma ([facility] 2. Acute metabolic encephalopathy 3. Hypothermia, initial encounter 4. ESRD on dialysis [facility]) 5. Poor appetite 6. Weight loss, unintentional   
7. Dementia without behavioral disturbance, unspecified dementia type ([facility] 8. Counseling regarding goals of care 9. Alzheimer's dementia without behavioral disturbance, unspecified timing of dementia onset ([facility] Attestation: 
I personally performed the services described in this documentation on this date, 2/11/2020 for patient Kaitlin Rodriguez. I have reviewed and verified that the information is accurate and complete. Shira Carpio MD 
 
 
This note will not be viewable in [address]

## 2020-02-11 NOTE — PROGRESS NOTES
Reason for Readmission:     Altered mental status, hypoglycemia RUR Score/Risk Level:     31 high risk Is a Care Conference indicated: unit IDR rounds Did you attend your follow up appointment (s): If not, why not: 
 
    
Resources/supports as identified by patient/family:   Lives LTC [facility] Top Challenges facing patient (as identified by patient/family and CM): Finances/Medication cost?     No challenges reported Transportation      Pt uses [address] for transportation to dialysis MWF Support system or lack thereof?     family Living arrangements? Lives at [address] [place] [address] and rehab the past 5+ years Self-care/ADLs/Cognition? Staff assist for all ADLs, currently alert to self only Current Advanced Directive/Advance Care Plan: On file 1/2017 Plan for utilizing home health:   Has not used home health Transition of Care Plan:    Based on readmission, the patient's previous Plan of Care [facility] for 2 weeks rehab then back into LTC, [facility] transportation 
 has been evaluated and/or modified. The current Transition of Care Plan is: 1. Patient in ED bed waiting for inpatient admission 2. Patient will need 2nd IM letter at discharge 3. Patient's daughter prefers for patient to return to [address] [place] [address] and rehab for LTC when discharged. Patient is a 81year old male admitted 2/11 for AMS and hypoglycemia. Patient alert to self, sleeping in bed. CM placed call to patient's mPOA and sister Dewey Gaytan who was unavailable by phone. CM placed call to patient's daughter Trudy Valdes [phone] to complete assessment information. Demographic information verified and correct - [facility]. Insurance information verified and correct.   Patient does not use oxygen, has no DME, and receives medications from [facility].   
 Staff assist patient for all ADLs, daughter states patient is able to walk with assistance from another person, but does not use DME. Patient uses a WC van to get to dialysis MWF at [address]. Care Management Interventions PCP Verified by CM: Bernadette Campos MD) Mode of Transport at Discharge: Other (see comment)( [place] [facility] transportation for dialysis) Transition of Care Consult (CM Consult): Discharge Planning Discharge Durable Medical Equipment: No 
Physical Therapy Consult: No 
Occupational Therapy Consult: No 
Speech Therapy Consult: No 
Current Support Network: Assisted Living(living at [facility] the past 5+ years) Confirm Follow Up Transport: Other (see comment) Discharge Location Discharge Placement: Assisted Living Juan Pablo Voss RN, BSN [facility] Management [phone]

## 2020-02-11 NOTE — ED NOTES
TRANSFER - OUT REPORT: 
 
Verbal report given to YANELI/ Myla 29 on [employer].  being transferred to [facility] for routine progression of care Report consisted of patients Situation, Background, Assessment and  
Recommendations(SBAR). Information from the following report(s) SBAR, Kardex, ED Summary and MAR was reviewed with the receiving nurse. Lines:  
Peripheral IV 02/11/20 Right Antecubital (Active) Site Assessment Clean, dry, & intact 2/11/2020  9:07 AM  
Phlebitis Assessment 0 2/11/2020  9:07 AM  
Infiltration Assessment 0 2/11/2020  9:07 AM  
Dressing Status Clean, dry, & intact 2/11/2020  9:07 AM  
Dressing Type Transparent 2/11/2020  9:07 AM  
Hub Color/Line Status Pink; Infusing 2/11/2020  9:07 AM  
Alcohol Cap Used No 2/11/2020  9:07 AM  
   
Peripheral IV 02/11/20 Posterior;Right Hand (Active) Opportunity for questions and clarification was provided. Patient transported with: 
 Monitor Registered Nurse

## 2020-02-11 NOTE — PROGRESS NOTES
Spoke to primary RN regarding LA order. Dean Kelly MSN, RN [phone] [facility] [phone]

## 2020-02-12 PROBLEM — R63.0 POOR APPETITE: Status: ACTIVE | Noted: 2020-01-01

## 2020-02-12 PROBLEM — R63.4 WEIGHT LOSS, UNINTENTIONAL: Status: ACTIVE | Noted: 2020-01-01

## 2020-02-12 NOTE — PROGRESS NOTES
0700- Report given to Leah Cavazos RN by off going nurse. 0730- Amber rodriguez turned off. Will monitor. 1335- Pt taken to dialysis in bed. VSS. . 
 
1755- Pt back to room. VSS. No complaints of pain. Dinner tray ordered.

## 2020-02-12 NOTE — PROCEDURES
[facility] Dialysis Team [place]  [phone] Vitals   Pre   Post   Assessment   Pre   Post    
Temp  Temp: 98.4 °F (36.9 °C) (02/12/20 1401) 98.3, oral LOC  A&Ox1, Dimentia A&Ox1, Dimentia HR   Pulse (Heart Rate): 85 (02/12/20 1401) 91 Lungs   Dim bases clear B/P   BP: 141/82 (02/12/20 1401) 119/74 Cardiac   Tele, NSR Tele, NSR Resp   Resp Rate: 16 (02/12/20 1401) 16 Skin   Small unstageable on sacrum Small unstageable on sacrum Pain level  Pain Intensity 1: 0 (02/12/20 1042) 0 Edema  2+ LUE 2+ LUE Orders: Duration:   Start:    1401 End:    1702 Total:   3hr  
Dialyzer:   Dialyzer/Set Up Inspection: Bry Deal (02/12/20 1401) K Bath:   Dialysate K (mEq/L): 3 (02/12/20 1401) Ca Bath:   Dialysate CA (mEq/L): 2.5 (02/12/20 1401) Na/Bicarb:   Dialysate NA (mEq/L): 140 (02/12/20 1401) Target Fluid Removal:   Goal/Amount of Fluid to Remove (mL): 1500 mL (02/12/20 1401) Access Type & Location:   MICHELL AVG: skin CDI. No s/s of infection. No issues with cannulation or hemostasis. Running well at -450. Labs Obtained/Reviewed Critical Results Called   Date when labs were drawn- 
Hgb-   
HGB Date Value Ref Range Status 02/12/2020 12.5 12.1 - 17.0 g/dL Final  
 
K-   
Potassium Date Value Ref Range Status 02/12/2020 3.8 3.5 - 5.1 mmol/L Final  
 
Ca-  
Calcium Date Value Ref Range Status 02/12/2020 8.1 (L) 8.5 - 10.1 MG/DL Final  
 
Bun-  
BUN Date Value Ref Range Status 02/12/2020 17 6 - 20 MG/DL Final  
 
Creat-  
Creatinine Date Value Ref Range Status 02/12/2020 3.82 (H) 0.70 - 1.30 MG/DL Final  
 
  
Medications/ Blood Products Given Name   Dose   Route and Time None ordered Blood Volume Processed (BVP):   67.7L Net Fluid Removed:   1600ml Comments Time Out Done: 1355 Primary Nurse Rpt Pre: Daryl Reynaga RN 
Primary Nurse Rpt Post: Daryl Reynaga RN 
 Pt Education: procedures/ consents; lack of refreshments in the HD suite; patient will need further education Care Plan: ongoing Tx Summary: 
Pt arrived to HD suite A&Ox1. Consent obtained via phone from daughter by HD staff x2. Consents signed & filed. SBAR received from Primary RN. 1401: Pt cannulated with 48E needles per policy & without issue. VSS. Dialysis Tx initiated. 1445: Wound care at bedside for assessment. HD RN assisted with turning and protect patient access and needles. VSS. Dressing on patient bottom changed. 1530: Bmg/dL. VSS.  
1630: 100ml NS flush given for clot prevention. VSS.  
1702: Tx ended. VSS. All possible blood returned to patient. Hemostasis achieved without issue. Bed locked and in the lowest position, call bell and belongings in reach. SBAR given to Primary, RN. Patient is stable at time of their departure. All Dialysis related medications have been reviewed. Admiting Diagnosis: Hypoglycemia Pt's previous clinic- [facility] Consent signed - Informed Consent Verified: Yes (20) Fiorella Consent - obtained/ signed/ filed Hepatitis Status- <10 AB, Neg Ag 20, susceptible Machine #- Machine Number: [id number] (20) Telemetry status- Tele, NSR Pre-dialysis wt. -

## 2020-02-12 NOTE — PROGRESS NOTES
Bedside and Verbal shift change report given to Elena Lang (oncoming nurse) by Laura RODRIGUEZ (offgoing nurse). Report included the following information SBAR, Kardex, Intake/Output, MAR, Recent Results and Med Rec Status. 0207. Luis Daniel Jacob RN, and [employer] both went into the patients room. RN's stated that he was yelling and on assessment RN's noted that patient was heavily sweating and disoriented x0. RN's checked a blood glucose level (36). Gave D50W injection IV.  
 
0222. Rechecked blood glucose. Patients orientation improved but still not at baseline from the beginning of the shift. Patient was able to take food and drink juice. Patients temperature had dropped. Will call telehospitalist of situation 
 
[phone]. Will restart D10 infusion. Patient placed on RAMON hugger per MD's order. 0052. Patients temperature and blood glucose had improved though out the night. (Flowsheets for details.) Patient currently still on the Kylah Hadley. Will report night shift events to day shift RN. Bedside and Verbal shift change report given to CRICKET Christian (oncoming nurse) by Elena Lang (offgoing nurse). Report included the following information SBAR, Kardex, Intake/Output, MAR, Recent Results and Med Rec Status.

## 2020-02-12 NOTE — CONSULTS
[id number] [employer] Consult Note NAME: Melva Hall. :  1950 MRN:  [id number] Date/Time: 2020 Risk of deterioration: low Assessment:    Plan: 
AMS/Hypoglycemia ESRD Dementia Hypothermia HD MWF-orders entered PT had appointment with endo for persistent hypoglycemia but personal plans with family prevented this from happening Check cortisol level 
meds reviewed Asked to see for esrd needs. Pt poor historian. Arrived yesterday with ams/hypoglycemia. This am back to his Baseline. Pleasantly demented. Subjective: Chief Complaint:  Where are my Verlena Cheeks? Review of Systems: edgar historian-states he back to [employer] Objective: VITALS:  
Last 24hrs VS reviewed since prior progress note. Most recent are: 
Visit Vitals BP 90/54 (BP 1 Location: Right arm, BP Patient Position: At rest) Pulse 86 Temp 98.3 °F (36.8 °C) Resp 20 Wt 48.2 kg (106 lb 3.2 oz) SpO2 100% BMI 17.67 kg/m² SpO2 Readings from Last 6 Encounters:  
20 100% 20 99% 19 100% 19 100% 17 100% 17 100% Intake/Output Summary (Last 24 hours) at 2020 1121 Last data filed at 2020 5255 Gross per 24 hour Intake 860 ml Output  Net 860 ml Telemetry Reviewed PHYSICAL EXAM: 
 
General   well developed, well nourished, appears stated age, in no acute distress EENT  Normocephalic, Atraumatic,  EOMI, sclera clear Respiratory   Clear To Auscultation bilaterally Cardiology  Regular Rate and Rythmn Abdominal  Soft, non-tender, non-distended Extremities  No clubbing, cyanosis, or edema. Pulses intact. , av access with t/b (RUE) Neurological  CN II-XII intact Lab Data Reviewed: (see below) Medications Reviewed: (see below) PMH/SH reviewed - no change compared to H&P Attending Physician: Warren Chirinos MD  
 
____________________________________________________ MEDICATIONS: 
 Current Facility-Administered Medications Medication Dose Route Frequency  dextrose 10% infusion  75 mL/hr IntraVENous CONTINUOUS  
 acetaminophen (TYLENOL) tablet 650 mg  650 mg Oral Q4H PRN  
 albuterol (PROVENTIL VENTOLIN) nebulizer solution 2.5 mg  2.5 mg Nebulization Q4H PRN  
 amLODIPine (NORVASC) tablet 10 mg  10 mg Oral DAILY  aspirin delayed-release tablet 81 mg  81 mg Oral DAILY  calcitRIOL (ROCALTROL) capsule 0.25 mcg  0.25 mcg Oral Q MON, WED & FRI  ferrous sulfate tablet 325 mg  325 mg Oral ACB  insulin lispro (HUMALOG) injection 10 Units  10 Units SubCUTAneous BID WITH MEALS  pantoprazole (PROTONIX) tablet 40 mg  40 mg Oral ACB  prednisoLONE acetate (PRED FORTE) 1 % ophthalmic suspension 1 Drop  1 Drop Right Eye BID  tamsulosin (FLOMAX) capsule 0.4 mg  0.4 mg Oral DAILY  B complex-vitaminC-folic acid (NEPHROCAP) cap  1 Cap Oral DAILY  glucose chewable tablet 16 g  4 Tab Oral PRN  
 glucagon (GLUCAGEN) injection 1 mg  1 mg IntraMUSCular PRN  
 dextrose 10% infusion 0-250 mL  0-250 mL IntraVENous PRN  
 insulin lispro (HUMALOG) injection   SubCUTAneous AC&HS  sodium chloride (NS) flush 5-40 mL  5-40 mL IntraVENous Q8H  
 sodium chloride (NS) flush 5-40 mL  5-40 mL IntraVENous PRN  
 ondansetron (ZOFRAN) injection 4 mg  4 mg IntraVENous Q4H PRN  
 heparin (porcine) injection 5,000 Units  5,000 Units SubCUTAneous Q12H  
  
 
LABS: 
Recent Labs  
  02/12/20 0226 02/11/20 
0915 WBC 4.8 8.1 HGB 12.5 12.6 HCT 36.4* 37.7  203 Recent Labs  
  02/12/20 0226 02/11/20 
0915 * 132* K 3.8 4.4 CL 97 98 CO2 26 26 BUN 17 12 CREA 3.82* 3.46* * 151* CA 8.1* 8.6 MG 2.4  --   
PHOS 3.9  --   
 
Recent Labs  
  02/12/20 0226 02/11/20 
0915 SGOT 57* 80* ALT 66 73 * 461* TBILI 1.1* 1.7* TP 6.0* 7.0 ALB 2.9* 3.3*  
GLOB 3.1 3.7 No results for input(s): INR, PTP, APTT, INREXT in the last 72 hours. No results for input(s): FE, TIBC, PSAT, FERR in the last 72 hours. No results for input(s): PH, PCO2, PO2 in the last 72 hours. No results for input(s): CPK, CKNDX, TROIQ in the last 72 hours. No lab exists for component: CPKMB Lab Results Component Value Date/Time  Glucose (POC) 122 (H) 02/12/2020 09:21 AM  
 Glucose (POC) 85 02/12/2020 07:42 AM  
 Glucose (POC) 218 (H) 02/12/2020 05:14 AM  
 Glucose (POC) 187 (H) 02/12/2020 04:07 AM  
 Glucose (POC) 104 (H) 02/12/2020 02:55 AM

## 2020-02-12 NOTE — CONSULTS
Palliative Medicine Consult Tadeo: [phone] ([phone]) Patient Name: Kiley Euceda. YOB: 1950 Date of Initial Consult: 2/12/2020 Reason for Consult: End Stage Disease Requesting Provider: Jose Luis Fowler MD 
Primary Care Physician: Freddy Lomas MD 
 
 SUMMARY:  
Kiley Euceda. is a 81 y.o. with a past history of frontotemporal Alzheimer's dz, CAD, prostate CA, ESRD on HD MWF, DM, who was admitted on 2/11/2020 from [address] [place] [address] with a diagnosis of hypothermia. Current medical issues leading to Palliative Medicine involvement include: hypoglycemic on admission and again overnight, also hypotensive overnight requiring bear hugger. Psychosocial: resides at [facility]. Sister Karthik Nevarez is pt's NOK (per my notes in 2017, mother and children deferred decision-making to Zoë Car). PALLIATIVE DIAGNOSES:  
1. Hypoglycemia 2. Legally blind 3. Physical deconditioning 4. Poor appetite 5. Weight loss (12/2019 wt 123 lb), (1/2020 wt 111 lb), this admission (2/11/2020 wt 106 lb) 6. Goals of care PLAN:  
1. Prior to visit, I completed an extensive review of patient's medical records, including medical documentation, vital signs, MARs, and results of various labs and other diagnostics. 2. Spoke with Zoë Car: she reports pt not doing well at all, doesn't eat, his hand that has his dialysis access is swollen, eyes hurt, poor QOL. (Pt had told me earlier that he spends all day in bed). She told me that a feeding tube was brought up as an option, and we discussed how that would not improve his QOL, but might give him more time. She reports that she agreed and had said no at that time. We discussed his QOL and if it is truly very poor, stopping dialysis and having hospice care is always an option. Sister thanked me for the information and stated that they would consider it.  
3. Initial consult note routed to primary continuity provider and/or primary health care team members 4. Communicated plan of care with: Palliative [place] Team 
 
 GOALS OF CARE / TREATMENT PREFERENCES:  
 
GOALS OF CARE: 
Patient/Health Care Proxy Stated Goals: Prolong life TREATMENT PREFERENCES:  
Code Status: DNR Advance Care Planning: 
[x] The [facility] Interdisciplinary Team has updated the ACP Navigator with [place] and Patient Capacity Primary Decision Maker (Active): Alyse Saini [facility] Proxy Per Pt's Mother) - Sister - [phone] Advance Care Planning 2/12/2020 Patient's Healthcare Decision Maker is: Legal Next of Kin Primary Decision Maker Name -  
Primary Decision Maker Phone Number -  
Primary Decision Maker Relationship to Patient -  
Confirm Advance Directive None Patient Would Like to Complete Advance Directive Unable Does the patient have other document types Do Not Resuscitate Medical Interventions: Limited additional interventions Other Instructions:  
Artificially Administered Nutrition: No feeding tube Other: As far as possible, the palliative care team has discussed with patient / health care proxy about goals of care / treatment preferences for patient. HISTORY:  
 
History obtained from: chart, sister Court Cornejo CHIEF COMPLAINT: hypoglycemia HPI/SUBJECTIVE: The patient is:  
[x] Verbal and participatory [] Non-participatory due to: oriented to self only 2/11: BIBA from [address] after staff found pt unresponsive this am with BS in the 30s. EMS fave IM Glucagon followed by D10 after which pt remained altered but more alert. He was recently admitted to [facility] with acute respiratory failure secondary to pulmonary edema, noncompliance, HCAP, hypothermia. Clinical Pain Assessment (nonverbal scale for severity on nonverbal patients):  
Clinical Pain Assessment Severity: 0 Activity (Movement): Lying quietly, normal position Duration: for how long has pt been experiencing pain (e.g., 2 days, 1 month, years) Frequency: how often pain is an issue (e.g., several times per day, once every few days, constant) FUNCTIONAL ASSESSMENT:  
 
Palliative Performance Scale (PPS): PPS: 20 
 
 
 PSYCHOSOCIAL/SPIRITUAL SCREENING:  
 
Palliative IDT has assessed this patient for cultural preferences / practices and a referral made as appropriate to needs (Cultural Services, Patient Advocacy, Ethics, etc.) Any spiritual / Orthodoxy concerns: 
[] Yes /  [x] No 
 
Caregiver Burnout: 
[] Yes /  [x] No /  [] No Caregiver Present Anticipatory grief assessment:  
[x] Normal  / [] Maladaptive ESAS Anxiety: Anxiety: 0 
 
ESAS Depression: Depression: 3 REVIEW OF SYSTEMS:  
 
Positive and pertinent negative findings in ROS are noted above in HPI. The following systems were [x] reviewed / [] unable to be reviewed as noted in HPI Other findings are noted below. Systems: constitutional, ears/nose/mouth/throat, respiratory, gastrointestinal, genitourinary, musculoskeletal, integumentary, neurologic, psychiatric, endocrine. Positive findings noted below. Modified ESAS Completed by: provider Fatigue: 8 Drowsiness: 3 Depression: 3 Pain: 0 Anxiety: 0 Nausea: 0 Anorexia: 6 Dyspnea: 0 Constipation: No  
  Stool Occurrence(s): 1 PHYSICAL EXAM:  
 
From RN flowsheet: 
Wt Readings from Last 3 Encounters:  
02/11/20 106 lb 3.2 oz (48.2 kg) 01/28/20 111 lb 4.8 oz (50.5 kg) 12/02/19 123 lb 3.8 oz (55.9 kg) Blood pressure 146/82, pulse 89, temperature 98.4 °F (36.9 °C), temperature source Oral, resp. rate 16, weight 106 lb 3.2 oz (48.2 kg), SpO2 99 %. Pain Scale 1: Numeric (0 - 10) Pain Intensity 1: 0 Last bowel movement, if known:  
 
Constitutional: thin, awake, alert, oriented to self, NAD  (lying in bed) Eyes: pupils equal, anicteric ENMT: no nasal discharge, moist mucous membranes Cardiovascular: regular rhythm, distal pulses intact Respiratory: breathing not labored, symmetric Gastrointestinal: soft non-tender, +bowel sounds Musculoskeletal: no deformity, no tenderness to palpation Skin: warm, dry Neurologic: following simple commands, moving all extremities Psychiatric: full affect HISTORY:  
 
Active Problems: Hypothermia (2/11/2020) Hypoglycemia (2/11/2020) Past Medical History:  
Diagnosis Date  Alzheimer disease ([facility]  CAD (coronary artery disease)  Cancer [facility])   
 prostate  Chronic kidney disease  CKD (chronic kidney disease) stage 4, GFR 15-29 ml/min ([facility])  DM (diabetes mellitus), type 2, uncontrolled ([facility] [address] [facility]  Hemodialysis patient ([facility]  Hyperlipidemia  Hypertension  Other ill-defined conditions(799.89)   
 blind R eye-retina detachment  Other ill-defined conditions(799.89) right cerebellopontine angle lipoma. Past Surgical History:  
Procedure Laterality Date  COLONOSCOPY,DIAGNOSTIC  11/12/2014  HX HEENT    
 retinal detachment  HX SHOULDER ARTHROSCOPY    
 right  HX UROLOGICAL    
 prostate bx  UPPER GI ENDOSCOPY,BIOPSY  11/12/2014 Family History Problem Relation Age of Onset  Heart Disease Mother Pacemaker  Cancer Father History reviewed, no pertinent family history. Social History Tobacco Use  Smoking status: Never Smoker  Smokeless tobacco: Never Used Substance Use Topics  Alcohol use: No  
 
Allergies Allergen Reactions  Ace Inhibitors Unknown (comments)  Arb-Angiotensin Receptor Antagonist Unknown (comments) Current Facility-Administered Medications Medication Dose Route Frequency  glucose chewable tablet 16 g  4 Tab Oral PRN  
 dextrose (D50W) injection syrg 12.5-25 g  25-50 mL IntraVENous PRN  
 glucagon (GLUCAGEN) injection 1 mg  1 mg IntraMUSCular PRN  
 dextrose 10% infusion  75 mL/hr IntraVENous CONTINUOUS  
  acetaminophen (TYLENOL) tablet 650 mg  650 mg Oral Q4H PRN  
 albuterol (PROVENTIL VENTOLIN) nebulizer solution 2.5 mg  2.5 mg Nebulization Q4H PRN  
 amLODIPine (NORVASC) tablet 10 mg  10 mg Oral DAILY  aspirin delayed-release tablet 81 mg  81 mg Oral DAILY  calcitRIOL (ROCALTROL) capsule 0.25 mcg  0.25 mcg Oral Q MON, WED & FRI  ferrous sulfate tablet 325 mg  325 mg Oral ACB  insulin lispro (HUMALOG) injection 10 Units  10 Units SubCUTAneous BID WITH MEALS  pantoprazole (PROTONIX) tablet 40 mg  40 mg Oral ACB  prednisoLONE acetate (PRED FORTE) 1 % ophthalmic suspension 1 Drop  1 Drop Right Eye BID  tamsulosin (FLOMAX) capsule 0.4 mg  0.4 mg Oral DAILY  B complex-vitaminC-folic acid (NEPHROCAP) cap  1 Cap Oral DAILY  insulin lispro (HUMALOG) injection   SubCUTAneous AC&HS  sodium chloride (NS) flush 5-40 mL  5-40 mL IntraVENous Q8H  
 sodium chloride (NS) flush 5-40 mL  5-40 mL IntraVENous PRN  
 ondansetron (ZOFRAN) injection 4 mg  4 mg IntraVENous Q4H PRN  
 heparin (porcine) injection 5,000 Units  5,000 Units SubCUTAneous Q12H  
 
 
 
 LAB AND IMAGING FINDINGS:  
 
Lab Results Component Value Date/Time WBC 4.8 02/12/2020 02:26 AM  
 HGB 12.5 02/12/2020 02:26 AM  
 PLATELET 692 01/68/9031 02:26 AM  
 
Lab Results Component Value Date/Time Sodium 132 (L) 02/12/2020 02:26 AM  
 Potassium 3.8 02/12/2020 02:26 AM  
 Chloride 97 02/12/2020 02:26 AM  
 CO2 26 02/12/2020 02:26 AM  
 BUN 17 02/12/2020 02:26 AM  
 Creatinine 3.82 (H) 02/12/2020 02:26 AM  
 Calcium 8.1 (L) 02/12/2020 02:26 AM  
 Magnesium 2.4 02/12/2020 02:26 AM  
 Phosphorus 3.9 02/12/2020 02:26 AM  
  
Lab Results Component Value Date/Time AST (SGOT) 57 (H) 02/12/2020 02:26 AM  
 Alk. phosphatase 420 (H) 02/12/2020 02:26 AM  
 Protein, total 6.0 (L) 02/12/2020 02:26 AM  
 Albumin 2.9 (L) 02/12/2020 02:26 AM  
 Globulin 3.1 02/12/2020 02:26 AM  
 
Lab Results Component Value Date/Time INR 1.0 06/14/2012 12:20 PM  
 Prothrombin time 10.6 06/14/2012 12:20 PM  
 aPTT 27.8 06/14/2012 12:20 PM  
  
Lab Results Component Value Date/Time Iron 34 (L) 01/18/2017 06:21 PM  
 TIBC 252 01/18/2017 06:21 PM  
 Iron % saturation 13 (L) 01/18/2017 06:21 PM  
 Ferritin 440 (H) 01/18/2017 06:21 PM  
  
Lab Results Component Value Date/Time pH 7.04 (LL) 02/05/2017 10:00 AM  
 PCO2 39 02/05/2017 10:00 AM  
 PO2 376 (H) 02/05/2017 10:00 AM  
 
No components found for: [place] Lab Results Component Value Date/Time  (H) 02/05/2017 05:42 AM  
 CK - MB 7.6 (H) 02/05/2017 05:42 AM  
  
 
 
   
 
Total time: 75 
Counseling / coordination time, spent as noted above: 65 
> 50% counseling / coordination?: y 
 
Prolonged service was provided for  []30 min   []75 min in face to face time in the presence of the patient, spent as noted above. Time Start:  
Time End:  
Note: this can only be billed with 51220 (initial) or 62186 (follow up). If multiple start / stop times, list each separately.

## 2020-02-12 NOTE — DIABETES MGMT
[address] Presentation Daniela Glover. is a 81 y.o. male admitted with low blood sugar. Recent hx of pneumonia treated with ABx. Now with small pleural effusion. Legally blind. Dementia. Current clinical course has been uncomplicated. Patient has diabetes of unknown duration. Kidney failure. On Novolog insulin twice daily per PTA listing. A1c 7.8%. Consulted by Provider for advanced diabetes nursing assessment and care, specifically related to  
[x] Inpatient management strategy [x] Home management assessment Subjective I don't know.  Objective Physical exam 
General Alert, oriented to person and in no acute distress. Conversant but clearly demented Vital Signs Visit Vitals /62 Pulse 85 Temp 98.3 °F (36.8 °C) Resp 20 Wt 48.2 kg (106 lb 3.2 oz) SpO2 99% BMI 17.67 kg/m² Laboratory Lab Results Component Value Date/Time Hemoglobin A1c 7.8 (H) 02/11/2020 09:15 AM  
 Hemoglobin A1c (POC) 9.1 04/07/2011 Hemoglobin A1c, External 7.7 08/12/2015 Lab Results Component Value Date/Time LDL, calculated 19.8 02/06/2017 02:36 AM  
 
Lab Results Component Value Date/Time Creatinine (POC) 2.0 (H) 04/07/2017 07:30 AM  
 Creatinine 3.82 (H) 02/12/2020 02:26 AM  
 
Lab Results Component Value Date/Time Sodium 132 (L) 02/12/2020 02:26 AM  
 Potassium 3.8 02/12/2020 02:26 AM  
 Chloride 97 02/12/2020 02:26 AM  
 CO2 26 02/12/2020 02:26 AM  
 Anion gap 9 02/12/2020 02:26 AM  
 Glucose 167 (H) 02/12/2020 02:26 AM  
 BUN 17 02/12/2020 02:26 AM  
 Creatinine 3.82 (H) 02/12/2020 02:26 AM  
 BUN/Creatinine ratio 4 (L) 02/12/2020 02:26 AM  
 GFR est AA 19 (L) 02/12/2020 02:26 AM  
 GFR est non-AA 16 (L) 02/12/2020 02:26 AM  
 Calcium 8.1 (L) 02/12/2020 02:26 AM  
 Bilirubin, total 1.1 (H) 02/12/2020 02:26 AM  
 AST (SGOT) 57 (H) 02/12/2020 02:26 AM  
 Alk.  phosphatase 420 (H) 02/12/2020 02:26 AM  
 Protein, total 6.0 (L) 02/12/2020 02:26 AM  
 Albumin 2.9 (L) 02/12/2020 02:26 AM  
 Globulin 3.1 02/12/2020 02:26 AM  
 A-G Ratio 0.9 (L) 02/12/2020 02:26 AM  
 ALT (SGPT) 66 02/12/2020 02:26 AM  
 
Lab Results Component Value Date/Time ALT (SGPT) 66 02/12/2020 02:26 AM  
 
 
Blood glucose pattern Assessment and Plan Nursing Diagnosis Risk for unstable blood glucose pattern Nursing Intervention Domain 5259 Decision-making Support Nursing Interventions Examined current inpatient diabetes control Informed patient of rational for insulin strategy while hospitalized Evaluation This gentleman, with presumed Type 2 diabetes, hasn't achieved inpatient blood glucose target of 100-180mg/dl. BG pattern initially 200s but now in the  range. Hx of hypoglycemia on rapid-acting insulin twice daily at [facility] along with corrective insulin. Due to kidney parameters (GFR 19 & serum creatinine 3.8), all non-insulin therapies are not reasonable. Gentle dosing of insulin is warranted. Would use intermediate-acting insulin. Recommendations Recommend: 
Basal insulin  
[x] 0.2 units/kg/D = NPH insulin 5 units twice daily Corrective insulin 
[x] Normal sensitivity Billing Code(s) [x] [id number] IP subsequent hospital care - 30 minutes YIMI Phan Access via SILVINA Helton [phone]

## 2020-02-12 NOTE — WOUND CARE
Wound Care Consult: chart reviewed and patient assessed for his sacral wound that was present on admission. Patient is a diabetic and is managed with insulin. He is on dialysis three days per week and he is completely blind and has what appears to be very mild dementia. No acute distress except that he c/o being \"very cold and needs 5 blankets\". Assessment: The sacrum was assessed on his left side - assisted by the dialysis nurse to turn patient / monitor the lines. The sacrum wound is a healing wound - mostly likely was a stage 2 but is now pink with a yellowish calloused skin and no drainage. Covered with a large foam dressing to protect from friction. Orders written for wound care.   
Olya Yates RN, BSN, [employer]

## 2020-02-12 NOTE — PROGRESS NOTES
TRANSFER - IN REPORT: 
 
Verbal report received from [facility] on [employer].  being received from PCU for ordered procedure Report consisted of patients Situation, Background, Assessment and  
Recommendations(SBAR). Information from the following report(s) SBAR, Kardex, [facility] and Cardiac Rhythm NSSR was reviewed with the receiving nurse. Opportunity for questions and clarification was provided. Assessment completed upon patients arrival to unit and care assumed.

## 2020-02-12 NOTE — PROGRESS NOTES
Initial Nutrition Assessment: 
 
INTERVENTIONS/RECOMMENDATIONS:  
· Continue consistent carb diet · Supplement: Nepro daily ASSESSMENT:  
Patient medically noted for hypoglycemia and hypothermia. PMH Dementia, DM, CAD, HTN, and ESRD on HD. Patient sleeping at time of attempted visit. PI documented per flowsheets; wound care consulted. Good appetite reported. 50% PO documented. K+ and phos WNL. Continue CCD. Will add nepro daily for extra kcal/protein. Encourage intake of meals/supplements. Diet Order: Consistent carb 
% Eaten:   
Patient Vitals for the past 72 hrs: 
 % Diet Eaten 02/12/20 0905 50 % 02/11/20 1818 50 % Pertinent Medications: [x]Reviewed []Other: Norvasc, nephrocap, Ferrous sulfate, Humalog, Protonix Pertinent Labs: [x]Reviewed []Other: Na 132, BG -003-915 Food Allergies: [x]None []Yes:   
Last BM: 2/12 []Active     []Hyperactive  []Hypoactive       [] Absent BS Skin:    [] Intact   [] Incision  [x] Breakdown: unstageable sacrum? - Wound care consult [] Edema []Other: Anthropometrics:  
Height:   Weight: 48.2 kg (106 lb 3.2 oz) IBW (%IBW):   ( ) UBW (%UBW):   (  %) Last Weight Metrics: 
Weight Loss Metrics 2/11/2020 1/28/2020 12/2/2019 5/8/2017 5/5/2017 4/8/2017 4/4/2017 Today's Wt 106 lb 3.2 oz 111 lb 4.8 oz 123 lb 3.8 oz 123 lb 3.8 oz 95 lb 7.4 oz 95 lb 7.4 oz -  
BMI 17.67 kg/m2 18.52 kg/m2 20.51 kg/m2 20.51 kg/m2 16.39 kg/m2 - 16.39 kg/m2 BMI: Body mass index is 17.67 kg/m². This BMI is indicative of: 
 [x]Underweight    []Normal    []Overweight    [] Obesity   [] Extreme Obesity (BMI>40) Estimated Nutrition Needs (Based on):  
2291 Kcals/day(BMR (1182) x 1.3AF +250kcal) , 72 g(1.5 g/kg bw) Protein Carbohydrate: At Least 130 g/day  Fluids: 1800 mL/day (1ml/kcal) Pt expected to meet estimated nutrient needs: [x]Yes []No 
 
NUTRITION DIAGNOSES:  
Problem:  Increased nutrient needs Etiology: related to wound healing, HD     
 Signs/Symptoms: as evidenced by unstageable to sacrum NUTRITION INTERVENTIONS: 
Meals/Snacks: General/healthful diet   Supplements: Commercial supplement GOAL:  
PO intake >50% of meals/supplement next 3-5 days LEARNING NEEDS (Diet, Food/Nutrient-Drug Interaction):  
 [x] None Identified 
 [] Identified and Education Provided/Documented 
 [] Identified and Pt declined/was not appropriate Cultural, Christianity, OR Ethnic Dietary Needs:  
 [x] None Identified 
 [] Identified and Addressed 
 
 [x] Interdisciplinary Care Plan Reviewed/Documented  
 [x] Discharge Planning: Consistent carb diet + ONS 1-2x/day MONITORING /EVALUATION:  
Food/Nutrient Intake Outcomes: Total energy intake Physical Signs/Symptoms Outcomes: Weight/weight change, Electrolyte and renal profile, Glucose profile NUTRITION RISK:  
 [x] Patient At Nutritional Risk              [] Patient Not at Nutritional Risk PT SEEN FOR:  
 [x]  MD Consult: []Calorie Count []Diabetic Diet Education []Diet Education []Electrolyte Management 
   [x]General Nutrition Management and Supplements []Management of Tube Feeding []TPN Recommendations [x]  RN Referral:  []MST score >=2 
   []Enteral/Parenteral Nutrition PTA []Pregnant: Gestational DM or Multigestation [x]Pressure Ulcer/Wound Care needs 
     
[]  Low BMI 
[]  LOS Julian Grate Ext [phone] Pager [phone] Weekend Pager [phone]

## 2020-02-12 NOTE — PROGRESS NOTES
HD TRANSFER - OUT REPORT: 
 
Verbal report given to Iman Cochran RN  (Name) on Hannah Bradley. being transferred to PCU  (Unit) for routine progression of care Report consisted of patient's Situation, Background, Assessment and  
Recommendations(SBAR). Information from the following report(s) SBAR and Procedure Summary was reviewed with the receiving nurse. Method:  $$ Method: Hemodialysis (02/12/20 1702) Fluid Removed  NET Fluid Removed (mL): 1600 ml (02/12/20 1702) Patient response to treatment:  Stable End Time If not documented, dialysis nurse to update post-dialysis row in HD/Filtration flowsheet Medications /Volume expansion agents or Fluid boluses administered during treatment? no 
 
Post-dialysis medication administration due?  no 
Remind nurse to administer post-HD medication upon return to unit. Fistula hemostasis? yes Line heparinization? no 
 
Lines: KARLY ARCE Opportunity for questions and clarification was provided. Patient transported with: [employer]

## 2020-02-12 NOTE — PROGRESS NOTES
Hospitalist Progress Note NAME: Nelson Martínez. :  1950 MRN:  [id number] Assessment / Plan: 
Acute encephalopathy resolving likely dt hypoglycemia Hypoglycemia resolving  D10  prn Hypothermia Seen in hd remains leathargic. Monitor bs overnight hopefully home in am. 
  
  
  
ESRD on HD MWF Anemia contw supplements 
 
  
  
  
  
DMII 
-FS monitoring, SS 
 
 
  
Alzheimer's Dementia MS at baseline 
-watch for delirium with acute medical problems Legally blind HTN 
HLD 
-Resume home meds 
  
Code Status: DDNR on file Surrogate Decision Maker: Sister DVT Prophylaxis: Heparin GI Prophylaxis: not indicated Baseline: Dependent IADLs 
  
 
 
less than 18.5 Underweight / Body mass index is 17.67 kg/m². Subjective: Chief Complaint / Reason for Physician Visit \"feels lethargic. Wants something sweet to eat. \". Discussed with RN events overnight. Review of Systems: 
Symptom Y/N Comments  Symptom Y/N Comments Fever/Chills    Chest Pain Poor Appetite    Edema Cough    Abdominal Pain Sputum    Joint Pain SOB/LAUREANO    Pruritis/Rash Nausea/vomit    Tolerating PT/OT Diarrhea    Tolerating Diet Constipation    Other Could NOT obtain due to:   
 
Objective: VITALS:  
Last 24hrs VS reviewed since prior progress note. Most recent are: 
Patient Vitals for the past 24 hrs: 
 Temp Pulse Resp BP SpO2  
20 1042 98.3 °F (36.8 °C) 86 20 90/54 100 % 20 0708 97.9 °F (36.6 °C) 73 18 132/63 100 % 20 0629 96.4 °F (35.8 °C)      
20 0455  69  152/68 99 % 20 0324 95.2 °F (35.1 °C) 76 18 156/74 98 % 20 0309    173/75   
20 0230 (!) 94.1 °F (34.5 °C)      
20 0209 95.2 °F (35.1 °C)   154/73   
20 0003 97.8 °F (36.6 °C) 79 16 147/68 100 % 20 1907 98.2 °F (36.8 °C) 76 18 159/74 98 % 20 1502 98.1 °F (36.7 °C) 78 20 153/71 99 % 02/11/20 1430 97.8 °F (36.6 °C) 91 18 145/73 100 % 02/11/20 1415  77 13 135/61 98 % 02/11/20 1400  84 19 132/64 99 % 02/11/20 1345  78 14 135/63 98 % 02/11/20 1330  80 19 145/70 98 % 02/11/20 1315  78 16 155/74 95 % 02/11/20 1300  78 16 152/75 97 % 02/11/20 1245  76 15 157/74 97 % Intake/Output Summary (Last 24 hours) at 2/12/2020 1230 Last data filed at 2/12/2020 6518 Gross per 24 hour Intake 860 ml Output  Net 860 ml PHYSICAL EXAM: 
General: WD, WN. Alert, cooperative, no acute distress   
EENT:  EOMI. Anicteric sclerae. MMM Resp:  CTA bilaterally, no wheezing or rales. No accessory muscle use CV:  Regular  rhythm,  No edema GI:  Soft, Non distended, Non tender.  +Bowel sounds Neurologic:  Alert and oriented X 3, normal speech, Psych:   Good insight. Not anxious nor agitated Skin:  No rashes. No jaundice Reviewed most current lab test results and cultures  YES Reviewed most current radiology test results   YES Review and summation of old records today    NO Reviewed patient's current orders and MAR    YES 
PMH/ reviewed - no change compared to H&P 
________________________________________________________________________ Care Plan discussed with: 
  Comments Patient Family RN Care Manager Consultant Multidiciplinary team rounds were held today with , nursing, pharmacist and clinical coordinator. Patient's plan of care was discussed; medications were reviewed and discharge planning was addressed. ________________________________________________________________________ Total NON critical care TIME:  20   Minutes Total CRITICAL CARE TIME Spent:   Minutes non procedure based Comments >50% of visit spent in counseling and coordination of care    
________________________________________________________________________ Raz Wallace DO  
 
 Procedures: see electronic medical records for all procedures/Xrays and details which were not copied into this note but were reviewed prior to creation of Plan. LABS: 
I reviewed today's most current labs and imaging studies. Pertinent labs include: 
Recent Labs  
  02/12/20 0226 02/11/20 0915 WBC 4.8 8.1 HGB 12.5 12.6 HCT 36.4* 37.7  203 Recent Labs  
  02/12/20 0226 02/11/20 0915 * 132* K 3.8 4.4 CL 97 98 CO2 26 26 * 151* BUN 17 12 CREA 3.82* 3.46* CA 8.1* 8.6 MG 2.4  --   
PHOS 3.9  --   
ALB 2.9* 3.3* TBILI 1.1* 1.7* SGOT 57* 80* ALT 66 73 Signed: Cristopher Delgadillo DO

## 2020-02-12 NOTE — PROGRESS NOTES
KULDIP BILLS PLAN--[facility]. Spoke with patient's sister and the plan when he is medically stable is to return to LTC--[facility]. Referral sent to [address] [place] [address] and they will accept him back when medically stable. Izabel Finnegan RN BSN CRM  [phone]

## 2020-02-13 PROBLEM — R06.00 DYSPNEA: Status: RESOLVED | Noted: 2017-02-06 | Resolved: 2020-01-01

## 2020-02-13 PROBLEM — J18.9 PNEUMONIA: Status: RESOLVED | Noted: 2017-02-04 | Resolved: 2020-01-01

## 2020-02-13 PROBLEM — G93.40 ACUTE ENCEPHALOPATHY: Status: ACTIVE | Noted: 2020-01-01

## 2020-02-13 PROBLEM — J81.1 PULMONARY EDEMA: Status: RESOLVED | Noted: 2020-01-01 | Resolved: 2020-01-01

## 2020-02-13 NOTE — ACP (ADVANCE CARE PLANNING)
Pt has no ACP on file- does have DDNR in his medical record.  With his dementia and encephalopathy not able to complete AMD

## 2020-02-13 NOTE — PROGRESS NOTES
Patient gets his dialysis at [facility]. Called them ([phone]) and informed them patient was being discharged back to [address] [place] [address] and would be receiving his dialysis there on Friday. Faxed run sheets to them with confirmation. Izabel Finnegan RN BSN CRM  [phone]

## 2020-02-13 NOTE — PROGRESS NOTES
MEGAN PLAN--LTC--[facility]. [facility] will transport patient at 1530pm today. Patient, nursing and sister aware. Informed  [facility] of transport time. Checked with liason at [facility] and patient is long term care he states he will not need a three night stay. Checked with Todd Vega ,coordinator and she states patient does not need three night stay. Izabel Finnegan  RN BSN formerly [facility]  [phone] Transition of Care Plan to SNF/Rehab Communication to Patient/Family: Met with patient and family and they are agreeable to the transition plan. The Plan for Transition of Care is related to the following treatment goals: To return LTC. The Patient and/or patient representative was provided with a choice of provider and agrees  with the discharge plan. Yes [x] No [] A Freedom of choice list was provided with basic dialogue that supports the patient's individualized plan of care/goals and shares the quality data associated with the providers. Yes [x] No [] SNF/Rehab Transition: 
Patient is going back to [address] [place] [address]. He is long term care there. Patient will transported by [facility] and expected to leave at 1530PM. Communication to SNF/Rehab: 
Bedside RN, Shawn Ash, has been notified to update the transition plan to the facility and call report [phone] Discharge information has been updated on the AVS. Patient has been identified as part of the [facility] [address] [facility] [address] [facility] Please include all hard scripts for controlled substances, med rec and dc summary, and AVS in packet. Reviewed and confirmed with facility, Nursing, can manage the patient care needs for the following:  
 
Sb Dowd with (X) only those applicable: 
Medication: 
[x]Medications are available at the facility []IV Antibiotics [x]Controlled Substance  hard copies available sent. []Weekly Labs Equipment: 
[]CPAP/BiPAP []Wound Vacuum []Childs or Urinary Device []PICC/Central Line []Nebulizer []Ventilator Treatment: 
[x]Isolation (for MRSA, VRE, etc.) []Surgical Drain Management []Tracheostomy Care 
[]Dressing Changes [x]Dialysis with transportation []PEG Care []Oxygen []Daily Weights for Heart Failure Dietary: 
[]Any diet limitations []Tube Feedings []Total Parenteral Management (TPN) Financial Resources: 
[]Medicaid Application Completed []UAI Completed and copy given to pt/family 
and copy given to pt/family 
[]A screening has previously been completed. []Level II Completed 
 
[] Private pay individual who will not become  
financially eligible for Medicaid within 6 months from admission to a [address] [place] [address] facility. [] Individual refused to have screening conducted. []Medicaid Application Completed []The screening denied because it was determined individual did not need/did not qualify for nursing facility level of care. [] Out of state residents seeking direct admission to a [address] facility. [] Individuals who are inpatients of an out of state hospital, or in state or out of state veterans/ hospital and seek direct admission to a [address] facility [] Individuals who are pateints or residents of a state owned/operated facility that is licensed by [employer] of [employer] and seek direct admission to [address] facility [] A screening not required for enrollment in [address] [address] [address] services as set out in 12 VAC 30- [] [facility]) staff shall perform screenings of the [facility]3] clients. Advanced Care Plan: 
[x]Surrogate Decision Maker of Care 
[]POA []Communicated Code Status and copy sent. Other:  
Patient has medicaid. Per liason at the snf they will set up patient's dialysis for tomorrow.

## 2020-02-13 NOTE — PROGRESS NOTES
Bedside and Verbal shift change report given to Elena Lang (oncoming nurse) by Fito Nieto RN (offgoing nurse). Report included the following information SBAR, Kardex, Intake/Output, MAR, Recent Results and Med Rec Status. 5146. Patient had an uneventful night. No questions or concerns at this time. Patient progressing towards goals. Bedside and Verbal shift change report given to CRICKET Christian (oncoming nurse) by Elena Lang (offgoing nurse). Report included the following information SBAR, Kardex, Intake/Output, MAR, Recent Results and Med Rec Status.

## 2020-02-13 NOTE — PROGRESS NOTES
[facility] Progress Note NAME: Arsh Dominguez. :  1950 MRN:  [id number] Date/Time: 2020 Risk of deterioration: low Assessment:    Plan: 
AMS/Hypoglycemia ESRD Dementia Hypothermia HD MWF- Check cortisol level-16.3 
meds reviewed Remains on d10 Dc planning Kaylene Coleman Subjective: Chief Complaint:  I go to dialysis on Monday's, Wednesday's and friday's Review of Systems: no n/v/cp/sob overnight Objective: VITALS:  
Last 24hrs VS reviewed since prior progress note. Most recent are: 
Visit Vitals /52 (BP 1 Location: Right arm, BP Patient Position: Lying right side) Pulse 84 Temp 97.7 °F (36.5 °C) Resp 12 Wt 48.4 kg (106 lb 11.2 oz) SpO2 100% BMI 17.76 kg/m² SpO2 Readings from Last 6 Encounters:  
20 100% 20 99% 19 100% 19 100% 17 100% 17 100% Intake/Output Summary (Last 24 hours) at 2020 1158 Last data filed at 2020 9223 Gross per 24 hour Intake 830 ml Output 1700 ml Net -870 ml Telemetry Reviewed PHYSICAL EXAM: 
 
General   well developed, well nourished, appears stated age, in no acute distress EENT  Normocephalic, Atraumatic,  EOMI, sclera clear Respiratory   Clear To Auscultation bilaterally Cardiology  Regular Rate and Rythmn Abdominal  Soft, non-tender, non-distended Extremities  No clubbing, cyanosis, or edema. Pulses intact. , av access with t/b (RUE) Neurological  CN II-XII intact Lab Data Reviewed: (see below) Medications Reviewed: (see below) PMH/SH reviewed - no change compared to H&P Attending Physician: Lian Lo MD  
 
____________________________________________________ MEDICATIONS: 
Current Facility-Administered Medications Medication Dose Route Frequency  glucose chewable tablet 16 g  4 Tab Oral PRN  
 dextrose (D50W) injection syrg 12.5-25 g  25-50 mL IntraVENous PRN  
  glucagon (GLUCAGEN) injection 1 mg  1 mg IntraMUSCular PRN  
 dextrose 10% infusion  75 mL/hr IntraVENous CONTINUOUS  
 acetaminophen (TYLENOL) tablet 650 mg  650 mg Oral Q4H PRN  
 albuterol (PROVENTIL VENTOLIN) nebulizer solution 2.5 mg  2.5 mg Nebulization Q4H PRN  
 amLODIPine (NORVASC) tablet 10 mg  10 mg Oral DAILY  aspirin delayed-release tablet 81 mg  81 mg Oral DAILY  calcitRIOL (ROCALTROL) capsule 0.25 mcg  0.25 mcg Oral Q MON, WED & FRI  ferrous sulfate tablet 325 mg  325 mg Oral ACB  insulin lispro (HUMALOG) injection 10 Units  10 Units SubCUTAneous BID WITH MEALS  pantoprazole (PROTONIX) tablet 40 mg  40 mg Oral ACB  prednisoLONE acetate (PRED FORTE) 1 % ophthalmic suspension 1 Drop  1 Drop Right Eye BID  tamsulosin (FLOMAX) capsule 0.4 mg  0.4 mg Oral DAILY  B complex-vitaminC-folic acid (NEPHROCAP) cap  1 Cap Oral DAILY  insulin lispro (HUMALOG) injection   SubCUTAneous AC&HS  sodium chloride (NS) flush 5-40 mL  5-40 mL IntraVENous Q8H  
 sodium chloride (NS) flush 5-40 mL  5-40 mL IntraVENous PRN  
 ondansetron (ZOFRAN) injection 4 mg  4 mg IntraVENous Q4H PRN  
 heparin (porcine) injection 5,000 Units  5,000 Units SubCUTAneous Q12H  
  
 
LABS: 
Recent Labs  
  02/12/20 0226 02/11/20 
0915 WBC 4.8 8.1 HGB 12.5 12.6 HCT 36.4* 37.7  203 Recent Labs  
  02/12/20 0226 02/11/20 
0915 * 132* K 3.8 4.4 CL 97 98 CO2 26 26 BUN 17 12 CREA 3.82* 3.46* * 151* CA 8.1* 8.6 MG 2.4  --   
PHOS 3.9  --   
 
Recent Labs  
  02/12/20 0226 02/11/20 
0915 SGOT 57* 80* ALT 66 73 * 461* TBILI 1.1* 1.7* TP 6.0* 7.0 ALB 2.9* 3.3*  
GLOB 3.1 3.7 No results for input(s): INR, PTP, APTT, INREXT, INREXT in the last 72 hours. No results for input(s): FE, TIBC, PSAT, FERR in the last 72 hours. No results for input(s): PH, PCO2, PO2 in the last 72 hours. No results for input(s): CPK, CKNDX, TROIQ in the last 72 hours. No lab exists for component: CPKMB Lab Results Component Value Date/Time  Glucose (POC) 155 (H) 02/13/2020 09:18 AM  
 Glucose (POC) 100 02/13/2020 07:28 AM  
 Glucose (POC) 256 (H) 02/13/2020 12:20 AM  
 Glucose (POC) 259 (H) 02/12/2020 10:01 PM  
 Glucose (POC) 188 (H) 02/12/2020 06:50 PM

## 2020-02-13 NOTE — DISCHARGE INSTRUCTIONS
HOSPITALIST DISCHARGE INSTRUCTIONS    NAME: Saritha Jama. :  1950   MRN:  [id number]     Date/Time:  2020 12:58 PM    ADMIT DATE: 2020   DISCHARGE DATE: 2020     Attending Physician: Clari Canas NP    DISCHARGE DIAGNOSIS:  YANELI/Irene Henry 1106 Problems    Diagnosis Date Noted    Acute encephalopathy 2020    Poor appetite 2020    Weight loss, unintentional 2020    Hypothermia 2020    Hypoglycemia 2020    HTN (hypertension) 2010    Alzheimer disease ([facility] 2010    DM (diabetes mellitus), type 2, uncontrolled ([facility] 2010    Blind one eye 2010     Retinal detatchment right eye      Pure hypercholesterolemia 2010    Dementia ([facility] 2010     Moderate. Medications: Per above medication reconciliation. Pain Management: per above medications    Recommended diet: Renal Diet    Recommended activity: Activity as tolerated    Wound care: None    Indwelling devices:  None    Supplemental Oxygen: None    Required Lab work: Per SNF routine    Glucose management:  Accucheck ACHS with sliding scale per SNF protocol    Code status: DNR        Outside physician follow up: Follow-up Information     Follow up With Specialties Details Why [address] [place] [address] Internal Medicine   [address] [phone]      Vasyl Simpson MD Nephrology   [address] [facility]  [address]  [phone]                 Skilled nursing facility/ SNF MD responsible for above on discharge. Information obtained by :  I understand that if any problems occur once I am at home I am to contact my physician. I understand and acknowledge receipt of the instructions indicated above. Physician's or R.N.'s Signature                                                                  Date/Time                                                                                                                                              Patient or Repres

## 2020-02-13 NOTE — PROGRESS NOTES
0700- Report given to Eleuterio Pelaez RN by off going nurse. 1300- Attempted to call report to receiving RN but got a discharge order from NP. Will follow up with  and call RN back to notify if pt is being [address] or transferred. VM left with CM. 1400- Spoke with Izabel/LEDA. She confirmed that [facility] can accept pt today. She will set up transport. Nursing supervisor notified. 1500- Report called to [address] [place] [address] and Rehab. PIV and telemetry removed. 1600- Pt taken via stretcher for DC by PATRICA.

## 2020-02-13 NOTE — CDMP QUERY
Patient admitted with low blood sugar, noted to have \"Acute encephalopathy due to hypoglycemia\". If possible, could the diagnosis of \"Acute encephalopathy\" be further specified in the progress notes and d/c summary if you are evaluating and/or treating any of the following: ? Metabolic encephalopathy, POA 
? Other encephalopathy, POA 
? Other Explanation of clinical findings ? Clinically Undetermined (no explanation for clinical findings) The medical record reflects the following: 
 
   Risk Factors: Hx DM2, uncontrolled. Katherene Means Katherene Means C/o Low BS Clinical Indicators: POC Gluc: 67 ^ 110 ^ 285 ^ 344 ^ 256 ^ 169 ^ 36. ...... Katherene Means Glucose: 151 ^ 167. .. Katherene Means Katherene Means Katherene Means noted per ED: He is ill-appearing, toxic-appearing and diaphoretic. ........ Katherene Means He is disoriented. .. Katherene Means Katherene Means Katherene Means noted: Seen in HD, remains lethargic Treatment: D10 infusion @ 45ml/hr in ED; IP admit; CT head; Increased Safety precautions: High fall risk; Partial assistance; HOB elevated; Fall prevention device; Side rails x 3; Thank you, [facility]

## 2020-02-13 NOTE — PROGRESS NOTES
Transitional Care Team: JOSIE Discharge Note Date of Assessment: 02/13/20 Time of Assessment:  1:51 PM 
 
 
Rashi Hanson is a 81 y.o. male inpatient at [facility] with pneumonia on  2/11. Assessment & Plan Pt with dementia, unable to understand healthy understanding of goals conversation. Pt is long term care resident at [facility] facility, and now has orders to return there. Pt sister did meet with palliative care team, and did discuss some of the goals and philosophies of hospice. Sister was appreciative, but declined to enter him into Hospice at this time Current Code Status:  DDNR Medication Reconciliation:  was performed. Discharge instructions have changed the insulin coverage Can patient afford medications:  yes Who manages medications at home facility staff Emergency Contact :Robles Jackson ( [phone]) Chart reviewed by me and JOSIE (Healthy Understanding of Goals) program introduced to patient/family. The Transitional Care Team bridges the gaps in care and education surrounding discharge from the acute care facility. The objective is to empower the patient and family in taking a proactive role in the task of preventing readmission within the first thirty days after discharge from the acute care setting, The team is also involved in the efforts to reduce readmission to the acute care setting after stabilization and discharge from the acute care environment either to skilled nursing facilities or community. Discharge With The Following Arrangements in place: 
 
Future Appointments Date Time Provider Belkys Yu 3/12/2020  2:00 PM Shelton Pedro MD [address] [facility] Non-BS follow up appointment(s): facility provider team 
 
 
Patient / Family was instructed on specific signs/symptoms to look for with regards to worsening of their medical conditions. Learning was assessed using teach back. The patient / family have added upcoming appointment dates to their [facility] Calendar prior to discharge. Patient education focused on readmission zones as described as: The Red Zone: High risk for readmission, days 1-21 The Yellow Zone: Moderate  risk for readmission, days 22-29 The Green Zone: Lower risk for readmission, days 30 and after [facility] Team will follow patient from a distance while inpatient as well as be available for further transition disposition as needed. The MEGAN TEAM will continue to offer support during the 30- 90 day discharge from acute care setting. Notified Ambulatory {Blank Single Select Template:20061[de-identified] ,MEGAN RN. Signed By: Gagandeep Beck RN February 13, 2020

## 2020-02-13 NOTE — DISCHARGE SUMMARY
Hospitalist Discharge Summary Patient ID: 
Gladis Finnegan 
[id number] 
15 y.o. 
1950 
2/11/2020 PCP on record: Ayesha Murphy MD 
 
Admit date: 2/11/2020 Discharge date and time: 2/13/2020 DISCHARGE DIAGNOSIS: 
 
Active Hospital Problems Diagnosis Date Noted  Acute encephalopathy 02/13/2020  Poor appetite 02/12/2020  Weight loss, unintentional 02/12/2020  Hypothermia 02/11/2020  Hypoglycemia 02/11/2020  
 HTN (hypertension) 12/20/2010  Alzheimer disease ([address] 09/19/2010  DM (diabetes mellitus), type 2, uncontrolled ([address] 08/16/2010  Blind one eye 01/02/2010 Retinal detatchment right eye  Pure hypercholesterolemia 01/02/2010  Dementia ([facility] 01/02/2010 Moderate. CONSULTATIONS: 
IP CONSULT TO PALLIATIVE CARE - PROVIDER 
IP CONSULT TO NEPHROLOGY Excerpted HPI from H&P of Radha Hanna MD: 
Josh Cr is a 81 y.o. black male with a past medical history as documented below presented to the emergency room with acute encephalopathy unresponsive prior to arrival patient was noted to have low sugar at the nursing facility patient resides at the [facility]. per nursing  Pt  appeared to be altered this morning when they checked his sugar was in the low 30s 911 was called patient was given IV glucagon  followed by 10 D10 patient in the emergency room remain with low sugars started on D10. Patient has history of end-stage renal disease gets dialysis Monday Wednesday Friday was dialyzed yesterday history of hypertension here recently was discharged from this hospital 2 weeks ago for acute respiratory failure secondary to edema and pneumonia. Patient has dementia. Legally blind. Patient when I examined the patient in the emergency room  was more alert because she was were hovering around 300.   D10 was discontinue patient is able to answer questions appropriately wants to drink. Denies any chest pain no difficulty breathing. No headache no fever chills. \" 
______________________________________________________________________ DISCHARGE SUMMARY/HOSPITAL COURSE:  for full details see H&P, daily progress notes, labs, consult notes. Lexa Sanabria Jr.69 y.o. male was admitted to [address] [place] [address] on 2/11/2020 and treated for the following medical complaints:  
 
Acute metabolic encephalopathy likely d/t hypoglycemia- resolved Hypoglycemia, POA- resolved Hypothermia, POA- resolved ·  this AM 
· Temp 97.7 · Mentation at baseline · Lactic acid 1.0; improved from 2.1 on admission · Procalcitonin 1.09 
· TSH 6.66; start Synthroid · BC NGTD · CXR negative · Head CT negative ESRD on HD 
· HD per nephrology Type 2 DM · Hgb A1c 7.8 · Continue NPH 5 units BID · BS AC&HS 
· SSI Alzheimer's dementia Legally blind · Supportive care HTN 
HLD. · Continue amlodipine and ASA Patient's plan of care has been reviewed with them. Patient and/or family have verbally conveyed their understanding and agreement of the patient's signs, symptoms, diagnosis, treatment and prognosis and additionally agree to follow up as recommended or return to [facility] should their condition change prior to follow-up. Discharge instructions have also been provided to the patient with some educational information regarding their diagnosis as well a list of reasons why they would want to return to the office prior to their follow-up appointment should their condition change. [facility] to avoid frequent ED visits. [facility] can treat; pains, sprains, cuts, wounds, high fevers, upper respiratory infections and much more. There medical team is equipped with all the tools necessary to provide advanced medical care in the comfort of you home, workplace, or location of need.   The medical team consists of doctors, nurse practitioners, and EMTs. [facility] is available 7 days per week 9 am to 9 pm.   Request care by calling [phone] or by going online at [phone] [employer] [address] 
_______________________________________________________________________ Patient seen and examined by me on discharge day. Pertinent Findings: 
Gen:    Not in distress Chest: Clear lungs CVS:   Regular rhythm. No edema Abd:  Soft, not distended, not tender Neuro:  Alert, oriented to self  
_______________________________________________________________________ DISCHARGE MEDICATIONS:  
Current Discharge Medication List  
  
START taking these medications Details  
levothyroxine (SYNTHROID) 75 mcg tablet Take 1 Tab by mouth Daily (before breakfast). Qty: 30 Tab, Refills: 0  
  
insulin NPH/insulin regular (NOVOLIN 70/30, HUMULIN 70/30) 100 unit/mL (70-30) injection Inject 5 units SQ BID Qty: 10 mL, Refills: 0 CONTINUE these medications which have NOT CHANGED Details  
ondansetron hcl (ZOFRAN) 4 mg tablet Take 4 mg by mouth every six (6) hours as needed for Nausea or Vomiting. amLODIPine (NORVASC) 10 mg tablet Take 10 mg by mouth daily. pantoprazole (PROTONIX) 40 mg tablet Take 40 mg by mouth daily. calcitRIOL (ROCALTROL) 0.25 mcg capsule Take 1 Cap by mouth every Monday, Wednesday, Friday. ferrous sulfate 325 mg (65 mg iron) tablet Take 1 Tab by mouth Daily (before breakfast). aspirin delayed-release 81 mg tablet Take 81 mg by mouth daily. insulin aspart U-100 (NOVOLOG U-100 INSULIN ASPART) 100 unit/mL injection 2-10 Units by SubCUTAneous route two (2) times daily as needed. Patient takes 10 units BID plus sliding scale Blood Glucose (mg/dL)       Insulin Dose (additional units)             151-220                               2  
            221-260                               4  
            261-300                               5  
 301-768                               8  
            414-301                               10  
            >450                                   Call MD  
  
prednisoLONE acetate (PRED FORTE) 1 % ophthalmic suspension Administer 1 Drop to right eye two (2) times a day. vit Bcomp,C/folic acid/zinc (RENAL MULTIVITAMIN/ZINC PO) Take 1 Tab by mouth daily. albuterol (PROVENTIL VENTOLIN) 2.5 mg /3 mL (0.083 %) nebulizer solution 2.5 mg by Nebulization route every four (4) hours as needed for Shortness of Breath. tamsulosin (FLOMAX) 0.4 mg capsule Take 0.4 mg by mouth daily. glucagon (GLUCAGEN) 1 mg injection 1 mL by IntraMUSCular route as needed for Hypoglycemia. Qty: 1 Vial, Refills: 0  
  
acetaminophen (TYLENOL) 325 mg tablet Take 2 Tabs by mouth every four (4) hours as needed. For pain. Qty: 100 Tab, Refills: 0 STOP taking these medications  
  
 insulin aspart U-100 (NOVOLOG FLEXPEN U-100 INSULIN) 100 unit/mL (3 mL) inpn Comments:  
Reason for Stopping:   
   
 insulin aspart U-100 (NOVOLOG FLEXPEN U-100 INSULIN) 100 unit/mL (3 mL) inpn Comments:  
Reason for Stopping:   
   
  
 
 
 
Patient Follow Up Instructions: Activity: Activity as tolerated Diet: Renal Diet Wound Care: As directed Follow-up with PCP in 1 week. Follow-up Information Follow up With Specialties Details Why Contact [facility] [address] [place] [address] 
[phone] Gail Cadena MD Internal Medicine   [address] [facility] [address] [place] [address] 
[phone] Shanice Nicholas MD Nephrology   [address]  
[address] [facility]. 
[phone] 
  
  
 
________________________________________________________________ Risk of deterioration: Moderate Condition at Discharge:  Stable 
__________________________________________________________________ Disposition SNF/LTC 
 
____________________________________________________________________ Code Status: DNR/DNI 
___________________________________________________________________ Total time in minutes spent coordinating this discharge (includes going over instructions, follow-up, prescriptions, and preparing report for sign off to her PCP) :  31 minutes Signed: 
Froy Agudelo NP

## 2020-02-13 NOTE — PROGRESS NOTES
MEGAN PLAN--Return to [place] [address]. Patient is being discharged back to [place] [address]. Spoke with his sister and made her aware and she is in agreement. PCS completed and given to nursing. Nursing to call report to [phone] ask for [facility]. He will be going to room 13b. Referral sent to [facility] and will await their response. Izabel Finnegan  RN BSN CRM  [phone]

## 2020-02-14 NOTE — PROGRESS NOTES
No transition of care outreach indicated due to patient discharge to a [address] [place] [address].

## 2020-02-18 NOTE — PROGRESS NOTES
Community Care Team documentation for patient in [facility]  Initial Follow Up       Patient was discharged to [facility], [facility]. Information included in this progress note has been provided to SNF. Hospital Admission and Diagnosis: [address] 2/11/2020 - 6/06/6699 Acute metabolic encephalopathy likely d/t hypoglycemia    PCP : No Jordan MD    SNF Attending:  No Jordan MD    Spoke with SNF team.   PT/OT update: Currently patient  continues to wok with OT and is min assist with grooming, hygiene and toileting, last therapy date 2/21. LOS/Disposition:Patient will transition back to LTC bed on 2/22 at [facility] will follow up weekly with [facility] until discharge. Medications were not reconciled and general patient assessment was not completed during this [facility] outreach.       Omi Victoria RN-CCT  [phone]

## 2020-02-26 NOTE — PROGRESS NOTES
Community Care Team Documentation for Patient in [facility] Discharge Note Patient has been discharged from [address] [facility] [address] ([facility]). See previous [facility] Team notes. PCP : Prasanna Amin MD 
 
Spoke with SNF team. LOS/Dispositoin-  Pt transitionsed to LTC bed at [facility] on 2/22 Community Care Team will sign off at this time. Medications were not reconciled and general patient assessment was not completed during this skilled nursing facility outreach. Ajit Grimm RN-CCT 
[phone]

## 2020-03-09 NOTE — ED NOTES
Patient discharged by provider. Patient provided with discharge instructions Rx and instructions on follow up care. Patient out of ED.

## 2020-03-09 NOTE — ED NOTES
Straight cath for urine specimen by [facility]. Dark yellow urine obtained, specimens sent to the lab.

## 2020-03-09 NOTE — ED PROVIDER NOTES
EMERGENCY DEPARTMENT HISTORY AND PHYSICAL EXAM 
 
 
Date: 3/9/2020 Patient Name: Nish Chen. History of Presenting Illness Chief Complaint Patient presents with  Back Pain Patient was sent by dialysis center  due to back pain and some tachycardia History Provided By: Patient HPI: Nish Chen., 81 y.o. male presents to the ED with history of current rehab at [facility] and rehab, with a history of end-stage renal disease getting dialysis 3 times a week, sent from the dialysis center due to patient's complaint of back pain and some tachycardia. On meeting the patient, he is awake alert, appears in no distress but says \"I have pain everywhere\". Chest pain or palpitations or shortness of breath and relates he does not understand why he is here. He does not feel any different than he normally does. When I specifically asked him about his back pain he says he normally has back pain and that it is not any different. He denies any falls. No known injuries. He was unaware that his heart rate was elevated. He says he still makes a small amount of urine but has not had any fever and does not relay any infectious symptoms including sore throat ear pain cough diarrhea vomiting or abdominal pain. There are no other complaints, changes, or physical findings at this time. PCP: Kristi Tom MD 
 
No current facility-administered medications on file prior to encounter. Current Outpatient Medications on File Prior to Encounter Medication Sig Dispense Refill  levothyroxine (SYNTHROID) 75 mcg tablet Take 1 Tab by mouth Daily (before breakfast). 30 Tab 0  
 insulin NPH/insulin regular (NOVOLIN 70/30, HUMULIN 70/30) 100 unit/mL (70-30) injection Inject 5 units SQ BID 10 mL 0  
 ondansetron hcl (ZOFRAN) 4 mg tablet Take 4 mg by mouth every six (6) hours as needed for Nausea or Vomiting.  amLODIPine (NORVASC) 10 mg tablet Take 10 mg by mouth daily.  pantoprazole (PROTONIX) 40 mg tablet Take 40 mg by mouth daily.  calcitRIOL (ROCALTROL) 0.25 mcg capsule Take 1 Cap by mouth every Monday, Wednesday, Friday.  ferrous sulfate 325 mg (65 mg iron) tablet Take 1 Tab by mouth Daily (before breakfast).  aspirin delayed-release 81 mg tablet Take 81 mg by mouth daily.  insulin aspart U-100 (NOVOLOG U-100 INSULIN ASPART) 100 unit/mL injection 2-10 Units by SubCUTAneous route two (2) times daily as needed. Patient takes 10 units BID plus sliding scale Blood Glucose (mg/dL)       Insulin Dose (additional units) 151-220                               2  
            221-260                               4  
            261-300                               5  
            301-350                               8  
            351-450                               10  
            >450                                   Call MD    
 prednisoLONE acetate (PRED FORTE) 1 % ophthalmic suspension Administer 1 Drop to right eye two (2) times a day.  vit Bcomp,C/folic acid/zinc (RENAL MULTIVITAMIN/ZINC PO) Take 1 Tab by mouth daily.  acetaminophen (TYLENOL) 325 mg tablet Take 2 Tabs by mouth every four (4) hours as needed. For pain. 100 Tab 0  
 albuterol (PROVENTIL VENTOLIN) 2.5 mg /3 mL (0.083 %) nebulizer solution 2.5 mg by Nebulization route every four (4) hours as needed for Shortness of Breath.  tamsulosin (FLOMAX) 0.4 mg capsule Take 0.4 mg by mouth daily.  glucagon (GLUCAGEN) 1 mg injection 1 mL by IntraMUSCular route as needed for Hypoglycemia. 1 Vial 0 Past History Past Medical History: 
Past Medical History:  
Diagnosis Date  Alzheimer disease ([facility] [address]  CAD (coronary artery disease)  Cancer [facility])   
 prostate  Chronic kidney disease  CKD (chronic kidney disease) stage 4, GFR 15-29 ml/min ([facility])  DKA (diabetic ketoacidoses) ([facility] [address] 7/10/2011  DM (diabetes mellitus), type 2, uncontrolled ([facility] [address]  Dyspnea 2/6/2017  Hemodialysis patient ([facility] [address]  History of GI bleed 11/12/2014  Hyperlipidemia  Hypertension  Other ill-defined conditions(799.89)   
 blind R eye-retina detachment  Other ill-defined conditions(799.89) right cerebellopontine angle lipoma.  Pneumonia 2/4/2017  Pulmonary edema 1/25/2020  Weakness generalized 8/16/2010 Past Surgical History: 
Past Surgical History:  
Procedure Laterality Date  COLONOSCOPY,DIAGNOSTIC  11/12/2014  HX HEENT    
 retinal detachment  HX SHOULDER ARTHROSCOPY    
 right  HX UROLOGICAL    
 prostate bx  UPPER GI ENDOSCOPY,BIOPSY  11/12/2014 Family History: 
Family History Problem Relation Age of Onset  Heart Disease Mother Pacemaker  Cancer Father Social History: 
Social History Tobacco Use  Smoking status: Never Smoker  Smokeless tobacco: Never Used Substance Use Topics  Alcohol use: No  
 Drug use: No  
 
 
Allergies: Allergies Allergen Reactions  Ace Inhibitors Unknown (comments)  Arb-Angiotensin Receptor Antagonist Unknown (comments) Review of Systems Review of Systems Constitutional: Negative for activity change, appetite change and fever. HENT: Negative. Respiratory: Negative for chest tightness and shortness of breath. Cardiovascular: Negative for palpitations. Gastrointestinal: Negative for abdominal distention and abdominal pain. Genitourinary: Negative. Musculoskeletal: Negative. Neurological: Negative. Hematological: Negative. All other systems reviewed and are negative. Physical Exam  
Physical Exam  
Vital signs and nursing notes reviewed CONSTITUTIONAL: Alert, in no distress; well-developed; well-nourished. Thin build, pleasant disposition, wearing glasses hEAD:  Normocephalic, atraumatic EYES: PERRL; EOM's intact. ENTM: Nose: no rhinorrhea; Throat: no erythema or exudate, mucous membranes moist 
Neck:  Supple. trachea is midline. No midline C-spine tenderness. No step-offs. RESP: Chest clear, equal breath sounds. - W/R/R 
CV: S1 and S2 WNL; No murmurs, gallops or rubs. 2+ radial and DP pulses bilaterally. GI: non-distended, normal bowel sounds, abdomen soft and non-tender. No masses or organomegaly. : No costo-vertebral angle tenderness. BACK:  Non-tender, normal appearance, no midline thoracic or lumbar tenderness on direct palpation without step-offs, back is atraumatic. UPPER EXT:  Normal inspection. no joint or soft tissue swelling, dialysis access covered with bandages on the left upper extremity without active bleeding. LOWER EXT: No edema, no calf tenderness. NEURO: Alert and oriented x3, 5/5 strength and light touch sensation intact in bilateral upper and lower extremities. SKIN: No rashes; Warm and dry PSYCH: Normal mood, normal affect Diagnostic Study Results Labs - Recent Results (from the past 12 hour(s)) CBC WITH AUTOMATED DIFF Collection Time: 03/09/20 12:15 PM  
Result Value Ref Range WBC 5.8 4.1 - 11.1 K/uL  
 RBC 3.57 (L) 4.10 - 5.70 M/uL  
 HGB 10.4 (L) 12.1 - 17.0 g/dL HCT 30.6 (L) 36.6 - 50.3 % MCV 85.7 80.0 - 99.0 FL  
 MCH 29.1 26.0 - 34.0 PG  
 MCHC 34.0 30.0 - 36.5 g/dL  
 RDW 17.9 (H) 11.5 - 14.5 % PLATELET 614 224 - 883 K/uL MPV 10.9 8.9 - 12.9 FL  
 NRBC 0.0 0  WBC ABSOLUTE NRBC 0.00 0.00 - 0.01 K/uL NEUTROPHILS 85 (H) 32 - 75 % LYMPHOCYTES 3 (L) 12 - 49 % MONOCYTES 11 5 - 13 % EOSINOPHILS 0 0 - 7 % BASOPHILS 0 0 - 1 % IMMATURE GRANULOCYTES 1 (H) 0.0 - 0.5 % ABS. NEUTROPHILS 4.9 1.8 - 8.0 K/UL  
 ABS. LYMPHOCYTES 0.2 (L) 0.8 - 3.5 K/UL  
 ABS. MONOCYTES 0.6 0.0 - 1.0 K/UL  
 ABS. EOSINOPHILS 0.0 0.0 - 0.4 K/UL  
 ABS. BASOPHILS 0.0 0.0 - 0.1 K/UL  
 ABS. IMM. GRANS. 0.1 (H) 0.00 - 0.04 K/UL  
 DF AUTOMATED RBC COMMENTS ANISOCYTOSIS 
1+ METABOLIC PANEL, COMPREHENSIVE Collection Time: 03/09/20 12:15 PM  
Result Value Ref Range Sodium 134 (L) 136 - 145 mmol/L Potassium 5.1 3.5 - 5.1 mmol/L Chloride 98 97 - 108 mmol/L  
 CO2 24 21 - 32 mmol/L Anion gap 12 5 - 15 mmol/L Glucose 238 (H) 65 - 100 mg/dL BUN 39 (H) 6 - 20 MG/DL Creatinine 4.96 (H) 0.70 - 1.30 MG/DL  
 BUN/Creatinine ratio 8 (L) 12 - 20 GFR est AA 14 (L) >60 ml/min/1.73m2 GFR est non-AA 12 (L) >60 ml/min/1.73m2 Calcium 8.8 8.5 - 10.1 MG/DL Bilirubin, total 3.0 (H) 0.2 - 1.0 MG/DL  
 ALT (SGPT) 22 12 - 78 U/L  
 AST (SGOT) 17 15 - 37 U/L Alk. phosphatase 196 (H) 45 - 117 U/L Protein, total 5.7 (L) 6.4 - 8.2 g/dL Albumin 2.5 (L) 3.5 - 5.0 g/dL Globulin 3.2 2.0 - 4.0 g/dL A-G Ratio 0.8 (L) 1.1 - 2.2    
TROPONIN I Collection Time: 03/09/20 12:15 PM  
Result Value Ref Range Troponin-I, Qt. 0.27 (H) <0.05 ng/mL EKG, 12 LEAD, INITIAL Collection Time: 03/09/20 12:49 PM  
Result Value Ref Range Ventricular Rate 109 BPM  
 Atrial Rate 109 BPM  
 P-R Interval 86 ms QRS Duration 68 ms Q-T Interval 348 ms QTC Calculation (Bezet) 468 ms Calculated P Axis 78 degrees Calculated R Axis 15 degrees Calculated T Axis 37 degrees Diagnosis Sinus tachycardia with short MS When compared with ECG of 11-FEB-2020 09:05, Nonspecific T wave abnormality, improved in Inferior leads T wave inversion less evident in Lateral leads LACTIC ACID Collection Time: 03/09/20 12:50 PM  
Result Value Ref Range Lactic acid 1.7 0.4 - 2.0 MMOL/L Radiologic Studies -  
XR CHEST PORT    (Results Pending) CTA CHEST W OR W WO CONT    (Results Pending) CT Results  (Last 48 hours) None CXR Results  (Last 48 hours) None Medical Decision Making I am the first provider for this patient.  
 
I reviewed the vital signs, available nursing notes, past medical history, past surgical history, family history and social history. Vital Signs-Reviewed the patient's vital signs. Patient Vitals for the past 12 hrs: 
 Temp Pulse Resp BP SpO2  
03/09/20 1207 99.4 °F (37.4 °C) (!) 111 16 103/56 97 % EKG interpretation: (Preliminary) EKG performed at 12:49 PM shows a sinus tachycardia at a rate of 109, normal axis, normal intervals except for short MN, no visible acute ischemic changes. Slight biphasic pattern in V2. No S1Q3T3. Records Reviewed: Nursing Notes, Old Medical Records and Previous electrocardiograms Provider Notes (Medical Decision Making):  
79-year-old male with back pain though cannot elicit on today's exam with low-grade temp and unexplained tachycardia that is nonischemic on EKG evaluation. No midline spine pain to suggest acute fracture, can evaluate urine to make sure there is no urine infection given patient still does make urine, concern for low-grade temp and tachycardia that is unexplained, must rule out PE, patient to get CT and can get contrast dialyzed off. Troponin result is elevated, will need to get second troponin but may be elevated given renal disease. ED Course:  
Initial assessment performed. The patients presenting problems have been discussed, and they are in agreement with the care plan formulated and outlined with them. I have encouraged them to ask questions as they arise throughout their visit. ED Course as of Mar 17 0855 Mon Mar 09, 2020  
1751 -------------------------------Signout------------------------------------- I took over care of this patient at 1600. Begin documentation Maria Antonia Plaza MD 
 
Patient initially presented today with back pain, extensive work-up reveals evidence of possible urinary tract infection with leukocytes, 2+ bacteria, blood in the urine. We will treat the patient for this. He did have an initially elevated troponin without any evidence of EKG changes. Subsequent troponin is downtrending. As per Dr. Noreen Cooks will discharge the patient with antibiotics for UTI.  
 [NW] ED Course User Index 
[NW] Monty Thomson MD  
 
It should be noted that IJustin MD will be the provider of record for this patient. Justin Miller MD  
 
 
Disposition: 
Discharge PLAN: 
1. Current Discharge Medication List  
Keflex 500 mg capsule by mouth 4 times daily for 7 days. 2.  
Follow-up Information Primary care physician or dialysis physician in the next 2 to 3 days. Return to ED if worse Diagnosis Clinical Impression: Urinary tract infection with hematuria, site unspecified Attestations: 
 
Justin Miller MD 
 
Please note that this dictation was completed with [employer], the computer voice recognition software. Quite often unanticipated grammatical, syntax, homophones, and other interpretive errors are inadvertently transcribed by the computer software. Please disregard these errors. Please excuse any errors that have escaped final proofreading. Thank you.

## 2020-03-09 NOTE — ED NOTES
Spoke with pt family member via phone, pt is a resident of [address] [place] [address]. [address] Chavez, spoke with [facility]. Pt will need ride home.

## 2020-03-09 NOTE — ED NOTES
Patient is being discharged home at this time, nadn, skin wpd, v/s stable, was given dc instructions and verbalized understanding,

## 2020-03-09 NOTE — ED NOTES
Pt encouraged to leave urine sample, refusing straight cath at this time, provided with urinal and water.

## 2020-03-10 PROBLEM — A41.9 SEVERE SEPSIS (HCC): Status: ACTIVE | Noted: 2020-01-01

## 2020-03-10 PROBLEM — R65.20 SEVERE SEPSIS (HCC): Status: ACTIVE | Noted: 2020-01-01

## 2020-03-10 NOTE — PROGRESS NOTES
Pharmacy  Heparin Monitoring Indication: DVT prophylaxis Goal aPTT: 58-77 seconds Initial dosing Weight: 49.8 kg Labs: 
Recent Labs  
  03/10/20 
1314 HGB 9.6*  
* Impression/Plan:  
Per protocol, changed heparin to 500 units SQ every 12hr for body weight < 60kg. Thanks, Oscar Osei PHARMD 
 
[email or web address]

## 2020-03-10 NOTE — PROGRESS NOTES
Spoke with Jennifer, nurse manager at pt's LT care facility, regarding + blood culture x2 results and need for return to the ED. Pt will return to the ED.

## 2020-03-10 NOTE — ED TRIAGE NOTES
Assumed care of pt via EMS stretcher. Pt is A&O x 2 and was brought in due to PA calling [employer] with positive culture results from yesterday's visit. Pt resting on stretcher in POC with call bell in reach. Pt placed on monitor x 3. VSS at this time. No acute distress at this time.

## 2020-03-10 NOTE — ED PROVIDER NOTES
EMERGENCY DEPARTMENT HISTORY AND PHYSICAL EXAM 
 
 
Date: 3/10/2020 Patient Name: Saritha Jama. History of Presenting Illness Chief Complaint Patient presents with  Abnormal Lab Results History Provided By: EMS and Caregiver HPI: Saritha Jama., 81 y.o. male  presents to the ED with cc of positive blood cultures. Patient was seen yesterday here in our emergency department for low-grade fever and he did have a sinus tachycardia. It appears that no apparent source of infection was found during yesterday's work-up. The reader of this note is invited to read the ER documentation from yesterday for further details. Patient has a history of dementia. He is in a nursing home. To my knowledge there is been no changes reported in his condition in the past 24 hours or since he was discharged from our ER. We called him back to the emergency department via the nursing home because of gram-positive cocci that were growing in blood cultures. There are no other complaints, changes, or physical findings at this time. PCP: Prasanna Amin MD 
 
Current Facility-Administered Medications on File Prior to Encounter Medication Dose Route Frequency Provider Last Rate Last Dose  [COMPLETED] sodium chloride 0.9 % bolus infusion 250 mL  250 mL IntraVENous ONCE Kirill Sidhu MD   Stopped at 03/09/20 1950  [DISCONTINUED] sodium chloride (NS) flush 5-10 mL  5-10 mL IntraVENous PRN Kirill Sidhu MD      
 
Current Outpatient Medications on File Prior to Encounter Medication Sig Dispense Refill  dronabinoL (MARINOL) 2.5 mg capsule Take 2.5 mg by mouth two (2) times a day.  cephALEXin (KEFLEX) 500 mg capsule Take 1 Cap by mouth four (4) times daily for 7 days. (Patient taking differently: Take 500 mg by mouth four (4) times daily. UTI) 28 Cap 0  
 levothyroxine (SYNTHROID) 75 mcg tablet Take 1 Tab by mouth Daily (before breakfast).  30 Tab 0  
  insulin NPH/insulin regular (NOVOLIN 70/30, HUMULIN 70/30) 100 unit/mL (70-30) injection Inject 5 units SQ BID 10 mL 0  
 ondansetron hcl (ZOFRAN) 4 mg tablet Take 4 mg by mouth every six (6) hours as needed for Nausea or Vomiting.  amLODIPine (NORVASC) 10 mg tablet Take 10 mg by mouth daily.  pantoprazole (PROTONIX) 40 mg tablet Take 40 mg by mouth daily.  calcitRIOL (ROCALTROL) 0.25 mcg capsule Take 1 Cap by mouth every Monday, Wednesday, Friday.  ferrous sulfate 325 mg (65 mg iron) tablet Take 1 Tab by mouth Daily (before breakfast).  aspirin delayed-release 81 mg tablet Take 81 mg by mouth daily.  insulin aspart U-100 (NOVOLOG U-100 INSULIN ASPART) 100 unit/mL injection 2-10 Units by SubCUTAneous route two (2) times daily as needed. Patient takes 10 units BID plus sliding scale Blood Glucose (mg/dL)       Insulin Dose (additional units) 151-220                               2  
            221-260                               4  
            261-300                               5  
            301-350                               8  
            351-450                               10  
            >450                                   Call MD    
 prednisoLONE acetate (PRED FORTE) 1 % ophthalmic suspension Administer 1 Drop to right eye two (2) times a day.  vit Bcomp,C/folic acid/zinc (RENAL MULTIVITAMIN/ZINC PO) Take 1 Tab by mouth daily.  acetaminophen (TYLENOL) 325 mg tablet Take 2 Tabs by mouth every four (4) hours as needed. For pain. 100 Tab 0  
 albuterol (PROVENTIL VENTOLIN) 2.5 mg /3 mL (0.083 %) nebulizer solution 2.5 mg by Nebulization route every four (4) hours as needed for Shortness of Breath.  tamsulosin (FLOMAX) 0.4 mg capsule Take 0.4 mg by mouth daily.  glucagon (GLUCAGEN) 1 mg injection 1 mL by IntraMUSCular route as needed for Hypoglycemia. 1 Vial 0 Past History Past Medical History: 
Past Medical History: Diagnosis Date  Alzheimer disease ([facility]  CAD (coronary artery disease)  Cancer [facility])   
 prostate  Chronic kidney disease  CKD (chronic kidney disease) stage 4, GFR 15-29 ml/min ([facility])  DKA (diabetic ketoacidoses) ([facility] 7/10/2011  DM (diabetes mellitus), type 2, uncontrolled ([facility]  Dyspnea 2/6/2017  Hemodialysis patient ([facility]  History of GI bleed 11/12/2014  Hyperlipidemia  Hypertension  Other ill-defined conditions(799.89)   
 blind R eye-retina detachment  Other ill-defined conditions(799.89) right cerebellopontine angle lipoma.  Pneumonia 2/4/2017  Pulmonary edema 1/25/2020  Weakness generalized 8/16/2010 Past Surgical History: 
Past Surgical History:  
Procedure Laterality Date  COLONOSCOPY,DIAGNOSTIC  11/12/2014  HX HEENT    
 retinal detachment  HX SHOULDER ARTHROSCOPY    
 right  HX UROLOGICAL    
 prostate bx  UPPER GI ENDOSCOPY,BIOPSY  11/12/2014 Family History: 
Family History Problem Relation Age of Onset  Heart Disease Mother Pacemaker  Cancer Father Social History: 
Social History Tobacco Use  Smoking status: Never Smoker  Smokeless tobacco: Never Used Substance Use Topics  Alcohol use: No  
 Drug use: No  
 
 
Allergies: Allergies Allergen Reactions  Ace Inhibitors Unknown (comments)  Arb-Angiotensin Receptor Antagonist Unknown (comments) Review of Systems Review of Systems Unable to perform ROS: Dementia Physical Exam  
Physical Exam 
Vitals signs and nursing note reviewed. Constitutional:   
   General: He is awake. He is not in acute distress. Appearance: He is well-developed. He is not ill-appearing. Eyes:  
   Conjunctiva/sclera: Conjunctivae normal.  
Neck: Musculoskeletal: Neck supple. Cardiovascular:  
   Rate and Rhythm: Normal rate and regular rhythm. Pulmonary: Effort: Pulmonary effort is normal. No accessory muscle usage or respiratory distress. Breath sounds: Normal breath sounds. Abdominal:  
   General: There is no distension. Palpations: Abdomen is soft. Tenderness: There is no abdominal tenderness. Musculoskeletal:  
   Left upper arm: He exhibits swelling. Arms: 
 
Lymphadenopathy:  
   Cervical: No cervical adenopathy. Skin: 
   General: Skin is warm and dry. Neurological:  
   Mental Status: He is alert. Cranial Nerves: No cranial nerve deficit. Sensory: No sensory deficit. Diagnostic Study Results Labs - Recent Results (from the past 24 hour(s)) URINALYSIS W/ REFLEX CULTURE Collection Time: 03/09/20  5:35 PM  
Result Value Ref Range Color DARK YELLOW Appearance CLEAR CLEAR Specific gravity 1.018 1.003 - 1.030    
 pH (UA) 7.0 5.0 - 8.0 Protein >300 (A) NEG mg/dL Glucose 250 (A) NEG mg/dL Ketone NEGATIVE  NEG mg/dL Blood MODERATE (A) NEG Urobilinogen 0.2 0.2 - 1.0 EU/dL Nitrites NEGATIVE  NEG Leukocyte Esterase TRACE (A) NEG    
 WBC 0-4 0 - 4 /hpf  
 RBC 20-50 0 - 5 /hpf Epithelial cells FEW FEW /lpf Bacteria 2+ (A) NEG /hpf  
 UA:UC IF INDICATED URINE CULTURE ORDERED (A) CNI Hyaline cast 0-2 0 - 5 /lpf URINE CULTURE HOLD SAMPLE Collection Time: 03/09/20  5:35 PM  
Result Value Ref Range Urine culture hold Urine on hold in Microbiology dept for 2 days. If unpreserved urine is submitted, it cannot be used for addtional testing after 24 hours, recollection will be required. SAMPLES BEING HELD Collection Time: 03/09/20  5:35 PM  
Result Value Ref Range SAMPLES BEING HELD 1 YLW UR   
 COMMENT Add-on orders for these samples will be processed based on acceptable specimen integrity and analyte stability, which may vary by analyte. BILIRUBIN, CONFIRM Collection Time: 03/09/20  5:35 PM  
Result Value Ref Range Bilirubin UA, confirm NEGATIVE  NEG    
CULTURE, URINE Collection Time: 03/09/20  5:35 PM  
Result Value Ref Range Special Requests: NO SPECIAL REQUESTS Reflexed from [id number] [place] Count >100,000 COLONIES/mL Culture result: PROBABLE STAPHYLOCOCCUS SPECIES (A) GLUCOSE, POC Collection Time: 03/10/20 12:19 PM  
Result Value Ref Range Glucose (POC) 219 (H) 65 - 100 mg/dL Performed by Laura Prieto RN (BSN) CBC WITH AUTOMATED DIFF Collection Time: 03/10/20  1:14 PM  
Result Value Ref Range WBC 10.2 4.1 - 11.1 K/uL  
 RBC 3.38 (L) 4.10 - 5.70 M/uL HGB 9.6 (L) 12.1 - 17.0 g/dL HCT 28.2 (L) 36.6 - 50.3 % MCV 83.4 80.0 - 99.0 FL  
 MCH 28.4 26.0 - 34.0 PG  
 MCHC 34.0 30.0 - 36.5 g/dL  
 RDW 18.0 (H) 11.5 - 14.5 % PLATELET 207 (L) 265 - 400 K/uL MPV 11.3 8.9 - 12.9 FL  
 NRBC 0.2 (H) 0  WBC ABSOLUTE NRBC 0.02 (H) 0.00 - 0.01 K/uL NEUTROPHILS 87 (H) 32 - 75 % BAND NEUTROPHILS 4 % LYMPHOCYTES 4 (L) 12 - 49 % MONOCYTES 5 5 - 13 % EOSINOPHILS 0 0 - 7 % BASOPHILS 0 0 - 1 % IMMATURE GRANULOCYTES 0 0.0 - 0.5 % ABS. NEUTROPHILS 9.3 (H) 1.8 - 8.0 K/UL  
 ABS. LYMPHOCYTES 0.4 (L) 0.8 - 3.5 K/UL  
 ABS. MONOCYTES 0.5 0.0 - 1.0 K/UL  
 ABS. EOSINOPHILS 0.0 0.0 - 0.4 K/UL  
 ABS. BASOPHILS 0.0 0.0 - 0.1 K/UL  
 ABS. IMM. GRANS. 0.0 0.00 - 0.04 K/UL  
 DF MANUAL    
 RBC COMMENTS ANISOCYTOSIS 1+ 
    
 RBC COMMENTS SCHISTOCYTES PRESENT 
    
LACTIC ACID Collection Time: 03/10/20  1:14 PM  
Result Value Ref Range Lactic acid 2.9 (HH) 0.4 - 2.0 MMOL/L  
METABOLIC PANEL, COMPREHENSIVE Collection Time: 03/10/20  1:14 PM  
Result Value Ref Range Sodium 128 (L) 136 - 145 mmol/L Potassium 6.1 (H) 3.5 - 5.1 mmol/L Chloride 94 (L) 97 - 108 mmol/L  
 CO2 23 21 - 32 mmol/L Anion gap 11 5 - 15 mmol/L Glucose 216 (H) 65 - 100 mg/dL BUN 59 (H) 6 - 20 MG/DL  Creatinine 5.71 (H) 0.70 - 1.30 MG/DL  
 BUN/Creatinine ratio 10 (L) 12 - 20 GFR est AA 12 (L) >60 ml/min/1.73m2 GFR est non-AA 10 (L) >60 ml/min/1.73m2 Calcium 7.9 (L) 8.5 - 10.1 MG/DL Bilirubin, total 2.8 (H) 0.2 - 1.0 MG/DL  
 ALT (SGPT) 21 12 - 78 U/L  
 AST (SGOT) 29 15 - 37 U/L Alk. phosphatase 204 (H) 45 - 117 U/L Protein, total 5.6 (L) 6.4 - 8.2 g/dL Albumin 2.3 (L) 3.5 - 5.0 g/dL Globulin 3.3 2.0 - 4.0 g/dL A-G Ratio 0.7 (L) 1.1 - 2.2 MAGNESIUM Collection Time: 03/10/20  1:14 PM  
Result Value Ref Range Magnesium 2.5 (H) 1.6 - 2.4 mg/dL PHOSPHORUS Collection Time: 03/10/20  1:14 PM  
Result Value Ref Range Phosphorus 7.5 (H) 2.6 - 4.7 MG/DL Radiologic Studies - No orders to display CT Results  (Last 48 hours) 03/09/20 1500  CTA CHEST W OR W WO CONT Final result Impression:  IMPRESSION: Bilateral pleural effusions Narrative:  Clinical indication: Acute tachycardia. Localizer obtained without contrast at the level of the pulmonary arteries. Fast  
injection rate of the cc of Isovue-370 with review of the raw data and MIP  
reconstructions. No prior. CT dose reduction was achieved through the use of a  
standardized protocol tailored for this examination and automatic exposure  
control for dose modulation . Ham Laureano Large bilateral pleural effusions with anasarca and by basilar atelectasis. There is no pulmonary emboli. Interstitial prominence is demonstrated. Several  
nonsolid lung nodules are demonstrated possible small infiltrates. Those should  
be followed up. CXR Results  (Last 48 hours) 03/09/20 1421  XR CHEST PORT Final result Impression:   impression: No change, effusions. Narrative:  Clinical indication: Shortness of breath. Portable AP upright view of the chest is obtained, comparison February 11. Stable left pleural effusion. Possible small right pleural effusion also unchanged. The heart size is normal.  
   
  
  
 
 
 
Medical Decision Making I am the first provider for this patient. I reviewed the vital signs, available nursing notes, past medical history, past surgical history, family history and social history. Vital Signs-Reviewed the patient's vital signs. Patient Vitals for the past 24 hrs: 
 Temp Pulse Resp BP SpO2  
03/10/20 1430  (!) 105 16 120/74   
03/10/20 1330  (!) 105 21 118/72   
03/10/20 1300  (!) 109 22 119/72 (!) 83 % 03/10/20 1251     98 % 03/10/20 1230  (!) 108 20 129/80 96 % 03/10/20 1211 97.9 °F (36.6 °C) (!) 108 20 133/77 97 % Records Reviewed: Nursing Notes, Old Medical Records, Previous Radiology Studies and Previous Laboratory Studies Provider Notes (Medical Decision Making):  
Patient with a history of end-stage renal disease on dialysis was called back today for positive blood cultures. He has a bacteremia. He is tachycardic and has an elevating white blood cell count. He is not febrile. His lactic acid is 2.9 however he is a renal dialysis patient which would falsely elevate these levels. It is elevated compared to 1.7 yesterday. For set of labs were hemolyzed. The potassium was 6.1 is a false elevation due to hemolysis and will be repeated. Broad-spectrum antibiotics were administered including MRSA coverage. ED Course:  
Initial assessment performed. The patients presenting problems have been discussed, and they are in agreement with the care plan formulated and outlined with them. I have encouraged them to ask questions as they arise throughout their visit. Orders Placed This Encounter  SEVERE SEPSIS AND SEPTIC SHOCK BUNDLE INITIATED  CULTURE, BLOOD  CULTURE, BLOOD  CBC WITH AUTOMATED DIFF  
 LACTIC ACID, PLASMA  COMPREHENSIVE METABOLIC PANEL  
 MAGNESIUM  
 PHOSPHORUS  
 POTASSIUM  
 LACTIC ACID, PLASMA  URINALYSIS W/ RFLX MICROSCOPIC  
  METABOLIC PANEL, COMPREHENSIVE  
 CBC WITH AUTOMATED DIFF  
 VITAL SIGNS - PER UNIT ROUTINE  DO NOT RESUSCITATE  
 IP CONSULT TO NEPHROLOGY  CONTACT ISOLATION  
 GLUCOSE, POC  SALINE LOCK IV ONE TIME STAT  
 SALINE LOCK IV ONE TIME STAT  cefepime (MAXIPIME) 1 g in 0.9% sodium chloride (MBP/ADV) 50 mL  vancomycin (VANCOCIN) 1,000 mg in 0.9% sodium chloride (MBP/ADV) 250 mL  acetaminophen (TYLENOL) tablet 650 mg  
 albuterol (PROVENTIL VENTOLIN) nebulizer solution 2.5 mg  
 amLODIPine (NORVASC) tablet 10 mg  
 aspirin delayed-release tablet 81 mg  
 calcitRIOL (ROCALTROL) capsule 0.25 mcg  ferrous sulfate tablet 325 mg  
 glucagon (GLUCAGEN) injection 1 mg  levothyroxine (SYNTHROID) tablet 75 mcg  pantoprazole (PROTONIX) tablet 40 mg  
 prednisoLONE acetate (PRED FORTE) 1 % ophthalmic suspension 1 Drop  tamsulosin (FLOMAX) capsule 0.4 mg  B complex-vitaminC-folic acid (NEPHROCAP) cap  sodium chloride (NS) flush 5-10 mL  DISCONTD: vancomycin (VANCOCIN) 1,000 mg in 0.9% sodium chloride (MBP/ADV) 250 mL  0.9% sodium chloride infusion  ondansetron (ZOFRAN) injection 4 mg  insulin lispro (HUMALOG) injection  glucose chewable tablet 16 g  
 dextrose (D50W) injection syrg 12.5-25 g  
 glucagon (GLUCAGEN) injection 1 mg  dronabinoL (MARINOL) 2.5 mg capsule  heparin (porcine) injection 5,000 Units  VANCOMYCIN INFORMATION NOTE  
 IP CONSULT TO VASCULAR SURGERY  
 INITIAL PHYSICIAN ORDER: INPATIENT Medical; 3. Patient receiving treatment that can only be provided in an inpatient setting (further clarification in H&P documentation)  IP CONSULT TO PHARMACY - VANCOMYCIN DOSING Procedures Critical Care Time:  
0 Disposition: 
Admit Diagnosis Clinical Impression: 1. Bacteremia This note will not be viewable in [address]

## 2020-03-10 NOTE — H&P
Hospitalist Admission Note NAME: Tiara Cochran. :  1950 MRN:  [id number] Date/Time:  3/10/2020 3:51 PM 
 
Patient PCP: Ebonie Villanueva MD 
______________________________________________________________________ Given the patient's current clinical presentation, I have a high level of concern for decompensation if discharged from the emergency department. Complex decision making was performed, which includes reviewing the patient's available past medical records, laboratory results, and x-ray films. My assessment of this patient's clinical condition and my plan of care is as follows. Assessment / Plan: 
 
Fever/Gram Positive Cocci Bacteremia/ Infected Dialysis Fistula site/UTI 
-Patient was noted to have fever at the nursing home. 
-He was seen in the ED yesterday with the blood cultures drawn. Blood cultures came back positive for gram-positive cocci 
-Patient is oriented to self on his baseline and is unable to provide any reliable history. 
-His dialysis fistula site is covered in a bandage with putrid smell. When the bandage removed there is a blood clot noticed in an open area. -Likely source of bacteremia is infected dialysis fistula site. 
-We will repeat blood cultures.  
-start vancomycin until sensitivities are back 
-Obtain vascular consult to evaluate for fistula site 
-Obtain a Doppler of the left upper extremity as a blood clot noticed in the dialysis fistula site open area in left upper extremity is swollen 
 -Obtain echocardiogram due to the presence of gram-positive bacteremia 
-Urine culture obtained yesterday is also growing Staphylococcus 
-Patient denies any symptoms of UTI. 
-Lactic acid level is elevated, patient does not meet the sepsis criteria 
-We will repeat lactic acid level 
-Avoid giving IV fluids due to the presence of bilateral pleural effusion and ascites 
-Blood pressure is stable 
  
ESRD on HD MWF 
 Anemia contw supplements/ Hyperkalemia/Hyponatremia 
 -Nephrology Consult for dialysis 
-Potassium is 6.1, sodium is 128 
-We will repeat BMP as it is noted to be hemolyzed sample Hyperbilirubinemia: Likely secondary to ascites, bilirubin level was noted to be 3 yesterday and is 2.8 today. Ultrasound of the abdomen from yesterday reviewed which showed mild ascites. Follow-up CMP Hypoxia/bilateral pleural effusion: 
-Pleural effusion is chronic, patient is noted to have one episode of hypoxia of 83% on room air 
-Oxygen supplementation 
-CT from yesterday reviewed with no evidence of infiltrate. 
-Expectant improvement after dialysis 
  
IDDM Type 2 
-FS monitoring, SS 
-hold insulin now start SS as needed with meals 
  
Alzheimer's Dementia MS at baseline 
-watch for delirium with acute medical problems Legally blind HTN: Continue amlodipine Hypothyroidism: Continue levothyroxine 
 
  
Code Status: DDNR on file Surrogate Decision Maker:  DVT Prophylaxis: Heparin SQ 
GI Prophylaxis: not indicated Baseline: Dependent IADLs, oriented to self Subjective: CHIEF COMPLAINT: Fever/positive blood culture HISTORY OF PRESENT ILLNESS:    
Albino Kenny is a 81 y.o.  male with past medical history significant for end-stage renal disease on dialysis Monday Wednesday Friday, insulin-dependent diabetes, hypertension, hypothyroidism who is legally blind and had multiple admissions in the last few months for different reasons. Is currently a nursing home resident and was sent into the ED for the evaluation of fever yesterday. His initial work-up including leukocyte count, CTA chest and ultrasound of the abdomen was unremarkable except for chronic bilateral pleural effusion and small amount of ascites.   Patient was discharged from the ED and was called back again today as his blood cultures came back positive for gram-positive cocci.  He was started on Keflex with a impression of UTI. Patient himself is oriented to his name at his baseline due to the dementia and is unable to provide any reliable history. Review of symptoms is negative for headache, confusion, chest pain, shortness of breath, cough, sputum production, nausea, vomiting, diarrhea, urine urgency frequency dysuria, heat or cold intolerance. Patient is noted to have his dialysis fistula wrapped in a dressing. The dressing is soaked with blood and on removing the dressing it is noticed that there is putrid smell as well as what apparently seems to be a blood clot sticking out of the open area which is 1 and 1 cm. There is no surrounding cellulitis noticed We were asked to admit for work up and evaluation of the above problems. Past Medical History:  
Diagnosis Date  Alzheimer disease ([facility]  CAD (coronary artery disease)  Cancer [facility])   
 prostate  Chronic kidney disease  CKD (chronic kidney disease) stage 4, GFR 15-29 ml/min ([facility])  DKA (diabetic ketoacidoses) ([facility] 7/10/2011  DM (diabetes mellitus), type 2, uncontrolled ([facility]  Dyspnea 2/6/2017  Hemodialysis patient ([facility]  History of GI bleed 11/12/2014  Hyperlipidemia  Hypertension  Other ill-defined conditions(799.89)   
 blind R eye-retina detachment  Other ill-defined conditions(799.89) right cerebellopontine angle lipoma.  Pneumonia 2/4/2017  Pulmonary edema 1/25/2020  Weakness generalized 8/16/2010 Past Surgical History:  
Procedure Laterality Date  COLONOSCOPY,DIAGNOSTIC  11/12/2014  HX HEENT    
 retinal detachment  HX SHOULDER ARTHROSCOPY    
 right  HX UROLOGICAL    
 prostate bx  UPPER GI ENDOSCOPY,BIOPSY  11/12/2014 Social History Tobacco Use  Smoking status: Never Smoker  Smokeless tobacco: Never Used Substance Use Topics  Alcohol use: No  
  
 
Family History Problem Relation Age of Onset  Heart Disease Mother Pacemaker  Cancer Father Reviewed Allergies Allergen Reactions  Ace Inhibitors Unknown (comments)  Arb-Angiotensin Receptor Antagonist Unknown (comments) Prior to Admission medications Medication Sig Start Date End Date Taking? Authorizing Provider  
dronabinoL (MARINOL) 2.5 mg capsule Take 2.5 mg by mouth two (2) times a day. Yes Provider, Historical  
cephALEXin (KEFLEX) 500 mg capsule Take 1 Cap by mouth four (4) times daily for 7 days. Patient taking differently: Take 500 mg by mouth four (4) times daily. UTI 3/9/20 3/16/20 Yes Ignacio Ochoa MD  
levothyroxine (SYNTHROID) 75 mcg tablet Take 1 Tab by mouth Daily (before breakfast). 2/13/20  Yes Olimpia Moran NP  
insulin NPH/insulin regular (NOVOLIN 70/30, HUMULIN 70/30) 100 unit/mL (70-30) injection Inject 5 units SQ BID 2/13/20  Yes Olimpia Moran NP  
ondansetron hcl (ZOFRAN) 4 mg tablet Take 4 mg by mouth every six (6) hours as needed for Nausea or Vomiting. Yes Provider, Historical  
amLODIPine (NORVASC) 10 mg tablet Take 10 mg by mouth daily. Yes Provider, Historical  
pantoprazole (PROTONIX) 40 mg tablet Take 40 mg by mouth daily. Yes Provider, Historical  
calcitRIOL (ROCALTROL) 0.25 mcg capsule Take 1 Cap by mouth every Monday, Wednesday, Friday. 12/4/19  Yes Chon Cesar MD  
ferrous sulfate 325 mg (65 mg iron) tablet Take 1 Tab by mouth Daily (before breakfast). 12/4/19  Yes Chon Cesar MD  
aspirin delayed-release 81 mg tablet Take 81 mg by mouth daily. Yes Provider, Historical  
insulin aspart U-100 (NOVOLOG U-100 INSULIN ASPART) 100 unit/mL injection 2-10 Units by SubCUTAneous route two (2) times daily as needed. Patient takes 10 units BID plus sliding scale Blood Glucose (mg/dL)       Insulin Dose (additional units)             151-220                               2  
            221-260                               4  
 261-300                               5  
            301-350                               8  
            351-450                               10  
            >450                                   Call MD   Yes Provider, Historical  
prednisoLONE acetate (PRED FORTE) 1 % ophthalmic suspension Administer 1 Drop to right eye two (2) times a day. Yes Provider, Historical  
vit Bcomp,C/folic acid/zinc (RENAL MULTIVITAMIN/ZINC PO) Take 1 Tab by mouth daily. Yes Provider, Historical  
acetaminophen (TYLENOL) 325 mg tablet Take 2 Tabs by mouth every four (4) hours as needed. For pain. 4/8/17  Yes [place], Minna Opitz, MD  
albuterol (PROVENTIL VENTOLIN) 2.5 mg /3 mL (0.083 %) nebulizer solution 2.5 mg by Nebulization route every four (4) hours as needed for Shortness of Breath. Yes Provider, Historical  
tamsulosin (FLOMAX) 0.4 mg capsule Take 0.4 mg by mouth daily. Yes Other, MD Yumi  
glucagon (GLUCAGEN) 1 mg injection 1 mL by IntraMUSCular route as needed for Hypoglycemia. 6/25/12  Yes Karen Becerra MD  
 
 
REVIEW OF SYSTEMS:    
I am not able to complete the review of systems because: The patient is intubated and sedated The patient has altered mental status due to his acute medical problems The patient has baseline aphasia from prior stroke(s) The patient has baseline dementia and is not reliable historian The patient is in acute medical distress and unable to provide information Total of 12 systems reviewed as follows:   
   POSITIVE= underlined text  Negative = text not underlined General:  fever, chills, sweats, generalized weakness, weight loss/gain,  
   loss of appetite Eyes:    blurred vision, eye pain, loss of vision, double vision ENT:    rhinorrhea, pharyngitis Respiratory:   cough, sputum production, SOB, LAUREANO, wheezing, pleuritic pain  
Cardiology:   chest pain, palpitations, orthopnea, PND, edema, syncope Gastrointestinal:  abdominal pain , N/V, diarrhea, dysphagia, constipation, bleeding Genitourinary:  frequency, urgency, dysuria, hematuria, incontinence Muskuloskeletal :  arthralgia, myalgia, back pain Hematology:  easy bruising, nose or gum bleeding, lymphadenopathy Dermatological: rash, ulceration, pruritis, color change / jaundice Endocrine:   hot flashes or polydipsia Neurological:  headache, dizziness, confusion, focal weakness, paresthesia, Speech difficulties, memory loss, gait difficulty Psychological: Feelings of anxiety, depression, agitation Objective: VITALS:   
Visit Vitals /74 Pulse (!) 105 Temp 97.9 °F (36.6 °C) Resp 16 Ht 5' 5\" (1.651 m) SpO2 (!) 83% BMI 18.27 kg/m² PHYSICAL EXAM: 
 
General:    Alert, cooperative, no distress, appears stated age. HEENT: Atraumatic, anicteric sclerae, pink conjunctivae No oral ulcers, mucosa moist, throat clear, dentition fair Neck:  Supple, symmetrical,  thyroid: non tender Lungs:   Clear to auscultation bilaterally. No Wheezing or Rhonchi. No rales. Chest wall:  No tenderness  No Accessory muscle use. Heart:   Regular  rhythm,  No  murmur   No edema Abdomen:   Soft, non-tender. Not distended. Bowel sounds normal 
Extremities: No cyanosis. No clubbing,   
  Skin turgor normal, Capillary refill normal, Radial dial pulse 2+, dialysis fistula site had a dressing which was blood soaked in upon removal there was putrid smell with 1 into 1 cm open area with a fresh blood clot sticking out of that open area. Left upper extremity is edematous . Skin:     Not pale. Not Jaundiced  No rashes Psych:  Good insight. Not depressed. Not anxious or agitated. Neurologic: EOMs intact. No facial asymmetry. No aphasia or slurred speech. Symmetrical strength, Sensation grossly intact. Alert and oriented X 4.  
 
_______________________________________________________________________ Care Plan discussed with: 
 Comments Patient x Family RN x Care Manager Consultant:  x   
_______________________________________________________________________ Expected  Disposition:  
Home with Family HH/PT/OT/RN   
SNF/LTC x  
JESUS   
________________________________________________________________________ TOTAL TIME:  45 Minutes Critical Care Provided     Minutes non procedure based Comments Reviewed previous records  
>50% of visit spent in counseling and coordination of care  Discussion with patient and/or family and questions answered 
  
 
________________________________________________________________________ Signed: Jalil Diaz MD 
 
Procedures: see electronic medical records for all procedures/Xrays and details which were not copied into this note but were reviewed prior to creation of Plan. LAB DATA REVIEWED:   
Recent Results (from the past 24 hour(s)) TROPONIN I Collection Time: 03/09/20  4:08 PM  
Result Value Ref Range Troponin-I, Qt. 0.23 (H) <0.05 ng/mL URINALYSIS W/ REFLEX CULTURE Collection Time: 03/09/20  5:35 PM  
Result Value Ref Range Color DARK YELLOW Appearance CLEAR CLEAR Specific gravity 1.018 1.003 - 1.030    
 pH (UA) 7.0 5.0 - 8.0 Protein >300 (A) NEG mg/dL Glucose 250 (A) NEG mg/dL Ketone NEGATIVE  NEG mg/dL Blood MODERATE (A) NEG Urobilinogen 0.2 0.2 - 1.0 EU/dL Nitrites NEGATIVE  NEG Leukocyte Esterase TRACE (A) NEG    
 WBC 0-4 0 - 4 /hpf  
 RBC 20-50 0 - 5 /hpf Epithelial cells FEW FEW /lpf Bacteria 2+ (A) NEG /hpf  
 UA:UC IF INDICATED URINE CULTURE ORDERED (A) CNI Hyaline cast 0-2 0 - 5 /lpf URINE CULTURE HOLD SAMPLE Collection Time: 03/09/20  5:35 PM  
Result Value Ref Range Urine culture hold Urine on hold in Microbiology dept for 2 days. If unpreserved urine is submitted, it cannot be used for addtional testing after 24 hours, recollection will be required. SAMPLES BEING HELD Collection Time: 03/09/20  5:35 PM  
Result Value Ref Range SAMPLES BEING HELD 1 YLW UR   
 COMMENT Add-on orders for these samples will be processed based on acceptable specimen integrity and analyte stability, which may vary by analyte. BILIRUBIN, CONFIRM Collection Time: 03/09/20  5:35 PM  
Result Value Ref Range Bilirubin UA, confirm NEGATIVE  NEG    
CULTURE, URINE Collection Time: 03/09/20  5:35 PM  
Result Value Ref Range Special Requests: NO SPECIAL REQUESTS Reflexed from [id number] [place] Count >100,000 COLONIES/mL Culture result: PROBABLE STAPHYLOCOCCUS SPECIES (A) GLUCOSE, POC Collection Time: 03/10/20 12:19 PM  
Result Value Ref Range Glucose (POC) 219 (H) 65 - 100 mg/dL Performed by Melissa Jackson RN (BSN) CBC WITH AUTOMATED DIFF Collection Time: 03/10/20  1:14 PM  
Result Value Ref Range WBC 10.2 4.1 - 11.1 K/uL  
 RBC 3.38 (L) 4.10 - 5.70 M/uL HGB 9.6 (L) 12.1 - 17.0 g/dL HCT 28.2 (L) 36.6 - 50.3 % MCV 83.4 80.0 - 99.0 FL  
 MCH 28.4 26.0 - 34.0 PG  
 MCHC 34.0 30.0 - 36.5 g/dL  
 RDW 18.0 (H) 11.5 - 14.5 % PLATELET 410 (L) 829 - 400 K/uL MPV 11.3 8.9 - 12.9 FL  
 NRBC 0.2 (H) 0  WBC ABSOLUTE NRBC 0.02 (H) 0.00 - 0.01 K/uL NEUTROPHILS 87 (H) 32 - 75 % BAND NEUTROPHILS 4 % LYMPHOCYTES 4 (L) 12 - 49 % MONOCYTES 5 5 - 13 % EOSINOPHILS 0 0 - 7 % BASOPHILS 0 0 - 1 % IMMATURE GRANULOCYTES 0 0.0 - 0.5 % ABS. NEUTROPHILS 9.3 (H) 1.8 - 8.0 K/UL  
 ABS. LYMPHOCYTES 0.4 (L) 0.8 - 3.5 K/UL  
 ABS. MONOCYTES 0.5 0.0 - 1.0 K/UL  
 ABS. EOSINOPHILS 0.0 0.0 - 0.4 K/UL  
 ABS. BASOPHILS 0.0 0.0 - 0.1 K/UL  
 ABS. IMM. GRANS. 0.0 0.00 - 0.04 K/UL  
 DF MANUAL    
 RBC COMMENTS ANISOCYTOSIS 1+ 
    
 RBC COMMENTS SCHISTOCYTES PRESENT 
    
LACTIC ACID Collection Time: 03/10/20  1:14 PM  
Result Value Ref Range  Lactic acid 2.9 (HH) 0.4 - 2.0 MMOL/L  
METABOLIC PANEL, COMPREHENSIVE  
 Collection Time: 03/10/20  1:14 PM  
Result Value Ref Range Sodium 128 (L) 136 - 145 mmol/L Potassium 6.1 (H) 3.5 - 5.1 mmol/L Chloride 94 (L) 97 - 108 mmol/L  
 CO2 23 21 - 32 mmol/L Anion gap 11 5 - 15 mmol/L Glucose 216 (H) 65 - 100 mg/dL BUN 59 (H) 6 - 20 MG/DL Creatinine 5.71 (H) 0.70 - 1.30 MG/DL  
 BUN/Creatinine ratio 10 (L) 12 - 20 GFR est AA 12 (L) >60 ml/min/1.73m2 GFR est non-AA 10 (L) >60 ml/min/1.73m2 Calcium 7.9 (L) 8.5 - 10.1 MG/DL Bilirubin, total 2.8 (H) 0.2 - 1.0 MG/DL  
 ALT (SGPT) 21 12 - 78 U/L  
 AST (SGOT) 29 15 - 37 U/L Alk. phosphatase 204 (H) 45 - 117 U/L Protein, total 5.6 (L) 6.4 - 8.2 g/dL Albumin 2.3 (L) 3.5 - 5.0 g/dL Globulin 3.3 2.0 - 4.0 g/dL A-G Ratio 0.7 (L) 1.1 - 2.2 MAGNESIUM Collection Time: 03/10/20  1:14 PM  
Result Value Ref Range Magnesium 2.5 (H) 1.6 - 2.4 mg/dL PHOSPHORUS Collection Time: 03/10/20  1:14 PM  
Result Value Ref Range  Phosphorus 7.5 (H) 2.6 - 4.7 MG/DL

## 2020-03-10 NOTE — PROGRESS NOTES
Bedside and Verbal shift change report given to Power (oncoming nurse) by Maritza Royal (offgoing nurse). Report included the following information SBAR, Kardex, Intake/Output and MAR.

## 2020-03-10 NOTE — CONSULTS
[id number]- consult dictated ESRD ([facility]; MWF) High K- hemolyzed specimen Fever/GPC Bacteremia and potential infected L AVF site HTN- BP low nl Hyponatremia Anemia of ESRD High Phos/SHPT High Mg Dementia P: 
HD tomm per schedule Repeat BMP IV ABx; follow Cx Vascular surgery consult If we are not able to use AVF, then will need indra catheter. Will await vascular opinion Ct home phosphate binders Avoid Mg containing laxatives or supplements Jigna Walton MD 
[id number]

## 2020-03-10 NOTE — PROGRESS NOTES
1838- Lactic acid came back critical at 4.2. MD Dr Mariana Bailey notified. Order for 500 mL bolus to be given and to recheck Lactic in 4 hours. Will continue to monitor.

## 2020-03-10 NOTE — PROGRESS NOTES
Pharmacy Automatic Renal Dosing Protocol - Antimicrobials Indication for Antimicrobials: SSTI Current Regimen of Each Antimicrobial: 
Vancomycin 1000 mg x 1 then 750 mg After each HD (Start Date 3/10; Day # ) Previous Antimicrobial Therapy: 
 
Vancomycin Goal Level: 15-20 mcg/ml Vancomycin Levels Date Dose & Interval Measured (mcg/mL) Steady State (mcg/mL) Date & time of next level:  
 
Significant Cultures:  
 
Radiology / Imaging results: (X-ray, CT scan or MRI):  
 
 
Labs: 
Recent Labs  
  03/10/20 
1314 20 
1215 CREA 5.71* 4.96* BUN 59* 39* WBC 10.2 5.8 Temp (24hrs), Av.9 °F (36.6 °C), Min:97.9 °F (36.6 °C), Max:97.9 °F (36.6 °C) Paralysis, amputations, malnutrition:  
Creatinine Clearance (mL/min) or Dialysis: HD Impression/Plan:  
Vancomycin 1000 mg x 1 then 750 mg after each HD. Random vancomycin level before 2nd maintenance dose. Pharmacy will follow daily and adjust medications as appropriate for renal function and/or serum levels. Thank you, 
Bay Dunham, [facility] Recommended duration of therapy 
http://[facility]/[place]/[facility]/Pharmacy/Clinical%20Companion/Duration%20of%20ABX%20therapy. docx Renal Dosing 
http://[facility]/[place]/[facility]/Pharmacy/Clinical%20Companion/Renal%[email or web address]. pdf

## 2020-03-10 NOTE — ED NOTES
TRANSFER - OUT REPORT: 
 
Verbal report given to Staci Perez (name) on [facility].  being transferred to [facility] (unit) for routine progression of care Report consisted of patients Situation, Background, Assessment and  
Recommendations(SBAR). Information from the following report(s) SBAR, ED Summary and Recent Results was reviewed with the receiving nurse. Lines:  
Peripheral IV 03/10/20 Right Antecubital (Active) Site Assessment Clean, dry, & intact 3/10/2020 12:14 PM  
Phlebitis Assessment 0 3/10/2020 12:14 PM  
Infiltration Assessment 0 3/10/2020 12:14 PM  
Dressing Status Clean, dry, & intact 3/10/2020 12:14 PM  
Hub Color/Line Status Pink;Flushed;Patent 3/10/2020 12:14 PM  
  
 
Opportunity for questions and clarification was provided. Patient transported with: 
 [employer]

## 2020-03-11 NOTE — PROGRESS NOTES
Patient's health care proxy was contacted on all three phone numbers, but I have not received a call back.

## 2020-03-11 NOTE — CONSULTS
[address] [facility] Name:  Jacqueline Zamarripa 
MR#:  [id number] :  1950 ACCOUNT #:  [de-identified] DATE OF SERVICE:  03/10/2020 REQUESTING PHYSICIAN:  Dr Ramírez Griffin REASON FOR CONSULTATION:  For evaluation and management of ESRD. The patient was seen and examined in ED. History was obtained from chart review. The patient has dementia and a very poor historian. HISTORY OF PRESENT ILLNESS:  The patient is a 47-year-old -American male with past medical history significant for ESRD on HD, poorly controlled diabetes, hypertension, and hypothyroidism, who is legally blind and has had multiple admissions in last few months for various reasons. He is a nursing home resident and was send in to ED yesterday for evaluation of fever. His initial workup including leukocyte count, CTA of the chest, and ultrasound of the abdomen was unremarkable except for chronic bilateral pleural effusion and small amount of ascites. He was discharged from ED, but was called back again today as his blood cultures drawn yesterday in the ED came back positive for gram-positive cocci. He was started on Keflex with impression of UTI although his UA does not show any evidence of pyuria. The patient has baseline dementia and unable to provide any reliable history. He is pleasantly confused. He denies any headache, confusion, chest pain, dyspnea, cough, productive sputum, diarrhea, or any burning or pain with urination. He makes minimal urine. His left AV fistula has bruit. There is a slight soaking with blood and there was a blood clot sticking out of the open area earlier on removal of dressing. I did not remove the dressing. He is currently afebrile. Vitals are stable. He is due for dialysis tomorrow. His initial labs showed a potassium of 6.1, but it was a hemolyzed specimen. His sodium was slightly low at 128. REVIEW OF SYSTEM:  As noted above. Remainder review of system obtained and negative. Past Medical History:  
Diagnosis Date  Alzheimer disease ([facility]  CAD (coronary artery disease)  Cancer [facility])   
 prostate  Chronic kidney disease  CKD (chronic kidney disease) stage 4, GFR 15-29 ml/min ([facility])  DKA (diabetic ketoacidoses) ([facility] 7/10/2011  DM (diabetes mellitus), type 2, uncontrolled ([facility]  Dyspnea 2/6/2017  Hemodialysis patient ([facility]  History of GI bleed 11/12/2014  Hyperlipidemia  Hypertension  Other ill-defined conditions(799.89)   
 blind R eye-retina detachment  Other ill-defined conditions(799.89) right cerebellopontine angle lipoma.  Pneumonia 2/4/2017  Pulmonary edema 1/25/2020  Weakness generalized 8/16/2010 Past Surgical History:  
Procedure Laterality Date  COLONOSCOPY,DIAGNOSTIC  11/12/2014  HX HEENT    
 retinal detachment  HX SHOULDER ARTHROSCOPY    
 right  HX UROLOGICAL    
 prostate bx  UPPER GI ENDOSCOPY,BIOPSY  11/12/2014 Allergies Allergen Reactions  Ace Inhibitors Unknown (comments)  Arb-Angiotensin Receptor Antagonist Unknown (comments) Social History Tobacco Use  Smoking status: Never Smoker  Smokeless tobacco: Never Used Substance Use Topics  Alcohol use: No  
 Drug use: No  
 
Family History Problem Relation Age of Onset  Heart Disease Mother Pacemaker  Cancer Father PHYSICAL EXAMINATION: 
GENERAL:  Elderly male, ill appearing, in no acute distress. He is resting comfortably in the stretcher. VITAL SIGNS:  He is afebrile. Pulse 103, /71, respirations of 18, saturating 100% on room air. HEENT:  Head is atraumatic, normocephalic. Conjunctivae are slightly pale. Mucous membranes are moist.  No scleral icterus. NECK:  No JVD. LUNGS:  Clear except slight decreased breath sounds at bases. HEART:  Regular rate rhythm with mild tachycardia. ABDOMEN:  Soft, nontender, active bowel sounds. EXTREMITIES:  No peripheral edema. Left arm fistula site is covered with dressing. Positive bruits. CNS:  He is alert, awake, pleasantly confused, poor historian. Baseline dementia. LABORATORY DATA:  CBC with anemia with a hemoglobin of 9.6, white count is normal, platelets are down to 114. BUN 59, creatinine 5.71. Sodium 128, potassium of 6.1 which is hemolyzed, phosphorus is 7.5, magnesium is 2.5. Lactic acid is slightly elevated at 2.9.  UA from yesterday showed normal wbc of 0-4, moderate blood, and 20-50 rbc's. Nitrites were negative. Leukocyte esterase were only trace positive. ASSESSMENT: 
1.  End-stage renal disease, on hemodialysis. 2.  Fever/gram-positive cocci bacteremia with potential infected dialysis fistula site. 3.  Hyperkalemia, but a hemolyzed specimen. 4.  Secondary hyperparathyroidism with high phosphorus. 5.  High magnesium. 6.  Hypertension. Blood pressure is low normal. 
7.  Hyponatremia. RECOMMENDATIONS: 
1.  Started on IV antibiotics including vancomycin. 2.  We will arrange hemodialysis tomorrow. 3.  Vascular surgery is consulted. We will await to see if it is okay to use the left AV fistula or not. If not, he will require a Robbie catheter placement tomorrow. 4.  Repeat BMP to make sure potassium is okay. 5.  Follow up on cultures. 6.  Continue home phosphate binders. 7.  Avoid any magnesium containing supplements or laxatives. 8.  Avoid hypotonic IV fluids or excessive free water intake. Discussed with the patient and ED nurse. Fiorella notified about potential dialysis tomorrow. Thanks for renal consult. We will follow the patient with you. Federico Prater MD 
 
 
[id number] 
D:  03/10/2020 17:08 
T:  03/10/2020 21:51 
JOB #:  [id number]

## 2020-03-11 NOTE — PROGRESS NOTES
Thrombus noted in  Left basilic vein which is not part of deep vein system, no anticoagulation required

## 2020-03-11 NOTE — PROGRESS NOTES
Hospitalist Progress Note NAME: Kiley Euceda. :  1950 MRN:  [id number] Assessment / Plan: 
Bacteremia POA Suspect infected fistula in ESRD HD pt Continue IV Vancomycin Repeat blood cultures tomorrow 2D echo If repeat cultures positive then may need ROSELINE Awaiting vascular surgery consult HD via fistula vs indra, will leave decision to nephrology Right Superficial Thrombophlebitis in Basilic Vein Supportive care Abnormal UA It's not unusual to have abnormal UA with colonization in ESRD HD pt. F/u cultures Vascular to see the patient as above as fistula site 
  
ESRD on HD MWF Hyperkalemia Nephrology is involved for ongoing HD Anemia cont supplements / Hyperkalemia/Hyponatremia Nephrology on consult for dialysis 
  
Elevated LFTs with Hyperbilirubinemia and elevated Alk Phos Remain elevated US without any hepatobiliary disease Suspect septic liver Check Fractionated bilirubin and GGT, may need GI consult depending on the levels Continue to monitor LFTs 
  
Hypoxia POA Due to bilateral pleural effusion Fluid Mx as per nephrology as ESRD HD pt 
O2 PRN 
  
IDDM Type 2 Continue SSI Alzheimer's Dementia MS at baseline Watch for delirium with acute medical problems  
 
Legally blind HTN Continue amlodipine 
  
Hypothyroidism: Continue levothyroxine 
   
Code Status: DDNR on file Surrogate Decision Maker: Sister DVT Prophylaxis: Heparin SQ 
GI Prophylaxis: not indicated Baseline: Dependent IADLs, oriented to self 
   
             
Subjective: Pt seen and examined at bedside. Poor insight. NAD. Overnight events d/w RN  
CHIEF COMPLAINT: f/u \"Fever/positive blood culture\" 
  
Review of Systems: 
Symptom Y/N Comments  Symptom Y/N Comments Fever/Chills    Chest Pain Poor Appetite    Edema Cough    Abdominal Pain Sputum    Joint Pain SOB/LAUREANO    Pruritis/Rash Nausea/vomit    Tolerating PT/OT Diarrhea    Tolerating Diet Constipation    Other Could NOT obtain due to:   
 
Objective: VITALS:  
Last 24hrs VS reviewed since prior progress note. Most recent are: 
Patient Vitals for the past 24 hrs: 
 Temp Pulse Resp BP SpO2  
03/10/20 2243 97.9 °F (36.6 °C) 99 18 101/63 99 % 03/10/20 1649 97.7 °F (36.5 °C) (!) 103 18 111/71 100 % 03/10/20 1615  (!) 103 24 110/66 100 % 03/10/20 1545  (!) 104 22 114/73 90 % 03/10/20 1430  (!) 105 16 120/74   
03/10/20 1330  (!) 105 21 118/72   
03/10/20 1300  (!) 109 22 119/72 (!) 83 % 03/10/20 1251     98 % 03/10/20 1230  (!) 108 20 129/80 96 % 03/10/20 1211 97.9 °F (36.6 °C) (!) 108 20 133/77 97 % Intake/Output Summary (Last 24 hours) at 3/11/2020 8807 Last data filed at 3/11/2020 0600 Gross per 24 hour Intake 833.33 ml Output  Net 833.33 ml PHYSICAL EXAM: 
General: WD, WN. Alert, cooperative, no acute distress   
EENT:  EOMI. Anicteric sclerae. MMM Resp:  CTA bilaterally, no wheezing or rales. No accessory muscle use CV:  Regular  rhythm,  No edema GI:  Soft, Non distended, Non tender.  +Bowel sounds Neurologic:  Alert and oriented X 3, normal speech, Psych:   Good insight. Not anxious nor agitated Skin:  No rashes. No jaundice Reviewed most current lab test results and cultures  YES Reviewed most current radiology test results   YES Review and summation of old records today    NO Reviewed patient's current orders and MAR    YES 
PMH/SH reviewed - no change compared to H&P 
________________________________________________________________________ Care Plan discussed with: 
  Comments Patient y Family RN y   
Care Manager Consultant Multidiciplinary team rounds were held today with , nursing, pharmacist and clinical coordinator. Patient's plan of care was discussed; medications were reviewed and discharge planning was addressed. ________________________________________________________________________ Total NON critical care TIME:  35 Minutes Total CRITICAL CARE TIME Spent:   Minutes non procedure based Comments >50% of visit spent in counseling and coordination of care    
________________________________________________________________________ Tamia Huitron MD  
 
Procedures: see electronic medical records for all procedures/Xrays and details which were not copied into this note but were reviewed prior to creation of Plan. LABS: 
I reviewed today's most current labs and imaging studies. Pertinent labs include: 
Recent Labs  
  03/10/20 
2315 03/10/20 
1314 03/09/20 
1215 WBC 13.4* 10.2 5.8 HGB 9.1* 9.6* 10.4* HCT 25.3* 28.2* 30.6* * 114* 152 Recent Labs  
  03/10/20 
2315 03/10/20 
1314 03/09/20 
1215 * 128* 134* K 5.6* 6.1* 5.1 CL 96* 94* 98  
CO2 20* 23 24 GLU 61* 216* 238* BUN 59* 59* 39* CREA 5.72* 5.71* 4.96* CA 7.6* 7.9* 8.8 MG  --  2.5*  --   
PHOS  --  7.5*  --   
ALB 2.1* 2.3* 2.5* TBILI 2.9* 2.8* 3.0*  
SGOT 19 29 17 ALT 18 21 22 Signed: Tamia Huitron MD

## 2020-03-11 NOTE — ANESTHESIA PREPROCEDURE EVALUATION
Anesthetic History No history of anesthetic complications Review of Systems / Medical History Patient summary reviewed, nursing notes reviewed and pertinent labs reviewed Pulmonary Shortness of breath Neuro/Psych Dementia Comments: Alzheimer's Dementia Hx encephalopathy Altered mental status Cardiovascular Hypertension: well controlled CAD and hyperlipidemia Exercise tolerance: <4 METS 
  
GI/Hepatic/Renal 
  
 
 
Renal disease: dialysis and ESRD Comments: History of GI bleed Endo/Other Diabetes: poorly controlled, using insulin Anemia Other Findings Comments: LEFT ARM INFECTED AV GRAFT Hyponatremia Hx prostate ca Blind right eye Unintentional weight loss Physical Exam 
 
Airway Mallampati: III 
TM Distance: > 6 cm Neck ROM: normal range of motion Mouth opening: Normal 
 
 Cardiovascular Regular rate and rhythm,  S1 and S2 normal,  no murmur, click, rub, or gallop Dental 
 
Dentition: Edentulous Pulmonary Breath sounds clear to auscultation Abdominal 
GI exam deferred Other Findings Anesthetic Plan ASA: 4 Anesthesia type: general 
 
Monitoring Plan: BIS Induction: Intravenous Anesthetic plan and risks discussed with: Patient

## 2020-03-11 NOTE — BRIEF OP NOTE
BRIEF OPERATIVE NOTE Date of Procedure: 3/11/2020 Preoperative Diagnosis: LEFT ARM INFECTED AV GRAFT Postoperative Diagnosis: LEFT ARM INFECTED AV GRAFT Procedure(s): REMOVAL ARTERIO VENOUS FISTULA/GRAFT LEFT ARM (AXILLARY BLOCK) INFUSAPORT DIALYSIS CATHETER INSERTION RIGHT INTERNAL JUGULAR VEIN Surgeon(s) and Role: 
   * Deysi Kaplan MD - Primary Surgical Assistant: none Surgical Staff: 
Circ-1: Marcie Ayala Circ-Relief: Victoria Durbin RN Scrub Tech-1: Ashley Wagner Scrub Tech-Relief: Mauro Miller Surg Asst-1: Jhonny Cavazos Event Time In Incision Start 03/11/2020 1502 Incision Close 03/11/2020 1618 Anesthesia: Other Estimated Blood Loss: 50ccs Specimens:  
ID Type Source Tests Collected by Time Destination 1 : Left AV graft wound site Wound Incision CULTURE, ANAEROBIC, CULTURE, WOUND W Gisselle Graves Nuvia Connors MD 3/11/2020 1557 Microbiology Findings: infected graft. No gross purulence. Removed to incorporated graft proximally and distally. Placement of R IJ temporary dialysis catheter. CXR in pacu shows catheter in good position for use. [id number] Complications: none Implants: * No implants in log *

## 2020-03-11 NOTE — PERIOP NOTES
Tony Lauren 39 from Operating Room to PACU Report received from NANCY Hernandez RN and Des Abbasi CRNA regarding Mohan Vieira. Sue Vargas Surgeon(s): 
Cheko Muse MD  And Procedure(s) (LRB): REMOVAL ARTERIO VENOUS FISTULA/GRAFT LEFT ARM (AXILLARY BLOCK) (Left) INFUSAPORT DIALYSIS CATHETER INSERTION RIGHT INTERNAL JUGULAR VEIN (Right)  confirmed  
with allergies and dressings discussed. Anesthesia type, drugs, patient history, complications, estimated blood loss, vital signs, intake and output, and last pain medication, lines, reversal medications and temperature were reviewed. 46 Updated Dr Greer Saucedo on patient condition. BP's 80-90/50-60's. Order for Neosynephrine drip received. 1716 Neosynephrine drip started 26 Updated Dr Pedro Carbajal on patient condition. Order to transfer to PCU. 
 
1735 Notified Dr Pedro Carbajal of CXR results line in place, moderate pulmonary edema and bilateral pleural effusion. Per Dr Pedro Carbajal make nephrologist aware and see if they would prefer patient in the unit for dialysis. [address] Updated Dr Heron Flores on patient condition, Neosynephrine drip and xray results and need for HD. Per Dr Heron Flores transfer patient to CCU. 
 
550 Arnol Joao Made Patient Placement aware of transfer to [facility] RN called to get update on patient status. Awaiting CCU bed placement at this time. 1900 Dr Greer Saucedo at bedside to place central line. 1945 TRANSFER - OUT REPORT: 
 
Verbal report given to Mary RODRIGUEZ(name) on Mohan Vieira.  being transferred to CCU(unit) for routine post - op Report consisted of patients Situation, Background, Assessment and  
Recommendations(SBAR). Information from the following report(s) SBAR, Kardex, OR Summary, Procedure Summary, Intake/Output and MAR was reviewed with the receiving nurse. Opportunity for questions and clarification was provided. Neosynephrine drip currently running at 60mcg/min. 2030 Per Dr Radha Hale central line is okay to use. 2037 Patient transported with: 
 Monitor O2 @ 3 liters Registered Nurse x 2 Patient Chart

## 2020-03-11 NOTE — CONSULTS
Vascular Surgery Consult Note 3/11/2020 Subjective:  
 
Shawn Patel is a 81 y.o.  male with a pmhx significant for Alzheimer's dementia, ASHD, IDDM, HTN, HLD, and GIB. He is admitted to the hospital w/ MRSA septicemia after presenting w/ an open ulceration of his left AVG that was placed in 04/2017 by Dr. Ashia Gutierrez. He has a positive urinalysis has well. We have been asked to evaluate. Past Medical History:  
Diagnosis Date  Alzheimer disease ([facility]  CAD (coronary artery disease)  Cancer [facility])   
 prostate  Chronic kidney disease  CKD (chronic kidney disease) stage 4, GFR 15-29 ml/min ([facility])  DKA (diabetic ketoacidoses) ([facility] 7/10/2011  DM (diabetes mellitus), type 2, uncontrolled ([facility]  Dyspnea 2/6/2017  Hemodialysis patient ([facility]  History of GI bleed 11/12/2014  Hyperlipidemia  Hypertension  Other ill-defined conditions(799.89)   
 blind R eye-retina detachment  Other ill-defined conditions(799.89) right cerebellopontine angle lipoma.  Pneumonia 2/4/2017  Pulmonary edema 1/25/2020  Weakness generalized 8/16/2010 Past Surgical History:  
Procedure Laterality Date  COLONOSCOPY,DIAGNOSTIC  11/12/2014  HX HEENT    
 retinal detachment  HX SHOULDER ARTHROSCOPY    
 right  HX UROLOGICAL    
 prostate bx  UPPER GI ENDOSCOPY,BIOPSY  11/12/2014 Family History Problem Relation Age of Onset  Heart Disease Mother Pacemaker  Cancer Father Social History Tobacco Use  Smoking status: Never Smoker  Smokeless tobacco: Never Used Substance Use Topics  Alcohol use: No  
   
Prior to Admission medications Medication Sig Start Date End Date Taking? Authorizing Provider  
dronabinoL (MARINOL) 2.5 mg capsule Take 2.5 mg by mouth two (2) times a day. Yes Provider, Historical  
cephALEXin (KEFLEX) 500 mg capsule Take 1 Cap by mouth four (4) times daily for 7 days. Patient taking differently: Take 500 mg by mouth four (4) times daily. UTI 3/9/20 3/16/20 Yes Lukasz Vidales MD  
levothyroxine (SYNTHROID) 75 mcg tablet Take 1 Tab by mouth Daily (before breakfast). 2/13/20  Yes Olimpia Moran NP  
insulin NPH/insulin regular (NOVOLIN 70/30, HUMULIN 70/30) 100 unit/mL (70-30) injection Inject 5 units SQ BID 2/13/20  Yes Olimpia Moran NP  
ondansetron hcl (ZOFRAN) 4 mg tablet Take 4 mg by mouth every six (6) hours as needed for Nausea or Vomiting. Yes Provider, Historical  
amLODIPine (NORVASC) 10 mg tablet Take 10 mg by mouth daily. Yes Provider, Historical  
pantoprazole (PROTONIX) 40 mg tablet Take 40 mg by mouth daily. Yes Provider, Historical  
calcitRIOL (ROCALTROL) 0.25 mcg capsule Take 1 Cap by mouth every Monday, Wednesday, Friday. 12/4/19  Yes Amaris Holland MD  
ferrous sulfate 325 mg (65 mg iron) tablet Take 1 Tab by mouth Daily (before breakfast). 12/4/19  Yes Amaris Holland MD  
aspirin delayed-release 81 mg tablet Take 81 mg by mouth daily. Yes Provider, Historical  
insulin aspart U-100 (NOVOLOG U-100 INSULIN ASPART) 100 unit/mL injection 2-10 Units by SubCUTAneous route two (2) times daily as needed. Patient takes 10 units BID plus sliding scale Blood Glucose (mg/dL)       Insulin Dose (additional units) 151-220                               2  
            221-260                               4  
            261-300                               5  
            301-350                               8  
            351-450                               10  
            >450                                   Call MD   Yes Provider, Historical  
prednisoLONE acetate (PRED FORTE) 1 % ophthalmic suspension Administer 1 Drop to right eye two (2) times a day. Yes Provider, Historical  
vit Bcomp,C/folic acid/zinc (RENAL MULTIVITAMIN/ZINC PO) Take 1 Tab by mouth daily.    Yes Provider, Historical  
 acetaminophen (TYLENOL) 325 mg tablet Take 2 Tabs by mouth every four (4) hours as needed. For pain. 4/8/17  Yes Belkis Arango MD  
albuterol (PROVENTIL VENTOLIN) 2.5 mg /3 mL (0.083 %) nebulizer solution 2.5 mg by Nebulization route every four (4) hours as needed for Shortness of Breath. Yes Provider, Historical  
tamsulosin (FLOMAX) 0.4 mg capsule Take 0.4 mg by mouth daily. Yes Other, MD Yumi  
glucagon (GLUCAGEN) 1 mg injection 1 mL by IntraMUSCular route as needed for Hypoglycemia. 6/25/12  Yes Rocky Wolf MD  
 
Allergies Allergen Reactions  Ace Inhibitors Unknown (comments)  Arb-Angiotensin Receptor Antagonist Unknown (comments) Review of Systems Unable to perform ROS: Dementia Objective:  
 
 
Patient Vitals for the past 24 hrs: 
 BP Temp Pulse Resp SpO2 Height 03/11/20 0822 132/78 98.3 °F (36.8 °C) 90 20 97 %   
03/10/20 2243 101/63 97.9 °F (36.6 °C) 99 18 99 %   
03/10/20 1649 111/71 97.7 °F (36.5 °C) (!) 103 18 100 %   
03/10/20 1615 110/66  (!) 103 24 100 %   
03/10/20 1545 114/73  (!) 104 22 90 %   
03/10/20 1430 120/74  (!) 105 16    
03/10/20 1330 118/72  (!) 105 21    
03/10/20 1300 119/72  (!) 109 22 (!) 83 %   
03/10/20 1251     98 %   
03/10/20 1230 129/80  (!) 108 20 96 %   
03/10/20 1211 133/77 97.9 °F (36.6 °C) (!) 108 20 97 % 5' 5\" (1.651 m) Physical Exam 
Constitutional:   
   Appearance: He is ill-appearing. Comments: Lethargic and slow to arouse. Unable to follow commands. Cachetic. HENT:  
   Head: Normocephalic. Nose: Nose normal.  
   Mouth/Throat:  
   Mouth: Mucous membranes are dry. Eyes:  
   Pupils: Pupils are equal, round, and reactive to light. Cardiovascular:  
   Rate and Rhythm: Normal rate and regular rhythm. Comments: LUE AVG w/ palpable thrill. Pulmonary:  
   Effort: Pulmonary effort is normal.  
   Breath sounds: Normal breath sounds. Abdominal: General: Abdomen is flat. Palpations: Abdomen is soft. Musculoskeletal: Normal range of motion. Skin: 
   General: Skin is warm. Comments: Ulceration of the left AVG with extensive distal edema. Neurological:  
   Mental Status: He is disoriented. Pertinent Test Results:  
Recent Results (from the past 24 hour(s)) GLUCOSE, POC Collection Time: 03/10/20 12:19 PM  
Result Value Ref Range Glucose (POC) 219 (H) 65 - 100 mg/dL Performed by Noe Escobar RN (BSN) CBC WITH AUTOMATED DIFF Collection Time: 03/10/20  1:14 PM  
Result Value Ref Range WBC 10.2 4.1 - 11.1 K/uL  
 RBC 3.38 (L) 4.10 - 5.70 M/uL HGB 9.6 (L) 12.1 - 17.0 g/dL HCT 28.2 (L) 36.6 - 50.3 % MCV 83.4 80.0 - 99.0 FL  
 MCH 28.4 26.0 - 34.0 PG  
 MCHC 34.0 30.0 - 36.5 g/dL  
 RDW 18.0 (H) 11.5 - 14.5 % PLATELET 421 (L) 620 - 400 K/uL MPV 11.3 8.9 - 12.9 FL  
 NRBC 0.2 (H) 0  WBC ABSOLUTE NRBC 0.02 (H) 0.00 - 0.01 K/uL NEUTROPHILS 87 (H) 32 - 75 % BAND NEUTROPHILS 4 % LYMPHOCYTES 4 (L) 12 - 49 % MONOCYTES 5 5 - 13 % EOSINOPHILS 0 0 - 7 % BASOPHILS 0 0 - 1 % IMMATURE GRANULOCYTES 0 0.0 - 0.5 % ABS. NEUTROPHILS 9.3 (H) 1.8 - 8.0 K/UL  
 ABS. LYMPHOCYTES 0.4 (L) 0.8 - 3.5 K/UL  
 ABS. MONOCYTES 0.5 0.0 - 1.0 K/UL  
 ABS. EOSINOPHILS 0.0 0.0 - 0.4 K/UL  
 ABS. BASOPHILS 0.0 0.0 - 0.1 K/UL  
 ABS. IMM. GRANS. 0.0 0.00 - 0.04 K/UL  
 DF MANUAL    
 RBC COMMENTS ANISOCYTOSIS 1+ 
    
 RBC COMMENTS SCHISTOCYTES PRESENT 
    
LACTIC ACID Collection Time: 03/10/20  1:14 PM  
Result Value Ref Range Lactic acid 2.9 (HH) 0.4 - 2.0 MMOL/L  
METABOLIC PANEL, COMPREHENSIVE Collection Time: 03/10/20  1:14 PM  
Result Value Ref Range Sodium 128 (L) 136 - 145 mmol/L Potassium 6.1 (H) 3.5 - 5.1 mmol/L Chloride 94 (L) 97 - 108 mmol/L  
 CO2 23 21 - 32 mmol/L Anion gap 11 5 - 15 mmol/L Glucose 216 (H) 65 - 100 mg/dL  BUN 59 (H) 6 - 20 MG/DL  
 Creatinine 5.71 (H) 0.70 - 1.30 MG/DL  
 BUN/Creatinine ratio 10 (L) 12 - 20 GFR est AA 12 (L) >60 ml/min/1.73m2 GFR est non-AA 10 (L) >60 ml/min/1.73m2 Calcium 7.9 (L) 8.5 - 10.1 MG/DL Bilirubin, total 2.8 (H) 0.2 - 1.0 MG/DL  
 ALT (SGPT) 21 12 - 78 U/L  
 AST (SGOT) 29 15 - 37 U/L Alk. phosphatase 204 (H) 45 - 117 U/L Protein, total 5.6 (L) 6.4 - 8.2 g/dL Albumin 2.3 (L) 3.5 - 5.0 g/dL Globulin 3.3 2.0 - 4.0 g/dL A-G Ratio 0.7 (L) 1.1 - 2.2 MAGNESIUM Collection Time: 03/10/20  1:14 PM  
Result Value Ref Range Magnesium 2.5 (H) 1.6 - 2.4 mg/dL PHOSPHORUS Collection Time: 03/10/20  1:14 PM  
Result Value Ref Range Phosphorus 7.5 (H) 2.6 - 4.7 MG/DL  
GLUCOSE, POC Collection Time: 03/10/20  4:56 PM  
Result Value Ref Range Glucose (POC) 174 (H) 65 - 100 mg/dL Performed by Daniel Rodriguez (CON) PCT   
CULTURE, BLOOD Collection Time: 03/10/20  5:37 PM  
Result Value Ref Range Special Requests: NO SPECIAL REQUESTS Culture result:     
  ONE OF TWO BOTTLES HAS BEEN FLAGGED POSITIVE BY INSTRUMENT. BOTTLE HAS BEEN SENT TO [address] [place] [address] LABORATORY TO ASSESS FOR POSSIBLE GROWTH. Culture result: REMAINING BOTTLE(S) HAS/HAVE NO GROWTH SO FAR    
LACTIC ACID Collection Time: 03/10/20  5:54 PM  
Result Value Ref Range Lactic acid 4.2 (HH) 0.4 - 2.0 MMOL/L  
CULTURE, BLOOD Collection Time: 03/10/20  6:30 PM  
Result Value Ref Range Special Requests: NO SPECIAL REQUESTS Culture result: NO GROWTH AFTER 13 HOURS    
GLUCOSE, POC Collection Time: 03/10/20  9:54 PM  
Result Value Ref Range Glucose (POC) 92 65 - 100 mg/dL Performed by Brayden Narayanan (PCT) METABOLIC PANEL, COMPREHENSIVE Collection Time: 03/10/20 11:15 PM  
Result Value Ref Range Sodium 128 (L) 136 - 145 mmol/L Potassium 5.6 (H) 3.5 - 5.1 mmol/L Chloride 96 (L) 97 - 108 mmol/L  
 CO2 20 (L) 21 - 32 mmol/L Anion gap 12 5 - 15 mmol/L Glucose 61 (L) 65 - 100 mg/dL BUN 59 (H) 6 - 20 MG/DL Creatinine 5.72 (H) 0.70 - 1.30 MG/DL  
 BUN/Creatinine ratio 10 (L) 12 - 20 GFR est AA 12 (L) >60 ml/min/1.73m2 GFR est non-AA 10 (L) >60 ml/min/1.73m2 Calcium 7.6 (L) 8.5 - 10.1 MG/DL Bilirubin, total 2.9 (H) 0.2 - 1.0 MG/DL  
 ALT (SGPT) 18 12 - 78 U/L  
 AST (SGOT) 19 15 - 37 U/L Alk. phosphatase 236 (H) 45 - 117 U/L Protein, total 5.1 (L) 6.4 - 8.2 g/dL Albumin 2.1 (L) 3.5 - 5.0 g/dL Globulin 3.0 2.0 - 4.0 g/dL A-G Ratio 0.7 (L) 1.1 - 2.2    
CBC WITH AUTOMATED DIFF Collection Time: 03/10/20 11:15 PM  
Result Value Ref Range WBC 13.4 (H) 4.1 - 11.1 K/uL  
 RBC 3.15 (L) 4.10 - 5.70 M/uL HGB 9.1 (L) 12.1 - 17.0 g/dL HCT 25.3 (L) 36.6 - 50.3 % MCV 80.3 80.0 - 99.0 FL  
 MCH 28.9 26.0 - 34.0 PG  
 MCHC 36.0 30.0 - 36.5 g/dL  
 RDW 17.4 (H) 11.5 - 14.5 % PLATELET 380 (L) 147 - 400 K/uL MPV 11.5 8.9 - 12.9 FL  
 NRBC 0.1 (H) 0  WBC ABSOLUTE NRBC 0.02 (H) 0.00 - 0.01 K/uL NEUTROPHILS 88 (H) 32 - 75 % LYMPHOCYTES 4 (L) 12 - 49 % MONOCYTES 8 5 - 13 % EOSINOPHILS 0 0 - 7 % BASOPHILS 0 0 - 1 % IMMATURE GRANULOCYTES 0 0.0 - 0.5 % ABS. NEUTROPHILS 11.8 (H) 1.8 - 8.0 K/UL  
 ABS. LYMPHOCYTES 0.5 (L) 0.8 - 3.5 K/UL  
 ABS. MONOCYTES 1.1 (H) 0.0 - 1.0 K/UL  
 ABS. EOSINOPHILS 0.0 0.0 - 0.4 K/UL  
 ABS. BASOPHILS 0.0 0.0 - 0.1 K/UL  
 ABS. IMM. GRANS. 0.0 0.00 - 0.04 K/UL  
 DF MANUAL    
 RBC COMMENTS ANISOCYTOSIS 1+ 
    
 RBC COMMENTS MICROCYTOSIS 1+ LACTIC ACID Collection Time: 03/10/20 11:15 PM  
Result Value Ref Range Lactic acid 2.0 0.4 - 2.0 MMOL/L  
LACTIC ACID Collection Time: 03/11/20  5:29 AM  
Result Value Ref Range Lactic acid 1.7 0.4 - 2.0 MMOL/L  
GLUCOSE, POC Collection Time: 03/11/20  8:34 AM  
Result Value Ref Range Glucose (POC) 56 (L) 65 - 100 mg/dL Performed by Wendy Patterson RN Assessmen/Plan:  
 
Consult problem MRSA septicemia w/ open ulceration of LUE AVG Will take patient to OR later today for resection. NPO. Hold DVT prophylaxis. Obtain consent from NOK/mPOA. Patient will need Robbie catheter placed for HD as we will unable to place a perm-a-cath w/ + blood cultures. IDDM w/ hypoglycemia  
-initiate dextrose IVF while NPO 
 
VTE Prophylaxis: 
Held for procedure Disposition: 
TBD Signed By: Kera Lima NP March 11, 2020

## 2020-03-11 NOTE — ANESTHESIA PROCEDURE NOTES
Central Line Placement Start time: 3/11/2020 7:01 PM 
End time: 3/11/2020 7:28 PM 
Performed by: Meghan Smyth MD 
Authorized by: Lauren Alejandro MD  
 
Indications: need for vasopressors Preanesthetic Checklist: patient identified, risks and benefits discussed, site marked, patient being monitored and timeout performed Timeout Time: 19:01 Preanesthetic Checklist comment:  Unable to obtain consent. Medically necessary procedure in demented patient post surgery. Pre-procedure: All elements of maximal sterile barrier technique followed? Yes   
2% Chlorhexidine for cutaneous antisepsis, Hand hygiene performed prior to catheter insertion and Ultrasound guidance Sterile Ultrasound Technique followed?: Yes Procedure:  
Prep:  Chlorhexidine and ChloraPrep Location: internal jugular Patient position:  Trendelenburg Catheter type:  Triple lumen Catheter size:  7 Fr Catheter length:  16 cm Number of attempts: Three attempts at left subclavian. One attempt at left IJ. Successful placement: Yes Assessment:  
Post-procedure:  Sterile dressing with CHG applied Assessment:  Blood return through all ports, free fluid flow, guidewire removal verified and placement verified by x-ray Insertion:  Uncomplicated Patient tolerance:  Patient tolerated the procedure well with no immediate complications Central Line Procedure Note Indication: Need for vasopressors Risks, benefits, alternatives explained and patient agrees to proceed. Patient positioned in Trendelenburg. 7-Step Sterility Protocol followed. (cap, mask sterile gown, sterile gloves, large sterile sheet, hand hygiene, 2% chlorhexidine for cutaneous antisepsis) 5 mL 1% Lidocaine placed at insertion site. Left subclavian vein cannulation attempted x 3 unsuccessfully. Patient re-prepped and draped as above.  
Left internal jugular cannulated x 1 attempt(s) utilizing the Seldinger technique under ultrasound guidance. Catheter secured & Biopatch applied. Sterile Tegaderm placed. CXR pending.    
Care turned over to covering Attending MD.

## 2020-03-11 NOTE — PROGRESS NOTES
[facility]  Preliminary Report:  Venous Duplex Arm Left arm venous duplex was performed. Difficult exam due to limited mobility and patient unable to follow instructions to move arm and take deep breath when asked. Unable to visualized cephalic upper arm and basilic forearm. Brachial/Basilic fistula visualized appears to have hypoechoic loosely attached thrombus visualized mid upper arm in basilic, appears acute and non occlusive. Velocity of 128.7cm/s detected fistula mid upper arm. Fistula appears patient throughout. Final report to follow.

## 2020-03-11 NOTE — PROGRESS NOTES
Initial Nutrition Assessment: 
 
INTERVENTIONS/RECOMMENDATIONS:  
· Advance diet s/p procedure · Would recommend renal, consistent carb diet. Monitor K/Phos levels after HD. Liberalize as able to encourage intake. · Continue Nepro supplementation TID ASSESSMENT:  
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 Pt in HD MWF, noted that pt has ascites on admission. Visited pt at bedside, pt receiving care from RN at time of visit. Noted that pt currently living at [facility] Spoke with RN, pt had no appetite and did not eat on admission, pt currently NPO for resection of fistula today per vascular surgery note. Visual assessment of pt showed mild temporal muscle wasting. Per bren review, wt loss x 4 mo ~14#, 11%. Question accuracy of wts from 12/2/2019, as it is the same wt from 05/08/2017. Diet Order: NPO 
% Eaten:  No data found. %Supplement Intake:   
Pertinent Medications: [x]Reviewed []Other Pertinent Labs: [x]Reviewed []Other Food Allergies: [x]None []Other Last BM: pt cannot recall  []Active     []Hyperactive  []Hypoactive       [] Absent BS Skin:    [x] Intact   [] Incision  [] Breakdown  [] Other: Anthropometrics:  
Height: 5' 5\" (165.1 cm) Weight: 49.8 kg (109 lb 12.6 oz) IBW (%IBW):   ( ) UBW (%UBW):   (  %) Last Weight Metrics: Wt Readings from Last 30 Encounters:  
03/11/20 49.8 kg (109 lb 12.6 oz) 03/09/20 49.8 kg (109 lb 12.6 oz) 02/13/20 48.4 kg (106 lb 11.2 oz) 01/28/20 50.5 kg (111 lb 4.8 oz) 12/02/19 55.9 kg (123 lb 3.8 oz) 05/08/17 55.9 kg (123 lb 3.8 oz) 05/05/17 43.3 kg (95 lb 7.4 oz) 04/08/17 43.3 kg (95 lb 7.4 oz) 03/02/17 64.9 kg (143 lb)  
02/28/17 63.1 kg (139 lb 1.8 oz) 02/22/17 63.1 kg (139 lb 1.8 oz) 02/10/17 52.3 kg (115 lb 4.8 oz) 01/18/17 72.7 kg (160 lb 5.1 oz) 01/16/17 56.7 kg (125 lb)  
11/23/16 54 kg (119 lb) 09/30/16 55.3 kg (122 lb)  
09/15/16 54.4 kg (120 lb) 08/22/16 61.2 kg (135 lb) 12/17/15 56.7 kg (125 lb)  
09/30/15 55.8 kg (123 lb) 11/08/14 63.5 kg (140 lb) 07/07/14 68 kg (150 lb) 12/16/13 55.7 kg (122 lb 12.8 oz)  
11/15/13 55.3 kg (122 lb)  
06/18/12 59 kg (130 lb)  
02/29/12 55.8 kg (123 lb)  
01/27/12 54.9 kg (121 lb) 12/08/11 48 kg (105 lb 13.1 oz)  
10/01/11 52.2 kg (115 lb) 07/09/11 44.5 kg (98 lb) BMI: Body mass index is 18.27 kg/m². This BMI is indicative of: 
 []Underweight    []Normal    []Overweight    [] Obesity   [] Extreme Obesity (BMI>40) Estimated Nutrition Needs (Based on):  
3523 Kcals/day(BMR (1199) x AF 1.2 + 250) , 50 g(-65 (1.0-1.3 g/kg bw)) Protein Carbohydrate: At Least 130 g/day  Fluids: 1600 mL/day (1ml/kcal) NUTRITION DIAGNOSES:  
Problem:  Altered nutrition-related lab values Etiology: related to pt between HD sessions Signs/Symptoms: as evidenced by K 5.6, Mg 2.5, Phos 7.5 and Na 128 NUTRITION INTERVENTIONS: 
Meals/Snacks: General/healthful diet   Supplements: Commercial supplement GOAL:  
Advance pt diet per surgery next 24-48 hr 
LEARNING NEEDS (Diet, Food/Nutrient-Drug Interaction):  
 [x] None Identified 
 [] Identified and Education Provided/Documented 
 [] Identified and Pt declined/was not appropriate Cultureal, Mandaeism, OR Ethnic Dietary Needs:  
 [x] None Identified 
 [] Identified and Addressed 
 [x] Interdisciplinary Care Plan Reviewed/Documented  
 [x] Discharge Planning:   Nepro supplementation, continue consistent carb diet, working with dialysis RD for K/Phos restrictions as necessary. MONITORING /EVALUATION:  
Food/Nutrient Intake Outcomes: Total energy intake Physical Signs/Symptoms Outcomes: Weight/weight change, Electrolyte and renal profile, Glucose profile NUTRITION RISK:  
 [x] Patient At Nutritional Risk        [] Patient Not At Nutritional Risk PT SEEN FOR:  
 []  MD Consult: []Calorie Count []Diabetic Diet Education []Diet Education []Electrolyte Management []General Nutrition Management and Supplements []Management of Tube Feeding []TPN Recommendations [x]  RN Referral:  [x]MST score >=2 
   []Enteral/Parenteral Nutrition PTA []Pregnant: Gestational DM or Multigestation 
   []Pressure Ulcer/Wound Care needs 
     
[]  Low BMI 
[]  CHANDAN Robertse Pager [phone] Weekend Pager [phone]

## 2020-03-11 NOTE — PROGRESS NOTES
Name: Valeen Mcardle. MRN: [id number] : 1950 Assessment: 
ESRD ([facility]; MWF) High K- hemolyzed specimen yesterday. Repeat K better, but still high Fever/MRSA Bacteremia and potential infected L AVF site Open ulceration of L AVG HTN Hyponatremia Anemia of ESRD High Phos/SHPT High Mg Dementia 
  
3-11- pt to go OR today for surgery on his L AVG. On IV ABx. Needs indra for HD Plan/Recommendations: 
Consult IR for indra; if he cannot get indra in time, I have asked vascular surgery to place indra when he goes to OR Ct ABx HD later today after indra Start Naldo Jo pending HD for high K Vascular surgery on board Avoid Mg containing laxatives or supplements Reduce Amlodipine to 2.5 mg every day- as BP is low nl and has bacteremia Subjective: 
Resting. Confused- at basln. No fevers No acute c/o ROS:  
No nausea, no vomiting No chest pain, no shortness of breath Exam: 
Visit Vitals /78 (BP 1 Location: Right arm, BP Patient Position: At rest;Lying right side) Pulse 90 Temp 98.3 °F (36.8 °C) Resp 20 Ht 5' 5\" (1.651 m) Wt 49.8 kg (109 lb 12.6 oz) SpO2 97% BMI 18.27 kg/m² Elderly cachectic in NAD Clear RRR 
L AVG site with drsg No edema Alert awake Legally blind Current Facility-Administered Medications Medication Dose Route Frequency Last Dose  dextrose 5 % - 0.45% NaCl infusion  50 mL/hr IntraVENous CONTINUOUS 50 mL/hr at 20 0901  acetaminophen (TYLENOL) tablet 650 mg  650 mg Oral Q6H  mg at 20 0027  
 albuterol (PROVENTIL VENTOLIN) nebulizer solution 2.5 mg  2.5 mg Nebulization Q4H PRN    
 amLODIPine (NORVASC) tablet 10 mg  10 mg Oral DAILY 10 mg at 20 9926  aspirin delayed-release tablet 81 mg  81 mg Oral DAILY 81 mg at 20 0893  calcitRIOL (ROCALTROL) capsule 0.25 mcg  0.25 mcg Oral Q MON, WED & FRI  ferrous sulfate tablet 325 mg  325 mg Oral  mg at 03/11/20 0700  
 glucagon (GLUCAGEN) injection 1 mg  1 mg IntraMUSCular PRN    
 levothyroxine (SYNTHROID) tablet 75 mcg  75 mcg Oral ACB 75 mcg at 03/11/20 0700  pantoprazole (PROTONIX) tablet 40 mg  40 mg Oral DAILY 40 mg at 03/11/20 3858  prednisoLONE acetate (PRED FORTE) 1 % ophthalmic suspension 1 Drop  1 Drop Right Eye BID 1 Drop at 03/11/20 2053  tamsulosin (FLOMAX) capsule 0.4 mg  0.4 mg Oral DAILY 0.4 mg at 03/11/20 9560  B complex-vitaminC-folic acid (NEPHROCAP) cap  1 Cap Oral DAILY 1 Cap at 03/11/20 1481  sodium chloride (NS) flush 5-10 mL  5-10 mL IntraVENous PRN    
 ondansetron (ZOFRAN) injection 4 mg  4 mg IntraVENous Q6H PRN    
 insulin lispro (HUMALOG) injection   SubCUTAneous AC&HS Stopped at 03/10/20 2200  
 glucose chewable tablet 16 g  4 Tab Oral PRN    
 dextrose (D50W) injection syrg 12.5-25 g  12.5-25 g IntraVENous PRN 25 g at 03/11/20 0854  
 glucagon (GLUCAGEN) injection 1 mg  1 mg IntraMUSCular PRN    
 VANCOMYCIN INFORMATION NOTE   Other Rx Dosing/Monitoring Labs/Data: 
 
Lab Results Component Value Date/Time WBC 13.4 (H) 03/10/2020 11:15 PM  
 Hemoglobin (POC) 10.5 (L) 04/07/2017 07:30 AM  
 HGB 9.1 (L) 03/10/2020 11:15 PM  
 Hematocrit (POC) 31 (L) 04/07/2017 07:30 AM  
 HCT 25.3 (L) 03/10/2020 11:15 PM  
 PLATELET 519 (L) 36/92/9618 11:15 PM  
 MCV 80.3 03/10/2020 11:15 PM  
 
 
Lab Results Component Value Date/Time  Sodium 128 (L) 03/10/2020 11:15 PM  
 Potassium 5.6 (H) 03/10/2020 11:15 PM  
 Chloride 96 (L) 03/10/2020 11:15 PM  
 CO2 20 (L) 03/10/2020 11:15 PM  
 Anion gap 12 03/10/2020 11:15 PM  
 Glucose 61 (L) 03/10/2020 11:15 PM  
 BUN 59 (H) 03/10/2020 11:15 PM  
 Creatinine 5.72 (H) 03/10/2020 11:15 PM  
 BUN/Creatinine ratio 10 (L) 03/10/2020 11:15 PM  
 GFR est AA 12 (L) 03/10/2020 11:15 PM  
 GFR est non-AA 10 (L) 03/10/2020 11:15 PM  
 Calcium 7.6 (L) 03/10/2020 11:15 PM  
 
 
Wt Readings from Last 3 Encounters:  
03/11/20 49.8 kg (109 lb 12.6 oz) 03/09/20 49.8 kg (109 lb 12.6 oz) 02/13/20 48.4 kg (106 lb 11.2 oz) Intake/Output Summary (Last 24 hours) at 3/11/2020 1025 Last data filed at 3/11/2020 0600 Gross per 24 hour Intake 833.33 ml Output  Net 833.33 ml Patient seen and examined. Chart reviewed. Labs, data and other pertinent notes reviewed in last 24 hrs. PMH/SH/FH reviewed and unchanged compared to H&P Discussed with pt/Brigette and Vascular surgery Violet Holland MD

## 2020-03-11 NOTE — PROGRESS NOTES
1235-TRANSFER - IN REPORT: 
 
Verbal report received from Rick Schaffer, [address] (name) on Desiree Herzog.  being received from [facility](unit) for ordered procedure Report consisted of patients Situation, Background, Assessment and  
Recommendations(SBAR). Information from the following report(s) SBAR, MAR and Recent Results was reviewed with the receiving nurse. Opportunity for questions and clarification was provided. Assessment completed upon patients arrival to unit and care assumed. 1255-Unit manager unable to obtain consent from listed next of kin or health care proxy. Advs to contact surgeon. 1300-Notified OR charge of no consent at this time.

## 2020-03-11 NOTE — ANESTHESIA POSTPROCEDURE EVALUATION
Procedure(s): REMOVAL ARTERIO VENOUS FISTULA/GRAFT LEFT ARM (AXILLARY BLOCK) INFUSAPORT DIALYSIS CATHETER INSERTION RIGHT INTERNAL JUGULAR VEIN. general 
 
Anesthesia Post Evaluation Patient location during evaluation: PACU Note status: Adequate. Level of consciousness: responsive to verbal stimuli and sleepy but conscious Pain management: satisfactory to patient Airway patency: patent Anesthetic complications: no 
Cardiovascular status: acceptable Respiratory status: acceptable Hydration status: acceptable Comments: +Post-Anesthesia Evaluation and Assessment Patient: Kalie Mcgee MRN: [id number]  SSN: [id number] YOB: 1950  Age: 81 y.o. Sex: male Cardiovascular Function/Vital Signs /84   Pulse 83   Temp 36.5 °C (97.7 °F)   Resp 18   Ht 5' 5\" (1.651 m)   Wt 49.8 kg (109 lb 12.6 oz)   SpO2 99%   BMI 18.27 kg/m² Patient is status post Procedure(s): REMOVAL ARTERIO VENOUS FISTULA/GRAFT LEFT ARM (AXILLARY BLOCK) INFUSAPORT DIALYSIS CATHETER INSERTION RIGHT INTERNAL JUGULAR VEIN. Nausea/Vomiting: Controlled. Postoperative hydration reviewed and adequate. Pain: 
Pain Scale 1: FLACC (03/11/20 1700) Pain Intensity 1: 0 (03/11/20 1700) Managed. Neurological Status:  
Neuro (WDL): Exceptions to WDL (03/11/20 1645) At baseline. Mental Status and Level of Consciousness: Arousable. Pulmonary Status:  
O2 Device: Nasal cannula (03/11/20 1815) Adequate oxygenation and airway patent. Complications related to anesthesia: None Post-anesthesia assessment completed. No concerns. Signed By: Li Amaral MD  
 3/11/2020 Post anesthesia nausea and vomiting:  controlled Vitals Value Taken Time /71 3/11/2020  7:35 PM  
Temp 36.5 °C (97.7 °F) 3/11/2020  4:33 PM  
Pulse 84 3/11/2020  7:37 PM  
Resp 21 3/11/2020  7:37 PM  
SpO2 98 % 3/11/2020  7:37 PM  
Vitals shown include unvalidated device data.

## 2020-03-11 NOTE — PROGRESS NOTES
TRANSFER - OUT REPORT: 
 
Verbal report given to Mary(name) on [facility].  being transferred to CCU(unit) for change in patient condition(low BP) Report consisted of patients Situation, Background, Assessment and  
Recommendations(SBAR). Information from the following report(s) SBAR, Kardex, Intake/Output and MAR was reviewed with the receiving nurse. Lines:  
Peripheral IV 03/11/20 Posterior;Right Forearm (Active) Site Assessment Clean, dry, & intact 3/11/2020  4:33 PM  
Phlebitis Assessment 0 3/11/2020  4:33 PM  
Infiltration Assessment 0 3/11/2020  4:33 PM  
Dressing Status Clean, dry, & intact 3/11/2020  4:33 PM  
Dressing Type Tape;Transparent 3/11/2020  4:33 PM  
Hub Color/Line Status Blue 3/11/2020  4:33 PM  
Alcohol Cap Used Yes 3/11/2020  3:08 PM  
  
 
Opportunity for questions and clarification was provided.

## 2020-03-11 NOTE — PROGRESS NOTES
Bedside and Verbal shift change report given to Rissa Christine RN (oncoming nurse) by Domenic Shi RN (offgoing nurse). Report included the following information SBAR, Kardex, Intake/Output and MAR.

## 2020-03-11 NOTE — PROGRESS NOTES
Consulted on call tele hospitalist about pt's sepsis. Asked MD to review his chart to see if he should be on fluids. Only order was to recheck Lactic acid.

## 2020-03-12 NOTE — INTERDISCIPLINARY ROUNDS
Critical care interdisciplinary rounds held on 03/12/2020. Following members present, Pharmacy, Diabetes Treatment, Case Management, Respiratory Therapy, Physical Therapy, Palliative Care and Nutrition. Led by CHRISTAL Monroy RN and Dr. Chalino Pimentel. Plan of care discussed. See clinical pathway for plan of care and interventions and desired outcome.

## 2020-03-12 NOTE — PROGRESS NOTES
Name: Tiara Cochran. MRN: [id number] : 1950 Assessment: 
ESRD ([facility]; MWF) High K- resolved Fever/MRSA Bacteremia and potential infected L AVF site Infected L AVG with ulceration HTN Hyponatremia Anemia of ESRD High Phos/SHPT High Mg Dementia 
  
3-12- got partial explantation of L AVG and R indra on 3. 11. got HD last night. BP is better. Off pressors. High K resolved 3-- pt to go OR today for surgery on his L AVG. On IV ABx. Needs indra for HD Plan/Recommendations: 
Wean off IV D5W if he maintains reasonable PO intake and no more hypoglycemia. Reduce rate to 25 ml/hr. D/W RN Off pressors already HD tomm per schedule Ct ABx D/C [place] Avoid Mg containing laxatives or supplements Hold amlodipine until he is better and BP starts to rise Subjective: 
Feels Ok. Got HD last night. Off pressors ROS:  
No nausea, no vomiting No chest pain, no shortness of breath Exam: 
Visit Vitals /73 Pulse 98 Temp 97.2 °F (36.2 °C) Resp 17 Ht 5' 5\" (1.651 m) Wt 51.8 kg (114 lb 3.2 oz) SpO2 100% BMI 19.00 kg/m² Elderly cachectic in NAD 
R Indra in place Clear RRR 
L AVG site with drsg No edema Alert awake Legally blind Current Facility-Administered Medications Medication Dose Route Frequency Last Dose  dextrose 5 % - 0.45% NaCl infusion  25 mL/hr IntraVENous CONTINUOUS 25 mL/hr at 20 1010  
 heparin (porcine) 1,000 unit/mL injection 1,000 Units  1,000 Units InterCATHeter DIALYSIS PRN    
 lidocaine (PF) (XYLOCAINE) 10 mg/mL (1 %) injection 0.1 mL  0.1 mL SubCUTAneous PRN    
 [START ON 3/13/2020] Vancomycin random with AM labs 3/13  1 Each Other ONCE    
 albumin human 25% (BUMINATE) solution 12.5 g  12.5 g IntraVENous DIALYSIS PRN    
  alcohol 62% (NOZIN) nasal  1 Ampule  1 Ampule Topical Q12H 1 Ampule at 03/12/20 9035  heparin (porcine) 1,000 unit/mL injection 2,400 Units  2,400 Units Hemodialysis DIALYSIS PRN 2,400 Units at 03/12/20 0005  acetaminophen (TYLENOL) tablet 650 mg  650 mg Oral Q6H  mg at 03/11/20 0027  
 albuterol (PROVENTIL VENTOLIN) nebulizer solution 2.5 mg  2.5 mg Nebulization Q4H PRN    
 aspirin delayed-release tablet 81 mg  81 mg Oral DAILY 81 mg at 03/12/20 1006  calcitRIOL (ROCALTROL) capsule 0.25 mcg  0.25 mcg Oral Q MON, WED & FRI 0.25 mcg at 03/12/20 0030  ferrous sulfate tablet 325 mg  325 mg Oral  mg at 03/12/20 0833  
 glucagon (GLUCAGEN) injection 1 mg  1 mg IntraMUSCular PRN    
 levothyroxine (SYNTHROID) tablet 75 mcg  75 mcg Oral ACB 75 mcg at 03/12/20 0829  
 pantoprazole (PROTONIX) tablet 40 mg  40 mg Oral DAILY 40 mg at 03/12/20 1005  prednisoLONE acetate (PRED FORTE) 1 % ophthalmic suspension 1 Drop  1 Drop Right Eye BID 1 Drop at 03/11/20 5734  tamsulosin (FLOMAX) capsule 0.4 mg  0.4 mg Oral DAILY 0.4 mg at 03/12/20 1004  B complex-vitaminC-folic acid (NEPHROCAP) cap  1 Cap Oral DAILY 1 Cap at 03/12/20 1004  sodium chloride (NS) flush 5-10 mL  5-10 mL IntraVENous PRN 10 mL at 03/12/20 0105  ondansetron (ZOFRAN) injection 4 mg  4 mg IntraVENous Q6H PRN 4 mg at 03/11/20 1729  
 insulin lispro (HUMALOG) injection   SubCUTAneous AC&HS 3 Units at 03/12/20 7847  glucose chewable tablet 16 g  4 Tab Oral PRN    
 dextrose (D50W) injection syrg 12.5-25 g  12.5-25 g IntraVENous PRN 25 g at 03/11/20 0854  
 glucagon (GLUCAGEN) injection 1 mg  1 mg IntraMUSCular PRN    
 VANCOMYCIN INFORMATION NOTE   Other Rx Dosing/Monitoring Labs/Data: 
 
Lab Results Component Value Date/Time  WBC 12.4 (H) 03/12/2020 04:56 AM  
 Hemoglobin (POC) 10.5 (L) 04/07/2017 07:30 AM  
 HGB 8.1 (L) 03/12/2020 04:56 AM  
 Hematocrit (POC) 31 (L) 04/07/2017 07:30 AM  
 HCT 22.7 (L) 03/12/2020 04:56 AM  
 PLATELET 80 (L) 97/90/8351 04:56 AM  
 MCV 79.4 (L) 03/12/2020 04:56 AM  
 
 
Lab Results Component Value Date/Time Sodium 133 (L) 03/12/2020 04:56 AM  
 Potassium 4.4 03/12/2020 04:56 AM  
 Chloride 103 03/12/2020 04:56 AM  
 CO2 22 03/12/2020 04:56 AM  
 Anion gap 8 03/12/2020 04:56 AM  
 Glucose 116 (H) 03/12/2020 04:56 AM  
 BUN 37 (H) 03/12/2020 04:56 AM  
 Creatinine 3.33 (H) 03/12/2020 04:56 AM  
 BUN/Creatinine ratio 11 (L) 03/12/2020 04:56 AM  
 GFR est AA 23 (L) 03/12/2020 04:56 AM  
 GFR est non-AA 19 (L) 03/12/2020 04:56 AM  
 Calcium 7.6 (L) 03/12/2020 04:56 AM  
 
 
Wt Readings from Last 3 Encounters:  
03/11/20 51.8 kg (114 lb 3.2 oz) 03/09/20 49.8 kg (109 lb 12.6 oz) 02/13/20 48.4 kg (106 lb 11.2 oz) Intake/Output Summary (Last 24 hours) at 3/12/2020 1057 Last data filed at 3/12/2020 1008 Gross per 24 hour Intake 2416.7 ml Output 1020 ml Net 1396.7 ml Patient seen and examined. Chart reviewed. Labs, data and other pertinent notes reviewed in last 24 hrs. PMH/SH/FH reviewed and unchanged compared to H&P Discussed with pt and RN Jerel Ochoa MD

## 2020-03-12 NOTE — DIALYSIS
[facility] Dialysis Team [place]  [phone] Vitals   Pre   Post   Assessment   Pre   Post    
Temp  Temp: 97.4 °F (36.3 °C) (03/11/20 2110)  97.6 LOC  AXOX1, sight impaired-blindness from both eyes Alert,forgetful, responds appropriately HR   Pulse (Heart Rate): 86 (03/11/20 2110) 90 Lungs   Coarse,diminished bilaterally, O2 3L/ nc  Diminished lung fields, sat 100% on 3L/nc B/P   BP: 106/72 (03/11/20 2110) 144/88 Cardiac   SR  NSR Resp   Resp Rate: 20 (03/11/20 2110) 17 Skin   BUE cool. LUE Ace wrap  CDI  W/D Pain level  Pain Intensity 1: 0 (03/11/20 2100) 0/10 Edema BUE 
 BUE Orders: Duration:   Start:    2110 End:    0010 Total:   3 hours Dialyzer:   Dialyzer/Set Up Inspection: Paul Driscoll (03/11/20 2110) K Bath:   Dialysate K (mEq/L): 2 (03/11/20 2110) Ca Bath:   Dialysate CA (mEq/L): 2.5 (03/11/20 2110) Na/Bicarb:   Dialysate NA (mEq/L): 138 (03/11/20 2110) Target Fluid Removal:   Goal/Amount of Fluid to Remove (mL): 1000 mL (03/11/20 2110) Access Type & Location:   R IJ non tunneled CVCinserted and confirmed for use today 03/11/20. Dsg is CDI and biopatch is visible. Each catheter limb disinfected for 60 seconds per limb with alcohol swabs. Caps removed, dialysis CVC hub scrubbed with Prevantics for 5 seconds, followed by a 5 second dry time per Hospital P&P. Good aspiration and flush from both ports. Labs drawn pre HD. Tx initiated. Labs Obtained/Reviewed Critical Results Called   Date when labs were drawn- 
Hgb-   
HGB Date Value Ref Range Status 03/10/2020 9.1 (L) 12.1 - 17.0 g/dL Final  
 
K-   
Potassium Date Value Ref Range Status 03/10/2020 5.6 (H) 3.5 - 5.1 mmol/L Final  
 
Ca-  
Calcium Date Value Ref Range Status 03/10/2020 7.6 (L) 8.5 - 10.1 MG/DL Final  
 
Bun-  
BUN Date Value Ref Range Status 03/10/2020 59 (H) 6 - 20 MG/DL Final  
 
Creat-  
Creatinine Date Value Ref Range Status 03/10/2020 5.72 (H) 0.70 - 1.30 MG/DL Final  
 
  
Medications/ Blood Products Given Name   Dose   Route and Time Heparin 1200 units  IC -art port@ 0010 Heparin 1200 units IC- amita port@ 0010 Blood Volume Processed (BVP):    52.5 Net Fluid Removed:  1000 ml Comments Time Out Done: 2105 Primary Nurse Rpt Pre:Mary Mejia, RN 
Primary Nurse Rpt Kellen Fulton RN 
Pt [address]  Care Plan:As per nephrologists. Tx Summary:Pt was received from PACU on pressors. Pt appeared alert and stable. Tx progressed without complications while pressors titrated down. Pt more alert and more verbal.Tx completed and all possible blood returned with rinseback. Each catheter limb disinfected for 60 seconds per limb with alcohol swabs. Dialysis CVC hubs scrubbed with Prevantics for 5 seconds, followed by a 5 second dry time per Hospital P&P, ports heparinized per orders, red and blue dialysis caps applied to ports, clamps secured. Pt was left in care of primary RN in [facility]. Admiting Diagnosis: 
Pt's previous clinic-[facility]/ [facility] Consent signed - Informed Consent Verified: Yes(obtained x 2  on 03/11/20) (03/11/20 2110) [facility] Consent - Obtained 03/11/20. Hepatitis Status- HBsAg neg on 02/17/20 per Clinic records. Machine #- Machine Number: [id number] (03/11/20 2110) Telemetry status-Bedside/ remote Pre-dialysis wt. - Pre-Dialysis Weight: 51.8 kg (114 lb 3.2 oz) (03/11/20 2110)

## 2020-03-12 NOTE — PROGRESS NOTES
Hospitalist Progress Note NAME: Tiara Cochran. :  1950 MRN:  [id number] Assessment / Plan: 
Bacteremia POA Due to infected fistula in ESRD HD pt Continue IV Vancomycin F/u repeat blood cultures from today Awaiting 2D echo If repeat cultures positive then may need ROSELINE 
appreciate vascular surgery consult, s/o removal of AV fistula/graft left arm and placement of dialysis catheter this admission 3/11/2020 Right Superficial Thrombophlebitis in Basilic Vein Supportive care Abnormal UA It's not unusual to have abnormal UA with colonization in ESRD HD pt. F/u cultures Vascular to see the patient as above as fistula site 
  
ESRD on HD MWF Hyperkalemia - resolved Nephrology is involved for ongoing HD Anemia cont supplements / Hyperkalemia/Hyponatremia Nephrology on consult for dialysis 
  
Elevated LFTs with Hyperbilirubinemia and elevated Alk Phos Remain elevated US without any hepatobiliary disease Suspect septic liver Check Fractionated bilirubin and GGT, may need GI consult depending on the levels Continue to monitor LFTs 
  
Hypoxia POA Due to bilateral pleural effusion Fluid Mx as per nephrology as ESRD HD pt 
O2 PRN 
  
IDDM Type 2 Continue SSI Alzheimer's Dementia MS at baseline Watch for delirium with acute medical problems  
 
Legally blind HTN Continue amlodipine 
  
Hypothyroidism: Continue levothyroxine 
   
Code Status: DDNR on file Surrogate Decision Maker: Sister DVT Prophylaxis: Heparin SQ 
GI Prophylaxis: not indicated Baseline: Dependent IADLs, oriented to self 
   
             
Subjective: Pt seen and examined at bedside. Poor insight. NAD. Overnight events d/w RN  
CHIEF COMPLAINT: f/u \"Fever/positive blood culture\" 
  
Review of Systems: 
Symptom Y/N Comments  Symptom Y/N Comments Fever/Chills    Chest Pain Poor Appetite    Edema Cough    Abdominal Pain Sputum    Joint Pain SOB/LAUREANO    Pruritis/Rash Nausea/vomit    Tolerating PT/OT Diarrhea    Tolerating Diet Constipation    Other Could NOT obtain due to:   
 
Objective: VITALS:  
Last 24hrs VS reviewed since prior progress note. Most recent are: 
Patient Vitals for the past 24 hrs: 
 Temp Pulse Resp BP SpO2  
03/12/20 1401 97.9 °F (36.6 °C) 84 20 118/68 100 % 03/12/20 1200 97.4 °F (36.3 °C) 88 15 109/71 100 % 03/12/20 1100  91 14 114/77 100 % 03/12/20 1000  98 17 121/73 100 % 03/12/20 0900  91 12 129/79 100 % 03/12/20 0800 97.2 °F (36.2 °C) 85 14 122/71 100 % 03/12/20 0700  84 11 124/69 100 % 03/12/20 0600  89 16 127/75 100 % 03/12/20 0500  84 16 132/73 100 % 03/12/20 0400 97.4 °F (36.3 °C) 82 14 119/71 100 % 03/12/20 0300  86 13 125/77 100 % 03/12/20 0200  90 19 129/79 100 % 03/12/20 0130  87 11 129/75 100 % 03/12/20 0100  90 18 131/80 100 % 03/12/20 0030 97.6 °F (36.4 °C) 93 17 127/81 100 % 03/12/20 0015  90  144/88   
03/12/20 0000  89 16 136/87 100 % 03/11/20 2345  90  139/89   
03/11/20 2330  88 18 139/87 100 % 03/11/20 2315 97.8 °F (36.6 °C) 91  136/84   
03/11/20 2300  90  132/85   
03/11/20 2245  91 17 138/83 100 % 03/11/20 2230  88 14 135/84 98 % 03/11/20 2215  91  130/89   
03/11/20 2200  89 19 128/88 98 % 03/11/20 2145  89 17 126/82 97 % 03/11/20 2131  89 20 125/82 97 % 03/11/20 2115  86 17 116/76   
03/11/20 2110 97.4 °F (36.3 °C) 86 20 106/72 96 % 03/11/20 2100 (!) 94.4 °F (34.7 °C) 83 17 107/78 97 % 03/11/20 2035  84 18 110/75 96 % 03/11/20 2030  83 19 108/76 96 % 03/11/20 2025  83 18 110/77 97 % 03/11/20 2020  83 20 105/74 97 % 03/11/20 2015  84 19 106/78 97 % 03/11/20 2010  84 18 105/74 96 % 03/11/20 2005  83 19 105/77 97 % 03/11/20 2000  84 22 104/76 97 % 03/11/20 1955  83 21 106/72 97 % 03/11/20 1950  84 20 101/74 97 % 03/11/20 1945  83 19 104/72 97 % 03/11/20 1940  83 23 103/72 97 % 03/11/20 1930 97.1 °F (36.2 °C) 83 15 102/79 97 % 03/11/20 1920  86 19 104/68 99 % 03/11/20 1910  85 22 110/76 98 % 03/11/20 1900  85 15 106/75 100 % 03/11/20 1850  84 19 105/76 98 % 03/11/20 1840  83 18 109/84 99 % 03/11/20 1835  83 16 108/76 99 % 03/11/20 1830  83 16 109/74 97 % 03/11/20 1825  82 14 106/77 98 % 03/11/20 1820  82 15 105/74 98 % 03/11/20 1815  81 28 101/74 98 % 03/11/20 1810  83 17 108/72 99 % 03/11/20 1805  82 14 104/68 97 % 03/11/20 1800 97.3 °F (36.3 °C) 82 17 113/72 98 % 03/11/20 1755  81 19 101/71 97 % 03/11/20 1745  80 24 106/68 97 % 03/11/20 1740  78 23 103/69 97 % 03/11/20 1736  78 20 102/64 98 % 03/11/20 1730  77 14 97/67 97 % 03/11/20 1725  76 19 98/66 97 % 03/11/20 1720  74 18 (!) 85/64 95 % 03/11/20 1715  72 18 (!) 87/60 97 % 03/11/20 1710  70 17 (!) 89/62 97 % 03/11/20 1705  70 (!) 6 (!) 81/57 96 % 03/11/20 1700 97.4 °F (36.3 °C) 69 12 (!) 80/61 96 % 03/11/20 1655  70 16 90/64 96 % 03/11/20 1650  69 18 90/65 96 % 03/11/20 1645  71 15 (!) 87/63 95 % 03/11/20 1641  71 20 92/61 94 % 03/11/20 1640  72 9 91/58 95 % 03/11/20 1638  73 11 93/59 96 % 03/11/20 1635  74  (!) 89/64 96 % 03/11/20 1633 97.7 °F (36.5 °C) 74 15 (!) 88/62  03/11/20 1630 97.7 °F (36.5 °C) 74 15 (!) 88/62 96 % Intake/Output Summary (Last 24 hours) at 3/12/2020 1422 Last data filed at 3/12/2020 1008 Gross per 24 hour Intake 2416.7 ml Output 1020 ml Net 1396.7 ml PHYSICAL EXAM: 
General: WD, WN. Alert, cooperative, no acute distress   
EENT:  EOMI. Anicteric sclerae. MMM Resp:  CTA bilaterally, no wheezing or rales. No accessory muscle use CV:  Regular  rhythm,  No edema GI:  Soft, Non distended, Non tender.  +Bowel sounds Neurologic:  Alert, normal speech, Psych:   Poor insight. Not anxious nor agitated Skin:  No rashes. No jaundice Reviewed most current lab test results and cultures  YES 
 Reviewed most current radiology test results   YES Review and summation of old records today    NO Reviewed patient's current orders and MAR    YES 
PMH/SH reviewed - no change compared to H&P 
________________________________________________________________________ Care Plan discussed with: 
  Comments Patient y Family RN y   
Care Manager Consultant Multidiciplinary team rounds were held today with , nursing, pharmacist and clinical coordinator. Patient's plan of care was discussed; medications were reviewed and discharge planning was addressed. ________________________________________________________________________ Total NON critical care TIME: 25 Minutes Total CRITICAL CARE TIME Spent:   Minutes non procedure based Comments >50% of visit spent in counseling and coordination of care    
________________________________________________________________________ Araseli Moeller MD  
 
Procedures: see electronic medical records for all procedures/Xrays and details which were not copied into this note but were reviewed prior to creation of Plan. LABS: 
I reviewed today's most current labs and imaging studies. Pertinent labs include: 
Recent Labs  
  03/12/20 
0456 03/10/20 
2315 03/10/20 
1314 WBC 12.4* 13.4* 10.2 HGB 8.1* 9.1* 9.6* HCT 22.7* 25.3* 28.2*  
PLT 80* 107* 114* Recent Labs  
  03/12/20 
0456 03/11/20 
2139 03/10/20 
2315 03/10/20 
1314 * 129* 128* 128* K 4.4 5.8* 5.6* 6.1*  
 97 96* 94* CO2 22 24 20* 23 * 58* 61* 216* BUN 37* 70* 59* 59* CREA 3.33* 5.91* 5.72* 5.71* CA 7.6* 6.8* 7.6* 7.9*  
MG  --   --   --  2.5* PHOS  --   --   --  7.5* ALB 2.3*  --  2.1* 2.3* TBILI 2.5*  --  2.9* 2.8* SGOT 22  --  19 29 ALT 21  --  18 21 Signed: Araseli Moeller MD

## 2020-03-12 NOTE — PROGRESS NOTES
Foam dressings to sacrum, bony with heal sacrum wound, foam dressing bilateral hips as prevention. Pt states legally blind in both eyes ACE band to left arm per MD to apply pressure from old fistula incisional area  
 
+2/+1 pitting edema bilateral lower extremities Trace edema bilateral hands Pt oriented to self and situation Pt sister called for update

## 2020-03-12 NOTE — PROGRESS NOTES
Vascular Surgery Progress Note Sylvia Irizarry ACNP-BC 
3/12/2020 Subjective:  
 
Janna John. is a 81 y.o.  male with a pmhx significant for Alzheimer's dementia, ASHD, IDDM, HTN, HLD, and GIB. He is admitted to the hospital w/ MRSA septicemia after presenting w/ an open ulceration of his left AVG that was placed in 04/2017 by Dr. Marvene Oppenheim. He has a positive urinalysis and a sacral decubitus ulcer has well. He is s/p removal of LUE AVG and Robbie catheter placement on 03/11/2020. This am he is more alert. His leukocytosis is trending down. Nursing Data:  
 
Patient Vitals for the past 24 hrs: 
 BP Temp Temp src Pulse Resp SpO2 Weight 03/12/20 0800 122/71 97.2 °F (36.2 °C)  85 14 100 %   
03/12/20 0700 124/69   84 11 100 %   
03/12/20 0600 127/75   89 16 100 %   
03/12/20 0500 132/73   84 16 100 %   
03/12/20 0400 119/71 97.4 °F (36.3 °C)  82 14 100 %   
03/12/20 0300 125/77   86 13 100 %   
03/12/20 0200 129/79   90 19 100 %   
03/12/20 0130 129/75   87 11 100 %   
03/12/20 0100 131/80   90 18 100 %   
03/12/20 0030 127/81 97.6 °F (36.4 °C)  93 17 100 %   
03/12/20 0015 144/88   90     
03/12/20 0000 136/87   89 16 100 %   
03/11/20 2345 139/89   90     
03/11/20 2330 139/87   88 18 100 %   
03/11/20 2315 136/84 97.8 °F (36.6 °C) Oral 91     
03/11/20 2300 132/85   90     
03/11/20 2245 138/83   91 17 100 %   
03/11/20 2230 135/84   88 14 98 %   
03/11/20 2215 130/89   91     
03/11/20 2200 128/88   89 19 98 %   
03/11/20 2145 126/82   89 17 97 %   
03/11/20 2131 125/82   89 20 97 %   
03/11/20 2115 116/76   86 17    
03/11/20 2110 106/72 97.4 °F (36.3 °C) Oral 86 20 96 %   
03/11/20 2100 107/78 (!) 94.4 °F (34.7 °C)  83 17 97 % 51.8 kg (114 lb 3.2 oz) 03/11/20 2035 110/75   84 18 96 %   
03/11/20 2030 108/76   83 19 96 %   
03/11/20 2025 110/77   83 18 97 %   
 03/11/20 2020 105/74   83 20 97 %   
03/11/20 2015 106/78   84 19 97 %   
03/11/20 2010 105/74   84 18 96 %   
03/11/20 2005 105/77   83 19 97 %   
03/11/20 2000 104/76   84 22 97 %   
03/11/20 1955 106/72   83 21 97 %   
03/11/20 1950 101/74   84 20 97 %   
03/11/20 1945 104/72   83 19 97 %   
03/11/20 1940 103/72   83 23 97 %   
03/11/20 1930 102/79 97.1 °F (36.2 °C)  83 15 97 %   
03/11/20 1920 104/68   86 19 99 %   
03/11/20 1910 110/76   85 22 98 %   
03/11/20 1900 106/75   85 15 100 %   
03/11/20 1850 105/76   84 19 98 %   
03/11/20 1840 109/84   83 18 99 %   
03/11/20 1835 108/76   83 16 99 %   
03/11/20 1830 109/74   83 16 97 %   
03/11/20 1825 106/77   82 14 98 %   
03/11/20 1820 105/74   82 15 98 %   
03/11/20 1815 101/74   81 28 98 %   
03/11/20 1810 108/72   83 17 99 %   
03/11/20 1805 104/68   82 14 97 %   
 
--------------------------------------------------------------------------------------------------------- Intake/Output Summary (Last 24 hours) at 3/12/2020 1803 Last data filed at 3/12/2020 1008 Gross per 24 hour Intake 2016.7 ml Output 1000 ml Net 1016.7 ml  
 
 
Exam:  
 
Physical Exam 
Constitutional:   
   Comments: Frail, elderly AAM who is pleasantly confused. HENT:  
   Head: Normocephalic. Nose: Nose normal.  
   Mouth/Throat:  
   Mouth: Mucous membranes are moist.  
Neck: Musculoskeletal: Normal range of motion. Cardiovascular:  
   Rate and Rhythm: Normal rate and regular rhythm. Pulmonary:  
   Effort: Pulmonary effort is normal.  
   Breath sounds: Normal breath sounds. Abdominal:  
   General: Abdomen is flat. Palpations: Abdomen is soft. Musculoskeletal: Normal range of motion. Skin: 
   General: Skin is warm. Comments: Dressing and ACE wrap to LUE C/D/I. Neurological:  
   Mental Status: He is alert. Mental status is at baseline. Lab Review: Robbi Johnston Recent Results (from the past 24 hour(s)) GLUCOSE, POC Collection Time: 03/11/20  9:34 PM  
Result Value Ref Range Glucose (POC) 71 65 - 100 mg/dL Performed by Ricci Hollow PANEL, BASIC Collection Time: 03/11/20  9:39 PM  
Result Value Ref Range Sodium 129 (L) 136 - 145 mmol/L Potassium 5.8 (H) 3.5 - 5.1 mmol/L Chloride 97 97 - 108 mmol/L  
 CO2 24 21 - 32 mmol/L Anion gap 8 5 - 15 mmol/L Glucose 58 (L) 65 - 100 mg/dL BUN 70 (H) 6 - 20 MG/DL Creatinine 5.91 (H) 0.70 - 1.30 MG/DL  
 BUN/Creatinine ratio 12 12 - 20 GFR est AA 12 (L) >60 ml/min/1.73m2 GFR est non-AA 10 (L) >60 ml/min/1.73m2 Calcium 6.8 (L) 8.5 - 10.1 MG/DL  
HEP B SURFACE AG Collection Time: 03/11/20  9:39 PM  
Result Value Ref Range Hepatitis B surface Ag <0.10 Index Hep B surface Ag Interp. NEGATIVE  NEG    
METABOLIC PANEL, COMPREHENSIVE Collection Time: 03/12/20  4:56 AM  
Result Value Ref Range Sodium 133 (L) 136 - 145 mmol/L Potassium 4.4 3.5 - 5.1 mmol/L Chloride 103 97 - 108 mmol/L  
 CO2 22 21 - 32 mmol/L Anion gap 8 5 - 15 mmol/L Glucose 116 (H) 65 - 100 mg/dL BUN 37 (H) 6 - 20 MG/DL Creatinine 3.33 (H) 0.70 - 1.30 MG/DL  
 BUN/Creatinine ratio 11 (L) 12 - 20 GFR est AA 23 (L) >60 ml/min/1.73m2 GFR est non-AA 19 (L) >60 ml/min/1.73m2 Calcium 7.6 (L) 8.5 - 10.1 MG/DL Bilirubin, total 2.5 (H) 0.2 - 1.0 MG/DL  
 ALT (SGPT) 21 12 - 78 U/L  
 AST (SGOT) 22 15 - 37 U/L Alk. phosphatase 256 (H) 45 - 117 U/L Protein, total 5.3 (L) 6.4 - 8.2 g/dL Albumin 2.3 (L) 3.5 - 5.0 g/dL Globulin 3.0 2.0 - 4.0 g/dL A-G Ratio 0.8 (L) 1.1 - 2.2    
CBC WITH AUTOMATED DIFF Collection Time: 03/12/20  4:56 AM  
Result Value Ref Range WBC 12.4 (H) 4.1 - 11.1 K/uL  
 RBC 2.86 (L) 4.10 - 5.70 M/uL HGB 8.1 (L) 12.1 - 17.0 g/dL HCT 22.7 (L) 36.6 - 50.3 %  MCV 79.4 (L) 80.0 - 99.0 FL  
 MCH 28.3 26.0 - 34.0 PG  
 MCHC 35.7 30.0 - 36.5 g/dL  
 RDW 17.4 (H) 11.5 - 14.5 % PLATELET 80 (L) 035 - 400 K/uL MPV 12.5 8.9 - 12.9 FL  
 NRBC 0.0 0  WBC ABSOLUTE NRBC 0.00 0.00 - 0.01 K/uL NEUTROPHILS 91 (H) 32 - 75 % BAND NEUTROPHILS 2 % LYMPHOCYTES 1 (L) 12 - 49 % MONOCYTES 6 5 - 13 % EOSINOPHILS 0 0 - 7 % BASOPHILS 0 0 - 1 % IMMATURE GRANULOCYTES 0 0.0 - 0.5 % ABS. NEUTROPHILS 11.6 (H) 1.8 - 8.0 K/UL  
 ABS. LYMPHOCYTES 0.1 (L) 0.8 - 3.5 K/UL  
 ABS. MONOCYTES 0.7 0.0 - 1.0 K/UL  
 ABS. EOSINOPHILS 0.0 0.0 - 0.4 K/UL  
 ABS. BASOPHILS 0.0 0.0 - 0.1 K/UL  
 ABS. IMM. GRANS. 0.0 0.00 - 0.04 K/UL  
 DF AUTOMATED    
 RBC COMMENTS ANISOCYTOSIS 1+ 
    
 RBC COMMENTS HYPOCHROMIA 2+ 
    
 RBC COMMENTS MACROCYTOSIS 1+ 
    
 RBC COMMENTS TARGET CELLS 
PRESENT 
    
 RBC COMMENTS MARY CELLS 
PRESENT 
    
 RBC COMMENTS SCHISTOCYTES PRESENT 
    
BILIRUBIN, DIRECT Collection Time: 03/12/20  4:56 AM  
Result Value Ref Range Bilirubin, direct 1.1 (H) 0.0 - 0.2 MG/DL  
CULTURE, BLOOD Collection Time: 03/12/20  4:56 AM  
Result Value Ref Range Special Requests: NO SPECIAL REQUESTS Culture result: NO GROWTH AFTER 1 HOUR    
GGT Collection Time: 03/12/20  4:56 AM  
Result Value Ref Range  (H) 15 - 85 U/L  
GLUCOSE, POC Collection Time: 03/12/20  8:32 AM  
Result Value Ref Range Glucose (POC) 207 (H) 65 - 100 mg/dL Performed by Andrei Malone GLUCOSE, POC Collection Time: 03/12/20 11:47 AM  
Result Value Ref Range Glucose (POC) 188 (H) 65 - 100 mg/dL Performed by Jennifer Ballard ECHO ADULT COMPLETE Collection Time: 03/12/20  3:49 PM  
Result Value Ref Range Right Atrial Area 4C 12.24 cm2 LV E' Lateral Velocity 7.83 cm/s LV E' Septal Velocity 5.26 cm/s Ao Root D 3.45 cm Aortic Valve Systolic Peak Velocity 40.09 cm/s Aortic Valve Area by Continuity of Peak Velocity 2.1 cm2 AoV PG 3.0 mmHg  LVIDd 4.85 4.2 - 5.9 cm  
 LVIDs 4.56 cm IVSd 1.14 (A) 0.6 - 1.0 cm  
 LVOT d 1.72 cm  
 LVOT Peak Velocity 77.70 cm/s LVOT Peak Gradient 2.4 mmHg LVOT VTI 13.58 cm  
 MV A Baldemar 89.66 cm/s  
 MV E Baldemar 77.58 cm/s  
 MV E/A 0.90   
 MV Mean Gradient 2.1 mmHg Mitral Valve Annulus Velocity Time Integral 24.27 cm Left Atrium to Aortic Root Ratio 0.81 LA Vol 4C 48.53 18 - 58 mL  
 LA Area 4C 19.2 cm2 LVPWs 1.01 cm  
 E/E' lateral 9.91   
 E/E' septal 14.75   
 RVSP 44.4 mmHg MV Peak Gradient 4.8 mmHg E/E' ratio (averaged) 12.33 Est. RA Pressure 10.0 mmHg Mitral Valve E Wave Deceleration Time 116.7 ms Left Atrium Major Axis 2.79 cm Triscuspid Valve Regurgitation Peak Gradient 34.4 mmHg Mitral Valve Max Velocity 109.43 cm/s MVA VTI 1.3 cm2 LVOT SV 31.6 ml  
 TR Max Velocity 293.46 cm/s PISA MR Rad 0.90 cm PASP 44.4 mmHg LA Vol Index 31.13 16 - 28 ml/m2 JENNIFFER/BSA Pk Baldemar 1.3 cm2/m2 Left Ventricular Fractional Shortening by 2D 6.8634871675 % Left Ventricular Outflow Tract Mean Gradient 5.10822805457636 mmHg Left Ventricular Outflow Tract Mean Velocity 4.01596422745775 cm/s Mitral Valve Deceleration Benton 9.290084748913 Mitral valve mean inflow velocity 6.51934764809457 m/s AV Velocity Ratio 0.89 Left Ventricular End Diastolic Volume by Teichholz Method 6.77028205858 mL Left Ventricular End Systolic Volume by Teichholz Method 5.4922349630 mL Left Ventricular Stroke Volume by Teichholz Method 2.0483733145 mL Assessment/Plan:  
  
Consult problem MRSA septicemia w/ open ulceration of LUE AVG 
-s/p explantation and Robbie catheter placement on 03/11/2020 Thrombocytopenia Metabolic encephalopathy 
-improved Transaminitis Leukocytosis improved this. Keep LUE elevated. Antibx per infectious disease. Patient will need perm-a-cath once blood cultures are negative.    
 
Active problems ESRD on HD Hyponatremia  
-improved Hyperkalemia 
-resolved Anemia of chronic renal disease 
-stable post procedure Plan per Nephrology IDDM -hypoglycemia resolved Hypothyroidism ASHD Hypotension w/ hx of HTN Hyperlipidemia Hypoxia/Chronic pleural effusion Alzheimer's dementia Remote hx of prostate cancer Sacral decub 
-plan per wound care team  
Severe protein calorie malnutrition Management of comorbid conditions by primary team. 
 
VTE Prophylaxis: 
Resume [facility] 
  
Disposition: 
Genaroedgardsanaz Breezy 8

## 2020-03-12 NOTE — PROGRESS NOTES
Pharmacy Dosing Note Ordered Regimen: Heparin 5000 units subcutaneous every 8 hours New Regimen: Change to heparin 5000 units subcutaneous every 12 hours due to body weight less than 60 kg per [facility] VTE Prophylaxis Protocol. Body weight = 51.8 kg Thank you, 
Melania Bhat, PHARMD

## 2020-03-12 NOTE — PROGRESS NOTES
Dr. Tanisha Logan in to see patient and removed left upper arm dressing. Staples intact with small amount of oozing at upper part of incision. 4 X 4 placed over staples and arm ACE wrapped from hand to axillary region. Patient tolerated well. 7554 Dr. Andrei Beckford in to see patient. 1000 Dr. Lauren Toro in to see patient. 1150 TRANSFER - OUT REPORT: 
 
Verbal report given to [address] [facility] At [facility] on [facility].  being transferred to [facility] for  Routine progression of care Report consisted of patients Situation, Background, Assessment and  
Recommendations(SBAR). Information from the following report(s) SBAR, Kardex, MAR, Accordion and Pre Procedure Checklist was reviewed with the receiving nurse. Lines:  
Triple Lumen 03/11/20 (Active) Central Line Being Utilized Yes 3/12/2020  8:00 AM  
Criteria for Appropriate Use Limited/no vessel suitable for conventional peripheral access 3/12/2020  8:00 AM  
Site Assessment Clean, dry, & intact 3/12/2020  8:00 AM  
Infiltration Assessment 0 3/12/2020  8:00 AM  
Affected Extremity/Extremities Color distal to insertion site pink (or appropriate for race) 3/12/2020  8:00 AM  
Date of Last Dressing Change 03/11/20 3/12/2020  8:00 AM  
Dressing Status Clean, dry, & intact 3/12/2020  8:00 AM  
Dressing Type Disk with Chlorhexadine gluconate (CHG); Transparent 3/12/2020  8:00 AM  
Action Taken Open ports on tubing capped 3/12/2020  8:00 AM  
Proximal Hub Color/Line Status White; Infusing 3/12/2020  8:00 AM  
Positive Blood Return (Medial Site) Yes 3/12/2020  8:00 AM  
Medial Hub Color/Line Status Blue;Cap end changed; Capped;Flushed 3/12/2020  8:00 AM  
Positive Blood Return (Lateral Site) Yes 3/12/2020  8:00 AM  
Distal Hub Color/Line Status Brown;Cap end changed; Capped;Flushed 3/12/2020  8:00 AM  
Positive Blood Return (Site #3) Yes 3/12/2020  8:00 AM  
External Catheter Length (cm) 0 centimeters 3/12/2020  4:00 AM  
Alcohol Cap Used Yes 3/12/2020  8:00 AM  
  
 
 Opportunity for questions and clarification was provided. Patient transported with: 
 Monitor Tech  
[phone] Patient to room 2118 via bed.

## 2020-03-12 NOTE — WOUND CARE
Wound care nurse consult for POA sacral wound. Patient is a 82 y/o AAM admitted with sepsis from an infected AV graft in left arm. Patient went to OR yesterday to AV graft removed and temporary dialysis lumen placed in right subclavian. Patient moved out of CCU to floor today. Past Medical History:  
Diagnosis Date  Alzheimer disease ([facility]  CAD (coronary artery disease)  Cancer [facility])   
 prostate  Chronic kidney disease  CKD (chronic kidney disease) stage 4, GFR 15-29 ml/min ([facility])  DKA (diabetic ketoacidoses) ([facility] 7/10/2011  DM (diabetes mellitus), type 2, uncontrolled ([facility]  Dyspnea 2/6/2017  [facility]  History of GI bleed 11/12/2014  Hyperlipidemia  Hypertension  Other ill-defined conditions(799.89)   
 blind R eye-retina detachment  Other ill-defined conditions(799.89) right cerebellopontine angle lipoma.  Pneumonia 2/4/2017  Pulmonary edema 1/25/2020  Weakness generalized 8/16/2010 Patient is very thin, 5'4\", 114 lbs and kalpana. Sacral/Coccyx Unstageable PI: 
 
WOUND POA CONDITIONS Wound Sacrum Posterior nearly healing old sacral ulcer present at POA > wound care been consulted  03/12/20 (Active) Dressing Status Intact 3/12/2020  5:18 PM  
Dressing Type Foam 3/12/2020  5:18 PM  
Pressure Injury Unstageable 3/12/2020  5:18 PM  
Wound Length (cm) 1.3 cm 3/12/2020  5:18 PM  
Wound Width (cm) 1.3 cm 3/12/2020  5:18 PM  
Wound Surface Area (cm^2) 1.69 cm^2 3/12/2020  5:18 PM  
Condition of Base Eschar;Pink;Epithelializing 3/12/2020  5:18 PM  
Condition of Edges Open 3/12/2020  5:18 PM  
Tissue Type Percent Black 75 % 3/12/2020  5:18 PM  
Tissue Type Percent Pink 25 3/12/2020  5:18 PM  
Drainage Amount None 3/12/2020  5:18 PM  
Wound Odor None 3/12/2020  5:18 PM  
Vanessa-wound Assessment Intact 3/12/2020  5:18 PM  
Procedure Tolerated Fair 3/12/2020  5:18 PM  
Number of days: 2 Recommend: Sacral coccyx PI: daily cleanse area with dermal wound cleanser spray/NS moist 4x4. Cover with a piece of dry Opticell-AG and then a foam dressing. Specialty bed: Staff to order an Envision on a [facility] bed frame from [employer] Float heels Turn q2 hrs. Elevate left arm on pillows.  
 
Yanira Pitts RN, [employer]

## 2020-03-12 NOTE — OP NOTES
[address] 
OPERATIVE REPORT Name:  Sunshine Foster 
MR#:  [id number] :  1950 ACCOUNT #:  [de-identified] DATE OF SERVICE:  2020 PREOPERATIVE DIAGNOSES:  Infected left upper arm arteriovenous graft with ulceration. POSTOPERATIVE DIAGNOSES:  Infected left upper arm arteriovenous graft with ulceration. PROCEDURES PERFORMED: 
1. Partial explant of left upper extremity arteriovenous graft. 2.  Ultrasound guided placement of non-tunneled dialysis catheter and right internal jugular vein (30 mm catheter). SURGEON:  Alexey Ray MD 
 
SURGICAL ASSISTANT:  None. ANESTHESIA:  General. 
 
COMPLICATIONS:  None. SPECIMENS REMOVED:  Culture of the left upper extremity. IMPLANTS:  None. ESTIMATED BLOOD LOSS:  50 mL. INDICATIONS FOR PROCEDURE:  The patient is a 44-year-old male who was admitted earlier today with Gram positive cocci bacteremia as well as full-thickness ulceration and bleeding over his left upper extremity AV graft. I was unable to contact his health care proxy as he is not able to make consent of his own and emergency consent is documented in the chart to prevent life threatening bleeding. Of note, I was able to speak to the patient's decision maker later and she was okay with proceeding with surgery, but does want him to be DNR. PROCEDURE:  The patient was brought to the operating room and prepped and draped in the usual sterile fashion. We started off by using ultrasound guidance to place the non-tunneled dialysis catheter. Under ultrasound guidance, the right internal jugular vein was seen and accessed using Seldinger technique with a wire and dilated and the catheter was placed and appropriately back bled and flushed and sutured into place and sterile dressing placed. Next, we turned our attention and prepped and draped the left arm.   An incision was made initially proximal to the ulceration to get proximal control. The graft here was extremely well incorporated. It was dissected free circumferentially and the vessel loop placed. This was then cleaned. I then opened the skin around the ulceration, it was debrided back to healthy tissue and the graft was then isolated. There was  not well incorporated in this location and there was no gross purulence. It was incised and then dissected distally until it was well incorporated. Next, approximately 10 cm piece of graft including an aneurysmal component was excised and sent away for specimen. Both ends of the grafts were oversewn with a 4-0 Prolene suture in Boaz fashion. The clamps were released and there was good hemostasis on both the arteriovenous ends. Wound was then copiously irrigated with antibiotic solution irrigation. Debrided back to healthy tissue. It was then closed in layers with 3-0 Vicryl followed by skin staples. Ace wraps were placed. The patient tolerated the procedure well. All needle and sponge counts were correct at the end of the case. The patient was transferred to PACU in stable condition. [place] MD 
 
 
[id number] 
D:  03/11/2020 17:49 
T:  03/12/2020 4:16 JOB #:  [id number]

## 2020-03-12 NOTE — PROGRESS NOTES
MEGAN PLAN: Return to LTC at [address] [place] [address] when medically stable. Reason for Readmission:    Pt is a 80 yo male who was brought from [address] [place] [address] for treatment of sepsis secondary to infected dialysis fistula site and UTI. Pt went to OR on 3/12 for partial explant of L AV graft and placement of non-tunneled HD catheter. Pt has been LTC resident at [address] [place] [address] for past 7 years. He has had 4 IP admissions at [facility] since Dec 2019 - 1 admission each month for encephalopathy and pulmonary edema. Pt has ESRD - on HD at [facility]); IDDM, chronic pleural effusion, dementia. RUR Score/Risk Level:     45%  Readmission Level 2 PCP: First and Last name:  William Agee MD 
 Name of Practice:   [address] [place] [address] Dir Are you a current patient: Yes/No:  Yes Approximate date of last visit:   3/10/20 Is a Care Conference indicated:  Possible re-consult of Palliative Care to continue QOL discussion with pt's sister and health care proxy, Gee Bentley. Did you attend your follow up appointment (s): If not, why not: Pt was scheduled to see Dr. Dl Horton on 3/12 but had been admitted by then. Resources/supports as identified by patient/family:   Sister and paige Top Challenges facing patient (as identified by patient/family and CM): Finances/Medication cost?     Medicare/Medicaid Transportation    BLS Support system or lack thereof? Sister and paige Living arrangements? LTC at [facility] Self-care/ADLs/Cognition? Dependent - mostly bed-confined. Dementia at baseline. Current Advanced Directive/Advance Care Plan:  DNR - signed 1/19/17 on file. Palliative Care was consulted and met w/ pt's sister Riley Rasheed during Feb 2020 admission. Sister did not want feeding tube but wanted to continue with supportive care and HD at that time. Plan for utilizing home health:   N/A Transition of Care Plan:    Based on readmission, the patient's previous Plan of Care has been evaluated and/or modified. The current Transition of Care Plan is:        
Nephrology following - will have HD on Friday. CM spoke to sister Mikayla Ye by phone - she confirmed that she is still health care proxy/decision-maker and that her plan is for pt to return to [facility] when medically stable. Updates sent to [facility] via CC link. Pt might benefit from re-consultation of Palliative Care for further discussion on goals of care in light of pt's 4th admission in 4 mos and limited QOL. CM will follow progress and assist w/ dc arrgmts. Prior to dc, will need: 
2nd IM Transition Note to SNF Care Management Interventions PCP Verified by CM: Yes([facility]) Palliative Care Criteria Met (RRAT>21 & CHF Dx)?: No 
Mode of Transport at Discharge: BLS Transition of Care Consult (CM Consult): Discharge Planning Discharge Durable Medical Equipment: No 
Physical Therapy Consult: No 
Occupational Therapy Consult: No 
Speech Therapy Consult: No 
Current Support Network: Nursing Facility([facility]) Confirm Follow Up Transport: Other (see comment) Discharge Location Discharge Placement: [address] [facility] DW[address]NE Franco

## 2020-03-12 NOTE — PROGRESS NOTES
2100, Bedside and Verbal Admission report given to MICHAEL Hernandez (CCU nurse) by Gabrielle Valencia RN (PACU nurse). Report included the following information SBAR, Kardex, Intake/Output, MAR, Accordion, Recent Results, Med Rec Status and Cardiac Rhythm NSR. Severe sepsis ([facility] [A41.9, R65.20] REMOVAL ARTERIO VENOUS FISTULA/GRAFT LEFT ARM (AXILLARY BLOCK) INFUSAPORT DIALYSIS CATHETER INSERTION RIGHT INTERNAL JUGULAR VEIN Temp;  Low temp > extra blanket > 97.4 orally, Neuro; Drowsy & Oriented to self, follow command, legally Blind,  GCS;  Eye; 3-4, verbal; 4, motor; 6. 
Reassessment; more alert > Hx dementia Alzheimer > orient to self despite reoriented him many time for situation, Respiratory; Sat 97% on NC at 3 L/min, coarse diminished at lower lung sounds. Reassessment; Sat 100% at room Air, lung more clear, Apnea from time to time Cardiac; NSR, 83 B/min, NIBP 107/78 mmHg, Ramo at 60 dianne/min Reassessment; Ramo titrated off at 0030, /75 mmHg GI; NPO, Abd > Ascites ? semi soft with active bowel sound, on D5% with NS 0.45% at 50 ml/hr. Reassessment;  Keep asking for food, Jello & apple sauce provided as he has active bowel sound and BG 71, Renal; ESRD > HD started > 1 L removed on 03/12/2020. Reassessment;  BUN; 70 > 37, Crea; 5.91 > 3.33, Intake/Output Summary (Last 24 hours) at 3/12/2020 6897 Last data filed at 3/12/2020 0600 Gross per 24 hour Intake 1856.7 ml Output 1020 ml Net 836.7 ml  
 
 
Skin; surgical site cove with dressing AC > Radial feel > hand cool later warm up with goo refill. Old nearly healed sacral ulcer > Med Tele already consult wound care, scratch at left upper back, small spot scratch at left dorsal foot, Endo; AC/HS > Glucose 71 > apple sauce and Jello provided already IVF stated > at am Lab was 116, Lines;  right IJ Robbie, Left IJ CVP, Code; DNR, was on Ramo.  
Lab; WBC 13.4 > 12.4, HGB; 9.1 > 8.1, platelet; 303 > 80, Na; 129 >  ^ 133, K; 5.8 > V 4.4,  +ve culture MRSA 
DVT; SCD both legs. Plan; Reorient the patient, Titrate oxygen as tolerated, Titrate Ramo, pain and agitation management, HD as per order, Blood culture as per order > sent from right hand. follow lab tomorrow,   
0700 Bedside and Verbal shift change report given to Bernie Goodwin RN (oncoming nurse) by Anna Bryan RN (offgoing nurse). Report included the following information SBAR, Kardex, Intake/Output, MAR, Accordion, Recent Results, Med Rec Status and Cardiac Rhythm NSR.

## 2020-03-13 NOTE — PROGRESS NOTES
RAPID RESPONSE TEAM-Recent CCU Transfer Rounded on patient due to recent transfer out of CCU on 3/12/20. Discussed with primary RN, [address] [facility]. No acute concerns. No patient complaints. Vitals stable. Last documented MEWS 2. No RRT interventions indicated at this time. RN encouraged to call with any questions or concerns. Luigi Watkins RN, BSN, CCRN Rapid Response [place]

## 2020-03-13 NOTE — CDMP QUERY
Dr Hilda Carbone: 
 
Pt admitted with MRSA septicemia w/ open ulceration of LUE AVG 
-s/p explantation and Robbie catheter placement on 03/11/2020and has severemalnutrition documented. by a RD. Please clarify & confirm type of malnutrition. this patient is being evaluated & treated for: ? Malnutrition (mild, moderate, severe) ? Protein calorie malnutrition (mild, moderate, severe) ? Other (please specify) ? Unable to determine The medical record reflects the following: 
  Risk Factors: ESRD, alzheimer's dementia, postop, cachectic, underweight, poor appetite, infection Clinical Indicators: RD eval significantly cachectic with severe generalized fat and muscle wasting. He reports a very poor appetite, but is unable to voice any GI complaints that are keeping him from eating. Noted only 1-2 bites of lunch tray consumed, Meets criteria for chronic Malnutrition Severe Malnutrition, as evidenced by: ? Severe muscle wasting, loss of subcutaneous fat ? Nutritional intake of <75% of recommended intake for >1 month Treatment: commercial supplements, RD continued assessment, Continue diet, increase supplements, monitor intake Thank you,  
[facility], [address] [place] [address], [address] [place] [address]  
[phone]

## 2020-03-13 NOTE — PROGRESS NOTES
Problem: Diabetes Self-Management Goal: *Disease process and treatment process Description: Define diabetes and identify own type of diabetes; list 3 options for treating diabetes. Outcome: Progressing Towards Goal 
Goal: *Incorporating nutritional management into lifestyle Description: Describe effect of type, amount and timing of food on blood glucose; list 3 methods for planning meals. Outcome: Progressing Towards Goal 
Goal: *Incorporating physical activity into lifestyle Description: State effect of exercise on blood glucose levels. Outcome: Progressing Towards Goal 
Goal: *Developing strategies to promote health/change behavior Description: Define the ABC's of diabetes; identify appropriate screenings, schedule and personal plan for screenings. Outcome: Progressing Towards Goal 
Goal: *Using medications safely Description: State effect of diabetes medications on diabetes; name diabetes medication taking, action and side effects. Outcome: Progressing Towards Goal 
Goal: *Monitoring blood glucose, interpreting and using results Description: Identify recommended blood glucose targets  and personal targets. Outcome: Progressing Towards Goal 
Goal: *Prevention, detection, treatment of acute complications Description: List symptoms of hyper- and hypoglycemia; describe how to treat low blood sugar and actions for lowering  high blood glucose level. Outcome: Progressing Towards Goal 
Goal: *Prevention, detection and treatment of chronic complications Description: Define the natural course of diabetes and describe the relationship of blood glucose levels to long term complications of diabetes. Outcome: Progressing Towards Goal 
Goal: *Developing strategies to address psychosocial issues Description: Describe feelings about living with diabetes; identify support needed and support network Outcome: Progressing Towards Goal 
Goal: *Insulin pump training Outcome: Progressing Towards Goal 
 Goal: *Sick day guidelines Outcome: Progressing Towards Goal 
Goal: *Patient Specific Goal (EDIT GOAL, INSERT TEXT) Outcome: Progressing Towards Goal 
  
Problem: Patient Education: Go to Patient Education Activity Goal: Patient/Family Education Outcome: Progressing Towards Goal 
  
Problem: Risk for Spread of Infection Goal: Prevent transmission of infectious organism to others Description: Prevent the transmission of infectious organisms to other patients, staff members, and visitors. Outcome: Progressing Towards Goal 
  
Problem: Patient Education:  Go to Education Activity Goal: Patient/Family Education Outcome: Progressing Towards Goal 
  
Problem: Falls - Risk of 
Goal: *Absence of Falls Description: Document Catherene Bunting Fall Risk and appropriate interventions in the flowsheet. Outcome: Progressing Towards Goal 
Note: Fall Risk Interventions: 
  
 
Mentation Interventions: Door open when patient unattended Medication Interventions: Assess postural VS orthostatic hypotension, Bed/chair exit alarm, Patient to call before getting OOB Elimination Interventions: Bed/chair exit alarm, Call light in reach Problem: Patient Education: Go to Patient Education Activity Goal: Patient/Family Education Outcome: Progressing Towards Goal 
  
Problem: Pressure Injury - Risk of 
Goal: *Prevention of pressure injury Description: Document Luke Scale and appropriate interventions in the flowsheet. Outcome: Progressing Towards Goal 
Note: Pressure Injury Interventions: 
Sensory Interventions: Assess changes in LOC Moisture Interventions: Absorbent underpads Activity Interventions: Increase time out of bed, Pressure redistribution bed/mattress(bed type) Mobility Interventions: Assess need for specialty bed, Float heels, Pressure redistribution bed/mattress (bed type) Nutrition Interventions: Document food/fluid/supplement intake Friction and Shear Interventions: Apply protective barrier, creams and emollients, HOB 30 degrees or less, Minimize layers Problem: Patient Education: Go to Patient Education Activity Goal: Patient/Family Education Outcome: Progressing Towards Goal 
  
Problem: Patient Education: Go to Patient Education Activity Goal: Patient/Family Education Outcome: Progressing Towards Goal 
  
Problem: Sepsis: Day of Diagnosis Goal: Off Pathway (Use only if patient is Off Pathway) Outcome: Progressing Towards Goal 
Goal: *Fluid resuscitation Outcome: Progressing Towards Goal 
Goal: *Paired blood cultures prior to first dose of antibiotic Outcome: Progressing Towards Goal 
Goal: *First dose of  appropriate antibiotic within 3 hours of arrival to ED, within 1 hour of arrival to ICU Outcome: Progressing Towards Goal 
Goal: *Lactic acid with first set of blood cultures Outcome: Progressing Towards Goal 
Goal: *Pneumococcal immunization (if eligible) Outcome: Progressing Towards Goal 
Goal: *Influenza immunization (if eligible) Outcome: Progressing Towards Goal 
Goal: Activity/Safety Outcome: Progressing Towards Goal 
Goal: Consults, if ordered Outcome: Progressing Towards Goal 
Goal: Diagnostic Test/Procedures Outcome: Progressing Towards Goal 
Goal: Nutrition/Diet Outcome: Progressing Towards Goal 
Goal: Discharge Planning Outcome: Progressing Towards Goal 
Goal: Medications Outcome: Progressing Towards Goal 
Goal: Respiratory Outcome: Progressing Towards Goal 
Goal: Treatments/Interventions/Procedures Outcome: Progressing Towards Goal 
Goal: Psychosocial 
Outcome: Progressing Towards Goal 
  
Problem: Sepsis: Day 2 Goal: Off Pathway (Use only if patient is Off Pathway) Outcome: Progressing Towards Goal 
Goal: *Central Venous Pressure maintained at 8-12 mm Hg Outcome: Progressing Towards Goal 
Goal: *Hemodynamically stable Outcome: Progressing Towards Goal 
Goal: *Tolerating diet Outcome: Progressing Towards Goal 
Goal: Activity/Safety Outcome: Progressing Towards Goal 
Goal: Consults, if ordered Outcome: Progressing Towards Goal 
Goal: Diagnostic Test/Procedures Outcome: Progressing Towards Goal 
Goal: Nutrition/Diet Outcome: Progressing Towards Goal 
Goal: Discharge Planning Outcome: Progressing Towards Goal 
Goal: Medications Outcome: Progressing Towards Goal 
Goal: Respiratory Outcome: Progressing Towards Goal 
Goal: Treatments/Interventions/Procedures Outcome: Progressing Towards Goal 
Goal: Psychosocial 
Outcome: Progressing Towards Goal 
  
Problem: Sepsis: Day 3 Goal: Off Pathway (Use only if patient is Off Pathway) Outcome: Progressing Towards Goal 
Goal: *Central Venous Pressure maintained at 8-12 mm Hg Outcome: Progressing Towards Goal 
Goal: *Oxygen saturation within defined limits Outcome: Progressing Towards Goal 
Goal: *Vital sign stability Outcome: Progressing Towards Goal 
Goal: *Tolerating diet Outcome: Progressing Towards Goal 
Goal: *Demonstrates progressive activity Outcome: Progressing Towards Goal 
Goal: Activity/Safety Outcome: Progressing Towards Goal 
Goal: Consults, if ordered Outcome: Progressing Towards Goal 
Goal: Diagnostic Test/Procedures Outcome: Progressing Towards Goal 
Goal: Nutrition/Diet Outcome: Progressing Towards Goal 
Goal: Discharge Planning Outcome: Progressing Towards Goal 
Goal: Medications Outcome: Progressing Towards Goal 
Goal: Respiratory Outcome: Progressing Towards Goal 
Goal: Treatments/Interventions/Procedures Outcome: Progressing Towards Goal 
Goal: Psychosocial 
Outcome: Progressing Towards Goal 
  
Problem: Sepsis: Day 4 Goal: Off Pathway (Use only if patient is Off Pathway) Outcome: Progressing Towards Goal 
Goal: Activity/Safety Outcome: Progressing Towards Goal 
Goal: Consults, if ordered Outcome: Progressing Towards Goal 
Goal: Diagnostic Test/Procedures Outcome: Progressing Towards Goal 
 Goal: Nutrition/Diet Outcome: Progressing Towards Goal 
Goal: Discharge Planning Outcome: Progressing Towards Goal 
Goal: Medications Outcome: Progressing Towards Goal 
Goal: Respiratory Outcome: Progressing Towards Goal 
Goal: Treatments/Interventions/Procedures Outcome: Progressing Towards Goal 
Goal: Psychosocial 
Outcome: Progressing Towards Goal 
Goal: *Oxygen saturation within defined limits Outcome: Progressing Towards Goal 
Goal: *Hemodynamically stable Outcome: Progressing Towards Goal 
Goal: *Vital signs within defined limits Outcome: Progressing Towards Goal 
Goal: *Tolerating diet Outcome: Progressing Towards Goal 
Goal: *Demonstrates progressive activity Outcome: Progressing Towards Goal 
Goal: *Fluid volume maintenance Outcome: Progressing Towards Goal 
  
Problem: Sepsis: Day 5 Goal: Off Pathway (Use only if patient is Off Pathway) Outcome: Progressing Towards Goal 
Goal: *Oxygen saturation within defined limits Outcome: Progressing Towards Goal 
Goal: *Vital signs within defined limits Outcome: Progressing Towards Goal 
Goal: *Tolerating diet Outcome: Progressing Towards Goal 
Goal: *Demonstrates progressive activity Outcome: Progressing Towards Goal 
Goal: *Discharge plan identified Outcome: Progressing Towards Goal 
Goal: Activity/Safety Outcome: Progressing Towards Goal 
Goal: Consults, if ordered Outcome: Progressing Towards Goal 
Goal: Diagnostic Test/Procedures Outcome: Progressing Towards Goal 
Goal: Nutrition/Diet Outcome: Progressing Towards Goal 
Goal: Discharge Planning Outcome: Progressing Towards Goal 
Goal: Medications Outcome: Progressing Towards Goal 
Goal: Respiratory Outcome: Progressing Towards Goal 
Goal: Treatments/Interventions/Procedures Outcome: Progressing Towards Goal 
Goal: Psychosocial 
Outcome: Progressing Towards Goal 
  
Problem: Sepsis: Day 6 Goal: Off Pathway (Use only if patient is Off Pathway) Outcome: Progressing Towards Goal 
 Goal: *Oxygen saturation within defined limits Outcome: Progressing Towards Goal 
Goal: *Vital signs within defined limits Outcome: Progressing Towards Goal 
Goal: *Tolerating diet Outcome: Progressing Towards Goal 
Goal: *Demonstrates progressive activity Outcome: Progressing Towards Goal 
Goal: Influenza immunization Outcome: Progressing Towards Goal 
Goal: *Pneumococcal immunization Outcome: Progressing Towards Goal 
Goal: Activity/Safety Outcome: Progressing Towards Goal 
Goal: Diagnostic Test/Procedures Outcome: Progressing Towards Goal 
Goal: Nutrition/Diet Outcome: Progressing Towards Goal 
Goal: Discharge Planning Outcome: Progressing Towards Goal 
Goal: Medications Outcome: Progressing Towards Goal 
Goal: Respiratory Outcome: Progressing Towards Goal 
Goal: Treatments/Interventions/Procedures Outcome: Progressing Towards Goal 
Goal: Psychosocial 
Outcome: Progressing Towards Goal 
  
Problem: Sepsis: Discharge Outcomes Goal: *Vital signs within defined limits Outcome: Progressing Towards Goal 
Goal: *Tolerating diet Outcome: Progressing Towards Goal 
Goal: *Verbalizes understanding and describes prescribed diet Outcome: Progressing Towards Goal 
Goal: *Demonstrates progressive activity Outcome: Progressing Towards Goal 
Goal: *Describes follow-up/return visits to physicians Outcome: Progressing Towards Goal 
Goal: *Verbalizes name, dosage, time, side effects, and number of days to continue medications Outcome: Progressing Towards Goal 
Goal: *Influenza immunization (Oct-Mar only) Outcome: Progressing Towards Goal 
Goal: *Pneumococcal immunization Outcome: Progressing Towards Goal 
Goal: *Lungs clear or at baseline Outcome: Progressing Towards Goal 
Goal: *Oxygen saturation returns to baseline or 90% or better on room air Outcome: Progressing Towards Goal 
Goal: *Glycemic control Outcome: Progressing Towards Goal 
 Goal: *Absence of deep venous thrombosis signs and symptoms(Stroke Metric) Outcome: Progressing Towards Goal 
Goal: *Describes available resources and support systems Outcome: Progressing Towards Goal 
Goal: *Optimal pain control at patient's stated goal 
Outcome: Progressing Towards Goal

## 2020-03-13 NOTE — PROGRESS NOTES
Name: Nish Chen. MRN: [id number] : 1950 Assessment: 
ESRD ([facility]; MWF) High K- resolved Fever/MRSA Bacteremia Infected L AVG with ulceration HTN Hyponatremia Anemia of ESRD High Phos/SHPT High Mg Dementia 
  
3-- seen on HD. Using R Robbie, 350 QB, 2k, 2.5 Ca. UF of 1 kg as tiarra. /73. 3-12- got partial explantation of L AVG and R robbie on 3. 11. got HD last night. BP is better. Off pressors. High K resolved 3-- pt to go OR today for surgery on his L AVG. On IV ABx. Needs robbie for HD Plan/Recommendations: 
Ct IV Vanco 
HD today. Next HD on Monday K stable off of [place] Avoid Mg containing laxatives or supplements Amlodipine was held and now stopped. Can be resumed in future if he develops persistent HTN Will need Permacath prior to d/c, which can be planned next week, once he is afebrile and f/u BCx are negative Will check back on Monday. Please call us with q's over the weekend Subjective: 
Seen on HD. Has basln confusion and lacks complete insight Tolerating HD well ROS:  
Not reliable, but no c/o Exam: 
Visit Vitals /73 Pulse 93 Temp 97.7 °F (36.5 °C) (Oral) Resp 18 Ht 5' 5\" (1.651 m) Wt 54 kg (119 lb) SpO2 99% BMI 19.80 kg/m² Elderly cachectic in NAD 
R Robbie in place Clear RRR 
L AVG site with drsg No edema Alert awake Legally blind Current Facility-Administered Medications Medication Dose Route Frequency Last Dose  heparin (porcine) 1,000 unit/mL injection 1,000 Units  1,000 Units InterCATHeter DIALYSIS PRN    
 albumin human 25% (BUMINATE) solution 12.5 g  12.5 g IntraVENous DIALYSIS PRN    
 heparin (porcine) 1,000 unit/mL injection 2,400 Units  2,400 Units Hemodialysis DIALYSIS PRN 2,400 Units at 20 0005  acetaminophen (TYLENOL) tablet 650 mg  650 mg Oral Q6H  mg at 03/12/20 2206  albuterol (PROVENTIL VENTOLIN) nebulizer solution 2.5 mg  2.5 mg Nebulization Q4H PRN    
 aspirin delayed-release tablet 81 mg  81 mg Oral DAILY 81 mg at 03/13/20 0837  
 calcitRIOL (ROCALTROL) capsule 0.25 mcg  0.25 mcg Oral Q MON, WED & FRI 0.25 mcg at 03/12/20 0030  ferrous sulfate tablet 325 mg  325 mg Oral  mg at 03/13/20 0837  
 glucagon (GLUCAGEN) injection 1 mg  1 mg IntraMUSCular PRN    
 levothyroxine (SYNTHROID) tablet 75 mcg  75 mcg Oral ACB 75 mcg at 03/13/20 0837  
 pantoprazole (PROTONIX) tablet 40 mg  40 mg Oral DAILY 40 mg at 03/13/20 8427  prednisoLONE acetate (PRED FORTE) 1 % ophthalmic suspension 1 Drop  1 Drop Right Eye BID 1 Drop at 03/13/20 1001  tamsulosin (FLOMAX) capsule 0.4 mg  0.4 mg Oral DAILY 0.4 mg at 03/13/20 3056  B complex-vitaminC-folic acid (NEPHROCAP) cap  1 Cap Oral DAILY 1 Cap at 03/13/20 0900  
 sodium chloride (NS) flush 5-10 mL  5-10 mL IntraVENous PRN 10 mL at 03/12/20 0105  ondansetron (ZOFRAN) injection 4 mg  4 mg IntraVENous Q6H PRN 4 mg at 03/11/20 1729  
 insulin lispro (HUMALOG) injection   SubCUTAneous AC&HS 2 Units at 03/13/20 1330  
 glucose chewable tablet 16 g  4 Tab Oral PRN    
 dextrose (D50W) injection syrg 12.5-25 g  12.5-25 g IntraVENous PRN 25 g at 03/11/20 0854  
 glucagon (GLUCAGEN) injection 1 mg  1 mg IntraMUSCular PRN    
 VANCOMYCIN INFORMATION NOTE   Other Rx Dosing/Monitoring Labs/Data: 
 
Lab Results Component Value Date/Time WBC 20.3 (H) 03/13/2020 03:15 AM  
 Hemoglobin (POC) 10.5 (L) 04/07/2017 07:30 AM  
 HGB 7.5 (L) 03/13/2020 03:15 AM  
 Hematocrit (POC) 31 (L) 04/07/2017 07:30 AM  
 HCT 21.0 (L) 03/13/2020 03:15 AM  
 PLATELET 77 (L) 09/39/9577 03:15 AM  
 MCV 78.9 (L) 03/13/2020 03:15 AM  
 
 
Lab Results Component Value Date/Time  Sodium 131 (L) 03/13/2020 03:15 AM  
 Potassium 5.1 03/13/2020 03:15 AM  
 Chloride 102 03/13/2020 03:15 AM  
 CO2 17 (L) 03/13/2020 03:15 AM  
 Anion gap 12 03/13/2020 03:15 AM  
 Glucose 102 (H) 03/13/2020 03:15 AM  
 BUN 50 (H) 03/13/2020 03:15 AM  
 Creatinine 3.98 (H) 03/13/2020 03:15 AM  
 BUN/Creatinine ratio 13 03/13/2020 03:15 AM  
 GFR est AA 18 (L) 03/13/2020 03:15 AM  
 GFR est non-AA 15 (L) 03/13/2020 03:15 AM  
 Calcium 6.6 (L) 03/13/2020 03:15 AM  
 
 
Wt Readings from Last 3 Encounters:  
03/13/20 54 kg (119 lb) 03/09/20 49.8 kg (109 lb 12.6 oz) 02/13/20 48.4 kg (106 lb 11.2 oz) Intake/Output Summary (Last 24 hours) at 3/13/2020 1517 Last data filed at 3/12/2020 1851 Gross per 24 hour Intake 240 ml Output  Net 240 ml Patient seen and examined. Chart reviewed. Labs, data and other pertinent notes reviewed in last 24 hrs. PMH/SH/FH reviewed and unchanged compared to H&P Discussed with pt and HD RN Cornelious Schaumann, MD

## 2020-03-13 NOTE — PROGRESS NOTES
Office notified of RN concerns regarding incision site. [id number] [facility] will relay message to Dr. Davis Berman or SO Herrera.

## 2020-03-13 NOTE — CONSULTS
ROSELINE consult received. Will schedule for Monday as long as schedule allows.    
 
 
 
 
Iva Rodriguez NP 
DNP, RN, AGACNP-BC

## 2020-03-13 NOTE — PROGRESS NOTES
Nutrition Assessment: 
 
RECOMMENDATIONS:  
Continue diet as tolerated RD to increase Nepro to 2 bottles per meal 
 
ASSESSMENT:  
 
Meets Criteria for Chronic Malnutrition  
[x] Severe Malnutrition, as evidenced by: 
 [x] Severe muscle wasting, loss of subcutaneous fat 
 [x] Nutritional intake of <75% of recommended intake for >1 month 
 [] Weight loss of  >5% in 1 month, >7.5% in 3 months, >10% in 6 months, >20% in 1 year 
 [] Severe edema  
[]Moderate Malnutrition, as evidenced by: 
 [] Mild muscle wasting, loss of subcutaneous fat 
 [] Nutritional intake <75% of recommended intake for >1 month 
 [] Weight loss of  5% in 1 month, 7.5% in 3 months, 10% in 6 months, or 20% in 1 year 
 [] Mild edema Chart reviewed, pt lying in bed awake and alert on HD. Pt is significantly cachectic with severe generalized fat and muscle wasting. He reports a very poor appetite, but is unable to voice any GI complaints that are keeping him from eating. Noted only 1-2 bites of lunch tray consumed, however he drank 100% of Nepro shake. Pt reports milkshake was delicious and he wishes he could have another. Will send him 2 on every tray from now on. Dietitians Intervention(s)/Plan(s): Continue diet, increase supplements, monitor intake SUBJECTIVE/OBJECTIVE:  
\"I just have no appetite\" Diet Order: Renal, Mechanical soft, Other (comment)(Nepro TID ) 
% Eaten:   
Patient Vitals for the past 72 hrs: 
 % Diet Eaten 03/12/20 1851 10 % 03/12/20 1008 20 % Pertinent Medications:nephrocap, iron, humalog, protonix, vancomycin Chemistries: 
Lab Results Component Value Date/Time  Sodium 131 (L) 03/13/2020 03:15 AM  
 Potassium 5.1 03/13/2020 03:15 AM  
 Chloride 102 03/13/2020 03:15 AM  
 CO2 17 (L) 03/13/2020 03:15 AM  
 Anion gap 12 03/13/2020 03:15 AM  
 Glucose 102 (H) 03/13/2020 03:15 AM  
 BUN 50 (H) 03/13/2020 03:15 AM  
 Creatinine 3.98 (H) 03/13/2020 03:15 AM  
 BUN/Creatinine ratio 13 03/13/2020 03:15 AM  
 GFR est AA 18 (L) 03/13/2020 03:15 AM  
 GFR est non-AA 15 (L) 03/13/2020 03:15 AM  
 Calcium 6.6 (L) 03/13/2020 03:15 AM  
 Albumin 1.9 (L) 03/13/2020 03:15 AM  
  
Anthropometrics: Height: 5' 5\" (165.1 cm) Weight: 54 kg (119 lb)  [] standing scale    []bed scale    []stated   []unknown IBW (%IBW):   ( ) UBW (%UBW):   (  %) BMI: Body mass index is 19.8 kg/m². This BMI is indicative of: 
[x]Underweight   []Normal   []Overweight   [] Obesity   [] Extreme Obesity (BMI>40) Estimated Nutrition Needs (Based on): 8577 Kcals/day(BMR (1199) x AF 1.2 + 250) , 50 g(-65 (1.0-1.3 g/kg bw)) Protein Carbohydrate: At Least 130 g/day  Fluids: 1600 mL/day Last BM: 3/13   [x]Active     []Hyperactive  []Hypoactive       [] Absent   BS Skin:    [] Intact   [x] Incision  [x] Breakdown(unstageable-sacrum)    [] DTI   [] Tears/Excoriation/Abrasion  [x]Edema(+2-LLE; trace-BUE; +1-RLE) [] Other: Wt Readings from Last 30 Encounters:  
03/13/20 54 kg (119 lb) 03/09/20 49.8 kg (109 lb 12.6 oz) 02/13/20 48.4 kg (106 lb 11.2 oz) 01/28/20 50.5 kg (111 lb 4.8 oz) 12/02/19 55.9 kg (123 lb 3.8 oz) 05/08/17 55.9 kg (123 lb 3.8 oz) 05/05/17 43.3 kg (95 lb 7.4 oz) 04/08/17 43.3 kg (95 lb 7.4 oz) 03/02/17 64.9 kg (143 lb)  
02/28/17 63.1 kg (139 lb 1.8 oz) 02/22/17 63.1 kg (139 lb 1.8 oz) 02/10/17 52.3 kg (115 lb 4.8 oz) 01/18/17 72.7 kg (160 lb 5.1 oz) 01/16/17 56.7 kg (125 lb)  
11/23/16 54 kg (119 lb)  
09/30/16 55.3 kg (122 lb)  
09/15/16 54.4 kg (120 lb) 08/22/16 61.2 kg (135 lb) 12/17/15 56.7 kg (125 lb)  
09/30/15 55.8 kg (123 lb) 11/08/14 63.5 kg (140 lb) 07/07/14 68 kg (150 lb) 12/16/13 55.7 kg (122 lb 12.8 oz)  
11/15/13 55.3 kg (122 lb)  
06/18/12 59 kg (130 lb)  
02/29/12 55.8 kg (123 lb)  
01/27/12 54.9 kg (121 lb) 12/08/11 48 kg (105 lb 13.1 oz)  
10/01/11 52.2 kg (115 lb) 07/09/11 44.5 kg (98 lb) Dx of Malnutrition since admission: no 
 
NUTRITION DIAGNOSES:  
Problem:  Altered nutrition-related lab values Etiology: related to pt between HD sessions Signs/Symptoms: as evidenced by K 5.6, Mg 2.5, Phos 7.5 and Na 128 Previous dx re: altered nutrition related lab values resolving. NUTRITION INTERVENTIONS: 
Meals/Snacks: General/healthful diet   Supplements: Commercial supplement GOAL:  
Pt will consume >50% of meals and 100% PO supplements in 2-4 days. NUTRITION MONITORING AND EVALUATION Previous Goal: advance pt diet per surgery next 24-48 hrs Previous Goal Met: Yes Previous Recommendations Implemented: Yes Cultural, [employer], or Ethnic Dietary Needs: None LEARNING NEEDS (Diet, Food/Nutrient-Drug Interaction):  
 [x] None Identified 
 [] Identified and Education Provided/Documented 
 [] Identified and Pt declined/was not appropriate [x] Interdisciplinary Care Plan Reviewed/Documented  
 [x] Participated in Discharge Planning: Renal diet + Nepro TID [] Interdisciplinary Rounds NUTRITION RISK:  
 [x] High              [] Moderate           []  Low  []  Minimal/Uncompromised Chasity Michel RD, [facility] Pager [phone] Weekend Pager [phone]

## 2020-03-13 NOTE — PROGRESS NOTES
Pt left arm wrapped in ace bandage, drainage bloody, coming through. Removed. Assessed, drainage continues, swelling and purple bubble at end of staples from incision that looks like possible hematoma, MD paged and made aware, came to bedside

## 2020-03-13 NOTE — PROGRESS NOTES
Vascular Surgery Progress Note [facility] ACNP-BC 
3/13/2020 Subjective:  
 
Yvonne Alba. is a 81 y.o.  male with a pmhx significant for Alzheimer's dementia, ASHD, IDDM, HTN, HLD, and GIB. He is admitted to the hospital w/ MRSA septicemia after presenting w/ an open ulceration of his left AVG that was placed in 04/2017 by Dr. Parth Vieyra. He has a positive urinalysis and a sacral decubitus ulcer has well. He is s/p removal of LUE AVG and Robbie catheter placement on 03/11/2020. Patient continues to be alert. Oozing from incision. Small hematoma at distal portion of incision. H&H w/ expected drop. Nursing Data:  
 
Patient Vitals for the past 24 hrs: 
 BP Temp Temp src Pulse Resp SpO2 Weight 03/12/20 0800 122/71 97.2 °F (36.2 °C)  85 14 100 %   
03/12/20 0700 124/69   84 11 100 %   
03/12/20 0600 127/75   89 16 100 %   
03/12/20 0500 132/73   84 16 100 %   
03/12/20 0400 119/71 97.4 °F (36.3 °C)  82 14 100 %   
03/12/20 0300 125/77   86 13 100 %   
03/12/20 0200 129/79   90 19 100 %   
03/12/20 0130 129/75   87 11 100 %   
03/12/20 0100 131/80   90 18 100 %   
03/12/20 0030 127/81 97.6 °F (36.4 °C)  93 17 100 %   
03/12/20 0015 144/88   90     
03/12/20 0000 136/87   89 16 100 %   
03/11/20 2345 139/89   90     
03/11/20 2330 139/87   88 18 100 %   
03/11/20 2315 136/84 97.8 °F (36.6 °C) Oral 91     
03/11/20 2300 132/85   90     
03/11/20 2245 138/83   91 17 100 %   
03/11/20 2230 135/84   88 14 98 %   
03/11/20 2215 130/89   91     
03/11/20 2200 128/88   89 19 98 %   
03/11/20 2145 126/82   89 17 97 %   
03/11/20 2131 125/82   89 20 97 %   
03/11/20 2115 116/76   86 17    
03/11/20 2110 106/72 97.4 °F (36.3 °C) Oral 86 20 96 %   
03/11/20 2100 107/78 (!) 94.4 °F (34.7 °C)  83 17 97 % 51.8 kg (114 lb 3.2 oz) 03/11/20 2035 110/75   84 18 96 %   
 03/11/20 2030 108/76   83 19 96 %   
03/11/20 2025 110/77   83 18 97 %   
03/11/20 2020 105/74   83 20 97 %   
03/11/20 2015 106/78   84 19 97 %   
03/11/20 2010 105/74   84 18 96 %   
03/11/20 2005 105/77   83 19 97 %   
03/11/20 2000 104/76   84 22 97 %   
03/11/20 1955 106/72   83 21 97 %   
03/11/20 1950 101/74   84 20 97 %   
03/11/20 1945 104/72   83 19 97 %   
03/11/20 1940 103/72   83 23 97 %   
03/11/20 1930 102/79 97.1 °F (36.2 °C)  83 15 97 %   
03/11/20 1920 104/68   86 19 99 %   
03/11/20 1910 110/76   85 22 98 %   
03/11/20 1900 106/75   85 15 100 %   
03/11/20 1850 105/76   84 19 98 %   
03/11/20 1840 109/84   83 18 99 %   
03/11/20 1835 108/76   83 16 99 %   
03/11/20 1830 109/74   83 16 97 %   
03/11/20 1825 106/77   82 14 98 %   
03/11/20 1820 105/74   82 15 98 %   
03/11/20 1815 101/74   81 28 98 %   
03/11/20 1810 108/72   83 17 99 %   
03/11/20 1805 104/68   82 14 97 %   
 
--------------------------------------------------------------------------------------------------------- Intake/Output Summary (Last 24 hours) at 3/13/2020 4850 Last data filed at 3/12/2020 1851 Gross per 24 hour Intake 650 ml Output  Net 650 ml Exam:  
 
Physical Exam 
Constitutional:   
   Comments: Frail, elderly AAM who is pleasantly confused. HENT:  
   Head: Normocephalic. Nose: Nose normal.  
   Mouth/Throat:  
   Mouth: Mucous membranes are moist.  
Neck: Musculoskeletal: Normal range of motion. Cardiovascular:  
   Rate and Rhythm: Normal rate and regular rhythm. Pulmonary:  
   Effort: Pulmonary effort is normal.  
   Breath sounds: Normal breath sounds. Abdominal:  
   General: Abdomen is flat. Palpations: Abdomen is soft. Musculoskeletal: Normal range of motion. Skin: 
   General: Skin is warm. Comments: Dressing and ACE wrap to LUE w/ bloody drainage.   He has developed small hematoma distal to the incision. Neurological:  
   Mental Status: He is alert. Mental status is at baseline. Lab Review:  
 
. Recent Results (from the past 24 hour(s)) GLUCOSE, POC Collection Time: 03/12/20 11:47 AM  
Result Value Ref Range Glucose (POC) 188 (H) 65 - 100 mg/dL Performed by Kamille Garces ECHO ADULT COMPLETE Collection Time: 03/12/20  3:49 PM  
Result Value Ref Range Right Atrial Area 4C 12.24 cm2 LV E' Lateral Velocity 7.83 cm/s LV E' Septal Velocity 5.26 cm/s Ao Root D 3.45 cm Aortic Valve Systolic Peak Velocity 59.82 cm/s Aortic Valve Area by Continuity of Peak Velocity 2.1 cm2 AoV PG 3.0 mmHg LVIDd 4.85 4.2 - 5.9 cm LVIDs 4.56 cm IVSd 1.14 (A) 0.6 - 1.0 cm  
 LVOT d 1.72 cm  
 LVOT Peak Velocity 77.70 cm/s LVOT Peak Gradient 2.4 mmHg LVOT VTI 13.58 cm  
 MV A Baldemar 89.66 cm/s  
 MV E Baldemar 77.58 cm/s  
 MV E/A 0.87 MV Mean Gradient 2.1 mmHg Mitral Valve Annulus Velocity Time Integral 24.27 cm Left Atrium to Aortic Root Ratio 0.81 LA Vol 4C 48.53 18 - 58 mL  
 LA Area 4C 19.2 cm2 LVPWs 1.01 cm  
 E/E' lateral 9.91   
 E/E' septal 14.75   
 RVSP 44.4 mmHg MV Peak Gradient 4.8 mmHg E/E' ratio (averaged) 12.33 Est. RA Pressure 10.0 mmHg Mitral Valve E Wave Deceleration Time 116.7 ms Left Atrium Major Axis 2.79 cm Triscuspid Valve Regurgitation Peak Gradient 34.4 mmHg Mitral Valve Max Velocity 109.43 cm/s MVA VTI 1.3 cm2 LVOT SV 31.6 ml  
 TR Max Velocity 293.46 cm/s PISA MR Rad 0.90 cm PASP 44.4 mmHg LA Vol Index 31.13 16 - 28 ml/m2 JENNIFFER/BSA Pk Baldemar 1.3 cm2/m2 Left Ventricular Fractional Shortening by 2D 0.9580876169 % Left Ventricular Outflow Tract Mean Gradient 5.32905993092190 mmHg Left Ventricular Outflow Tract Mean Velocity 7.14615492268378 cm/s Mitral Valve Deceleration Barbour 7.663525001027 Mitral valve mean inflow velocity 6.90014335767818 m/s AV Velocity Ratio 0.89 Left Ventricular End Diastolic Volume by Teichholz Method 3.65468715456 mL Left Ventricular End Systolic Volume by Teichholz Method 4.1576839205 mL Left Ventricular Stroke Volume by Teichholz Method 6.9263881855 mL GLUCOSE, POC Collection Time: 03/12/20  6:08 PM  
Result Value Ref Range Glucose (POC) 169 (H) 65 - 100 mg/dL Performed by Dragan Kimball (LPN) GLUCOSE, POC Collection Time: 03/12/20 10:40 PM  
Result Value Ref Range Glucose (POC) 115 (H) 65 - 100 mg/dL Performed by [facility] LOI(CON) Sandy Mort Collection Time: 03/13/20  3:15 AM  
Result Value Ref Range Vancomycin, random 16.8 UG/ML  
CBC W/O DIFF Collection Time: 03/13/20  3:15 AM  
Result Value Ref Range WBC 20.3 (H) 4.1 - 11.1 K/uL  
 RBC 2.66 (L) 4.10 - 5.70 M/uL HGB 7.5 (L) 12.1 - 17.0 g/dL HCT 21.0 (L) 36.6 - 50.3 % MCV 78.9 (L) 80.0 - 99.0 FL  
 MCH 28.2 26.0 - 34.0 PG  
 MCHC 35.7 30.0 - 36.5 g/dL  
 RDW 17.9 (H) 11.5 - 14.5 % PLATELET 77 (L) 550 - 400 K/uL MPV 11.8 8.9 - 12.9 FL  
 NRBC 0.0 0  WBC ABSOLUTE NRBC 0.00 0.00 - 0.01 K/uL METABOLIC PANEL, COMPREHENSIVE Collection Time: 03/13/20  3:15 AM  
Result Value Ref Range Sodium 131 (L) 136 - 145 mmol/L Potassium 5.1 3.5 - 5.1 mmol/L Chloride 102 97 - 108 mmol/L  
 CO2 17 (L) 21 - 32 mmol/L Anion gap 12 5 - 15 mmol/L Glucose 102 (H) 65 - 100 mg/dL BUN 50 (H) 6 - 20 MG/DL Creatinine 3.98 (H) 0.70 - 1.30 MG/DL  
 BUN/Creatinine ratio 13 12 - 20 GFR est AA 18 (L) >60 ml/min/1.73m2 GFR est non-AA 15 (L) >60 ml/min/1.73m2 Calcium 6.6 (L) 8.5 - 10.1 MG/DL Bilirubin, total 1.4 (H) 0.2 - 1.0 MG/DL  
 ALT (SGPT) 19 12 - 78 U/L  
 AST (SGOT) 30 15 - 37 U/L Alk. phosphatase 307 (H) 45 - 117 U/L Protein, total 4.8 (L) 6.4 - 8.2 g/dL Albumin 1.9 (L) 3.5 - 5.0 g/dL Globulin 2.9 2.0 - 4.0 g/dL A-G Ratio 0.7 (L) 1.1 - 2.2 GLUCOSE, POC  
 Collection Time: 03/13/20  6:43 AM  
Result Value Ref Range Glucose (POC) 161 (H) 65 - 100 mg/dL Performed by Pernell Dandy PCT Assessment/Plan:  
  
Consult problem MRSA septicemia w/ open ulceration of LUE AVG 
-s/p explantation and Robbie catheter placement on 03/11/2020 Thrombocytopenia Metabolic encephalopathy 
-improved Elevated alk phos ? ? 
-denies abd pain Marked increase in WBC overnight??  Decub? ? Remains at neurological baseline w/ stable VSS. Continue to monitor. Hold DVT prophylaxis for oozing from incision w/ small hematoma. Active problems ESRD on HD Hyponatremia Hyperkalemia 
-resolved Anemia of chronic renal disease 
-labile Plan per Nephrology IDDM -hypoglycemia resolved Hypothyroidism ASHD Hypotension w/ hx of HTN Hyperlipidemia Hypoxia/Chronic pleural effusion Alzheimer's dementia Remote hx of prostate cancer Sacral decub 
-plan per wound care team  
Severe protein calorie malnutrition Management of comorbid conditions by primary team. 
 
VTE Prophylaxis: 
Hold for small hematoma distal to incision 
  
Disposition: 
Sariah Paz Vascular surgery to re-evaluate on Monday. We appreciate the opportunity to participate in the care of Mr. Jac Carrel. Please call for any urgent vascular issues.

## 2020-03-13 NOTE — PROGRESS NOTES
Hospitalist Progress Note NAME: Nish Chen. :  1950 MRN:  [id number] I reviewed pertinent labs/imaging and discussed plan of care with Dr. Shavon Sanford who is in agreement. Assessment / Plan: 
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 Blood culture 03/10/20:  MRSE 1/2 bottles in both of 2 cultures. Blood culture 20:  GPC 1/2 bottles. Urine culture 20:  MRSA Wound culture 20: Moderate probably Staph Aureus. 
- Continue vancomycin per pharmacy dosing.   
- Repeat cultures from 20 remain positive. - Cardiology consult to evaluate for ROSELINE. Right superficial throbophlebitis of the basilic vein - No additional tx indicated. - Supportive care. ESRD on HD MWF Secondary hyperparathyroidism Acidosis - Nephrology input/assistance appreciated. - Continue calcitriol 0.25 mcg po 3 times weekly. - HD currently. Anemia of chronic disease Thrombocytopenia - Transfuse for Hgb <7.0 
- No obvious sign of active hemorrhage. 
- No tx indicated for thrombocytopenia unless platelet <34X or active hemorrhage.   
- Continue FeSO4 325 mg po daily. Hyponatremia - Asymptomatic. 
- HD today. - Monitor. Elevated LFTs Hyperbilirubinemia - Alk Phos continue to climb. - Bili improving. 
- Monitor. Hypoxia, resolved Dinh pleural effusions 
- Off O2. 
- No tx indicated at this time. DM II Hyperglycemia 
- Continue FSBS with SSI correction scale. Alzheimer's dementia - Supportive care. Chronic systolic heart failure HTN Echo 20: 
· Mildly dilated left ventricle. Moderate concentric hypertrophy. Moderate-to-severe systolic function. Estimated left ventricular ejection 
           fraction is 15 - 20%. · Moderately dilated left atrium. · Moderately to severely reduced systolic function. · Mitral valve thickening. Moderate to severe mitral valve regurgitation is 
           present. · Mild tricuspid valve regurgitation is present. · There is a large left pleural effusion. · Mild to moderate pulmonary hypertension. 
- Adequate BP control off meds. - Continue to monitor. 
- Continue asa 81 mg po daily. Hypothyroidism 
- Continue levothyroxine 75 mcg po daily. BPH 
- Continue tamsulosin 0.4 mg po daily. GERD 
- Continue pantoprazole 40 mg po daily. 18.5 - 24.9 Normal weight / Body mass index is 19.8 kg/m². Code status: DNR Prophylaxis: SCD's Recommended Disposition: TBD Subjective: Chief Complaint / Reason for Physician Visit No specific complaints. Plan of care reviewed with bedside nurse. Review of Systems: 
Symptom Y/N Comments  Symptom Y/N Comments Fever/Chills N   Chest Pain N Poor Appetite N   Edema N   
Cough N   Abdominal Pain N Sputum N   Joint Pain SOB/LAUREANO N   Pruritis/Rash Nausea/vomit N   Tolerating PT/OT Diarrhea N   Tolerating Diet Y Constipation    Other Could NOT obtain due to:   
 
Objective: VITALS:  
Last 24hrs VS reviewed since prior progress note. Most recent are: 
Patient Vitals for the past 24 hrs: 
 Temp Pulse Resp BP SpO2  
03/13/20 1355  87 18 135/76   
03/13/20 1230 97.8 °F (36.6 °C) 84 18 118/69 99 % 03/13/20 0856 98.1 °F (36.7 °C) 78 18 121/65 98 % 03/13/20 0319 97.6 °F (36.4 °C) 80 20 109/69 96 % 03/13/20 0109 97.4 °F (36.3 °C) 82 20 106/64 98 % Intake/Output Summary (Last 24 hours) at 3/13/2020 1403 Last data filed at 3/12/2020 1851 Gross per 24 hour Intake 240 ml Output  Net 240 ml PHYSICAL EXAM: 
General:  Awake and alert. NAD. Frail. HEENT:  Normocephalic. Sclera anicteric. Mucous membranes moist.   
Chest:  Resps even/unlabored with symmetrical CWE. Air entry diminished at bases luis. Lungs CTA. No use of accessory muscles. CV: RRR. No peripheral edema. GI:  Abdomen soft/NT/ND. ABT X 4.   
Neurologic:  Nonfocal.  Speech normal.    
Psych:  Cooperative. No anxiety or agitation. Skin:  No rashes or jaundice. Reviewed most current lab test results and cultures  YES Reviewed most current radiology test results   YES Review and summation of old records today    NO Reviewed patient's current orders and MAR    YES 
PMH/SH reviewed - no change compared to H&P 
________________________________________________________________________ Care Plan discussed with: 
  Comments Patient Y Family RN Osvaldo Shay Care Manager Consultant Multidiciplinary team rounds were held today with , nursing, pharmacist and clinical coordinator. Patient's plan of care was discussed; medications were reviewed and discharge planning was addressed. ________________________________________________________________________ Andrew Laughter, NP Procedures: see electronic medical records for all procedures/Xrays and details which were not copied into this note but were reviewed prior to creation of Plan. LABS: 
I reviewed today's most current labs and imaging studies. Pertinent labs include: 
Recent Labs  
  03/13/20 0315 03/12/20 0456 03/10/20 
2315 WBC 20.3* 12.4* 13.4* HGB 7.5* 8.1* 9.1*  
HCT 21.0* 22.7* 25.3*  
PLT 77* 80* 107* Recent Labs  
  03/13/20 0315 03/12/20 
0456 03/11/20 
2139 03/10/20 
2315 * 133* 129* 128*  
K 5.1 4.4 5.8* 5.6*  
 103 97 96* CO2 17* 22 24 20* * 116* 58* 61* BUN 50* 37* 70* 59* CREA 3.98* 3.33* 5.91* 5.72* CA 6.6* 7.6* 6.8* 7.6* ALB 1.9* 2.3*  --  2.1* TBILI 1.4* 2.5*  --  2.9*  
SGOT 30 22  --  19 ALT 19 21  --  18 Signed: Andrew Almaguer, NP

## 2020-03-13 NOTE — DIALYSIS
[address] Dialysis Team [facility]  [phone] Vitals   Pre   Post   Assessment   Pre   Post    
Temp    98.5 LOC   AxOx1 HR    82 Lungs     even and unlabored B/P    131/76 Cardiac     regular Resp    18 Skin     intact Pain level    Edema   
 
 +2 +3 Orders: Duration:   Start:     End:    1705 Total:   3 hrs Dialyzer:   Dialyzer/Set Up Inspection: Gerard Shay (03/13/20 1355) K Bath:   Dialysate K (mEq/L): 2 (03/13/20 1355) Ca Bath:   Dialysate CA (mEq/L): 2.5 (03/13/20 1355) Na/Bicarb:   Dialysate NA (mEq/L): 140 (03/13/20 1355) Target Fluid Removal:   Goal/Amount of Fluid to Remove (mL): 1000 mL (03/13/20 1355) Access Type & Location:   Right nontunneled CVC, dressing changed 03/11/2020. No redness, warmth, or drainage. Labs Obtained/Reviewed Critical Results Called   Date when labs were drawn- 
Hgb-   
HGB Date Value Ref Range Status 03/13/2020 7.5 (L) 12.1 - 17.0 g/dL Final  
 
K-   
Potassium Date Value Ref Range Status 03/13/2020 5.1 3.5 - 5.1 mmol/L Final  
 
Ca-  
Calcium Date Value Ref Range Status 03/13/2020 6.6 (L) 8.5 - 10.1 MG/DL Final  
 
Bun-  
BUN Date Value Ref Range Status 03/13/2020 50 (H) 6 - 20 MG/DL Final  
 
Creat-  
Creatinine Date Value Ref Range Status 03/13/2020 3.98 (H) 0.70 - 1.30 MG/DL Final  
 
  
Medications/ Blood Products Given Name   Dose   Route and Time Heparin  1:1000  A 1.2 mL / V 1.2 mL Blood Volume Processed (BVP):    68.4 Net Fluid Removed:  1000 Comments Time Out Done: n/a Primary Nurse Rpt Pre: Fiorella Arnold RN 
Primary Nurse Rpt Post: Abril Mathur RN 
Pt Education: procedural 
Care Plan: continue current HD plan of care Tx Summary: 
[facility] care of patient at this time. Treatment initiated by Fiorella Arnold RN. Bedside report received. Patient resting comfortably on bed, no apparent distress. 1705 HD treatment completed per physician order. All possible blood returned to patient. Each catheter limb disinfected for 60 seconds per limb with alcohol swabs. Dialysis CVC hub scrubbed with Prevantics for 5 seconds, followed by a 5 second dry time per Hospital P&P.  
+flushed/+heplock/+capped. Admiting Diagnosis: Fever/+Blood cultures Pt's previous clinic- [facility] [place] Consent signed - Informed Consent Verified: Yes (03/13/20 1355) Fiorella Consent - verified Hepatitis Status- 03/11/2020 Ag Neg 
Machine #- Machine Number: [id number] (03/13/20 4505) Telemetry status- n/a 
Pre-dialysis wt. - Pre-Dialysis Weight: 51.8 kg (114 lb 3.2 oz) (03/11/20 2110)

## 2020-03-13 NOTE — PROGRESS NOTES
RAPID RESPONSE TEAM- Follow Up Rounded on patient due to transfer from CCU. Patient is in bed, somnolent, NAD. No RRT interventions indicated at this time. Please call back if needed. MICHAEL Ballesteros Vitals w/ MEWS Score (last day) Date/Time MEWS Score Pulse Resp Temp BP Level of Consciousness SpO2  
 03/13/20 0856    78  18  98.1 °F (36.7 °C)  121/65    98 % 03/13/20 0319    80  20  97.6 °F (36.4 °C)  109/69    96 % 03/13/20 0109    82  20  97.4 °F (36.3 °C)  106/64    98 % 03/12/20 1401  2  84  20  97.9 °F (36.6 °C)  118/68  Responds to Voice  100 % 03/12/20 1200    88  15  97.4 °F (36.3 °C)  109/71    100 % 03/12/20 1100    91  14    114/77    100 % 03/12/20 1000    98  17    121/73    100 % 03/12/20 0900    91  12    129/79    100 % 03/12/20 0800  1  85  14  97.2 °F (36.2 °C)  122/71  Responds to Voice  100 % 03/12/20 0700    84  11    124/69    100 % 03/12/20 0600    89  16    127/75    100 % 03/12/20 0500    84  16    132/73    100 % 03/12/20 0400  2  82  14  97.4 °F (36.3 °C)  119/71  (!) Confused or Agitated  100 % 03/12/20 0300    86  13    125/77    100 % 03/12/20 0200    90  19    129/79    100 % 03/12/20 0130    87  11    129/75    100 % 03/12/20 0100    90  18    131/80    100 % 03/12/20 0030  3  93  17  97.6 °F (36.4 °C)  127/81  (!) Confused or Agitated  100 % 03/12/20 0015    90      144/88      
 03/12/20 0000    89  16    136/87    100 %

## 2020-03-13 NOTE — PROCEDURES
[facility] Dialysis Team [place]  [phone] Vitals   Pre   Post   Assessment   Pre   Post    
Temp  97.7   LOC  A&Ox1 HR   Pulse (Heart Rate): 87 (03/13/20 1355)  Lungs   Clear on RA    
B/P   BP: 135/76 (03/13/20 1355)  Cardiac   Regular Resp   Resp Rate: 18 (03/13/20 1355)  Skin   Fragile. Pain level  Pain Intensity 1: 0 (03/12/20 1401)  Edema  Trace Orders: Duration:   Start:    1355 End:     Total:     
Dialyzer:   Dialyzer/Set Up Inspection: Hilaria Cea (03/13/20 1355) K Bath:   Dialysate K (mEq/L): 2 (03/13/20 1355) Ca Bath:   Dialysate CA (mEq/L): 2.5 (03/13/20 1355) Na/Bicarb:   Dialysate NA (mEq/L): 140 (03/13/20 1355) Target Fluid Removal:   Goal/Amount of Fluid to Remove (mL): 1000 mL (03/13/20 1355) Access Type & Location:   RIJ CVC: Dressing soiled and loose. No s/s of infection. Both lumens aspirate & flush well. Running well at . Labs Obtained/Reviewed Critical Results Called   Date when labs were drawn- 
Hgb-   
HGB Date Value Ref Range Status 03/13/2020 7.5 (L) 12.1 - 17.0 g/dL Final  
 
K-   
Potassium Date Value Ref Range Status 03/13/2020 5.1 3.5 - 5.1 mmol/L Final  
 
Ca-  
Calcium Date Value Ref Range Status 03/13/2020 6.6 (L) 8.5 - 10.1 MG/DL Final  
 
Bun-  
BUN Date Value Ref Range Status 03/13/2020 50 (H) 6 - 20 MG/DL Final  
 
Creat-  
Creatinine Date Value Ref Range Status 03/13/2020 3.98 (H) 0.70 - 1.30 MG/DL Final  
 
  
Medications/ Blood Products Given Name   Dose   Route and Time Blood Volume Processed (BVP):     Net Fluid Removed:    
Comments Time Out Done: 1350 Primary Nurse Rpt Pre: Errol Olszewski, RN 
Primary Nurse Rpt Post:  
Pt Education: Procedural 
Care Plan: HD per MD orders Tx Summary: SBAR received from Primary RN. Pt A&Ox1. Consent signed & on file. 1355:  Both lumens of Robbie/permcath disinfected with Prevantics per policy. Each lumen aspirated for blood return and flushed with Normal Saline per policy. VSS. Dialysis Tx initiated. 1550: Report given to Bita Dyer, RN oncoming dialysis nurse who will be taking over care of this patient. Admiting Diagnosis: Fever/ +blood cultures Pt's previous clinic- [facility] Consent signed - Informed Consent Verified: Yes (03/13/20 1355) Vasquezita Consent - verified Hepatitis Status- neg 3/11/20 Machine #- Machine Number: [id number] (03/13/20 1355) Telemetry status-  
Pre-dialysis wt. - Pre-Dialysis Weight: 51.8 kg (114 lb 3.2 oz) (03/11/20 2110)

## 2020-03-14 NOTE — PROGRESS NOTES
Dressing to LUE changed, old bloody drainage, ACE band for pressure. LUE edema non pitting ABD swelling Thighs swelling Left foot +1 pitting edema Right foot +2 pitting edema

## 2020-03-14 NOTE — PROGRESS NOTES
Hospitalist Progress Note NAME: Hannah Bradley. :  1950 MRN:  [id number] Assessment / Plan: 
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 Blood culture 03/10/20:  MRSE 1/2 bottles in both of 2 cultures. Blood culture 20:  GPC 1/2 bottles. Urine culture 20:  MRSA Wound culture 20: Moderate probably Staph Aureus. 
- Continue vancomycin per pharmacy dosing.   
- Repeat cultures from 20 remain positive. - ROSELINE monday 
  
Right superficial throbophlebitis of the basilic vein - No additional tx indicated. - Supportive care.   
  
ESRD on HD MWF Secondary hyperparathyroidism Acidosis - Nephrology input/assistance appreciated. - Continue calcitriol 0.25 mcg po 3 times weekly. - HD currently.   
  
Anemia of chronic disease Thrombocytopenia - Transfuse for Hgb <7.0 
- No obvious sign of active hemorrhage. 
- plts trending up now (stephanie 77) - Continue FeSO4 325 mg po daily.  
  
Hyponatremia 
- resolved. 
  
Elevated LFTs Hyperbilirubinemia - Alk Phos improved. - Bili improving. 
- Monitor.   
  
Hypoxia, resolved Dinh pleural effusions 
- Off O2. 
- No tx indicated at this time. 
  
DM II Hyperglycemia 
- Continue FSBS with SSI correction scale. 
  
Alzheimer's dementia - Supportive care.   
  
Chronic systolic heart failure HTN Echo 20: 
· Mildly dilated left ventricle. Moderate concentric hypertrophy. Moderate-to-severe systolic function. Estimated left ventricular ejection 
           fraction is 15 - 20%. · Moderately dilated left atrium. · Moderately to severely reduced systolic function. · Mitral valve thickening. Moderate to severe mitral valve regurgitation is 
           present. · Mild tricuspid valve regurgitation is present. · There is a large left pleural effusion. · Mild to moderate pulmonary hypertension. 
- Continue to monitor. 
- Continue asa 81 mg po daily.   
  
Hypothyroidism 
- Continue levothyroxine 75 mcg po daily.   
  
BPH 
- Continue tamsulosin 0.4 mg po daily.   
  
GERD 
- Continue pantoprazole 40 mg po daily.   
  
  
18.5 - 24.9 Normal weight / Body mass index is 19.8 kg/m². 
  
Code status: DNR Prophylaxis: SCD's Recommended Disposition: TBD 
 
 
18.5 - 24.9 Normal weight / Body mass index is 19.85 kg/m². Subjective: Chief Complaint / Reason for Physician Visit \"no pain or chills. \". Discussed with RN events overnight. Review of Systems: 
Symptom Y/N Comments  Symptom Y/N Comments Fever/Chills    Chest Pain Poor Appetite    Edema Cough    Abdominal Pain Sputum    Joint Pain SOB/LAUREANO    Pruritis/Rash Nausea/vomit    Tolerating PT/OT Diarrhea    Tolerating Diet Constipation    Other Could NOT obtain due to:   
 
Objective: VITALS:  
Last 24hrs VS reviewed since prior progress note. Most recent are: 
Patient Vitals for the past 24 hrs: 
 Temp Pulse Resp BP SpO2  
03/14/20 1036 98.1 °F (36.7 °C) 85 18 149/76 96 % 03/14/20 0715 97.8 °F (36.6 °C) 88 18 135/75 100 % 03/14/20 0338 97.6 °F (36.4 °C) 87 12 127/77 98 % 03/14/20 0006 97.7 °F (36.5 °C) 84 20 135/81 100 % 03/13/20 2052 97.7 °F (36.5 °C) 80 20 143/72 99 % 03/13/20 1705 98.5 °F (36.9 °C) 82 18 131/76   
03/13/20 1655  85 18 114/68   
03/13/20 1640  84 18 119/71   
03/13/20 1625  84 18 117/71   
03/13/20 1610  88 18 119/74   
03/13/20 1555  86 18 114/70   
03/13/20 1540  85 18 121/74   
03/13/20 1525  87 18 119/74   
03/13/20 1510  88 18 116/72   
03/13/20 1455  93 18 105/73   
03/13/20 1440  86 18 119/79   
03/13/20 1425  86 18 105/66   
03/13/20 1410  80 18 124/76   
03/13/20 1355 97.7 °F (36.5 °C) 87 18 135/76   
03/13/20 1230 97.8 °F (36.6 °C) 84 18 118/69 99 % Intake/Output Summary (Last 24 hours) at 3/14/2020 1212 Last data filed at 3/14/2020 1001 Gross per 24 hour Intake 1020 ml Output 1000 ml Net 20 ml PHYSICAL EXAM: 
General: WD, WN. Alert, cooperative, no acute distress   
EENT:  EOMI. Anicteric sclerae. MMM Resp:  CTA bilaterally, no wheezing or rales. No accessory muscle use CV:  Regular  rhythm,  No edema GI:  Soft, Non distended, Non tender.  +Bowel sounds Neurologic:  Alert and oriented X 3, normal speech, Psych:   Good insight. Not anxious nor agitated Skin:  No rashes. No jaundice Reviewed most current lab test results and cultures  YES Reviewed most current radiology test results   YES Review and summation of old records today    NO Reviewed patient's current orders and MAR    YES 
PMH/ reviewed - no change compared to H&P 
________________________________________________________________________ Care Plan discussed with: 
  Comments Patient Family RN Care Manager Consultant Multidiciplinary team rounds were held today with , nursing, pharmacist and clinical coordinator. Patient's plan of care was discussed; medications were reviewed and discharge planning was addressed. ________________________________________________________________________ Total NON critical care TIME:  20   Minutes Total CRITICAL CARE TIME Spent:   Minutes non procedure based Comments >50% of visit spent in counseling and coordination of care    
________________________________________________________________________ Alaina Marcelor, DO  
 
Procedures: see electronic medical records for all procedures/Xrays and details which were not copied into this note but were reviewed prior to creation of Plan. LABS: 
I reviewed today's most current labs and imaging studies. Pertinent labs include: 
Recent Labs  
  03/14/20 
0112 03/13/20 
0315 03/12/20 
0456 WBC 18.4* 20.3* 12.4*  
 HGB 7.4* 7.5* 8.1* HCT 21.3* 21.0* 22.7*  
PLT 90* 77* 80* Recent Labs  
  03/14/20 
0112 03/13/20 
0315 03/12/20 
0939  131* 133* K 3.7 5.1 4.4  102 103 CO2 26 17* 22 * 102* 116* BUN 30* 50* 37* CREA 2.32* 3.98* 3.33* CA 7.0* 6.6* 7.6* ALB 1.9* 1.9* 2.3* TBILI 1.3* 1.4* 2.5* SGOT 13* 30 22 ALT 15 19 21 Signed: Hattie Servin DO

## 2020-03-14 NOTE — PROGRESS NOTES
Problem: Diabetes Self-Management Goal: *Disease process and treatment process Description: Define diabetes and identify own type of diabetes; list 3 options for treating diabetes. Outcome: Progressing Towards Goal 
Goal: *Incorporating nutritional management into lifestyle Description: Describe effect of type, amount and timing of food on blood glucose; list 3 methods for planning meals. Outcome: Progressing Towards Goal 
Goal: *Incorporating physical activity into lifestyle Description: State effect of exercise on blood glucose levels. Outcome: Progressing Towards Goal 
Goal: *Developing strategies to promote health/change behavior Description: Define the ABC's of diabetes; identify appropriate screenings, schedule and personal plan for screenings. Outcome: Progressing Towards Goal 
Goal: *Using medications safely Description: State effect of diabetes medications on diabetes; name diabetes medication taking, action and side effects. Outcome: Progressing Towards Goal 
Goal: *Monitoring blood glucose, interpreting and using results Description: Identify recommended blood glucose targets  and personal targets. Outcome: Progressing Towards Goal 
Goal: *Prevention, detection, treatment of acute complications Description: List symptoms of hyper- and hypoglycemia; describe how to treat low blood sugar and actions for lowering  high blood glucose level. Outcome: Progressing Towards Goal 
Goal: *Prevention, detection and treatment of chronic complications Description: Define the natural course of diabetes and describe the relationship of blood glucose levels to long term complications of diabetes. Outcome: Progressing Towards Goal 
Goal: *Developing strategies to address psychosocial issues Description: Describe feelings about living with diabetes; identify support needed and support network Outcome: Progressing Towards Goal 
Goal: *Insulin pump training Outcome: Progressing Towards Goal 
 Goal: *Sick day guidelines Outcome: Progressing Towards Goal 
Goal: *Patient Specific Goal (EDIT GOAL, INSERT TEXT) Outcome: Progressing Towards Goal 
  
Problem: Patient Education: Go to Patient Education Activity Goal: Patient/Family Education Outcome: Progressing Towards Goal 
  
Problem: Risk for Spread of Infection Goal: Prevent transmission of infectious organism to others Description: Prevent the transmission of infectious organisms to other patients, staff members, and visitors. Outcome: Progressing Towards Goal 
  
Problem: Patient Education:  Go to Education Activity Goal: Patient/Family Education Outcome: Progressing Towards Goal 
  
Problem: Falls - Risk of 
Goal: *Absence of Falls Description: Document Monster Anibalelisa Fall Risk and appropriate interventions in the flowsheet. Outcome: Progressing Towards Goal 
Note: Fall Risk Interventions: 
  
 
Mentation Interventions: Door open when patient unattended Medication Interventions: Assess postural VS orthostatic hypotension, Bed/chair exit alarm, Patient to call before getting OOB Elimination Interventions: Bed/chair exit alarm, Call light in reach Problem: Patient Education: Go to Patient Education Activity Goal: Patient/Family Education Outcome: Progressing Towards Goal 
  
Problem: Pressure Injury - Risk of 
Goal: *Prevention of pressure injury Description: Document Luke Scale and appropriate interventions in the flowsheet. Outcome: Progressing Towards Goal 
Note: Pressure Injury Interventions: 
Sensory Interventions: Assess changes in LOC Moisture Interventions: Absorbent underpads Activity Interventions: Increase time out of bed, Pressure redistribution bed/mattress(bed type) Mobility Interventions: Assess need for specialty bed, Float heels, Pressure redistribution bed/mattress (bed type) Nutrition Interventions: Document food/fluid/supplement intake Friction and Shear Interventions: Apply protective barrier, creams and emollients, HOB 30 degrees or less, Minimize layers Problem: Patient Education: Go to Patient Education Activity Goal: Patient/Family Education Outcome: Progressing Towards Goal 
  
Problem: Patient Education: Go to Patient Education Activity Goal: Patient/Family Education Outcome: Progressing Towards Goal 
  
Problem: Sepsis: Day of Diagnosis Goal: Off Pathway (Use only if patient is Off Pathway) Outcome: Progressing Towards Goal 
Goal: *Fluid resuscitation Outcome: Progressing Towards Goal 
Goal: *Paired blood cultures prior to first dose of antibiotic Outcome: Progressing Towards Goal 
Goal: *First dose of  appropriate antibiotic within 3 hours of arrival to ED, within 1 hour of arrival to ICU Outcome: Progressing Towards Goal 
Goal: *Lactic acid with first set of blood cultures Outcome: Progressing Towards Goal 
Goal: *Pneumococcal immunization (if eligible) Outcome: Progressing Towards Goal 
Goal: *Influenza immunization (if eligible) Outcome: Progressing Towards Goal 
Goal: Activity/Safety Outcome: Progressing Towards Goal 
Goal: Consults, if ordered Outcome: Progressing Towards Goal 
Goal: Diagnostic Test/Procedures Outcome: Progressing Towards Goal 
Goal: Nutrition/Diet Outcome: Progressing Towards Goal 
Goal: Discharge Planning Outcome: Progressing Towards Goal 
Goal: Medications Outcome: Progressing Towards Goal 
Goal: Respiratory Outcome: Progressing Towards Goal 
Goal: Treatments/Interventions/Procedures Outcome: Progressing Towards Goal 
Goal: Psychosocial 
Outcome: Progressing Towards Goal 
  
Problem: Sepsis: Day 2 Goal: Off Pathway (Use only if patient is Off Pathway) Outcome: Progressing Towards Goal 
Goal: *Central Venous Pressure maintained at 8-12 mm Hg Outcome: Progressing Towards Goal 
Goal: *Hemodynamically stable Outcome: Progressing Towards Goal 
Goal: *Tolerating diet Outcome: Progressing Towards Goal 
Goal: Activity/Safety Outcome: Progressing Towards Goal 
Goal: Consults, if ordered Outcome: Progressing Towards Goal 
Goal: Diagnostic Test/Procedures Outcome: Progressing Towards Goal 
Goal: Nutrition/Diet Outcome: Progressing Towards Goal 
Goal: Discharge Planning Outcome: Progressing Towards Goal 
Goal: Medications Outcome: Progressing Towards Goal 
Goal: Respiratory Outcome: Progressing Towards Goal 
Goal: Treatments/Interventions/Procedures Outcome: Progressing Towards Goal 
Goal: Psychosocial 
Outcome: Progressing Towards Goal 
  
Problem: Sepsis: Day 3 Goal: Off Pathway (Use only if patient is Off Pathway) Outcome: Progressing Towards Goal 
Goal: *Central Venous Pressure maintained at 8-12 mm Hg Outcome: Progressing Towards Goal 
Goal: *Oxygen saturation within defined limits Outcome: Progressing Towards Goal 
Goal: *Vital sign stability Outcome: Progressing Towards Goal 
Goal: *Tolerating diet Outcome: Progressing Towards Goal 
Goal: *Demonstrates progressive activity Outcome: Progressing Towards Goal 
Goal: Activity/Safety Outcome: Progressing Towards Goal 
Goal: Consults, if ordered Outcome: Progressing Towards Goal 
Goal: Diagnostic Test/Procedures Outcome: Progressing Towards Goal 
Goal: Nutrition/Diet Outcome: Progressing Towards Goal 
Goal: Discharge Planning Outcome: Progressing Towards Goal 
Goal: Medications Outcome: Progressing Towards Goal 
Goal: Respiratory Outcome: Progressing Towards Goal 
Goal: Treatments/Interventions/Procedures Outcome: Progressing Towards Goal 
Goal: Psychosocial 
Outcome: Progressing Towards Goal 
  
Problem: Sepsis: Day 4 Goal: Off Pathway (Use only if patient is Off Pathway) Outcome: Progressing Towards Goal 
Goal: Activity/Safety Outcome: Progressing Towards Goal 
Goal: Consults, if ordered Outcome: Progressing Towards Goal 
Goal: Diagnostic Test/Procedures Outcome: Progressing Towards Goal 
 Goal: Nutrition/Diet Outcome: Progressing Towards Goal 
Goal: Discharge Planning Outcome: Progressing Towards Goal 
Goal: Medications Outcome: Progressing Towards Goal 
Goal: Respiratory Outcome: Progressing Towards Goal 
Goal: Treatments/Interventions/Procedures Outcome: Progressing Towards Goal 
Goal: Psychosocial 
Outcome: Progressing Towards Goal 
Goal: *Oxygen saturation within defined limits Outcome: Progressing Towards Goal 
Goal: *Hemodynamically stable Outcome: Progressing Towards Goal 
Goal: *Vital signs within defined limits Outcome: Progressing Towards Goal 
Goal: *Tolerating diet Outcome: Progressing Towards Goal 
Goal: *Demonstrates progressive activity Outcome: Progressing Towards Goal 
Goal: *Fluid volume maintenance Outcome: Progressing Towards Goal 
  
Problem: Sepsis: Day 5 Goal: Off Pathway (Use only if patient is Off Pathway) Outcome: Progressing Towards Goal 
Goal: *Oxygen saturation within defined limits Outcome: Progressing Towards Goal 
Goal: *Vital signs within defined limits Outcome: Progressing Towards Goal 
Goal: *Tolerating diet Outcome: Progressing Towards Goal 
Goal: *Demonstrates progressive activity Outcome: Progressing Towards Goal 
Goal: *Discharge plan identified Outcome: Progressing Towards Goal 
Goal: Activity/Safety Outcome: Progressing Towards Goal 
Goal: Consults, if ordered Outcome: Progressing Towards Goal 
Goal: Diagnostic Test/Procedures Outcome: Progressing Towards Goal 
Goal: Nutrition/Diet Outcome: Progressing Towards Goal 
Goal: Discharge Planning Outcome: Progressing Towards Goal 
Goal: Medications Outcome: Progressing Towards Goal 
Goal: Respiratory Outcome: Progressing Towards Goal 
Goal: Treatments/Interventions/Procedures Outcome: Progressing Towards Goal 
Goal: Psychosocial 
Outcome: Progressing Towards Goal 
  
Problem: Sepsis: Day 6 Goal: Off Pathway (Use only if patient is Off Pathway) Outcome: Progressing Towards Goal 
 Goal: *Oxygen saturation within defined limits Outcome: Progressing Towards Goal 
Goal: *Vital signs within defined limits Outcome: Progressing Towards Goal 
Goal: *Tolerating diet Outcome: Progressing Towards Goal 
Goal: *Demonstrates progressive activity Outcome: Progressing Towards Goal 
Goal: Influenza immunization Outcome: Progressing Towards Goal 
Goal: *Pneumococcal immunization Outcome: Progressing Towards Goal 
Goal: Activity/Safety Outcome: Progressing Towards Goal 
Goal: Diagnostic Test/Procedures Outcome: Progressing Towards Goal 
Goal: Nutrition/Diet Outcome: Progressing Towards Goal 
Goal: Discharge Planning Outcome: Progressing Towards Goal 
Goal: Medications Outcome: Progressing Towards Goal 
Goal: Respiratory Outcome: Progressing Towards Goal 
Goal: Treatments/Interventions/Procedures Outcome: Progressing Towards Goal 
Goal: Psychosocial 
Outcome: Progressing Towards Goal 
  
Problem: Sepsis: Discharge Outcomes Goal: *Vital signs within defined limits Outcome: Progressing Towards Goal 
Goal: *Tolerating diet Outcome: Progressing Towards Goal 
Goal: *Verbalizes understanding and describes prescribed diet Outcome: Progressing Towards Goal 
Goal: *Demonstrates progressive activity Outcome: Progressing Towards Goal 
Goal: *Describes follow-up/return visits to physicians Outcome: Progressing Towards Goal 
Goal: *Verbalizes name, dosage, time, side effects, and number of days to continue medications Outcome: Progressing Towards Goal 
Goal: *Influenza immunization (Oct-Mar only) Outcome: Progressing Towards Goal 
Goal: *Pneumococcal immunization Outcome: Progressing Towards Goal 
Goal: *Lungs clear or at baseline Outcome: Progressing Towards Goal 
Goal: *Oxygen saturation returns to baseline or 90% or better on room air Outcome: Progressing Towards Goal 
Goal: *Glycemic control Outcome: Progressing Towards Goal 
 Goal: *Absence of deep venous thrombosis signs and symptoms(Stroke Metric) Outcome: Progressing Towards Goal 
Goal: *Describes available resources and support systems Outcome: Progressing Towards Goal 
Goal: *Optimal pain control at patient's stated goal 
Outcome: Progressing Towards Goal

## 2020-03-15 NOTE — PROGRESS NOTES
General Surgery End of Shift Nursing Note Bedside shift change report given to [facility] RN (oncoming nurse) by Sean Santana (offgoing nurse). Report included the following information SBAR. Shift worked:   7am-7pm  
Summary of shift:  Patient in bed this morning. Complaining of back pain ( repositioned) BM X2 No urination this shift Bandage changed on arm. Staples are CDI Issues for physician to address:     
 
Number times ambulated in hallway past shift: 0 Number of times OOB to chair past shift: 0 Pain Management: 
Current medication: none given Patient states pain is manageable on current pain medication:  
 
GI: 
 
Current diet:  DIET RENAL Mechanical Soft DIET NUTRITIONAL SUPPLEMENTS All Meals; Nepro DIET NPO Tolerating current diet: YES Passing flatus: YES Last Bowel Movement: today Appearance: solid Respiratory: 
 
Incentive Spirometer at bedside:  
Patient instructed on use:  
 
Patient Safety: 
 
Falls Score: 3 Bed Alarm On? Not applicable Sitter? Not applicable Sean Santana

## 2020-03-15 NOTE — PROGRESS NOTES
Problem: Risk for Spread of Infection Goal: Prevent transmission of infectious organism to others Description: Prevent the transmission of infectious organisms to other patients, staff members, and visitors.  
Outcome: Progressing Towards Goal

## 2020-03-15 NOTE — PROGRESS NOTES
. 
 
 
Hospitalist Progress Note NAME: Melva Hall. :  1950 MRN:  [id number] Assessment / Plan: 
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  
Blood culture 20:  MRSA X 4 bottles. Blood culture 03/10/20:  MRSE 1/2 bottles in both of 2 cultures. Blood culture 20: [place] 1/2 bottles. Urine culture 20:  MRSA Wound culture 20:  Moderate probably Staph Aureus. 
- Continue vancomycin per pharmacy dosing.   
- Repeat cultures from 20 remain positive. repeat bc today. - ROSELINE monday 
  
Right superficial throbophlebitis of the basilic vein - No additional tx indicated. - Supportive care.   
  
ESRD on HD MWF Secondary hyperparathyroidism  
Acidosis  
- Nephrology input/assistance appreciated. - Continue calcitriol 0.25 mcg po 3 times weekly.   
- HD currently.   
  
Anemia of chronic disease Thrombocytopenia - Transfuse for Hgb <7.0 
- No obvious sign of active hemorrhage. 
- plts trending up now (stephanie 77) - Continue FeSO4 325 mg po daily.  
  
Hyponatremia 
- resolved. 
  
Elevated LFTs Hyperbilirubinemia - Alk Phos improved. - Bili improving. 
- Monitor.   
  
Hypoxia, resolved  
Dinh pleural effusions 
- Off O2. 
 
  
DM II Hyperglycemia 
- Continue FSBS with SSI correction scale. 
  
Alzheimer's dementia - Supportive care.   
  
Chronic systolic heart failure HTN Echo 20: 
· Mildly dilated left ventricle. Moderate concentric hypertrophy. 
           Moderate-to-severe systolic function. Estimated left ventricular ejection            fraction is 15 - 20%. · Moderately dilated left atrium. · Moderately to severely reduced systolic function. · Mitral valve thickening. Moderate to severe mitral valve regurgitation is            present. · Mild tricuspid valve regurgitation is present. · There is a large left pleural effusion. · Mild to moderate pulmonary hypertension. - Continue asa 81 mg po daily.   
  
Hypothyroidism 
- levothyroxine 75 mcg po daily.   
  
BPH 
- tamsulosin 0.4 mg po daily.    
  
GERD 
-  pantoprazole 40 mg po daily.    
  
  
18.5 - 24.9 Normal weight / Body mass index is 19.8 kg/m². 
  
Code status: DNR Prophylaxis: SCD's Recommended Disposition: TBD 
  
  
 
Subjective: Chief Complaint / Reason for Physician Visit \"no pain or chills. \". Discussed with RN events overnight. Review of Systems: 
Symptom Y/N Comments  Symptom Y/N Comments Fever/Chills    Chest Pain Poor Appetite    Edema Cough    Abdominal Pain Sputum    Joint Pain SOB/LAUREANO    Pruritis/Rash Nausea/vomit    Tolerating PT/OT Diarrhea    Tolerating Diet Constipation    Other Could NOT obtain due to:   
 
Objective: VITALS:  
Last 24hrs VS reviewed since prior progress note. Most recent are: 
Patient Vitals for the past 24 hrs: 
 Temp Pulse Resp BP SpO2  
03/15/20 0315 97.9 °F (36.6 °C) 98 16 141/74 96 % 03/14/20 2352 97.5 °F (36.4 °C) 98 16 151/50 100 % 03/14/20 2016 98.4 °F (36.9 °C) 89 16 141/78 100 % 03/14/20 1526 97.9 °F (36.6 °C) 90 18 147/80 98 % 03/14/20 1036 98.1 °F (36.7 °C) 85 18 149/76 96 % Intake/Output Summary (Last 24 hours) at 3/15/2020 3581 Last data filed at 3/15/2020 5438 Gross per 24 hour Intake 1000 ml Output 150 ml Net 850 ml PHYSICAL EXAM: 
General: WD, WN. Alert, cooperative, no acute distress   
EENT:  EOMI. Anicteric sclerae. MMM Resp:  CTA bilaterally, no wheezing or rales. No accessory muscle use CV:  Regular  rhythm,  No edema GI:  Soft, Non distended, Non tender.  +Bowel sounds Neurologic:  Alert and oriented X 3, normal speech, Psych:   Good insight. Not anxious nor agitated Skin:  No rashes. No jaundice Reviewed most current lab test results and cultures  YES Reviewed most current radiology test results   YES Review and summation of old records today    NO 
 Reviewed patient's current orders and MAR    YES 
PMH/SH reviewed - no change compared to H&P 
________________________________________________________________________ Care Plan discussed with: 
  Comments Patient Family RN Care Manager Consultant Multidiciplinary team rounds were held today with , nursing, pharmacist and clinical coordinator. Patient's plan of care was discussed; medications were reviewed and discharge planning was addressed. ________________________________________________________________________ Total NON critical care TIME:  20   Minutes Total CRITICAL CARE TIME Spent:   Minutes non procedure based Comments >50% of visit spent in counseling and coordination of care    
________________________________________________________________________ Theo Anaya DO  
 
Procedures: see electronic medical records for all procedures/Xrays and details which were not copied into this note but were reviewed prior to creation of Plan. LABS: 
I reviewed today's most current labs and imaging studies. Pertinent labs include: 
Recent Labs  
  03/15/20 
0403 03/14/20 
0112 03/13/20 
0315 WBC 23.8* 18.4* 20.3* HGB 7.8* 7.4* 7.5* HCT 22.7* 21.3* 21.0*  
* 90* 77* Recent Labs  
  03/15/20 
0403 03/14/20 
0112 03/13/20 
0315 * 138 131* K 4.0 3.7 5.1  105 102 CO2 27 26 17* GLU 74 121* 102* BUN 45* 30* 50* CREA 3.04* 2.32* 3.98* CA 7.7* 7.0* 6.6* ALB 2.2* 1.9* 1.9* TBILI 1.6* 1.3* 1.4* SGOT 59* 13* 30 ALT 49 15 19 Signed: Theo Anaya DO

## 2020-03-16 PROBLEM — I42.9 CARDIOMYOPATHY (HCC): Status: ACTIVE | Noted: 2020-01-01

## 2020-03-16 NOTE — PROGRESS NOTES
General Surgery End of Shift Nursing Note Bedside shift change report given to [address] by Adithya. Report included the following information Kardex, OR Summary, Intake/Output and Recent Results. Shift worked:   [id number] Summary of shift:  Pt NPO since midnight for scheduled TTE. Pt medicated for pain x1 and slept well Issues for physician to address:   none Number times ambulated in hallway past shift: 0 Number of times OOB to chair past shift: 0 Pain Management: 
Current medication: Tylenol Patient states pain is manageable on current pain medication: Yes GI: 
 
Current diet:  DIET NUTRITIONAL SUPPLEMENTS All Meals; Nepro DIET NPO Tolerating current diet: yes Passing flatus:  
Last Bowel Movement: 3/15/2020 Appearance:  
 
Respiratory: 
 
Incentive Spirometer at bedside: no 
Patient instructed on use: no 
 
Patient Safety: 
 
Falls Score: 4 Bed Alarm On? no 
Sitter? no 
 
Clearnce Andreina

## 2020-03-16 NOTE — PROCEDURES
[facility] Dialysis Team [place]  [phone] Vitals   Pre   Post   Assessment   Pre   Post    
Temp  Temp: 98.2 °F (36.8 °C) (03/16/20 1439)  97.6 LOC  A&Ox2 A&Ox2 HR   Pulse (Heart Rate): 96 (03/16/20 1439) 86 Lungs   crackles Clear; mild wheezing anterior lobe B/P   BP: 137/80 (03/16/20 1439) 148/82 Cardiac   RRR RRR Resp   Resp Rate: 26 (03/16/20 1439) 22 Skin   L arm; buttocks-sacrum per Primary RN L arm; buttocks-sacrum per Primary RN Pain level  Pain Intensity 1: 0 (03/15/20 2238) 0 Edema  Non pitting L arm Non pitting L arm Orders: Duration:   Start:    1439 End:    1741 Total:   3 hours Dialyzer:   Dialyzer/Set Up Inspection: Keshawn Burgos (03/16/20 1439) K Bath:   Dialysate K (mEq/L): 2 (03/16/20 1439) Ca Bath:   Dialysate CA (mEq/L): 2.5 (03/16/20 1439) Na/Bicarb:   Dialysate NA (mEq/L): 138 (03/16/20 1439) Target Fluid Removal:   Goal/Amount of Fluid to Remove (mL): 1000 mL (03/16/20 1439) Access Type & Location:   RCVC, dressing CDI; no s/s of infection noted. Each catheter limb disinfected for 60 seconds per limb with alcohol swabs. Caps removed, dialysis CVC hub scrubbed with Prevantics for 5 seconds, followed by a 5 second dry time per Hospital P&P. Each limb aspirated and flushed with 10cc ns per P&P.  VSS, tx initiated. Labs Obtained/Reviewed Critical Results Called   Date when labs were drawn- 
Hgb-   
HGB Date Value Ref Range Status 03/15/2020 7.8 (L) 12.1 - 17.0 g/dL Final  
 
K-   
Potassium Date Value Ref Range Status 03/15/2020 4.0 3.5 - 5.1 mmol/L Final  
 
Ca-  
Calcium Date Value Ref Range Status 03/15/2020 7.7 (L) 8.5 - 10.1 MG/DL Final  
 
Bun-  
BUN Date Value Ref Range Status 03/15/2020 45 (H) 6 - 20 MG/DL Final  
 
Creat-  
Creatinine Date Value Ref Range Status 03/15/2020 3.04 (H) 0.70 - 1.30 MG/DL Final  
 
  
Medications/ Blood Products Given Name   Dose   Route and Time Heparin 1:1000 1.2  [email or web address] Heparin 1:1000 1.2 [email or web address] Blood Volume Processed (BVP):    69.8 Net Fluid Removed:  1000mL Comments All dialysis related medications have been reviewed. Assessment performed by RN. Procedure and documentation observed and reviewed by Elizabeth Weiss RN Time Out Done: 1434 Primary Nurse Rpt Pre:  Jaz Huff RN 
Primary Nurse Rpt Post:  Jaz Huff RN 
Pt Education:  Importance of face mask covering nose and mouth Care Plan:  On going Tx Summary: 
2721:  SBAR received from Primary RN. Pt arrived to HD suite A&Ox2, denies complaints. RCVC, dressing CDI; no s/s of infection noted. Each catheter limb disinfected for 60 seconds per limb with alcohol swabs. Caps removed, dialysis CVC hub scrubbed with Prevantics for 5 seconds, followed by a 5 second dry time per Hospital P&P. Each limb aspirated and flushed with 10cc ns per P&P.  VSS, tx initiated. 1455:  Pt resting 
1500:  Pt resting, educated pt on purpose of keeping mask on covering nose and mouth 1515:  Pt resting 1530:  Pt resting; Dr Bev Santos at bedside 1545:  Pt resting, educated pt on purpose of keeping mask on covering nose and mouth 
1600:  Pt resting, educated pt on purpose of keeping mask on covering nose and mouth, pt continues to pull mask down 1615:  Pt resting 1630:  Pt resting, educated pt on keeping mask on; pt continues to pull mask down uncovering nose and mouth 1645:  Pt resting; pt continues to pull mask down uncovering nose and mouth 1700:  Pt resting; continues to pull mask down uncovering nose and mouth 1715:  Pt resting; continues to pull mask down uncovering nose and mouth 1730:  Pt resting; continues to pull mask down uncovering nose and mouth 1741:  Each catheter limb disinfected for 60 seconds per limb with alcohol swabs.  Dialysis CVC hubs scrubbed with Prevantics for 5 seconds, followed by a 5 second dry time per Hospital P&P, red and blue dialysis caps applied to ports. SBAR called to Primary RN. VSS, pt returned to room. Admiting Diagnosis: L arm fistula infection/Septic 
Pt's previous clinic- [facility] Consent signed - Informed Consent Verified: Yes (03/16/20 1439) Fiorella Consent - signed and on file Hepatitis Status- neg/susc 3/11/20 Machine #- Machine Number: [id number] (03/16/20 2519) Telemetry status- 
Pre-dialysis wt. - Pre-Dialysis Weight: 51.8 kg (114 lb 3.2 oz) (03/11/20 7560)

## 2020-03-16 NOTE — PROGRESS NOTES
TRANSFER - OUT REPORT: 
 
Verbal report given to [place] (name) on Oleg Scott  being transferred to 2118 (unit) for routine progression of care Report consisted of patients Situation, Background, Assessment and  
Recommendations(SBAR). Information from the following report(s) SBAR, Kardex, Procedure Summary, Intake/Output, MAR and Cardiac Rhythm NSR was reviewed with the receiving nurse. Lines:  
Triple Lumen 03/11/20 (Active) Central Line Being Utilized Yes 3/15/2020 11:25 PM  
Criteria for Appropriate Use Long term IV/antibiotic administration 3/15/2020 11:25 PM  
Site Assessment Clean, dry, & intact 3/15/2020 11:25 PM  
Infiltration Assessment 0 3/15/2020 11:25 PM  
Affected Extremity/Extremities Color distal to insertion site pink (or appropriate for race) 3/15/2020 11:25 PM  
Date of Last Dressing Change 03/16/20 3/15/2020 11:25 PM  
Dressing Status Clean, dry, & intact 3/15/2020 11:25 PM  
Dressing Type Disk with Chlorhexadine gluconate (CHG) 3/15/2020 11:25 PM  
Action Taken Open ports on tubing capped 3/15/2020 11:25 PM  
Proximal Hub Color/Line Status White;Capped 3/15/2020 11:25 PM  
Positive Blood Return (Medial Site) Yes 3/15/2020 11:25 PM  
Medial Hub Color/Line Status Blue;Capped 3/15/2020 11:25 PM  
Positive Blood Return (Lateral Site) Yes 3/15/2020 11:25 PM  
Distal Hub Color/Line Status Brown;Capped 3/15/2020 11:25 PM  
Positive Blood Return (Site #3) Yes 3/15/2020 11:25 PM  
External Catheter Length (cm) 0 centimeters 3/15/2020 11:25 PM  
Alcohol Cap Used Yes 3/15/2020 11:25 PM  
  
 
Opportunity for questions and clarification was provided. Patient transported with: 
 O2 @ 3 liters Informed RN 02 sats upon arrival to Non-invasive were 87-90% on room air, 02 sat is 90% on 2L nasal cannula, please keep pt on 02 until sedation wears off, or until 02 levels are stable

## 2020-03-16 NOTE — PROGRESS NOTES
HD TRANSFER - OUT REPORT: 
 
Verbal report given to Cecil Nuñez RN on [facility]. being transferred to [facility] room 2118 for continuing care. Report consisted of patient's Situation, Background, Assessment and  
Recommendations(SBAR). Information from the following report(s) dialysis treatment note was reviewed with the receiving nurse. Method:  $$ Method: Hemodialysis (03/16/20 1439) Fluid Removed  NET Fluid Removed (mL): 1000 ml (03/13/20 1705) Patient response to treatment: Well End Time  : 1574 If not documented, dialysis nurse to update post-dialysis row in HD/Filtration flowsheet Medications /Volume expansion agents or Fluid boluses administered during treatment? No 
 
Post-dialysis medication administration due? No 
Remind nurse to administer post-HD medication upon return to unit. Line heparinization? Yes 
 
Leta Costa Opportunity for questions and clarification was provided. Patient transported with:belongings by hospital staff in stable condition.

## 2020-03-16 NOTE — PROGRESS NOTES
Problem: Diabetes Self-Management Goal: *Disease process and treatment process Description: Define diabetes and identify own type of diabetes; list 3 options for treating diabetes. Outcome: Progressing Towards Goal 
Goal: *Incorporating nutritional management into lifestyle Description: Describe effect of type, amount and timing of food on blood glucose; list 3 methods for planning meals. Outcome: Progressing Towards Goal 
Goal: *Incorporating physical activity into lifestyle Description: State effect of exercise on blood glucose levels. Outcome: Progressing Towards Goal 
Goal: *Developing strategies to promote health/change behavior Description: Define the ABC's of diabetes; identify appropriate screenings, schedule and personal plan for screenings. Outcome: Progressing Towards Goal 
Goal: *Using medications safely Description: State effect of diabetes medications on diabetes; name diabetes medication taking, action and side effects. Outcome: Progressing Towards Goal 
Goal: *Monitoring blood glucose, interpreting and using results Description: Identify recommended blood glucose targets  and personal targets. Outcome: Progressing Towards Goal 
Goal: *Prevention, detection, treatment of acute complications Description: List symptoms of hyper- and hypoglycemia; describe how to treat low blood sugar and actions for lowering  high blood glucose level. Outcome: Progressing Towards Goal 
Goal: *Prevention, detection and treatment of chronic complications Description: Define the natural course of diabetes and describe the relationship of blood glucose levels to long term complications of diabetes. Outcome: Progressing Towards Goal 
Goal: *Developing strategies to address psychosocial issues Description: Describe feelings about living with diabetes; identify support needed and support network Outcome: Progressing Towards Goal 
Goal: *Insulin pump training Outcome: Progressing Towards Goal

## 2020-03-16 NOTE — PROGRESS NOTES
Vascular Surgery Progress Note Luis Manuel Koroma ACNP-BC 
3/16/2020 Subjective:  
 
Kaitlin Rodriguez is a 81 y.o.  male with a pmhx significant for Alzheimer's dementia, ASHD, IDDM, HTN, HLD, and GIB. He is admitted to the hospital w/ MRSA septicemia after presenting w/ an open ulceration of his left AVG that was placed in 04/2017 by Dr. Val Parker. He has a positive urinalysis and a sacral decubitus ulcer has well. He is s/p removal of LUE AVG and Robbie catheter placement on 03/11/2020. Patient is alert. Diaphoretic this am-afebrile. Complaining of nausea. Nursing Data:  
 
Patient Vitals for the past 24 hrs: 
 BP Temp Temp src Pulse Resp SpO2 Weight 03/12/20 0800 122/71 97.2 °F (36.2 °C)  85 14 100 %   
03/12/20 0700 124/69   84 11 100 %   
03/12/20 0600 127/75   89 16 100 %   
03/12/20 0500 132/73   84 16 100 %   
03/12/20 0400 119/71 97.4 °F (36.3 °C)  82 14 100 %   
03/12/20 0300 125/77   86 13 100 %   
03/12/20 0200 129/79   90 19 100 %   
03/12/20 0130 129/75   87 11 100 %   
03/12/20 0100 131/80   90 18 100 %   
03/12/20 0030 127/81 97.6 °F (36.4 °C)  93 17 100 %   
03/12/20 0015 144/88   90     
03/12/20 0000 136/87   89 16 100 %   
03/11/20 2345 139/89   90     
03/11/20 2330 139/87   88 18 100 %   
03/11/20 2315 136/84 97.8 °F (36.6 °C) Oral 91     
03/11/20 2300 132/85   90     
03/11/20 2245 138/83   91 17 100 %   
03/11/20 2230 135/84   88 14 98 %   
03/11/20 2215 130/89   91     
03/11/20 2200 128/88   89 19 98 %   
03/11/20 2145 126/82   89 17 97 %   
03/11/20 2131 125/82   89 20 97 %   
03/11/20 2115 116/76   86 17    
03/11/20 2110 106/72 97.4 °F (36.3 °C) Oral 86 20 96 %   
03/11/20 2100 107/78 (!) 94.4 °F (34.7 °C)  83 17 97 % 51.8 kg (114 lb 3.2 oz) 03/11/20 2035 110/75   84 18 96 %   
03/11/20 2030 108/76   83 19 96 %   
03/11/20 2025 110/77   83 18 97 %   
 03/11/20 2020 105/74   83 20 97 %   
03/11/20 2015 106/78   84 19 97 %   
03/11/20 2010 105/74   84 18 96 %   
03/11/20 2005 105/77   83 19 97 %   
03/11/20 2000 104/76   84 22 97 %   
03/11/20 1955 106/72   83 21 97 %   
03/11/20 1950 101/74   84 20 97 %   
03/11/20 1945 104/72   83 19 97 %   
03/11/20 1940 103/72   83 23 97 %   
03/11/20 1930 102/79 97.1 °F (36.2 °C)  83 15 97 %   
03/11/20 1920 104/68   86 19 99 %   
03/11/20 1910 110/76   85 22 98 %   
03/11/20 1900 106/75   85 15 100 %   
03/11/20 1850 105/76   84 19 98 %   
03/11/20 1840 109/84   83 18 99 %   
03/11/20 1835 108/76   83 16 99 %   
03/11/20 1830 109/74   83 16 97 %   
03/11/20 1825 106/77   82 14 98 %   
03/11/20 1820 105/74   82 15 98 %   
03/11/20 1815 101/74   81 28 98 %   
03/11/20 1810 108/72   83 17 99 %   
03/11/20 1805 104/68   82 14 97 %   
 
--------------------------------------------------------------------------------------------------------- No intake or output data in the 24 hours ending 03/16/20 4110 Exam:  
 
Physical Exam 
Constitutional:   
   Comments: Frail, elderly AAM who is pleasantly confused. HENT:  
   Head: Normocephalic. Nose: Nose normal.  
   Mouth/Throat:  
   Mouth: Mucous membranes are moist.  
Neck: Musculoskeletal: Normal range of motion. Cardiovascular:  
   Rate and Rhythm: Normal rate and regular rhythm. Pulmonary:  
   Effort: Pulmonary effort is normal.  
   Breath sounds: Normal breath sounds. Abdominal:  
   General: Abdomen is flat. Palpations: Abdomen is soft. Musculoskeletal: Normal range of motion. Skin: 
   General: Skin is warm. Comments: Dressing to left arm C/D/I. Swelling significantly improved. Neurological:  
   Mental Status: He is alert. Mental status is at baseline. Lab Review:  
 
. Recent Results (from the past 24 hour(s)) GLUCOSE, POC  Collection Time: 03/15/20 11:29 AM  
 Result Value Ref Range Glucose (POC) 241 (H) 65 - 100 mg/dL Performed by Tommie Steward (PCT) GLUCOSE, POC Collection Time: 03/15/20  3:38 PM  
Result Value Ref Range Glucose (POC) 184 (H) 65 - 100 mg/dL Performed by Tommie Steward (PCT) GLUCOSE, POC Collection Time: 03/15/20  8:59 PM  
Result Value Ref Range Glucose (POC) 169 (H) 65 - 100 mg/dL Performed by Danielle Steen GLUCOSE, POC Collection Time: 03/16/20  6:23 AM  
Result Value Ref Range Glucose (POC) 120 (H) 65 - 100 mg/dL Performed by Pedrito Cortez (PCT) GLUCOSE, POC Collection Time: 03/16/20  8:32 AM  
Result Value Ref Range Glucose (POC) 144 (H) 65 - 100 mg/dL Performed by Diego Nova LPN Assessment/Plan:  
  
Consult problem MRSA septicemia w/ open ulceration of LUE AVG 
-s/p explantation and Robbie catheter placement on 03/11/2020 
-persistent bacteremia Thrombocytopenia Metabolic encephalopathy 
-improved -TTE planned for today Persistently worsening of leukocytosis. Decub vs abd source ? ? Remains at neurological baseline w/ stable VSS. Continue to monitor. DVT prophylaxis may be resumed at discretion of primary team.  
 
Active problems Elevated AST &alk phos Hyperbilirubinemia  
-denies abd pain 
-nausea this am 
Antiemetics as needed Source of worsening leukocytosis ? ? ESRD on HD Hyponatremia Hyperkalemia 
-resolved Anemia of chronic renal disease 
-stable Plan per Nephrology IDDM -hypoglycemia add dextrose IVF while NPO Hypothyroidism ASHD Hypotension w/ hx of HTN Hyperlipidemia Hypoxia/Chronic pleural effusion Alzheimer's dementia Legal blind Remote hx of prostate cancer Sacral decub 
-plan per wound care team  
Severe protein calorie malnutrition Management of comorbid conditions by primary team. 
 
VTE Prophylaxis: 
Resume at discretion of primary team  
  
Disposition: 
Sariah Tijerina 8

## 2020-03-16 NOTE — PROGRESS NOTES
0756-Called for phone consent by health care proxy, Shaheen Boudreaux, pt's sister by nurse Nancy Martinez (offgoing nurse).

## 2020-03-16 NOTE — PROGRESS NOTES
Hospitalist Progress Note NAME: Janna John. :  1950 MRN:  [id number] Assessment / Plan: 
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  
Blood culture 20:  MRSA X 4 bottles. Blood culture 03/10/20:  MRSE 1/2 bottles in both of 2 cultures. Blood culture 20: [place] 1/2 bottles. Urine culture 20:  MRSA Wound culture 20:  Moderate probably Staph Aureus. 
- Continue vancomycin per pharmacy dosing.   
- Repeat cultures from 20 remain positive. repeat bc today with hd 
- ROSELINE Monday, consult id, repeat bc today with hd 
  
Right superficial thrombophlebitis of the basilic vein - No additional tx indicated. - Supportive care.   
  
ESRD on HD MWF Secondary hyperparathyroidism  
Acidosis  
- Nephrology input/assistance appreciated. - Continue calcitriol 0.25 mcg po 3 times weekly.   
- HD through right jugular temporary catheter. 
  
Anemia of chronic disease Thrombocytopenia - Transfuse for Hgb <7.0 
- No obvious sign of active hemorrhage. 
- plts trending up now (bottomed out at 77) - Continue FeSO4 325 mg po daily.  
  
Hyponatremia 
- resolved. 
  
Elevated LFTs Hyperbilirubinemia - Alk Phos improved. - Bili improving. 
- Monitor.   
  
Hypoxia, resolved  
Dinh pleural effusions 
- Off O2. 
  
  
DM II Hyperglycemia 
- Continue FSBS with SSI correction scale. 
  
Alzheimer's dementia - Supportive care.   
  
Chronic systolic heart failure HTN Echo 20: 
· Mildly dilated left ventricle. Moderate concentric hypertrophy. 
           Moderate-to-severe systolic function. Estimated left ventricular ejection            fraction is 15 - 20%. · Moderately dilated left atrium. · Moderately to severely reduced systolic function. · Mitral valve thickening. Moderate to severe mitral valve regurgitation is            present. · Mild tricuspid valve regurgitation is present. · There is a large left pleural effusion. · Mild to moderate pulmonary hypertension. 
- Continue asa 81 mg po daily.   
  
Hypothyroidism 
- levothyroxine 75 mcg po daily.   
  
BPH 
- tamsulosin 0.4 mg po daily.    
  
GERD 
-  pantoprazole 40 mg po daily.    
  
  
18.5 - 24.9 Normal weight / Body mass index is 19.8 kg/m². 
  
Code status: DNR Prophylaxis: SCD's Recommended Disposition: TBD 
  
 
  
 
Subjective: Chief Complaint / Reason for Physician Visit \"no pain or chills. \". Discussed with RN events overnight. Review of Systems: 
Symptom Y/N Comments  Symptom Y/N Comments Fever/Chills    Chest Pain Poor Appetite    Edema Cough    Abdominal Pain Sputum    Joint Pain SOB/LAUREANO    Pruritis/Rash Nausea/vomit    Tolerating PT/OT Diarrhea    Tolerating Diet Constipation    Other Could NOT obtain due to:   
 
Objective: VITALS:  
Last 24hrs VS reviewed since prior progress note. Most recent are: 
Patient Vitals for the past 24 hrs: 
 Temp Pulse Resp BP SpO2  
03/16/20 1000  (!) 103 28 155/84 93 % 03/16/20 0955  (!) 103 (!) 33 158/82 90 % 03/16/20 0950  (!) 103 (!) 31 154/82 91 % 03/16/20 0945  (!) 104 (!) 32 156/86 94 % 03/16/20 0940  (!) 108 (!) 38 154/76 90 % 03/16/20 0935  (!) 107 26 143/86 92 % 03/16/20 0930  100 (!) 31 157/79 94 % 03/16/20 0921  100 26 146/76 93 % 03/16/20 0714 97.8 °F (36.6 °C) 98 17 147/75 92 % 03/16/20 0245 97.5 °F (36.4 °C) 94 16 141/76 93 % 03/15/20 2238 97.7 °F (36.5 °C) 99 16 149/77 95 % 03/15/20 2024 97.4 °F (36.3 °C) 99 16 114/77 95 % 03/15/20 1600 97.4 °F (36.3 °C) 100 16 150/81 94 % 03/15/20 1156 97.8 °F (36.6 °C) 97 18 144/71 97 % No intake or output data in the 24 hours ending 03/16/20 1009 PHYSICAL EXAM: 
General: WD, WN. Alert, cooperative, no acute distress   
EENT:  EOMI. Anicteric sclerae. MMM Resp:  CTA bilaterally, no wheezing or rales. No accessory muscle use CV:  Regular  rhythm,  No edema GI:  Soft, Non distended, Non tender.  +Bowel sounds Neurologic:  Alert and oriented X 3, normal speech, Psych:   Good insight. Not anxious nor agitated Skin:  No rashes. No jaundice Reviewed most current lab test results and cultures  YES Reviewed most current radiology test results   YES Review and summation of old records today    NO Reviewed patient's current orders and MAR    YES 
PMH/SH reviewed - no change compared to H&P 
________________________________________________________________________ Care Plan discussed with: 
  Comments Patient Family RN Care Manager Consultant Multidiciplinary team rounds were held today with , nursing, pharmacist and clinical coordinator. Patient's plan of care was discussed; medications were reviewed and discharge planning was addressed. ________________________________________________________________________ Total NON critical care TIME:  20   Minutes Total CRITICAL CARE TIME Spent:   Minutes non procedure based Comments >50% of visit spent in counseling and coordination of care    
________________________________________________________________________ Robbie Farrell DO  
 
Procedures: see electronic medical records for all procedures/Xrays and details which were not copied into this note but were reviewed prior to creation of Plan. LABS: 
I reviewed today's most current labs and imaging studies. Pertinent labs include: 
Recent Labs  
  03/15/20 
0403 03/14/20 
0112 WBC 23.8* 18.4* HGB 7.8* 7.4* HCT 22.7* 21.3*  
* 90* Recent Labs  
  03/15/20 
0403 03/14/20 
0112 * 138  
K 4.0 3.7  105 CO2 27 26 GLU 74 121* BUN 45* 30* CREA 3.04* 2.32* CA 7.7* 7.0* ALB 2.2* 1.9* TBILI 1.6* 1.3*  
SGOT 59* 13* ALT 49 15 Signed: Robbie Farrell, DO

## 2020-03-16 NOTE — PROGRESS NOTES
Paged Dr. Michi Delcid got BS of 62. Gave apple juice for BS of 59 and it went up to 62. HYPOGLYCEMIC EPISODE DOCUMENTATION Patient with hypoglycemic episode(s) at 1822 (time) on 3/16/20(date). BG value(s) pre-treatment 59 Was patient symptomatic? [] yes, [x] no Patient was treated with the following rescue medications/treatments: [] D50 [] Glucose tablets 
              [] Glucagon 
              [x] 4oz juice (6 cups of apple juice) [] 6oz reg soda 
              [] 8oz low fat milk BG value post-treatment: 62 Once BG treated and value greater than 80mg/dl, pt was provided with the following: 
[] snack 
[] meal 
Name of MD notified:yes The following orders were received: paged Dr. Michi Delcid at [phone] for orders

## 2020-03-16 NOTE — CONSULTS
[facility] Date of  Admission: 3/10/2020 11:59 AM  
 
Admission type:Emergency Consult for: ROSELINE R/O endocarditis Consult by: Stella Painter NP Subjective:  
 
Clara Levy is a 81 y.o. male with a PMH significant for alzheimer disease, CAD, Prostate CA, ESRD on hemodialysis MWF, DM II, HLD, HTN, Blind right eye, and GI bleeds admitted for Severe sepsis ([facility] [A41.9, R65.20]. Per ED provider note, the patient presented with cc of gram positive cocci in blood cultures. He was seen in the ED on 3/12/2020 with low-grade fever and ST, sent back to nursing home where he resides . The nursing home sent the patient back to ED on 3/13/2020 for the positive blood cultures and further work-up. Upon assessment, the patient complains of hunger. He is confused and no family is present at this time to complete full history. He does state \"nothing hurts\". Per chart review, it does not appear the patient sees a cardiologist routinely. There is a welcome letter to [facility] in chart, but no documented office visit. Previous treatment/evaluation includes echocardiogram and ECG . Cardiac risk factors: family history, dyslipidemia, diabetes mellitus, sedentary life style, male gender, hypertension, age. Patient Active Problem List  
 Diagnosis Date Noted  Anemia in chronic kidney disease 01/21/2017 Priority: 3 - Three Class: Chronic  Cardiomyopathy ([address] 03/16/2020  Severe sepsis ([facility] 03/10/2020  Acute encephalopathy 02/13/2020  Poor appetite 02/12/2020  Weight loss, unintentional 02/12/2020  Hypothermia 02/11/2020  Hypoglycemia 02/11/2020  Acute pulmonary edema ([address] 12/01/2019  Counseling regarding goals of care 01/19/2017  Do not resuscitate discussion 01/19/2017  CAD (coronary artery disease) 12/20/2010  
 HTN (hypertension) 12/20/2010  Alzheimer disease ([address] 09/19/2010  DM (diabetes mellitus), type 2, uncontrolled ([facility] 08/16/2010  Pure hypercholesterolemia 01/02/2010  Prostate cancer ([facility] 01/02/2010  Dementia ([facility] 01/02/2010  Blind one eye 01/02/2010 Prasanna Amin MD 
Past Medical History:  
Diagnosis Date  Alzheimer disease ([facility]  CAD (coronary artery disease)  Cancer [facility])   
 prostate  Chronic kidney disease  CKD (chronic kidney disease) stage 4, GFR 15-29 ml/min ([facility])  DKA (diabetic ketoacidoses) [facility] 7/10/2011  DM (diabetes mellitus), type 2, uncontrolled ([facility]  Dyspnea 2/6/2017  Hemodialysis patient ([facility]  History of GI bleed 11/12/2014  Hyperlipidemia  Hypertension  Other ill-defined conditions(799.89)   
 blind R eye-retina detachment  Other ill-defined conditions(799.89) right cerebellopontine angle lipoma.  Pneumonia 2/4/2017  Pulmonary edema 1/25/2020  Weakness generalized 8/16/2010 Social History Socioeconomic History  Marital status:  Spouse name: Not on file  Number of children: Not on file  Years of education: Not on file  Highest education level: Not on file Tobacco Use  Smoking status: Never Smoker  Smokeless tobacco: Never Used Substance and Sexual Activity  Alcohol use: No  
 Drug use: No  
 Sexual activity: Not Currently Allergies Allergen Reactions  Ace Inhibitors Unknown (comments)  Arb-Angiotensin Receptor Antagonist Unknown (comments) Family History Problem Relation Age of Onset  Heart Disease Mother Pacemaker  Cancer Father Current Facility-Administered Medications Medication Dose Route Frequency  epoetin shaye-epbx (RETACRIT) injection 10,000 Units  10,000 Units SubCUTAneous Q MON, WED & FRI  heparin (porcine) 1,000 unit/mL injection 1,000 Units  1,000 Units InterCATHeter DIALYSIS PRN  
 albumin human 25% (BUMINATE) solution 12.5 g  12.5 g IntraVENous DIALYSIS PRN  
  heparin (porcine) 1,000 unit/mL injection 2,400 Units  2,400 Units Hemodialysis DIALYSIS PRN  
 acetaminophen (TYLENOL) tablet 650 mg  650 mg Oral Q6H PRN  
 albuterol (PROVENTIL VENTOLIN) nebulizer solution 2.5 mg  2.5 mg Nebulization Q4H PRN  
 aspirin delayed-release tablet 81 mg  81 mg Oral DAILY  calcitRIOL (ROCALTROL) capsule 0.25 mcg  0.25 mcg Oral Q MON, WED & FRI  ferrous sulfate tablet 325 mg  325 mg Oral ACB  glucagon (GLUCAGEN) injection 1 mg  1 mg IntraMUSCular PRN  
 levothyroxine (SYNTHROID) tablet 75 mcg  75 mcg Oral ACB  pantoprazole (PROTONIX) tablet 40 mg  40 mg Oral DAILY  prednisoLONE acetate (PRED FORTE) 1 % ophthalmic suspension 1 Drop  1 Drop Right Eye BID  tamsulosin (FLOMAX) capsule 0.4 mg  0.4 mg Oral DAILY  B complex-vitaminC-folic acid (NEPHROCAP) cap  1 Cap Oral DAILY  sodium chloride (NS) flush 5-10 mL  5-10 mL IntraVENous PRN  
 ondansetron (ZOFRAN) injection 4 mg  4 mg IntraVENous Q6H PRN  
 insulin lispro (HUMALOG) injection   SubCUTAneous AC&HS  
 glucose chewable tablet 16 g  4 Tab Oral PRN  
 dextrose (D50W) injection syrg 12.5-25 g  12.5-25 g IntraVENous PRN  
 glucagon (GLUCAGEN) injection 1 mg  1 mg IntraMUSCular PRN  
 VANCOMYCIN INFORMATION NOTE   Other Rx Dosing/Monitoring Review of Symptoms:  
11 systems reviewed, negative other than as stated in the HPI Objective:  
  
Visit Vitals /75 Pulse 98 Temp 97.8 °F (36.6 °C) Resp 17 Ht 5' 5\" (1.651 m) Wt 54.1 kg (119 lb 4.3 oz) SpO2 92% BMI 19.85 kg/m² Physical:  
General: frail, thin, elderly AA male, in NAD Heart: RRR, no m/S3/JVD, no carotid bruits Lungs: clear anterior upper, diminished posterior bases, RA, no cough Abdomen: Soft, +BS, NTND Extremities: LE luis +DP/PT, no edema Neurologic: Confused, yelling out asking for food, oriented to self only, weak, MONTALVO Data Review:  
Recent Labs  
  03/15/20 
0403 03/14/20 
0112 WBC 23.8* 18.4*  
 HGB 7.8* 7.4* HCT 22.7* 21.3*  
* 90* Recent Labs  
  03/15/20 
0403 03/14/20 
0112 * 138  
K 4.0 3.7  105 CO2 27 26 GLU 74 121* BUN 45* 30* CREA 3.04* 2.32* CA 7.7* 7.0* ALB 2.2* 1.9* TBILI 1.6* 1.3*  
SGOT 59* 13* ALT 49 15 No results for input(s): TROIQ, CPK, CKMB in the last 72 hours. No intake or output data in the 24 hours ending 03/16/20 0752 Cardiographics ECG: ST 
Echocardiogram: · Mildly dilated left ventricle. Moderate concentric hypertrophy. Moderate-to-severe systolic function. Estimated left ventricular ejection fraction is 15 - 20%. · Moderately dilated left atrium. · Moderately to severely reduced systolic function. · Mitral valve thickening. Moderate to severe mitral valve regurgitation is present. · Mild tricuspid valve regurgitation is present. · There is a large left pleural effusion. · Mild to moderate pulmonary hypertension. CXRAY: \"The lungs are diminished in volume\" Assessment:  
   
 Active Problems: 
  Severe sepsis ([address] (3/10/2020) Cardiomyopathy ([facility] (3/16/2020) Plan:  
Valeen Mcardle. is a 81 y.o. male with a PMH significant for alzheimer disease, CAD, Prostate CA, ESRD on hemodialysis MWF, DM II, HLD, HTN, Blind right eye, anemia, and GI bleeds admitted for Severe sepsis ([facility] [A41.9, R65.20]. Leukocytosis WBC 23.8. H/H 7.8/22. 7. Plt. 112. +UTI. EKG ST. Cr 3.04. Lactic 1.7. Trop 0.27, 0.23. CXR: diminished volume. Cardiology consulted for ROSELINE r/o endocarditis. Fever/Gram Positive Cocci Bacteremia/ Infected Dialysis Fistula site/UTI 
-fistula removed by vascular surgery Saturday 3/14/2020 
-abx management per internal medicine 
-ROSELINE this am r/o endocarditis Cardiomyopathy: EF 15-20% 
-Obtaining consent for ROSELINE this am 
-Keep Patient NPO 
-Add telemetry 
-Add Coreg 3.125 mg PO BID 
 
CAD: 
-Continue ASA 81 mg PO daily  
-Add Coreg 3.125mg PO BID 
 
HTN: 
 -continue amlodipine 10 mg PO daily  
-add 3.125 mg PO Coreg BID 
 
HLD:  
-not on statin 
-lipid panel ordered Thank you for the opportunity to partner in the care of this patient. Paola Anthony NP  
MSN, RN, AG-ACNP-BC Patient seen and examined by me with nurse practitioner. I personally performed all components of the history, physical, and medical decision making and agree with the assessment and plan as noted. Unable to complete ROSELINE today due to inability to advance ROSELINE probe. Will reschedule with anaesthesia. leximyoview after ROSELINE to rule out any significant CAD. Nena Hancock MD

## 2020-03-16 NOTE — PROGRESS NOTES
Unable to pass ROSELINE probe, procedure will be rescheduled per Dr. Leah Basurto [id number]-pt tentatively on schedule for 3/18/2020 @ 10:30am with anesthesia, will update if there are any changes.

## 2020-03-16 NOTE — PROGRESS NOTES
Name: Nadiya Chris MRN: [id number] : 1950 Assessment: 
ESRD ([facility]; MWF) Fever/MRSA Bacteremia Infected L AVG with ulceration; surgery 3/11; using indra at present HTN Hyponatremia, mild Anemia of ESRD High Phos/SHPT High Mg Dementia 
  
 
Plan/Recommendations: 
IV Vanco 
3/12 BC MRSA 
HD today. On calcitriol Start retacrit 10 k sc tiw Avoid Mg containing laxatives or supplements Amlodipine stopped Will need Permacath prior to d/c, which can be planned next week, once he is afebrile and f/u BCx are negative Subjective: The patient was seen on dialysis at 3:40 PM .  BP is stable. Catheter is functioning well. Has basln confusion and lacks complete insight Tolerating HD well ROS:  
Not reliable, but no c/o Exam: 
Visit Vitals /76 Pulse 94 Temp 97.5 °F (36.4 °C) Resp 16 Ht 5' 5\" (1.651 m) Wt 54.1 kg (119 lb 4.3 oz) SpO2 93% BMI 19.85 kg/m² Elderly cachectic in NAD 
R Indra in place Clear RRR 
L AVG site with drsg No edema Alert awake Legally blind Current Facility-Administered Medications Medication Dose Route Frequency Last Dose  heparin (porcine) 1,000 unit/mL injection 1,000 Units  1,000 Units InterCATHeter DIALYSIS PRN    
 albumin human 25% (BUMINATE) solution 12.5 g  12.5 g IntraVENous DIALYSIS PRN    
 heparin (porcine) 1,000 unit/mL injection 2,400 Units  2,400 Units Hemodialysis DIALYSIS PRN 2,400 Units at 20 1635  acetaminophen (TYLENOL) tablet 650 mg  650 mg Oral Q6H  mg at 03/15/20 2100  
 albuterol (PROVENTIL VENTOLIN) nebulizer solution 2.5 mg  2.5 mg Nebulization Q4H PRN    
 aspirin delayed-release tablet 81 mg  81 mg Oral DAILY 81 mg at 03/15/20 0900  calcitRIOL (ROCALTROL) capsule 0.25 mcg  0.25 mcg Oral Q MON, WED & FRI 0.25 mcg at 03/13/20 2018  ferrous sulfate tablet 325 mg  325 mg Oral  mg at 03/15/20 0642  
 glucagon (GLUCAGEN) injection 1 mg  1 mg IntraMUSCular PRN    
 levothyroxine (SYNTHROID) tablet 75 mcg  75 mcg Oral ACB 75 mcg at 03/15/20 1714  pantoprazole (PROTONIX) tablet 40 mg  40 mg Oral DAILY 40 mg at 03/15/20 0900  prednisoLONE acetate (PRED FORTE) 1 % ophthalmic suspension 1 Drop  1 Drop Right Eye BID 1 Drop at 03/15/20 1715  tamsulosin (FLOMAX) capsule 0.4 mg  0.4 mg Oral DAILY 0.4 mg at 03/15/20 0900  B complex-vitaminC-folic acid (NEPHROCAP) cap  1 Cap Oral DAILY 1 Cap at 03/15/20 0900  
 sodium chloride (NS) flush 5-10 mL  5-10 mL IntraVENous PRN 10 mL at 03/13/20 2020  ondansetron (ZOFRAN) injection 4 mg  4 mg IntraVENous Q6H PRN 4 mg at 03/11/20 1729  
 insulin lispro (HUMALOG) injection   SubCUTAneous AC&HS 2 Units at 03/15/20 2111  
 glucose chewable tablet 16 g  4 Tab Oral PRN    
 dextrose (D50W) injection syrg 12.5-25 g  12.5-25 g IntraVENous PRN 25 g at 03/15/20 0642  
 glucagon (GLUCAGEN) injection 1 mg  1 mg IntraMUSCular PRN    
 VANCOMYCIN INFORMATION NOTE   Other Rx Dosing/Monitoring Labs/Data: 
 
Lab Results Component Value Date/Time WBC 23.8 (H) 03/15/2020 04:03 AM  
 Hemoglobin (POC) 10.5 (L) 04/07/2017 07:30 AM  
 HGB 7.8 (L) 03/15/2020 04:03 AM  
 Hematocrit (POC) 31 (L) 04/07/2017 07:30 AM  
 HCT 22.7 (L) 03/15/2020 04:03 AM  
 PLATELET 919 (L) 09/71/8059 04:03 AM  
 MCV 82.5 03/15/2020 04:03 AM  
 
 
Lab Results Component Value Date/Time  Sodium 134 (L) 03/15/2020 04:03 AM  
 Potassium 4.0 03/15/2020 04:03 AM  
 Chloride 101 03/15/2020 04:03 AM  
 CO2 27 03/15/2020 04:03 AM  
 Anion gap 6 03/15/2020 04:03 AM  
 Glucose 74 03/15/2020 04:03 AM  
 BUN 45 (H) 03/15/2020 04:03 AM  
 Creatinine 3.04 (H) 03/15/2020 04:03 AM  
 BUN/Creatinine ratio 15 03/15/2020 04:03 AM  
 GFR est AA 25 (L) 03/15/2020 04:03 AM  
 GFR est non-AA 21 (L) 03/15/2020 04:03 AM  
 Calcium 7.7 (L) 03/15/2020 04:03 AM  
 
 
Wt Readings from Last 3 Encounters:  
03/14/20 54.1 kg (119 lb 4.3 oz) 03/09/20 49.8 kg (109 lb 12.6 oz) 02/13/20 48.4 kg (106 lb 11.2 oz) No intake or output data in the 24 hours ending 03/16/20 0541 Patient seen and examined. Chart reviewed. Labs, data and other pertinent notes reviewed in last 24 hrs. PMH/SH/FH reviewed and unchanged compared to H&P Discussed with pt and HD RN 
 
iMke Sanders MD

## 2020-03-16 NOTE — PROGRESS NOTES
Patient arrived to Non-Invasive Cardiology Lab for Inpatient ROSELINE Procedure. Staff introduced to patient. Patient identifiers verified with Name and Date of Birth. Procedure verified with patient. Consent forms reviewed and signed by patient or authorized representative and verified. Allergies verified. Patient informed of procedure and plan of care. Questions answered with review. Patient on cardiac monitor, non-invasive blood pressure, SPO2 monitor. On room air. Patient is A&Ox3. Patient reports no complaints. Patient on stretcher, in low position, with side rails up. Patient instructed to call for assistance as needed.

## 2020-03-16 NOTE — PROGRESS NOTES
TRANSFER - IN REPORT: 
 
Verbal report received from [facility]  on Iliana Recinos.  being received from [facility] room 2118 Report consisted of patients Situation, Background, Assessment and  
Recommendations(SBAR). Information from the following report(s) kardex was reviewed with the receiving nurse. Opportunity for questions and clarification was provided. Assessment completed upon patients arrival to unit and care assumed.

## 2020-03-17 PROBLEM — N18.6 ESRD (END STAGE RENAL DISEASE) (HCC): Status: ACTIVE | Noted: 2020-01-01

## 2020-03-17 PROBLEM — D64.9 ANEMIA: Status: ACTIVE | Noted: 2020-01-01

## 2020-03-17 PROBLEM — R78.81 BACTEREMIA: Status: ACTIVE | Noted: 2020-01-01

## 2020-03-17 NOTE — PROGRESS NOTES
HYPOGLYCEMIC EPISODE DOCUMENTATION Patient with hypoglycemic episode(s) at 1138(time) on 3/17/20(date). BG value(s) pre-treatment 57 Was patient symptomatic? [] yes, [x] no Patient was treated with the following rescue medications/treatments: [] D50 [x] Glucose tablets 
              [] Glucagon 
              [x] 4oz juice 
              [] 6oz reg soda 
              [] 8oz low fat milk BG value post-treatment: 47 
 
12.5 mg D50 given 1156 
 
1206 BG value 81 Once BG treated and value greater than 80mg/dl, pt was provided with the following: 
[x] snack 
[] meal 
Name of MD notified:Dr. Brendolyn Cabot The following orders were received: none Dr. Sam Haque said it was OK to order nutritional supplements.

## 2020-03-17 NOTE — PROGRESS NOTES
[address] [place] [address], [facility], [address] [facility]  [phone] Cardiology Progress Note 3/17/2020 3:45 PM 
 
Admit Date: 3/10/2020 Admit Diagnosis:  
Severe sepsis ([address] [A41.9, R65.20] Interval History/Subjective:  
 
Melva Gamble is a 81 y.o. male who is admitted for Severe sepsis ([address] [A41.9, R65.20] -VSS 
-Hg 6.0; creat 2.04; K 3.1. hypoglycemia  
-Mr. Portia Nazario states he's feeling okay right now. Blood transfusing. He denies any pain or SOB. Visit Vitals /72 (BP 1 Location: Right arm, BP Patient Position: At rest) Pulse 85 Temp 97.5 °F (36.4 °C) Resp 18 Ht 5' 5\" (1.651 m) Wt 56.9 kg (125 lb 7.1 oz) SpO2 96% BMI 20.87 kg/m² Current Facility-Administered Medications Medication Dose Route Frequency  0.9% sodium chloride infusion 250 mL  250 mL IntraVENous PRN  
 albuterol-ipratropium (DUO-NEB) 2.5 MG-0.5 MG/3 ML  3 mL Nebulization Q4H RT  
 epoetin shaye-epbx (RETACRIT) injection 10,000 Units  10,000 Units SubCUTAneous Q MON, WED & FRI  carvediloL (COREG) tablet 3.125 mg  3.125 mg Oral BID WITH MEALS  
 heparin (porcine) 1,000 unit/mL injection 1,000 Units  1,000 Units InterCATHeter DIALYSIS PRN  
 albumin human 25% (BUMINATE) solution 12.5 g  12.5 g IntraVENous DIALYSIS PRN  
 heparin (porcine) 1,000 unit/mL injection 2,400 Units  2,400 Units Hemodialysis DIALYSIS PRN  
 acetaminophen (TYLENOL) tablet 650 mg  650 mg Oral Q6H PRN  
 albuterol (PROVENTIL VENTOLIN) nebulizer solution 2.5 mg  2.5 mg Nebulization Q4H PRN  
 aspirin delayed-release tablet 81 mg  81 mg Oral DAILY  calcitRIOL (ROCALTROL) capsule 0.25 mcg  0.25 mcg Oral Q MON, WED & FRI  ferrous sulfate tablet 325 mg  325 mg Oral ACB  glucagon (GLUCAGEN) injection 1 mg  1 mg IntraMUSCular PRN  
 levothyroxine (SYNTHROID) tablet 75 mcg  75 mcg Oral ACB  pantoprazole (PROTONIX) tablet 40 mg  40 mg Oral DAILY  prednisoLONE acetate (PRED FORTE) 1 % ophthalmic suspension 1 Drop  1 Drop Right Eye BID  tamsulosin (FLOMAX) capsule 0.4 mg  0.4 mg Oral DAILY  B complex-vitaminC-folic acid (NEPHROCAP) cap  1 Cap Oral DAILY  sodium chloride (NS) flush 5-10 mL  5-10 mL IntraVENous PRN  
 ondansetron (ZOFRAN) injection 4 mg  4 mg IntraVENous Q6H PRN  
 insulin lispro (HUMALOG) injection   SubCUTAneous AC&HS  
 glucose chewable tablet 16 g  4 Tab Oral PRN  
 dextrose (D50W) injection syrg 12.5-25 g  12.5-25 g IntraVENous PRN  
 glucagon (GLUCAGEN) injection 1 mg  1 mg IntraMUSCular PRN  
 VANCOMYCIN INFORMATION NOTE   Other Rx Dosing/Monitoring Objective:  
  
Physical Exam: 
General Appearance:  pleasant, confused, thin, elderly male resting in bed in NAD Chest:   ? RLL crackles;  Scattered wheezing on R Cardiovascular:  Regular rate and rhythm, no murmur. Abdomen:   Soft, non-tender, bowel sounds are active. Extremities: no edema Skin:  Warm and dry. Data Review:  
Recent Labs  
  03/17/20 
0449 03/15/20 
0403 WBC 17.6* 23.8* HGB 6.0* 7.8* HCT 18.2* 22.7*  
 112* Recent Labs  
  03/17/20 
0345 03/15/20 
0403  134* K 3.1* 4.0  
 101 CO2 24 27 * 74 BUN 29* 45* CREA 2.04* 3.04* CA 7.1* 7.7* ALB 2.0* 2.2* TBILI 1.7* 1.6* SGOT 30 59* ALT 28 49 No results for input(s): TROIQ, CPK, CKMB in the last 72 hours. Intake/Output Summary (Last 24 hours) at 3/17/2020 4511 Last data filed at 3/17/2020 1150 Gross per 24 hour Intake 858 ml Output 1000 ml Net -142 ml Telemetry: SR 
EKG: ST with short CO 
ECHO:  EF 15-20%; mod concentric hypertrophy; mod to severely reduced RV systolic function; mod to severe MR; mild TR; mild to mod pHTN;  
CTA chest: \"Large bilateral pleural effusions with anasarca and by basilar atelectasis. There is no pulmonary emboli.  Interstitial prominence is demonstrated. Several nonsolid lung nodules are demonstrated possible small infiltrates. Those should be followed up. \" Assessment:  
 
Principal Problem: 
  Severe sepsis ([facility] (3/10/2020) Active Problems: 
  Cardiomyopathy ([address] (3/16/2020) Bacteremia (3/17/2020) ESRD (end stage renal disease) ([facility] (3/17/2020) Anemia (3/17/2020) Plan:  
 
Bacteremia:  S/p removal of AV fistula 3/14/20. · Unable to perform ROSELINE yesterday. Planned for attempt with anesthesia tomorrow to eval for endocarditis · abx per hospitalist  
 
BiV failure:  Newly identified cardiomyopathy with EF 15-20% with decreased RV function. Mod to severe mitral regurgitation. · ROSELINE tomorrow, as above · Stress test is scheduled for tomorrow. · Fluid balance per dialysis. Has bilateral effusions and ascites · On BB. Will increase dose. Has listed allergies to ACEi and ARBs CAD risk:  Troponin 0.23 at admit for sepsis · New cardiomyopathy · ASA, BB. Lipids low · Plans for stress test tomorrow · Poor cath candidate with severe anemia and active infection HTN:  BP slightly elevated · Increased BB Anemia:  Hg 6.0 today. Receiving blood transfusion. plt improved today · Per hospitalist  
· On ASA ESRD:  On HD · Per nephrology Honor SO Wills 
DNP, RN, AGAP-BC Patient seen and examined by me with nurse practitioner. I personally performed all components of the history, physical, and medical decision making and agree with the assessment and plan as noted.  
 
Say Vásquez MD

## 2020-03-17 NOTE — PROGRESS NOTES
Hb 6 
VSS No evidence of bleeding ESRD on HD ,need for transfusion and timing per primary team and nephrology

## 2020-03-17 NOTE — PROGRESS NOTES
Vascular Surgery Progress Note Fawad Douglas ACNP-BC 
3/17/2020 Subjective:  
 
Janith Pallas. is a 81 y.o.  male with a pmhx significant for Alzheimer's dementia, ASHD, IDDM, HTN, HLD, and GIB. He is admitted to the hospital w/ MRSA septicemia after presenting w/ an open ulceration of his left AVG that was placed in 04/2017 by Dr. Francine Gupta. He has a positive urinalysis and a sacral decubitus ulcer has well. He is s/p removal of LUE AVG and Robbie catheter placement on 03/11/2020. Patient is alert. Diaphoretic this am-afebrile. Complaining of nausea. Nursing Data:  
 
Patient Vitals for the past 24 hrs: 
 BP Temp Temp src Pulse Resp SpO2 Weight 03/12/20 0800 122/71 97.2 °F (36.2 °C)  85 14 100 %   
03/12/20 0700 124/69   84 11 100 %   
03/12/20 0600 127/75   89 16 100 %   
03/12/20 0500 132/73   84 16 100 %   
03/12/20 0400 119/71 97.4 °F (36.3 °C)  82 14 100 %   
03/12/20 0300 125/77   86 13 100 %   
03/12/20 0200 129/79   90 19 100 %   
03/12/20 0130 129/75   87 11 100 %   
03/12/20 0100 131/80   90 18 100 %   
03/12/20 0030 127/81 97.6 °F (36.4 °C)  93 17 100 %   
03/12/20 0015 144/88   90     
03/12/20 0000 136/87   89 16 100 %   
03/11/20 2345 139/89   90     
03/11/20 2330 139/87   88 18 100 %   
03/11/20 2315 136/84 97.8 °F (36.6 °C) Oral 91     
03/11/20 2300 132/85   90     
03/11/20 2245 138/83   91 17 100 %   
03/11/20 2230 135/84   88 14 98 %   
03/11/20 2215 130/89   91     
03/11/20 2200 128/88   89 19 98 %   
03/11/20 2145 126/82   89 17 97 %   
03/11/20 2131 125/82   89 20 97 %   
03/11/20 2115 116/76   86 17    
03/11/20 2110 106/72 97.4 °F (36.3 °C) Oral 86 20 96 %   
03/11/20 2100 107/78 (!) 94.4 °F (34.7 °C)  83 17 97 % 51.8 kg (114 lb 3.2 oz) 03/11/20 2035 110/75   84 18 96 %   
03/11/20 2030 108/76   83 19 96 %   
03/11/20 2025 110/77   83 18 97 %   
 03/11/20 2020 105/74   83 20 97 %   
03/11/20 2015 106/78   84 19 97 %   
03/11/20 2010 105/74   84 18 96 %   
03/11/20 2005 105/77   83 19 97 %   
03/11/20 2000 104/76   84 22 97 %   
03/11/20 1955 106/72   83 21 97 %   
03/11/20 1950 101/74   84 20 97 %   
03/11/20 1945 104/72   83 19 97 %   
03/11/20 1940 103/72   83 23 97 %   
03/11/20 1930 102/79 97.1 °F (36.2 °C)  83 15 97 %   
03/11/20 1920 104/68   86 19 99 %   
03/11/20 1910 110/76   85 22 98 %   
03/11/20 1900 106/75   85 15 100 %   
03/11/20 1850 105/76   84 19 98 %   
03/11/20 1840 109/84   83 18 99 %   
03/11/20 1835 108/76   83 16 99 %   
03/11/20 1830 109/74   83 16 97 %   
03/11/20 1825 106/77   82 14 98 %   
03/11/20 1820 105/74   82 15 98 %   
03/11/20 1815 101/74   81 28 98 %   
03/11/20 1810 108/72   83 17 99 %   
03/11/20 1805 104/68   82 14 97 %   
 
--------------------------------------------------------------------------------------------------------- Intake/Output Summary (Last 24 hours) at 3/17/2020 3646 Last data filed at 3/16/2020 1839 Gross per 24 hour Intake 758 ml Output 1000 ml Net -242 ml Exam:  
 
Physical Exam 
Constitutional:   
   Comments: Frail, elderly AAM who is pleasantly confused. HENT:  
   Head: Normocephalic. Nose: Nose normal.  
   Mouth/Throat:  
   Mouth: Mucous membranes are moist.  
Neck: Musculoskeletal: Normal range of motion. Cardiovascular:  
   Rate and Rhythm: Normal rate and regular rhythm. Pulmonary:  
   Effort: Pulmonary effort is normal.  
   Breath sounds: Normal breath sounds. Abdominal:  
   General: Abdomen is flat. Palpations: Abdomen is soft. Musculoskeletal: Normal range of motion. Skin: 
   General: Skin is warm. Comments: Dressing to left arm C/D/I. Swelling significantly improved. Neurological:  
   Mental Status: He is alert. Mental status is at baseline. Lab Review: . 
Recent Results (from the past 24 hour(s)) GLUCOSE, POC Collection Time: 03/16/20 11:51 AM  
Result Value Ref Range Glucose (POC) 173 (H) 65 - 100 mg/dL Performed by Chandni Lawrence [facility]   
GLUCOSE, POC Collection Time: 03/16/20  6:22 PM  
Result Value Ref Range Glucose (POC) 59 (L) 65 - 100 mg/dL Performed by Mykel Veras N   
GLUCOSE, POC Collection Time: 03/16/20  6:35 PM  
Result Value Ref Range Glucose (POC) 62 (L) 65 - 100 mg/dL Performed by Mykel Veras [facility]   
GLUCOSE, POC Collection Time: 03/16/20  6:59 PM  
Result Value Ref Range Glucose (POC) 78 65 - 100 mg/dL Performed by Mykel Veras [facility]   
GLUCOSE, POC Collection Time: 03/16/20  7:09 PM  
Result Value Ref Range Glucose (POC) 107 (H) 65 - 100 mg/dL Performed by Mykel Veras [facility]   
GLUCOSE, POC Collection Time: 03/17/20  1:40 AM  
Result Value Ref Range Glucose (POC) 248 (H) 65 - 100 mg/dL Performed by Dariel Lazaro [facility]   
LIPID PANEL Collection Time: 03/17/20  3:45 AM  
Result Value Ref Range LIPID PROFILE Cholesterol, total 88 <200 MG/DL Triglyceride 48 <150 MG/DL  
 HDL Cholesterol 53 MG/DL  
 LDL, calculated 25.4 0 - 100 MG/DL VLDL, calculated 9.6 MG/DL  
 CHOL/HDL Ratio 1.7 0.0 - 5.0 METABOLIC PANEL, COMPREHENSIVE Collection Time: 03/17/20  3:45 AM  
Result Value Ref Range Sodium 136 136 - 145 mmol/L Potassium 3.1 (L) 3.5 - 5.1 mmol/L Chloride 106 97 - 108 mmol/L  
 CO2 24 21 - 32 mmol/L Anion gap 6 5 - 15 mmol/L Glucose 133 (H) 65 - 100 mg/dL BUN 29 (H) 6 - 20 MG/DL Creatinine 2.04 (H) 0.70 - 1.30 MG/DL  
 BUN/Creatinine ratio 14 12 - 20 GFR est AA 40 (L) >60 ml/min/1.73m2 GFR est non-AA 33 (L) >60 ml/min/1.73m2 Calcium 7.1 (L) 8.5 - 10.1 MG/DL Bilirubin, total 1.7 (H) 0.2 - 1.0 MG/DL  
 ALT (SGPT) 28 12 - 78 U/L  
 AST (SGOT) 30 15 - 37 U/L Alk.  phosphatase 410 (H) 45 - 117 U/L  
 Protein, total 5.1 (L) 6.4 - 8.2 g/dL Albumin 2.0 (L) 3.5 - 5.0 g/dL Globulin 3.1 2.0 - 4.0 g/dL A-G Ratio 0.6 (L) 1.1 - 2.2    
CBC W/O DIFF Collection Time: 03/17/20  4:49 AM  
Result Value Ref Range WBC 17.6 (H) 4.1 - 11.1 K/uL  
 RBC 2.17 (L) 4.10 - 5.70 M/uL HGB 6.0 (L) 12.1 - 17.0 g/dL HCT 18.2 (L) 36.6 - 50.3 % MCV 83.9 80.0 - 99.0 FL  
 MCH 27.6 26.0 - 34.0 PG  
 MCHC 33.0 30.0 - 36.5 g/dL  
 RDW 19.3 (H) 11.5 - 14.5 % PLATELET 273 266 - 587 K/uL MPV 11.9 8.9 - 12.9 FL  
 NRBC 0.1 (H) 0  WBC ABSOLUTE NRBC 0.02 (H) 0.00 - 0.01 K/uL SAMPLES BEING HELD Collection Time: 03/17/20  4:49 AM  
Result Value Ref Range SAMPLES BEING HELD 1 PST COMMENT Add-on orders for these samples will be processed based on acceptable specimen integrity and analyte stability, which may vary by analyte. GLUCOSE, POC Collection Time: 03/17/20  6:58 AM  
Result Value Ref Range Glucose (POC) 71 65 - 100 mg/dL Performed by Twila Suggs LPN   
GLUCOSE, POC Collection Time: 03/17/20  7:14 AM  
Result Value Ref Range Glucose (POC) 75 65 - 100 mg/dL Performed by Daniel Sheffield (PCT) Assessment/Plan:  
  
Consult problem MRSA septicemia w/ open ulceration of LUE AVG 
-s/p explantation and Robbie catheter placement on 03/11/2020 
-persistent bacteremia Thrombocytopenia Metabolic encephalopathy 
-improved -TTE planned for today Persistently worsening of leukocytosis. Decub vs abd source ? ? Remains at neurological baseline w/ stable VSS. Continue to monitor. DVT prophylaxis may be resumed at discretion of primary team.  
 
Active problems Elevated AST &alk phos Hyperbilirubinemia  
-denies abd pain 
-nausea this am 
Antiemetics as needed Source of worsening leukocytosis ? ? ESRD on HD Hyponatremia Hyperkalemia 
-resolved Anemia of chronic renal disease 
-stable Plan per Nephrology IDDM -hypoglycemia add dextrose IVF while NPO Hypothyroidism ASHD Hypotension w/ hx of HTN Hyperlipidemia Hypoxia/Chronic pleural effusion Alzheimer's dementia Legal blind Remote hx of prostate cancer Sacral decub 
-plan per wound care team  
Severe protein calorie malnutrition Management of comorbid conditions by primary team. 
 
VTE Prophylaxis: 
Resume at discretion of primary team  
  
Disposition: 
Sariah Paz

## 2020-03-17 NOTE — PROGRESS NOTES
Hospitalist Progress Note NAME: Ida Mejias. :  1950 MRN:  [id number] Assessment / Plan: 
Persistent MRSA Bacteremia 2/2 infected AVG, s/p partial explant of LUE arteriovenous graft on 20 Leucocytosis  
BCX-  +, 3/10 +, 3/11 + - MRSA Surgical wound Cx 3/11 + - MRSA UCx + MRSA 
 
-On Vancomycin, 
-get Repeat BCx 
-Consulted ID , ? Change to different Abx 
-has residual hardware in LUE Awaiting ID opinion, I needs CT abd to rule out any underlying abd collection/source - ROSELINE tomorrow repeat bc today 
  
Right superficial thrombophlebitis of the basilic vein - No additional tx indicated. - Supportive care.   
  
ESRD on HD MWF Secondary hyperparathyroidism  
Acidosis  
- Nephrology input/assistance appreciated. - Continue calcitriol 0.25 mcg po 3 times weekly.   
- HD through right jugular temporary catheter. 
  
Anemia of chronic disease, Hb 6.0 today Thrombocytopenia - Transfuse for Hgb <7.0, Transfuse 1 unit today - No obvious sign of active hemorrhage. 
- Continue FeSO4 325 mg po daily.  
  
Hyponatremia 
- resolved. 
  
Elevated LFTs Hyperbilirubinemia - Alk Phos improved. - Bili improving. 
- Monitor.   
  
Hypoxia, Dyspna Dinh pleural effusions Repeat CXR today Will place him on Nebs 
 
  
  
DM II Episodes of Hypoglycemia 
?due to sepsis 
monitor 
  
Alzheimer's dementia - Supportive care.   
  
Chronic systolic heart failure HTN Hypothyroidism 
- levothyroxine 75 mcg po daily.   
  
BPH 
- tamsulosin 0.4 mg po daily.    
  
GERD 
-  pantoprazole 40 mg po daily.    
  
  
18.5 - 24.9 Normal weight / Body mass index is 19.8 kg/m². 
  
Code status: DNR Prophylaxis: SCD Recommended Disposition: TBD 
  
Likely source - Infected Left Upper arm AVG which was partially removed, likely will need further re-exploration Subjective: Chief Complaint / Reason for Physician Visit \"no pain or chills. \". Discussed with RN events overnight. Mild SOB today. Review of Systems: 
Symptom Y/N Comments  Symptom Y/N Comments Fever/Chills    Chest Pain Poor Appetite    Edema Cough    Abdominal Pain Sputum    Joint Pain SOB/LAUREANO    Pruritis/Rash Nausea/vomit    Tolerating PT/OT Diarrhea    Tolerating Diet Constipation    Other Could NOT obtain due to:   
 
Objective: VITALS:  
Last 24hrs VS reviewed since prior progress note. Most recent are: 
Patient Vitals for the past 24 hrs: 
 Temp Pulse Resp BP SpO2  
03/17/20 1459 97.5 °F (36.4 °C) 85 18 136/72 96 % 03/17/20 1237 97.9 °F (36.6 °C) 83 19 140/70 90 % 03/17/20 0711 98.3 °F (36.8 °C) 87 18 138/77 96 % 03/17/20 0330 97.7 °F (36.5 °C) 87 18 155/68 95 % 03/16/20 1825 98 °F (36.7 °C) 87 20 146/68   
03/16/20 1741 97.6 °F (36.4 °C) 86 22 148/82   
03/16/20 1730  87 22 146/79   
03/16/20 1715  89 22 134/71   
03/16/20 1700  86 20 138/77   
03/16/20 1645  86 22 131/81   
03/16/20 1630  88 22 138/79   
03/16/20 1615  88 22 148/86   
03/16/20 1600  89 22 145/84   
03/16/20 1545  90 22 141/79   
03/16/20 1530  92 22 143/75   
03/16/20 1515  92 22 148/81  Intake/Output Summary (Last 24 hours) at 3/17/2020 1513 Last data filed at 3/17/2020 1150 Gross per 24 hour Intake 858 ml Output 1000 ml Net -142 ml PHYSICAL EXAM: 
General: WD, WN. Alert, cooperative, no acute distress   
EENT:  EOMI. Anicteric sclerae. MMM Resp:  Exp wheezing CV:  Regular  rhythm,  No edema GI:  Soft, Non distended, Non tender.  +Bowel sounds Neurologic:  Alert and oriented X 2, normal speech, Psych:   Poor insight. Not anxious nor agitated Skin:  No rashes. No jaundice Reviewed most current lab test results and cultures  YES Reviewed most current radiology test results   YES Review and summation of old records today    NO Reviewed patient's current orders and MAR    YES 
PMH/SH reviewed - no change compared to H&P 
 ________________________________________________________________________ Care Plan discussed with: 
  Comments Patient Family RN Care Manager Consultant Multidiciplinary team rounds were held today with , nursing, pharmacist and clinical coordinator. Patient's plan of care was discussed; medications were reviewed and discharge planning was addressed. ________________________________________________________________________ Total NON critical care TIME:  20   Minutes Total CRITICAL CARE TIME Spent:   Minutes non procedure based Comments >50% of visit spent in counseling and coordination of care    
________________________________________________________________________ Ivonne Garcia MD  
 
Procedures: see electronic medical records for all procedures/Xrays and details which were not copied into this note but were reviewed prior to creation of Plan. LABS: 
I reviewed today's most current labs and imaging studies. Pertinent labs include: 
Recent Labs  
  03/17/20 
0449 03/15/20 
0403 WBC 17.6* 23.8* HGB 6.0* 7.8* HCT 18.2* 22.7*  
 112* Recent Labs  
  03/17/20 
0345 03/15/20 
0403  134* K 3.1* 4.0  
 101 CO2 24 27 * 74 BUN 29* 45* CREA 2.04* 3.04* CA 7.1* 7.7* ALB 2.0* 2.2* TBILI 1.7* 1.6* SGOT 30 59* ALT 28 49 Signed: Ivonne Garcia MD

## 2020-03-17 NOTE — PROGRESS NOTES
PerfectServed Dr. Marlen Martinez from nephrology to ask if blood transfusion be held for tomorrow during dialysis per Dr. Marianela Will request. 
 
8263-Attempted to call sister, the health care proxy for consent over phone for blood transfusion. 1130-Pt's BS was 62, gave 4 glucose and 12.5mg bolus of D50W and BG was 81 afterwards. 1532-blood transfusion started and ended up 1930 1920-report given to Kylah Butcher

## 2020-03-17 NOTE — PROGRESS NOTES
Vascular Surgery Progress Note Reji Adams ACNP-BC 
3/17/2020 Subjective:  
 
Shawn Deleon. is a 81 y.o.  male with a pmhx significant for Alzheimer's dementia, ASHD, IDDM, HTN, HLD, and GIB. He is admitted to the hospital w/ MRSA septicemia after presenting w/ an open ulceration of his left AVG that was placed in 04/2017 by Dr. Ashia Gutierrez. His urine also has grown MRSA and he has a sacral decubitus ulcer. Despite removal of LUE AVG and Robbie catheter placement on 03/11/2020 he continues to have a persistent leukocytosis. Repeat blood cultures remains positive. A TTE was unsuccessful yesterday. A ROSELINE is + for severe mitral regurgitation. He has an EF of 15-20%. Plan is for attempt at ROSELINE and NM stress test.   
 
This am patient continues to complain of feeling unwell. He is anemic w/ a HGB of 6.0 and a HCT of 18.2. His WBC is slight improved at 17.6. He is hypothermic. With a nl HR and RR. Nursing Data:  
 
Patient Vitals for the past 24 hrs: 
 BP Temp Temp src Pulse Resp SpO2 Weight 03/12/20 0800 122/71 97.2 °F (36.2 °C)  85 14 100 %   
03/12/20 0700 124/69   84 11 100 %   
03/12/20 0600 127/75   89 16 100 %   
03/12/20 0500 132/73   84 16 100 %   
03/12/20 0400 119/71 97.4 °F (36.3 °C)  82 14 100 %   
03/12/20 0300 125/77   86 13 100 %   
03/12/20 0200 129/79   90 19 100 %   
03/12/20 0130 129/75   87 11 100 %   
03/12/20 0100 131/80   90 18 100 %   
03/12/20 0030 127/81 97.6 °F (36.4 °C)  93 17 100 %   
03/12/20 0015 144/88   90     
03/12/20 0000 136/87   89 16 100 %   
03/11/20 2345 139/89   90     
03/11/20 2330 139/87   88 18 100 %   
03/11/20 2315 136/84 97.8 °F (36.6 °C) Oral 91     
03/11/20 2300 132/85   90     
03/11/20 2245 138/83   91 17 100 %   
03/11/20 2230 135/84   88 14 98 %   
03/11/20 2215 130/89   91     
03/11/20 2200 128/88   89 19 98 %   
 03/11/20 2145 126/82   89 17 97 %   
03/11/20 2131 125/82   89 20 97 %   
03/11/20 2115 116/76   86 17    
03/11/20 2110 106/72 97.4 °F (36.3 °C) Oral 86 20 96 %   
03/11/20 2100 107/78 (!) 94.4 °F (34.7 °C)  83 17 97 % 51.8 kg (114 lb 3.2 oz) 03/11/20 2035 110/75   84 18 96 %   
03/11/20 2030 108/76   83 19 96 %   
03/11/20 2025 110/77   83 18 97 %   
03/11/20 2020 105/74   83 20 97 %   
03/11/20 2015 106/78   84 19 97 %   
03/11/20 2010 105/74   84 18 96 %   
03/11/20 2005 105/77   83 19 97 %   
03/11/20 2000 104/76   84 22 97 %   
03/11/20 1955 106/72   83 21 97 %   
03/11/20 1950 101/74   84 20 97 %   
03/11/20 1945 104/72   83 19 97 %   
03/11/20 1940 103/72   83 23 97 %   
03/11/20 1930 102/79 97.1 °F (36.2 °C)  83 15 97 %   
03/11/20 1920 104/68   86 19 99 %   
03/11/20 1910 110/76   85 22 98 %   
03/11/20 1900 106/75   85 15 100 %   
03/11/20 1850 105/76   84 19 98 %   
03/11/20 1840 109/84   83 18 99 %   
03/11/20 1835 108/76   83 16 99 %   
03/11/20 1830 109/74   83 16 97 %   
03/11/20 1825 106/77   82 14 98 %   
03/11/20 1820 105/74   82 15 98 %   
03/11/20 1815 101/74   81 28 98 %   
03/11/20 1810 108/72   83 17 99 %   
03/11/20 1805 104/68   82 14 97 %   
 
--------------------------------------------------------------------------------------------------------- Intake/Output Summary (Last 24 hours) at 3/17/2020 1127 Last data filed at 3/17/2020 1118 Gross per 24 hour Intake 808 ml Output 1000 ml Net -192 ml Exam:  
 
Physical Exam 
Constitutional:   
   Comments: Frail, elderly, cachetic AAM who is at his neurological baseline. HENT:  
   Head: Normocephalic. Nose: Nose normal.  
   Mouth/Throat:  
   Mouth: Mucous membranes are dry Neck: Musculoskeletal: Normal range of motion. Cardiovascular:  
   Rate and Rhythm: Normal rate and regular rhythm. Pulmonary: Effort: Pulmonary effort is normal.  
   Breath sounds: Normal breath sounds. Abdominal:  
   General: Abdomen is flat. Palpations: Abdomen is soft. Musculoskeletal: Normal range of motion. Skin: 
   General: Skin is cool. Comments: Dressing to left arm C/D/I. Swelling significantly improved. Sacral decub. Lab Review:  
 
. Recent Results (from the past 24 hour(s)) GLUCOSE, POC Collection Time: 03/16/20 11:51 AM  
Result Value Ref Range Glucose (POC) 173 (H) 65 - 100 mg/dL Performed by Elizabet GARG   
GLUCOSE, POC Collection Time: 03/16/20  6:22 PM  
Result Value Ref Range Glucose (POC) 59 (L) 65 - 100 mg/dL Performed by Lissett Roman LPN   
GLUCOSE, POC Collection Time: 03/16/20  6:35 PM  
Result Value Ref Range Glucose (POC) 62 (L) 65 - 100 mg/dL Performed by Lissett Roman LPN   
GLUCOSE, POC Collection Time: 03/16/20  6:59 PM  
Result Value Ref Range Glucose (POC) 78 65 - 100 mg/dL Performed by Lissett Roman LPN   
GLUCOSE, POC Collection Time: 03/16/20  7:09 PM  
Result Value Ref Range Glucose (POC) 107 (H) 65 - 100 mg/dL Performed by Lissett Roman LPN   
GLUCOSE, POC Collection Time: 03/17/20  1:40 AM  
Result Value Ref Range Glucose (POC) 248 (H) 65 - 100 mg/dL Performed by Sae Chung [facility]   
LIPID PANEL Collection Time: 03/17/20  3:45 AM  
Result Value Ref Range LIPID PROFILE Cholesterol, total 88 <200 MG/DL Triglyceride 48 <150 MG/DL  
 HDL Cholesterol 53 MG/DL  
 LDL, calculated 25.4 0 - 100 MG/DL VLDL, calculated 9.6 MG/DL  
 CHOL/HDL Ratio 1.7 0.0 - 5.0 METABOLIC PANEL, COMPREHENSIVE Collection Time: 03/17/20  3:45 AM  
Result Value Ref Range Sodium 136 136 - 145 mmol/L Potassium 3.1 (L) 3.5 - 5.1 mmol/L Chloride 106 97 - 108 mmol/L  
 CO2 24 21 - 32 mmol/L Anion gap 6 5 - 15 mmol/L Glucose 133 (H) 65 - 100 mg/dL  BUN 29 (H) 6 - 20 MG/DL  
 Creatinine 2.04 (H) 0.70 - 1.30 MG/DL  
 BUN/Creatinine ratio 14 12 - 20 GFR est AA 40 (L) >60 ml/min/1.73m2 GFR est non-AA 33 (L) >60 ml/min/1.73m2 Calcium 7.1 (L) 8.5 - 10.1 MG/DL Bilirubin, total 1.7 (H) 0.2 - 1.0 MG/DL  
 ALT (SGPT) 28 12 - 78 U/L  
 AST (SGOT) 30 15 - 37 U/L Alk. phosphatase 410 (H) 45 - 117 U/L Protein, total 5.1 (L) 6.4 - 8.2 g/dL Albumin 2.0 (L) 3.5 - 5.0 g/dL Globulin 3.1 2.0 - 4.0 g/dL A-G Ratio 0.6 (L) 1.1 - 2.2    
CBC W/O DIFF Collection Time: 03/17/20  4:49 AM  
Result Value Ref Range WBC 17.6 (H) 4.1 - 11.1 K/uL  
 RBC 2.17 (L) 4.10 - 5.70 M/uL HGB 6.0 (L) 12.1 - 17.0 g/dL HCT 18.2 (L) 36.6 - 50.3 % MCV 83.9 80.0 - 99.0 FL  
 MCH 27.6 26.0 - 34.0 PG  
 MCHC 33.0 30.0 - 36.5 g/dL  
 RDW 19.3 (H) 11.5 - 14.5 % PLATELET 916 424 - 361 K/uL MPV 11.9 8.9 - 12.9 FL  
 NRBC 0.1 (H) 0  WBC ABSOLUTE NRBC 0.02 (H) 0.00 - 0.01 K/uL SAMPLES BEING HELD Collection Time: 03/17/20  4:49 AM  
Result Value Ref Range SAMPLES BEING HELD 1 PST COMMENT Add-on orders for these samples will be processed based on acceptable specimen integrity and analyte stability, which may vary by analyte. GLUCOSE, POC Collection Time: 03/17/20  6:58 AM  
Result Value Ref Range Glucose (POC) 71 65 - 100 mg/dL Performed by Yolanda Flores LPN   
GLUCOSE, POC Collection Time: 03/17/20  7:14 AM  
Result Value Ref Range Glucose (POC) 75 65 - 100 mg/dL Performed by Raquelgene Nita (PCT) Assessment/Plan:  
  
Consult problems: 
Multifactorial MRSA septecemia 
-w/ LUE AVG that was exposed due to ulceration s/p explantation on 03/11/2020. Surgical wound cx + for MRSA. -HD access via a Robbie catheter placed on 03/11/2020. 
-MRSA UTI: urine culture + for MRSA w/ underlying BPH on Flomax -open sacral decubitus ulcer  
-elevated ALK phos, mildly elevated AST, & hyperbilirubinemia-labile.   Is this also source? RUQ neg for CBD on 9/8/3031. 
-right basilic vein thrombus w/ associated superficial thrombophlebitis 
 -no need for anticoagulation. Risk of PE from UE DVT is rare and w/ his current anemia the risk of bleeding is greater than the benefit. -repeat blood cx w/ persistent MRSA (last drawn on 03/12/2020) 
-thrombocytopenia now resolved 
-metabolic encephalopathy now resolved Patient has residual hardware in the LUE that is scarred in and would require a prolonged operation under general anesthesia. He is profoundly malnourished w/ hypoalbuminemia, chronic pleural effusion, and an EF of 15-20%. He is an ESRD w/ a baseline dementia and is a LTC resident. The procedure would have a high risk of morbidity and mortality. He currently does not have any evidence of erythema or edema at the site and has at least 1 other source (+urine cx). Risk of procedure continues to out weigh benefit at this time. Antibx per infectious disease. Wound care for sacral decub per wound care team.   
 
1) It is unclear if MRSA has been cleared from the urine as there is no repeat cx. Consider repeating urine cx. Woudd a Urology consult for bladder irrigation be helpful if repeat urine cx +??? He is retaining? 2) Consider GI consult to exclude abd as a source ??? 
3) Consider deep tissue cx of sacral wound 4) Need for TTE per cardiology and infectious disease. Suspect he has MRSA of multiple sources. Origin of source unclear and it may be difficult to clear from all sources. Consider palliative care consult. Active problems: 
ESRD on HD Hyponatremia  
-resolved Hyperkalemia/Hypokalemia Anemia of chronic disease/Iron deficiency  
-on Retacrit and ferrous sulfate Transfusion per nephrology Plan per Nephrology Severe protein calorie malnutrition Appreciate input from nutrition services Hypoalbuminemia Hypothermia Hypotension w/ hx of HTN  
-on BBlocker Hypoxia Chronic left pleural effusion (large left pleural effusion TTE 3/12/2020) Moderate to severe mitral regurgitation Chronic systolic CHF 
-EF 77-77% Mild to moderate pulmonary hypertension IDDM -hypoglycemia resolved w/ dextrose IVF while NPO  
-on SSI only Hypothyroidism -TSH 6.66 (2/12/2020) ASHD Hyperlipidemia  
-LDL 25.4 Alzheimer's dementia Legal blindness Remote hx of prostate cancer Management of comorbid conditions by primary team. 
 
VTE Prophylaxis: 
Resume at discretion of primary team  
  
Disposition: 
Sariah Paz

## 2020-03-17 NOTE — PROGRESS NOTES
Bedside and Verbal shift change report given to Elvi Bridges (oncoming nurse) by Olesya (offgoing nurse). Report included the following information SBAR, Kardex, Procedure Summary, Intake/Output, MAR, Recent Results and Med Rec Status.

## 2020-03-17 NOTE — PROGRESS NOTES
General Surgery End of Shift Nursing Note Bedside shift change report given to Olesya (oncoming nurse) by Giorgi Azar (offgoing nurse). Report included the following information SBAR, Kardex, Intake/Output, MAR and Recent Results. Shift worked:   7a-7p Summary of shift:    Pt went for ROSELINE procedure but unable to finish, will try again tomorrow or Wednesday. Dialysis removed 1000cc today. Pt BS was 59 gave apple juice and it went up to 62. Then gave 4 glucose tabs and BS is now 102. Issues for physician to address:   None [employer]

## 2020-03-17 NOTE — PROGRESS NOTES
General Surgery End of Shift Nursing Note Bedside shift change report given to Kylah Butcher (oncoming nurse) by Jacqueline Redding (offgoing nurse). Report included the following information SBAR, Kardex, Intake/Output and MAR. Shift worked:   7a-7p Summary of shift:    Patient treated for hypoglycemia at lunch time. Blood glucose has been normal. 1 unit of PRBC transfusion completed. Consent signed for procedures tomorrow. Blood cultures sent to lab. Issues for physician to address:   Poor oral intake Number times ambulated in hallway past shift: 0 Number of times OOB to chair past shift: 0 Pain Management: 
Current medication: tylenol Patient states pain is manageable on current pain medication: YES 
 
GI: 
 
Current diet:  DIET NUTRITIONAL SUPPLEMENTS All Meals; [place] DIET RENAL Regular DIET NPO 
DIET NUTRITIONAL SUPPLEMENTS All Meals, Breakfast, Lunch, Dinner; [employer] DIET ONE TIME MESSAGE Patient Safety: 
 
Falls Score: 3 Bed Alarm On? No 
Sitter?  No 
 
Cherelle Whalen RN

## 2020-03-17 NOTE — PROGRESS NOTES
Name: Saritha Jama. MRN: [id number] : 1950 Assessment: 
 
ESRD ([facility]; MWF) Fever/MRSA Bacteremia Infected L AVG with ulceration; surgery 3/11; using robbie at present HTN Hyponatremia, mild Anemia of ESRD High Phos/SHPT High Mg Dementia 
  
 
Plan/Recommendations: 
 
Abx per primary team 
3/12 [facility] + MRSA No HD today. On calcitriol 
retacrit 10 k sc tiw (initiated 3/16); prn prbc's (to get one unit today - slow infusion) Avoid Mg containing laxatives or supplements Amlodipine stopped; -150 Will need Permacath prior to d/c, wonce he is afebrile and f/u BCx are negative Subjective: 
Alert. In good spirits. No specific complaint. Has baseline confusion and lacks complete insight Tolerating HD well ROS:  
Not reliable, but no c/o Exam: 
Visit Vitals /68 (BP 1 Location: Right arm, BP Patient Position: At rest) Pulse 87 Temp 97.7 °F (36.5 °C) Resp 18 Ht 5' 5\" (1.651 m) Wt 56.9 kg (125 lb 7.1 oz) SpO2 95% BMI 20.87 kg/m² Elderly cachectic in NAD 
R Robbie in place Clear RRR 
L AVG site with drsg No edema Alert awake Legally blind Current Facility-Administered Medications Medication Dose Route Frequency Last Dose  epoetin shaye-epbx (RETACRIT) injection 10,000 Units  10,000 Units SubCUTAneous Q MON, WED & FRI 10,000 Units at 20 0134  carvediloL (COREG) tablet 3.125 mg  3.125 mg Oral BID WITH MEALS 3.125 mg at 20 1831  
 heparin (porcine) 1,000 unit/mL injection 1,000 Units  1,000 Units InterCATHeter DIALYSIS PRN    
 albumin human 25% (BUMINATE) solution 12.5 g  12.5 g IntraVENous DIALYSIS PRN    
 heparin (porcine) 1,000 unit/mL injection 2,400 Units  2,400 Units Hemodialysis DIALYSIS PRN 2,400 Units at 20 4800 Subjective   Patient ID: Joey is a 42 year old female.  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  She fell down some stairs on [employer] injuring her right heel.  After her fall she complained of pain swelling and difficulty walking.  She was seen at the emergency room at [facility] where x-rays revealed a fracture for which she was placed into a splint and now presents to the office for follow-up.  Currently denies numbness tingling.  Denies loss of consciousness.    She was sent to see me for consultation by Dr. Figueroa.    Joey has no past medical history on file.  Joey has Closed nondisplaced fracture of body of right calcaneus on their problem list.  Joey has no past surgical history on file.  Her family history is not on file.  Joey Cook currently has no medications in their medication list.  Joey   No current outpatient medications on file.     No current facility-administered medications for this visit.      Joey has no allergies on file.    ROS     Objective     Physical Examination:  Constitutional:  Well-developed, well-nourished female in no acute distress.  Psychiatric:  Normal affect  Skin: Warm, dry, intact without rash or lesion.  Neck: Normal appearing neck  Pulmonary: No labored respirations  Musculoskeletal:    Examination of the injured right foot and ankle reveals the skin is intact.  The patient has strong pedal pulses and intact sensation.  The patient is able to wiggle all digits.  The patient denies forefoot and midfoot pain.  The patient is not tender over the distal fibula.   No tenderness located medially.  Any movement of the ankle is associated with significant pain.  Her greatest pain is with palpation of her calcaneus    The opposite ankle is without pain, swelling, deformity.    Imaging  Reading/Results:  No image results found.      X-rays of right foot and ankle reveal a nondisplaced calcaneus fracture in excellent alignment.  These were done at [facility].    XR ADVOCATE PROCEDURE  Narrative: Accession #    [id number]    EXAM: XR FOOT RT MIN 3V    CLINICAL INDICATION: Trauma, right foot pain.    COMPARISON: None.    FINDINGS: Four views are presented.  On the lateral view there is an abnormal lucency along   the anterior aspect of the calcaneus suspicious for a nondisplaced calcaneal fracture.  No   additional fracture is identified.  The osseous structures are aligned.  Overlying soft tissues   unremarkable.  Small inferior calcaneal spur.  Impression:  Nondisplaced calcaneal fracture.    -----  F I N A L  -----    Transcribed By: SIOMARA   12/24/18 9:23 pm    Dictated By:            SHIRA, JULIO CONNER MD    Electronically Reviewed and Approved By:           SHIRA, JULIO CONNER MD  12/24/18 9:24 pm      Assessment:  Assessment   Problem List Items Addressed This Visit        Musculoskeletal    Closed nondisplaced fracture of body of right calcaneus - Primary          Plan:  I have recommended closed treatment for her nondisplaced calcaneus fracture.     She was placed into her splint and follow-up with me 10 days post fracture at which point we will place her into a cam walker boot.  She will be nonweightbearing for 8-10 weeks.  She will be able to start range of motion activities after next visit.        Toñito Gill MD      acetaminophen (TYLENOL) tablet 650 mg  650 mg Oral Q6H  mg at 03/15/20 2100  
 albuterol (PROVENTIL VENTOLIN) nebulizer solution 2.5 mg  2.5 mg Nebulization Q4H PRN    
 aspirin delayed-release tablet 81 mg  81 mg Oral DAILY 81 mg at 03/16/20 1056  calcitRIOL (ROCALTROL) capsule 0.25 mcg  0.25 mcg Oral Q MON, WED & FRI 0.25 mcg at 03/16/20 2135  ferrous sulfate tablet 325 mg  325 mg Oral  mg at 03/16/20 1058  glucagon (GLUCAGEN) injection 1 mg  1 mg IntraMUSCular PRN    
 levothyroxine (SYNTHROID) tablet 75 mcg  75 mcg Oral ACB 75 mcg at 03/16/20 1057  pantoprazole (PROTONIX) tablet 40 mg  40 mg Oral DAILY 40 mg at 03/16/20 1058  prednisoLONE acetate (PRED FORTE) 1 % ophthalmic suspension 1 Drop  1 Drop Right Eye BID 1 Drop at 03/16/20 1832  tamsulosin (FLOMAX) capsule 0.4 mg  0.4 mg Oral DAILY 0.4 mg at 03/16/20 1056  B complex-vitaminC-folic acid (NEPHROCAP) cap  1 Cap Oral DAILY 1 Cap at 03/16/20 1058  sodium chloride (NS) flush 5-10 mL  5-10 mL IntraVENous PRN 10 mL at 03/13/20 2020  ondansetron (ZOFRAN) injection 4 mg  4 mg IntraVENous Q6H PRN 4 mg at 03/11/20 1729  
 insulin lispro (HUMALOG) injection   SubCUTAneous AC&HS 2 Units at 03/16/20 2200  
 glucose chewable tablet 16 g  4 Tab Oral PRN 16 g at 03/16/20 1845  
 dextrose (D50W) injection syrg 12.5-25 g  12.5-25 g IntraVENous PRN 25 g at 03/15/20 0642  
 glucagon (GLUCAGEN) injection 1 mg  1 mg IntraMUSCular PRN    
 VANCOMYCIN INFORMATION NOTE   Other Rx Dosing/Monitoring Labs/Data: 
 
Lab Results Component Value Date/Time WBC 17.6 (H) 03/17/2020 04:49 AM  
 Hemoglobin (POC) 10.5 (L) 04/07/2017 07:30 AM  
 HGB 6.0 (L) 03/17/2020 04:49 AM  
 Hematocrit (POC) 31 (L) 04/07/2017 07:30 AM  
 HCT 18.2 (L) 03/17/2020 04:49 AM  
 PLATELET 254 31/44/7016 04:49 AM  
 MCV 83.9 03/17/2020 04:49 AM  
 
 
Lab Results Component Value Date/Time  Sodium 136 03/17/2020 03:45 AM  
 Potassium 3.1 (L) 03/17/2020 03:45 AM  
 Chloride 106 03/17/2020 03:45 AM  
 CO2 24 03/17/2020 03:45 AM  
 Anion gap 6 03/17/2020 03:45 AM  
 Glucose 133 (H) 03/17/2020 03:45 AM  
 BUN 29 (H) 03/17/2020 03:45 AM  
 Creatinine 2.04 (H) 03/17/2020 03:45 AM  
 BUN/Creatinine ratio 14 03/17/2020 03:45 AM  
 GFR est AA 40 (L) 03/17/2020 03:45 AM  
 GFR est non-AA 33 (L) 03/17/2020 03:45 AM  
 Calcium 7.1 (L) 03/17/2020 03:45 AM  
 
 
Wt Readings from Last 3 Encounters:  
03/16/20 56.9 kg (125 lb 7.1 oz) 03/09/20 49.8 kg (109 lb 12.6 oz) 02/13/20 48.4 kg (106 lb 11.2 oz) Intake/Output Summary (Last 24 hours) at 3/17/2020 5177 Last data filed at 3/16/2020 1839 Gross per 24 hour Intake 758 ml Output 1000 ml Net -242 ml Patient seen and examined. Chart reviewed. Labs, data and other pertinent notes reviewed in last 24 hrs. PMH/SH/FH reviewed and unchanged compared to H&P Discussed with pt and HD RN 
 
Tre Rivera MD

## 2020-03-18 NOTE — PROGRESS NOTES
TRANSFER - IN REPORT: 
 
Verbal report received from [address] on [employer].  being received from [facility] for ordered procedure Report consisted of patients Situation, Background, Assessment and  
Recommendations(SBAR). Information from the following report(s) SBAR, Kardex and Cardiac Rhythm NSR was reviewed with the receiving nurse. Opportunity for questions and clarification was provided. Assessment completed upon patients arrival to unit and care assumed.

## 2020-03-18 NOTE — PROGRESS NOTES
Spiritual Care Assessment/Progress Note [facility] 
 
 
NAME: Daniela Hebert MRN: [id number] AGE: 81 y.o. SEX: male Mandaen Affiliation: [facility]  
Language: [place] 3/18/2020     Total Time (in minutes): 10 Spiritual Assessment begun in [facility] GENERAL SURGERY through conversation with: 
  
    [x]Patient        [] Family    [] Friend(s) Reason for Consult: Palliative Care, Initial/Spiritual Assessment Spiritual beliefs: (Please include comment if needed) [x] Identifies with a oliverio tradition:     
   [x] Supported by a oliverio community:        
   [] Claims no spiritual orientation:       
   [] Seeking spiritual identity:            
   [] Adheres to an individual form of spirituality:       
   [] Not able to assess:                   
 
    
Identified resources for coping:  
   [x] Prayer                           
   [] Music                  [] Guided Imagery [x] Family/friends                 [] Pet visits [] Devotional reading                         [] Unknown 
   [] Other:                                         
 
 
Interventions offered during this visit: (See comments for more details) Patient Interventions: Affirmation of emotions/emotional suffering, Affirmation of oliverio, Prayer (assurance of), Iconic (affirming the presence of God/Higher Power) Plan of Care: 
 
 [] Support spiritual and/or cultural needs  
 [] Support AMD and/or advance care planning process    
 [] Support grieving process 
 [] Coordinate Rites and/or Rituals  
 [] Coordination with community clergy 
 [x] No spiritual needs identified at this time 
 [] Detailed Plan of Care below (See Comments)  [] Make referral to Music Therapy 
[] Make referral to Pet Therapy    
[] Make referral to Addiction services 
[] Make referral to [facility] 
[] Make referral to Spiritual Care Partner 
[] No future visits requested       
[x] Follow up visits as needed Comments: Visited with patient to make a spiritual assessment. The patient did not have family/friends present. The patient was receptive to the visit. The patient indicated that he has a spiritual side to his life and that he goes to [employer] sometime . The patient shared that he has the support of his parent(s), as well as brothers and sisters. The patient stated that he did not desire spiritual support at this time. Provided assurance to the patient that he would be kept in prayer. Advised of  availability. Chaplains will follow as able and/or needed. Rev. Johnathon Lomax EdD MDiv  For [facility] Page [phone])

## 2020-03-18 NOTE — ANESTHESIA PREPROCEDURE EVALUATION
Anesthetic History No history of anesthetic complications Review of Systems / Medical History Patient summary reviewed, nursing notes reviewed and pertinent labs reviewed Pulmonary Shortness of breath Neuro/Psych Dementia Comments: Alzheimer's Dementia Hx encephalopathy Altered mental status Cardiovascular Hypertension: well controlled CAD and hyperlipidemia Exercise tolerance: <4 METS 
  
GI/Hepatic/Renal 
  
 
 
Renal disease: dialysis and ESRD Comments: History of GI bleed Endo/Other Diabetes: poorly controlled, using insulin Anemia Other Findings Comments: LEFT ARM INFECTED AV GRAFT Hyponatremia Hx prostate ca Blind right eye Unintentional weight loss Physical Exam 
 
Airway Mallampati: III 
TM Distance: > 6 cm Neck ROM: normal range of motion Mouth opening: Normal 
 
 Cardiovascular Regular rate and rhythm,  S1 and S2 normal,  no murmur, click, rub, or gallop Dental 
 
Dentition: Edentulous Pulmonary Breath sounds clear to auscultation Abdominal 
GI exam deferred Other Findings Anesthetic Plan ASA: 4 Anesthesia type: total IV anesthesia Induction: Intravenous Anesthetic plan and risks discussed with: Patient

## 2020-03-18 NOTE — PROGRESS NOTES
Anesthesia name:  Paola Rodríguez, Nurse anesthetist  
 
Anesthesia is present for case. Refer to anesthesia log for vitals.

## 2020-03-18 NOTE — PROGRESS NOTES
ADULT PROTOCOL: JET AEROSOL ASSESSMENT Patient  Oleg Cartagena.     81 y.o.   male     3/18/2020  12:39 AM 
 
Breath Sounds Pre Procedure: Right Breath Sounds: Clear, Diminished Left Breath Sounds: Clear, Diminished Breath Sounds Post Procedure: Right Breath Sounds: Clear, Diminished Left Breath Sounds: Clear, Diminished Breathing pattern: Pre procedure Breathing Pattern: Regular Post procedure Breathing Pattern: Regular Heart Rate: Pre procedure Pulse: 89 
         Post procedure Pulse: 96 
 
Resp Rate: Pre procedure Respirations: 22 Post procedure Respirations: 22 Oxygen: O2 Device: Nasal cannula   Flow rate (L/min) 4 Changed: NO SpO2: Pre procedure SpO2: 90 %   with oxygen Post procedure SpO2: 93 %  with oxygen Nebulizer Therapy: Current medications Aerosolized Medications: DuoNeb Changed: YES, TID Smoking History: never Problem List:  
Patient Active Problem List  
Diagnosis Code  Pure hypercholesterolemia E78.00  Prostate cancer ([facility] C61  Dementia ([facility] F03.90  Blind one eye H54.40  DM (diabetes mellitus), type 2, uncontrolled ([facility] E11.65  
 Alzheimer disease ([facility] G30.9, F02.80  CAD (coronary artery disease) I25.10  
 HTN (hypertension) I10  
 Counseling regarding goals of care Z71.89  
 Do not resuscitate discussion Z71.89  
 Anemia in chronic kidney disease N18.9, D63.1  Acute pulmonary edema (HCC) J81.0  Hypothermia T68. Noelle Lord  Hypoglycemia E16.2  Poor appetite R63.0  Weight loss, unintentional R63.4  Acute encephalopathy G93.40  Severe sepsis (HCC) A41.9, R65.20  Cardiomyopathy ([facility] I42.9  Bacteremia R78.81  
 ESRD (end stage renal disease) ([facility] N18.6  Anemia D64.9 Respiratory Therapist: Maryan Toledo RT

## 2020-03-18 NOTE — PROGRESS NOTES
Vascular Surgery Progress Note Simon Brand ACNP-BC 
3/18/2020 Subjective:  
 
Melva Gamble is a 81 y.o.  male with a pmhx significant for Alzheimer's dementia, ASHD, IDDM, HTN, HLD, and GIB. He is admitted to the hospital w/ MRSA septicemia after presenting w/ an open ulceration of his left AVG that was placed in 04/2017 by Dr. Mayank Almazan. His urine also has grown MRSA and he has a sacral decubitus ulcer. Despite removal of LUE AVG and Robbie catheter placement on 03/11/2020 he continues to have a persistent leukocytosis. Repeat blood cultures remains positive. A TTE was unsuccessful yesterday. A ROSELINE is + for severe mitral regurgitation. He has an EF of 15-20%. ROSELINE is negative for vegetation. Patient was seen this am on HD and states he feels better. Leukocytosis has improved overnight; however, he continues to have positive blood cxs. He remains hypothermic. VS are stable. Nursing Data:  
 
Patient Vitals for the past 24 hrs: 
 BP Temp Temp src Pulse Resp SpO2 Weight 03/12/20 0800 122/71 97.2 °F (36.2 °C)  85 14 100 %   
03/12/20 0700 124/69   84 11 100 %   
03/12/20 0600 127/75   89 16 100 %   
03/12/20 0500 132/73   84 16 100 %   
03/12/20 0400 119/71 97.4 °F (36.3 °C)  82 14 100 %   
03/12/20 0300 125/77   86 13 100 %   
03/12/20 0200 129/79   90 19 100 %   
03/12/20 0130 129/75   87 11 100 %   
03/12/20 0100 131/80   90 18 100 %   
03/12/20 0030 127/81 97.6 °F (36.4 °C)  93 17 100 %   
03/12/20 0015 144/88   90     
03/12/20 0000 136/87   89 16 100 %   
03/11/20 2345 139/89   90     
03/11/20 2330 139/87   88 18 100 %   
03/11/20 2315 136/84 97.8 °F (36.6 °C) Oral 91     
03/11/20 2300 132/85   90     
03/11/20 2245 138/83   91 17 100 %   
03/11/20 2230 135/84   88 14 98 %   
03/11/20 2215 130/89   91     
03/11/20 2200 128/88   89 19 98 %   
03/11/20 2145 126/82   89 17 97 %   
 03/11/20 2131 125/82   89 20 97 %   
03/11/20 2115 116/76   86 17    
03/11/20 2110 106/72 97.4 °F (36.3 °C) Oral 86 20 96 %   
03/11/20 2100 107/78 (!) 94.4 °F (34.7 °C)  83 17 97 % 51.8 kg (114 lb 3.2 oz) 03/11/20 2035 110/75   84 18 96 %   
03/11/20 2030 108/76   83 19 96 %   
03/11/20 2025 110/77   83 18 97 %   
03/11/20 2020 105/74   83 20 97 %   
03/11/20 2015 106/78   84 19 97 %   
03/11/20 2010 105/74   84 18 96 %   
03/11/20 2005 105/77   83 19 97 %   
03/11/20 2000 104/76   84 22 97 %   
03/11/20 1955 106/72   83 21 97 %   
03/11/20 1950 101/74   84 20 97 %   
03/11/20 1945 104/72   83 19 97 %   
03/11/20 1940 103/72   83 23 97 %   
03/11/20 1930 102/79 97.1 °F (36.2 °C)  83 15 97 %   
03/11/20 1920 104/68   86 19 99 %   
03/11/20 1910 110/76   85 22 98 %   
03/11/20 1900 106/75   85 15 100 %   
03/11/20 1850 105/76   84 19 98 %   
03/11/20 1840 109/84   83 18 99 %   
03/11/20 1835 108/76   83 16 99 %   
03/11/20 1830 109/74   83 16 97 %   
03/11/20 1825 106/77   82 14 98 %   
03/11/20 1820 105/74   82 15 98 %   
03/11/20 1815 101/74   81 28 98 %   
03/11/20 1810 108/72   83 17 99 %   
03/11/20 1805 104/68   82 14 97 %   
 
--------------------------------------------------------------------------------------------------------- Intake/Output Summary (Last 24 hours) at 3/18/2020 1356 Last data filed at 3/18/2020 1059 Gross per 24 hour Intake 434.6 ml Output 300 ml Net 134.6 ml  
 
 
Exam:  
 
Physical Exam 
Constitutional:   
   Comments: Frail, elderly, cachetic AAM who is at his neurological baseline. HENT:  
   Head: Normocephalic. Nose: Nose normal.  
   Mouth/Throat:  
   Mouth: Mucous membranes are dry Neck: Musculoskeletal: Normal range of motion. Cardiovascular:  
   Rate and Rhythm: Normal rate and regular rhythm.   
Pulmonary:  
   Effort: Pulmonary effort is normal.  
 Breath sounds: Normal breath sounds. Abdominal:  
   General: Abdomen is flat. Palpations: Abdomen is soft. Musculoskeletal: Normal range of motion. Skin: 
   General: Skin is cool. Comments: Dressing to left arm C/D/I. Swelling significantly improved. Sacral decub. Lab Review:  
 
. Recent Results (from the past 24 hour(s)) GLUCOSE, POC Collection Time: 03/17/20  4:44 PM  
Result Value Ref Range Glucose (POC) 118 (H) 65 - 100 mg/dL Performed by Michel Miller ([facility]) GLUCOSE, POC Collection Time: 03/17/20  9:19 PM  
Result Value Ref Range Glucose (POC) 95 65 - 100 mg/dL Performed by Lorena Pascual LPN   
CBC W/O DIFF Collection Time: 03/18/20  3:28 AM  
Result Value Ref Range WBC 13.4 (H) 4.1 - 11.1 K/uL  
 RBC 2.34 (L) 4.10 - 5.70 M/uL HGB 6.7 (L) 12.1 - 17.0 g/dL HCT 20.3 (L) 36.6 - 50.3 % MCV 86.8 80.0 - 99.0 FL  
 MCH 28.6 26.0 - 34.0 PG  
 MCHC 33.0 30.0 - 36.5 g/dL  
 RDW 17.5 (H) 11.5 - 14.5 % PLATELET 458 638 - 312 K/uL MPV 11.6 8.9 - 12.9 FL  
 NRBC 0.1 (H) 0  WBC ABSOLUTE NRBC 0.02 (H) 0.00 - 0.01 K/uL METABOLIC PANEL, BASIC Collection Time: 03/18/20  3:28 AM  
Result Value Ref Range Sodium 134 (L) 136 - 145 mmol/L Potassium 4.3 3.5 - 5.1 mmol/L Chloride 102 97 - 108 mmol/L  
 CO2 25 21 - 32 mmol/L Anion gap 7 5 - 15 mmol/L Glucose 48 (LL) 65 - 100 mg/dL BUN 42 (H) 6 - 20 MG/DL Creatinine 2.82 (H) 0.70 - 1.30 MG/DL  
 BUN/Creatinine ratio 15 12 - 20 GFR est AA 27 (L) >60 ml/min/1.73m2 GFR est non-AA 23 (L) >60 ml/min/1.73m2 Calcium 8.0 (L) 8.5 - 10.1 MG/DL  
PHOSPHORUS Collection Time: 03/18/20  3:28 AM  
Result Value Ref Range Phosphorus 5.6 (H) 2.6 - 4.7 MG/DL  
PTH INTACT Collection Time: 03/18/20  3:29 AM  
Result Value Ref Range Calcium 8.2 (L) 8.5 - 10.1 MG/DL  
 PTH, Intact 174.1 (H) 18.4 - 88.0 pg/mL GLUCOSE, POC  Collection Time: 03/18/20  4:20 AM  
 Result Value Ref Range Glucose (POC) 47 (LL) 65 - 100 mg/dL Performed by Clay Canas GLUCOSE, POC Collection Time: 03/18/20  4:52 AM  
Result Value Ref Range Glucose (POC) 156 (H) 65 - 100 mg/dL Performed by Gilmar Pickering LPN   
GLUCOSE, POC Collection Time: 03/18/20  6:49 AM  
Result Value Ref Range Glucose (POC) 95 65 - 100 mg/dL Performed by Gilmar Pickering LPN   
GLUCOSE, POC Collection Time: 03/18/20  7:46 AM  
Result Value Ref Range Glucose (POC) 77 65 - 100 mg/dL Performed by Pj Coffer GLUCOSE, POC Collection Time: 03/18/20  8:05 AM  
Result Value Ref Range Glucose (POC) 115 (H) 65 - 100 mg/dL Performed by Pj Coffer GLUCOSE, POC Collection Time: 03/18/20  8:34 AM  
Result Value Ref Range Glucose (POC) 158 (H) 65 - 100 mg/dL Performed by Pj Coffer GLUCOSE, POC Collection Time: 03/18/20  9:16 AM  
Result Value Ref Range Glucose (POC) 120 (H) 65 - 100 mg/dL Performed by Pj Coffer GLUCOSE, POC Collection Time: 03/18/20 11:25 AM  
Result Value Ref Range Glucose (POC) 97 65 - 100 mg/dL Performed by Milagro Morocho ECHO ROSELINE W OR WO CONTRAST Collection Time: 03/18/20 12:27 PM  
Result Value Ref Range PASP 54.0 mmHg Assessment/Plan:  
  
Consult problems: 
Multifactorial MRSA septecemia 
-w/ LUE AVG that was exposed due to ulceration s/p explantation on 03/11/2020. Surgical wound cx + for MRSA. -HD access via a Robbie catheter placed on 03/11/2020. 
-MRSA UTI: urine culture + for MRSA w/ underlying BPH on Flomax -open sacral decubitus ulcer  
-elevated ALK phos, mildly elevated AST, & hyperbilirubinemia-labile. Is this also source? RUQ neg for CBD on 4/7/6285. 
-right basilic vein thrombus w/ associated superficial thrombophlebitis 
 -no need for anticoagulation. Risk of PE from UE DVT is rare and w/ his current anemia the risk of bleeding is greater than the benefit. -repeat blood cx w/ persistent MRSA (last drawn on 03/12/2020) 
-thrombocytopenia now resolved 
-metabolic encephalopathy now resolved -ROSELINE negative for vegetation Patient has residual hardware in the LUE that is scarred in and would require a prolonged operation under general anesthesia. He is profoundly malnourished w/ hypoalbuminemia, chronic pleural effusion, and an EF of 25-30%. He is an ESRD w/ a baseline dementia and is a LTC resident. The procedure would have a high risk of morbidity and mortality. He currently does not have any evidence of erythema or edema at the site and has at least 1 other source (+urine cx). We will obtain a red tag cell scan in the am for further evaluation and to assist in determining if he is a candidate for removal of remains of graft. Antibx per infectious disease. Wound care for sacral decub per wound care team.   
 
1) It is unclear if MRSA has been cleared from the urine as there is no repeat cx. Consider repeating urine cx. Woudd a Urology consult for bladder irrigation be helpful if repeat urine cx +??? He is retaining minimal urine? Is this colonization of the urine? 2) Consider GI consult to exclude abd as a source ??? 
3) Consider deep tissue cx of sacral wound Suspect he has MRSA of multiple sources. Origin of source unclear and it may be difficult to clear from all sources. Consider palliative care consult. Active problems: 
ESRD on HD  
-will need perm-a-cath prior to discharge once blood cx are negative Hyponatremia  
-resolved Hyperkalemia/Hypokalemia Anemia of chronic disease/Iron deficiency  
-on Retacrit and ferrous sulfate. Improved s/p 1 unit of PRBCs. Transfusions per nephrology Plan per Nephrology Severe protein calorie malnutrition Appreciate input from nutrition services Hypoalbuminemia Hypothermia Hypotension w/ hx of HTN  
-on BBlocker Hypoxia Bilateral pleural effusion (large left pleural effusion TTE 3/12/2020) Ascites Moderate to severe mitral regurgitation Chronic systolic CHF 
-EF 54-45% Mild to moderate pulmonary hypertension ASHD Subendocardial ischemia Hypertension Hyperlipidemia  
-LDL 25.4 IDDM  
-profound hypoglycemia this am on SSI only Hypothyroidism -TSH 6.66 (2/12/2020) Alzheimer's dementia Legal blindness Remote hx of prostate cancer Management of comorbid conditions by primary team. 
 
VTE Prophylaxis: 
Resume at discretion of primary team  
  
Disposition: 
Sariah Tijerina 8

## 2020-03-18 NOTE — PROGRESS NOTES
2000: Introduced self to patient as secondary oncoming nurse. Patient was accepting and went back to resting. 2010: Witnessed RT come in and perform a nebulizing treatment on patient. 2120: Checked patients blood sugar. Sugar was 95. 
 
2200: Held patients insulin as coverage was not needed. 2325: Got patients midnight vitals. 0000: Patient is very verbal tonight by shouting very frequently. Jackson Claire went in and spoke with patient. 0010: Have had to go in a few times and talk to him about shouting. 0108: Patient is still awake. Patient is just restless. RN aware of restlessness. Patient has remained restless on and off all night long. 
 
0300: got patients morning labs. Lab called after 0400 am and gave results of a glucose of 48. [address] [facility] Leda, RN rechecked and confirmed that it was low at 47. She acted on low blood sugar protocol. I rechecked his sugar and it came up to 156 at 0450. Still having issues with getting BS under control. Goes from one side of scale to other. [address] [facility] Leda, RN notified of 0700 am's POC and she notified other personnel.

## 2020-03-18 NOTE — CONSULTS
Palliative Medicine Consult [place]: [phone] Patient Name: Mohan Vieira. YOB: 1950 Date of Initial Consult: 3/19/2020 Reason for Consult: care decisions Requesting Provider: Sheryl Bojorquez MD  
Primary Care Physician: Gabby Coy MD 
 
 SUMMARY:  
Mohan Vieira. is a 81 y.o. with a past history of frontotemporal Alzheimer's dz, CAD, prostate CA, ESRD on HD MWF, DM, who was admitted on 3/10/2020 from [address] [place] [address] with a diagnosis of severe sepsis. Current medical issues leading to Palliative Medicine involvement include: pt was inpatient 2/11-2/13/2020 with acute metabolic encephalopathy 2/2 hypoglycemia, hypothermia. Pt's functional status was poor due to dementia, ESRD on HD, legally blind, poor appetite with weight loss. This admission he was found to have sepsis due to infected AV graft, on 3/11/20 he underwent a partial explant of left upper extremity arteriovenous graft and placement of a Robbie catheter. During this admission he was found to have BiV failure, cardiomyopathy with EF 25-30% with decreased RV function and severe mitral regurg. Despite treatment with abx and removal of most of graft, blood, surgical wound and urine cultures remain pos with MRSA. Very poor prognosis. Pt is well-known to this provider from previous admission during which I met with his sister/Ramesh to discuss a feeding tube, which she decided against as it would not improve his QOL. Psychosocial: resides at [facility] for 7 years, is w/c dependent although spends most of his time in bed. PALLIATIVE DIAGNOSES:  
1. MRSA septicemia, also in surgical wound and urine 2. Acute encephalopathy in setting of dementia 3. Legally blind 4. Cardiomyopathy with EF 25-30% 5. Infected graft 6. Decubitus ulcer 7. ESRD on HD 8. Physical debility 9. Anemia of chronic disease 10. Thrombus in left basilic vein 11. Thrombocytopenia 12. Elevated LFTs 13. Care decisions PLAN: 1. Prior to visit, I completed an extensive review of patient's medical records, including medical documentation, vital signs, MARs, and results of various labs and other diagnostics. I also spoke with patient's nurse, Brijesh Collins and attending Dr. Severiano Austin. 2. 0930 am: multiple attempts at [facility], pt's sister who is listed as mPOA; no one answered her work phone, what's listed as her home phone is a fax, and she did not answer her cell phone and the voicemail was full. 3. 10:00 am: spoke with pt's daughter Ana Paula Bro, explained I was trying to get in touch with Lynda Guzman and hoped she could help. Ana Paula Bro reports that her understanding is that pt verbally stated a long time ago that Lynda Guzman would be his mPOA, however, she and her 2 sisters felt like they were not getting information regarding pt in a timely manner,ie, they didn't know pt was back in the hospital until last week. I suggested that we try to arrange a family meeting with Lynda Guzman and the 3 daughters so that they all could hear what the medical issues are and ask any questions that they might have so moving forward it would be clear and everyone would understand. Ana Paula Bro assured me she would get in touch with Lynda Guzman and her sisters to arrange a time to meet for today or tomorrow. We did discuss pt's current medical condition is very poor and poor prognosis. 4. 11:05am: responded to code blue: pt found unresponsive, PEA, pt has DNR, was allowed a peaceful death, pronounced dead at 11:00am.  Fredi Contreras at work, gave the phone to Jazmyne Garcia who informed her pt had . I then called Ana Paula Bro (per Josselyn's request) to update her on pt's death. 5. Initial consult note routed to primary continuity provider and/or primary health care team members 6.  Communicated plan of care with: Palliative IDT, [place] [address] Team 
 
 GOALS OF CARE / TREATMENT PREFERENCES:  
 
GOALS OF CARE:   
  
 
TREATMENT PREFERENCES:  
Code Status: DNR 
 
 Advance Care Planning: 
[x] The [facility] Interdisciplinary Team has updated the ACP Navigator with Ana and Patient Capacity Primary Decision Maker (Active): Brenna Huitron [facility] Proxy Per Pt's Mother) - Sister - [phone] Advance Care Planning 3/19/2020 Patient's Healthcare Decision Maker is: Legal Next of Kin Primary Decision Maker Name -  
Primary Decision Maker Phone Number -  
Primary Decision Maker Relationship to Patient -  
Confirm Advance Directive None Patient Would Like to Complete Advance Directive Unable Does the patient have other document types Do Not Resuscitate Other Instructions: Other: As far as possible, the palliative care team has discussed with patient / health care proxy about goals of care / treatment preferences for patient. HISTORY:  
 
History obtained from: chart CHIEF COMPLAINT: positive blood culture HPI/SUBJECTIVE: The patient is:  
[] Verbal and participatory [x] Non-participatory due to: dementia 3/10: BIBA with c/o positive blood cultures; pt was seen in our ED for a low-grade fever and sinus tachycardia, there was no apparent source of infection found, BC drawn and pt sent back to SNF. Today BC were pos, pt was called to return to ED. Today it was noted that there was an ulceration over his AV graft. Clinical Pain Assessment (nonverbal scale for severity on nonverbal patients):  
Clinical Pain Assessment Severity: 0 Activity (Movement): Lying quietly, normal position Duration: for how long has pt been experiencing pain (e.g., 2 days, 1 month, years) Frequency: how often pain is an issue (e.g., several times per day, once every few days, constant) FUNCTIONAL ASSESSMENT:  
 
Palliative Performance Scale (PPS): PPS: 0 
 
 
 PSYCHOSOCIAL/SPIRITUAL SCREENING:  
 
Palliative IDT has assessed this patient for cultural preferences / practices and a referral made as appropriate to needs (Cultural Services, Patient Advocacy, Ethics, etc.) Any spiritual / Taoist concerns: 
[] Yes /  [x] No 
 
Caregiver Burnout: 
[] Yes /  [x] No /  [] No Caregiver Present Anticipatory grief assessment:  
[x] Normal  / [] Maladaptive ESAS Anxiety:   unable to assess due to pt factors ESAS Depression:    unable to assess due to pt factors REVIEW OF SYSTEMS:  
 
Positive and pertinent negative findings in ROS are noted above in HPI. The following systems were [x] reviewed / [] unable to be reviewed as noted in HPI Other findings are noted below. Systems: constitutional, ears/nose/mouth/throat, respiratory, gastrointestinal, genitourinary, musculoskeletal, integumentary, neurologic, psychiatric, endocrine. Positive findings noted below. Modified ESAS Completed by: provider Pain: 0 Stool Occurrence(s): 1 PHYSICAL EXAM:  
 
From RN flowsheet: 
Wt Readings from Last 3 Encounters:  
03/19/20 126 lb 4.8 oz (57.3 kg) 03/09/20 109 lb 12.6 oz (49.8 kg) 02/13/20 106 lb 11.2 oz (48.4 kg) Blood pressure 115/85, pulse 73, temperature 96.2 °F (35.7 °C), resp. rate 18, height 5' 5\" (1.651 m), weight 126 lb 4.8 oz (57.3 kg), SpO2 99 %. Pain Scale 1: Numeric (0 - 10) Pain Intensity 1: 0 Pain Location 1: Back Pain Description 1: Aching Pain Intervention(s) 1: Position Last bowel movement, if known:  
 
Constitutional: lying in bed, eyes closed, unresponsive to verbal or tactile stimuli Eyes: pupils equal, anicteric, fixed and dilated ENMT: no nasal discharge, dry mucous membranes Cardiovascular: no audible heart tones, no palpable pulses Respiratory: no breath sounds or effort Gastrointestinal: soft Musculoskeletal: no deformity Skin: warm, dry Neurologic:  unable to assess due to pt factors Psychiatric:  unable to assess due to pt factors HISTORY:  
 
Principal Problem: Severe sepsis ([facility] (3/10/2020) Active Problems: 
  Cardiomyopathy ([facility] (3/16/2020) Bacteremia (3/17/2020) ESRD (end stage renal disease) ([facility] (3/17/2020) Anemia (3/17/2020) Past Medical History:  
Diagnosis Date  Alzheimer disease ([facility]  CAD (coronary artery disease)  Cancer [facility])   
 prostate  Chronic kidney disease  CKD (chronic kidney disease) stage 4, GFR 15-29 ml/min ([facility])  DKA (diabetic ketoacidoses) ([facility] 7/10/2011  DM (diabetes mellitus), type 2, uncontrolled ([facility]  Dyspnea 2/6/2017  Hemodialysis patient ([facility]  History of GI bleed 11/12/2014  Hyperlipidemia  Hypertension  Other ill-defined conditions(799.89)   
 blind R eye-retina detachment  Other ill-defined conditions(799.89) right cerebellopontine angle lipoma.  Pneumonia 2/4/2017  Pulmonary edema 1/25/2020  Weakness generalized 8/16/2010 Past Surgical History:  
Procedure Laterality Date  COLONOSCOPY,DIAGNOSTIC  11/12/2014  HX HEENT    
 retinal detachment  HX SHOULDER ARTHROSCOPY    
 right  HX UROLOGICAL    
 prostate bx  UPPER GI ENDOSCOPY,BIOPSY  11/12/2014 Family History Problem Relation Age of Onset  Heart Disease Mother Pacemaker  Cancer Father History reviewed, no pertinent family history. Social History Tobacco Use  Smoking status: Never Smoker  Smokeless tobacco: Never Used Substance Use Topics  Alcohol use: No  
 
Allergies Allergen Reactions  Ace Inhibitors Unknown (comments)  Arb-Angiotensin Receptor Antagonist Unknown (comments) Current Facility-Administered Medications Medication Dose Route Frequency  insulin lispro (HUMALOG) injection   SubCUTAneous AC&HS  
 0.9% sodium chloride infusion 250 mL  250 mL IntraVENous PRN  
 carvediloL (COREG) tablet 6.25 mg  6.25 mg Oral BID WITH MEALS  
 epoetin shaye-epbx (RETACRIT) injection 10,000 Units  10,000 Units SubCUTAneous Q MON, WED & FRI  heparin (porcine) 1,000 unit/mL injection 1,000 Units  1,000 Units InterCATHeter DIALYSIS PRN  
 albumin human 25% (BUMINATE) solution 12.5 g  12.5 g IntraVENous DIALYSIS PRN  
 heparin (porcine) 1,000 unit/mL injection 2,400 Units  2,400 Units Hemodialysis DIALYSIS PRN  
 acetaminophen (TYLENOL) tablet 650 mg  650 mg Oral Q6H PRN  
 albuterol (PROVENTIL VENTOLIN) nebulizer solution 2.5 mg  2.5 mg Nebulization Q4H PRN  
 aspirin delayed-release tablet 81 mg  81 mg Oral DAILY  calcitRIOL (ROCALTROL) capsule 0.25 mcg  0.25 mcg Oral Q MON, WED & FRI  ferrous sulfate tablet 325 mg  325 mg Oral ACB  glucagon (GLUCAGEN) injection 1 mg  1 mg IntraMUSCular PRN  
 levothyroxine (SYNTHROID) tablet 75 mcg  75 mcg Oral ACB  pantoprazole (PROTONIX) tablet 40 mg  40 mg Oral DAILY  prednisoLONE acetate (PRED FORTE) 1 % ophthalmic suspension 1 Drop  1 Drop Right Eye BID  tamsulosin (FLOMAX) capsule 0.4 mg  0.4 mg Oral DAILY  B complex-vitaminC-folic acid (NEPHROCAP) cap  1 Cap Oral DAILY  sodium chloride (NS) flush 5-10 mL  5-10 mL IntraVENous PRN  
 ondansetron (ZOFRAN) injection 4 mg  4 mg IntraVENous Q6H PRN  
 glucose chewable tablet 16 g  4 Tab Oral PRN  
 dextrose (D50W) injection syrg 12.5-25 g  12.5-25 g IntraVENous PRN  
 glucagon (GLUCAGEN) injection 1 mg  1 mg IntraMUSCular PRN  
 VANCOMYCIN INFORMATION NOTE   Other Rx Dosing/Monitoring LAB AND IMAGING FINDINGS:  
 
Lab Results Component Value Date/Time WBC 15.7 (H) 03/19/2020 03:22 AM  
 HGB 7.3 (L) 03/19/2020 03:22 AM  
 PLATELET 402 38/81/3376 03:22 AM  
 
Lab Results Component Value Date/Time  Sodium 134 (L) 03/19/2020 03:22 AM  
 Potassium 4.8 03/19/2020 03:22 AM  
 Chloride 103 03/19/2020 03:22 AM  
 CO2 25 03/19/2020 03:22 AM  
 BUN 42 (H) 03/19/2020 03:22 AM  
 Creatinine 2.55 (H) 03/19/2020 03:22 AM  
 Calcium 7.7 (L) 03/19/2020 03:22 AM  
 Magnesium 2.5 (H) 03/10/2020 01:14 PM  
 Phosphorus 5.6 (H) 03/18/2020 03:28 AM  
  
Lab Results Component Value Date/Time AST (SGOT) 30 03/17/2020 03:45 AM  
 Alk. phosphatase 410 (H) 03/17/2020 03:45 AM  
 Protein, total 5.1 (L) 03/17/2020 03:45 AM  
 Albumin 2.0 (L) 03/17/2020 03:45 AM  
 Globulin 3.1 03/17/2020 03:45 AM  
  (H) 03/12/2020 04:56 AM  
 
Lab Results Component Value Date/Time INR 1.0 06/14/2012 12:20 PM  
 Prothrombin time 10.6 06/14/2012 12:20 PM  
 aPTT 27.8 06/14/2012 12:20 PM  
  
Lab Results Component Value Date/Time Iron 34 (L) 01/18/2017 06:21 PM  
 TIBC 252 01/18/2017 06:21 PM  
 Iron % saturation 13 (L) 01/18/2017 06:21 PM  
 Ferritin 440 (H) 01/18/2017 06:21 PM  
  
Lab Results Component Value Date/Time pH 7.04 (LL) 02/05/2017 10:00 AM  
 PCO2 39 02/05/2017 10:00 AM  
 PO2 376 (H) 02/05/2017 10:00 AM  
 
No components found for: [place] Lab Results Component Value Date/Time  (H) 02/05/2017 05:42 AM  
 CK - MB 7.6 (H) 02/05/2017 05:42 AM  
  
 
 
   
 
Total time: 75 
Counseling / coordination time, spent as noted above: 65 
> 50% counseling / coordination?: y 
 
Prolonged service was provided for  []30 min   []75 min in face to face time in the presence of the patient, spent as noted above. Time Start:  
Time End:  
Note: this can only be billed with 29665 (initial) or 90592 (follow up). If multiple start / stop times, list each separately.

## 2020-03-18 NOTE — PROGRESS NOTES
Hb 6.7, up from 6 after 1 U, VSS No bleeding per RN report ESRD on HD, if PRBC is needed can be given during HD today

## 2020-03-18 NOTE — PROGRESS NOTES
Bedside shift change report given to [address] [place] [address] 365 (oncoming nurse) by [facility] (offgoing nurse). Report included the following information SBAR, Kardex and MAR. Patient was in dialysis upon coming on shift, patient scheduled for ROSELINE at 1030. Dialysis stopped, will finish treatment tomorrow AM. Lab called with two updates on paired blood cultures, Dr. Jesusita Mills made aware.

## 2020-03-18 NOTE — PROGRESS NOTES
TRANSFER - OUT REPORT: 
 
Verbal report given to Martha RN(name) on Elenora Morning.  being transferred to General Surgery/ pt room(unit) for routine progression of care   (post ROSELINE) Report consisted of patients Situation, Background, Assessment and  
Recommendations(SBAR). Information from the following report(s) SBAR, Procedure Summary, MAR and Cardiac Rhythm NSR was reviewed with the receiving nurse. Lines:  
Triple Lumen 03/11/20 (Active) Central Line Being Utilized Yes 3/18/2020  4:29 AM  
Criteria for Appropriate Use Limited/no vessel suitable for conventional peripheral access 3/18/2020  4:29 AM  
Site Assessment Clean, dry, & intact 3/18/2020  4:29 AM  
Infiltration Assessment 0 3/18/2020  4:29 AM  
Affected Extremity/Extremities Color distal to insertion site pink (or appropriate for race) 3/18/2020  4:29 AM  
Date of Last Dressing Change 03/16/20 3/18/2020  4:29 AM  
Dressing Status Clean, dry, & intact 3/18/2020  4:29 AM  
Dressing Type Disk with Chlorhexadine gluconate (CHG) 3/18/2020  4:29 AM  
Action Taken Open ports on tubing capped 3/16/2020  8:00 AM  
Proximal Hub Color/Line Status Flushed 3/17/2020  8:00 AM  
Positive Blood Return (Medial Site) Yes 3/17/2020  8:00 AM  
Medial Hub Color/Line Status Flushed 3/17/2020  8:00 AM  
Positive Blood Return (Lateral Site) Yes 3/17/2020  8:00 AM  
Distal Hub Color/Line Status Flushed 3/17/2020  8:00 AM  
Positive Blood Return (Site #3) Yes 3/17/2020  8:00 AM  
External Catheter Length (cm) 0 centimeters 3/15/2020 11:25 PM  
Alcohol Cap Used Yes 3/17/2020  8:00 AM  
  
 
Opportunity for questions and clarification was provided. Patient transported with: 
Tele- Monitor O2 @ 3 liters

## 2020-03-18 NOTE — PROGRESS NOTES
Dr. Sherie Shaw conversation w/ patient's sister acknowledged. No further procedural interventions. This is appropriate in a frail patient with multiple comorbid conditions. Will cancel red tag cell scan. Vascular surgery signing off. We appreciate the opportunity to participate in the care of this very pleasant gentleman. Please reconsult as needed.

## 2020-03-18 NOTE — PROGRESS NOTES
[address] [place] [address]  [phone] Cardiology Progress Note 3/18/2020 1221 PM 
 
Admit Date: 3/10/2020 Admit Diagnosis:  
Severe sepsis ([facility] [A41.9, R65.20] Interval History/Subjective:  
 
Daniela Hebert is a 81 y.o. male who is admitted for Severe sepsis ([facility] [A41.9, R65.20] -VSS 
-Hg 6.7 after 1 unit PRBC's yesterday; creat 2.82; K 4.3. hypoglycemia again this am (48 per lab 47 per POC, treated) -receiving hemodialysis. ROSELINE completed today. Visit Vitals /73 Pulse 74 Temp 96.6 °F (35.9 °C) (Axillary) Resp 20 Ht 5' 5\" (1.651 m) Wt 59.1 kg (130 lb 4.7 oz) SpO2 92% BMI 21.68 kg/m² Current Facility-Administered Medications Medication Dose Route Frequency  albuterol-ipratropium (DUO-NEB) 2.5 MG-0.5 MG/3 ML  3 mL Nebulization TID RT  
 0.9% sodium chloride infusion 250 mL  250 mL IntraVENous PRN  
 carvediloL (COREG) tablet 6.25 mg  6.25 mg Oral BID WITH MEALS  
 epoetin shaye-epbx (RETACRIT) injection 10,000 Units  10,000 Units SubCUTAneous Q MON, WED & FRI  heparin (porcine) 1,000 unit/mL injection 1,000 Units  1,000 Units InterCATHeter DIALYSIS PRN  
 albumin human 25% (BUMINATE) solution 12.5 g  12.5 g IntraVENous DIALYSIS PRN  
 heparin (porcine) 1,000 unit/mL injection 2,400 Units  2,400 Units Hemodialysis DIALYSIS PRN  
 acetaminophen (TYLENOL) tablet 650 mg  650 mg Oral Q6H PRN  
 albuterol (PROVENTIL VENTOLIN) nebulizer solution 2.5 mg  2.5 mg Nebulization Q4H PRN  
 aspirin delayed-release tablet 81 mg  81 mg Oral DAILY  calcitRIOL (ROCALTROL) capsule 0.25 mcg  0.25 mcg Oral Q MON, WED & FRI  ferrous sulfate tablet 325 mg  325 mg Oral ACB  glucagon (GLUCAGEN) injection 1 mg  1 mg IntraMUSCular PRN  
 levothyroxine (SYNTHROID) tablet 75 mcg  75 mcg Oral ACB  pantoprazole (PROTONIX) tablet 40 mg  40 mg Oral DAILY  prednisoLONE acetate (PRED FORTE) 1 % ophthalmic suspension 1 Drop  1 Drop Right Eye BID  tamsulosin (FLOMAX) capsule 0.4 mg  0.4 mg Oral DAILY  B complex-vitaminC-folic acid (NEPHROCAP) cap  1 Cap Oral DAILY  sodium chloride (NS) flush 5-10 mL  5-10 mL IntraVENous PRN  
 ondansetron (ZOFRAN) injection 4 mg  4 mg IntraVENous Q6H PRN  
 insulin lispro (HUMALOG) injection   SubCUTAneous AC&HS  
 glucose chewable tablet 16 g  4 Tab Oral PRN  
 dextrose (D50W) injection syrg 12.5-25 g  12.5-25 g IntraVENous PRN  
 glucagon (GLUCAGEN) injection 1 mg  1 mg IntraMUSCular PRN  
 VANCOMYCIN INFORMATION NOTE   Other Rx Dosing/Monitoring Objective:  
  
Physical Exam: 
General Appearance:  pleasant, confused, thin, elderly male resting in bed in NAD Chest:   RLL crackles;  Scattered wheezing throughout, dyspnea at rest 
Cardiovascular:  Regular rate and rhythm, no murmur. Abdomen:   Soft, non-tender, bowel sounds are active. Extremities: no edema Skin:  Warm and dry. Data Review:  
Recent Labs  
  03/18/20 
0328 03/17/20 
0449 WBC 13.4* 17.6* HGB 6.7* 6.0*  
HCT 20.3* 18.2*  
 161 Recent Labs  
  03/18/20 
0329 03/18/20 
0328 03/17/20 
0345 NA  --  134* 136 K  --  4.3 3.1*  
CL  --  102 106 CO2  --  25 24 GLU  --  48* 133* BUN  --  42* 29* CREA  --  2.82* 2.04* CA 8.2* 8.0* 7.1*  
PHOS  --  5.6*  --   
ALB  --   --  2.0*  
TBILI  --   --  1.7* SGOT  --   --  30 ALT  --   --  28 No results for input(s): TROIQ, CPK, CKMB in the last 72 hours. Intake/Output Summary (Last 24 hours) at 3/18/2020 1221 Last data filed at 3/18/2020 1059 Gross per 24 hour Intake 434.6 ml Output 300 ml Net 134.6 ml  
  
 
Telemetry: SR 
EKG: ST with short MT 
ECHO:  EF 15-20%; mod concentric hypertrophy; mod to severely reduced RV systolic function; mod to severe MR; mild TR; mild to mod pHTN;  
CTA chest: \"Large bilateral pleural effusions with anasarca and by basilar atelectasis. There is no pulmonary emboli.  Interstitial prominence is demonstrated. Several nonsolid lung nodules are demonstrated possible small infiltrates. Those should be followed up. \" Addendum: ROSELINE result as follows · Normal wall thickness. Dilated left ventricle. Severe global systolic function. Estimated left ventricular ejection fraction is 25 - 30%. Hypokinetic left ventricular wall motion. · Dilated right ventricle. Reduced systolic function. · Severe mitral valve regurgitation is present. · Moderate to severe tricuspid valve regurgitation is present. · Moderate pulmonary hypertension. · There is left pleural effusion. Assessment:  
 
Principal Problem: 
  Severe sepsis ([address] (3/10/2020) Active Problems: 
  Cardiomyopathy ([address] (3/16/2020) Bacteremia (3/17/2020) ESRD (end stage renal disease) ([facility] (3/17/2020) Anemia (3/17/2020) Plan:  
Bacteremia:  S/p removal of AV fistula 3/14/20. · ROSELINE performed today under anesthesia to eval for endocarditis-negative · abx per hospitalist  
 
BiV failure:  Newly identified cardiomyopathy with EF 25-30% with decreased RV function. Severe mitral regurgitation. · ROSELINE completed · Fluid balance per dialysis. Has bilateral effusions and ascites · On BB. Has listed allergies to ACEi and ARBs CAD risk:  Troponin 0.23 at admit for sepsis · New cardiomyopathy · ASA, BB. Lipids low · Poor cath candidate with severe anemia and active infection- recommend right and left heart catheterization as outpatient. HTN: BP improved · Continue BB Anemia:  Hg 6.7 today. Received blood transfusion yesterday. plt improved. · Per hospitalist  
· On ASA ESRD:  On HD · Per nephrology Latrice Morrison NP 
MSN, RN, AGAP-BC Patient seen and examined by me with nurse practitioner. I personally performed all components of the history, physical, and medical decision making and agree with the assessment and plan as noted. ROSELINE noted.  Severe MR. No vegetation. Will hold off cardiac cath for now. Once acute issues resolved, will evaluate MR as out pt.   
 
Gladis Olivo MD

## 2020-03-18 NOTE — PROGRESS NOTES
Pharmacy Automatic Renal Dosing Protocol - Antimicrobials Indication for Antimicrobials: bacteremia, infected fistula Current Regimen of Each Antimicrobial: 
Vancomycin 750 mg After each HD (Start Date 3/10; Day #8) Previous Antimicrobial Therapy: N/a Vancomycin Goal Level: 15-20 mcg/ml Vancomycin Levels Date of HD and Lenght Dose Date and Measured (mcg/mL) Steady State (mcg/mL) 3/10 None 1000 mg LD    
3/12 3 hours 750 mg post HD    
3/13 3 hours 750 mg post HD  3:15 am 16.8   
3/16 3 hours 750  mg post HD    
3/18 1.4 hours 750 mg post HD Date & time of next level: 3/20 AM 
 
Significant Cultures:  
3/10 blood cx: MRSA - FINAL 
3/11 wound (fistula) cx from OR: mod MRSA= FINAL 
3/12 blood cx: MRSA= FINAL Radiology / Imaging results: (X-ray, CT scan or MRI):  
None pertinent Labs: 
Recent Labs  
  20 
0328 20 
0449 20 
0345 CREA 2.82*  --  2.04* BUN 42*  --  29* WBC 13.4* 17.6*  -- Temp (24hrs), Av.4 °F (36.3 °C), Min:96.6 °F (35.9 °C), Max:98 °F (36.7 °C) Paralysis, amputations, malnutrition: none noted Creatinine Clearance (mL/min) or Dialysis: HD MWF Impression/Plan: S/p fistula removal 3/11 HD  mg today Random level Friday 3/20 w/ AM labs ordered Antibiotic duration: pending, likely at least 2 week for bacteremia Pharmacy will follow daily and adjust medications as appropriate for renal function and/or serum levels.

## 2020-03-18 NOTE — PROGRESS NOTES
MEGAN Plan: *[facility] Patient not ready for d/c. CM will continue to follow. Krystal Joiner Ext [phone]

## 2020-03-18 NOTE — PROGRESS NOTES
Received call from lab that pt blood sugar was 48 retested at 47 PRN D50 was given pt remains alert and still calling out to staff no further changes

## 2020-03-18 NOTE — PROGRESS NOTES
Patient assessment done by RRT. Patient does not appear to be in any distress at this time sp02: 91 HR: 76. Breath sounds are diminished but patient does have upper airway wheezing which appears to be from fluid. I would not recommend Duo-Neb treatments at this time

## 2020-03-18 NOTE — PROCEDURES
[facility] Dialysis Team [place]  [phone] Vitals   Pre   Post   Assessment   Pre   Post    
Temp  Temp: 96.9 °F (36.1 °C) (03/18/20 0750)  96.6 LOC  A&Ox2 A&Ox2 HR   Pulse (Heart Rate): 77 (03/18/20 0750) 80 Lungs   Crackles upper; diminished lower  crackles upper; diminished lower B/P   BP: 127/86 (03/18/20 0750) 155/97 Cardiac   RRR  RRR Resp   Resp Rate: 20 (03/18/20 0750) 20 Skin   Sacral wound Sacral wound Pain level  Pain Intensity 1: 0 (03/17/20 2120) 0 Edema  None noted None noted Orders: Duration:   Start:    0734 End:    0915 Total:   1.40 hours Dialyzer:   Dialyzer/Set Up Inspection: Ant Bustillo (03/18/20 0750) K Bath:   Dialysate K (mEq/L): 2 (03/18/20 0750) Ca Bath:   Dialysate CA (mEq/L): 2.5 (03/18/20 0750) Na/Bicarb:   Dialysate NA (mEq/L): 138 (03/18/20 0750) Target Fluid Removal:   Goal/Amount of Fluid to Remove (mL): 1000 mL (03/18/20 0750) Access Type & Location:   RCVC, dressing changed; no s/s of infection noted. Each catheter limb disinfected for 60 seconds per limb with alcohol swabs. Caps removed, dialysis CVC hub scrubbed with Prevantics for 5 seconds, followed by a 5 second dry time per Hospital P&P. Both limbs aspirated well and flushed with 10cc ns per P&P.  VSS, tx initiated. Labs Obtained/Reviewed Critical Results Called   Date when labs were drawn- 
Hgb-   
HGB Date Value Ref Range Status 03/18/2020 6.7 (L) 12.1 - 17.0 g/dL Final  
 
K-   
Potassium Date Value Ref Range Status 03/18/2020 4.3 3.5 - 5.1 mmol/L Final  
  Comment:  
  INVESTIGATED PER DELTA CHECK PROTOCOL Ca-  
Calcium Date Value Ref Range Status 03/18/2020 8.2 (L) 8.5 - 10.1 MG/DL Final  
 
Bun-  
BUN Date Value Ref Range Status 03/18/2020 42 (H) 6 - 20 MG/DL Final  
 
Creat-  
Creatinine Date Value Ref Range Status 03/18/2020 2.82 (H) 0.70 - 1.30 MG/DL Final  
 
  
Medications/ Blood Products Given Name   Dose   Route and Time Dextrose 50% 25g [email or web address] Heparin 1:1000 1.2 [email or web address] Heparin 1:1000 1.2 [email or web address] Blood Volume Processed (BVP):   38.6 Net Fluid Removed:  300mL Comments All dialysis related medications have been reviewed. Assessment performed by RN. Procedure and documentation observed and reviewed by Maria Luz Smith RN Time Out Done: 5547 Primary Nurse Rpt Pre:  Zoey Roberson RN 
Primary Nurse Rpt Post:  Jorge Ye RN 
Pt Education:  procedural 
Care Plan:  On going Tx Summary: 
0282: SBAR received from Primary RN. Pt arrived to HD suite A&Ox2, denies complaints. RCVC, dressing changed; no s/s of infection noted. Each catheter limb disinfected for 60 seconds per limb with alcohol swabs. Caps removed, dialysis CVC hub scrubbed with Prevantics for 5 seconds, followed by a 5 second dry time per Hospital P&P. Both limbs aspirated well and flushed with 10cc ns per P&P.  VSS, tx initiated. 0745:  Pt resting; Blood glucose 77,  RN called Dr Tigre Haque 
4576:  1/3 of Dextrose 50% given per MD verbal order 
0800:  Pt resting; Blood glucose 115 
0815:  Pt resting 
0830:  Pt resting; Blood glucose 158 
0845:  Pt resting 
0900:  Dr Tigre Haque at bedside; pt resting 
0915:  Blood glucose 122. Tx terminated per Dr Tigre Haque, pt to have ROSELINE done. Each catheter limb disinfected for 60 seconds per limb with alcohol swabs. Dialysis CVC hubs scrubbed with Prevantics for 5 seconds, followed by a 5 second dry time per Hospital P&P, red and blue dialysis caps applied to ports. Admiting Diagnosis:  Severe sepsis Pt's previous clinic- [facility] Consent signed - Informed Consent Verified: Yes (03/18/20 0750) Fi[place] Consent - signed and on file Hepatitis Status- neg/sven 3/11/2020 Machine #- Machine Number: Jackeline [id number] (03/18/20 0750) Telemetry status- 
Pre-dialysis wt. - Pre-Dialysis Weight: 51.8 kg (114 lb 3.2 oz) (03/11/20 2110)

## 2020-03-18 NOTE — PROGRESS NOTES
Patient arrived to Non-Invasive Cardiology Lab for Inpatient ROSELINE Procedure. Staff introduced to patient. Patient identifiers verified with Name and Date of Birth. Procedure verified with patient. Consent forms reviewed and signed by patient or authorized representative and verified. Allergies verified. Patient informed of procedure and plan of care. Questions answered with review. Patient on cardiac monitor, non-invasive blood pressure, SPO2 monitor. On 3LPM via nasal cannula. Patient is A&Ox1, to self. Patient reports no complaints. Patient in bed, in low position, with side rails up. Patient instructed to call for assistance as needed.

## 2020-03-18 NOTE — PROGRESS NOTES
HD TRANSFER - OUT REPORT: 
 
Verbal report given to [facility] MICHAEL PEREA on [employer]. being transferred to Non-Invasive Cardiology (Gen-Surg patient) for routine progression of care / ordered procedure Report consisted of patient's Situation, Background, Assessment and  
Recommendations(SBAR). Information from the following report(s) SBAR, Procedure Summary, Intake/Output, MAR and Recent Results was reviewed with the receiving nurse. Method:  $$ Method: Hemodialysis (03/18/20 0734) Fluid Removed  NET Fluid Removed (mL): 300 ml (03/18/20 0915) Patient response to treatment:  Unchanged End Time  Hemodialysis End Time: 0915 (03/18/20 0915) If not documented, dialysis nurse to update post-dialysis row in HD/Filtration flowsheet Medications /Volume expansion agents or Fluid boluses administered during treatment? no 
 
Post-dialysis medication administration due?  yes, Had D50% with treatment, NPO Remind nurse to administer post-HD medication upon return to unit. Fistula hemostasis? no 
 
Line heparinization? yes, Heparin dwells Lines: [facility] CVC Opportunity for questions and clarification was provided. Patient transported with: O2 @ 2 liters Tech

## 2020-03-18 NOTE — ANESTHESIA POSTPROCEDURE EVALUATION
* No procedures listed *. 
 
total IV anesthesia Anesthesia Post Evaluation Patient location during evaluation: PACU Note status: Adequate. Level of consciousness: responsive to verbal stimuli and sleepy but conscious Pain management: satisfactory to patient Airway patency: patent Anesthetic complications: no 
Cardiovascular status: acceptable Respiratory status: acceptable Hydration status: acceptable Comments: +Post-Anesthesia Evaluation and Assessment Patient: Saritha Killian MRN: [id number]  SSN: [id number] YOB: 1950  Age: 81 y.o. Sex: male Cardiovascular Function/Vital Signs /70   Pulse 74   Temp 35.9 °C (96.6 °F) (Axillary)   Resp 21   Ht 5' 5\" (1.651 m)   Wt 59.1 kg (130 lb 4.7 oz)   SpO2 94%   BMI 21.68 kg/m² Patient is status post * No procedures listed *. Nausea/Vomiting: Controlled. Postoperative hydration reviewed and adequate. Pain: 
Pain Scale 1: Numeric (0 - 10) (03/18/20 9217) Pain Intensity 1: 0 (03/18/20 3466) Managed. Neurological Status:  
Neuro (WDL): Exceptions to [facility] (03/11/20 2035) At baseline. Mental Status and Level of Consciousness: Arousable. Pulmonary Status:  
O2 Device: Nasal cannula (03/18/20 1131) Adequate oxygenation and airway patent. Complications related to anesthesia: None Post-anesthesia assessment completed. No concerns. Signed By: Osito Finney MD  
 3/18/2020 Post anesthesia nausea and vomiting:  controlled Vitals Value Taken Time /70 3/18/2020 11:31 AM  
Temp Pulse 73 3/18/2020 11:35 AM  
Resp 19 3/18/2020 11:35 AM  
SpO2 94 % 3/18/2020 11:35 AM  
Vitals shown include unvalidated device data. pt is A and O x1. pt is not eating much or drinking. pt has some generalized edema

## 2020-03-18 NOTE — PROGRESS NOTES
Name: Ida Garcia MRN: [id number] : 1950 Assessment: 
 
ESRD ([facility]; MWF) Fever/MRSA Bacteremia Infected L AVG with ulceration; surgery 3/11; using robbie at present HTN Hyponatremia, mild Anemia of ESRD High Phos/SHPT High Mg Dementia 
  
 
Plan/Recommendations: 
 
Abx per primary team 
3/12 BC + MRSA; 3/17 BC pending Short HD today prior to procedure; short HD tomorrow to complete full HD (also to facilitate transfusion if needed). On calcitriol 
retacrit 10 k sc tiw (initiated 3/16); prn prbc's (I would hold PRBC transfusion for today; monitor closely) Avoid Mg containing laxatives or supplements Amlodipine stopped; -150 Will need Permacath prior to d/c, once he is afebrile and f/u BCx are negative Subjective: 
Alert. In good spirits. No specific complaint. Has baseline confusion and lacks complete insight Tolerating HD well The patient was seen on dialysis at 9:10 AM .  BP is stable. Catheter is functioning well. ROS:  
Not reliable, but no c/o Exam: 
Visit Vitals /75 (BP 1 Location: Right arm, BP Patient Position: At rest) Pulse 86 Temp 96.6 °F (35.9 °C) Resp 19 Ht 5' 5\" (1.651 m) Wt 56.9 kg (125 lb 7.1 oz) SpO2 98% BMI 20.87 kg/m² Elderly cachectic in NAD 
R Robbie in place Clear RRR 
L AVG site with drsg No edema Alert awake Legally blind Current Facility-Administered Medications Medication Dose Route Frequency Last Dose  albuterol-ipratropium (DUO-NEB) 2.5 MG-0.5 MG/3 ML  3 mL Nebulization TID RT    
 0.9% sodium chloride infusion 250 mL  250 mL IntraVENous PRN    
 carvediloL (COREG) tablet 6.25 mg  6.25 mg Oral BID WITH MEALS 6.25 mg at 20 9357  epoetin shaye-epbx (RETACRIT) injection 10,000 Units  10,000 Units SubCUTAneous Q MON, WED & FRI 10,000 Units at 03/17/20 0134  heparin (porcine) 1,000 unit/mL injection 1,000 Units  1,000 Units InterCATHeter DIALYSIS PRN    
 albumin human 25% (BUMINATE) solution 12.5 g  12.5 g IntraVENous DIALYSIS PRN    
 heparin (porcine) 1,000 unit/mL injection 2,400 Units  2,400 Units Hemodialysis DIALYSIS PRN 2,400 Units at 03/16/20 1745  acetaminophen (TYLENOL) tablet 650 mg  650 mg Oral Q6H  mg at 03/17/20 1521  
 albuterol (PROVENTIL VENTOLIN) nebulizer solution 2.5 mg  2.5 mg Nebulization Q4H PRN    
 aspirin delayed-release tablet 81 mg  81 mg Oral DAILY 81 mg at 03/17/20 4245  calcitRIOL (ROCALTROL) capsule 0.25 mcg  0.25 mcg Oral Q MON, WED & FRI 0.25 mcg at 03/16/20 2135  ferrous sulfate tablet 325 mg  325 mg Oral ACB Stopped at 03/17/20 0730  
 glucagon (GLUCAGEN) injection 1 mg  1 mg IntraMUSCular PRN    
 levothyroxine (SYNTHROID) tablet 75 mcg  75 mcg Oral ACB Stopped at 03/17/20 0730  pantoprazole (PROTONIX) tablet 40 mg  40 mg Oral DAILY 40 mg at 03/17/20 5528  prednisoLONE acetate (PRED FORTE) 1 % ophthalmic suspension 1 Drop  1 Drop Right Eye BID 1 Drop at 03/17/20 1844  tamsulosin (FLOMAX) capsule 0.4 mg  0.4 mg Oral DAILY 0.4 mg at 03/17/20 9814  B complex-vitaminC-folic acid (NEPHROCAP) cap  1 Cap Oral DAILY 1 Cap at 03/17/20 9922  sodium chloride (NS) flush 5-10 mL  5-10 mL IntraVENous PRN 10 mL at 03/13/20 2020  ondansetron (ZOFRAN) injection 4 mg  4 mg IntraVENous Q6H PRN 4 mg at 03/17/20 1521  
 insulin lispro (HUMALOG) injection   SubCUTAneous AC&HS Stopped at 03/17/20 0730  
 glucose chewable tablet 16 g  4 Tab Oral PRN 16 g at 03/17/20 1145  dextrose (D50W) injection syrg 12.5-25 g  12.5-25 g IntraVENous PRN 25 g at 03/18/20 0424  
 glucagon (GLUCAGEN) injection 1 mg  1 mg IntraMUSCular PRN    
 VANCOMYCIN INFORMATION NOTE   Other Rx Dosing/Monitoring Labs/Data: 
 
Lab Results Component Value Date/Time WBC 13.4 (H) 03/18/2020 03:28 AM  
 Hemoglobin (POC) 10.5 (L) 04/07/2017 07:30 AM  
 HGB 6.7 (L) 03/18/2020 03:28 AM  
 Hematocrit (POC) 31 (L) 04/07/2017 07:30 AM  
 HCT 20.3 (L) 03/18/2020 03:28 AM  
 PLATELET 527 18/98/6416 03:28 AM  
 MCV 86.8 03/18/2020 03:28 AM  
 
 
Lab Results Component Value Date/Time Sodium 134 (L) 03/18/2020 03:28 AM  
 Potassium 4.3 03/18/2020 03:28 AM  
 Chloride 102 03/18/2020 03:28 AM  
 CO2 25 03/18/2020 03:28 AM  
 Anion gap 7 03/18/2020 03:28 AM  
 Glucose 48 (LL) 03/18/2020 03:28 AM  
 BUN 42 (H) 03/18/2020 03:28 AM  
 Creatinine 2.82 (H) 03/18/2020 03:28 AM  
 BUN/Creatinine ratio 15 03/18/2020 03:28 AM  
 GFR est AA 27 (L) 03/18/2020 03:28 AM  
 GFR est non-AA 23 (L) 03/18/2020 03:28 AM  
 Calcium 8.0 (L) 03/18/2020 03:28 AM  
 
 
Wt Readings from Last 3 Encounters:  
03/16/20 56.9 kg (125 lb 7.1 oz) 03/09/20 49.8 kg (109 lb 12.6 oz) 02/13/20 48.4 kg (106 lb 11.2 oz) Intake/Output Summary (Last 24 hours) at 3/18/2020 1028 Last data filed at 3/17/2020 1859 Gross per 24 hour Intake 484.6 ml Output  Net 484.6 ml Patient seen and examined. Chart reviewed. Labs, data and other pertinent notes reviewed in last 24 hrs. PMH/SH/FH reviewed and unchanged compared to H&P Discussed with pt and HD RN 
 
Alberto Diane MD

## 2020-03-18 NOTE — PROGRESS NOTES
Hospitalist Progress Note NAME: Jett Vincent. :  1950 MRN:  [id number] Assessment / Plan: 
Persistent MRSA Bacteremia 2/2 infected AVG, s/p partial explant of LUE arteriovenous graft on 20 Leucocytosis , improving [address]-  +, 3/10 +, 3/11 + - MRSA Surgical wound Cx 3/11 + - MRSA UCx + MRSA 
 
-On Vancomycin, 
-Consulted ID , ? Change to different Abx 
-has residual hardware in LUE Awaiting ID opinion, I needs CT abd to rule out any underlying abd collection/source -Kathleen Muñoz today without any vegetation 
  
Right superficial thrombophlebitis of the basilic vein - No additional tx indicated. - Supportive care.   
  
ESRD on HD MWF Secondary hyperparathyroidism  
Acidosis  
- Nephrology input/assistance appreciated. - Continue calcitriol 0.25 mcg po 3 times weekly.   
- HD through right jugular temporary catheter. 
  
Anemia of chronic disease, Hb 6.0 today Thrombocytopenia - Transfuse for Hgb <7.0, Transfuse 1 unit yesterday - No obvious sign of active hemorrhage. 
- Continue FeSO4 325 mg po daily.  
  
Hyponatremia 
- resolved. 
  
Elevated LFTs Hyperbilirubinemia - Alk Phos improved. - Bili improving. 
- Monitor.   
  
Hypoxia, Dyspnea Dinh pleural effusions Likely 2/2 Fluid overload from ESRD. continue HD 
 
 
  
  
DM II Episodes of Hypoglycemia 
?due to infection Hypoglycemia this am, also ? poor nutrition and bacteremia which is uncontrolled Will change to sensitive SSI. If gets hypoglycemia then may need d10 drip at low rate. 
  
Alzheimer's dementia - Supportive care.   
  
Chronic systolic heart failure HTN Hypothyroidism 
- levothyroxine 75 mcg po daily.   
  
BPH 
- tamsulosin 0.4 mg po daily.    
  
GERD 
-  pantoprazole 40 mg po daily.    
  
  
18.5 - 24.9 Normal weight / Body mass index is 19.8 kg/m². 
  
Code status: DNR Prophylaxis: SCD Recommended Disposition: TBD 
  
 
 
 Discussed with Sister, Lynda Guzman, She seems to be [facility] as per previous charts. She prefers no more surgery and make him comfortable. Will ask Palliative team to see him. Likely plan for hospice Pending Family meeting with Palliative team 
  
 
Subjective: Chief Complaint / Reason for Physician Visit \"no pain or chills. \". Discussed with RN events overnight. Minimal SOB Review of Systems: 
Symptom Y/N Comments  Symptom Y/N Comments Fever/Chills n   Chest Pain n   
Poor Appetite y   Edema n   
Cough n   Abdominal Pain n   
Sputum y   Joint Pain SOB/LAUREANO y   Pruritis/Rash Nausea/vomit    Tolerating PT/OT Diarrhea    Tolerating Diet Constipation    Other Could NOT obtain due to:   
 
Objective: VITALS:  
Last 24hrs VS reviewed since prior progress note. Most recent are: 
Patient Vitals for the past 24 hrs: 
 Temp Pulse Resp BP SpO2  
03/18/20 1248 98.3 °F (36.8 °C) 74 19 136/77 91 % 03/18/20 1220  74 20 139/89 94 % 03/18/20 1215  76 19 133/73 92 % 03/18/20 1210  75 21 129/81 93 % 03/18/20 1205  75 24 129/77 93 % 03/18/20 1200  75 21 (!) 125/92 94 % 03/18/20 1155  75 21 135/77 95 % 03/18/20 1150  74 25 130/74 93 % 03/18/20 1145  73 20 127/72 92 % 03/18/20 1140  74 19 126/70 92 % 03/18/20 1136  72 19 123/89 93 % 03/18/20 1135  73 19  94 % 03/18/20 1131  74 21 126/70 94 % 03/18/20 1130  74 22  94 % 03/18/20 1129  74 20 123/67 93 % 03/18/20 1127  74 23 121/72 94 % 03/18/20 1125  75 23 123/69 94 % 03/18/20 1123  75 21 129/67 95 % 03/18/20 1122  75 22 129/67 96 % 03/18/20 1121  75 15 129/84 93 % 03/18/20 1120  77 19  92 % 03/18/20 1119  77 19 127/74 93 % 03/18/20 1117  77 19 115/68 (!) 88 % 03/18/20 1115  70 21 (!) 77/53 96 % 03/18/20 1113  63 21 (!) 62/45 94 % 03/18/20 1110  63 22  96 % 03/18/20 1109  65 20 (!) 69/49 95 % 03/18/20 1107  66 21 (!) 75/49 96 % 03/18/20 1105  68 24 97/56 96 % 03/18/20 1103  69 24 98/58 97 % 03/18/20 1101  71 26 106/58 97 % 03/18/20 1100  71 28  96 % 03/18/20 1059  72 (!) 31 100/58 95 % 03/18/20 1057  75 23 107/62 92 % 03/18/20 1045  81 27 142/85 93 % 03/18/20 1030  81 25 151/80 95 % 03/18/20 1015  80 23 157/81 94 % 03/18/20 1000  81 21 152/82 95 % 03/18/20 0951  81 25 153/80 97 % 03/18/20 0915 96.6 °F (35.9 °C) 80 20 (!) 155/97   
03/18/20 0900  82 20 (!) 161/95   
03/18/20 0845  81 20 (!) 158/97   
03/18/20 0830  80 20 (!) 153/93   
03/18/20 0815  81 20 (!) 152/94   
03/18/20 0800  79 20 (!) 154/92   
03/18/20 0745  78 20 (!) 145/91   
03/18/20 0734 96.9 °F (36.1 °C) 77 20 127/86   
03/17/20 2326 96.6 °F (35.9 °C) 86 19 138/75 98 % 03/17/20 1918 97.9 °F (36.6 °C) 94 21 138/75 100 % 03/17/20 1717 98 °F (36.7 °C) 88 24 144/83 99 % 03/17/20 1617 98 °F (36.7 °C) 87 18 142/78 96 % 03/17/20 1554     96 % 03/17/20 1547 97.6 °F (36.4 °C) 88 19 155/77 96 % 03/17/20 1459 97.5 °F (36.4 °C) 85 18 136/72 96 % Intake/Output Summary (Last 24 hours) at 3/18/2020 1423 Last data filed at 3/18/2020 1059 Gross per 24 hour Intake 434.6 ml Output 300 ml Net 134.6 ml  
  
 
PHYSICAL EXAM: 
General: WD, WN. Alert, cooperative, no acute distress   
EENT:  EOMI. Anicteric sclerae. MMM Resp:  Clear lungs CV:  Regular  rhythm,  No edema GI:  Soft, Non distended, Non tender.  +Bowel sounds Neurologic:  Alert and oriented X 2, normal speech, Psych:   Poor insight. Not anxious nor agitated Skin:  No rashes. No jaundice Reviewed most current lab test results and cultures  YES Reviewed most current radiology test results   YES Review and summation of old records today    NO Reviewed patient's current orders and MAR    YES 
PMH/SH reviewed - no change compared to H&P 
________________________________________________________________________ Care Plan discussed with: 
  Comments Patient Family RN    
 Care Manager Consultant Multidiciplinary team rounds were held today with , nursing, pharmacist and clinical coordinator. Patient's plan of care was discussed; medications were reviewed and discharge planning was addressed. ________________________________________________________________________ Total NON critical care TIME:  20   Minutes Total CRITICAL CARE TIME Spent:   Minutes non procedure based Comments >50% of visit spent in counseling and coordination of care    
________________________________________________________________________ Abundio Frederick MD  
 
Procedures: see electronic medical records for all procedures/Xrays and details which were not copied into this note but were reviewed prior to creation of Plan. LABS: 
I reviewed today's most current labs and imaging studies. Pertinent labs include: 
Recent Labs  
  03/18/20 0328 03/17/20 
0449 WBC 13.4* 17.6* HGB 6.7* 6.0*  
HCT 20.3* 18.2*  
 161 Recent Labs  
  03/18/20 0329 03/18/20 0328 03/17/20 
0345 NA  --  134* 136 K  --  4.3 3.1*  
CL  --  102 106 CO2  --  25 24 GLU  --  48* 133* BUN  --  42* 29* CREA  --  2.82* 2.04* CA 8.2* 8.0* 7.1*  
PHOS  --  5.6*  --   
ALB  --   --  2.0*  
TBILI  --   --  1.7* SGOT  --   --  30 ALT  --   --  28 Signed: Abundio Frederick MD

## 2020-03-19 NOTE — PROGRESS NOTES
Called to pronounce patient. No response to noxious stimuli. No spontaneous breath sounds nor cardiac sounds. Pupils fixed and dilated. TOD:11.00 AM March 19 2020. Family notified and grieving appropriately. D/C Summary, will be done.  
Allen Andersen MD

## 2020-03-19 NOTE — PROGRESS NOTES
[address], [address] [place] [address], [address] [facility]  [phone] Cardiology Progress Note 3/19/2020 1034AM 
 
Admit Date: 3/10/2020 Admit Diagnosis:  
Severe sepsis ([facility] [address] [A41.9, R65.20] Interval History/Subjective:  
 
Oleg Scott is a 81 y.o. male who is admitted for Severe sepsis ([facility] [address] [A41.9, R65.20]. No cardiac complaints this am. Not orthopneic. -VSS 
-Hgb stable 7.3; creat 2.55 MWF hemodialysis; K 4.8. Visit Vitals /85 Pulse 73 Temp 96.2 °F (35.7 °C) Resp 18 Ht 5' 5\" (1.651 m) Wt 57.3 kg (126 lb 4.8 oz) SpO2 99% BMI 21.02 kg/m² Current Facility-Administered Medications Medication Dose Route Frequency  insulin lispro (HUMALOG) injection   SubCUTAneous AC&HS  
 0.9% sodium chloride infusion 250 mL  250 mL IntraVENous PRN  
 carvediloL (COREG) tablet 6.25 mg  6.25 mg Oral BID WITH MEALS  
 epoetin shaye-epbx (RETACRIT) injection 10,000 Units  10,000 Units SubCUTAneous Q MON, WED & FRI  heparin (porcine) 1,000 unit/mL injection 1,000 Units  1,000 Units InterCATHeter DIALYSIS PRN  
 albumin human 25% (BUMINATE) solution 12.5 g  12.5 g IntraVENous DIALYSIS PRN  
 heparin (porcine) 1,000 unit/mL injection 2,400 Units  2,400 Units Hemodialysis DIALYSIS PRN  
 acetaminophen (TYLENOL) tablet 650 mg  650 mg Oral Q6H PRN  
 albuterol (PROVENTIL VENTOLIN) nebulizer solution 2.5 mg  2.5 mg Nebulization Q4H PRN  
 aspirin delayed-release tablet 81 mg  81 mg Oral DAILY  calcitRIOL (ROCALTROL) capsule 0.25 mcg  0.25 mcg Oral Q MON, WED & FRI  ferrous sulfate tablet 325 mg  325 mg Oral ACB  glucagon (GLUCAGEN) injection 1 mg  1 mg IntraMUSCular PRN  
 levothyroxine (SYNTHROID) tablet 75 mcg  75 mcg Oral ACB  pantoprazole (PROTONIX) tablet 40 mg  40 mg Oral DAILY  prednisoLONE acetate (PRED FORTE) 1 % ophthalmic suspension 1 Drop  1 Drop Right Eye BID  tamsulosin (FLOMAX) capsule 0.4 mg  0.4 mg Oral DAILY  B complex-vitaminC-folic acid (NEPHROCAP) cap  1 Cap Oral DAILY  sodium chloride (NS) flush 5-10 mL  5-10 mL IntraVENous PRN  
 ondansetron (ZOFRAN) injection 4 mg  4 mg IntraVENous Q6H PRN  
 glucose chewable tablet 16 g  4 Tab Oral PRN  
 dextrose (D50W) injection syrg 12.5-25 g  12.5-25 g IntraVENous PRN  
 glucagon (GLUCAGEN) injection 1 mg  1 mg IntraMUSCular PRN  
 VANCOMYCIN INFORMATION NOTE   Other Rx Dosing/Monitoring Objective:  
  
Physical Exam: 
General Appearance:  pleasant, confused, thin, elderly male resting in bed in NAD Chest: RLL crackles; Scattered wheezing throughout, 3L NC Sat's % Cardiovascular:  Regular rate and rhythm, no murmur. Abdomen: Soft, non-tender, bowel sounds are active. Extremities: no edema Skin: Warm and dry. Data Review:  
Recent Labs  
  03/19/20 0322 03/18/20 0328 03/17/20 
0449 WBC 15.7* 13.4* 17.6* HGB 7.3* 6.7* 6.0*  
HCT 22.2* 20.3* 18.2*  
 167 161 Recent Labs  
  03/19/20 0322 03/18/20 
0329 03/18/20 0328 03/17/20 
0345 *  --  134* 136  
K 4.8  --  4.3 3.1*  
  --  102 106 CO2 25  --  25 24 *  --  48* 133* BUN 42*  --  42* 29* CREA 2.55*  --  2.82* 2.04* CA 7.7* 8.2* 8.0* 7.1*  
PHOS  --   --  5.6*  --   
ALB  --   --   --  2.0*  
TBILI  --   --   --  1.7* SGOT  --   --   --  30 ALT  --   --   --  28 No results for input(s): TROIQ, CPK, CKMB in the last 72 hours. Intake/Output Summary (Last 24 hours) at 3/19/2020 1030 Last data filed at 3/18/2020 1059 Gross per 24 hour Intake 100 ml Output  Net 100 ml Telemetry: SR 
EKG: ST with short NM Addendum: ROSELINE result as follows · Normal wall thickness. Dilated left ventricle. Severe global systolic function. · Dilated right ventricle. Reduced systolic function. Estimated left ventricular ejection fraction is 25 - 30%. Hypokinetic left ventricular wall motion. · Severe mitral valve regurgitation is present. · Moderate to severe tricuspid valve regurgitation is present. · Moderate pulmonary hypertension. · There is left pleural effusion. Assessment:  
 
Principal Problem: 
  Severe sepsis ([facility] [address] (3/10/2020) Active Problems: 
  Cardiomyopathy ([address] (3/16/2020) Bacteremia (3/17/2020) ESRD (end stage renal disease) ([facility] [address] (3/17/2020) Anemia (3/17/2020) Plan:  
Bacteremia:  S/p removal of AV fistula 3/14/20. · ROSELINE performed today under anesthesia to eval for endocarditis-negative · abx per hospitalist  
 
BiV failure:  Newly identified cardiomyopathy with EF 25-30% with decreased RV function. Severe mitral regurgitation. · ROSELINE completed · Fluid balance per dialysis. Has bilateral effusions and ascites · On BB. Has listed allergies to ACEi and ARBs · Evaluate MR as outpatient CAD risk:  Troponin 0.23 at admit for sepsis · New cardiomyopathy · ASA, BB. Lipids low · Poor cath candidate with severe anemia and active infection- recommend right and left heart catheterization as outpatient. HTN: BP improved · Continue BB Anemia:  Hg 7.3 today. plt improved. · Per hospitalist  
· On ASA ESRD:  On HD · Per nephrology Will sign-off and follow as outpatient, please contact if needed. Addendum : responded to a code blue. When arrived to patient room, patient was unresponsive. Hospitalist, pharmacy, RN staff present. Patient PEA on tele. , no pulse palpable. Called by hospitalist, he stated will notify his next of kin. Vanessa Mullins NP 
MSN, RN, [facility] Patient seen and examined by me with nurse practitioner. I personally performed all components of the history, physical, and medical decision making and agree with the assessment and plan as noted.  
 
Kamille Martinez MD

## 2020-03-19 NOTE — PROGRESS NOTES
Hospitalist Progress Note NAME: Desiree Herzog. :  1950 MRN:  [id number] Assessment / Plan: 
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 3/17 + GPC Surgical wound Cx 3/11 + - MRSA UCx + MRSA 
 
-On Vancomycin, 
-has residual hardware in LUE 
 
 
- ROSELINE today without any vegetation 
  
Right superficial thrombophlebitis of the basilic vein - No additional tx indicated. - Supportive care.   
  
ESRD on HD MWF Secondary hyperparathyroidism  
Acidosis  
- Nephrology input/assistance appreciated. - Continue calcitriol 0.25 mcg po 3 times weekly.   
- HD through right jugular temporary catheter. 
  
Anemia of chronic disease, Hb 6.0 today Thrombocytopenia - Transfuse for Hgb <7.0, Transfuse 1 unit yesterday - No obvious sign of active hemorrhage. 
- Continue FeSO4 325 mg po daily.  
  
Hyponatremia 
- resolved. 
  
Elevated LFTs Hyperbilirubinemia - Alk Phos improved. - Bili improving. 
- Monitor.   
  
Hypoxia, Dyspnea Dinh pleural effusions Likely 2/2 Fluid overload from ESRD. continue HD 
 
 
  
  
DM II Episodes of Hypoglycemia 
?due to infection Hypoglycemia this am, also ? poor nutrition and bacteremia which is uncontrolled Will change to sensitive SSI. If gets hypoglycemia then may need d10 drip at low rate. 
  
Alzheimer's dementia - Supportive care.   
  
Chronic systolic heart failure HTN Hypothyroidism 
- levothyroxine 75 mcg po daily.   
  
BPH 
- tamsulosin 0.4 mg po daily.    
  
GERD 
-  pantoprazole 40 mg po daily.    
  
  
18.5 - 24.9 Normal weight / Body mass index is 19.8 kg/m². 
  
Code status: DNR Prophylaxis: SCD Recommended Disposition: TBD 
  
 
Continues to have persistent Bacteremia, Palliative team to discuss with Family today regarding possible hospice. Subjective: Chief Complaint / Reason for Physician Visit \"want his TV on. Pleasant pt. No events Review of Systems: 
Symptom Y/N Comments  Symptom Y/N Comments Fever/Chills n   Chest Pain n   
Poor Appetite y   Edema n   
Cough y   Abdominal Pain n   
Sputum y   Joint Pain SOB/LAUREANO y   Pruritis/Rash Nausea/vomit    Tolerating PT/OT Diarrhea    Tolerating Diet Constipation    Other Could NOT obtain due to:   
 
Objective: VITALS:  
Last 24hrs VS reviewed since prior progress note. Most recent are: 
Patient Vitals for the past 24 hrs: 
 Temp Pulse Resp BP SpO2  
03/19/20 0737 96.2 °F (35.7 °C) 73 18 115/85 99 % 03/19/20 0358 96 °F (35.6 °C) 73 16 116/71 100 % 03/18/20 2035 95.8 °F (35.4 °C) 78 16 132/85 98 % 03/18/20 1516 98.6 °F (37 °C) 79 18 134/79 98 % 03/18/20 1248 98.3 °F (36.8 °C) 74 19 136/77 91 % 03/18/20 1220  74 20 139/89 94 % 03/18/20 1215  76 19 133/73 92 % 03/18/20 1210  75 21 129/81 93 % 03/18/20 1205  75 24 129/77 93 % 03/18/20 1200  75 21 (!) 125/92 94 % 03/18/20 1155  75 21 135/77 95 % 03/18/20 1150  74 25 130/74 93 % 03/18/20 1145  73 20 127/72 92 % 03/18/20 1140  74 19 126/70 92 % 03/18/20 1136  72 19 123/89 93 % 03/18/20 1135  73 19  94 % 03/18/20 1131  74 21 126/70 94 % 03/18/20 1130  74 22  94 % 03/18/20 1129  74 20 123/67 93 % 03/18/20 1127  74 23 121/72 94 % 03/18/20 1125  75 23 123/69 94 % 03/18/20 1123  75 21 129/67 95 % 03/18/20 1122  75 22 129/67 96 % 03/18/20 1121  75 15 129/84 93 % 03/18/20 1120  77 19  92 % 03/18/20 1119  77 19 127/74 93 % 03/18/20 1117  77 19 115/68 (!) 88 % 03/18/20 1115  70 21 (!) 77/53 96 % 03/18/20 1113  63 21 (!) 62/45 94 % 03/18/20 1110  63 22  96 % 03/18/20 1109  65 20 (!) 69/49 95 % 03/18/20 1107  66 21 (!) 75/49 96 % 03/18/20 1105  68 24 97/56 96 % 03/18/20 1103  69 24 98/58 97 % 03/18/20 1101  71 26 106/58 97 % 03/18/20 1100  71 28  96 % 03/18/20 1059  72 (!) 31 100/58 95 % 03/18/20 1057  75 23 107/62 92 % 03/18/20 1045  81 27 142/85 93 % 03/18/20 1030  81 25 151/80 95 % 03/18/20 1015  80 23 157/81 94 % 03/18/20 1000  81 21 152/82 95 % 03/18/20 0951  81 25 153/80 97 % Intake/Output Summary (Last 24 hours) at 3/19/2020 5535 Last data filed at 3/18/2020 1059 Gross per 24 hour Intake 100 ml Output  Net 100 ml PHYSICAL EXAM: 
General: WD, WN. Alert, cooperative, no acute distress   
EENT:  EOMI. Anicteric sclerae. MMM Resp:  Coarse sounds CV:  Regular  rhythm,  No edema GI:  Soft, Non distended, Non tender.  +Bowel sounds Neurologic:  Alert and oriented X 2, normal speech, Psych:   Poor insight. Not anxious nor agitated Skin:  No rashes. No jaundice Reviewed most current lab test results and cultures  YES Reviewed most current radiology test results   YES Review and summation of old records today    NO Reviewed patient's current orders and MAR    YES 
PMH/ reviewed - no change compared to H&P 
________________________________________________________________________ Care Plan discussed with: 
  Comments Patient x Family RN x Care Manager Consultant Multidiciplinary team rounds were held today with , nursing, pharmacist and clinical coordinator. Patient's plan of care was discussed; medications were reviewed and discharge planning was addressed. ________________________________________________________________________ Total NON critical care TIME:  30   Minutes Total CRITICAL CARE TIME Spent:   Minutes non procedure based Comments >50% of visit spent in counseling and coordination of care    
________________________________________________________________________ Dez Paul MD  
 
Procedures: see electronic medical records for all procedures/Xrays and details which were not copied into this note but were reviewed prior to creation of Plan.    
 
LABS: 
 I reviewed today's most current labs and imaging studies. Pertinent labs include: 
Recent Labs  
  03/19/20 0322 03/18/20 0328 03/17/20 
0449 WBC 15.7* 13.4* 17.6* HGB 7.3* 6.7* 6.0*  
HCT 22.2* 20.3* 18.2*  
 167 161 Recent Labs  
  03/19/20 0322 03/18/20 0329 03/18/20 0328 03/17/20 
0345 *  --  134* 136  
K 4.8  --  4.3 3.1*  
  --  102 106 CO2 25  --  25 24 *  --  48* 133* BUN 42*  --  42* 29* CREA 2.55*  --  2.82* 2.04* CA 7.7* 8.2* 8.0* 7.1*  
PHOS  --   --  5.6*  --   
ALB  --   --   --  2.0*  
TBILI  --   --   --  1.7* SGOT  --   --   --  30 ALT  --   --   --  28 Signed: Becky Hall MD

## 2020-03-19 NOTE — PROGRESS NOTES
11:00 Primary RN, Jessica Alcantara went into patient room, no signs of life noted. Dr. Mckayla Pereira into pronounce patent. Time : 11:00 am. Sammie, nursing supervisor, , nursing supervisor, and attending MD. Ol[place] within an hour of patent death. [facility] approved release of body. Palliative gave this writer  home information, this writer placed information on white record of death sheet. Family stated that they would not be by to visit. Post mortem care provided. All lines removed, placed patient in white body bag with toe and bag labeled with patient ID label. [place] [facility] [place] dropped off at nursing staff office

## 2020-03-19 NOTE — PROGRESS NOTES
Responded to code blue on gen surg unit. Patient was DNR. Patient had passed when this  arrived. No family present.  can be contacted for family support if family arrives. ANNAMARIA Bermudez, [facility], Staff  [facility]  Paging Service  [phone])

## 2020-03-19 NOTE — DISCHARGE SUMMARY
Hospitalist Discharge Summary Patient ID: 
Jean Knapp 
[id number] 
89 y.o. 
1950 
3/10/2020 PCP on record: Cady Hubbard MD 
 
Admit date: 3/10/2020 Discharge date and time: 3/19/2020 
 
 
 
 
 
______________________________________________________________________ DISCHARGE SUMMARY/HOSPITAL COURSE:  for full details see H&P, daily progress notes, labs, consult notes. See progress Note for Full details Pt  today at 6 AM.  He went to Sinus jb and asystole. No CPR was done as he was DNR. Had discussed with sister yesterday ,who was health care proxy as per chart, regarding grim prognosis with his Severe HF, Severe MR, HTN, ESRD, dementia. She did not wanted any further surgery or aggressive intervention. Given condolences to sister after he passed away. 
 
 
 
 
 
_______________________________________________________________________ Signed: 
Conner Alexis MD

## 2020-03-26 LAB
BACTERIA SPEC CULT: NORMAL
BACTERIA SPEC CULT: NORMAL
SERVICE CMNT-IMP: NORMAL

## 2020-10-05 NOTE — PROGRESS NOTES
Meth in Lab called critical glucose of 39, patient given 1 amp D50 as he would not follow direction to drink juice. Then 3 applejuices given by mouth and 1 milk. FSBS up to 146 after repleted. Will continue to monitor closely. FERNANDO

## 2021-01-19 NOTE — PROGRESS NOTES
Interdisciplinary team rounds were held 2/7/2017 with the following team members:Care Management, Diabetes Treatment Specialist, Nursing, Nutrition, Pastoral Care, Pharmacy, Physical Therapy, Physician, Respiratory Therapy and Clinical Coordinator. Plan of care discussed. Goal: Discontinue IJ  Line and spencer, adjust medications, transfer orders pending. See MD orders and progress notes for further  interventions and desired outcomes. none

## 2023-09-28 NOTE — ED PROVIDER NOTES
Chief complaint:   Chief Complaint   Patient presents with   • Sore Throat       Vitals:  Visit Vitals  /83 (BP Location: RUE - Right upper extremity, Patient Position: Sitting, Cuff Size: Large Adult)   Pulse 82   Temp 98.4 °F (36.9 °C) (Oral)   Resp 16   SpO2 98%       HISTORY OF PRESENT ILLNESS     Sore throat 2 days no fever      Other significant problems:  Patient Active Problem List    Diagnosis Date Noted   • Crohn's disease of small intestine with complication (CMD) 11/10/2015     Priority: Low   • Migraine 03/25/2013     Priority: Low       PAST MEDICAL, FAMILY AND SOCIAL HISTORY     Medications:  Current Outpatient Medications   Medication Sig Dispense Refill   • albuterol 108 (90 Base) MCG/ACT inhaler Inhale 2 puffs into the lungs every 4 hours as needed for Shortness of Breath or Wheezing. 1 each 0   • EPINEPHrine 0.3 MG/0.3ML Solution Auto-injector injection Inject 0.3 mLs into the muscle as needed (Use for severe allergic symptoms: throat closing, difficulty breathing, dizziness). 3 each 0     No current facility-administered medications for this visit.       Allergies:  ALLERGIES:   Allergen Reactions   • Aspirin    • Ibuprofen SHORTNESS OF BREATH   • Nsaids    • Bactrim Ds HIVES   • Sulfa Antibiotics HIVES   • Sumatriptan SWELLING   • Tramadol SWELLING and Cough       Past Medical  History/Surgeries:  Past Medical History:   Diagnosis Date   • Acid reflux    • ASCUS on Pap smear 6/2014    HPV neg   • Migraine     no aura       Past Surgical History:   Procedure Laterality Date   • Past surgical history      none       Family History:  Family History   Problem Relation Age of Onset   • Diabetes Father         congestive heart failure   • Heart disease Father 48   • Cancer, Colon Father 40   • Celiac Disease Brother    • Cancer Other         uterine: paternal side.   • Cancer Other         NEG 2020 ov   • Cancer, Breast Paternal Aunt    • Cancer, Breast Paternal Grandmother        Social  HPI Comments: Tracy Killian is a 61 y.o. male with PMhx significant for HTN, DM and CKD who presents via EMS to [facility] ED for further evaluation of a possible infection in the pt's dialysis graft at his LUE. Pt states that he was referred to the ED today from [facility] to rule out an abscess. Per pt's dialysis nurse the pt's dialysis graft was erythematous, swollen and warm to the touch. As a result she states that they used the pt's dialysis catheter today for dialysis. Pt specifically denies any pain to his LUE at this time. SHx: (-) tobacco and EtOH use    PCP: Radha Metcalf MD  Nephrology: Vern Pacheco MD    History is limited due to patient's condition. Written by IMAN Fields, as dictated by Charmayne Nip, DO. The history is provided by the patient (dialysis center). No  was used. Past Medical History:   Diagnosis Date    Alzheimer disease     CAD (coronary artery disease)     Cancer ([facility] [address]     prostate    Chronic kidney disease     CKD (chronic kidney disease) stage 4, GFR 15-29 ml/min ([facility])     DM (diabetes mellitus), type 2, uncontrolled ([address]     Hemodialysis patient ([facility] [address]     Hyperlipidemia     Hypertension     Other ill-defined conditions     blind R eye-retina detachment    Other ill-defined conditions     right cerebellopontine angle lipoma. Past Surgical History:   Procedure Laterality Date    COLONOSCOPY,DIAGNOSTIC  11/12/2014         HX HEENT      retinal detachment    HX SHOULDER ARTHROSCOPY      right    HX UROLOGICAL      prostate bx    UPPER GI ENDOSCOPY,BIOPSY  11/12/2014              Family History:   Problem Relation Age of Onset    Heart Disease Mother      Pacemaker    Cancer Father        Social History     Social History    Marital status:      Spouse name: N/A    Number of children: N/A    Years of education: N/A     Occupational History    Not on file.      Social History Main Topics History:  Social History     Tobacco Use   • Smoking status: Never   • Smokeless tobacco: Never   Substance Use Topics   • Alcohol use: Yes     Alcohol/week: 0.0 standard drinks of alcohol     Comment: occasional       REVIEW OF SYSTEMS     Review of Systems   Constitutional: Negative.    HENT: Positive for sore throat.    Eyes: Negative.    Respiratory: Negative.    Cardiovascular: Negative.    Gastrointestinal: Negative.    Endocrine: Negative.    Genitourinary: Negative.    Musculoskeletal: Negative.    Skin: Negative.        PHYSICAL EXAM     Physical Exam  Vitals and nursing note reviewed.   Constitutional:       General: She is not in acute distress.     Appearance: She is well-developed. She is not ill-appearing, toxic-appearing or diaphoretic.   HENT:      Head: Normocephalic and atraumatic.      Right Ear: Hearing, tympanic membrane, ear canal and external ear normal.      Left Ear: Hearing, tympanic membrane, ear canal and external ear normal.      Nose: Mucosal edema and rhinorrhea present.      Mouth/Throat:      Mouth: Mucous membranes are moist.      Pharynx: Oropharynx is clear. Posterior oropharyngeal erythema present. No oropharyngeal exudate.      Neck: Normal range of motion and neck supple.   Eyes:      General: No scleral icterus.        Right eye: No discharge.         Left eye: No discharge.      Conjunctiva/sclera: Conjunctivae normal.   Neck:      Trachea: No tracheal deviation.   Cardiovascular:      Rate and Rhythm: Normal rate and regular rhythm.      Heart sounds: Normal heart sounds. No murmur heard.     No friction rub. No gallop.   Pulmonary:      Effort: Pulmonary effort is normal. No respiratory distress.      Breath sounds: Normal breath sounds. No stridor. No wheezing, rhonchi or rales.   Chest:      Chest wall: No tenderness.   Skin:     General: Skin is warm and dry.      Coloration: Skin is not pale.      Findings: No erythema or rash.   Neurological:      Mental Status: She is   Smoking status: Never Smoker    Smokeless tobacco: Never Used    Alcohol use No    Drug use: No    Sexual activity: Not Currently     Other Topics Concern    Not on file     Social History Narrative         ALLERGIES: Ace inhibitors and Arb-angiotensin receptor antagonist    Review of Systems   Unable to perform ROS: Other       Vitals:    05/05/17 1526   BP: 121/63   Pulse: 94   Resp: 18   Temp: 97.7 °F (36.5 °C)   SpO2: 100%   Weight: 43.3 kg (95 lb 7.4 oz)   Height: 5' 4\" (1.626 m)            Physical Exam   Constitutional: He is oriented to person, place, and time. He appears well-developed and well-nourished. No distress. HENT:   Head: Normocephalic and atraumatic. Mouth/Throat: Oropharynx is clear and moist.   Eyes: Conjunctivae and EOM are normal.   Neck: Neck supple. No JVD present. No tracheal deviation present. Cardiovascular: Normal rate, regular rhythm and intact distal pulses. Exam reveals no gallop and no friction rub. No murmur heard. 2+ peripheral pulses    Pulmonary/Chest: Effort normal and breath sounds normal. No stridor. No respiratory distress. He has no wheezes. Abdominal: Soft. Bowel sounds are normal. He exhibits no distension and no mass. There is no tenderness. There is no guarding. Musculoskeletal: Normal range of motion. He exhibits edema. He exhibits no tenderness. No deformity. Graft over his LUE with palpable thrill. No TTP. Trace pitting edema at the LUE. No fluctuance, erythema, and drainage. Normal ROM of LUE. Neurological: He is alert and oriented to person, place, and time. He has normal strength. No focal deficits   Skin: Skin is warm, dry and intact. No rash noted. Psychiatric: He has a normal mood and affect. His behavior is normal. Judgment and thought content normal.   Nursing note and vitals reviewed.        MDM  Number of Diagnoses or Management Options  Complication of arteriovenous dialysis fistula, initial encounter:   Diagnosis alert and oriented to person, place, and time.      Motor: No abnormal muscle tone.      Coordination: Coordination normal.   Psychiatric:         Mood and Affect: Mood normal.         Behavior: Behavior normal.         Thought Content: Thought content normal.         Judgment: Judgment normal.         ASSESSMENT/PLAN     1. Viral pharyngitis    - predniSONE (DELTASONE) 20 MG tablet; Take 3 tablets by mouth 1 time for 1 dose.  Dispense: 3 tablet; Refill: 0    2. Sore throat    - Streptococcus group A PCR    Jairo Her MD    management comments: Patient presenting from dialysis center to evaluate for possible abscess/cellulitis at fistula site. No erythema, swelling or tenderness on physical exam. Will check labs and bedside US to eval for abscess/infection. Amount and/or Complexity of Data Reviewed  Clinical lab tests: ordered and reviewed  Obtain history from someone other than the patient: yes (Pt's dialysis center)  Review and summarize past medical records: yes    Patient Progress  Patient progress: stable    ED Course       Procedures  Procedure Note - Bedside Ultrasound:  4:13 PM  Performed by: Chris Elkins DO  Superficial US over graft site performed at beside, showing no fluid collection or soft tissue cobblestoning that is consistent with cellulitis or abscess. The procedure took 1-15 minutes, and pt tolerated well. Written by Carole Shrestha, ED Scribe, as dictated by Chris Elkins DO.    LABORATORY TESTS:  Recent Results (from the past 12 hour(s))   CBC WITH AUTOMATED DIFF    Collection Time: 05/05/17  3:58 PM   Result Value Ref Range    WBC 6.6 4.1 - 11.1 K/uL    RBC 4.05 (L) 4.10 - 5.70 M/uL    HGB 12.2 12.1 - 17.0 g/dL    HCT 36.2 (L) 36.6 - 50.3 %    MCV 89.4 80.0 - 99.0 FL    MCH 30.1 26.0 - 34.0 PG    MCHC 33.7 30.0 - 36.5 g/dL    RDW 15.4 (H) 11.5 - 14.5 %    PLATELET 489 382 - 408 K/uL    NEUTROPHILS 60 32 - 75 %    LYMPHOCYTES 20 12 - 49 %    MONOCYTES 16 (H) 5 - 13 %    EOSINOPHILS 3 0 - 7 %    BASOPHILS 1 0 - 1 %    ABS. NEUTROPHILS 4.1 1.8 - 8.0 K/UL    ABS. LYMPHOCYTES 1.3 0.8 - 3.5 K/UL    ABS. MONOCYTES 1.0 0.0 - 1.0 K/UL    ABS. EOSINOPHILS 0.2 0.0 - 0.4 K/UL    ABS.  BASOPHILS 0.0 0.0 - 0.1 K/UL   LACTIC ACID, PLASMA    Collection Time: 05/05/17  3:58 PM   Result Value Ref Range    Lactic acid 1.4 0.4 - 2.0 MMOL/L   METABOLIC PANEL, COMPREHENSIVE    Collection Time: 05/05/17  4:35 PM   Result Value Ref Range    Sodium 140 136 - 145 mmol/L    Potassium 3.7 3.5 - 5.1 mmol/L    Chloride 102 97 - 108 mmol/L    CO2 29 21 - 32 mmol/L    Anion gap 9 5 - 15 mmol/L    Glucose 133 (H) 65 - 100 mg/dL    BUN 5 (L) 6 - 20 MG/DL    Creatinine 1.86 (H) 0.70 - 1.30 MG/DL    BUN/Creatinine ratio 3 (L) 12 - 20      GFR est AA 45 (L) >60 ml/min/1.73m2    GFR est non-AA 37 (L) >60 ml/min/1.73m2    Calcium 8.3 (L) 8.5 - 10.1 MG/DL    Bilirubin, total 1.1 (H) 0.2 - 1.0 MG/DL    ALT (SGPT) 76 12 - 78 U/L    AST (SGOT) 59 (H) 15 - 37 U/L    Alk. phosphatase 344 (H) 45 - 117 U/L    Protein, total 6.8 6.4 - 8.2 g/dL    Albumin 3.3 (L) 3.5 - 5.0 g/dL    Globulin 3.5 2.0 - 4.0 g/dL    A-G Ratio 0.9 (L) 1.1 - 2.2       MEDICATIONS GIVEN:  Medications - No data to display    IMPRESSION:  1. Complication of arteriovenous dialysis fistula, initial encounter        PLAN:  1. Current Discharge Medication List        2. Follow-up Information     Follow up With Details Comments Butch Francis MD Schedule an appointment as soon as possible for a visit  [address]  [phone]      [facility] [place] [facility]  As needed, If symptoms worsen [address] [place] [address]  [address] [facility]  [phone]        Return to ED if worse   Discharge Note:  5:31 PM  The patient is ready for discharge. The patient's signs, symptoms, diagnosis, and discharge instruction have been discussed and the patient has conveyed their understanding. The patient is to follow up as recommended or return to the ER should their symptoms worsen. Plan has been discussed and the patient is in agreement. Written by Alexus Keys ED Scribe, as dictated by Nena Bailey DO. Attestation: This is note is prepared by Alexus Keys, acting as Scribe for Nena Bailey DO. Nena Bailey DO The scribe's documentation has been prepared under my direction and personally reviewed by me in its entirety.  I confirm that the note above accurately reflects all work, treatment, procedures, and medical decision making performed by me. children/spouse

## (undated) DEVICE — INFECTION CONTROL KIT SYS

## (undated) DEVICE — COVER,MAYO STAND,STERILE: Brand: MEDLINE

## (undated) DEVICE — SPONGE HEMOSTAT CELLULS 4X8IN -- SURGICEL

## (undated) DEVICE — SUT PROL 5-0 36IN RB1 DA BLU --

## (undated) DEVICE — SUTURE PERMAHAND SZ 2-0 L30IN NONABSORBABLE BLK SILK W/O A305H

## (undated) DEVICE — REM POLYHESIVE ADULT PATIENT RETURN ELECTRODE: Brand: VALLEYLAB

## (undated) DEVICE — STRAP,POSITIONING,KNEE/BODY,FOAM,4X60": Brand: MEDLINE

## (undated) DEVICE — SUT PROL 6-0 24IN BV1 DA BLU --

## (undated) DEVICE — Device

## (undated) DEVICE — SPONGE GZ W4XL4IN COT 12 PLY TYP VII WVN C FLD DSGN

## (undated) DEVICE — AEGIS 1" DISK 4MM HOLE, PEEL OPEN: Brand: MEDLINE

## (undated) DEVICE — LABEL MED CARD MRMC STRL

## (undated) DEVICE — 1200 GUARD II KIT W/5MM TUBE W/O VAC TUBE: Brand: GUARDIAN

## (undated) DEVICE — SUTURE PERMAHAND SZ 3-0 L30IN NONABSORBABLE BLK SILK BRAID A304H

## (undated) DEVICE — (D)PREP SKN CHLRAPRP APPL 26ML -- CONVERT TO ITEM 371833

## (undated) DEVICE — SLIM BODY SKIN STAPLER: Brand: APPOSE ULC

## (undated) DEVICE — SOLUTION IV 500ML 0.9% SOD CHL FLX CONT

## (undated) DEVICE — KENDALL SCD EXPRESS SLEEVES, KNEE LENGTH, MEDIUM: Brand: KENDALL SCD

## (undated) DEVICE — SPONGE GZ W4XL4IN COT RADPQ HIGHLY ABSRB

## (undated) DEVICE — AGENT HEMSTAT W4XL4IN OXIDIZED REGENERATED CELOS ABSRB SFT

## (undated) DEVICE — SOLUTION IRRIG 1000ML H2O STRL BLT

## (undated) DEVICE — DERMABOND SKIN ADH 0.7ML -- DERMABOND ADVANCED 12/BX

## (undated) DEVICE — NEEDLE HYPO 18GA L1.5IN PNK S STL HUB POLYPR SHLD REG BVL

## (undated) DEVICE — BANDAGE,GAUZE,BULKEE II,4.5"X4.1YD,STRL: Brand: MEDLINE

## (undated) DEVICE — SOLUTION IV 1000ML 0.9% SOD CHL

## (undated) DEVICE — BLADE ASSEMB CLP HAIR FINE --

## (undated) DEVICE — SUTURE VCRL SZ 3-0 L27IN ABSRB UD L26MM SH 1/2 CIR J416H

## (undated) DEVICE — TRAY PREP DRY W/ PREM GLV 2 APPL 6 SPNG 2 UNDPD 1 OVERWRAP

## (undated) DEVICE — 3M™ TEGADERM™ TRANSPARENT FILM DRESSING FRAME STYLE, 1626W, 4 IN X 4-3/4 IN (10 CM X 12 CM), 50/CT 4CT/CASE: Brand: 3M™ TEGADERM™

## (undated) DEVICE — SUTURE PROL SZ 6-0 L24IN NONABSORBABLE BLU L13MM C-1 3/8 8726H

## (undated) DEVICE — DRAPE,REIN 53X77,STERILE: Brand: MEDLINE

## (undated) DEVICE — HANDLE LT SNAP ON ULT DURABLE LENS FOR TRUMPF ALC DISPOSABLE

## (undated) DEVICE — DRAPE PRB US TRNSDCR 6X96IN --

## (undated) DEVICE — DEVON™ KNEE AND BODY STRAP 60" X 3" (1.5 M X 7.6 CM): Brand: DEVON

## (undated) DEVICE — STERILE POLYISOPRENE POWDER-FREE SURGICAL GLOVES: Brand: PROTEXIS

## (undated) DEVICE — SWAB CULT LIQ STUART AGR AERB MOD IN BRK SGL RAYON TIP PLAS 220099] BECTON DICKINSON MICRO]

## (undated) DEVICE — SUTURE MCRYL SZ 4-0 L27IN ABSRB UD L19MM PS-2 1/2 CIR PRIM Y426H

## (undated) DEVICE — TOWEL SURG W17XL27IN STD BLU COT NONFENESTRATED PREWASHED

## (undated) DEVICE — CULTURETTE SGL EVAC TUBE PALL -- 100/CA

## (undated) DEVICE — STERILE POLYISOPRENE POWDER-FREE SURGICAL GLOVES WITH EMOLLIENT COATING: Brand: PROTEXIS